# Patient Record
Sex: MALE | Race: WHITE | NOT HISPANIC OR LATINO | Employment: OTHER | URBAN - NONMETROPOLITAN AREA
[De-identification: names, ages, dates, MRNs, and addresses within clinical notes are randomized per-mention and may not be internally consistent; named-entity substitution may affect disease eponyms.]

---

## 2017-10-02 RX ORDER — AMLODIPINE BESYLATE 10 MG/1
10 TABLET ORAL DAILY
COMMUNITY
End: 2018-07-31 | Stop reason: HOSPADM

## 2017-10-02 RX ORDER — NITROGLYCERIN 0.4 MG/1
0.4 TABLET SUBLINGUAL
COMMUNITY
End: 2020-05-31 | Stop reason: SDUPTHER

## 2017-10-02 RX ORDER — ASPIRIN 81 MG/1
81 TABLET ORAL DAILY
COMMUNITY
End: 2020-05-30 | Stop reason: HOSPADM

## 2017-10-02 RX ORDER — BUMETANIDE 1 MG/1
1 TABLET ORAL 2 TIMES DAILY
COMMUNITY
End: 2017-10-03

## 2017-10-02 RX ORDER — NEBIVOLOL 5 MG/1
5 TABLET ORAL DAILY
COMMUNITY
End: 2017-10-03

## 2017-10-02 RX ORDER — LORATADINE 10 MG/1
10 CAPSULE, LIQUID FILLED ORAL DAILY
COMMUNITY
End: 2018-08-14

## 2017-10-02 RX ORDER — EPLERENONE 50 MG/1
50 TABLET, FILM COATED ORAL DAILY
COMMUNITY
End: 2017-10-03

## 2017-10-03 ENCOUNTER — OFFICE VISIT (OUTPATIENT)
Dept: CARDIOLOGY | Facility: CLINIC | Age: 77
End: 2017-10-03

## 2017-10-03 VITALS
SYSTOLIC BLOOD PRESSURE: 142 MMHG | HEIGHT: 73 IN | BODY MASS INDEX: 29.42 KG/M2 | DIASTOLIC BLOOD PRESSURE: 86 MMHG | WEIGHT: 222 LBS | HEART RATE: 80 BPM | OXYGEN SATURATION: 98 %

## 2017-10-03 DIAGNOSIS — I48.91 ATRIAL FIBRILLATION, UNSPECIFIED TYPE (HCC): ICD-10-CM

## 2017-10-03 DIAGNOSIS — R00.2 PALPITATIONS: Primary | ICD-10-CM

## 2017-10-03 DIAGNOSIS — I10 ESSENTIAL HYPERTENSION: ICD-10-CM

## 2017-10-03 DIAGNOSIS — R53.82 CHRONIC FATIGUE: ICD-10-CM

## 2017-10-03 PROCEDURE — 93000 ELECTROCARDIOGRAM COMPLETE: CPT | Performed by: INTERNAL MEDICINE

## 2017-10-03 PROCEDURE — 99203 OFFICE O/P NEW LOW 30 MIN: CPT | Performed by: INTERNAL MEDICINE

## 2017-10-03 RX ORDER — LISINOPRIL 20 MG/1
20 TABLET ORAL DAILY
COMMUNITY
End: 2018-07-31 | Stop reason: HOSPADM

## 2017-10-03 RX ORDER — MECLIZINE HYDROCHLORIDE 25 MG/1
25 TABLET ORAL 3 TIMES DAILY PRN
COMMUNITY
End: 2018-10-03 | Stop reason: ALTCHOICE

## 2017-10-03 RX ORDER — MULTIVIT-MIN/IRON/FOLIC ACID/K 18-600-40
2000 CAPSULE ORAL DAILY
COMMUNITY

## 2017-10-03 RX ORDER — EPLERENONE 50 MG/1
50 TABLET, FILM COATED ORAL 2 TIMES DAILY
COMMUNITY
End: 2018-07-31 | Stop reason: HOSPADM

## 2017-10-03 NOTE — PROGRESS NOTES
Subjective:     Encounter Date:10/03/2017      Patient ID: Faisal Joseph is a 77 y.o. male with chronic kidney disease, stage IV, hypertension, hyperlipidemia, reported atrial fibrillation who presents today for further evaluation of fatigue.    Referring Provider: Nikita Polanco MD    Reason for Referral: Fatigue    Chief Complaint: Fatigue     Palpitations    This is a recurrent problem. The problem occurs intermittently. The problem has been waxing and waning. Nothing aggravates the symptoms. Associated symptoms include dizziness, an irregular heartbeat, malaise/fatigue and weakness. Pertinent negatives include no anxiety, chest fullness, chest pain, coughing, diaphoresis, fever, nausea, numbness, shortness of breath, syncope or vomiting. He has tried nothing for the symptoms. Risk factors include being male and dyslipidemia. There is no history of drug use, heart disease or a valve disorder.   Fatigue   This is a chronic problem. The problem occurs daily. The problem has been waxing and waning. Associated symptoms include fatigue and weakness. Pertinent negatives include no abdominal pain, change in bowel habit, chest pain, chills, congestion, coughing, diaphoresis, fever, headaches, joint swelling, myalgias, nausea, neck pain, numbness, rash, urinary symptoms, vertigo, visual change or vomiting. Nothing aggravates the symptoms. He has tried nothing for the symptoms.   Hypertension   This is a chronic problem. The problem is unchanged. The problem is controlled. Associated symptoms include malaise/fatigue and palpitations. Pertinent negatives include no blurred vision, chest pain, headaches, neck pain, orthopnea, peripheral edema, PND or shortness of breath. Risk factors for coronary artery disease include dyslipidemia and male gender. Past treatments include calcium channel blockers and ACE inhibitors. The current treatment provides significant improvement. There are no compliance problems.  Hypertensive  end-organ damage includes kidney disease. There is no history of angina. Identifiable causes of hypertension include chronic renal disease.     This is a 77-year-old white male with a history of reported atrial fibrillation, chronicity unknown, hypertension, hyperlipidemia, chronic kidney disease who presents today for further evaluation of fatigue.  The patient says that he was admitted to the hospital in [place] by Dr. Polanco recently.  I am unclear exactly why the patient was admitted as he does not give a concise history today.  In any event, we received records from [place] that indicated that he had a Lexiscan nuclear stress test on 9/7/17 which was negative for ischemia and showed a normal ejection fraction.  The patient today he initially said that he was having some chest discomfort lately to my medical assistant however when I evaluated the patient, he denies any chest discomfort at any point in time.  He says that his main complaint is generalized fatigue that has been present for quite some time and is waxing and waning.  He he is difficult to obtain a history from but the best I can tell he is simply fatigue with no significant exacerbating factors.  He also says that he feels some irregularity to his pulse at times and during my examination, the patient started to lean more towards describing palpitations where he says that he feels like his heart may be racing or fluttering and he notes irregularities.  He says that at [place] he was found have a first-degree AV block.  I did ask him about his potential history of atrial fibrillation and he says that he is not aware that he has atrial fibrillation but only that he has a first-degree AV block.  He denies any history of cardiovascular disease and says that he previously saw Dr. Ybarra here and had a heart catheterization number of years ago with no obstructive disease identified.  He says that his blood pressures generally well-controlled.  He is  followed by the kidney disease clinic as he has a history of chronic renal insufficiency.  Otherwise, the patient is without complaint today.    The following portions of the patient's history were reviewed and updated as appropriate: allergies, current medications, past family history, past medical history, past social history, past surgical history and problem list.     Past Medical History:   Diagnosis Date   • Atrial fibrillation    • Chronic renal disease, stage IV    • Hyperlipidemia    • Hypertension    • Sleep apnea    • Stroke 2007     Past Surgical History:   Procedure Laterality Date   • CARDIAC CATHETERIZATION         Current Outpatient Prescriptions:   •  amLODIPine (NORVASC) 10 MG tablet, Take 10 mg by mouth Daily., Disp: , Rfl:   •  aspirin 81 MG EC tablet, Take 81 mg by mouth Daily., Disp: , Rfl:   •  Cholecalciferol (VITAMIN D) 2000 units capsule, Take  by mouth., Disp: , Rfl:   •  lisinopril (PRINIVIL,ZESTRIL) 20 MG tablet, Take 20 mg by mouth Daily., Disp: , Rfl:   •  Loratadine 10 MG capsule, Take 10 mg by mouth Daily., Disp: , Rfl:   •  magnesium oxide (MAGOX) 400 (241.3 Mg) MG tablet tablet, Take 400 mg by mouth 2 (Two) Times a Day., Disp: , Rfl:   •  meclizine (ANTIVERT) 25 MG tablet, Take 25 mg by mouth 3 (Three) Times a Day As Needed for dizziness., Disp: , Rfl:   •  nitroglycerin (NITROSTAT) 0.4 MG SL tablet, Place 0.4 mg under the tongue Every 5 (Five) Minutes As Needed for Chest Pain. Take no more than 3 doses in 15 minutes., Disp: , Rfl:   •  eplerenone (INSPRA) 50 MG tablet, Take 50 mg by mouth 2 (Two) Times a Day. Taking 1/2 tablet , Disp: , Rfl:     Allergies   Allergen Reactions   • Hydralazine Nausea Only   • Losartan Potassium Nausea Only   • Penicillins    • Spironolactone      Social History   Substance Use Topics   • Smoking status: Former Smoker     Quit date: 1984   • Smokeless tobacco: Never Used   • Alcohol use No     History reviewed. No pertinent family history.    Review  of Systems   Constitution: Positive for fatigue, weakness and malaise/fatigue. Negative for chills, diaphoresis, fever, night sweats and weight loss.   HENT: Negative for congestion and hearing loss.    Eyes: Negative for blurred vision and pain.   Cardiovascular: Positive for irregular heartbeat and palpitations. Negative for chest pain, claudication, dyspnea on exertion, leg swelling, orthopnea, paroxysmal nocturnal dyspnea and syncope.   Respiratory: Negative for cough, hemoptysis, shortness of breath and wheezing.    Endocrine: Negative for cold intolerance, heat intolerance, polydipsia and polyuria.   Hematologic/Lymphatic: Negative for adenopathy and bleeding problem. Does not bruise/bleed easily.   Skin: Negative for color change, poor wound healing and rash.   Musculoskeletal: Negative for arthritis, back pain, joint pain, joint swelling, myalgias and neck pain.   Gastrointestinal: Negative for abdominal pain, change in bowel habit, constipation, diarrhea, heartburn, hematochezia, melena, nausea and vomiting.   Genitourinary: Negative for bladder incontinence, dysuria, frequency, hematuria and nocturia.   Neurological: Positive for dizziness. Negative for focal weakness, headaches, light-headedness, loss of balance, numbness, seizures and vertigo.   Psychiatric/Behavioral: Negative for altered mental status, memory loss and substance abuse. The patient is not nervous/anxious.    Allergic/Immunologic: Negative for hives and persistent infections.       ECG 12 Lead  Date/Time: 10/3/2017 5:34 PM  Performed by: HILARY WALKER  Authorized by: HILARY WALKER   Previous ECG: no previous ECG available  Rhythm: sinus rhythm  Ectopy: atrial premature contractions  Rate: normal  BPM: 76  Conduction: 1st degree  QRS axis: normal  Other findings: PRWP  Clinical impression: abnormal ECG             Objective:     Physical Exam   Constitutional: He is oriented to person, place, and time. Vital signs are  normal. He appears well-developed and well-nourished. He is cooperative.  Non-toxic appearance. No distress.   HENT:   Head: Normocephalic and atraumatic.   Right Ear: External ear normal.   Left Ear: External ear normal.   Nose: Nose normal.   Mouth/Throat: Uvula is midline, oropharynx is clear and moist and mucous membranes are normal. Mucous membranes are not pale, not dry and not cyanotic. No oropharyngeal exudate.   Eyes: EOM and lids are normal. Pupils are equal, round, and reactive to light.   Neck: Normal range of motion. Neck supple. No hepatojugular reflux and no JVD present. Carotid bruit is not present. No tracheal deviation and no edema present. No thyroid mass and no thyromegaly present.   Cardiovascular: Normal rate, regular rhythm, S1 normal, S2 normal, normal heart sounds, intact distal pulses and normal pulses.   No extrasystoles are present. PMI is not displaced.  Exam reveals no gallop and no friction rub.    No murmur heard.  Pulses:       Radial pulses are 2+ on the right side, and 2+ on the left side.        Femoral pulses are 2+ on the right side, and 2+ on the left side.       Dorsalis pedis pulses are 2+ on the right side, and 2+ on the left side.        Posterior tibial pulses are 2+ on the right side, and 2+ on the left side.   Pulmonary/Chest: Effort normal and breath sounds normal. No accessory muscle usage. No respiratory distress. He has no wheezes. He has no rales. He exhibits no tenderness.   Abdominal: Soft. Normal appearance and bowel sounds are normal. He exhibits no distension, no abdominal bruit and no pulsatile midline mass. There is no hepatosplenomegaly. There is no tenderness.   Musculoskeletal: Normal range of motion. He exhibits no edema, tenderness or deformity.   Lymphadenopathy:     He has no cervical adenopathy.   Neurological: He is oriented to person, place, and time. He has normal strength. No cranial nerve deficit.   Skin: Skin is warm, dry and intact. No rash  "noted. He is not diaphoretic. No cyanosis or erythema. Nails show no clubbing.   Psychiatric: He has a normal mood and affect. His speech is normal and behavior is normal. Thought content normal.   Vitals reviewed.    /86 (BP Location: Left arm, Patient Position: Sitting, Cuff Size: Adult)  Pulse 80  Ht 73\" (185.4 cm)  Wt 222 lb (101 kg)  SpO2 98%  BMI 29.29 kg/m2    Data/Lab Review:     Lexiscan nuclear stress test at [facility] on 9/7/17 was negative for ischemia and ejection fraction of 57% was noted.      Assessment:          Diagnosis Plan   1. Palpitations  Holter Monitor - 24 Hour    ECG 12 Lead   2. Atrial fibrillation, unspecified type  Holter Monitor - 24 Hour   3. Chronic fatigue     4. Essential hypertension            Plan:       1.  Palpitations: The patient gives a somewhat circuitous history however it seems that most of his complaints are related to palpitations and fatigue.  His records that were sent with him indicate possibly atrial fibrillation in the past.  The patient himself denies any history of atrial fibrillation.  Therefore, I think to evaluate further, we need to place him in a 24-hour Holter monitor to see if we can identify any sort of rhythm abnormalities other than premature atrial contractions which were present on today's EKG.  We will have the patient follow-up in one month with our nurse practitioner clinic to review the results of the Holter monitor with him and make further plans.    2.  Atrial fibrillation, unspecified type: The papers that the patient brought with him today indicate a history of atrial fibrillation although he denies any personal history of atrial fibrillation.  He is not chronically anticoagulated, based on his home medications.  A Holter monitor has been ordered to evaluate further.    3.  Chronic fatigue: Is unclear exactly what to make of the patient's fatigue as he gives a somewhat vague history.  He had a recent normal " myocardial perfusion imaging study at [place], therefore I am not inclined to pursue any other invasive cardiac workup at this time.  It may be that he has some sort of arrhythmia or significant ectopy that may contribute to his fatigue.  Therefore, we will investigate further with a Holter monitor.    4.  Essential hypertension: The patient's pressure is reasonable at this time.  Continue current medications.    Follow-up: One month with our nurse practitioner clinic to review the results the patient's Holter monitor make further planning.    EMR Dragon/Transcription disclaimer: Much of this encounter note is an electronic transcription/translation of spoken language to printed text. The electronic translation of spoken language may permit erroneous, or at times, nonsensical words or phrases to be inadvertently transcribed; although I have reviewed the note for such errors, some may still exist.

## 2017-10-17 ENCOUNTER — OFFICE VISIT (OUTPATIENT)
Dept: CARDIOLOGY | Facility: CLINIC | Age: 77
End: 2017-10-17

## 2017-10-17 VITALS
WEIGHT: 220 LBS | HEIGHT: 73 IN | SYSTOLIC BLOOD PRESSURE: 140 MMHG | BODY MASS INDEX: 29.16 KG/M2 | DIASTOLIC BLOOD PRESSURE: 80 MMHG | HEART RATE: 70 BPM | RESPIRATION RATE: 18 BRPM

## 2017-10-17 DIAGNOSIS — I10 ESSENTIAL HYPERTENSION: ICD-10-CM

## 2017-10-17 DIAGNOSIS — I49.3 PVCS (PREMATURE VENTRICULAR CONTRACTIONS): ICD-10-CM

## 2017-10-17 DIAGNOSIS — R00.2 PALPITATIONS: Primary | ICD-10-CM

## 2017-10-17 DIAGNOSIS — I49.1 PREMATURE ATRIAL CONTRACTIONS: ICD-10-CM

## 2017-10-17 PROBLEM — E78.2 MIXED HYPERLIPIDEMIA: Status: ACTIVE | Noted: 2017-10-17

## 2017-10-17 PROCEDURE — 99214 OFFICE O/P EST MOD 30 MIN: CPT | Performed by: NURSE PRACTITIONER

## 2017-10-17 RX ORDER — METOPROLOL SUCCINATE 25 MG/1
25 TABLET, EXTENDED RELEASE ORAL DAILY
Qty: 90 TABLET | Refills: 3 | Status: SHIPPED | OUTPATIENT
Start: 2017-10-17 | End: 2018-07-31 | Stop reason: HOSPADM

## 2017-11-17 ENCOUNTER — OFFICE VISIT (OUTPATIENT)
Dept: CARDIOLOGY | Facility: CLINIC | Age: 77
End: 2017-11-17

## 2017-11-17 VITALS
HEART RATE: 60 BPM | BODY MASS INDEX: 29.82 KG/M2 | WEIGHT: 225 LBS | SYSTOLIC BLOOD PRESSURE: 118 MMHG | DIASTOLIC BLOOD PRESSURE: 78 MMHG | HEIGHT: 73 IN | RESPIRATION RATE: 18 BRPM

## 2017-11-17 DIAGNOSIS — I10 ESSENTIAL HYPERTENSION: ICD-10-CM

## 2017-11-17 DIAGNOSIS — I49.3 PVCS (PREMATURE VENTRICULAR CONTRACTIONS): ICD-10-CM

## 2017-11-17 DIAGNOSIS — R00.2 PALPITATIONS: Primary | ICD-10-CM

## 2017-11-17 PROCEDURE — 99213 OFFICE O/P EST LOW 20 MIN: CPT | Performed by: NURSE PRACTITIONER

## 2017-11-17 NOTE — PROGRESS NOTES
Subjective:     Encounter Date:11/17/2017      Patient ID: Faisal Joseph is a 77 y.o. male.    Chief Complaint:  Palpitations    This is a recurrent problem. The problem has been resolved. Pertinent negatives include no chest pain, coughing, dizziness, fever, malaise/fatigue, nausea, near-syncope, shortness of breath, syncope or vomiting. He has tried beta blockers for the symptoms. The treatment provided significant relief.     Patient is here for 1 month follow up after being started on metoprolol for PVCs and PACs on holter and palpitations. Patient states his palpitations have resolved. Patient is tolerating the medication without side effects, no issues with cost. Denies chest pain, shortness of breath, palpitations, orthopnea, PND, dizziness or syncope. Patient has a history of CKD followed by [facility]. Patient reports a longstanding history of hypertension, taking medications since 30. Patient has a history of a stroke. Patient also has atrial fibrillation noted in old records. Patient denies atrial fibrillation, and states he was never anticoagulated. Recent holter monitor identified palpitations related to PVCs and PACs.     The following portions of the patient's history were reviewed and updated as appropriate: allergies, current medications, past family history, past medical history, past social history, past surgical history and problem list.   Prior to Admission medications    Medication Sig Start Date End Date Taking? Authorizing Provider   amLODIPine (NORVASC) 10 MG tablet Take 10 mg by mouth Daily.   Yes Historical Provider, MD   aspirin 81 MG EC tablet Take 81 mg by mouth Daily.   Yes Historical Provider, MD   Cholecalciferol (VITAMIN D) 2000 units capsule Take  by mouth.   Yes Historical Provider, MD   eplerenone (INSPRA) 50 MG tablet Take 50 mg by mouth 2 (Two) Times a Day. Taking 1/2 tablet    Yes Historical Provider, MD   lisinopril (PRINIVIL,ZESTRIL) 20 MG tablet Take 20 mg by mouth  Daily.   Yes Historical Provider, MD   Loratadine 10 MG capsule Take 10 mg by mouth Daily.   Yes Historical Provider, MD   magnesium oxide (MAGOX) 400 (241.3 Mg) MG tablet tablet Take 400 mg by mouth 2 (Two) Times a Day.   Yes Historical Provider, MD   meclizine (ANTIVERT) 25 MG tablet Take 25 mg by mouth 3 (Three) Times a Day As Needed for dizziness.   Yes Historical Provider, MD   metoprolol succinate XL (TOPROL XL) 25 MG 24 hr tablet Take 1 tablet by mouth Daily. 10/17/17  Yes LIZ Infante   nitroglycerin (NITROSTAT) 0.4 MG SL tablet Place 0.4 mg under the tongue Every 5 (Five) Minutes As Needed for Chest Pain. Take no more than 3 doses in 15 minutes.   Yes Historical Provider, MD     Past Medical History:   Diagnosis Date   • Atrial fibrillation    • Chronic renal disease, stage IV    • Hyperlipidemia    • Hypertension    • Sleep apnea    • Stroke 2007       Review of Systems   Constitution: Negative for chills, decreased appetite, fever, malaise/fatigue, weight gain and weight loss.   HENT: Negative for nosebleeds.    Eyes: Negative for visual disturbance.   Cardiovascular: Negative for chest pain, dyspnea on exertion, leg swelling, near-syncope, orthopnea, palpitations, paroxysmal nocturnal dyspnea and syncope.   Respiratory: Negative for cough, hemoptysis, shortness of breath and snoring.    Endocrine: Negative for cold intolerance and heat intolerance.   Hematologic/Lymphatic: Negative for bleeding problem. Does not bruise/bleed easily.   Skin: Negative for rash.   Musculoskeletal: Negative for back pain and falls.   Gastrointestinal: Negative for abdominal pain, constipation, diarrhea, heartburn, melena, nausea and vomiting.   Genitourinary: Negative for hematuria.   Neurological: Negative for dizziness, headaches and light-headedness.   Psychiatric/Behavioral: Negative for altered mental status.   Allergic/Immunologic: Negative for persistent infections.       Procedures       Objective:      "Physical Exam   Constitutional: He is oriented to person, place, and time. He appears well-developed and well-nourished.   HENT:   Head: Normocephalic and atraumatic.   Eyes: Pupils are equal, round, and reactive to light.   Neck: Normal range of motion. Neck supple. No JVD present. Carotid bruit is not present.   Cardiovascular: Normal rate, regular rhythm, normal heart sounds and intact distal pulses.    Pulmonary/Chest: Effort normal and breath sounds normal.   Abdominal: Soft. Bowel sounds are normal.   Musculoskeletal: Normal range of motion.   Neurological: He is alert and oriented to person, place, and time. He has normal reflexes.   Skin: Skin is warm and dry.   Psychiatric: He has a normal mood and affect. His behavior is normal. Judgment and thought content normal.     Blood pressure 118/78, pulse 60, resp. rate 18, height 73\" (185.4 cm), weight 225 lb (102 kg).      Lab Review:       Assessment:          Diagnosis Plan   1. Palpitations     2. Essential hypertension     3. PVCs (premature ventricular contractions)            Plan:       1. Palpitations- have resolved on metoprolol. Follow up in 1 year. Patient to call if return.   2. Blood Pressure controlled  3. PVCs and PACs on holter monitor. No A-fib.          "

## 2018-07-30 ENCOUNTER — HOSPITAL ENCOUNTER (OUTPATIENT)
Facility: HOSPITAL | Age: 78
Setting detail: OBSERVATION
Discharge: HOME | End: 2018-07-31
Attending: INTERNAL MEDICINE | Admitting: NURSE PRACTITIONER

## 2018-07-30 DIAGNOSIS — R00.1 SYMPTOMATIC BRADYCARDIA: ICD-10-CM

## 2018-07-30 DIAGNOSIS — I49.1 PREMATURE ATRIAL CONTRACTIONS: ICD-10-CM

## 2018-07-30 DIAGNOSIS — I49.3 PVCS (PREMATURE VENTRICULAR CONTRACTIONS): Primary | ICD-10-CM

## 2018-07-30 DIAGNOSIS — R00.2 PALPITATIONS: ICD-10-CM

## 2018-07-30 DIAGNOSIS — R53.82 CHRONIC FATIGUE: ICD-10-CM

## 2018-07-30 PROBLEM — N18.4 STAGE 4 CHRONIC KIDNEY DISEASE: Status: ACTIVE | Noted: 2018-07-30

## 2018-07-30 LAB
ANION GAP SERPL CALCULATED.3IONS-SCNC: 10 MMOL/L (ref 4–13)
BUN BLD-MCNC: 25 MG/DL (ref 5–21)
BUN/CREAT SERPL: 13.2 (ref 7–25)
CALCIUM SPEC-SCNC: 9.3 MG/DL (ref 8.4–10.4)
CHLORIDE SERPL-SCNC: 105 MMOL/L (ref 98–110)
CO2 SERPL-SCNC: 24 MMOL/L (ref 24–31)
CREAT BLD-MCNC: 1.89 MG/DL (ref 0.5–1.4)
GFR SERPL CREATININE-BSD FRML MDRD: 35 ML/MIN/1.73
GLUCOSE BLD-MCNC: 99 MG/DL (ref 70–100)
POTASSIUM BLD-SCNC: 4.6 MMOL/L (ref 3.5–5.3)
SODIUM BLD-SCNC: 139 MMOL/L (ref 135–145)

## 2018-07-30 PROCEDURE — G0378 HOSPITAL OBSERVATION PER HR: HCPCS

## 2018-07-30 PROCEDURE — 93005 ELECTROCARDIOGRAM TRACING: CPT | Performed by: NURSE PRACTITIONER

## 2018-07-30 PROCEDURE — G0379 DIRECT REFER HOSPITAL OBSERV: HCPCS

## 2018-07-30 PROCEDURE — 99220 PR INITIAL OBSERVATION CARE/DAY 70 MINUTES: CPT | Performed by: INTERNAL MEDICINE

## 2018-07-30 PROCEDURE — 93010 ELECTROCARDIOGRAM REPORT: CPT | Performed by: INTERNAL MEDICINE

## 2018-07-30 PROCEDURE — 80048 BASIC METABOLIC PNL TOTAL CA: CPT | Performed by: NURSE PRACTITIONER

## 2018-07-30 RX ORDER — CETIRIZINE HYDROCHLORIDE 5 MG/1
5 TABLET ORAL DAILY
Status: DISCONTINUED | OUTPATIENT
Start: 2018-07-30 | End: 2018-07-31 | Stop reason: HOSPADM

## 2018-07-30 RX ORDER — ASPIRIN 81 MG/1
81 TABLET ORAL DAILY
Status: DISCONTINUED | OUTPATIENT
Start: 2018-07-30 | End: 2018-07-31 | Stop reason: HOSPADM

## 2018-07-30 RX ORDER — NITROGLYCERIN 0.4 MG/1
0.4 TABLET SUBLINGUAL
Status: DISCONTINUED | OUTPATIENT
Start: 2018-07-30 | End: 2018-07-31 | Stop reason: HOSPADM

## 2018-07-30 RX ADMIN — MAGNESIUM GLUCONATE 500 MG ORAL TABLET 400 MG: 500 TABLET ORAL at 14:50

## 2018-07-30 RX ADMIN — CETIRIZINE HYDROCHLORIDE 5 MG: 5 TABLET ORAL at 14:50

## 2018-07-31 ENCOUNTER — APPOINTMENT (OUTPATIENT)
Dept: CARDIOLOGY | Facility: HOSPITAL | Age: 78
End: 2018-07-31
Attending: INTERNAL MEDICINE

## 2018-07-31 VITALS
BODY MASS INDEX: 28.92 KG/M2 | DIASTOLIC BLOOD PRESSURE: 85 MMHG | HEART RATE: 64 BPM | WEIGHT: 218.2 LBS | TEMPERATURE: 97.5 F | RESPIRATION RATE: 18 BRPM | SYSTOLIC BLOOD PRESSURE: 141 MMHG | HEIGHT: 73 IN | OXYGEN SATURATION: 98 %

## 2018-07-31 LAB
BH CV ECHO MEAS - AO MAX PG (FULL): 0.5 MMHG
BH CV ECHO MEAS - AO MAX PG: 4.1 MMHG
BH CV ECHO MEAS - AO MEAN PG (FULL): 0 MMHG
BH CV ECHO MEAS - AO MEAN PG: 2 MMHG
BH CV ECHO MEAS - AO ROOT AREA (BSA CORRECTED): 1.4
BH CV ECHO MEAS - AO ROOT AREA: 7.5 CM^2
BH CV ECHO MEAS - AO ROOT DIAM: 3.1 CM
BH CV ECHO MEAS - AO V2 MAX: 101 CM/SEC
BH CV ECHO MEAS - AO V2 MEAN: 71.8 CM/SEC
BH CV ECHO MEAS - AO V2 VTI: 24 CM
BH CV ECHO MEAS - AVA(I,A): 3.1 CM^2
BH CV ECHO MEAS - AVA(I,D): 3.1 CM^2
BH CV ECHO MEAS - AVA(V,A): 3.2 CM^2
BH CV ECHO MEAS - AVA(V,D): 3.2 CM^2
BH CV ECHO MEAS - BSA(HAYCOCK): 2.3 M^2
BH CV ECHO MEAS - BSA: 2.2 M^2
BH CV ECHO MEAS - BZI_BMI: 28.8 KILOGRAMS/M^2
BH CV ECHO MEAS - BZI_METRIC_HEIGHT: 185.4 CM
BH CV ECHO MEAS - BZI_METRIC_WEIGHT: 98.9 KG
BH CV ECHO MEAS - EDV(CUBED): 103.8 ML
BH CV ECHO MEAS - EDV(TEICH): 102.4 ML
BH CV ECHO MEAS - EF(CUBED): 76.5 %
BH CV ECHO MEAS - EF(TEICH): 68.5 %
BH CV ECHO MEAS - ESV(CUBED): 24.4 ML
BH CV ECHO MEAS - ESV(TEICH): 32.2 ML
BH CV ECHO MEAS - FS: 38.3 %
BH CV ECHO MEAS - IVS/LVPW: 0.7
BH CV ECHO MEAS - IVSD: 0.7 CM
BH CV ECHO MEAS - LA DIMENSION: 4.3 CM
BH CV ECHO MEAS - LA/AO: 1.4
BH CV ECHO MEAS - LAT PEAK E' VEL: 11.9 CM/SEC
BH CV ECHO MEAS - LV MASS(C)D: 132.3 GRAMS
BH CV ECHO MEAS - LV MASS(C)DI: 59.3 GRAMS/M^2
BH CV ECHO MEAS - LV MAX PG: 3.6 MMHG
BH CV ECHO MEAS - LV MEAN PG: 2 MMHG
BH CV ECHO MEAS - LV V1 MAX: 94.6 CM/SEC
BH CV ECHO MEAS - LV V1 MEAN: 59.3 CM/SEC
BH CV ECHO MEAS - LV V1 VTI: 21.4 CM
BH CV ECHO MEAS - LVIDD: 4.7 CM
BH CV ECHO MEAS - LVIDS: 2.9 CM
BH CV ECHO MEAS - LVOT AREA (M): 3.5 CM^2
BH CV ECHO MEAS - LVOT AREA: 3.5 CM^2
BH CV ECHO MEAS - LVOT DIAM: 2.1 CM
BH CV ECHO MEAS - LVPWD: 1 CM
BH CV ECHO MEAS - MED PEAK E' VEL: 7.62 CM/SEC
BH CV ECHO MEAS - MV A MAX VEL: 82 CM/SEC
BH CV ECHO MEAS - MV DEC TIME: 0.22 SEC
BH CV ECHO MEAS - MV E MAX VEL: 89.7 CM/SEC
BH CV ECHO MEAS - MV E/A: 1.1
BH CV ECHO MEAS - RAP SYSTOLE: 5 MMHG
BH CV ECHO MEAS - RVSP: 20.4 MMHG
BH CV ECHO MEAS - SI(AO): 81.2 ML/M^2
BH CV ECHO MEAS - SI(CUBED): 35.6 ML/M^2
BH CV ECHO MEAS - SI(LVOT): 33.2 ML/M^2
BH CV ECHO MEAS - SI(TEICH): 31.4 ML/M^2
BH CV ECHO MEAS - SV(AO): 181.1 ML
BH CV ECHO MEAS - SV(CUBED): 79.4 ML
BH CV ECHO MEAS - SV(LVOT): 74.1 ML
BH CV ECHO MEAS - SV(TEICH): 70.1 ML
BH CV ECHO MEAS - TR MAX VEL: 196 CM/SEC
BH CV ECHO MEASUREMENTS AVERAGE E/E' RATIO: 9.19
LEFT ATRIUM VOLUME INDEX: 30.4 ML/M2
LEFT ATRIUM VOLUME: 67.8 CM3
MAXIMAL PREDICTED HEART RATE: 143 BPM
STRESS TARGET HR: 122 BPM

## 2018-07-31 PROCEDURE — 99217 PR OBSERVATION CARE DISCHARGE MANAGEMENT: CPT | Performed by: INTERNAL MEDICINE

## 2018-07-31 PROCEDURE — G0378 HOSPITAL OBSERVATION PER HR: HCPCS

## 2018-07-31 PROCEDURE — 93306 TTE W/DOPPLER COMPLETE: CPT

## 2018-07-31 PROCEDURE — 93306 TTE W/DOPPLER COMPLETE: CPT | Performed by: INTERNAL MEDICINE

## 2018-07-31 RX ADMIN — CETIRIZINE HYDROCHLORIDE 5 MG: 5 TABLET ORAL at 08:07

## 2018-07-31 RX ADMIN — ASPIRIN 81 MG: 81 TABLET ORAL at 08:07

## 2018-08-14 ENCOUNTER — OFFICE VISIT (OUTPATIENT)
Dept: CARDIOLOGY | Facility: CLINIC | Age: 78
End: 2018-08-14

## 2018-08-14 VITALS
SYSTOLIC BLOOD PRESSURE: 158 MMHG | HEIGHT: 73 IN | BODY MASS INDEX: 28.89 KG/M2 | DIASTOLIC BLOOD PRESSURE: 84 MMHG | WEIGHT: 218 LBS | HEART RATE: 72 BPM

## 2018-08-14 DIAGNOSIS — R00.1 SYMPTOMATIC BRADYCARDIA: ICD-10-CM

## 2018-08-14 DIAGNOSIS — I63.9 CEREBROVASCULAR ACCIDENT (CVA), UNSPECIFIED MECHANISM (HCC): ICD-10-CM

## 2018-08-14 DIAGNOSIS — R00.2 PALPITATIONS: Primary | ICD-10-CM

## 2018-08-14 DIAGNOSIS — I10 ESSENTIAL HYPERTENSION: ICD-10-CM

## 2018-08-14 PROCEDURE — 99213 OFFICE O/P EST LOW 20 MIN: CPT | Performed by: PHYSICIAN ASSISTANT

## 2018-08-14 PROCEDURE — 93000 ELECTROCARDIOGRAM COMPLETE: CPT | Performed by: PHYSICIAN ASSISTANT

## 2018-08-14 RX ORDER — LISINOPRIL 20 MG/1
20 TABLET ORAL 2 TIMES DAILY
Status: ON HOLD | COMMUNITY
End: 2020-06-16 | Stop reason: SDUPTHER

## 2018-08-14 RX ORDER — EPLERENONE 50 MG/1
50 TABLET, FILM COATED ORAL DAILY
Status: ON HOLD | COMMUNITY
End: 2020-06-09

## 2018-08-14 NOTE — PROGRESS NOTES
Subjective:     Encounter Date:08/14/2018      Patient ID: Faisal Joseph is a 77 y.o. male w hx of HTN, CVA who [facility]-up after hospitalization for symptomatic bradycardia. He notes that he has felt a little better since discharge. He continues with chronic dizziness, he reports due to stroke history. He denies any chest pain. He endorses he continues with palpitations every day, unchanged. He denies any syncope or similar complaint. He reports he has chronic, stable exertional dyspnea x  3 years. He notes his blood pressure has been averaging systolic in 150s.     Chief Complaint:  Palpitations    This is a chronic problem. The current episode started more than 1 year ago. The problem occurs 2 to 4 times per day. The problem has been unchanged. Nothing aggravates the symptoms. Associated symptoms include dizziness. Pertinent negatives include no chest pain, irregular heartbeat, malaise/fatigue, nausea, near-syncope, shortness of breath, syncope or vomiting. He has tried beta blockers for the symptoms. The treatment provided mild relief. Risk factors include being male and family history. There is no history of heart disease.   Hypertension   This is a chronic problem. The current episode started more than 1 year ago. The problem is unchanged. The problem is uncontrolled. Associated symptoms include palpitations. Pertinent negatives include no chest pain, malaise/fatigue, orthopnea, PND or shortness of breath. There are no associated agents to hypertension. Risk factors for coronary artery disease include male gender and family history. Past treatments include ACE inhibitors. Current antihypertension treatment includes ACE inhibitors. The current treatment provides moderate improvement. Compliance problems include exercise and diet.  There is no history of heart failure. Identifiable causes of hypertension include chronic renal disease.       The following portions of the  patient's history were reviewed and updated as appropriate: allergies, current medications, past family history, past medical history, past social history, past surgical history and problem list.    Review of Systems   Constitution: Negative for malaise/fatigue and weight gain.   Cardiovascular: Positive for dyspnea on exertion and palpitations. Negative for chest pain, claudication, irregular heartbeat, leg swelling, near-syncope, orthopnea, paroxysmal nocturnal dyspnea and syncope.   Respiratory: Negative for hemoptysis and shortness of breath.    Hematologic/Lymphatic: Negative for bleeding problem. Does not bruise/bleed easily.   Skin: Negative for poor wound healing.   Musculoskeletal: Negative for myalgias.   Gastrointestinal: Negative for melena, nausea and vomiting.   Genitourinary: Negative for hematuria.   Neurological: Positive for dizziness and light-headedness. Negative for focal weakness.   Psychiatric/Behavioral: Negative for memory loss.   All other systems reviewed and are negative.        ECG 12 Lead  Date/Time: 8/14/2018 9:42 AM  Performed by: TONI UMAÑA  Authorized by: TONI UMAÑA   Comparison: compared with previous ECG from 7/30/2018  Similar to previous ECG  Rhythm: sinus rhythm  Ectopy: atrial premature contractions  Rate: normal  QRS axis: right  Clinical impression: abnormal ECG               Objective:     Physical Exam   Constitutional: He is oriented to person, place, and time. He appears well-developed and well-nourished.   HENT:   Head: Normocephalic and atraumatic.   Eyes: Pupils are equal, round, and reactive to light. Conjunctivae and EOM are normal.   Neck: Normal range of motion. Neck supple. No JVD present.   Cardiovascular: Normal rate, S1 normal, S2 normal, normal heart sounds, intact distal pulses and normal pulses.  An irregular rhythm present.   No murmur heard.  Pulmonary/Chest: Effort normal and breath sounds normal. No respiratory distress.   Abdominal: Soft. Bowel  "sounds are normal. He exhibits no distension.   Musculoskeletal: He exhibits no edema or tenderness.   Neurological: He is alert and oriented to person, place, and time.   Skin: Skin is warm and dry.   Psychiatric: He has a normal mood and affect. Judgment normal.   Vitals reviewed.      /84   Pulse 72   Ht 185.4 cm (73\")   Wt 98.9 kg (218 lb)   BMI 28.76 kg/m²     Current Outpatient Prescriptions:   •  aspirin 81 MG EC tablet, Take 81 mg by mouth Daily., Disp: , Rfl:   •  Cholecalciferol (VITAMIN D) 2000 units capsule, Take  by mouth Daily., Disp: , Rfl:   •  eplerenone (INSPRA) 50 MG tablet, Take 50 mg by mouth Daily., Disp: , Rfl:   •  lisinopril (PRINIVIL,ZESTRIL) 20 MG tablet, Take 20 mg by mouth Daily., Disp: , Rfl:   •  magnesium oxide (MAGOX) 400 (241.3 Mg) MG tablet tablet, Take 400 mg by mouth 1 (One) Time., Disp: , Rfl:   •  meclizine (ANTIVERT) 25 MG tablet, Take 25 mg by mouth 3 (Three) Times a Day As Needed for dizziness., Disp: , Rfl:   •  nitroglycerin (NITROSTAT) 0.4 MG SL tablet, Place 0.4 mg under the tongue Every 5 (Five) Minutes As Needed for Chest Pain. Take no more than 3 doses in 15 minutes., Disp: , Rfl:   Past Medical History:   Diagnosis Date   • Atrial fibrillation (CMS/HCC)    • Chronic renal disease, stage IV (CMS/HCC)    • Hyperlipidemia    • Hypertension    • Sleep apnea    • Stroke (CMS/HCC) 2007     Past Surgical History:   Procedure Laterality Date   • CARDIAC CATHETERIZATION       Allergies   Allergen Reactions   • Hydralazine Nausea Only   • Losartan Potassium Nausea Only   • Penicillins Hives   • Spironolactone Unknown (See Comments)     Pt unsure of reaction     Social History     Social History   • Marital status:      Spouse name: N/A   • Number of children: N/A   • Years of education: N/A     Occupational History   • Not on file.     Social History Main Topics   • Smoking status: Former Smoker     Quit date: 1984   • Smokeless tobacco: Never Used   • " Alcohol use No   • Drug use: No   • Sexual activity: Defer     Other Topics Concern   • Not on file     Social History Narrative   • No narrative on file     Family History   Problem Relation Age of Onset   • Cancer Mother    • Cancer Father      Patient's Body mass index is 28.76 kg/m². BMI is above normal parameters. Recommendations include: educational material.  Current outpatient and discharge medications have been reconciled for the patient.  Reviewed by: Tamar Willoughby PA-C        Assessment:          Diagnosis Plan   1. Palpitations      with frequent PACs   2. Essential hypertension      Poorly controlled   3. Symptomatic bradycardia      bradycardia appears to have improved since holding BB          Plan:     1. I tried to increase patient's Lisinopril to 40 mg, but he tells me he wants his kidney doctor to adjust his anti-hypertensives and will not let me do so. I have instructed him to continue monitoring this and follow-up with nephrology regarding this issue next month as scheduled.   2. Continue MCOT- patient has 2 weeks left. Further recommendations pending this report.   3. Follow-up as scheduled with Dr. Brice in October, unless needed sooner  4. Verbalized understanding of instructions.

## 2018-08-14 NOTE — PATIENT INSTRUCTIONS
Heart-Healthy Eating Plan  Many factors influence your heart health, including eating and exercise habits. Heart (coronary) risk increases with abnormal blood fat (lipid) levels. Heart-healthy meal planning includes limiting unhealthy fats, increasing healthy fats, and making other small dietary changes. This includes maintaining a healthy body weight to help keep lipid levels within a normal range.  What is my plan?  Your health care provider recommends that you:  · Get no more than _________% of the total calories in your daily diet from fat.  · Limit your intake of saturated fat to less than _________% of your total calories each day.  · Limit the amount of cholesterol in your diet to less than _________ mg per day.    What types of fat should I choose?  · Choose healthy fats more often. Choose monounsaturated and polyunsaturated fats, such as olive oil and canola oil, flaxseeds, walnuts, almonds, and seeds.  · Eat more omega-3 fats. Good choices include salmon, mackerel, sardines, tuna, flaxseed oil, and ground flaxseeds. Aim to eat fish at least two times each week.  · Limit saturated fats. Saturated fats are primarily found in animal products, such as meats, butter, and cream. Plant sources of saturated fats include palm oil, palm kernel oil, and coconut oil.  · Avoid foods with partially hydrogenated oils in them. These contain trans fats. Examples of foods that contain trans fats are stick margarine, some tub margarines, cookies, crackers, and other baked goods.  What general guidelines do I need to follow?  · Check food labels carefully to identify foods with trans fats or high amounts of saturated fat.  · Fill one half of your plate with vegetables and green salads. Eat 4-5 servings of vegetables per day. A serving of vegetables equals 1 cup of raw leafy vegetables, ½ cup of raw or cooked cut-up vegetables, or ½ cup of vegetable juice.  · Fill one fourth of your plate with whole grains. Look for the word  "\"whole\" as the first word in the ingredient list.  · Fill one fourth of your plate with lean protein foods.  · Eat 4-5 servings of fruit per day. A serving of fruit equals one medium whole fruit, ¼ cup of dried fruit, ½ cup of fresh, frozen, or canned fruit, or ½ cup of 100% fruit juice.  · Eat more foods that contain soluble fiber. Examples of foods that contain this type of fiber are apples, broccoli, carrots, beans, peas, and barley. Aim to get 20-30 g of fiber per day.  · Eat more home-cooked food and less restaurant, buffet, and fast food.  · Limit or avoid alcohol.  · Limit foods that are high in starch and sugar.  · Avoid fried foods.  · Cook foods by using methods other than frying. Baking, boiling, grilling, and broiling are all great options. Other fat-reducing suggestions include:  ? Removing the skin from poultry.  ? Removing all visible fats from meats.  ? Skimming the fat off of stews, soups, and gravies before serving them.  ? Steaming vegetables in water or broth.  · Lose weight if you are overweight. Losing just 5-10% of your initial body weight can help your overall health and prevent diseases such as diabetes and heart disease.  · Increase your consumption of nuts, legumes, and seeds to 4-5 servings per week. One serving of dried beans or legumes equals ½ cup after being cooked, one serving of nuts equals 1½ ounces, and one serving of seeds equals ½ ounce or 1 tablespoon.  · You may need to monitor your salt (sodium) intake, especially if you have high blood pressure. Talk with your health care provider or dietitian to get more information about reducing sodium.  What foods can I eat?  Grains    Breads, including Maltese, white, jeevan, wheat, raisin, rye, oatmeal, and Italian. Tortillas that are neither fried nor made with lard or trans fat. Low-fat rolls, including hotdog and hamburger buns and English muffins. Biscuits. Muffins. Waffles. Pancakes. Light popcorn. Whole-grain cereals. Flatbread. " Odette toast. Pretzels. Breadsticks. Rusks. Low-fat snacks and crackers, including oyster, saltine, matzo, doris, animal, and rye. Rice and pasta, including brown rice and those that are made with whole wheat.  Vegetables  All vegetables.  Fruits  All fruits, but limit coconut.  Meats and Other Protein Sources  Lean, well-trimmed beef, veal, pork, and lamb. Chicken and turkey without skin. All fish and shellfish. Wild duck, rabbit, pheasant, and venison. Egg whites or low-cholesterol egg substitutes. Dried beans, peas, lentils, and tofu. Seeds and most nuts.  Dairy  Low-fat or nonfat cheeses, including ricotta, string, and mozzarella. Skim or 1% milk that is liquid, powdered, or evaporated. Buttermilk that is made with low-fat milk. Nonfat or low-fat yogurt.  Beverages  Mineral water. Diet carbonated beverages.  Sweets and Desserts  Sherbets and fruit ices. Honey, jam, marmalade, jelly, and syrups. Meringues and gelatins. Pure sugar candy, such as hard candy, jelly beans, gumdrops, mints, marshmallows, and small amounts of dark chocolate. Maynor food cake.  Eat all sweets and desserts in moderation.  Fats and Oils  Nonhydrogenated (trans-free) margarines. Vegetable oils, including soybean, sesame, sunflower, olive, peanut, safflower, corn, canola, and cottonseed. Salad dressings or mayonnaise that are made with a vegetable oil. Limit added fats and oils that you use for cooking, baking, salads, and as spreads.  Other  Cocoa powder. Coffee and tea. All seasonings and condiments.  The items listed above may not be a complete list of recommended foods or beverages. Contact your dietitian for more options.  What foods are not recommended?  Grains  Breads that are made with saturated or trans fats, oils, or whole milk. Croissants. Butter rolls. Cheese breads. Sweet rolls. Donuts. Buttered popcorn. Chow mein noodles. High-fat crackers, such as cheese or butter crackers.  Meats and Other Protein Sources  Fatty meats, such  as hotdogs, short ribs, sausage, spareribs, jackson, ribeye roast or steak, and mutton. High-fat deli meats, such as salami and bologna. Caviar. Domestic duck and goose. Organ meats, such as kidney, liver, sweetbreads, brains, gizzard, chitterlings, and heart.  Dairy  Cream, sour cream, cream cheese, and creamed cottage cheese. Whole milk cheeses, including blue (carmen), Cory Rolando, Brie, Gianluca, American, Havarti, Swiss, cheddar, Camembert, and [place]. Whole or 2% milk that is liquid, evaporated, or condensed. Whole buttermilk. Cream sauce or high-fat cheese sauce. Yogurt that is made from whole milk.  Beverages  Regular sodas and drinks with added sugar.  Sweets and Desserts  Frosting. Pudding. Cookies. Cakes other than pattie food cake. Candy that has milk chocolate or white chocolate, hydrogenated fat, butter, coconut, or unknown ingredients. Buttered syrups. Full-fat ice cream or ice cream drinks.  Fats and Oils  Gravy that has suet, meat fat, or shortening. Cocoa butter, hydrogenated oils, palm oil, coconut oil, palm kernel oil. These can often be found in baked products, candy, fried foods, nondairy creamers, and whipped toppings. Solid fats and shortenings, including jackson fat, salt pork, lard, and butter. Nondairy cream substitutes, such as coffee creamers and sour cream substitutes. Salad dressings that are made of unknown oils, cheese, or sour cream.  The items listed above may not be a complete list of foods and beverages to avoid. Contact your dietitian for more information.  This information is not intended to replace advice given to you by your health care provider. Make sure you discuss any questions you have with your health care provider.  Document Released: 09/26/2009 Document Revised: 07/07/2017 Document Reviewed: 06/11/2015  [employer] Interactive Patient Education © 2018 [employer]    Exercising to Lose Weight  Exercising can help you to lose weight. In order to lose weight through exercise,  you need to do vigorous-intensity exercise. You can tell that you are exercising with vigorous intensity if you are breathing very hard and fast and cannot hold a conversation while exercising.  Moderate-intensity exercise helps to maintain your current weight. You can tell that you are exercising at a moderate level if you have a higher heart rate and faster breathing, but you are still able to hold a conversation.  How often should I exercise?  Choose an activity that you enjoy and set realistic goals. Your health care provider can help you to make an activity plan that works for you. Exercise regularly as directed by your health care provider. This may include:  · Doing resistance training twice each week, such as:  ? Push-ups.  ? Sit-ups.  ? Lifting weights.  ? Using resistance bands.  · Doing a given intensity of exercise for a given amount of time. Choose from these options:  ? 150 minutes of moderate-intensity exercise every week.  ? 75 minutes of vigorous-intensity exercise every week.  ? A mix of moderate-intensity and vigorous-intensity exercise every week.    Children, pregnant women, people who are out of shape, people who are overweight, and older adults may need to consult a health care provider for individual recommendations. If you have any sort of medical condition, be sure to consult your health care provider before starting a new exercise program.  What are some activities that can help me to lose weight?  · Walking at a rate of at least 4.5 miles an hour.  · Jogging or running at a rate of 5 miles per hour.  · Biking at a rate of at least 10 miles per hour.  · Lap swimming.  · Roller-skating or in-line skating.  · Cross-country skiing.  · Vigorous competitive sports, such as football, basketball, and soccer.  · Jumping rope.  · Aerobic dancing.  How can I be more active in my day-to-day activities?  · Use the stairs instead of the elevator.  · Take a walk during your lunch break.  · If you drive,  park your car farther away from work or school.  · If you take public transportation, get off one stop early and walk the rest of the way.  · Make all of your phone calls while standing up and walking around.  · Get up, stretch, and walk around every 30 minutes throughout the day.  What guidelines should I follow while exercising?  · Do not exercise so much that you hurt yourself, feel dizzy, or get very short of breath.  · Consult your health care provider prior to starting a new exercise program.  · Wear comfortable clothes and shoes with good support.  · Drink plenty of water while you exercise to prevent dehydration or heat stroke. Body water is lost during exercise and must be replaced.  · Work out until you breathe faster and your heart beats faster.  This information is not intended to replace advice given to you by your health care provider. Make sure you discuss any questions you have with your health care provider.  Document Released: 01/20/2012 Document Revised: 05/25/2017 Document Reviewed: 05/21/2015  [employer] Interactive Patient Education © 2018 [employer]

## 2018-10-03 ENCOUNTER — OFFICE VISIT (OUTPATIENT)
Dept: CARDIOLOGY | Facility: CLINIC | Age: 78
End: 2018-10-03

## 2018-10-03 VITALS
HEART RATE: 70 BPM | DIASTOLIC BLOOD PRESSURE: 98 MMHG | OXYGEN SATURATION: 99 % | BODY MASS INDEX: 29.8 KG/M2 | HEIGHT: 72 IN | WEIGHT: 220 LBS | SYSTOLIC BLOOD PRESSURE: 140 MMHG

## 2018-10-03 DIAGNOSIS — I10 ESSENTIAL HYPERTENSION: Primary | ICD-10-CM

## 2018-10-03 DIAGNOSIS — R00.1 SYMPTOMATIC BRADYCARDIA: ICD-10-CM

## 2018-10-03 DIAGNOSIS — R00.2 PALPITATIONS: ICD-10-CM

## 2018-10-03 PROCEDURE — 93000 ELECTROCARDIOGRAM COMPLETE: CPT | Performed by: INTERNAL MEDICINE

## 2018-10-03 PROCEDURE — 99214 OFFICE O/P EST MOD 30 MIN: CPT | Performed by: INTERNAL MEDICINE

## 2018-10-03 RX ORDER — AMLODIPINE BESYLATE 5 MG/1
5 TABLET ORAL DAILY
Qty: 30 TABLET | Refills: 11 | Status: SHIPPED | OUTPATIENT
Start: 2018-10-03 | End: 2019-10-31 | Stop reason: SDUPTHER

## 2018-10-03 RX ORDER — LORATADINE 10 MG/1
1 CAPSULE, LIQUID FILLED ORAL DAILY
COMMUNITY
End: 2019-08-25 | Stop reason: HOSPADM

## 2018-10-03 NOTE — PROGRESS NOTES
Subjective:     Encounter Date:10/03/2018      Patient ID: Faisal Joseph is a 78 y.o. male with history of PACs, PVCs, hypertension with rather recent hospitalization with bradycardia and hypotension, here for follow-up.    Chief Complaint: Follow-up    Palpitations    This is a recurrent problem. The problem occurs intermittently. The problem has been waxing and waning. Nothing aggravates the symptoms. Associated symptoms include malaise/fatigue. Pertinent negatives include no chest pain, coughing, diaphoresis, dizziness, fever, nausea, numbness, shortness of breath, syncope, vomiting or weakness. He has tried nothing for the symptoms. Risk factors include being male. There is no history of hyperthyroidism.   Hypertension   This is a chronic problem. The problem has been waxing and waning since onset. The problem is uncontrolled. Associated symptoms include malaise/fatigue and palpitations. Pertinent negatives include no chest pain, headaches, orthopnea, peripheral edema, PND or shortness of breath. Past treatments include beta blockers. Current antihypertension treatment includes diuretics and ACE inhibitors. The current treatment provides mild improvement. There are no compliance problems.  There is no history of angina, CVA or heart failure.     This patient presents today for follow-up.  He says that overall he has been doing reasonably well but he says that he has had some good days and bad days.  When asked further about this, the patient says that he will have occasional palpitations.  He says that he becomes very frightened during these episodes and thinks that his heart may stop.  He checks his pulse and notices 3 or 4 beats out of every minute or so that he says are hard beats and seems somewhat off cycle.  These typically last for a few hours and then are gone and he has days in between where he has absolutely no symptoms.  He has worn a monitor and is noted to have PVCs and PACs.  The patient says  that during these episodes, he can feel some what fatigue but he also says that he may be simply worried about his symptoms and may be more fatigue secondary to this.  He denies any syncopal episodes.  He did have bradycardia which was present during the hospital stay and metoprolol was discontinued.  At that time, most of his blood pressure medications were discontinued to see was relatively hypotensive as well.  Since that time, he has started back on eplerenone and lisinopril.  He says that his blood pressure has never been under as good control as it was previously but he has certainly had no episodes of low blood pressure since his hospital stay      Current Outpatient Prescriptions:   •  aspirin 81 MG EC tablet, Take 81 mg by mouth Daily., Disp: , Rfl:   •  Cholecalciferol (VITAMIN D) 2000 units capsule, Take  by mouth Daily., Disp: , Rfl:   •  eplerenone (INSPRA) 50 MG tablet, Take 50 mg by mouth Daily., Disp: , Rfl:   •  lisinopril (PRINIVIL,ZESTRIL) 20 MG tablet, Take 20 mg by mouth 2 (Two) Times a Day., Disp: , Rfl:   •  Loratadine 10 MG capsule, Take 1 tablet by mouth Daily., Disp: , Rfl:   •  magnesium oxide (MAGOX) 400 (241.3 Mg) MG tablet tablet, Take 400 mg by mouth 1 (One) Time., Disp: , Rfl:   •  nitroglycerin (NITROSTAT) 0.4 MG SL tablet, Place 0.4 mg under the tongue Every 5 (Five) Minutes As Needed for Chest Pain. Take no more than 3 doses in 15 minutes., Disp: , Rfl:   •  amLODIPine (NORVASC) 5 MG tablet, Take 1 tablet by mouth Daily., Disp: 30 tablet, Rfl: 11    Allergies   Allergen Reactions   • Hydralazine Nausea Only   • Losartan Potassium Nausea Only   • Penicillins Hives   • Spironolactone Unknown (See Comments)     Pt unsure of reaction     Social History   Substance Use Topics   • Smoking status: Former Smoker     Quit date: 1984   • Smokeless tobacco: Never Used   • Alcohol use No     Review of Systems   Constitution: Positive for malaise/fatigue. Negative for chills, diaphoresis,  fever, weakness, night sweats and weight loss.   Cardiovascular: Positive for palpitations. Negative for chest pain, claudication, dyspnea on exertion, leg swelling, orthopnea, paroxysmal nocturnal dyspnea and syncope.   Respiratory: Negative for cough, hemoptysis, shortness of breath and wheezing.    Endocrine: Negative for cold intolerance and heat intolerance.   Hematologic/Lymphatic: Negative for bleeding problem. Does not bruise/bleed easily.   Gastrointestinal: Negative for abdominal pain, hematemesis, hematochezia, melena, nausea and vomiting.   Genitourinary: Negative for dysuria and hematuria.   Neurological: Positive for light-headedness. Negative for dizziness, headaches, loss of balance, numbness, paresthesias and seizures.         ECG 12 Lead  Date/Time: 10/3/2018 10:57 AM  Performed by: HILARY WALKER  Authorized by: HILARY WALKER   Comparison: compared with previous ECG from 8/14/2018  Similar to previous ECG  Comparison to previous ECG: PACs no longer present  Rhythm: sinus rhythm  Rate: normal  BPM: 65  Conduction: conduction normal  QRS axis: normal  Clinical impression: abnormal ECG  Comments: NSTT               Objective:     Physical Exam   Constitutional: He is oriented to person, place, and time. He appears well-developed and well-nourished. No distress.   HENT:   Head: Normocephalic and atraumatic.   Mouth/Throat: Oropharynx is clear and moist.   Eyes: Pupils are equal, round, and reactive to light. EOM are normal.   Neck: Normal range of motion. Neck supple. No JVD present. No thyromegaly present.   Cardiovascular: Normal rate, regular rhythm, S1 normal, S2 normal, normal heart sounds and intact distal pulses.  Exam reveals no gallop and no friction rub.    No murmur heard.  Pulmonary/Chest: Effort normal and breath sounds normal.   Abdominal: Soft. Bowel sounds are normal. He exhibits no distension. There is no tenderness.   Musculoskeletal: Normal range of motion. He  "exhibits no edema.   Neurological: He is alert and oriented to person, place, and time. No cranial nerve deficit.   Skin: Skin is warm and dry. No rash noted. No cyanosis or erythema. Nails show no clubbing.   Psychiatric: He has a normal mood and affect.   Vitals reviewed.    /98 (BP Location: Left arm, Patient Position: Sitting)   Pulse 70   Ht 182.9 cm (72\")   Wt 99.8 kg (220 lb)   SpO2 99%   BMI 29.84 kg/m²     Lab Review:     [facility] [place]  · A relatively benign monitor study.  · Average heart rate 71 bpm  · The predominant rhythm is normal sinus rhythm.  · PACs and PVCs are noted during the monitoring period.  · One episode of lightheadedness and dizziness was reported in this happened during a time of sinus rhythm with a PAC the tracing.  · All other manual entries correlated with sinus rhythm and either PACs or PVCs.  · There were no significant arrhythmias identified on this cardiac monitor.    Echo 7/31/18:  · Left ventricular systolic function is normal. Estimated EF appears to be in the range of 56 - 60%.  · Left atrial cavity size is dilated.  · Trace mitral valve regurgitation is present.  · Physiologic tricuspid valve regurgitation is present. Estimated right ventricular systolic pressure from tricuspid regurgitation is normal (<35 mmHg).           Assessment:          Diagnosis Plan   1. Essential hypertension  amLODIPine (NORVASC) 5 MG tablet   2. Palpitations  ECG 12 Lead   3. Symptomatic bradycardia            Plan:       1.  Essential hypertension: The patient says that his blood pressure has been elevated since being out of hospital, even more so than today's reading.  Therefore, I think that he needs further blood pressure control and I will add back amlodipine, starting at 5 mg daily.  I would recommend him to stay off of beta blockers as he has had bradycardia on beta blockers in the past at that time was symptomatic.    2. Palpitations: We did discuss at length that this patient " has never been identified as having any significant cardiac arrhythmia but does have episodes of PACs and PVCs.  We did discuss that these, in general, are considered to be benign.  It does not seem that he is having the frequency, at this particular time, that would be worrisome.  He says that he simply does not like the feeling of having palpitations but with the knowledge that in general, palpitations are not thought to be harmful (given the knowledge of isolated PVCs and PACs), he seems to be somewhat encouraged.    3.  Symptomatically bradycardia: The patient's cardiac monitor showed an average heart rate is 71 bpm.  At this time, I would recommend avoiding beta blocker therapy as he has had a history of symptomatic bradycardia.  However, there continues to be no indication for a pacemaker at this time.    Patient's Body mass index is 29.84 kg/m². BMI is above normal parameters. Recommendations include: exercise counseling and nutrition counseling.    Follow-up: 6 months unless needed sooner

## 2019-08-25 ENCOUNTER — HOSPITAL ENCOUNTER (EMERGENCY)
Facility: HOSPITAL | Age: 79
Discharge: HOME | End: 2019-08-25
Attending: EMERGENCY MEDICINE | Admitting: EMERGENCY MEDICINE

## 2019-08-25 ENCOUNTER — APPOINTMENT (OUTPATIENT)
Dept: CT IMAGING | Facility: HOSPITAL | Age: 79
End: 2019-08-25

## 2019-08-25 VITALS
OXYGEN SATURATION: 98 % | TEMPERATURE: 97.9 F | HEIGHT: 72 IN | DIASTOLIC BLOOD PRESSURE: 91 MMHG | RESPIRATION RATE: 15 BRPM | SYSTOLIC BLOOD PRESSURE: 160 MMHG | WEIGHT: 212 LBS | BODY MASS INDEX: 28.71 KG/M2 | HEART RATE: 62 BPM

## 2019-08-25 DIAGNOSIS — R20.2 PARESTHESIA: Primary | ICD-10-CM

## 2019-08-25 LAB
ANION GAP SERPL CALCULATED.3IONS-SCNC: 5 MMOL/L (ref 4–13)
APTT PPP: 30.6 SECONDS (ref 24.1–35)
BASOPHILS # BLD AUTO: 0.04 10*3/MM3 (ref 0–0.2)
BASOPHILS NFR BLD AUTO: 0.7 % (ref 0–1.5)
BUN BLD-MCNC: 39 MG/DL (ref 5–21)
BUN/CREAT SERPL: 16.2 (ref 7–25)
CALCIUM SPEC-SCNC: 8.9 MG/DL (ref 8.4–10.4)
CHLORIDE SERPL-SCNC: 108 MMOL/L (ref 98–110)
CK SERPL-CCNC: 109 U/L (ref 0–203)
CO2 SERPL-SCNC: 23 MMOL/L (ref 24–31)
CREAT BLD-MCNC: 2.41 MG/DL (ref 0.5–1.4)
DEPRECATED RDW RBC AUTO: 40.7 FL (ref 37–54)
EOSINOPHIL # BLD AUTO: 0.25 10*3/MM3 (ref 0–0.4)
EOSINOPHIL NFR BLD AUTO: 4.6 % (ref 0.3–6.2)
ERYTHROCYTE [DISTWIDTH] IN BLOOD BY AUTOMATED COUNT: 12.6 % (ref 12.3–15.4)
GFR SERPL CREATININE-BSD FRML MDRD: 26 ML/MIN/1.73
GLUCOSE BLD-MCNC: 91 MG/DL (ref 70–100)
GLUCOSE BLDC GLUCOMTR-MCNC: 94 MG/DL (ref 70–130)
HCT VFR BLD AUTO: 38.7 % (ref 37.5–51)
HGB BLD-MCNC: 13.5 G/DL (ref 13–17.7)
IMM GRANULOCYTES # BLD AUTO: 0.02 10*3/MM3 (ref 0–0.05)
IMM GRANULOCYTES NFR BLD AUTO: 0.4 % (ref 0–0.5)
INR PPP: 0.96 (ref 0.91–1.09)
LYMPHOCYTES # BLD AUTO: 0.91 10*3/MM3 (ref 0.7–3.1)
LYMPHOCYTES NFR BLD AUTO: 16.7 % (ref 19.6–45.3)
MAGNESIUM SERPL-MCNC: 2.1 MG/DL (ref 1.4–2.2)
MCH RBC QN AUTO: 30.6 PG (ref 26.6–33)
MCHC RBC AUTO-ENTMCNC: 34.9 G/DL (ref 31.5–35.7)
MCV RBC AUTO: 87.8 FL (ref 79–97)
MONOCYTES # BLD AUTO: 0.44 10*3/MM3 (ref 0.1–0.9)
MONOCYTES NFR BLD AUTO: 8.1 % (ref 5–12)
NEUTROPHILS # BLD AUTO: 3.79 10*3/MM3 (ref 1.7–7)
NEUTROPHILS NFR BLD AUTO: 69.5 % (ref 42.7–76)
NRBC BLD AUTO-RTO: 0 /100 WBC (ref 0–0.2)
PLATELET # BLD AUTO: 153 10*3/MM3 (ref 140–450)
PMV BLD AUTO: 10.6 FL (ref 6–12)
POTASSIUM BLD-SCNC: 4.6 MMOL/L (ref 3.5–5.3)
PROTHROMBIN TIME: 13.1 SECONDS (ref 11.9–14.6)
RBC # BLD AUTO: 4.41 10*6/MM3 (ref 4.14–5.8)
SODIUM BLD-SCNC: 136 MMOL/L (ref 135–145)
TROPONIN I SERPL-MCNC: <0.012 NG/ML (ref 0–0.03)
WBC NRBC COR # BLD: 5.45 10*3/MM3 (ref 3.4–10.8)

## 2019-08-25 PROCEDURE — 85610 PROTHROMBIN TIME: CPT | Performed by: EMERGENCY MEDICINE

## 2019-08-25 PROCEDURE — 82962 GLUCOSE BLOOD TEST: CPT

## 2019-08-25 PROCEDURE — 99284 EMERGENCY DEPT VISIT MOD MDM: CPT

## 2019-08-25 PROCEDURE — 83735 ASSAY OF MAGNESIUM: CPT | Performed by: EMERGENCY MEDICINE

## 2019-08-25 PROCEDURE — 85025 COMPLETE CBC W/AUTO DIFF WBC: CPT | Performed by: EMERGENCY MEDICINE

## 2019-08-25 PROCEDURE — 80048 BASIC METABOLIC PNL TOTAL CA: CPT | Performed by: EMERGENCY MEDICINE

## 2019-08-25 PROCEDURE — 84484 ASSAY OF TROPONIN QUANT: CPT | Performed by: EMERGENCY MEDICINE

## 2019-08-25 PROCEDURE — 82550 ASSAY OF CK (CPK): CPT | Performed by: EMERGENCY MEDICINE

## 2019-08-25 PROCEDURE — 93010 ELECTROCARDIOGRAM REPORT: CPT | Performed by: INTERNAL MEDICINE

## 2019-08-25 PROCEDURE — 70450 CT HEAD/BRAIN W/O DYE: CPT

## 2019-08-25 PROCEDURE — 93005 ELECTROCARDIOGRAM TRACING: CPT | Performed by: EMERGENCY MEDICINE

## 2019-08-25 PROCEDURE — 85730 THROMBOPLASTIN TIME PARTIAL: CPT | Performed by: EMERGENCY MEDICINE

## 2019-08-25 RX ADMIN — SODIUM CHLORIDE 500 ML: 9 INJECTION, SOLUTION INTRAVENOUS at 20:00

## 2019-10-31 DIAGNOSIS — I10 ESSENTIAL HYPERTENSION: ICD-10-CM

## 2019-10-31 RX ORDER — AMLODIPINE BESYLATE 5 MG/1
5 TABLET ORAL DAILY
Qty: 30 TABLET | Refills: 11 | Status: SHIPPED | OUTPATIENT
Start: 2019-10-31 | End: 2020-05-30 | Stop reason: HOSPADM

## 2020-05-28 ENCOUNTER — HOSPITAL ENCOUNTER (INPATIENT)
Facility: HOSPITAL | Age: 80
LOS: 2 days | Discharge: HOME | End: 2020-05-30
Attending: EMERGENCY MEDICINE | Admitting: FAMILY MEDICINE

## 2020-05-28 ENCOUNTER — APPOINTMENT (OUTPATIENT)
Dept: CARDIOLOGY | Facility: HOSPITAL | Age: 80
End: 2020-05-28

## 2020-05-28 ENCOUNTER — APPOINTMENT (OUTPATIENT)
Dept: GENERAL RADIOLOGY | Facility: HOSPITAL | Age: 80
End: 2020-05-28

## 2020-05-28 DIAGNOSIS — I21.4 NSTEMI (NON-ST ELEVATED MYOCARDIAL INFARCTION) (HCC): ICD-10-CM

## 2020-05-28 DIAGNOSIS — R07.9 CHEST PAIN, UNSPECIFIED TYPE: Primary | ICD-10-CM

## 2020-05-28 PROBLEM — R00.1 SYMPTOMATIC BRADYCARDIA: Status: RESOLVED | Noted: 2018-07-30 | Resolved: 2020-05-28

## 2020-05-28 PROBLEM — R77.8 ELEVATED TROPONIN: Status: ACTIVE | Noted: 2020-05-28

## 2020-05-28 PROBLEM — R79.89 ELEVATED TROPONIN: Status: ACTIVE | Noted: 2020-05-28

## 2020-05-28 LAB
ALBUMIN SERPL-MCNC: 4.2 G/DL (ref 3.5–5.2)
ALBUMIN/GLOB SERPL: 1.7 G/DL
ALP SERPL-CCNC: 50 U/L (ref 39–117)
ALT SERPL W P-5'-P-CCNC: 11 U/L (ref 1–41)
ANION GAP SERPL CALCULATED.3IONS-SCNC: 11 MMOL/L (ref 5–15)
APTT PPP: 30.5 SECONDS (ref 24.1–35)
AST SERPL-CCNC: 15 U/L (ref 1–40)
BASOPHILS # BLD AUTO: 0.06 10*3/MM3 (ref 0–0.2)
BASOPHILS NFR BLD AUTO: 1 % (ref 0–1.5)
BH CV ECHO MEAS - AO MAX PG (FULL): 2.7 MMHG
BH CV ECHO MEAS - AO MAX PG: 5.1 MMHG
BH CV ECHO MEAS - AO MEAN PG (FULL): 1 MMHG
BH CV ECHO MEAS - AO MEAN PG: 3 MMHG
BH CV ECHO MEAS - AO ROOT AREA (BSA CORRECTED): 1.5
BH CV ECHO MEAS - AO ROOT AREA: 8.6 CM^2
BH CV ECHO MEAS - AO ROOT DIAM: 3.3 CM
BH CV ECHO MEAS - AO V2 MAX: 113 CM/SEC
BH CV ECHO MEAS - AO V2 MEAN: 78.6 CM/SEC
BH CV ECHO MEAS - AO V2 VTI: 27.3 CM
BH CV ECHO MEAS - AVA(I,A): 3.1 CM^2
BH CV ECHO MEAS - AVA(I,D): 3.1 CM^2
BH CV ECHO MEAS - AVA(V,A): 2.9 CM^2
BH CV ECHO MEAS - AVA(V,D): 2.9 CM^2
BH CV ECHO MEAS - BSA(HAYCOCK): 2.2 M^2
BH CV ECHO MEAS - BSA: 2.2 M^2
BH CV ECHO MEAS - BZI_BMI: 28.9 KILOGRAMS/M^2
BH CV ECHO MEAS - BZI_METRIC_HEIGHT: 182.9 CM
BH CV ECHO MEAS - BZI_METRIC_WEIGHT: 96.6 KG
BH CV ECHO MEAS - EDV(CUBED): 100.5 ML
BH CV ECHO MEAS - EDV(MOD-SP4): 142 ML
BH CV ECHO MEAS - EDV(TEICH): 99.8 ML
BH CV ECHO MEAS - EF(CUBED): 70.9 %
BH CV ECHO MEAS - EF(MOD-SP4): 68.9 %
BH CV ECHO MEAS - EF(TEICH): 62.6 %
BH CV ECHO MEAS - ESV(CUBED): 29.2 ML
BH CV ECHO MEAS - ESV(MOD-SP4): 44.2 ML
BH CV ECHO MEAS - ESV(TEICH): 37.3 ML
BH CV ECHO MEAS - FS: 33.8 %
BH CV ECHO MEAS - IVS/LVPW: 1
BH CV ECHO MEAS - IVSD: 1.3 CM
BH CV ECHO MEAS - LA DIMENSION: 4.2 CM
BH CV ECHO MEAS - LA/AO: 1.3
BH CV ECHO MEAS - LAT PEAK E' VEL: 12 CM/SEC
BH CV ECHO MEAS - LV DIASTOLIC VOL/BSA (35-75): 64.9 ML/M^2
BH CV ECHO MEAS - LV MASS(C)D: 234 GRAMS
BH CV ECHO MEAS - LV MASS(C)DI: 106.9 GRAMS/M^2
BH CV ECHO MEAS - LV MAX PG: 2.5 MMHG
BH CV ECHO MEAS - LV MEAN PG: 2 MMHG
BH CV ECHO MEAS - LV SYSTOLIC VOL/BSA (12-30): 20.2 ML/M^2
BH CV ECHO MEAS - LV V1 MAX: 78.3 CM/SEC
BH CV ECHO MEAS - LV V1 MEAN: 58.4 CM/SEC
BH CV ECHO MEAS - LV V1 VTI: 20.3 CM
BH CV ECHO MEAS - LVIDD: 4.7 CM
BH CV ECHO MEAS - LVIDS: 3.1 CM
BH CV ECHO MEAS - LVLD AP4: 8.5 CM
BH CV ECHO MEAS - LVLS AP4: 6.9 CM
BH CV ECHO MEAS - LVOT AREA (M): 4.2 CM^2
BH CV ECHO MEAS - LVOT AREA: 4.2 CM^2
BH CV ECHO MEAS - LVOT DIAM: 2.3 CM
BH CV ECHO MEAS - LVPWD: 1.3 CM
BH CV ECHO MEAS - MED PEAK E' VEL: 6.85 CM/SEC
BH CV ECHO MEAS - MV A MAX VEL: 72.8 CM/SEC
BH CV ECHO MEAS - MV DEC SLOPE: 395 CM/SEC^2
BH CV ECHO MEAS - MV DEC TIME: 0.22 SEC
BH CV ECHO MEAS - MV E MAX VEL: 84.9 CM/SEC
BH CV ECHO MEAS - MV E/A: 1.2
BH CV ECHO MEAS - RAP SYSTOLE: 5 MMHG
BH CV ECHO MEAS - RVSP: 23 MMHG
BH CV ECHO MEAS - SI(AO): 106.7 ML/M^2
BH CV ECHO MEAS - SI(CUBED): 32.6 ML/M^2
BH CV ECHO MEAS - SI(LVOT): 38.5 ML/M^2
BH CV ECHO MEAS - SI(MOD-SP4): 44.7 ML/M^2
BH CV ECHO MEAS - SI(TEICH): 28.6 ML/M^2
BH CV ECHO MEAS - SV(AO): 233.5 ML
BH CV ECHO MEAS - SV(CUBED): 71.3 ML
BH CV ECHO MEAS - SV(LVOT): 84.3 ML
BH CV ECHO MEAS - SV(MOD-SP4): 97.8 ML
BH CV ECHO MEAS - SV(TEICH): 62.5 ML
BH CV ECHO MEAS - TR MAX VEL: 212 CM/SEC
BH CV ECHO MEASUREMENTS AVERAGE E/E' RATIO: 9.01
BILIRUB SERPL-MCNC: 0.3 MG/DL (ref 0.2–1.2)
BUN BLD-MCNC: 30 MG/DL (ref 8–23)
BUN/CREAT SERPL: 15.7 (ref 7–25)
CALCIUM SPEC-SCNC: 9.4 MG/DL (ref 8.6–10.5)
CHLORIDE SERPL-SCNC: 107 MMOL/L (ref 98–107)
CHOLEST SERPL-MCNC: 162 MG/DL (ref 0–200)
CO2 SERPL-SCNC: 22 MMOL/L (ref 22–29)
CREAT BLD-MCNC: 1.91 MG/DL (ref 0.76–1.27)
DEPRECATED RDW RBC AUTO: 41.7 FL (ref 37–54)
EOSINOPHIL # BLD AUTO: 0.29 10*3/MM3 (ref 0–0.4)
EOSINOPHIL NFR BLD AUTO: 4.7 % (ref 0.3–6.2)
ERYTHROCYTE [DISTWIDTH] IN BLOOD BY AUTOMATED COUNT: 12.7 % (ref 12.3–15.4)
GFR SERPL CREATININE-BSD FRML MDRD: 34 ML/MIN/1.73
GLOBULIN UR ELPH-MCNC: 2.5 GM/DL
GLUCOSE BLD-MCNC: 98 MG/DL (ref 65–99)
HCT VFR BLD AUTO: 40.5 % (ref 37.5–51)
HDLC SERPL-MCNC: 42 MG/DL (ref 40–60)
HGB BLD-MCNC: 13.9 G/DL (ref 13–17.7)
HOLD SPECIMEN: NORMAL
HOLD SPECIMEN: NORMAL
IMM GRANULOCYTES # BLD AUTO: 0.02 10*3/MM3 (ref 0–0.05)
IMM GRANULOCYTES NFR BLD AUTO: 0.3 % (ref 0–0.5)
INR PPP: 1.02 (ref 0.91–1.09)
LDLC SERPL CALC-MCNC: 106 MG/DL (ref 0–100)
LDLC/HDLC SERPL: 2.51 {RATIO}
LEFT ATRIUM VOLUME INDEX: 37.1 ML/M2
LEFT ATRIUM VOLUME: 81.2 CM3
LYMPHOCYTES # BLD AUTO: 1.15 10*3/MM3 (ref 0.7–3.1)
LYMPHOCYTES NFR BLD AUTO: 18.8 % (ref 19.6–45.3)
MAGNESIUM SERPL-MCNC: 1.9 MG/DL (ref 1.6–2.4)
MAXIMAL PREDICTED HEART RATE: 141 BPM
MCH RBC QN AUTO: 30.5 PG (ref 26.6–33)
MCHC RBC AUTO-ENTMCNC: 34.3 G/DL (ref 31.5–35.7)
MCV RBC AUTO: 88.8 FL (ref 79–97)
MONOCYTES # BLD AUTO: 0.59 10*3/MM3 (ref 0.1–0.9)
MONOCYTES NFR BLD AUTO: 9.7 % (ref 5–12)
NEUTROPHILS # BLD AUTO: 4 10*3/MM3 (ref 1.7–7)
NEUTROPHILS NFR BLD AUTO: 65.5 % (ref 42.7–76)
NRBC BLD AUTO-RTO: 0 /100 WBC (ref 0–0.2)
NT-PROBNP SERPL-MCNC: 992.2 PG/ML (ref 5–1800)
PLATELET # BLD AUTO: 144 10*3/MM3 (ref 140–450)
PMV BLD AUTO: 10.5 FL (ref 6–12)
POTASSIUM BLD-SCNC: 4.4 MMOL/L (ref 3.5–5.2)
PROT SERPL-MCNC: 6.7 G/DL (ref 6–8.5)
PROTHROMBIN TIME: 13 SECONDS (ref 11.9–14.6)
RBC # BLD AUTO: 4.56 10*6/MM3 (ref 4.14–5.8)
SARS-COV-2 RNA RESP QL NAA+PROBE: NOT DETECTED
SODIUM BLD-SCNC: 140 MMOL/L (ref 136–145)
STRESS TARGET HR: 120 BPM
TRIGL SERPL-MCNC: 72 MG/DL (ref 0–150)
TROPONIN T SERPL-MCNC: 0.04 NG/ML (ref 0–0.03)
TROPONIN T SERPL-MCNC: 0.04 NG/ML (ref 0–0.03)
VLDLC SERPL-MCNC: 14.4 MG/DL
WBC NRBC COR # BLD: 6.11 10*3/MM3 (ref 3.4–10.8)
WHOLE BLOOD HOLD SPECIMEN: NORMAL
WHOLE BLOOD HOLD SPECIMEN: NORMAL

## 2020-05-28 PROCEDURE — 85025 COMPLETE CBC W/AUTO DIFF WBC: CPT | Performed by: EMERGENCY MEDICINE

## 2020-05-28 PROCEDURE — 87635 SARS-COV-2 COVID-19 AMP PRB: CPT | Performed by: FAMILY MEDICINE

## 2020-05-28 PROCEDURE — 36415 COLL VENOUS BLD VENIPUNCTURE: CPT | Performed by: EMERGENCY MEDICINE

## 2020-05-28 PROCEDURE — 25010000002 FENTANYL CITRATE (PF) 100 MCG/2ML SOLUTION: Performed by: INTERNAL MEDICINE

## 2020-05-28 PROCEDURE — 85610 PROTHROMBIN TIME: CPT | Performed by: EMERGENCY MEDICINE

## 2020-05-28 PROCEDURE — 93010 ELECTROCARDIOGRAM REPORT: CPT | Performed by: INTERNAL MEDICINE

## 2020-05-28 PROCEDURE — 25010000002 HEPARIN (PORCINE): Performed by: INTERNAL MEDICINE

## 2020-05-28 PROCEDURE — 99152 MOD SED SAME PHYS/QHP 5/>YRS: CPT | Performed by: INTERNAL MEDICINE

## 2020-05-28 PROCEDURE — 93454 CORONARY ARTERY ANGIO S&I: CPT | Performed by: INTERNAL MEDICINE

## 2020-05-28 PROCEDURE — 93005 ELECTROCARDIOGRAM TRACING: CPT | Performed by: EMERGENCY MEDICINE

## 2020-05-28 PROCEDURE — 99284 EMERGENCY DEPT VISIT MOD MDM: CPT

## 2020-05-28 PROCEDURE — 71045 X-RAY EXAM CHEST 1 VIEW: CPT

## 2020-05-28 PROCEDURE — 25010000002 MIDAZOLAM PER 1 MG: Performed by: INTERNAL MEDICINE

## 2020-05-28 PROCEDURE — 93005 ELECTROCARDIOGRAM TRACING: CPT | Performed by: INTERNAL MEDICINE

## 2020-05-28 PROCEDURE — 83735 ASSAY OF MAGNESIUM: CPT | Performed by: INTERNAL MEDICINE

## 2020-05-28 PROCEDURE — 83880 ASSAY OF NATRIURETIC PEPTIDE: CPT | Performed by: EMERGENCY MEDICINE

## 2020-05-28 PROCEDURE — 25010000002 HEPARIN (PORCINE) PER 1000 UNITS: Performed by: INTERNAL MEDICINE

## 2020-05-28 PROCEDURE — B2111ZZ FLUOROSCOPY OF MULTIPLE CORONARY ARTERIES USING LOW OSMOLAR CONTRAST: ICD-10-PCS | Performed by: INTERNAL MEDICINE

## 2020-05-28 PROCEDURE — 84484 ASSAY OF TROPONIN QUANT: CPT | Performed by: EMERGENCY MEDICINE

## 2020-05-28 PROCEDURE — 80061 LIPID PANEL: CPT | Performed by: INTERNAL MEDICINE

## 2020-05-28 PROCEDURE — C1894 INTRO/SHEATH, NON-LASER: HCPCS | Performed by: INTERNAL MEDICINE

## 2020-05-28 PROCEDURE — 80053 COMPREHEN METABOLIC PANEL: CPT | Performed by: EMERGENCY MEDICINE

## 2020-05-28 PROCEDURE — 25010000002 DIPHENHYDRAMINE PER 50 MG: Performed by: INTERNAL MEDICINE

## 2020-05-28 PROCEDURE — 84484 ASSAY OF TROPONIN QUANT: CPT | Performed by: INTERNAL MEDICINE

## 2020-05-28 PROCEDURE — 85730 THROMBOPLASTIN TIME PARTIAL: CPT | Performed by: EMERGENCY MEDICINE

## 2020-05-28 PROCEDURE — 4A023N7 MEASUREMENT OF CARDIAC SAMPLING AND PRESSURE, LEFT HEART, PERCUTANEOUS APPROACH: ICD-10-PCS | Performed by: INTERNAL MEDICINE

## 2020-05-28 PROCEDURE — 99223 1ST HOSP IP/OBS HIGH 75: CPT | Performed by: INTERNAL MEDICINE

## 2020-05-28 PROCEDURE — 25010000002 PERFLUTREN 6.52 MG/ML SUSPENSION: Performed by: INTERNAL MEDICINE

## 2020-05-28 PROCEDURE — 93306 TTE W/DOPPLER COMPLETE: CPT

## 2020-05-28 PROCEDURE — 25010000002 IOPAMIDOL 61 % SOLUTION: Performed by: INTERNAL MEDICINE

## 2020-05-28 PROCEDURE — B2151ZZ FLUOROSCOPY OF LEFT HEART USING LOW OSMOLAR CONTRAST: ICD-10-PCS | Performed by: INTERNAL MEDICINE

## 2020-05-28 PROCEDURE — 93306 TTE W/DOPPLER COMPLETE: CPT | Performed by: INTERNAL MEDICINE

## 2020-05-28 RX ORDER — ASPIRIN 81 MG/1
324 TABLET, CHEWABLE ORAL ONCE
Status: COMPLETED | OUTPATIENT
Start: 2020-05-28 | End: 2020-05-28

## 2020-05-28 RX ORDER — NITROGLYCERIN 0.4 MG/1
0.4 TABLET SUBLINGUAL
Status: DISCONTINUED | OUTPATIENT
Start: 2020-05-28 | End: 2020-05-28 | Stop reason: SDUPTHER

## 2020-05-28 RX ORDER — DIPHENHYDRAMINE HYDROCHLORIDE 50 MG/ML
INJECTION INTRAMUSCULAR; INTRAVENOUS AS NEEDED
Status: DISCONTINUED | OUTPATIENT
Start: 2020-05-28 | End: 2020-05-28 | Stop reason: HOSPADM

## 2020-05-28 RX ORDER — ASPIRIN 81 MG/1
81 TABLET ORAL DAILY
Status: DISCONTINUED | OUTPATIENT
Start: 2020-05-29 | End: 2020-05-30 | Stop reason: HOSPADM

## 2020-05-28 RX ORDER — ACETAMINOPHEN 325 MG/1
650 TABLET ORAL EVERY 4 HOURS PRN
Status: DISCONTINUED | OUTPATIENT
Start: 2020-05-28 | End: 2020-05-30 | Stop reason: HOSPADM

## 2020-05-28 RX ORDER — LISINOPRIL 20 MG/1
20 TABLET ORAL 2 TIMES DAILY
Status: DISCONTINUED | OUTPATIENT
Start: 2020-05-28 | End: 2020-05-30 | Stop reason: HOSPADM

## 2020-05-28 RX ORDER — SODIUM CHLORIDE 9 MG/ML
100 INJECTION, SOLUTION INTRAVENOUS CONTINUOUS
Status: DISCONTINUED | OUTPATIENT
Start: 2020-05-28 | End: 2020-05-28 | Stop reason: HOSPADM

## 2020-05-28 RX ORDER — EPLERENONE 25 MG/1
50 TABLET, FILM COATED ORAL DAILY
Status: DISCONTINUED | OUTPATIENT
Start: 2020-05-28 | End: 2020-05-30 | Stop reason: HOSPADM

## 2020-05-28 RX ORDER — AMLODIPINE BESYLATE 5 MG/1
5 TABLET ORAL DAILY
Status: DISCONTINUED | OUTPATIENT
Start: 2020-05-28 | End: 2020-05-29

## 2020-05-28 RX ORDER — SODIUM CHLORIDE 0.9 % (FLUSH) 0.9 %
10 SYRINGE (ML) INJECTION AS NEEDED
Status: DISCONTINUED | OUTPATIENT
Start: 2020-05-28 | End: 2020-05-28 | Stop reason: HOSPADM

## 2020-05-28 RX ORDER — SODIUM CHLORIDE 0.9 % (FLUSH) 0.9 %
10 SYRINGE (ML) INJECTION EVERY 12 HOURS SCHEDULED
Status: DISCONTINUED | OUTPATIENT
Start: 2020-05-28 | End: 2020-05-30 | Stop reason: HOSPADM

## 2020-05-28 RX ORDER — ACETAMINOPHEN 325 MG/1
650 TABLET ORAL EVERY 4 HOURS PRN
Status: DISCONTINUED | OUTPATIENT
Start: 2020-05-28 | End: 2020-05-28 | Stop reason: SDUPTHER

## 2020-05-28 RX ORDER — ONDANSETRON 2 MG/ML
4 INJECTION INTRAMUSCULAR; INTRAVENOUS EVERY 6 HOURS PRN
Status: DISCONTINUED | OUTPATIENT
Start: 2020-05-28 | End: 2020-05-30 | Stop reason: HOSPADM

## 2020-05-28 RX ORDER — SODIUM CHLORIDE 0.9 % (FLUSH) 0.9 %
10 SYRINGE (ML) INJECTION AS NEEDED
Status: DISCONTINUED | OUTPATIENT
Start: 2020-05-28 | End: 2020-05-30 | Stop reason: HOSPADM

## 2020-05-28 RX ORDER — NITROGLYCERIN 0.4 MG/1
0.4 TABLET SUBLINGUAL
Status: DISCONTINUED | OUTPATIENT
Start: 2020-05-28 | End: 2020-05-30 | Stop reason: HOSPADM

## 2020-05-28 RX ORDER — ACETAMINOPHEN 650 MG/1
650 SUPPOSITORY RECTAL EVERY 4 HOURS PRN
Status: DISCONTINUED | OUTPATIENT
Start: 2020-05-28 | End: 2020-05-30 | Stop reason: HOSPADM

## 2020-05-28 RX ORDER — SODIUM CHLORIDE 9 MG/ML
100 INJECTION, SOLUTION INTRAVENOUS CONTINUOUS
Status: DISCONTINUED | OUTPATIENT
Start: 2020-05-28 | End: 2020-05-29

## 2020-05-28 RX ORDER — DIPHENHYDRAMINE HCL 25 MG
25 CAPSULE ORAL ONCE
Status: DISCONTINUED | OUTPATIENT
Start: 2020-05-28 | End: 2020-05-28 | Stop reason: HOSPADM

## 2020-05-28 RX ORDER — MELATONIN
1000 DAILY
Status: DISCONTINUED | OUTPATIENT
Start: 2020-05-28 | End: 2020-05-30 | Stop reason: HOSPADM

## 2020-05-28 RX ORDER — ATORVASTATIN CALCIUM 40 MG/1
40 TABLET, FILM COATED ORAL NIGHTLY
Status: DISCONTINUED | OUTPATIENT
Start: 2020-05-28 | End: 2020-05-30 | Stop reason: HOSPADM

## 2020-05-28 RX ORDER — SODIUM CHLORIDE 9 MG/ML
75 INJECTION, SOLUTION INTRAVENOUS CONTINUOUS
Status: DISCONTINUED | OUTPATIENT
Start: 2020-05-28 | End: 2020-05-28 | Stop reason: HOSPADM

## 2020-05-28 RX ORDER — MIDAZOLAM HYDROCHLORIDE 1 MG/ML
INJECTION INTRAMUSCULAR; INTRAVENOUS AS NEEDED
Status: DISCONTINUED | OUTPATIENT
Start: 2020-05-28 | End: 2020-05-28 | Stop reason: HOSPADM

## 2020-05-28 RX ORDER — ACETAMINOPHEN 160 MG/5ML
650 SOLUTION ORAL EVERY 4 HOURS PRN
Status: DISCONTINUED | OUTPATIENT
Start: 2020-05-28 | End: 2020-05-30 | Stop reason: HOSPADM

## 2020-05-28 RX ORDER — ONDANSETRON 4 MG/1
4 TABLET, FILM COATED ORAL EVERY 6 HOURS PRN
Status: DISCONTINUED | OUTPATIENT
Start: 2020-05-28 | End: 2020-05-30 | Stop reason: HOSPADM

## 2020-05-28 RX ORDER — FENTANYL CITRATE 50 UG/ML
INJECTION, SOLUTION INTRAMUSCULAR; INTRAVENOUS AS NEEDED
Status: DISCONTINUED | OUTPATIENT
Start: 2020-05-28 | End: 2020-05-28 | Stop reason: HOSPADM

## 2020-05-28 RX ADMIN — VITAMIN D 1000 UNITS: 25 TAB ORAL at 08:38

## 2020-05-28 RX ADMIN — SODIUM CHLORIDE 100 ML/HR: 9 INJECTION, SOLUTION INTRAVENOUS at 23:19

## 2020-05-28 RX ADMIN — NITROGLYCERIN 0.4 MG: 0.4 TABLET SUBLINGUAL at 05:09

## 2020-05-28 RX ADMIN — SODIUM CHLORIDE 75 ML/HR: 9 INJECTION, SOLUTION INTRAVENOUS at 08:40

## 2020-05-28 RX ADMIN — PERFLUTREN 8.48 MG: 6.52 INJECTION, SUSPENSION INTRAVENOUS at 15:19

## 2020-05-28 RX ADMIN — MAGNESIUM GLUCONATE 500 MG ORAL TABLET 400 MG: 500 TABLET ORAL at 08:38

## 2020-05-28 RX ADMIN — ATORVASTATIN CALCIUM 40 MG: 40 TABLET, FILM COATED ORAL at 20:37

## 2020-05-28 RX ADMIN — EPLERENONE 50 MG: 25 TABLET ORAL at 08:38

## 2020-05-28 RX ADMIN — ASPIRIN 324 MG: 81 TABLET, CHEWABLE ORAL at 05:08

## 2020-05-28 RX ADMIN — LISINOPRIL 20 MG: 20 TABLET ORAL at 20:37

## 2020-05-28 RX ADMIN — LISINOPRIL 20 MG: 20 TABLET ORAL at 08:38

## 2020-05-28 RX ADMIN — AMLODIPINE BESYLATE 5 MG: 5 TABLET ORAL at 08:38

## 2020-05-29 ENCOUNTER — APPOINTMENT (OUTPATIENT)
Dept: PULMONOLOGY | Facility: HOSPITAL | Age: 80
End: 2020-05-29

## 2020-05-29 ENCOUNTER — APPOINTMENT (OUTPATIENT)
Dept: CT IMAGING | Facility: HOSPITAL | Age: 80
End: 2020-05-29

## 2020-05-29 ENCOUNTER — APPOINTMENT (OUTPATIENT)
Dept: ULTRASOUND IMAGING | Facility: HOSPITAL | Age: 80
End: 2020-05-29

## 2020-05-29 LAB
ALBUMIN SERPL-MCNC: 3.8 G/DL (ref 3.5–5.2)
ALBUMIN/GLOB SERPL: 1.7 G/DL
ALP SERPL-CCNC: 44 U/L (ref 39–117)
ALT SERPL W P-5'-P-CCNC: 9 U/L (ref 1–41)
ANION GAP SERPL CALCULATED.3IONS-SCNC: 12 MMOL/L (ref 5–15)
ARTERIAL PATENCY WRIST A: POSITIVE
AST SERPL-CCNC: 13 U/L (ref 1–40)
ATMOSPHERIC PRESS: 751 MMHG
BASE EXCESS BLDA CALC-SCNC: -3.5 MMOL/L (ref 0–2)
BASOPHILS # BLD AUTO: 0.03 10*3/MM3 (ref 0–0.2)
BASOPHILS NFR BLD AUTO: 0.6 % (ref 0–1.5)
BDY SITE: ABNORMAL
BILIRUB SERPL-MCNC: 0.3 MG/DL (ref 0.2–1.2)
BODY TEMPERATURE: 37 C
BUN BLD-MCNC: 26 MG/DL (ref 8–23)
BUN/CREAT SERPL: 14.7 (ref 7–25)
CALCIUM SPEC-SCNC: 8.9 MG/DL (ref 8.6–10.5)
CHLORIDE SERPL-SCNC: 107 MMOL/L (ref 98–107)
CO2 SERPL-SCNC: 21 MMOL/L (ref 22–29)
CREAT BLD-MCNC: 1.77 MG/DL (ref 0.76–1.27)
DEPRECATED RDW RBC AUTO: 42.7 FL (ref 37–54)
EOSINOPHIL # BLD AUTO: 0.24 10*3/MM3 (ref 0–0.4)
EOSINOPHIL NFR BLD AUTO: 4.6 % (ref 0.3–6.2)
ERYTHROCYTE [DISTWIDTH] IN BLOOD BY AUTOMATED COUNT: 13.1 % (ref 12.3–15.4)
GFR SERPL CREATININE-BSD FRML MDRD: 37 ML/MIN/1.73
GLOBULIN UR ELPH-MCNC: 2.3 GM/DL
GLUCOSE BLD-MCNC: 80 MG/DL (ref 65–99)
HCO3 BLDA-SCNC: 20.4 MMOL/L (ref 20–26)
HCT VFR BLD AUTO: 38.7 % (ref 37.5–51)
HGB BLD-MCNC: 13 G/DL (ref 13–17.7)
IMM GRANULOCYTES # BLD AUTO: 0.01 10*3/MM3 (ref 0–0.05)
IMM GRANULOCYTES NFR BLD AUTO: 0.2 % (ref 0–0.5)
LYMPHOCYTES # BLD AUTO: 1.16 10*3/MM3 (ref 0.7–3.1)
LYMPHOCYTES NFR BLD AUTO: 22.2 % (ref 19.6–45.3)
Lab: ABNORMAL
MCH RBC QN AUTO: 30.4 PG (ref 26.6–33)
MCHC RBC AUTO-ENTMCNC: 33.6 G/DL (ref 31.5–35.7)
MCV RBC AUTO: 90.4 FL (ref 79–97)
MODALITY: ABNORMAL
MONOCYTES # BLD AUTO: 0.57 10*3/MM3 (ref 0.1–0.9)
MONOCYTES NFR BLD AUTO: 10.9 % (ref 5–12)
NEUTROPHILS # BLD AUTO: 3.22 10*3/MM3 (ref 1.7–7)
NEUTROPHILS NFR BLD AUTO: 61.5 % (ref 42.7–76)
NRBC BLD AUTO-RTO: 0 /100 WBC (ref 0–0.2)
PCO2 BLDA: 32.7 MM HG (ref 35–45)
PH BLDA: 7.4 PH UNITS (ref 7.35–7.45)
PLATELET # BLD AUTO: 146 10*3/MM3 (ref 140–450)
PMV BLD AUTO: 11.3 FL (ref 6–12)
PO2 BLDA: 94.2 MM HG (ref 83–108)
POTASSIUM BLD-SCNC: 4.3 MMOL/L (ref 3.5–5.2)
PROT SERPL-MCNC: 6.1 G/DL (ref 6–8.5)
RBC # BLD AUTO: 4.28 10*6/MM3 (ref 4.14–5.8)
SAO2 % BLDCOA: 98 % (ref 94–99)
SODIUM BLD-SCNC: 140 MMOL/L (ref 136–145)
TROPONIN T SERPL-MCNC: 0.04 NG/ML (ref 0–0.03)
TROPONIN T SERPL-MCNC: 0.05 NG/ML (ref 0–0.03)
VENTILATOR MODE: ABNORMAL
WBC NRBC COR # BLD: 5.23 10*3/MM3 (ref 3.4–10.8)

## 2020-05-29 PROCEDURE — 94726 PLETHYSMOGRAPHY LUNG VOLUMES: CPT | Performed by: INTERNAL MEDICINE

## 2020-05-29 PROCEDURE — 94729 DIFFUSING CAPACITY: CPT | Performed by: INTERNAL MEDICINE

## 2020-05-29 PROCEDURE — 84484 ASSAY OF TROPONIN QUANT: CPT | Performed by: INTERNAL MEDICINE

## 2020-05-29 PROCEDURE — 71275 CT ANGIOGRAPHY CHEST: CPT

## 2020-05-29 PROCEDURE — 99222 1ST HOSP IP/OBS MODERATE 55: CPT | Performed by: THORACIC SURGERY (CARDIOTHORACIC VASCULAR SURGERY)

## 2020-05-29 PROCEDURE — 94729 DIFFUSING CAPACITY: CPT

## 2020-05-29 PROCEDURE — 94060 EVALUATION OF WHEEZING: CPT | Performed by: INTERNAL MEDICINE

## 2020-05-29 PROCEDURE — 94726 PLETHYSMOGRAPHY LUNG VOLUMES: CPT

## 2020-05-29 PROCEDURE — 82803 BLOOD GASES ANY COMBINATION: CPT

## 2020-05-29 PROCEDURE — 93880 EXTRACRANIAL BILAT STUDY: CPT | Performed by: SURGERY

## 2020-05-29 PROCEDURE — 36415 COLL VENOUS BLD VENIPUNCTURE: CPT | Performed by: INTERNAL MEDICINE

## 2020-05-29 PROCEDURE — 93880 EXTRACRANIAL BILAT STUDY: CPT

## 2020-05-29 PROCEDURE — 99232 SBSQ HOSP IP/OBS MODERATE 35: CPT | Performed by: INTERNAL MEDICINE

## 2020-05-29 PROCEDURE — 0 IOPAMIDOL PER 1 ML: Performed by: FAMILY MEDICINE

## 2020-05-29 PROCEDURE — 80053 COMPREHEN METABOLIC PANEL: CPT | Performed by: INTERNAL MEDICINE

## 2020-05-29 PROCEDURE — 36600 WITHDRAWAL OF ARTERIAL BLOOD: CPT

## 2020-05-29 PROCEDURE — 94060 EVALUATION OF WHEEZING: CPT

## 2020-05-29 PROCEDURE — 85025 COMPLETE CBC W/AUTO DIFF WBC: CPT | Performed by: INTERNAL MEDICINE

## 2020-05-29 RX ORDER — AMLODIPINE BESYLATE 10 MG/1
10 TABLET ORAL DAILY
Status: DISCONTINUED | OUTPATIENT
Start: 2020-05-30 | End: 2020-05-30 | Stop reason: HOSPADM

## 2020-05-29 RX ORDER — ALBUTEROL SULFATE 2.5 MG/3ML
2.5 SOLUTION RESPIRATORY (INHALATION) ONCE
Status: COMPLETED | OUTPATIENT
Start: 2020-05-29 | End: 2020-05-29

## 2020-05-29 RX ADMIN — LISINOPRIL 20 MG: 20 TABLET ORAL at 08:19

## 2020-05-29 RX ADMIN — ASPIRIN 81 MG: 81 TABLET ORAL at 08:19

## 2020-05-29 RX ADMIN — MAGNESIUM GLUCONATE 500 MG ORAL TABLET 400 MG: 500 TABLET ORAL at 08:19

## 2020-05-29 RX ADMIN — SODIUM CHLORIDE, PRESERVATIVE FREE 10 ML: 5 INJECTION INTRAVENOUS at 21:34

## 2020-05-29 RX ADMIN — AMLODIPINE BESYLATE 5 MG: 5 TABLET ORAL at 08:19

## 2020-05-29 RX ADMIN — EPLERENONE 50 MG: 25 TABLET ORAL at 08:19

## 2020-05-29 RX ADMIN — ALBUTEROL SULFATE 2.5 MG: 2.5 SOLUTION RESPIRATORY (INHALATION) at 14:42

## 2020-05-29 RX ADMIN — LISINOPRIL 20 MG: 20 TABLET ORAL at 22:53

## 2020-05-29 RX ADMIN — ATORVASTATIN CALCIUM 40 MG: 40 TABLET, FILM COATED ORAL at 22:27

## 2020-05-29 RX ADMIN — VITAMIN D 1000 UNITS: 25 TAB ORAL at 08:19

## 2020-05-29 RX ADMIN — IOPAMIDOL 109 ML: 755 INJECTION, SOLUTION INTRAVENOUS at 15:07

## 2020-05-30 ENCOUNTER — HOSPITAL ENCOUNTER (EMERGENCY)
Facility: HOSPITAL | Age: 80
Discharge: HOME | End: 2020-05-31
Attending: EMERGENCY MEDICINE | Admitting: EMERGENCY MEDICINE

## 2020-05-30 ENCOUNTER — APPOINTMENT (OUTPATIENT)
Dept: GENERAL RADIOLOGY | Facility: HOSPITAL | Age: 80
End: 2020-05-30

## 2020-05-30 ENCOUNTER — READMISSION MANAGEMENT (OUTPATIENT)
Dept: CALL CENTER | Facility: HOSPITAL | Age: 80
End: 2020-05-30

## 2020-05-30 VITALS
SYSTOLIC BLOOD PRESSURE: 132 MMHG | WEIGHT: 212.8 LBS | TEMPERATURE: 97.5 F | DIASTOLIC BLOOD PRESSURE: 72 MMHG | OXYGEN SATURATION: 98 % | BODY MASS INDEX: 28.82 KG/M2 | RESPIRATION RATE: 16 BRPM | HEART RATE: 65 BPM | HEIGHT: 72 IN

## 2020-05-30 DIAGNOSIS — R07.9 CHEST PAIN, UNSPECIFIED TYPE: Primary | ICD-10-CM

## 2020-05-30 LAB
ANION GAP SERPL CALCULATED.3IONS-SCNC: 11 MMOL/L (ref 5–15)
ANION GAP SERPL CALCULATED.3IONS-SCNC: 12 MMOL/L (ref 5–15)
APTT PPP: 30.4 SECONDS (ref 24.1–35)
BASOPHILS # BLD AUTO: 0.06 10*3/MM3 (ref 0–0.2)
BASOPHILS NFR BLD AUTO: 0.8 % (ref 0–1.5)
BUN BLD-MCNC: 21 MG/DL (ref 8–23)
BUN BLD-MCNC: 23 MG/DL (ref 8–23)
BUN/CREAT SERPL: 10.9 (ref 7–25)
BUN/CREAT SERPL: 12.4 (ref 7–25)
CALCIUM SPEC-SCNC: 9.2 MG/DL (ref 8.6–10.5)
CALCIUM SPEC-SCNC: 9.6 MG/DL (ref 8.6–10.5)
CHLORIDE SERPL-SCNC: 104 MMOL/L (ref 98–107)
CHLORIDE SERPL-SCNC: 106 MMOL/L (ref 98–107)
CK SERPL-CCNC: 83 U/L (ref 20–200)
CO2 SERPL-SCNC: 21 MMOL/L (ref 22–29)
CO2 SERPL-SCNC: 22 MMOL/L (ref 22–29)
CREAT BLD-MCNC: 1.86 MG/DL (ref 0.76–1.27)
CREAT BLD-MCNC: 1.93 MG/DL (ref 0.76–1.27)
DEPRECATED RDW RBC AUTO: 41.6 FL (ref 37–54)
EOSINOPHIL # BLD AUTO: 0.24 10*3/MM3 (ref 0–0.4)
EOSINOPHIL NFR BLD AUTO: 3.1 % (ref 0.3–6.2)
ERYTHROCYTE [DISTWIDTH] IN BLOOD BY AUTOMATED COUNT: 12.9 % (ref 12.3–15.4)
GFR SERPL CREATININE-BSD FRML MDRD: 34 ML/MIN/1.73
GFR SERPL CREATININE-BSD FRML MDRD: 35 ML/MIN/1.73
GLUCOSE BLD-MCNC: 102 MG/DL (ref 65–99)
GLUCOSE BLD-MCNC: 91 MG/DL (ref 65–99)
HCT VFR BLD AUTO: 39.8 % (ref 37.5–51)
HGB BLD-MCNC: 13.7 G/DL (ref 13–17.7)
HOLD SPECIMEN: NORMAL
HOLD SPECIMEN: NORMAL
IMM GRANULOCYTES # BLD AUTO: 0.02 10*3/MM3 (ref 0–0.05)
IMM GRANULOCYTES NFR BLD AUTO: 0.3 % (ref 0–0.5)
INR PPP: 1.04 (ref 0.91–1.09)
LYMPHOCYTES # BLD AUTO: 1.02 10*3/MM3 (ref 0.7–3.1)
LYMPHOCYTES NFR BLD AUTO: 13.3 % (ref 19.6–45.3)
MCH RBC QN AUTO: 30.4 PG (ref 26.6–33)
MCHC RBC AUTO-ENTMCNC: 34.4 G/DL (ref 31.5–35.7)
MCV RBC AUTO: 88.4 FL (ref 79–97)
MONOCYTES # BLD AUTO: 0.5 10*3/MM3 (ref 0.1–0.9)
MONOCYTES NFR BLD AUTO: 6.5 % (ref 5–12)
NEUTROPHILS # BLD AUTO: 5.82 10*3/MM3 (ref 1.7–7)
NEUTROPHILS NFR BLD AUTO: 76 % (ref 42.7–76)
NRBC BLD AUTO-RTO: 0 /100 WBC (ref 0–0.2)
NT-PROBNP SERPL-MCNC: 679.1 PG/ML (ref 5–1800)
PLATELET # BLD AUTO: 157 10*3/MM3 (ref 140–450)
PMV BLD AUTO: 10.5 FL (ref 6–12)
POTASSIUM BLD-SCNC: 4.3 MMOL/L (ref 3.5–5.2)
POTASSIUM BLD-SCNC: 4.6 MMOL/L (ref 3.5–5.2)
PROTHROMBIN TIME: 13.2 SECONDS (ref 11.9–14.6)
RBC # BLD AUTO: 4.5 10*6/MM3 (ref 4.14–5.8)
SODIUM BLD-SCNC: 137 MMOL/L (ref 136–145)
SODIUM BLD-SCNC: 139 MMOL/L (ref 136–145)
TROPONIN T SERPL-MCNC: 0.03 NG/ML (ref 0–0.03)
WBC NRBC COR # BLD: 7.66 10*3/MM3 (ref 3.4–10.8)
WHOLE BLOOD HOLD SPECIMEN: NORMAL

## 2020-05-30 PROCEDURE — 82550 ASSAY OF CK (CPK): CPT | Performed by: EMERGENCY MEDICINE

## 2020-05-30 PROCEDURE — 93010 ELECTROCARDIOGRAM REPORT: CPT | Performed by: INTERNAL MEDICINE

## 2020-05-30 PROCEDURE — 85025 COMPLETE CBC W/AUTO DIFF WBC: CPT | Performed by: EMERGENCY MEDICINE

## 2020-05-30 PROCEDURE — 99232 SBSQ HOSP IP/OBS MODERATE 35: CPT | Performed by: INTERNAL MEDICINE

## 2020-05-30 PROCEDURE — 93005 ELECTROCARDIOGRAM TRACING: CPT | Performed by: EMERGENCY MEDICINE

## 2020-05-30 PROCEDURE — 84484 ASSAY OF TROPONIN QUANT: CPT | Performed by: EMERGENCY MEDICINE

## 2020-05-30 PROCEDURE — 85610 PROTHROMBIN TIME: CPT | Performed by: EMERGENCY MEDICINE

## 2020-05-30 PROCEDURE — 83880 ASSAY OF NATRIURETIC PEPTIDE: CPT | Performed by: EMERGENCY MEDICINE

## 2020-05-30 PROCEDURE — 85730 THROMBOPLASTIN TIME PARTIAL: CPT | Performed by: EMERGENCY MEDICINE

## 2020-05-30 PROCEDURE — 36415 COLL VENOUS BLD VENIPUNCTURE: CPT

## 2020-05-30 PROCEDURE — 71045 X-RAY EXAM CHEST 1 VIEW: CPT

## 2020-05-30 PROCEDURE — 80048 BASIC METABOLIC PNL TOTAL CA: CPT | Performed by: EMERGENCY MEDICINE

## 2020-05-30 PROCEDURE — 99283 EMERGENCY DEPT VISIT LOW MDM: CPT

## 2020-05-30 PROCEDURE — 80048 BASIC METABOLIC PNL TOTAL CA: CPT | Performed by: INTERNAL MEDICINE

## 2020-05-30 RX ORDER — AMLODIPINE BESYLATE 10 MG/1
10 TABLET ORAL DAILY
Qty: 30 TABLET | Refills: 2 | Status: ON HOLD | OUTPATIENT
Start: 2020-05-31 | End: 2020-06-11

## 2020-05-30 RX ORDER — ASPIRIN 81 MG/1
81 TABLET ORAL DAILY
Qty: 100 TABLET | Refills: 99 | Status: SHIPPED | OUTPATIENT
Start: 2020-05-31

## 2020-05-30 RX ORDER — ATORVASTATIN CALCIUM 40 MG/1
40 TABLET, FILM COATED ORAL NIGHTLY
Qty: 30 TABLET | Refills: 2 | Status: SHIPPED | OUTPATIENT
Start: 2020-05-30 | End: 2021-01-29 | Stop reason: SINTOL

## 2020-05-30 RX ADMIN — LISINOPRIL 20 MG: 20 TABLET ORAL at 09:26

## 2020-05-30 RX ADMIN — MAGNESIUM GLUCONATE 500 MG ORAL TABLET 400 MG: 500 TABLET ORAL at 09:26

## 2020-05-30 RX ADMIN — AMLODIPINE BESYLATE 10 MG: 10 TABLET ORAL at 09:26

## 2020-05-30 RX ADMIN — SODIUM CHLORIDE, PRESERVATIVE FREE 10 ML: 5 INJECTION INTRAVENOUS at 09:26

## 2020-05-30 RX ADMIN — ASPIRIN 81 MG: 81 TABLET ORAL at 09:26

## 2020-05-30 RX ADMIN — EPLERENONE 50 MG: 25 TABLET ORAL at 09:26

## 2020-05-30 RX ADMIN — VITAMIN D 1000 UNITS: 25 TAB ORAL at 09:26

## 2020-05-31 VITALS
RESPIRATION RATE: 20 BRPM | OXYGEN SATURATION: 96 % | BODY MASS INDEX: 28.99 KG/M2 | TEMPERATURE: 98 F | SYSTOLIC BLOOD PRESSURE: 154 MMHG | HEART RATE: 68 BPM | WEIGHT: 214 LBS | HEIGHT: 72 IN | DIASTOLIC BLOOD PRESSURE: 85 MMHG

## 2020-05-31 LAB — TROPONIN T SERPL-MCNC: 0.03 NG/ML (ref 0–0.03)

## 2020-05-31 PROCEDURE — 93005 ELECTROCARDIOGRAM TRACING: CPT | Performed by: EMERGENCY MEDICINE

## 2020-05-31 PROCEDURE — 93010 ELECTROCARDIOGRAM REPORT: CPT | Performed by: INTERNAL MEDICINE

## 2020-05-31 PROCEDURE — 84484 ASSAY OF TROPONIN QUANT: CPT | Performed by: EMERGENCY MEDICINE

## 2020-05-31 RX ORDER — NITROGLYCERIN 0.4 MG/1
0.4 TABLET SUBLINGUAL
Qty: 30 TABLET | Refills: 0 | Status: SHIPPED | OUTPATIENT
Start: 2020-05-31 | End: 2020-06-16 | Stop reason: HOSPADM

## 2020-05-31 NOTE — OUTREACH NOTE
Prep Survey      Responses   [facility] facility patient discharged from?  [facility]   Is LACE score < 7 ?  No   Eligibility  Readm Mgmt   Discharge diagnosis  Chest painElevated troponin, CABG next week   COVID-19 Test Status  Negative   Does the patient have one of the following disease processes/diagnoses(primary or secondary)?  Other   Does the patient have Home health ordered?  No   Is there a DME ordered?  No   Prep survey completed?  Yes          Libby Lawrence RN

## 2020-06-01 ENCOUNTER — PREP FOR SURGERY (OUTPATIENT)
Dept: OTHER | Facility: HOSPITAL | Age: 80
End: 2020-06-01

## 2020-06-01 DIAGNOSIS — I25.10 CORONARY ARTERY DISEASE: Primary | ICD-10-CM

## 2020-06-01 DIAGNOSIS — R07.9 CHEST PAIN: ICD-10-CM

## 2020-06-01 RX ORDER — SODIUM CHLORIDE 0.9 % (FLUSH) 0.9 %
3 SYRINGE (ML) INJECTION EVERY 12 HOURS SCHEDULED
Status: CANCELLED | OUTPATIENT
Start: 2020-06-01

## 2020-06-01 RX ORDER — SODIUM CHLORIDE 0.9 % (FLUSH) 0.9 %
10 SYRINGE (ML) INJECTION AS NEEDED
Status: CANCELLED | OUTPATIENT
Start: 2020-06-01

## 2020-06-02 ENCOUNTER — APPOINTMENT (OUTPATIENT)
Dept: PREADMISSION TESTING | Facility: HOSPITAL | Age: 80
End: 2020-06-02

## 2020-06-02 ENCOUNTER — TRANSCRIBE ORDERS (OUTPATIENT)
Dept: ADMINISTRATIVE | Facility: HOSPITAL | Age: 80
End: 2020-06-02

## 2020-06-02 ENCOUNTER — ANESTHESIA EVENT (OUTPATIENT)
Dept: PERIOP | Facility: HOSPITAL | Age: 80
End: 2020-06-02

## 2020-06-02 ENCOUNTER — LAB (OUTPATIENT)
Dept: LAB | Facility: HOSPITAL | Age: 80
End: 2020-06-02

## 2020-06-02 ENCOUNTER — READMISSION MANAGEMENT (OUTPATIENT)
Dept: CALL CENTER | Facility: HOSPITAL | Age: 80
End: 2020-06-02

## 2020-06-02 VITALS
BODY MASS INDEX: 28.61 KG/M2 | RESPIRATION RATE: 18 BRPM | HEIGHT: 72 IN | OXYGEN SATURATION: 98 % | WEIGHT: 211.2 LBS | DIASTOLIC BLOOD PRESSURE: 93 MMHG | SYSTOLIC BLOOD PRESSURE: 137 MMHG | HEART RATE: 66 BPM

## 2020-06-02 DIAGNOSIS — I25.10 CORONARY ARTERY DISEASE: ICD-10-CM

## 2020-06-02 DIAGNOSIS — R07.9 CHEST PAIN: ICD-10-CM

## 2020-06-02 DIAGNOSIS — Z01.818 PREOP TESTING: Primary | ICD-10-CM

## 2020-06-02 LAB
ABO GROUP BLD: NORMAL
ALBUMIN SERPL-MCNC: 4.5 G/DL (ref 3.5–5.2)
ALBUMIN/GLOB SERPL: 1.7 G/DL
ALP SERPL-CCNC: 53 U/L (ref 39–117)
ALT SERPL W P-5'-P-CCNC: 11 U/L (ref 1–41)
ANION GAP SERPL CALCULATED.3IONS-SCNC: 9 MMOL/L (ref 5–15)
APTT PPP: 30.7 SECONDS (ref 24.1–35)
AST SERPL-CCNC: 16 U/L (ref 1–40)
BASOPHILS # BLD AUTO: 0.04 10*3/MM3 (ref 0–0.2)
BASOPHILS NFR BLD AUTO: 0.7 % (ref 0–1.5)
BILIRUB SERPL-MCNC: 0.5 MG/DL (ref 0.2–1.2)
BILIRUB UR QL STRIP: NEGATIVE
BLD GP AB SCN SERPL QL: NEGATIVE
BUN BLD-MCNC: 28 MG/DL (ref 8–23)
BUN/CREAT SERPL: 13.5 (ref 7–25)
CALCIUM SPEC-SCNC: 9.5 MG/DL (ref 8.6–10.5)
CHLORIDE SERPL-SCNC: 106 MMOL/L (ref 98–107)
CLARITY UR: CLEAR
CO2 SERPL-SCNC: 24 MMOL/L (ref 22–29)
COLOR UR: YELLOW
CREAT BLD-MCNC: 2.07 MG/DL (ref 0.76–1.27)
DEPRECATED RDW RBC AUTO: 41.1 FL (ref 37–54)
EOSINOPHIL # BLD AUTO: 0.21 10*3/MM3 (ref 0–0.4)
EOSINOPHIL NFR BLD AUTO: 3.5 % (ref 0.3–6.2)
ERYTHROCYTE [DISTWIDTH] IN BLOOD BY AUTOMATED COUNT: 12.7 % (ref 12.3–15.4)
GFR SERPL CREATININE-BSD FRML MDRD: 31 ML/MIN/1.73
GLOBULIN UR ELPH-MCNC: 2.6 GM/DL
GLUCOSE BLD-MCNC: 86 MG/DL (ref 65–99)
GLUCOSE UR STRIP-MCNC: NEGATIVE MG/DL
HCT VFR BLD AUTO: 41.8 % (ref 37.5–51)
HGB BLD-MCNC: 14.3 G/DL (ref 13–17.7)
HGB UR QL STRIP.AUTO: NEGATIVE
IMM GRANULOCYTES # BLD AUTO: 0.02 10*3/MM3 (ref 0–0.05)
IMM GRANULOCYTES NFR BLD AUTO: 0.3 % (ref 0–0.5)
INR PPP: 1.05 (ref 0.91–1.09)
KETONES UR QL STRIP: NEGATIVE
LEUKOCYTE ESTERASE UR QL STRIP.AUTO: NEGATIVE
LYMPHOCYTES # BLD AUTO: 1 10*3/MM3 (ref 0.7–3.1)
LYMPHOCYTES NFR BLD AUTO: 16.5 % (ref 19.6–45.3)
MCH RBC QN AUTO: 30.5 PG (ref 26.6–33)
MCHC RBC AUTO-ENTMCNC: 34.2 G/DL (ref 31.5–35.7)
MCV RBC AUTO: 89.1 FL (ref 79–97)
MONOCYTES # BLD AUTO: 0.46 10*3/MM3 (ref 0.1–0.9)
MONOCYTES NFR BLD AUTO: 7.6 % (ref 5–12)
NEUTROPHILS # BLD AUTO: 4.32 10*3/MM3 (ref 1.7–7)
NEUTROPHILS NFR BLD AUTO: 71.4 % (ref 42.7–76)
NITRITE UR QL STRIP: NEGATIVE
NRBC BLD AUTO-RTO: 0 /100 WBC (ref 0–0.2)
PH UR STRIP.AUTO: 5.5 [PH] (ref 5–8)
PLATELET # BLD AUTO: 159 10*3/MM3 (ref 140–450)
PMV BLD AUTO: 10.4 FL (ref 6–12)
POTASSIUM BLD-SCNC: 4.6 MMOL/L (ref 3.5–5.2)
PROT SERPL-MCNC: 7.1 G/DL (ref 6–8.5)
PROT UR QL STRIP: ABNORMAL
PROTHROMBIN TIME: 13.3 SECONDS (ref 11.9–14.6)
RBC # BLD AUTO: 4.69 10*6/MM3 (ref 4.14–5.8)
RH BLD: POSITIVE
SARS-COV-2 RNA RESP QL NAA+PROBE: NOT DETECTED
SODIUM BLD-SCNC: 139 MMOL/L (ref 136–145)
SP GR UR STRIP: 1.02 (ref 1–1.03)
T&S EXPIRATION DATE: NORMAL
UROBILINOGEN UR QL STRIP: ABNORMAL
WBC NRBC COR # BLD: 6.05 10*3/MM3 (ref 3.4–10.8)

## 2020-06-02 PROCEDURE — 86850 RBC ANTIBODY SCREEN: CPT | Performed by: NURSE PRACTITIONER

## 2020-06-02 PROCEDURE — 86901 BLOOD TYPING SEROLOGIC RH(D): CPT | Performed by: NURSE PRACTITIONER

## 2020-06-02 PROCEDURE — 87635 SARS-COV-2 COVID-19 AMP PRB: CPT | Performed by: THORACIC SURGERY (CARDIOTHORACIC VASCULAR SURGERY)

## 2020-06-02 PROCEDURE — C9803 HOPD COVID-19 SPEC COLLECT: HCPCS

## 2020-06-02 PROCEDURE — 86900 BLOOD TYPING SEROLOGIC ABO: CPT | Performed by: NURSE PRACTITIONER

## 2020-06-02 PROCEDURE — 85025 COMPLETE CBC W/AUTO DIFF WBC: CPT | Performed by: NURSE PRACTITIONER

## 2020-06-02 PROCEDURE — 85730 THROMBOPLASTIN TIME PARTIAL: CPT | Performed by: NURSE PRACTITIONER

## 2020-06-02 PROCEDURE — 93010 ELECTROCARDIOGRAM REPORT: CPT | Performed by: INTERNAL MEDICINE

## 2020-06-02 PROCEDURE — 81003 URINALYSIS AUTO W/O SCOPE: CPT | Performed by: NURSE PRACTITIONER

## 2020-06-02 PROCEDURE — 80053 COMPREHEN METABOLIC PANEL: CPT | Performed by: NURSE PRACTITIONER

## 2020-06-02 PROCEDURE — 93005 ELECTROCARDIOGRAM TRACING: CPT

## 2020-06-02 PROCEDURE — 85610 PROTHROMBIN TIME: CPT | Performed by: NURSE PRACTITIONER

## 2020-06-02 PROCEDURE — 36415 COLL VENOUS BLD VENIPUNCTURE: CPT

## 2020-06-02 NOTE — ANESTHESIA PREPROCEDURE EVALUATION
Anesthesia Evaluation     Patient summary reviewed   no history of anesthetic complications:  NPO Solid Status: > 8 hours  NPO Liquid Status: > 8 hours           Airway   Mallampati: I  TM distance: >3 FB  Neck ROM: full  Dental    (+) partials        Pulmonary - normal exam    breath sounds clear to auscultation  (+) sleep apnea (not compliant with CPAP),   (-) asthma, recent URI, not a smoker  Cardiovascular - normal exam  Exercise tolerance: poor (<4 METS)    ECG reviewed  Rhythm: regular  Rate: normal    (+) hypertension, past MI (one week ago) , CAD, dysrhythmias (patient unsure, believes this is remote) Atrial Fib, angina with exertion, hyperlipidemia,   (-) pacemaker, cardiac stents, CABG    ROS comment: TTE 5/28/20 - ·Left ventricular systolic function is normal. Estimated EF appears to be in the range of 61 - 65%.  ·Left ventricular wall thickness is consistent with mild concentric hypertrophy.  ·No hemodynamically significant valvular abnormalities identified on the study.    Cath 5/28/20 - 1.  Severe native multivessel coronary artery disease, including severe stenosis at the distal left main coronary artery which also involves the ostial portion of the LAD, ramus intermedius and left circumflex and severe stenosis in the proximal right coronary artery.    Neuro/Psych  (+) CVA (2007, residual balance and right leg issues),     (-) seizures, TIA  GI/Hepatic/Renal/Endo    (+)   renal disease CRI,   (-) GERD, liver disease, diabetes, no thyroid disorder    Musculoskeletal     Abdominal    Substance History      OB/GYN          Other        ROS/Med Hx Other: Denies dysphagia, previous esophageal surgeries      Phys Exam Other: Radial pulses palpable bilaterally                Anesthesia Plan    ASA 4     general   (Preop arterial line  Post induction central line  Post induction SHELBY (no CI to SHELBY placement))  intravenous induction     Anesthetic plan, all risks, benefits, and alternatives have been provided,  discussed and informed consent has been obtained with: patient.  Use of blood products discussed with patient  Consented to blood products.

## 2020-06-02 NOTE — DISCHARGE INSTRUCTIONS
DAY OF SURGERY INSTRUCTIONS        YOUR SURGEON: ***    PROCEDURE: ***Coronary Artery Bypass Grafting     DATE OF SURGERY: ***6/4/2020    ARRIVAL TIME: AS DIRECTED BY OFFICE    YOU MAY TAKE THE FOLLOWING MEDICATION(S) THE MORNING OF SURGERY WITH A SIP OF WATER: ***NONE per       ALL OTHER HOME MEDICATION CHECK WITH YOUR PHYSICIAN                            MANAGING PAIN AFTER SURGERY    We know you are probably wondering what your pain will be like after surgery.  Following surgery it is unrealistic to expect you will not have pain.   Pain is how our bodies let us know that something is wrong or cautions us to be careful.  That said, our goal is to make your pain tolerable.    Methods we may use to treat your pain include (oral or IV medications, PCAs, epidurals, nerve blocks, etc.)   While some procedures require IV pain medications for a short time after surgery, transitioning to pain medications by mouth allows for better management of pain.   Your nurse will encourage you to take oral pain medications whenever possible.  IV medications work almost immediately, but only last a short while.  Taking medications by mouth allows for a more constant level of medication in your blood stream for a longer period of time.      Once your pain is out of control it is harder to get back under control.  It is important you are aware when your next dose of pain medication is due.  If you are admitted, your nurse may write the time of your next dose on the white board in your room to help you remember.      We are interested in your pain and encourage you to inform us about aggravating factors during your visit.   Many times a simple repositioning every few hours can make a big difference.    If your physician says it is okay, do not let your pain prevent you from getting out of bed. Be sure to call your nurse for assistance prior to getting up so you do not fall.      Before surgery, please decide your  tolerable pain goal.  These faces help describe the pain ratings we use on a 0-10 scale.   Be prepared to tell us your goal and whether or not you take pain or anxiety medications at home.          BEFORE YOU COME TO THE HOSPITAL  (Pre-op instructions)  • Do not eat, drink, smoke or chew gum after midnight the night before surgery.  This also includes no mints.  • Morning of surgery take only the medicines you have been instructed with a sip of water unless otherwise instructed  by your physician.  • Do not shave, wear makeup or dark nail polish.  • Remove all jewelry including rings.  • Leave anything you consider valuable at home.  • Leave your suitcase in the car until after your surgery.  • Bring the following with you if applicable:  o Picture ID and insurance, Medicare or Medicaid cards  o Co-pay/deductible required by insurance (cash, check, credit card)  o Copy of advance directive, living will or power-of- documents if not brought to PAT  o CPAP or BIPAP mask and tubing  o Relaxation aids ( book, magazine), etc.  o Hearing aids                        ON THE DAY OF SURGERY  · On the day of surgery check in at registration located at the main entrance of the hospital.   ? You will be registered and given a beeper with instructions where to wait in the main lobby.  ? When your beeper lights up and vibrates a member of the Outpatient Surgery staff will meet you at the double doors under the stair steps and escort you to your preoperative room.   · You may have cloth compression devices placed on your legs. These help to prevent blood clots and reduce swelling in your legs.  · An IV may be inserted into one of your veins.  · In the operating room, you may be given one or more of the following:  ? A medicine to help you relax (sedative).  ? A medicine to numb the area (local anesthetic).  ? A medicine to make you fall asleep (general anesthetic).  ? A medicine that is injected into an area of your body to  "numb everything below the injection site (regional anesthetic).  · Your surgical site will be marked or identified.  · You may be given an antibiotic through your IV to help prevent infection.  Contact a health care provider if you:  · Develop a fever of more than 100.4°F (38°C) or other feelings of illness during the 48 hours before your surgery.  · Have symptoms that get worse.  Have questions or concerns about your surgery    General Anesthesia/Surgery, Adult  General anesthesia is the use of medicines to make a person \"go to sleep\" (unconscious) for a medical procedure. General anesthesia must be used for certain procedures, and is often recommended for procedures that:  · Last a long time.  · Require you to be still or in an unusual position.  · Are major and can cause blood loss.  The medicines used for general anesthesia are called general anesthetics. As well as making you unconscious for a certain amount of time, these medicines:  · Prevent pain.  · Control your blood pressure.  · Relax your muscles.  Tell a health care provider about:  · Any allergies you have.  · All medicines you are taking, including vitamins, herbs, eye drops, creams, and over-the-counter medicines.  · Any problems you or family members have had with anesthetic medicines.  · Types of anesthetics you have had in the past.  · Any blood disorders you have.  · Any surgeries you have had.  · Any medical conditions you have.  · Any recent upper respiratory, chest, or ear infections.  · Any history of:  ? Heart or lung conditions, such as heart failure, sleep apnea, asthma, or chronic obstructive pulmonary disease (COPD).  ?  service.  ? Depression or anxiety.  · Any tobacco or drug use, including marijuana or alcohol use.  · Whether you are pregnant or may be pregnant.  What are the risks?  Generally, this is a safe procedure. However, problems may occur, including:  · Allergic reaction.  · Lung and heart problems.  · Inhaling food " or liquid from the stomach into the lungs (aspiration).  · Nerve injury.  · Air in the bloodstream, which can lead to stroke.  · Extreme agitation or confusion (delirium) when you wake up from the anesthetic.  · Waking up during your procedure and being unable to move. This is rare.  These problems are more likely to develop if you are having a major surgery or if you have an advanced or serious medical condition. You can prevent some of these complications by answering all of your health care provider's questions thoroughly and by following all instructions before your procedure.  General anesthesia can cause side effects, including:  · Nausea or vomiting.  · A sore throat from the breathing tube.  · Hoarseness.  · Wheezing or coughing.  · Shaking chills.  · Tiredness.  · Body aches.  · Anxiety.  · Sleepiness or drowsiness.  · Confusion or agitation.  RISKS AND COMPLICATIONS OF SURGERY  Your health care provider will discuss possible risks and complications with you before surgery. Common risks and complications include:    · Problems due to the use of anesthetics.  · Blood loss and replacement (does not apply to minor surgical procedures).  · Temporary increase in pain due to surgery.  · Uncorrected pain or problems that the surgery was meant to correct.  · Infection.  · New damage.    What happens before the procedure?    Medicines  Ask your health care provider about:  · Changing or stopping your regular medicines. This is especially important if you are taking diabetes medicines or blood thinners.  · Taking medicines such as aspirin and ibuprofen. These medicines can thin your blood. Do not take these medicines unless your health care provider tells you to take them.  · Taking over-the-counter medicines, vitamins, herbs, and supplements. Do not take these during the week before your procedure unless your health care provider approves them.  General instructions  · Starting 3-6 weeks before the procedure, do not  use any products that contain nicotine or tobacco, such as cigarettes and e-cigarettes. If you need help quitting, ask your health care provider.  · If you brush your teeth on the morning of the procedure, make sure to spit out all of the toothpaste.  · Tell your health care provider if you become ill or develop a cold, cough, or fever.  · If instructed by your health care provider, bring your sleep apnea device with you on the day of your surgery (if applicable).  · Ask your health care provider if you will be going home the same day, the following day, or after a longer hospital stay.  ? Plan to have someone take you home from the hospital or clinic.  ? Plan to have a responsible adult care for you for at least 24 hours after you leave the hospital or clinic. This is important.  What happens during the procedure?  · You will be given anesthetics through both of the following:  ? A mask placed over your nose and mouth.  ? An IV in one of your veins.  · You may receive a medicine to help you relax (sedative).  · After you are unconscious, a breathing tube may be inserted down your throat to help you breathe. This will be removed before you wake up.  · An anesthesia specialist will stay with you throughout your procedure. He or she will:  ? Keep you comfortable and safe by continuing to give you medicines and adjusting the amount of medicine that you get.  ? Monitor your blood pressure, pulse, and oxygen levels to make sure that the anesthetics do not cause any problems.  The procedure may vary among health care providers and hospitals.  What happens after the procedure?  · Your blood pressure, temperature, heart rate, breathing rate, and blood oxygen level will be monitored until the medicines you were given have worn off.  · You will wake up in a recovery area. You may wake up slowly.  · If you feel anxious or agitated, you may be given medicine to help you calm down.  · If you will be going home the same day, your  health care provider may check to make sure you can walk, drink, and urinate.  · Your health care provider will treat any pain or side effects you have before you go home.  · Do not drive for 24 hours if you were given a sedative.  Summary  · General anesthesia is used to keep you still and prevent pain during a procedure.  · It is important to tell your healthcare provider about your medical history and any surgeries you have had, and previous experience with anesthesia.  · Follow your healthcare provider’s instructions about when to stop eating, drinking, or taking certain medicines before your procedure.  · Plan to have someone take you home from the hospital or clinic.  This information is not intended to replace advice given to you by your health care provider. Make sure you discuss any questions you have with your health care provider.  Document Released: 03/26/2009 Document Revised: 08/03/2018 Document Reviewed: 08/03/2018  [employer] Interactive Patient Education © 2019 [employer]       Fall Prevention in Hospitals, Adult  As a hospital patient, your condition and the treatments you receive can increase your risk for falls. Some additional risk factors for falls in a hospital include:  · Being in an unfamiliar environment.  · Being on bed rest.  · Your surgery.  · Taking certain medicines.  · Your tubing requirements, such as intravenous (IV) therapy or catheters.  It is important that you learn how to decrease fall risks while at the hospital. Below are important tips that can help prevent falls.  SAFETY TIPS FOR PREVENTING FALLS  Talk about your risk of falling.  · Ask your health care provider why you are at risk for falling. Is it your medicine, illness, tubing placement, or something else?  · Make a plan with your health care provider to keep you safe from falls.  · Ask your health care provider or pharmacist about side effects of your medicines. Some medicines can make you dizzy or affect your  coordination.  Ask for help.  · Ask for help before getting out of bed. You may need to press your call button.  · Ask for assistance in getting safely to the toilet.  · Ask for a walker or cane to be put at your bedside. Ask that most of the side rails on your bed be placed up before your health care provider leaves the room.  · Ask family or friends to sit with you.  · Ask for things that are out of your reach, such as your glasses, hearing aids, telephone, bedside table, or call button.  Follow these tips to avoid falling:  · Stay lying or seated, rather than standing, while waiting for help.  · Wear rubber-soled slippers or shoes whenever you walk in the hospital.  · Avoid quick, sudden movements.  ¨ Change positions slowly.  ¨ Sit on the side of your bed before standing.  ¨ Stand up slowly and wait before you start to walk.  · Let your health care provider know if there is a spill on the floor.  · Pay careful attention to the medical equipment, electrical cords, and tubes around you.  · When you need help, use your call button by your bed or in the bathroom. Wait for one of your health care providers to help you.  · If you feel dizzy or unsure of your footing, return to bed and wait for assistance.  · Avoid being distracted by the TV, telephone, or another person in your room.  · Do not lean or support yourself on rolling objects, such as IV poles or bedside tables.     This information is not intended to replace advice given to you by your health care provider. Make sure you discuss any questions you have with your health care provider.     Document Released: 12/15/2001 Document Revised: 01/08/2016 Document Reviewed: 08/25/2013  [employer] Interactive Patient Education ©2016 [employer]       [facility]  CHG 4% Patient Instruction Sheet    Chlorhexidine Before Surgery  Chlorhexidine gluconate (CHG) is a germ-killing (antiseptic) solution that is used to clean the skin. It gets rid of the bacteria  that normally live on the skin. Cleaning your skin with CHG before surgery helps lower the risk for infection after surgery.    How to use CHG solution  · You will take 2 showers, one shower the night before surgery, the second shower the morning of surgery before coming to the hospital.  · Use CHG only as told by your health care provider, and follow the instructions on the label.  · Use CHG solution while taking a shower. Follow these steps when using CHG solution (unless your health care provider gives you different instructions):  1. Start the shower.  2. Use your normal soap and shampoo to wash your face and hair.  3. Turn off the shower or move out of the shower stream.  4. Pour the CHG onto a clean washcloth. Do not use any type of brush or rough-edged sponge.  5. Starting at your neck, lather your body down to your toes. Make sure you:  6. Pay special attention to the part of your body where you will be having surgery. Scrub this area for at least 1 minute.  7. Use the full amount of CHG as directed. Usually, this is one half bottle for each shower.  8. Do not use CHG on your head or face. If the solution gets into your ears or eyes, rinse them well with water.  9. Avoid your genital area.  10. Avoid any areas of skin that have broken skin, cuts, or scrapes.  11. Scrub your back and under your arms. Make sure to wash skin folds.  12. Let the lather sit on your skin for 1-2 minutes or as long as told by your health care  provider.  13. Thoroughly rinse your entire body in the shower. Make sure that all body creases and crevices are rinsed well.  14. Dry off with a clean towel. Do not put any substances on your body afterward, such as powder, lotion, or perfume.  15. Put on clean clothes or pajamas.  16. If it is the night before your surgery, sleep in clean sheets.    What are the risks?  Risks of using CHG include:  · A skin reaction.  · Hearing loss, if CHG gets in your ears.  · Eye injury, if CHG gets in  your eyes and is not rinsed out.  · The CHG product catching fire.  Make sure that you avoid smoking and flames after applying CHG to your skin.  Do not use CHG:  · If you have a chlorhexidine allergy or have previously reacted to chlorhexidine.  · On babies younger than 2 months of age.      On the day of surgery, when you are taken to your room in Outpatient Surgery you will be given a CHG prepackaged cloth to wipe the site for your surgery.  How to use CHG prepackaged cloths  · Follow the instructions on the label.  · Use the CHG cloth on clean, dry skin. Follow these steps when using a CHG cloth (unless your health care provider gives you different instructions):  1. Using the CHG cloth, vigorously scrub the part of your body where you will be having surgery. Scrub using a back-and-forth motion for 3 minutes. The area on your body should be completely wet with CHG when you are finished scrubbing.  2. Do not rinse. Discard the cloth and let the area air-dry for 1 minute. Do not put any substances on your body afterward, such as powder, lotion, or perfume.  Contact a health care provider if:  · Your skin gets irritated after scrubbing.  · You have questions about using your solution or cloth.  Get help right away if:  · Your eyes become very red or swollen.  · Your eyes itch badly.  · Your skin itches badly and is red or swollen.  · Your hearing changes.  · You have trouble seeing.  · You have swelling or tingling in your mouth or throat.  · You have trouble breathing.  · You swallow any chlorhexidine.  Summary  · Chlorhexidine gluconate (CHG) is a germ-killing (antiseptic) solution that is used to clean the skin. Cleaning your skin with CHG before surgery helps lower the risk for infection after surgery.  · You may be given CHG to use at home. It may be in a bottle or in a prepackaged cloth to use on your skin. Carefully follow your health care provider's instructions and the instructions on the product  label.  · Do not use CHG if you have a chlorhexidine allergy.  · Contact your health care provider if your skin gets irritated after scrubbing.  This information is not intended to replace advice given to you by your health care provider. Make sure you discuss any questions you have with your health care provider.  Document Released: 09/11/2013 Document Revised: 11/15/2018 Document Reviewed: 11/15/2018  [employer] Interactive Patient Education © 2019 [employer]          PATIENT/FAMILY/RESPONSIBLE PARTY VERBALIZES UNDERSTANDING OF ABOVE EDUCATION.  COPY OF PAIN SCALE GIVEN AND REVIEWED WITH VERBALIZED UNDERSTANDING.

## 2020-06-03 ENCOUNTER — DOCUMENTATION (OUTPATIENT)
Dept: CARDIAC SURGERY | Facility: CLINIC | Age: 80
End: 2020-06-03

## 2020-06-03 ENCOUNTER — TRANSCRIBE ORDERS (OUTPATIENT)
Dept: ADMINISTRATIVE | Facility: HOSPITAL | Age: 80
End: 2020-06-03

## 2020-06-03 ENCOUNTER — TELEPHONE (OUTPATIENT)
Dept: CARDIAC SURGERY | Facility: CLINIC | Age: 80
End: 2020-06-03

## 2020-06-03 DIAGNOSIS — Z01.818 PRE-OP TESTING: Primary | ICD-10-CM

## 2020-06-03 DIAGNOSIS — N18.30 CKD (CHRONIC KIDNEY DISEASE) STAGE 3, GFR 30-59 ML/MIN (HCC): Primary | ICD-10-CM

## 2020-06-03 RX ORDER — ISOSORBIDE MONONITRATE 30 MG/1
30 TABLET, EXTENDED RELEASE ORAL DAILY
Qty: 7 TABLET | Refills: 0 | Status: SHIPPED | OUTPATIENT
Start: 2020-06-03 | End: 2020-06-16 | Stop reason: HOSPADM

## 2020-06-03 RX ORDER — ISOSORBIDE MONONITRATE 30 MG/1
30 TABLET, EXTENDED RELEASE ORAL DAILY
Qty: 7 TABLET | Refills: 0 | Status: SHIPPED | OUTPATIENT
Start: 2020-06-03 | End: 2020-06-03 | Stop reason: SDUPTHER

## 2020-06-03 NOTE — TELEPHONE ENCOUNTER
Called patient to discuss. Ok to continue BB. He has not taken this yet. Rx sent in for imdur too. Patient will pick this up tomorrow. /80 while on phone with me. Advised him to monitor HR, BP and notify our office with any changes. I will call him back tomorrow to check in with him.

## 2020-06-03 NOTE — OUTREACH NOTE
Medical Week 1 Survey      Responses   [facility] patient discharged from?  [place]   COVID-19 Test Status  Negative   Does the patient have one of the following disease processes/diagnoses(primary or secondary)?  Other   Is there a successful TCM telephone encounter documented?  No   Week 1 attempt successful?  Yes   Call start time  2019   Call end time  2023   Discharge diagnosis  Chest painElevated troponin, CABG next week   Meds reviewed with patient/caregiver?  Yes   Is the patient having any side effects they believe may be caused by any medication additions or changes?  No   Does the patient have all medications ordered at discharge?  Yes   Is the patient taking all medications as directed (includes completed medication regime)?  Yes   Does the patient have a primary care provider?   Yes   Does the patient have an appointment with their PCP within 7 days of discharge?  Yes   Has the patient kept scheduled appointments due by today?  Yes   Comments  ER Saturday for chest pain, took nitro last night and he is feeling some better today.   Has home health visited the patient within 72 hours of discharge?  N/A   Pulse Ox monitoring  None   Psychosocial issues?  No   Did the patient receive a copy of their discharge instructions?  Yes   Nursing interventions  Reviewed instructions with patient   What is the patient's perception of their health status since discharge?  Improving   Is the patient/caregiver able to teach back signs and symptoms related to disease process for when to call PCP?  Yes   Is the patient/caregiver able to teach back signs and symptoms related to disease process for when to call 911?  Yes   Is the patient/caregiver able to teach back the hierarchy of who to call/visit for symptoms/problems? PCP, Specialist, Home health nurse, Urgent Care, ED, 911  Yes   Additional teach back comments  Open Heart Surgery this Thursday.   Week 1 call completed?  Yes          Sneha Gaona RN

## 2020-06-03 NOTE — TELEPHONE ENCOUNTER
Discussed with patient's daughter Ms. Ray and patient. Pre operative lab work revealed worsening renal function. Instructed patient to increase oral fluids, stop inspra, decrease lisinopril to 10 mg BID and that CABG would be cancelled tomorrow and likely be rescheduled for next Tuesday with repeat labs on Monday. Patient checked BP while I was on the phone with the daughter and his BP was 154/84 HR 80. He has not had morning medications yet today. Instructed patient to take medications with the exception of the above changes and recheck BP later on today and let our office know. In addition a prescription for lopressor has been sent to his pharmacy. He did report an episode of chest pain this morning around 0200 that resolved after a single dose of nitroglycerin. He expresses concern for postponing CABG. We discussed he is to notify our office of worsening chest pain or proceed to the ER. Both patient and daughter verbalize understanding.   He is not taking lipitor now as it is causing muscle cramps. He is taking pravastatin.   Will notify them later today of timing for repeat labs on Monday.   Dr. Carballo aware of the above.

## 2020-06-03 NOTE — TELEPHONE ENCOUNTER
Susan Ray left vm message stating a nurse practitioner just called them about a medication change and they have further questions re: this and request a call back.  Can reach them at #[phone]/dayana

## 2020-06-03 NOTE — TELEPHONE ENCOUNTER
Pt calling to report afternoon BP reading.  /75, P 84.  Can reach pt at #[phone] if any questions or instructions/kahm

## 2020-06-04 ENCOUNTER — ANESTHESIA (OUTPATIENT)
Dept: PERIOP | Facility: HOSPITAL | Age: 80
End: 2020-06-04

## 2020-06-04 NOTE — TELEPHONE ENCOUNTER
Patient checked blood pressure around mid day today and it was 139/71 and HR was 59. Notified patient he could go Monday morning to the outpatient lab to get his labs redrawn. Patient voiced understanding.

## 2020-06-05 ENCOUNTER — TELEPHONE (OUTPATIENT)
Dept: CARDIAC SURGERY | Facility: CLINIC | Age: 80
End: 2020-06-05

## 2020-06-05 NOTE — TELEPHONE ENCOUNTER
144/73 HR 63 however he has not taken any of his meds today.  I have advised him to discontinue the metoprolol and  Imdur from his pharmacy.  This was sent in on 6/3.  Reportedly he does not tolerate beta blockers and with HR 63 prior to meds, will hold off on lopressor for now and see how his numbers look with Imdur instead.  Patient verbalized understanding and states he will call his pharmacy and  new script.

## 2020-06-06 ENCOUNTER — LAB (OUTPATIENT)
Dept: LAB | Facility: HOSPITAL | Age: 80
End: 2020-06-06

## 2020-06-06 PROCEDURE — U0003 INFECTIOUS AGENT DETECTION BY NUCLEIC ACID (DNA OR RNA); SEVERE ACUTE RESPIRATORY SYNDROME CORONAVIRUS 2 (SARS-COV-2) (CORONAVIRUS DISEASE [COVID-19]), AMPLIFIED PROBE TECHNIQUE, MAKING USE OF HIGH THROUGHPUT TECHNOLOGIES AS DESCRIBED BY CMS-2020-01-R: HCPCS | Performed by: THORACIC SURGERY (CARDIOTHORACIC VASCULAR SURGERY)

## 2020-06-07 LAB
COVID LABCORP PRIORITY: NORMAL
SARS-COV-2 RNA RESP QL NAA+PROBE: NOT DETECTED

## 2020-06-08 ENCOUNTER — TELEPHONE (OUTPATIENT)
Dept: CARDIOLOGY | Facility: CLINIC | Age: 80
End: 2020-06-08

## 2020-06-08 ENCOUNTER — LAB (OUTPATIENT)
Dept: LAB | Facility: HOSPITAL | Age: 80
End: 2020-06-08

## 2020-06-08 ENCOUNTER — TELEPHONE (OUTPATIENT)
Dept: CARDIAC SURGERY | Facility: CLINIC | Age: 80
End: 2020-06-08

## 2020-06-08 ENCOUNTER — HOSPITAL ENCOUNTER (OUTPATIENT)
Dept: PULMONOLOGY | Facility: HOSPITAL | Age: 80
Discharge: HOME | End: 2020-06-08
Admitting: NURSE PRACTITIONER

## 2020-06-08 DIAGNOSIS — N18.30 CKD (CHRONIC KIDNEY DISEASE) STAGE 3, GFR 30-59 ML/MIN (HCC): ICD-10-CM

## 2020-06-08 DIAGNOSIS — I25.10 CORONARY ARTERY DISEASE: ICD-10-CM

## 2020-06-08 LAB
ANION GAP SERPL CALCULATED.3IONS-SCNC: 11 MMOL/L (ref 5–15)
ARTERIAL PATENCY WRIST A: ABNORMAL
ATMOSPHERIC PRESS: 747 MMHG
BASE EXCESS BLDA CALC-SCNC: -3.6 MMOL/L (ref 0–2)
BDY SITE: ABNORMAL
BODY TEMPERATURE: 37 C
BUN BLD-MCNC: 22 MG/DL (ref 8–23)
BUN/CREAT SERPL: 13 (ref 7–25)
CALCIUM SPEC-SCNC: 9.1 MG/DL (ref 8.6–10.5)
CHLORIDE SERPL-SCNC: 107 MMOL/L (ref 98–107)
CO2 SERPL-SCNC: 23 MMOL/L (ref 22–29)
CREAT BLD-MCNC: 1.69 MG/DL (ref 0.76–1.27)
GFR SERPL CREATININE-BSD FRML MDRD: 39 ML/MIN/1.73
GLUCOSE BLD-MCNC: 87 MG/DL (ref 65–99)
HCO3 BLDA-SCNC: 20.3 MMOL/L (ref 20–26)
HOROWITZ INDEX BLD+IHG-RTO: 21 %
Lab: ABNORMAL
MODALITY: ABNORMAL
PCO2 BLDA: 32.8 MM HG (ref 35–45)
PH BLDA: 7.4 PH UNITS (ref 7.35–7.45)
PO2 BLDA: 96.3 MM HG (ref 83–108)
POTASSIUM BLD-SCNC: 4.2 MMOL/L (ref 3.5–5.2)
SAO2 % BLDCOA: 98.2 % (ref 94–99)
SODIUM BLD-SCNC: 141 MMOL/L (ref 136–145)
VENTILATOR MODE: ABNORMAL

## 2020-06-08 PROCEDURE — 36600 WITHDRAWAL OF ARTERIAL BLOOD: CPT | Performed by: NURSE PRACTITIONER

## 2020-06-08 PROCEDURE — 80048 BASIC METABOLIC PNL TOTAL CA: CPT | Performed by: NURSE PRACTITIONER

## 2020-06-08 PROCEDURE — 36415 COLL VENOUS BLD VENIPUNCTURE: CPT

## 2020-06-08 PROCEDURE — 82803 BLOOD GASES ANY COMBINATION: CPT | Performed by: NURSE PRACTITIONER

## 2020-06-08 NOTE — TELEPHONE ENCOUNTER
Spoke with patient's daughter Susan.  Advised her that Creatinine today has improved from 2.0 to 1.6.  Likely will proceed with CABG tomorrow however Dr. Carballo is in the OR currently so this will be discussed with him this afternoon. She verbalized understanding.

## 2020-06-09 ENCOUNTER — APPOINTMENT (OUTPATIENT)
Dept: GENERAL RADIOLOGY | Facility: HOSPITAL | Age: 80
End: 2020-06-09

## 2020-06-09 ENCOUNTER — HOSPITAL ENCOUNTER (OUTPATIENT)
Dept: CARDIOLOGY | Facility: HOSPITAL | Age: 80
Setting detail: SURGERY ADMIT
Discharge: HOME | End: 2020-06-09

## 2020-06-09 ENCOUNTER — HOSPITAL ENCOUNTER (INPATIENT)
Facility: HOSPITAL | Age: 80
LOS: 7 days | Discharge: NURSING, REHAB OR LONG-TERM CARE | End: 2020-06-16
Attending: THORACIC SURGERY (CARDIOTHORACIC VASCULAR SURGERY) | Admitting: THORACIC SURGERY (CARDIOTHORACIC VASCULAR SURGERY)

## 2020-06-09 ENCOUNTER — APPOINTMENT (OUTPATIENT)
Dept: CARDIOLOGY | Facility: HOSPITAL | Age: 80
End: 2020-06-09

## 2020-06-09 ENCOUNTER — READMISSION MANAGEMENT (OUTPATIENT)
Dept: CALL CENTER | Facility: HOSPITAL | Age: 80
End: 2020-06-09

## 2020-06-09 DIAGNOSIS — I25.10 CORONARY ARTERY DISEASE: ICD-10-CM

## 2020-06-09 DIAGNOSIS — Z74.09 IMPAIRED MOBILITY: ICD-10-CM

## 2020-06-09 LAB
A-A DO2: 103.3 MMHG
A-A DO2: 123.1 MMHG
A-A DO2: 182 MMHG
A-A DO2: 204.5 MMHG
A-A DO2: 290 MMHG
A-A DO2: 366.8 MMHG
A-A DO2: 489.4 MMHG
A-A DO2: 95.7 MMHG
ABO GROUP BLD: NORMAL
ALBUMIN SERPL-MCNC: 2.6 G/DL (ref 3.5–5.2)
ALBUMIN SERPL-MCNC: 3.2 G/DL (ref 3.5–5.2)
ANION GAP SERPL CALCULATED.3IONS-SCNC: 11 MMOL/L (ref 5–15)
ANION GAP SERPL CALCULATED.3IONS-SCNC: 12 MMOL/L (ref 5–15)
APTT PPP: 45.5 SECONDS (ref 24.1–35)
ARTERIAL PATENCY WRIST A: ABNORMAL
ATMOSPHERIC PRESS: 742 MMHG
ATMOSPHERIC PRESS: 743 MMHG
BASE EXCESS BLDA CALC-SCNC: -1.8 MMOL/L (ref 0–2)
BASE EXCESS BLDA CALC-SCNC: -1.9 MMOL/L (ref 0–2)
BASE EXCESS BLDA CALC-SCNC: -2.7 MMOL/L (ref 0–2)
BASE EXCESS BLDA CALC-SCNC: -3.7 MMOL/L (ref 0–2)
BASE EXCESS BLDA CALC-SCNC: -3.8 MMOL/L (ref 0–2)
BASE EXCESS BLDA CALC-SCNC: -4.1 MMOL/L (ref 0–2)
BASE EXCESS BLDA CALC-SCNC: -4.5 MMOL/L (ref 0–2)
BASE EXCESS BLDA CALC-SCNC: -5 MMOL/L (ref 0–2)
BASE EXCESS BLDA CALC-SCNC: -5.3 MMOL/L (ref 0–2)
BASE EXCESS BLDA CALC-SCNC: 0.2 MMOL/L (ref 0–2)
BASE EXCESS BLDA CALC-SCNC: 1 MMOL/L (ref 0–2)
BASE EXCESS BLDA CALC-SCNC: 1.1 MMOL/L (ref 0–2)
BDY SITE: ABNORMAL
BLD GP AB SCN SERPL QL: NEGATIVE
BODY TEMPERATURE: 37 C
BUN BLD-MCNC: 21 MG/DL (ref 8–23)
BUN BLD-MCNC: 23 MG/DL (ref 8–23)
BUN/CREAT SERPL: 10.9 (ref 7–25)
BUN/CREAT SERPL: 11.3 (ref 7–25)
CA-I BLD-MCNC: 3.98 MG/DL (ref 4.6–5.4)
CA-I BLD-MCNC: 4.01 MG/DL (ref 4.6–5.4)
CA-I BLD-MCNC: 4.04 MG/DL (ref 4.6–5.4)
CA-I BLD-MCNC: 4.33 MG/DL (ref 4.6–5.4)
CA-I BLD-MCNC: 4.65 MG/DL (ref 4.6–5.4)
CA-I BLD-MCNC: 4.67 MG/DL (ref 4.6–5.4)
CA-I BLD-MCNC: 4.81 MG/DL (ref 4.6–5.4)
CA-I BLD-MCNC: 4.98 MG/DL (ref 4.6–5.4)
CALCIUM SPEC-SCNC: 7.7 MG/DL (ref 8.6–10.5)
CALCIUM SPEC-SCNC: 7.9 MG/DL (ref 8.6–10.5)
CHLORIDE SERPL-SCNC: 109 MMOL/L (ref 98–107)
CHLORIDE SERPL-SCNC: 111 MMOL/L (ref 98–107)
CO2 SERPL-SCNC: 21 MMOL/L (ref 22–29)
CO2 SERPL-SCNC: 22 MMOL/L (ref 22–29)
COHGB MFR BLD: 0.9 % (ref 0–5)
COHGB MFR BLD: 1 % (ref 0–5)
COHGB MFR BLD: 1.1 % (ref 0–5)
COHGB MFR BLD: 1.2 % (ref 0–5)
COHGB MFR BLD: 1.2 % (ref 0–5)
COHGB MFR BLD: 1.5 % (ref 0–5)
CREAT BLD-MCNC: 1.93 MG/DL (ref 0.76–1.27)
CREAT BLD-MCNC: 2.03 MG/DL (ref 0.76–1.27)
DEPRECATED RDW RBC AUTO: 42.4 FL (ref 37–54)
DEPRECATED RDW RBC AUTO: 43.3 FL (ref 37–54)
ERYTHROCYTE [DISTWIDTH] IN BLOOD BY AUTOMATED COUNT: 12.9 % (ref 12.3–15.4)
ERYTHROCYTE [DISTWIDTH] IN BLOOD BY AUTOMATED COUNT: 13.2 % (ref 12.3–15.4)
GFR SERPL CREATININE-BSD FRML MDRD: 32 ML/MIN/1.73
GFR SERPL CREATININE-BSD FRML MDRD: 34 ML/MIN/1.73
GLUCOSE BLD-MCNC: 100 MG/DL (ref 65–99)
GLUCOSE BLD-MCNC: 129 MG/DL (ref 65–99)
HCO3 BLDA-SCNC: 21.9 MMOL/L (ref 20–26)
HCO3 BLDA-SCNC: 21.9 MMOL/L (ref 20–26)
HCO3 BLDA-SCNC: 22 MMOL/L (ref 20–26)
HCO3 BLDA-SCNC: 22.1 MMOL/L (ref 20–26)
HCO3 BLDA-SCNC: 22.2 MMOL/L (ref 20–26)
HCO3 BLDA-SCNC: 23.4 MMOL/L (ref 20–26)
HCO3 BLDA-SCNC: 23.6 MMOL/L (ref 20–26)
HCO3 BLDA-SCNC: 24.3 MMOL/L (ref 20–26)
HCO3 BLDA-SCNC: 24.8 MMOL/L (ref 20–26)
HCO3 BLDA-SCNC: 24.9 MMOL/L (ref 20–26)
HCO3 BLDA-SCNC: 25.1 MMOL/L (ref 20–26)
HCO3 BLDA-SCNC: 25.7 MMOL/L (ref 20–26)
HCT VFR BLD AUTO: 28.2 % (ref 37.5–51)
HCT VFR BLD AUTO: 28.4 % (ref 37.5–51)
HCT VFR BLD CALC: 25.9 % (ref 38–51)
HCT VFR BLD CALC: 26.4 % (ref 38–51)
HCT VFR BLD CALC: 26.7 % (ref 38–51)
HCT VFR BLD CALC: 27.7 % (ref 38–51)
HCT VFR BLD CALC: 31 % (ref 38–51)
HCT VFR BLD CALC: 31.1 % (ref 38–51)
HCT VFR BLD CALC: 38.1 % (ref 38–51)
HCT VFR BLD CALC: 38.6 % (ref 38–51)
HGB BLD-MCNC: 9.6 G/DL (ref 13–17.7)
HGB BLD-MCNC: 9.6 G/DL (ref 13–17.7)
HGB BLDA-MCNC: 10.1 G/DL (ref 14–18)
HGB BLDA-MCNC: 10.2 G/DL (ref 14–18)
HGB BLDA-MCNC: 12.4 G/DL (ref 14–18)
HGB BLDA-MCNC: 12.6 G/DL (ref 14–18)
HGB BLDA-MCNC: 8.4 G/DL (ref 14–18)
HGB BLDA-MCNC: 8.6 G/DL (ref 14–18)
HGB BLDA-MCNC: 8.7 G/DL (ref 14–18)
HGB BLDA-MCNC: 9 G/DL (ref 14–18)
HOROWITZ INDEX BLD+IHG-RTO: 100 %
HOROWITZ INDEX BLD+IHG-RTO: 50 %
HOROWITZ INDEX BLD+IHG-RTO: 60 %
HOROWITZ INDEX BLD+IHG-RTO: 60 %
HOROWITZ INDEX BLD+IHG-RTO: 90 %
INR PPP: 1.4 (ref 0.91–1.09)
Lab: ABNORMAL
Lab: NORMAL
MCH RBC QN AUTO: 30.9 PG (ref 26.6–33)
MCH RBC QN AUTO: 31 PG (ref 26.6–33)
MCHC RBC AUTO-ENTMCNC: 33.8 G/DL (ref 31.5–35.7)
MCHC RBC AUTO-ENTMCNC: 34 G/DL (ref 31.5–35.7)
MCV RBC AUTO: 91 FL (ref 79–97)
MCV RBC AUTO: 91.3 FL (ref 79–97)
METHGB BLD QL: 0.8 % (ref 0–3)
METHGB BLD QL: 0.8 % (ref 0–3)
METHGB BLD QL: 1 % (ref 0–3)
METHGB BLD QL: 1 % (ref 0–3)
METHGB BLD QL: 1.1 % (ref 0–3)
METHGB BLD QL: 1.3 % (ref 0–3)
MODALITY: ABNORMAL
NOTE: ABNORMAL
NOTIFIED BY: ABNORMAL
NOTIFIED WHO: ABNORMAL
OXYHGB MFR BLDV: 90.2 % (ref 94–99)
OXYHGB MFR BLDV: 97.3 % (ref 94–99)
OXYHGB MFR BLDV: 98 % (ref 94–99)
OXYHGB MFR BLDV: 98.2 % (ref 94–99)
OXYHGB MFR BLDV: 98.2 % (ref 94–99)
OXYHGB MFR BLDV: 98.3 % (ref 94–99)
OXYHGB MFR BLDV: 98.5 % (ref 94–99)
OXYHGB MFR BLDV: 98.5 % (ref 94–99)
PCO2 BLDA: 36.1 MM HG (ref 35–45)
PCO2 BLDA: 36.5 MM HG (ref 35–45)
PCO2 BLDA: 36.6 MM HG (ref 35–45)
PCO2 BLDA: 40.3 MM HG (ref 35–45)
PCO2 BLDA: 40.8 MM HG (ref 35–45)
PCO2 BLDA: 42.1 MM HG (ref 35–45)
PCO2 BLDA: 42.9 MM HG (ref 35–45)
PCO2 BLDA: 47.4 MM HG (ref 35–45)
PCO2 BLDA: 48.4 MM HG (ref 35–45)
PCO2 BLDA: 50.8 MM HG (ref 35–45)
PCO2 BLDA: 52.9 MM HG (ref 35–45)
PCO2 BLDA: 71.7 MM HG (ref 35–45)
PCO2 TEMP ADJ BLD: ABNORMAL MM[HG]
PEEP RESPIRATORY: 5 CM[H2O]
PH BLDA: 7.15 PH UNITS (ref 7.35–7.45)
PH BLDA: 7.26 PH UNITS (ref 7.35–7.45)
PH BLDA: 7.27 PH UNITS (ref 7.35–7.45)
PH BLDA: 7.28 PH UNITS (ref 7.35–7.45)
PH BLDA: 7.3 PH UNITS (ref 7.35–7.45)
PH BLDA: 7.32 PH UNITS (ref 7.35–7.45)
PH BLDA: 7.33 PH UNITS (ref 7.35–7.45)
PH BLDA: 7.37 PH UNITS (ref 7.35–7.45)
PH BLDA: 7.39 PH UNITS (ref 7.35–7.45)
PH BLDA: 7.41 PH UNITS (ref 7.35–7.45)
PH BLDA: 7.44 PH UNITS (ref 7.35–7.45)
PH BLDA: 7.44 PH UNITS (ref 7.35–7.45)
PH, TEMP CORRECTED: ABNORMAL
PHOSPHATE SERPL-MCNC: 3.8 MG/DL (ref 2.5–4.5)
PHOSPHATE SERPL-MCNC: 4.3 MG/DL (ref 2.5–4.5)
PLATELET # BLD AUTO: 113 10*3/MM3 (ref 140–450)
PLATELET # BLD AUTO: 127 10*3/MM3 (ref 140–450)
PMV BLD AUTO: 10.6 FL (ref 6–12)
PMV BLD AUTO: 10.7 FL (ref 6–12)
PO2 BLDA: 104 MM HG (ref 83–108)
PO2 BLDA: 165 MM HG (ref 83–108)
PO2 BLDA: 292 MM HG (ref 83–108)
PO2 BLDA: 419 MM HG (ref 83–108)
PO2 BLDA: 463 MM HG (ref 83–108)
PO2 BLDA: 466 MM HG (ref 83–108)
PO2 BLDA: 482 MM HG (ref 83–108)
PO2 BLDA: 493 MM HG (ref 83–108)
PO2 BLDA: 64.5 MM HG (ref 83–108)
PO2 BLDA: 70.5 MM HG (ref 83–108)
PO2 BLDA: 79.1 MM HG (ref 83–108)
PO2 BLDA: 85 MM HG (ref 83–108)
PO2 TEMP ADJ BLD: ABNORMAL MM[HG]
POTASSIUM BLD-SCNC: 4.9 MMOL/L (ref 3.5–5.2)
POTASSIUM BLD-SCNC: 5.1 MMOL/L (ref 3.5–5.2)
POTASSIUM BLDA-SCNC: 4.1 MMOL/L (ref 3.5–5.2)
POTASSIUM BLDA-SCNC: 4.4 MMOL/L (ref 3.5–5.2)
POTASSIUM BLDA-SCNC: 4.5 MMOL/L (ref 3.5–5.2)
POTASSIUM BLDA-SCNC: 4.6 MMOL/L (ref 3.5–5.2)
POTASSIUM BLDA-SCNC: 4.6 MMOL/L (ref 3.5–5.2)
POTASSIUM BLDA-SCNC: 5 MMOL/L (ref 3.5–5.2)
POTASSIUM BLDA-SCNC: 5.1 MMOL/L (ref 3.5–5.2)
POTASSIUM BLDA-SCNC: 5.7 MMOL/L (ref 3.5–5.2)
PROTHROMBIN TIME: 16.8 SECONDS (ref 11.9–14.6)
PSV: 10 CMH2O
RBC # BLD AUTO: 3.1 10*6/MM3 (ref 4.14–5.8)
RBC # BLD AUTO: 3.11 10*6/MM3 (ref 4.14–5.8)
RH BLD: POSITIVE
SAO2 % BLDCOA: 100 % (ref 94–99)
SAO2 % BLDCOA: 92.2 % (ref 94–99)
SAO2 % BLDCOA: 92.3 % (ref 94–99)
SAO2 % BLDCOA: 95.1 % (ref 94–99)
SAO2 % BLDCOA: 96.9 % (ref 94–99)
SAO2 % BLDCOA: 97.8 % (ref 94–99)
SAO2 % BLDCOA: 99.7 % (ref 94–99)
SAO2 % BLDCOA: >100.1 % (ref 94–99)
SET MECH RESP RATE: 12
SET MECH RESP RATE: 16
SET MECH RESP RATE: 18
SET MECH RESP RATE: 22
SET MECH RESP RATE: 22
SODIUM BLD-SCNC: 142 MMOL/L (ref 136–145)
SODIUM BLD-SCNC: 144 MMOL/L (ref 136–145)
SODIUM BLDA-SCNC: 140 MMOL/L (ref 136–145)
SODIUM BLDA-SCNC: 141 MMOL/L (ref 136–145)
SODIUM BLDA-SCNC: 142 MMOL/L (ref 136–145)
T&S EXPIRATION DATE: NORMAL
VENTILATOR MODE: ABNORMAL
VENTILATOR MODE: AC
VT ON VENT VENT: 550 ML
WBC NRBC COR # BLD: 12.55 10*3/MM3 (ref 3.4–10.8)
WBC NRBC COR # BLD: 13.18 10*3/MM3 (ref 3.4–10.8)

## 2020-06-09 PROCEDURE — 25010000002 AMIODARONE IN DEXTROSE 5% 360-4.14 MG/200ML-% SOLUTION: Performed by: THORACIC SURGERY (CARDIOTHORACIC VASCULAR SURGERY)

## 2020-06-09 PROCEDURE — 25010000002 PROPRANOLOL PER 1 MG: Performed by: NURSE ANESTHETIST, CERTIFIED REGISTERED

## 2020-06-09 PROCEDURE — 25010000002 HEPARIN (PORCINE) PER 1000 UNITS: Performed by: NURSE ANESTHETIST, CERTIFIED REGISTERED

## 2020-06-09 PROCEDURE — 25010000002 VANCOMYCIN 1 G RECONSTITUTED SOLUTION 1 EACH VIAL: Performed by: THORACIC SURGERY (CARDIOTHORACIC VASCULAR SURGERY)

## 2020-06-09 PROCEDURE — B245ZZ4 ULTRASONOGRAPHY OF LEFT HEART, TRANSESOPHAGEAL: ICD-10-PCS | Performed by: THORACIC SURGERY (CARDIOTHORACIC VASCULAR SURGERY)

## 2020-06-09 PROCEDURE — C1713 ANCHOR/SCREW BN/BN,TIS/BN: HCPCS | Performed by: THORACIC SURGERY (CARDIOTHORACIC VASCULAR SURGERY)

## 2020-06-09 PROCEDURE — 94002 VENT MGMT INPAT INIT DAY: CPT

## 2020-06-09 PROCEDURE — 5A1221Z PERFORMANCE OF CARDIAC OUTPUT, CONTINUOUS: ICD-10-PCS | Performed by: THORACIC SURGERY (CARDIOTHORACIC VASCULAR SURGERY)

## 2020-06-09 PROCEDURE — 82330 ASSAY OF CALCIUM: CPT

## 2020-06-09 PROCEDURE — 85730 THROMBOPLASTIN TIME PARTIAL: CPT | Performed by: THORACIC SURGERY (CARDIOTHORACIC VASCULAR SURGERY)

## 2020-06-09 PROCEDURE — 71045 X-RAY EXAM CHEST 1 VIEW: CPT

## 2020-06-09 PROCEDURE — 86923 COMPATIBILITY TEST ELECTRIC: CPT

## 2020-06-09 PROCEDURE — 86901 BLOOD TYPING SEROLOGIC RH(D): CPT | Performed by: THORACIC SURGERY (CARDIOTHORACIC VASCULAR SURGERY)

## 2020-06-09 PROCEDURE — 86900 BLOOD TYPING SEROLOGIC ABO: CPT | Performed by: THORACIC SURGERY (CARDIOTHORACIC VASCULAR SURGERY)

## 2020-06-09 PROCEDURE — 3E080GC INTRODUCTION OF OTHER THERAPEUTIC SUBSTANCE INTO HEART, OPEN APPROACH: ICD-10-PCS | Performed by: THORACIC SURGERY (CARDIOTHORACIC VASCULAR SURGERY)

## 2020-06-09 PROCEDURE — 0211093 BYPASS CORONARY ARTERY, TWO ARTERIES FROM CORONARY ARTERY WITH AUTOLOGOUS VENOUS TISSUE, OPEN APPROACH: ICD-10-PCS | Performed by: THORACIC SURGERY (CARDIOTHORACIC VASCULAR SURGERY)

## 2020-06-09 PROCEDURE — 82805 BLOOD GASES W/O2 SATURATION: CPT

## 2020-06-09 PROCEDURE — 25010000002 VANCOMYCIN: Performed by: NURSE PRACTITIONER

## 2020-06-09 PROCEDURE — P9041 ALBUMIN (HUMAN),5%, 50ML: HCPCS | Performed by: THORACIC SURGERY (CARDIOTHORACIC VASCULAR SURGERY)

## 2020-06-09 PROCEDURE — 25810000003 DEXTROSE 5 % WITH KCL 20 MEQ 20-5 MEQ/L-% SOLUTION: Performed by: THORACIC SURGERY (CARDIOTHORACIC VASCULAR SURGERY)

## 2020-06-09 PROCEDURE — A4648 IMPLANTABLE TISSUE MARKER: HCPCS | Performed by: THORACIC SURGERY (CARDIOTHORACIC VASCULAR SURGERY)

## 2020-06-09 PROCEDURE — 80069 RENAL FUNCTION PANEL: CPT | Performed by: THORACIC SURGERY (CARDIOTHORACIC VASCULAR SURGERY)

## 2020-06-09 PROCEDURE — C1751 CATH, INF, PER/CENT/MIDLINE: HCPCS | Performed by: NURSE ANESTHETIST, CERTIFIED REGISTERED

## 2020-06-09 PROCEDURE — 94799 UNLISTED PULMONARY SVC/PX: CPT

## 2020-06-09 PROCEDURE — 25010000003 MEPERIDINE PER 100 MG: Performed by: THORACIC SURGERY (CARDIOTHORACIC VASCULAR SURGERY)

## 2020-06-09 PROCEDURE — 33508 ENDOSCOPIC VEIN HARVEST: CPT | Performed by: THORACIC SURGERY (CARDIOTHORACIC VASCULAR SURGERY)

## 2020-06-09 PROCEDURE — 25010000002 MORPHINE SULFATE (PF) 2 MG/ML SOLUTION: Performed by: THORACIC SURGERY (CARDIOTHORACIC VASCULAR SURGERY)

## 2020-06-09 PROCEDURE — 33533 CABG ARTERIAL SINGLE: CPT | Performed by: THORACIC SURGERY (CARDIOTHORACIC VASCULAR SURGERY)

## 2020-06-09 PROCEDURE — 25010000002 PHENYLEPHRINE 10 MG/ML SOLUTION: Performed by: NURSE ANESTHETIST, CERTIFIED REGISTERED

## 2020-06-09 PROCEDURE — 86850 RBC ANTIBODY SCREEN: CPT | Performed by: THORACIC SURGERY (CARDIOTHORACIC VASCULAR SURGERY)

## 2020-06-09 PROCEDURE — 33518 CABG ARTERY-VEIN TWO: CPT | Performed by: THORACIC SURGERY (CARDIOTHORACIC VASCULAR SURGERY)

## 2020-06-09 PROCEDURE — 25010000002 PROPOFOL 10 MG/ML EMULSION: Performed by: THORACIC SURGERY (CARDIOTHORACIC VASCULAR SURGERY)

## 2020-06-09 PROCEDURE — 82375 ASSAY CARBOXYHB QUANT: CPT

## 2020-06-09 PROCEDURE — 25010000002 CEFAZOLIN PER 500 MG: Performed by: THORACIC SURGERY (CARDIOTHORACIC VASCULAR SURGERY)

## 2020-06-09 PROCEDURE — 25010000002 MIDAZOLAM PER 1 MG: Performed by: NURSE ANESTHETIST, CERTIFIED REGISTERED

## 2020-06-09 PROCEDURE — 85610 PROTHROMBIN TIME: CPT | Performed by: THORACIC SURGERY (CARDIOTHORACIC VASCULAR SURGERY)

## 2020-06-09 PROCEDURE — 93010 ELECTROCARDIOGRAM REPORT: CPT | Performed by: INTERNAL MEDICINE

## 2020-06-09 PROCEDURE — 85027 COMPLETE CBC AUTOMATED: CPT | Performed by: THORACIC SURGERY (CARDIOTHORACIC VASCULAR SURGERY)

## 2020-06-09 PROCEDURE — 06BP3ZZ EXCISION OF RIGHT SAPHENOUS VEIN, PERCUTANEOUS APPROACH: ICD-10-PCS | Performed by: THORACIC SURGERY (CARDIOTHORACIC VASCULAR SURGERY)

## 2020-06-09 PROCEDURE — 25010000002 PROTAMINE SULFATE PER 10 MG: Performed by: NURSE ANESTHETIST, CERTIFIED REGISTERED

## 2020-06-09 PROCEDURE — 83050 HGB METHEMOGLOBIN QUAN: CPT

## 2020-06-09 PROCEDURE — 82803 BLOOD GASES ANY COMBINATION: CPT

## 2020-06-09 PROCEDURE — 93312 ECHO TRANSESOPHAGEAL: CPT | Performed by: INTERNAL MEDICINE

## 2020-06-09 PROCEDURE — 25010000002 ALBUMIN HUMAN 5% PER 50 ML: Performed by: THORACIC SURGERY (CARDIOTHORACIC VASCULAR SURGERY)

## 2020-06-09 PROCEDURE — 25010000003 CEFAZOLIN PER 500 MG: Performed by: NURSE ANESTHETIST, CERTIFIED REGISTERED

## 2020-06-09 PROCEDURE — 02100Z9 BYPASS CORONARY ARTERY, ONE ARTERY FROM LEFT INTERNAL MAMMARY, OPEN APPROACH: ICD-10-PCS | Performed by: THORACIC SURGERY (CARDIOTHORACIC VASCULAR SURGERY)

## 2020-06-09 PROCEDURE — 94640 AIRWAY INHALATION TREATMENT: CPT

## 2020-06-09 PROCEDURE — 25010000002 PROPOFOL 10 MG/ML EMULSION: Performed by: NURSE ANESTHETIST, CERTIFIED REGISTERED

## 2020-06-09 PROCEDURE — 93325 DOPPLER ECHO COLOR FLOW MAPG: CPT | Performed by: INTERNAL MEDICINE

## 2020-06-09 PROCEDURE — 25010000002 HEPARIN (PORCINE) PER 1000 UNITS: Performed by: THORACIC SURGERY (CARDIOTHORACIC VASCULAR SURGERY)

## 2020-06-09 PROCEDURE — 93318 ECHO TRANSESOPHAGEAL INTRAOP: CPT | Performed by: NURSE ANESTHETIST, CERTIFIED REGISTERED

## 2020-06-09 PROCEDURE — 93005 ELECTROCARDIOGRAM TRACING: CPT | Performed by: THORACIC SURGERY (CARDIOTHORACIC VASCULAR SURGERY)

## 2020-06-09 PROCEDURE — 25010000002 PAPAVERINE PER 60 MG: Performed by: THORACIC SURGERY (CARDIOTHORACIC VASCULAR SURGERY)

## 2020-06-09 DEVICE — WR SUT NONABS MF SS V40 1/2CIR TC 6/0 18IN M649G: Type: IMPLANTABLE DEVICE | Status: FUNCTIONAL

## 2020-06-09 DEVICE — WR SUT NONABS MF SS CCS 1/2CIR CUT/CONV 5/0 18IN M657G: Type: IMPLANTABLE DEVICE | Status: FUNCTIONAL

## 2020-06-09 DEVICE — DISK-SHAPED STYLE, SILICONE (1 PER STERILE PKG)
Type: IMPLANTABLE DEVICE | Status: FUNCTIONAL
Brand: SCANLAN® RADIOMARK® GRAFT MARKERS

## 2020-06-09 RX ORDER — ATORVASTATIN CALCIUM 10 MG/1
20 TABLET, FILM COATED ORAL NIGHTLY
Status: DISCONTINUED | OUTPATIENT
Start: 2020-06-10 | End: 2020-06-16 | Stop reason: HOSPADM

## 2020-06-09 RX ORDER — ASPIRIN 325 MG
162 TABLET ORAL ONCE
Status: COMPLETED | OUTPATIENT
Start: 2020-06-10 | End: 2020-06-10

## 2020-06-09 RX ORDER — OXYCODONE HYDROCHLORIDE AND ACETAMINOPHEN 5; 325 MG/1; MG/1
1 TABLET ORAL
Status: DISCONTINUED | OUTPATIENT
Start: 2020-06-09 | End: 2020-06-16 | Stop reason: HOSPADM

## 2020-06-09 RX ORDER — IPRATROPIUM BROMIDE AND ALBUTEROL SULFATE 2.5; .5 MG/3ML; MG/3ML
3 SOLUTION RESPIRATORY (INHALATION)
Status: DISCONTINUED | OUTPATIENT
Start: 2020-06-09 | End: 2020-06-14

## 2020-06-09 RX ORDER — MIDAZOLAM HYDROCHLORIDE 1 MG/ML
INJECTION INTRAMUSCULAR; INTRAVENOUS AS NEEDED
Status: DISCONTINUED | OUTPATIENT
Start: 2020-06-09 | End: 2020-06-09 | Stop reason: SURG

## 2020-06-09 RX ORDER — POTASSIUM CHLORIDE 29.8 MG/ML
20 INJECTION INTRAVENOUS
Status: DISCONTINUED | OUTPATIENT
Start: 2020-06-09 | End: 2020-06-12

## 2020-06-09 RX ORDER — SODIUM CHLORIDE, SODIUM LACTATE, POTASSIUM CHLORIDE, CALCIUM CHLORIDE 600; 310; 30; 20 MG/100ML; MG/100ML; MG/100ML; MG/100ML
INJECTION, SOLUTION INTRAVENOUS CONTINUOUS PRN
Status: DISCONTINUED | OUTPATIENT
Start: 2020-06-09 | End: 2020-06-09 | Stop reason: SURG

## 2020-06-09 RX ORDER — CEFAZOLIN SODIUM 1 G/3ML
INJECTION, POWDER, FOR SOLUTION INTRAMUSCULAR; INTRAVENOUS AS NEEDED
Status: DISCONTINUED | OUTPATIENT
Start: 2020-06-09 | End: 2020-06-09 | Stop reason: SURG

## 2020-06-09 RX ORDER — PROTAMINE SULFATE 10 MG/ML
INJECTION, SOLUTION INTRAVENOUS AS NEEDED
Status: DISCONTINUED | OUTPATIENT
Start: 2020-06-09 | End: 2020-06-09 | Stop reason: SURG

## 2020-06-09 RX ORDER — VECURONIUM BROMIDE 1 MG/ML
INJECTION, POWDER, LYOPHILIZED, FOR SOLUTION INTRAVENOUS AS NEEDED
Status: DISCONTINUED | OUTPATIENT
Start: 2020-06-09 | End: 2020-06-09 | Stop reason: SURG

## 2020-06-09 RX ORDER — NITROGLYCERIN 20 MG/100ML
5 INJECTION INTRAVENOUS CONTINUOUS
Status: DISCONTINUED | OUTPATIENT
Start: 2020-06-09 | End: 2020-06-12

## 2020-06-09 RX ORDER — ALBUMIN, HUMAN INJ 5% 5 %
500 SOLUTION INTRAVENOUS ONCE
Status: COMPLETED | OUTPATIENT
Start: 2020-06-09 | End: 2020-06-09

## 2020-06-09 RX ORDER — MORPHINE SULFATE 2 MG/ML
2 INJECTION, SOLUTION INTRAMUSCULAR; INTRAVENOUS
Status: DISCONTINUED | OUTPATIENT
Start: 2020-06-09 | End: 2020-06-10

## 2020-06-09 RX ORDER — MEPERIDINE HYDROCHLORIDE 50 MG/ML
25 INJECTION INTRAMUSCULAR; INTRAVENOUS; SUBCUTANEOUS
Status: DISCONTINUED | OUTPATIENT
Start: 2020-06-09 | End: 2020-06-10

## 2020-06-09 RX ORDER — POTASSIUM CHLORIDE, DEXTROSE MONOHYDRATE 150; 5 MG/100ML; G/100ML
30 INJECTION, SOLUTION INTRAVENOUS CONTINUOUS
Status: DISCONTINUED | OUTPATIENT
Start: 2020-06-09 | End: 2020-06-12

## 2020-06-09 RX ORDER — AMINOCAPROIC ACID 250 MG/ML
INJECTION, SOLUTION INTRAVENOUS AS NEEDED
Status: DISCONTINUED | OUTPATIENT
Start: 2020-06-09 | End: 2020-06-09 | Stop reason: SURG

## 2020-06-09 RX ORDER — MAGNESIUM HYDROXIDE 1200 MG/15ML
LIQUID ORAL AS NEEDED
Status: DISCONTINUED | OUTPATIENT
Start: 2020-06-09 | End: 2020-06-09 | Stop reason: HOSPADM

## 2020-06-09 RX ORDER — HEPARIN SODIUM 1000 [USP'U]/ML
INJECTION, SOLUTION INTRAVENOUS; SUBCUTANEOUS AS NEEDED
Status: DISCONTINUED | OUTPATIENT
Start: 2020-06-09 | End: 2020-06-09 | Stop reason: SURG

## 2020-06-09 RX ORDER — CLOPIDOGREL BISULFATE 75 MG/1
75 TABLET ORAL EVERY EVENING
Status: DISCONTINUED | OUTPATIENT
Start: 2020-06-10 | End: 2020-06-16 | Stop reason: HOSPADM

## 2020-06-09 RX ORDER — SUFENTANIL CITRATE 50 UG/ML
INJECTION EPIDURAL; INTRAVENOUS AS NEEDED
Status: DISCONTINUED | OUTPATIENT
Start: 2020-06-09 | End: 2020-06-09 | Stop reason: SURG

## 2020-06-09 RX ORDER — SODIUM CHLORIDE 9 MG/ML
INJECTION, SOLUTION INTRAVENOUS AS NEEDED
Status: DISCONTINUED | OUTPATIENT
Start: 2020-06-09 | End: 2020-06-09 | Stop reason: HOSPADM

## 2020-06-09 RX ORDER — ONDANSETRON 2 MG/ML
4 INJECTION INTRAMUSCULAR; INTRAVENOUS EVERY 6 HOURS PRN
Status: DISCONTINUED | OUTPATIENT
Start: 2020-06-09 | End: 2020-06-16 | Stop reason: HOSPADM

## 2020-06-09 RX ORDER — PROPRANOLOL HYDROCHLORIDE 1 MG/ML
INJECTION, SOLUTION INTRAVENOUS AS NEEDED
Status: DISCONTINUED | OUTPATIENT
Start: 2020-06-09 | End: 2020-06-09 | Stop reason: SURG

## 2020-06-09 RX ORDER — DEXTROSE MONOHYDRATE 25 G/50ML
25-50 INJECTION, SOLUTION INTRAVENOUS
Status: DISCONTINUED | OUTPATIENT
Start: 2020-06-09 | End: 2020-06-12

## 2020-06-09 RX ORDER — PROPOFOL 10 MG/ML
VIAL (ML) INTRAVENOUS AS NEEDED
Status: DISCONTINUED | OUTPATIENT
Start: 2020-06-09 | End: 2020-06-09 | Stop reason: SURG

## 2020-06-09 RX ORDER — AMIODARONE HYDROCHLORIDE 200 MG/1
400 TABLET ORAL EVERY 6 HOURS
Status: DISCONTINUED | OUTPATIENT
Start: 2020-06-10 | End: 2020-06-11

## 2020-06-09 RX ORDER — NITROGLYCERIN 20 MG/100ML
INJECTION INTRAVENOUS CONTINUOUS PRN
Status: DISCONTINUED | OUTPATIENT
Start: 2020-06-09 | End: 2020-06-09 | Stop reason: SURG

## 2020-06-09 RX ORDER — BUPIVACAINE HCL/0.9 % NACL/PF 0.1 %
2 PLASTIC BAG, INJECTION (ML) EPIDURAL EVERY 8 HOURS
Status: COMPLETED | OUTPATIENT
Start: 2020-06-09 | End: 2020-06-11

## 2020-06-09 RX ORDER — SODIUM CHLORIDE 0.9 % (FLUSH) 0.9 %
3 SYRINGE (ML) INJECTION EVERY 12 HOURS SCHEDULED
Status: DISCONTINUED | OUTPATIENT
Start: 2020-06-09 | End: 2020-06-09 | Stop reason: HOSPADM

## 2020-06-09 RX ORDER — VANCOMYCIN HYDROCHLORIDE 1 G/200ML
10 INJECTION, SOLUTION INTRAVENOUS EVERY 24 HOURS
Status: COMPLETED | OUTPATIENT
Start: 2020-06-10 | End: 2020-06-10

## 2020-06-09 RX ORDER — SODIUM CHLORIDE 0.9 % (FLUSH) 0.9 %
10 SYRINGE (ML) INJECTION AS NEEDED
Status: DISCONTINUED | OUTPATIENT
Start: 2020-06-09 | End: 2020-06-09 | Stop reason: HOSPADM

## 2020-06-09 RX ORDER — OXYCODONE AND ACETAMINOPHEN 10; 325 MG/1; MG/1
1 TABLET ORAL
Status: DISCONTINUED | OUTPATIENT
Start: 2020-06-09 | End: 2020-06-11

## 2020-06-09 RX ORDER — CHLORHEXIDINE GLUCONATE 0.12 MG/ML
15 RINSE ORAL EVERY 12 HOURS
Status: DISCONTINUED | OUTPATIENT
Start: 2020-06-09 | End: 2020-06-12

## 2020-06-09 RX ORDER — SODIUM CHLORIDE 9 MG/ML
INJECTION, SOLUTION INTRAVENOUS CONTINUOUS PRN
Status: DISCONTINUED | OUTPATIENT
Start: 2020-06-09 | End: 2020-06-09 | Stop reason: SURG

## 2020-06-09 RX ORDER — ASPIRIN 81 MG/1
81 TABLET ORAL DAILY
Status: DISCONTINUED | OUTPATIENT
Start: 2020-06-11 | End: 2020-06-16 | Stop reason: HOSPADM

## 2020-06-09 RX ADMIN — Medication 1 APPLICATION: at 20:17

## 2020-06-09 RX ADMIN — PROTAMINE SULFATE 180 MG: 10 INJECTION, SOLUTION INTRAVENOUS at 16:07

## 2020-06-09 RX ADMIN — SODIUM CHLORIDE, POTASSIUM CHLORIDE, SODIUM LACTATE AND CALCIUM CHLORIDE: 600; 310; 30; 20 INJECTION, SOLUTION INTRAVENOUS at 10:30

## 2020-06-09 RX ADMIN — VECURONIUM BROMIDE 10 MG: 1 INJECTION, POWDER, LYOPHILIZED, FOR SOLUTION INTRAVENOUS at 10:35

## 2020-06-09 RX ADMIN — SODIUM CHLORIDE, POTASSIUM CHLORIDE, SODIUM LACTATE AND CALCIUM CHLORIDE 1000 ML: 600; 310; 30; 20 INJECTION, SOLUTION INTRAVENOUS at 18:10

## 2020-06-09 RX ADMIN — CEFAZOLIN SODIUM 2 G: 10 INJECTION, POWDER, FOR SOLUTION INTRAVENOUS at 18:54

## 2020-06-09 RX ADMIN — LIDOCAINE HYDROCHLORIDE 100 MG: 20 INJECTION, SOLUTION INTRAVENOUS at 10:35

## 2020-06-09 RX ADMIN — SODIUM CHLORIDE: 9 INJECTION, SOLUTION INTRAVENOUS at 10:30

## 2020-06-09 RX ADMIN — ALBUMIN HUMAN 500 ML: 0.05 INJECTION, SOLUTION INTRAVENOUS at 20:45

## 2020-06-09 RX ADMIN — AMIODARONE HYDROCHLORIDE 1 MG/MIN: 1.8 INJECTION, SOLUTION INTRAVENOUS at 18:51

## 2020-06-09 RX ADMIN — AMINOCAPROIC ACID 1 G/HR: 250 INJECTION, SOLUTION INTRAVENOUS at 11:02

## 2020-06-09 RX ADMIN — VECURONIUM BROMIDE 5 MG: 1 INJECTION, POWDER, LYOPHILIZED, FOR SOLUTION INTRAVENOUS at 15:28

## 2020-06-09 RX ADMIN — NITROGLYCERIN 5 MCG/MIN: 20 INJECTION INTRAVENOUS at 18:08

## 2020-06-09 RX ADMIN — PROPOFOL 50 MCG/KG/MIN: 10 INJECTION, EMULSION INTRAVENOUS at 18:08

## 2020-06-09 RX ADMIN — MIDAZOLAM HYDROCHLORIDE 3 MG: 1 INJECTION, SOLUTION INTRAMUSCULAR; INTRAVENOUS at 10:21

## 2020-06-09 RX ADMIN — MIDAZOLAM HYDROCHLORIDE 2 MG: 1 INJECTION, SOLUTION INTRAMUSCULAR; INTRAVENOUS at 10:35

## 2020-06-09 RX ADMIN — MORPHINE SULFATE 2 MG: 2 INJECTION, SOLUTION INTRAMUSCULAR; INTRAVENOUS at 23:33

## 2020-06-09 RX ADMIN — HEPARIN SODIUM 37000 UNITS: 1000 INJECTION, SOLUTION INTRAVENOUS; SUBCUTANEOUS at 12:23

## 2020-06-09 RX ADMIN — IPRATROPIUM BROMIDE AND ALBUTEROL SULFATE 3 ML: 2.5; .5 SOLUTION RESPIRATORY (INHALATION) at 18:53

## 2020-06-09 RX ADMIN — POTASSIUM CHLORIDE AND DEXTROSE MONOHYDRATE 30 ML/HR: 150; 5 INJECTION, SOLUTION INTRAVENOUS at 18:48

## 2020-06-09 RX ADMIN — NITROGLYCERIN 30 MCG/MIN: 20 INJECTION INTRAVENOUS at 17:20

## 2020-06-09 RX ADMIN — SODIUM CHLORIDE 1000 ML: 9 INJECTION, SOLUTION INTRAVENOUS at 19:15

## 2020-06-09 RX ADMIN — MEPERIDINE HYDROCHLORIDE 25 MG: 50 INJECTION INTRAMUSCULAR; INTRAVENOUS; SUBCUTANEOUS at 19:00

## 2020-06-09 RX ADMIN — SUFENTANIL CITRATE 1 MCG/KG/HR: 50 INJECTION EPIDURAL; INTRAVENOUS at 11:14

## 2020-06-09 RX ADMIN — VANCOMYCIN HYDROCHLORIDE 1500 MG: 10 INJECTION, POWDER, LYOPHILIZED, FOR SOLUTION INTRAVENOUS at 10:46

## 2020-06-09 RX ADMIN — MEPERIDINE HYDROCHLORIDE 25 MG: 50 INJECTION INTRAMUSCULAR; INTRAVENOUS; SUBCUTANEOUS at 21:49

## 2020-06-09 RX ADMIN — PROPRANOLOL HYDROCHLORIDE 1 MG: 1 INJECTION INTRAVENOUS at 10:18

## 2020-06-09 RX ADMIN — CHLORHEXIDINE GLUCONATE 15 ML: 1.2 RINSE ORAL at 20:17

## 2020-06-09 RX ADMIN — AMINOCAPROIC ACID 5 G: 250 INJECTION, SOLUTION INTRAVENOUS at 11:02

## 2020-06-09 RX ADMIN — PROPOFOL 50 MG: 10 INJECTION, EMULSION INTRAVENOUS at 10:35

## 2020-06-09 RX ADMIN — CEFAZOLIN 2 G: 330 INJECTION, POWDER, FOR SOLUTION INTRAMUSCULAR; INTRAVENOUS at 10:46

## 2020-06-09 RX ADMIN — VECURONIUM BROMIDE 5 MG: 1 INJECTION, POWDER, LYOPHILIZED, FOR SOLUTION INTRAVENOUS at 13:37

## 2020-06-09 NOTE — ANESTHESIA POSTPROCEDURE EVALUATION
Patient: Faisal Joseph    Procedure Summary     Date:  06/09/20 Room / Location:   [facility] OR 15 /  [facility] OR    Anesthesia Start:  1017 Anesthesia Stop:  1818    Procedure:  CABG X 3, LIMA, SHELBY, EVH WITH OPEN EXTENSION LOWER RIGHT LEG (N/A Chest) Diagnosis:       Coronary artery disease      (Coronary artery disease [I25.10])    Surgeon:  Nikunj Carballo MD Provider:  Chele Serrano CRNA    Anesthesia Type:  general ASA Status:  4          Anesthesia Type: general    Vitals  Vitals Value Taken Time   BP     Temp     Pulse 65 6/9/2020  6:30 PM   Resp     SpO2 96 % 6/9/2020  6:29 PM   Vitals shown include unvalidated device data.        Post Anesthesia Care and Evaluation    Patient location during evaluation: ICU  Patient participation: complete - patient participated  Level of consciousness: lethargic  Pain management: adequate  Airway patency: patent  Anesthetic complications: No anesthetic complications    Cardiovascular status: acceptable  Respiratory status: acceptable, ETT, intubated and ventilator  Hydration status: acceptable    Comments: Blood pressure 135/86, pulse 71, temperature 97.5 °F (36.4 °C), temperature source Temporal, resp. rate 16, SpO2 97 %.    Pt discharged from PACU based on eddie score >8

## 2020-06-09 NOTE — PERIOPERATIVE NURSING NOTE
Patient identified before entering or using armbands, surgical consent verified, and armbands removed.  Cerebral oximeter pads placed on patients forehead just above the eyebrows.  Patient transferred to OR table without difficulty, 5 lead EKG and balloon pump leads covered with tape, and fast patches with tegaderm covers applied.  Cerebral oximeter pads connected to fore site and fast patches connected to defibrillator prior to induction.       ICU notification  Surgery start - BRIGGETT RN ICU 1133  On bypass - LOTTIE OROZCO ICU 1341  Off bypass - BETINA OROZCO ICU 1644        Vein harvest  Start - 1133  End - 1355      Vein harvest site - RIGHT LEG    Performed by -  HAYDEE REICH CST/CFA

## 2020-06-09 NOTE — ANESTHESIA PROCEDURE NOTES
Arterial Line      Patient location during procedure: OR   Performed By   CRNA: Chele Serrano CRNA  Preanesthetic Checklist  Completed: patient identified, site marked, surgical consent, pre-op evaluation, timeout performed, IV checked, risks and benefits discussed and monitors and equipment checked  Arterial Line Prep   Sterile Tech: gloves and sterile barriers  Prep: alcohol swabs  Arterial Line Procedure   Laterality:left  Location:  radial artery  Catheter size: 20 G   Guidance: ultrasound guided  Number of attempts: 1  Successful placement: yes  Post Assessment   Dressing Type: occlusive dressing applied, secured with tape and wrist guard applied.   Complications no  Circ/Move/Sens Assessment: normal.   Patient Tolerance: patient tolerated the procedure well with no apparent complications

## 2020-06-09 NOTE — OUTREACH NOTE
Medical Week 2 Survey      Responses   [facility] patient discharged from?  [place]   COVID-19 Test Status  Negative   Does the patient have one of the following disease processes/diagnoses(primary or secondary)?  Other   Week 2 attempt successful?  No   Revoke  Readmitted          Irma Whitfield RN

## 2020-06-09 NOTE — ANESTHESIA PROCEDURE NOTES
Procedure Performed: Emergent/Open-Heart Anesthesia SHELBY       Start Time:  6/9/2020 10:49 AM       End Time:   6/9/2020 10:55 AM    Preanesthesia Checklist:  Patient identified, IV assessed, risks and benefits discussed, monitors and equipment assessed, procedure being performed at surgeon's request and anesthesia consent obtained.    General Procedure Information  SHELBY Placed for monitoring purposes only -- This is not a diagnostic SHELBY  Diagnostic Indications for Echo:  hemodynamic monitoring  Physician Requesting Echo: Nikunj Craballo MD  Location performed:  OR  Intubated  Bite block not placed  Heart visualized  Probe Insertion:  Easy  Probe Type:  Multiplane  Modalities:  2D only, color flow mapping, continuous wave Doppler and pulse wave Doppler        Anesthesia Information  Performed Personally  Anesthesiologist:  Shannan Cobian MD      Echocardiogram Comments:       SHELBY placed at surgeon request.     Please see cardiology diagnostic evaluation for complete report.

## 2020-06-09 NOTE — ANESTHESIA PROCEDURE NOTES
Peripheral IV    Patient location during procedure: OR  Line placed for Fluids/Medication Admin.  Performed By   CRNA: Chele Serrano CRNA  Preanesthetic Checklist  Completed: patient identified, site marked, surgical consent, pre-op evaluation, timeout performed, IV checked, risks and benefits discussed and monitors and equipment checked  Peripheral IV Prep   Patient position: sitting   Prep: ChloraPrep and alcohol swabs  Patient monitoring: heart rate, cardiac monitor and continuous pulse ox  Peripheral IV Procedure   Laterality:left  Location:  Antecubital  Catheter size: 18 G         Post Assessment   Dressing Type: transparent.    IV Dressing/Site: clean, dry and intact

## 2020-06-09 NOTE — INTERVAL H&P NOTE
H&P reviewed. The patient was examined and there are no changes to the H&P.      Workup completed,  He is an increased but acceptable risk candidate for cardiac surgery.  All questions answered and agreeable to proceed forward.  He is ok with HD if indicated.  Daughter present for today's assessment.  No signs/symptoms of COVID.  Full code status discussed and his wishes are consistent with that.

## 2020-06-09 NOTE — ANESTHESIA PROCEDURE NOTES
Peripheral IV    Patient location during procedure: OR  Line placed for Fluids/Medication Admin.  Preanesthetic Checklist  Completed: patient identified, site marked, surgical consent, pre-op evaluation, timeout performed, IV checked, risks and benefits discussed and monitors and equipment checked  Peripheral IV Prep   Patient position: supine   Prep: alcohol swabs  Patient monitoring: heart rate, cardiac monitor and continuous pulse ox  Peripheral IV Procedure   Laterality:right  Location:  Forearm  Catheter size: 18 G         Post Assessment   Dressing Type: transparent.    IV Dressing/Site: clean, dry and intact

## 2020-06-09 NOTE — ANESTHESIA PROCEDURE NOTES
Central Line    Pre-sedation assessment completed: 10/30/2020 12:29 PM    Patient reassessed immediately prior to procedure    Patient location during procedure: OR  Start time: 6/9/2020 10:45 AM  Stop Time:6/9/2020 10:48 AM  Indications: vascular access, MD/Surgeon request and central pressure monitoring  Staff  Anesthesiologist: Shannan Cobian MD  Preanesthetic Checklist  Completed: patient identified, site marked, surgical consent, pre-op evaluation, timeout performed, IV checked, risks and benefits discussed and monitors and equipment checked  Central Line Prep  Sterile Tech:cap, gloves, gown, mask and sterile barriers  Prep: chloraprep  Patient monitoring: blood pressure monitoring, continuous pulse oximetry and EKG  Central Line Procedure  Laterality:right  Location:internal jugular  Catheter Type:Quincy-Shannan (MAC catheter)  Catheter Size:9 Fr  Guidance:ultrasound guided and wire visualized in collapsible vessel prior to dilation  PROCEDURE NOTE/ULTRASOUND INTERPRETATION.  Using ultrasound guidance the potential vascular sites for insertion of the catheter were visualized to determine the patency of the vessel to be used for vascular access.  After selecting the appropriate site for insertion, the needle was visualized under ultrasound being inserted into the internal jugular vein, followed by ultrasound confirmation of wire and catheter placement. There were no abnormalities seen on ultrasound; an image was taken; and the patient tolerated the procedure with no complications.   Assessment  Post procedure:biopatch applied, line sutured and occlusive dressing applied  Assessement:blood return through all ports, chest x-ray ordered and free fluid flow  Complications:no  Patient Tolerance:patient tolerated the procedure well with no apparent complications

## 2020-06-09 NOTE — ANESTHESIA PROCEDURE NOTES
Airway  Date/Time: 6/9/2020 10:37 AM  Airway not difficult    General Information and Staff    Patient location during procedure: OR  CRNA: Chele Serrano CRNA    Indications and Patient Condition  Indications for airway management: airway protection    Preoxygenated: yes  Mask difficulty assessment: 0 - not attempted    Final Airway Details  Final airway type: endotracheal airway      Successful airway: ETT  Cuffed: yes   Successful intubation technique: direct laryngoscopy  Endotracheal tube insertion site: oral  Blade: Sarah  Blade size: 3  ETT size (mm): 7.5  Cormack-Lehane Classification: grade I - full view of glottis  Placement verified by: chest auscultation and capnometry   Cuff volume (mL): 6  Measured from: lips  ETT/EBT  to lips (cm): 21  Number of attempts at approach: 1  Assessment: lips, teeth, and gum same as pre-op and atraumatic intubation    Additional Comments  ATRAUMATIC INTUBATION

## 2020-06-10 ENCOUNTER — APPOINTMENT (OUTPATIENT)
Dept: GENERAL RADIOLOGY | Facility: HOSPITAL | Age: 80
End: 2020-06-10

## 2020-06-10 PROBLEM — I25.2 HISTORY OF NON-ST ELEVATION MYOCARDIAL INFARCTION (NSTEMI): Status: ACTIVE | Noted: 2020-06-10

## 2020-06-10 PROBLEM — I31.9 CHRONIC PERICARDITIS: Status: ACTIVE | Noted: 2020-06-10

## 2020-06-10 PROBLEM — E78.5 HYPERLIPIDEMIA: Status: ACTIVE | Noted: 2020-06-10

## 2020-06-10 PROBLEM — Z86.73 HISTORY OF CVA (CEREBROVASCULAR ACCIDENT): Status: ACTIVE | Noted: 2020-06-10

## 2020-06-10 LAB
ANION GAP SERPL CALCULATED.3IONS-SCNC: 14 MMOL/L (ref 5–15)
ARTERIAL PATENCY WRIST A: ABNORMAL
ATMOSPHERIC PRESS: 742 MMHG
BASE EXCESS BLDA CALC-SCNC: -4.3 MMOL/L (ref 0–2)
BASOPHILS # BLD AUTO: 0.03 10*3/MM3 (ref 0–0.2)
BASOPHILS NFR BLD AUTO: 0.2 % (ref 0–1.5)
BDY SITE: ABNORMAL
BODY TEMPERATURE: 37 C
BUN BLD-MCNC: 25 MG/DL (ref 8–23)
BUN/CREAT SERPL: 11.4 (ref 7–25)
CALCIUM SPEC-SCNC: 7.7 MG/DL (ref 8.6–10.5)
CHLORIDE SERPL-SCNC: 109 MMOL/L (ref 98–107)
CO2 SERPL-SCNC: 19 MMOL/L (ref 22–29)
CREAT BLD-MCNC: 2.2 MG/DL (ref 0.76–1.27)
DEPRECATED RDW RBC AUTO: 43 FL (ref 37–54)
EOSINOPHIL # BLD AUTO: 0.01 10*3/MM3 (ref 0–0.4)
EOSINOPHIL NFR BLD AUTO: 0.1 % (ref 0.3–6.2)
ERYTHROCYTE [DISTWIDTH] IN BLOOD BY AUTOMATED COUNT: 13.2 % (ref 12.3–15.4)
GFR SERPL CREATININE-BSD FRML MDRD: 29 ML/MIN/1.73
GLUCOSE BLD-MCNC: 188 MG/DL (ref 65–99)
GLUCOSE BLDC GLUCOMTR-MCNC: 107 MG/DL (ref 70–130)
GLUCOSE BLDC GLUCOMTR-MCNC: 112 MG/DL (ref 70–130)
GLUCOSE BLDC GLUCOMTR-MCNC: 128 MG/DL (ref 70–130)
GLUCOSE BLDC GLUCOMTR-MCNC: 133 MG/DL (ref 70–130)
GLUCOSE BLDC GLUCOMTR-MCNC: 136 MG/DL (ref 70–130)
GLUCOSE BLDC GLUCOMTR-MCNC: 140 MG/DL (ref 70–130)
GLUCOSE BLDC GLUCOMTR-MCNC: 152 MG/DL (ref 70–130)
GLUCOSE BLDC GLUCOMTR-MCNC: 154 MG/DL (ref 70–130)
GLUCOSE BLDC GLUCOMTR-MCNC: 156 MG/DL (ref 70–130)
GLUCOSE BLDC GLUCOMTR-MCNC: 162 MG/DL (ref 70–130)
GLUCOSE BLDC GLUCOMTR-MCNC: 173 MG/DL (ref 70–130)
GLUCOSE BLDC GLUCOMTR-MCNC: 174 MG/DL (ref 70–130)
GLUCOSE BLDC GLUCOMTR-MCNC: 183 MG/DL (ref 70–130)
GLUCOSE BLDC GLUCOMTR-MCNC: 94 MG/DL (ref 70–130)
HCO3 BLDA-SCNC: 21.3 MMOL/L (ref 20–26)
HCT VFR BLD AUTO: 27.9 % (ref 37.5–51)
HGB BLD-MCNC: 9.6 G/DL (ref 13–17.7)
HOROWITZ INDEX BLD+IHG-RTO: 50 %
IMM GRANULOCYTES # BLD AUTO: 0.05 10*3/MM3 (ref 0–0.05)
IMM GRANULOCYTES NFR BLD AUTO: 0.4 % (ref 0–0.5)
INR PPP: 1.29 (ref 0.91–1.09)
LYMPHOCYTES # BLD AUTO: 0.44 10*3/MM3 (ref 0.7–3.1)
LYMPHOCYTES NFR BLD AUTO: 3.6 % (ref 19.6–45.3)
Lab: ABNORMAL
MAGNESIUM SERPL-MCNC: 2 MG/DL (ref 1.6–2.4)
MCH RBC QN AUTO: 31.2 PG (ref 26.6–33)
MCHC RBC AUTO-ENTMCNC: 34.4 G/DL (ref 31.5–35.7)
MCV RBC AUTO: 90.6 FL (ref 79–97)
MODALITY: ABNORMAL
MONOCYTES # BLD AUTO: 0.57 10*3/MM3 (ref 0.1–0.9)
MONOCYTES NFR BLD AUTO: 4.7 % (ref 5–12)
NEUTROPHILS # BLD AUTO: 11.02 10*3/MM3 (ref 1.7–7)
NEUTROPHILS NFR BLD AUTO: 91 % (ref 42.7–76)
NRBC BLD AUTO-RTO: 0 /100 WBC (ref 0–0.2)
PCO2 BLDA: 40.3 MM HG (ref 35–45)
PEEP RESPIRATORY: 5 CM[H2O]
PH BLDA: 7.33 PH UNITS (ref 7.35–7.45)
PLATELET # BLD AUTO: 116 10*3/MM3 (ref 140–450)
PMV BLD AUTO: 10.9 FL (ref 6–12)
PO2 BLDA: 70.8 MM HG (ref 83–108)
POTASSIUM BLD-SCNC: 4.8 MMOL/L (ref 3.5–5.2)
PROTHROMBIN TIME: 15.7 SECONDS (ref 11.9–14.6)
PSV: 10 CMH2O
RBC # BLD AUTO: 3.08 10*6/MM3 (ref 4.14–5.8)
SAO2 % BLDCOA: 94.4 % (ref 94–99)
SET MECH RESP RATE: 22
SODIUM BLD-SCNC: 142 MMOL/L (ref 136–145)
VENTILATOR MODE: ABNORMAL
VT ON VENT VENT: 550 ML
WBC NRBC COR # BLD: 12.12 10*3/MM3 (ref 3.4–10.8)

## 2020-06-10 PROCEDURE — 97162 PT EVAL MOD COMPLEX 30 MIN: CPT

## 2020-06-10 PROCEDURE — 25010000002 ONDANSETRON PER 1 MG: Performed by: THORACIC SURGERY (CARDIOTHORACIC VASCULAR SURGERY)

## 2020-06-10 PROCEDURE — 71045 X-RAY EXAM CHEST 1 VIEW: CPT

## 2020-06-10 PROCEDURE — 25010000002 AMIODARONE IN DEXTROSE 5% 360-4.14 MG/200ML-% SOLUTION: Performed by: THORACIC SURGERY (CARDIOTHORACIC VASCULAR SURGERY)

## 2020-06-10 PROCEDURE — 94799 UNLISTED PULMONARY SVC/PX: CPT

## 2020-06-10 PROCEDURE — 85610 PROTHROMBIN TIME: CPT | Performed by: THORACIC SURGERY (CARDIOTHORACIC VASCULAR SURGERY)

## 2020-06-10 PROCEDURE — 83735 ASSAY OF MAGNESIUM: CPT | Performed by: THORACIC SURGERY (CARDIOTHORACIC VASCULAR SURGERY)

## 2020-06-10 PROCEDURE — 93010 ELECTROCARDIOGRAM REPORT: CPT | Performed by: INTERNAL MEDICINE

## 2020-06-10 PROCEDURE — 25010000002 ENOXAPARIN PER 10 MG: Performed by: THORACIC SURGERY (CARDIOTHORACIC VASCULAR SURGERY)

## 2020-06-10 PROCEDURE — 85025 COMPLETE CBC W/AUTO DIFF WBC: CPT | Performed by: THORACIC SURGERY (CARDIOTHORACIC VASCULAR SURGERY)

## 2020-06-10 PROCEDURE — 93005 ELECTROCARDIOGRAM TRACING: CPT | Performed by: THORACIC SURGERY (CARDIOTHORACIC VASCULAR SURGERY)

## 2020-06-10 PROCEDURE — 25010000002 CEFAZOLIN PER 500 MG: Performed by: THORACIC SURGERY (CARDIOTHORACIC VASCULAR SURGERY)

## 2020-06-10 PROCEDURE — 82962 GLUCOSE BLOOD TEST: CPT

## 2020-06-10 PROCEDURE — 99024 POSTOP FOLLOW-UP VISIT: CPT | Performed by: NURSE PRACTITIONER

## 2020-06-10 PROCEDURE — 82803 BLOOD GASES ANY COMBINATION: CPT

## 2020-06-10 PROCEDURE — 25010000002 VANCOMYCIN PER 500 MG: Performed by: THORACIC SURGERY (CARDIOTHORACIC VASCULAR SURGERY)

## 2020-06-10 PROCEDURE — 25010000003 INSULIN REGULAR HUMAN PER 5 UNITS: Performed by: THORACIC SURGERY (CARDIOTHORACIC VASCULAR SURGERY)

## 2020-06-10 PROCEDURE — 97530 THERAPEUTIC ACTIVITIES: CPT

## 2020-06-10 PROCEDURE — 80048 BASIC METABOLIC PNL TOTAL CA: CPT | Performed by: THORACIC SURGERY (CARDIOTHORACIC VASCULAR SURGERY)

## 2020-06-10 RX ORDER — PREGABALIN 25 MG/1
25 CAPSULE ORAL DAILY
Status: DISCONTINUED | OUTPATIENT
Start: 2020-06-10 | End: 2020-06-16 | Stop reason: HOSPADM

## 2020-06-10 RX ORDER — LIDOCAINE 50 MG/G
2 PATCH TOPICAL
Status: DISCONTINUED | OUTPATIENT
Start: 2020-06-10 | End: 2020-06-16 | Stop reason: HOSPADM

## 2020-06-10 RX ADMIN — VANCOMYCIN HYDROCHLORIDE 1000 MG: 1 INJECTION, SOLUTION INTRAVENOUS at 11:18

## 2020-06-10 RX ADMIN — IPRATROPIUM BROMIDE AND ALBUTEROL SULFATE 3 ML: 2.5; .5 SOLUTION RESPIRATORY (INHALATION) at 10:35

## 2020-06-10 RX ADMIN — AMIODARONE HYDROCHLORIDE 400 MG: 200 TABLET ORAL at 08:27

## 2020-06-10 RX ADMIN — PREGABALIN 25 MG: 25 CAPSULE ORAL at 10:06

## 2020-06-10 RX ADMIN — CLOPIDOGREL 75 MG: 75 TABLET, FILM COATED ORAL at 17:44

## 2020-06-10 RX ADMIN — ONDANSETRON HYDROCHLORIDE 4 MG: 2 SOLUTION INTRAMUSCULAR; INTRAVENOUS at 11:49

## 2020-06-10 RX ADMIN — AMIODARONE HYDROCHLORIDE 0.5 MG/MIN: 1.8 INJECTION, SOLUTION INTRAVENOUS at 04:52

## 2020-06-10 RX ADMIN — IPRATROPIUM BROMIDE AND ALBUTEROL SULFATE 3 ML: 2.5; .5 SOLUTION RESPIRATORY (INHALATION) at 14:36

## 2020-06-10 RX ADMIN — OXYCODONE HYDROCHLORIDE AND ACETAMINOPHEN 1 TABLET: 5; 325 TABLET ORAL at 11:18

## 2020-06-10 RX ADMIN — CHLORHEXIDINE GLUCONATE 15 ML: 1.2 RINSE ORAL at 20:04

## 2020-06-10 RX ADMIN — Medication 1 APPLICATION: at 09:06

## 2020-06-10 RX ADMIN — OXYCODONE HYDROCHLORIDE AND ACETAMINOPHEN 1 TABLET: 5; 325 TABLET ORAL at 20:45

## 2020-06-10 RX ADMIN — AMIODARONE HYDROCHLORIDE 400 MG: 200 TABLET ORAL at 13:21

## 2020-06-10 RX ADMIN — OXYCODONE HYDROCHLORIDE AND ACETAMINOPHEN 1 TABLET: 10; 325 TABLET ORAL at 23:44

## 2020-06-10 RX ADMIN — IPRATROPIUM BROMIDE AND ALBUTEROL SULFATE 3 ML: 2.5; .5 SOLUTION RESPIRATORY (INHALATION) at 07:06

## 2020-06-10 RX ADMIN — ENOXAPARIN SODIUM 30 MG: 30 INJECTION SUBCUTANEOUS at 20:04

## 2020-06-10 RX ADMIN — OXYCODONE HYDROCHLORIDE AND ACETAMINOPHEN 1 TABLET: 5; 325 TABLET ORAL at 17:59

## 2020-06-10 RX ADMIN — OXYCODONE HYDROCHLORIDE AND ACETAMINOPHEN 1 TABLET: 5; 325 TABLET ORAL at 14:22

## 2020-06-10 RX ADMIN — ATORVASTATIN CALCIUM 20 MG: 10 TABLET, FILM COATED ORAL at 20:03

## 2020-06-10 RX ADMIN — IPRATROPIUM BROMIDE AND ALBUTEROL SULFATE 3 ML: 2.5; .5 SOLUTION RESPIRATORY (INHALATION) at 21:14

## 2020-06-10 RX ADMIN — SODIUM CHLORIDE 1 UNITS/HR: 9 INJECTION, SOLUTION INTRAVENOUS at 00:46

## 2020-06-10 RX ADMIN — AMIODARONE HYDROCHLORIDE 400 MG: 200 TABLET ORAL at 17:44

## 2020-06-10 RX ADMIN — Medication 1 APPLICATION: at 20:04

## 2020-06-10 RX ADMIN — CEFAZOLIN SODIUM 2 G: 10 INJECTION, POWDER, FOR SOLUTION INTRAVENOUS at 10:06

## 2020-06-10 RX ADMIN — CEFAZOLIN SODIUM 2 G: 10 INJECTION, POWDER, FOR SOLUTION INTRAVENOUS at 18:00

## 2020-06-10 RX ADMIN — CHLORHEXIDINE GLUCONATE 15 ML: 1.2 RINSE ORAL at 09:06

## 2020-06-10 RX ADMIN — METOPROLOL TARTRATE 12.5 MG: 25 TABLET, FILM COATED ORAL at 08:28

## 2020-06-10 RX ADMIN — AMIODARONE HYDROCHLORIDE 400 MG: 200 TABLET ORAL at 23:44

## 2020-06-10 RX ADMIN — METOPROLOL TARTRATE 12.5 MG: 25 TABLET, FILM COATED ORAL at 20:03

## 2020-06-10 RX ADMIN — CEFAZOLIN SODIUM 2 G: 10 INJECTION, POWDER, FOR SOLUTION INTRAVENOUS at 02:16

## 2020-06-10 RX ADMIN — ONDANSETRON HYDROCHLORIDE 4 MG: 2 SOLUTION INTRAMUSCULAR; INTRAVENOUS at 04:16

## 2020-06-10 RX ADMIN — LIDOCAINE 2 PATCH: 50 PATCH CUTANEOUS at 10:06

## 2020-06-10 RX ADMIN — ENOXAPARIN SODIUM 30 MG: 30 INJECTION SUBCUTANEOUS at 08:28

## 2020-06-10 RX ADMIN — OXYCODONE HYDROCHLORIDE AND ACETAMINOPHEN 1 TABLET: 5; 325 TABLET ORAL at 07:28

## 2020-06-10 RX ADMIN — ASPIRIN 162 MG: 325 TABLET ORAL at 08:27

## 2020-06-10 NOTE — PLAN OF CARE
Problem: Patient Care Overview  Goal: Plan of Care Review  Flowsheets (Taken 6/10/2020 4335)  Outcome Summary: PT eval completed. He presents lethargic needing cues to stay awake and on task during all activity. He needed mod assist x 2 to get to the EOB, stand and t/f to the chair. He stood twice and both times he demos a flexed posture and B knee's shaking due to weakness. He was unable to ambulate due to weakness and was at risk for falls at this time. PT will continue to progress his functional mobillity, balance, and strength. Pending progress he may need placement for therapy vs d.c home with assist and HH.

## 2020-06-10 NOTE — PROGRESS NOTES
"CABG x 3, EVH with open extension of the lower right leg    POD 1    Extubated and resting in bed. Getting ready to work with PT. Drowsy. On 4 liters nasal cannula. IS ~750 ml with prompting.     IV gtts: Amiodarone, Insulin, Nitro, IVF, Jose David-off  Telemetry: sinus 70s  Hemodynamics reviewed: CO 7.1, CI 3.24, CVP 9 PAP 37/15 mean 24    Visit Vitals  /65   Pulse 72   Temp 97.8 °F (36.6 °C) (Core)   Resp 13   Ht 184 cm (72.44\")   Wt 99.2 kg (218 lb 11.1 oz)   SpO2 94%   BMI 29.30 kg/m²       Intake/Output Summary (Last 24 hours) at 6/10/2020 0944  Last data filed at 6/10/2020 0737  Gross per 24 hour   Intake 5177.01 ml   Output 1972 ml   Net 3205.01 ml     Mediastinal tube output: 229 ml/24 hours  Left Mejia drain: 195 ml/24 hours    Labs:      Chest x-ray: appropriate support lines in place, bibasilar atelectasis, no pneumothorax    Physical Exam:  General: No apparent distress. In good spirits.   Cardiovascular: Regular rate and rhythm without murmur, rubs, or gallops.    Pulmonary: Clear to auscultation bilaterally without wheezing, rubs, or rales.  Chest: Sternotomy incision clean, dry, and intact. Sternum stable. No clicks. Mediastinal tubes x 2 and Mejia drain x 1 with dressing clean, dry, and intact.    Abdomen: Soft, non distended and non tender.  Extremities: Warm, moves all extremities. Saphenectomy site with ace wrap in place.   Neurologic: Grossly intact with no focal deficits.  Drowsy.     Impression:  Coronary artery disease s/p CABG  Chronic pericarditis  CKD stage IV  History of NSTEMI  Previous CVA    Plan:  Multimodality pain control strategies  Encourage pulmonary toilet and ambulation  Discontinue amiodarone gtt, continue oral amiodarone  Routine post cardiac surgery regimen  Keep in ICU for now  DW patient, family, and nursing  "

## 2020-06-10 NOTE — PROGRESS NOTES
Discharge Planning Assessment   [place]     Patient Name: Faisal Joseph  MRN: [id number]  Today's Date: 6/10/2020    Admit Date: 6/9/2020    Discharge Needs Assessment     Row Name 06/10/20 0935       Living Environment    Lives With  child(bon), adult    Name(s) of Who Lives With Patient  Daughter - Susan Ray    Current Living Arrangements  home/apartment/condo    Primary Care Provided by  self    Provides Primary Care For  no one    Family Caregiver if Needed  child(bon), adult    Quality of Family Relationships  supportive;helpful;involved    Able to Return to Prior Arrangements  yes       Resource/Environmental Concerns    Resource/Environmental Concerns  none    Transportation Concerns  car, none       Transition Planning    Patient/Family Anticipates Transition to  home with family    Patient/Family Anticipated Services at Transition  none    Transportation Anticipated  family or friend will provide       Discharge Needs Assessment    Readmission Within the Last 30 Days  planned readmission    Concerns to be Addressed  discharge planning    Equipment Currently Used at Home  none    Anticipated Changes Related to Illness  none    Equipment Needed After Discharge  none    Current Discharge Risk  chronically ill    Discharge Coordination/Progress  Patient currently admitted to ICU post op.  Patient resides at home with his daughter and her family.  Patient functions independently, has a PCP and RX coverage.  Anticipate patient will return home at discharge.  SW will follow patient's progress.        Discharge Plan    No documentation.       Destination      Coordination has not been started for this encounter.      Durable Medical Equipment      Coordination has not been started for this encounter.      Dialysis/Infusion      Coordination has not been started for this encounter.      Home Medical Care      Coordination has not been started for this encounter.      Therapy      Coordination has not been  started for this encounter.      Community Resources      Coordination has not been started for this encounter.          Demographic Summary    No documentation.       Functional Status    No documentation.       Psychosocial    No documentation.       Abuse/Neglect    No documentation.       Legal    No documentation.       Substance Abuse    No documentation.       Patient Forms    No documentation.           PAZ Alexander

## 2020-06-10 NOTE — PLAN OF CARE
Problem: Patient Care Overview  Goal: Plan of Care Review  Outcome: Ongoing (interventions implemented as appropriate)  Flowsheets (Taken 6/10/2020 1412)  Outcome Summary: Pt. stood with mod assist x2. Pt. was barely able to take 3-4 steps forward and back x2. Flexed posture, flexed knees. Worked on wgt shifting, posture. Reviewed sternal precautions.

## 2020-06-10 NOTE — THERAPY EVALUATION
Patient Name: Faisal Joseph  : 1940    MRN: [id number]                              Today's Date: 6/10/2020       Admit Date: 2020    Visit Dx:     ICD-10-CM ICD-9-CM   1. Coronary artery disease I25.10 414.00   2. Impaired mobility Z74.09 799.89     Patient Active Problem List   Diagnosis   • Essential hypertension   • Stage 4 chronic kidney disease (CMS/[facility])   • Chest pain   • Elevated troponin   • NSTEMI (non-ST elevated myocardial infarction) (CMS/[facility])   • Coronary artery disease     Past Medical History:   Diagnosis Date   • Arthritis    • Atrial fibrillation (CMS/[facility])    • Cataract    • Chronic fatigue 10/3/2017   • Chronic renal disease, stage IV (CMS/[facility])    • CVA (cerebral vascular accident) (CMS/[facility]) 2018   • Hyperlipidemia    • Hypertension    • Palpitations 10/3/2017   • Premature atrial contractions 10/17/2017   • PVCs (premature ventricular contractions) 10/17/2017   • Sleep apnea    • Stage 4 chronic kidney disease (CMS/[facility]) 2018   • Stroke (CMS/HCC)    • Weakness     right leg weaker     Past Surgical History:   Procedure Laterality Date   • CARDIAC CATHETERIZATION     • CARDIAC CATHETERIZATION N/A 2020    Procedure: Left Heart Cath;  Surgeon: Rufino Brice MD;  Location: [facility] CATH INVASIVE LOCATION;  Service: Cardiology;  Laterality: N/A;     General Information     Row Name 06/10/20 0755          PT Evaluation Time/Intention    Document Type  evaluation Planned CABG s/p  CABG x 3.   -COURT     Mode of Treatment  physical therapy  -COURT     Row Name 06/10/20 0751          General Information    Patient Profile Reviewed?  yes  -COURT     Prior Level of Function  independent:;all household mobility;ADL's  -COURT     Existing Precautions/Restrictions  fall;sternal;oxygen therapy device and L/min chest tube  -COURT     Barriers to Rehab  medically complex  -COURT     Row Name 06/10/20 0752          Relationship/Environment    Lives With  child(bon), adult  -COURT     Row Name  06/10/20 [facility]5          Resource/Environmental Concerns    Current Living Arrangements  home/apartment/condo  -COURT     Row Name 06/10/20 [facility]5          Home Main Entrance    Number of Stairs, Main Entrance  one  -COURT     Row Name 06/10/20 [facility]5          Stairs Within Home, Primary    Stairs, Within Home, Primary  flight of steps upstairs to his bedroom.   -COURT     Row Name 06/10/20 0755          Cognitive Assessment/Intervention- PT/OT    Orientation Status (Cognition)  oriented to;person;place  -COURT     Row Name 06/10/20 0755          Safety Issues, Functional Mobility    Safety Issues Affecting Function (Mobility)  ability to follow commands;awareness of need for assistance;insight into deficits/self awareness;safety precaution awareness;safety precautions follow-through/compliance;sequencing abilities  -COURT     Impairments Affecting Function (Mobility)  balance;cognition;endurance/activity tolerance;strength;shortness of breath;postural/trunk control;pain  -COURT       User Key  (r) = Recorded By, (t) = Taken By, (c) = Cosigned By    Initials Name Provider Type    Noble Giles, PT DPT Physical Therapist        Mobility     Row Name 06/10/20 [facility]5          Bed Mobility Assessment/Treatment    Bed Mobility Assessment/Treatment  supine-sit  -COURT     Supine-Sit [place] (Bed Mobility)  moderate assist (50% patient effort);2 person assist  -COURT     Assistive Device (Bed Mobility)  head of bed elevated  -COURT     [facility] Name 06/10/20 0755          Transfer Assessment/Treatment    Comment (Transfers)  (S) Stood x 2 reps, flexed posture and B knee's shaking due to weakness  -COURT     Row Name 06/10/20 0755          Bed-Chair Transfer    Bed-Chair [place] (Transfers)  moderate assist (50% patient effort);2 person assist  -COURT     Row Name 06/10/20 0755          Sit-Stand Transfer    Sit-Stand [place] (Transfers)  moderate assist (50% patient effort);2 person assist  -COURT       User Key  (r) = Recorded By, (t) = Taken By,  (c) = Cosigned By    Initials Name Provider Type    Noble Giles, PT DPT Physical Therapist        Obj/Interventions     Row Name 06/10/20 [facility]5          General ROM    GENERAL ROM COMMENTS  B LE AROM impaired ~ 50% (weakness)  -COURT     Row Name 06/10/20 [facility]5          MMT (Manual Muscle Testing)    General MMT Comments  B LE functionally 3/5 with activity  -COURT     Row Name 06/10/20 [facility]5          Static Sitting Balance    Level of [place] (Unsupported Sitting, Static Balance)  minimal assist, 75% patient effort  -COURT     Sitting Position (Unsupported Sitting, Static Balance)  sitting on edge of bed  -COURT     Row Name 06/10/20 [facility]5          Dynamic Sitting Balance    Level of [place], Reaches Outside Midline (Sitting, Dynamic Balance)  minimal assist, 75% patient effort;moderate assist, 50 to 74% patient effort  -COURT     Sitting Position, Reaches Outside Midline (Sitting, Dynamic Balance)  sitting on edge of bed  -COURT     Row Name 06/10/20 [facility]5          Static Standing Balance    Level of [place] (Supported Standing, Static Balance)  moderate assist, 50 to 74% patient effort;2 person assist  -COURT     Row Name 06/10/20 [facility]5          Dynamic Standing Balance    Level of [place], Reaches Outside Midline (Standing, Dynamic Balance)  moderate assist, 50 to 74% patient effort;2 person assist  -COURT       User Key  (r) = Recorded By, (t) = Taken By, (c) = Cosigned By    Initials Name Provider Type    Noble Giles, PT DPT Physical Therapist        Goals/Plan     [facility] Name 06/10/20 0755          Bed Mobility Goal 1 (PT)    Activity/Assistive Device (Bed Mobility Goal 1, PT)  sit to supine/supine to sit  -COURT     [place] Level/Cues Needed (Bed Mobility Goal 1, PT)  supervision required  -COURT     Time Frame (Bed Mobility Goal 1, PT)  long term goal (LTG);10 days  -COURT     Progress/Outcomes (Bed Mobility Goal 1, PT)  goal ongoing  -COURT     Row Name 06/10/20 0755          Transfer Goal 1 (PT)     Activity/Assistive Device (Transfer Goal 1, PT)  sit-to-stand/stand-to-sit;bed-to-chair/chair-to-bed  -COURT     [place] Level/Cues Needed (Transfer Goal 1, PT)  supervision required  -COURT     Time Frame (Transfer Goal 1, PT)  long term goal (LTG);10 days  -COURT     Progress/Outcome (Transfer Goal 1, PT)  goal ongoing  -COURT     Row Name 06/10/20 8685          Gait Training Goal 1 (PT)    Activity/Assistive Device (Gait Training Goal 1, PT)  gait (walking locomotion);decrease fall risk;improve balance and speed;increase endurance/gait distance  -COURT     [place] Level (Gait Training Goal 1, PT)  supervision required  -COURT     Distance (Gait Goal 1, PT)  200  -COURT     Time Frame (Gait Training Goal 1, PT)  long term goal (LTG);10 days  -COURT     Progress/Outcome (Gait Training Goal 1, PT)  goal ongoing  -COURT     Row Name 06/10/20 0755          Stairs Goal 1 (PT)    Activity/Assistive Device (Stairs Goal 1, PT)  ascending stairs;descending stairs;using handrail, right;using handrail, left  -COURT     [place] Level/Cues Needed (Stairs Goal 1, PT)  contact guard assist  -COURT     Number of Stairs (Stairs Goal 1, PT)  9 steps  -COURT     Time Frame (Stairs Goal 1, PT)  long term goal (LTG);10 days  -COURT     Progress/Outcome (Stairs Goal 1, PT)  goal ongoing  -COURT       User Key  (r) = Recorded By, (t) = Taken By, (c) = Cosigned By    Initials Name Provider Type    Noble Giles, PT DPT Physical Therapist        Clinical Impression     Row Name 06/10/20 7724          Pain Assessment    Additional Documentation  Pain Scale: FACES Pre/Post-Treatment (Group)  -COURT     [facility] Name 06/10/20 2208          Pain Scale: Numbers Pre/Post-Treatment    Pain Location - Orientation  incisional  -COURT     Pain Intervention(s)  Repositioned;Ambulation/increased activity  -COURT     Row Name 06/10/20 4601          Pain Scale: FACES Pre/Post-Treatment    Pain: FACES Scale, Pretreatment  4-->hurts little more  -COURT     Pain: FACES Scale, Post-Treatment   4-->hurts little more  -COURT     Pre/Post Treatment Pain Comment  c/o pain, weakness, nausea  -COURT     Row Name 06/10/20 0755          Plan of Care Review    Plan of Care Reviewed With  patient  -COURT     [facility] Name 06/10/20 [facility]          Physical Therapy Clinical Impression    Patient/Family Goals Statement (PT Clinical Impression)  decrease pain  -COURT     Criteria for Skilled Interventions Met (PT Clinical Impression)  yes;treatment indicated  -COURT     Rehab Potential (PT Clinical Summary)  good, to achieve stated therapy goals  -COURT     Predicted Duration of Therapy (PT)  until d/c  -COURT     Row Name 06/10/20 [facility]5          Vital Signs    Pre Systolic BP Rehab  120  -COURT     Pre Treatment Diastolic BP  60  -COURT     Post Systolic BP Rehab  121  -COURT     Post Treatment Diastolic BP  78  -COURT     Pretreatment Heart Rate (beats/min)  74  -COURT     Intratreatment Heart Rate (beats/min)  60  -COURT     Posttreatment Heart Rate (beats/min)  62  -COURT     Pre SpO2 (%)  91  -COURT     O2 Delivery Pre Treatment  supplemental O2  -COURT     Post SpO2 (%)  90  -COURT     O2 Delivery Post Treatment  supplemental O2  -COURT     Pre Patient Position  Supine  -COURT     Intra Patient Position  Standing  -COURT     Post Patient Position  Sitting  -COURT     [facility] Name 06/10/20 [facility]          Positioning and Restraints    Pre-Treatment Position  in bed  -COURT     Post Treatment Position  chair  -COURT     In Chair  reclined;call light within reach;encouraged to call for assist;with nsg;RUE elevated;LUE elevated;legs elevated  -COURT       User Key  (r) = Recorded By, (t) = Taken By, (c) = Cosigned By    Initials Name Provider Type    Noble Giles, PT DPT Physical Therapist        Outcome Measures     Row Name 06/10/20 [facility]5          How much help from another person do you currently need...    Turning from your back to your side while in flat bed without using bedrails?  2  -COURT     Moving from lying on back to sitting on the side of a flat bed without bedrails?  2  -COURT      Moving to and from a bed to a chair (including a wheelchair)?  2  -COURT     Standing up from a chair using your arms (e.g., wheelchair, bedside chair)?  2  -COURT     Climbing 3-5 steps with a railing?  1  -COURT     To walk in hospital room?  1  -COURT     AM-PAC 6 Clicks Score (PT)  10  -COURT     Row Name 06/10/20 0755          Functional Assessment    Outcome Measure Options  AM-PAC 6 Clicks Basic Mobility (PT)  -COURT       User Key  (r) = Recorded By, (t) = Taken By, (c) = Cosigned By    Initials Name Provider Type    Noble Giles, PT DPT Physical Therapist        Physical Therapy Education                 Title: PT OT SLP Therapies (In Progress)     Topic: Physical Therapy (In Progress)     Point: Mobility training (In Progress)     Description:   Instruct learner(s) on safety and technique for assisting patient out of bed, chair or wheelchair.  Instruct in the proper use of assistive devices, such as walker, crutches, cane or brace.              Patient Friendly Description:   It's important to get you on your feet again, but we need to do so in a way that is safe for you. Falling has serious consequences, and your personal safety is the most important thing of all.        When it's time to get out of bed, one of us or a family member will sit next to you on the bed to give you support.     If your doctor or nurse tells you to use a walker, crutches, a cane, or a brace, be sure you use it every time you get out of bed, even if you think you don't need it.    Learning Progress Summary           Patient Acceptance, E, NR by COURT at 6/10/2020 0755    Comment:  Educated pt on progression of PT POC, benefits of activity, sternal prec                   Point: Home exercise program (Not Started)     Description:   Instruct learner(s) on appropriate technique for monitoring, assisting and/or progressing patient with therapeutic exercises and activities.              Learner Progress:   Not documented in this visit.           Point: Body mechanics (Not Started)     Description:   Instruct learner(s) on proper positioning and spine alignment for patient and/or caregiver during mobility tasks and/or exercises.              Learner Progress:   Not documented in this visit.          Point: Precautions (In Progress)     Description:   Instruct learner(s) on prescribed precautions during mobility and gait tasks              Learning Progress Summary           Patient Acceptance, E, NR by COURT at 6/10/2020 0755    Comment:  Educated pt on progression of PT POC, benefits of activity, sternal prec                               User Key     Initials Effective Dates Name Provider Type Discipline    COURT 08/02/16 -  Noble Day, PT DPT Physical Therapist PT              PT Recommendation and Plan  Planned Therapy Interventions (PT Eval): bed mobility training, transfer training, gait training, balance training, home exercise program, patient/family education, postural re-education, stair training, strengthening  Outcome Summary/Treatment Plan (PT)  Anticipated Discharge Disposition (PT): home with assist, home with 24/7 care, home with home health, skilled nursing facility, transitional care  Plan of Care Reviewed With: patient  Outcome Summary: PT eval completed. He presents lethargic needing cues to stay awake and on task during all activity. He needed mod assist x 2 to get to the EOB, stand and t/f to the chair. He stood twice and both times he demos a flexed posture and B knee's shaking due to weakness. He was unable to ambulate due to weakness and was at risk for falls at this time. PT will continue to progress his functional mobillity, balance, and strength. Pending progress he may need placement for therapy vs d.c home with assist and .     Time Calculation:   PT Charges     Row Name 06/10/20 0933             Time Calculation    Start Time  0755  -COURT      Stop Time  0910  -COURT      Time Calculation (min)  75 min  -CORUT      PT Received On   06/10/20  -COURT      PT Goal Re-Cert Due Date  06/20/20  -COURT         Time Calculation- PT    Total Timed Code Minutes- PT  15 minute(s)  -COURT         Timed Charges    33542 - PT Therapeutic Activity Minutes  15  -COURT        User Key  (r) = Recorded By, (t) = Taken By, (c) = Cosigned By    Initials Name Provider Type    Noble Giles, PT DPT Physical Therapist        Therapy Charges for Today     Code Description Service Date Service Provider Modifiers Qty    78681835398 HC PT THERAPEUTIC ACT EA 15 MIN 6/10/2020 Noble Day, PT DPT GP 1    87796154964 HC PT EVAL MOD COMPLEXITY 4 6/10/2020 Noble Day, PT DPT GP 1          PT G-Codes  Outcome Measure Options: AM-PAC 6 Clicks Basic Mobility (PT)  AM-PAC 6 Clicks Score (PT): 10    Noble Day, PT DPT  6/10/2020

## 2020-06-11 ENCOUNTER — APPOINTMENT (OUTPATIENT)
Dept: GENERAL RADIOLOGY | Facility: HOSPITAL | Age: 80
End: 2020-06-11

## 2020-06-11 LAB
ANION GAP SERPL CALCULATED.3IONS-SCNC: 10 MMOL/L (ref 5–15)
BUN BLD-MCNC: 33 MG/DL (ref 8–23)
BUN/CREAT SERPL: 13.3 (ref 7–25)
CALCIUM SPEC-SCNC: 8.2 MG/DL (ref 8.6–10.5)
CHLORIDE SERPL-SCNC: 107 MMOL/L (ref 98–107)
CO2 SERPL-SCNC: 24 MMOL/L (ref 22–29)
CREAT BLD-MCNC: 2.48 MG/DL (ref 0.76–1.27)
DEPRECATED RDW RBC AUTO: 45.3 FL (ref 37–54)
ERYTHROCYTE [DISTWIDTH] IN BLOOD BY AUTOMATED COUNT: 13.4 % (ref 12.3–15.4)
GFR SERPL CREATININE-BSD FRML MDRD: 25 ML/MIN/1.73
GLUCOSE BLD-MCNC: 134 MG/DL (ref 65–99)
HCT VFR BLD AUTO: 27.9 % (ref 37.5–51)
HGB BLD-MCNC: 9.1 G/DL (ref 13–17.7)
MCH RBC QN AUTO: 30.1 PG (ref 26.6–33)
MCHC RBC AUTO-ENTMCNC: 32.6 G/DL (ref 31.5–35.7)
MCV RBC AUTO: 92.4 FL (ref 79–97)
PLATELET # BLD AUTO: 94 10*3/MM3 (ref 140–450)
PMV BLD AUTO: 11.4 FL (ref 6–12)
POTASSIUM BLD-SCNC: 4.5 MMOL/L (ref 3.5–5.2)
RBC # BLD AUTO: 3.02 10*6/MM3 (ref 4.14–5.8)
SODIUM BLD-SCNC: 141 MMOL/L (ref 136–145)
WBC NRBC COR # BLD: 12.2 10*3/MM3 (ref 3.4–10.8)

## 2020-06-11 PROCEDURE — 97116 GAIT TRAINING THERAPY: CPT

## 2020-06-11 PROCEDURE — 25810000003 DEXTROSE 5 % WITH KCL 20 MEQ 20-5 MEQ/L-% SOLUTION: Performed by: THORACIC SURGERY (CARDIOTHORACIC VASCULAR SURGERY)

## 2020-06-11 PROCEDURE — 25010000002 FUROSEMIDE PER 20 MG: Performed by: THORACIC SURGERY (CARDIOTHORACIC VASCULAR SURGERY)

## 2020-06-11 PROCEDURE — 94799 UNLISTED PULMONARY SVC/PX: CPT

## 2020-06-11 PROCEDURE — 99024 POSTOP FOLLOW-UP VISIT: CPT | Performed by: NURSE PRACTITIONER

## 2020-06-11 PROCEDURE — 85027 COMPLETE CBC AUTOMATED: CPT | Performed by: THORACIC SURGERY (CARDIOTHORACIC VASCULAR SURGERY)

## 2020-06-11 PROCEDURE — 93010 ELECTROCARDIOGRAM REPORT: CPT | Performed by: INTERNAL MEDICINE

## 2020-06-11 PROCEDURE — 93005 ELECTROCARDIOGRAM TRACING: CPT | Performed by: THORACIC SURGERY (CARDIOTHORACIC VASCULAR SURGERY)

## 2020-06-11 PROCEDURE — 80048 BASIC METABOLIC PNL TOTAL CA: CPT | Performed by: THORACIC SURGERY (CARDIOTHORACIC VASCULAR SURGERY)

## 2020-06-11 PROCEDURE — 71045 X-RAY EXAM CHEST 1 VIEW: CPT

## 2020-06-11 PROCEDURE — 25010000002 ENOXAPARIN PER 10 MG: Performed by: THORACIC SURGERY (CARDIOTHORACIC VASCULAR SURGERY)

## 2020-06-11 PROCEDURE — 25010000002 CEFAZOLIN PER 500 MG: Performed by: THORACIC SURGERY (CARDIOTHORACIC VASCULAR SURGERY)

## 2020-06-11 PROCEDURE — 25010000002 ONDANSETRON PER 1 MG: Performed by: THORACIC SURGERY (CARDIOTHORACIC VASCULAR SURGERY)

## 2020-06-11 RX ORDER — EPLERENONE 50 MG/1
25 TABLET, FILM COATED ORAL 2 TIMES DAILY
COMMUNITY
End: 2020-06-16 | Stop reason: HOSPADM

## 2020-06-11 RX ORDER — BISACODYL 5 MG/1
10 TABLET, DELAYED RELEASE ORAL 2 TIMES DAILY
Status: DISCONTINUED | OUTPATIENT
Start: 2020-06-11 | End: 2020-06-16 | Stop reason: HOSPADM

## 2020-06-11 RX ORDER — NEBIVOLOL 5 MG/1
5 TABLET ORAL DAILY
COMMUNITY
End: 2020-06-16 | Stop reason: HOSPADM

## 2020-06-11 RX ORDER — POLYETHYLENE GLYCOL 3350 17 G/17G
17 POWDER, FOR SOLUTION ORAL DAILY
Status: DISCONTINUED | OUTPATIENT
Start: 2020-06-11 | End: 2020-06-16 | Stop reason: HOSPADM

## 2020-06-11 RX ORDER — AMLODIPINE BESYLATE 5 MG/1
5 TABLET ORAL
Status: DISCONTINUED | OUTPATIENT
Start: 2020-06-11 | End: 2020-06-16 | Stop reason: HOSPADM

## 2020-06-11 RX ORDER — AMLODIPINE BESYLATE 10 MG/1
10 TABLET ORAL DAILY
COMMUNITY

## 2020-06-11 RX ORDER — FUROSEMIDE 10 MG/ML
20 INJECTION INTRAMUSCULAR; INTRAVENOUS ONCE
Status: COMPLETED | OUTPATIENT
Start: 2020-06-11 | End: 2020-06-11

## 2020-06-11 RX ORDER — AMIODARONE HYDROCHLORIDE 200 MG/1
400 TABLET ORAL EVERY 12 HOURS SCHEDULED
Status: DISCONTINUED | OUTPATIENT
Start: 2020-06-11 | End: 2020-06-14

## 2020-06-11 RX ADMIN — BISACODYL 10 MG: 5 TABLET ORAL at 11:08

## 2020-06-11 RX ADMIN — ENOXAPARIN SODIUM 30 MG: 30 INJECTION SUBCUTANEOUS at 08:41

## 2020-06-11 RX ADMIN — ENOXAPARIN SODIUM 30 MG: 30 INJECTION SUBCUTANEOUS at 20:34

## 2020-06-11 RX ADMIN — LIDOCAINE 2 PATCH: 50 PATCH CUTANEOUS at 08:42

## 2020-06-11 RX ADMIN — IPRATROPIUM BROMIDE AND ALBUTEROL SULFATE 3 ML: 2.5; .5 SOLUTION RESPIRATORY (INHALATION) at 10:36

## 2020-06-11 RX ADMIN — ASPIRIN 81 MG: 81 TABLET ORAL at 08:39

## 2020-06-11 RX ADMIN — IPRATROPIUM BROMIDE AND ALBUTEROL SULFATE 3 ML: 2.5; .5 SOLUTION RESPIRATORY (INHALATION) at 18:45

## 2020-06-11 RX ADMIN — OXYCODONE HYDROCHLORIDE AND ACETAMINOPHEN 1 TABLET: 10; 325 TABLET ORAL at 03:38

## 2020-06-11 RX ADMIN — IPRATROPIUM BROMIDE AND ALBUTEROL SULFATE 3 ML: 2.5; .5 SOLUTION RESPIRATORY (INHALATION) at 07:30

## 2020-06-11 RX ADMIN — ATORVASTATIN CALCIUM 20 MG: 10 TABLET, FILM COATED ORAL at 20:33

## 2020-06-11 RX ADMIN — METOPROLOL TARTRATE 12.5 MG: 25 TABLET, FILM COATED ORAL at 08:40

## 2020-06-11 RX ADMIN — POLYETHYLENE GLYCOL 3350 17 G: 17 POWDER, FOR SOLUTION ORAL at 11:08

## 2020-06-11 RX ADMIN — AMLODIPINE BESYLATE 5 MG: 5 TABLET ORAL at 15:28

## 2020-06-11 RX ADMIN — AMIODARONE HYDROCHLORIDE 400 MG: 200 TABLET ORAL at 06:22

## 2020-06-11 RX ADMIN — METOPROLOL TARTRATE 12.5 MG: 25 TABLET, FILM COATED ORAL at 20:33

## 2020-06-11 RX ADMIN — BISACODYL 10 MG: 5 TABLET ORAL at 20:34

## 2020-06-11 RX ADMIN — ONDANSETRON HYDROCHLORIDE 4 MG: 2 SOLUTION INTRAMUSCULAR; INTRAVENOUS at 08:42

## 2020-06-11 RX ADMIN — CHLORHEXIDINE GLUCONATE 15 ML: 1.2 RINSE ORAL at 08:42

## 2020-06-11 RX ADMIN — CLOPIDOGREL 75 MG: 75 TABLET, FILM COATED ORAL at 18:14

## 2020-06-11 RX ADMIN — PREGABALIN 25 MG: 25 CAPSULE ORAL at 08:39

## 2020-06-11 RX ADMIN — OXYCODONE HYDROCHLORIDE AND ACETAMINOPHEN 1 TABLET: 5; 325 TABLET ORAL at 19:23

## 2020-06-11 RX ADMIN — AMIODARONE HYDROCHLORIDE 400 MG: 200 TABLET ORAL at 20:32

## 2020-06-11 RX ADMIN — IPRATROPIUM BROMIDE AND ALBUTEROL SULFATE 3 ML: 2.5; .5 SOLUTION RESPIRATORY (INHALATION) at 14:36

## 2020-06-11 RX ADMIN — POTASSIUM CHLORIDE AND DEXTROSE MONOHYDRATE 30 ML/HR: 150; 5 INJECTION, SOLUTION INTRAVENOUS at 03:28

## 2020-06-11 RX ADMIN — FUROSEMIDE 20 MG: 10 INJECTION, SOLUTION INTRAMUSCULAR; INTRAVENOUS at 15:28

## 2020-06-11 RX ADMIN — Medication 1 APPLICATION: at 08:41

## 2020-06-11 RX ADMIN — CEFAZOLIN SODIUM 2 G: 10 INJECTION, POWDER, FOR SOLUTION INTRAVENOUS at 03:27

## 2020-06-11 RX ADMIN — OXYCODONE HYDROCHLORIDE AND ACETAMINOPHEN 1 TABLET: 10; 325 TABLET ORAL at 06:22

## 2020-06-11 RX ADMIN — OXYCODONE HYDROCHLORIDE AND ACETAMINOPHEN 1 TABLET: 5; 325 TABLET ORAL at 14:49

## 2020-06-11 NOTE — PROGRESS NOTES
"CABG x 3, EVH with open extension of the lower right leg    POD 2    Resting in chair.  Nauseated.  Drowsy.  Walked 45 feet this morning with PT.  On 4 liters nasal cannula. IS ~750 ml with prompting. Per nursing he is requiring pain medication around the clock.  This morning he states his pain is in his back.    Telemetry: sinus 70s    Visit Vitals  /78   Pulse 78   Temp 97.7 °F (36.5 °C) (Oral)   Resp 18   Ht 184 cm (72.44\")   Wt 103 kg (226 lb 6.6 oz)   SpO2 93%   BMI 30.33 kg/m²       Intake/Output Summary (Last 24 hours) at 6/11/2020 0915  Last data filed at 6/11/2020 0800  Gross per 24 hour   Intake 953 ml   Output 1185 ml   Net -232 ml     Mediastinal tube output: 200 ml/24 hours  Left Mejia drain: 50ml/24 hours    Labs:       Chest x-ray: bibasilar atelectasis, no pneumothorax    Physical Exam:  General: No apparent distress. In good spirits.   Cardiovascular: Regular rate and rhythm without murmur, rubs, or gallops.    Pulmonary: Clear to auscultation bilaterally without wheezing, rubs, or rales.  Chest: Sternotomy incision clean, dry, and intact. Sternum stable. No clicks. Mediastinal tubes x 2 and Mejia drain x 1 with dressing clean, dry, and intact.    Abdomen: Soft, non distended and non tender.  Extremities: Warm, moves all extremities. Saphenectomy site C/D/I   Neurologic: Grossly intact with no focal deficits.  Drowsy.     Impression:  Coronary artery disease s/p CABG  Chronic pericarditis  CKD stage IV  History of NSTEMI  Previous CVA    Plan:  Multimodality pain control strategies  Decrease percocet to 5mg   Encourage pulmonary toilet and ambulation  Routine post cardiac surgery regimen  DW patient, family, and nursing  "

## 2020-06-11 NOTE — PLAN OF CARE
Problem: Patient Care Overview  Goal: Plan of Care Review  Outcome: Ongoing (interventions implemented as appropriate)  Flowsheets (Taken 6/11/2020 0810)  Outcome Summary: Pt. stood w min assist x1. Min assist of 2 to walk 45'. Forward flexed. Decreased step length, mostly slides feet, few actual steps. Fixated on pain. Rates pain at 7/10. Will continue to benefit from safety education, gait training.

## 2020-06-11 NOTE — PLAN OF CARE
Problem: Patient Care Overview  Goal: Plan of Care Review  6/11/2020 1343 by Halle Pritchard, PATRICIO  Outcome: Ongoing (interventions implemented as appropriate)  Flowsheets (Taken 6/11/2020 1343)  Outcome Summary: Pt. stood w minimal assist. Required assist to correct balance when 1st stood. Ambulated 85' very slowly. Forward flexed posture, very small scooting steps. Will continue to work on gait training and safety instruction.

## 2020-06-11 NOTE — PLAN OF CARE
VSS. 3-4L 02. 500-750 on incentive spirometer, needs a lot of encouragement/reminded to use I.S. C/O pain, medicated with PRN. Appears to be resting between care. X3 assist to get patient from chair to bed, very unsteady and weak.  Urine output 425. Chest tube output 70, LUNA output 10. H/H stable. BUN/CR trending up. Will continue to monitor.

## 2020-06-12 ENCOUNTER — APPOINTMENT (OUTPATIENT)
Dept: GENERAL RADIOLOGY | Facility: HOSPITAL | Age: 80
End: 2020-06-12

## 2020-06-12 LAB
ANION GAP SERPL CALCULATED.3IONS-SCNC: 10 MMOL/L (ref 5–15)
BUN BLD-MCNC: 37 MG/DL (ref 8–23)
BUN/CREAT SERPL: 17.4 (ref 7–25)
CALCIUM SPEC-SCNC: 8.8 MG/DL (ref 8.6–10.5)
CHLORIDE SERPL-SCNC: 104 MMOL/L (ref 98–107)
CO2 SERPL-SCNC: 25 MMOL/L (ref 22–29)
CREAT BLD-MCNC: 2.13 MG/DL (ref 0.76–1.27)
DEPRECATED RDW RBC AUTO: 44.4 FL (ref 37–54)
ERYTHROCYTE [DISTWIDTH] IN BLOOD BY AUTOMATED COUNT: 13.2 % (ref 12.3–15.4)
GFR SERPL CREATININE-BSD FRML MDRD: 30 ML/MIN/1.73
GLUCOSE BLD-MCNC: 110 MG/DL (ref 65–99)
HCT VFR BLD AUTO: 28.9 % (ref 37.5–51)
HGB BLD-MCNC: 9.8 G/DL (ref 13–17.7)
MCH RBC QN AUTO: 31.1 PG (ref 26.6–33)
MCHC RBC AUTO-ENTMCNC: 33.9 G/DL (ref 31.5–35.7)
MCV RBC AUTO: 91.7 FL (ref 79–97)
PLATELET # BLD AUTO: 120 10*3/MM3 (ref 140–450)
PMV BLD AUTO: 11.3 FL (ref 6–12)
POTASSIUM BLD-SCNC: 4.4 MMOL/L (ref 3.5–5.2)
RBC # BLD AUTO: 3.15 10*6/MM3 (ref 4.14–5.8)
SODIUM BLD-SCNC: 139 MMOL/L (ref 136–145)
WBC NRBC COR # BLD: 11.63 10*3/MM3 (ref 3.4–10.8)

## 2020-06-12 PROCEDURE — 25010000002 ONDANSETRON PER 1 MG: Performed by: THORACIC SURGERY (CARDIOTHORACIC VASCULAR SURGERY)

## 2020-06-12 PROCEDURE — 25010000002 ENOXAPARIN PER 10 MG: Performed by: THORACIC SURGERY (CARDIOTHORACIC VASCULAR SURGERY)

## 2020-06-12 PROCEDURE — 71046 X-RAY EXAM CHEST 2 VIEWS: CPT

## 2020-06-12 PROCEDURE — 94799 UNLISTED PULMONARY SVC/PX: CPT

## 2020-06-12 PROCEDURE — 25010000002 FUROSEMIDE PER 20 MG: Performed by: NURSE PRACTITIONER

## 2020-06-12 PROCEDURE — 97116 GAIT TRAINING THERAPY: CPT

## 2020-06-12 PROCEDURE — 25010000002 ENOXAPARIN PER 10 MG: Performed by: NURSE PRACTITIONER

## 2020-06-12 PROCEDURE — 85027 COMPLETE CBC AUTOMATED: CPT | Performed by: THORACIC SURGERY (CARDIOTHORACIC VASCULAR SURGERY)

## 2020-06-12 PROCEDURE — 80048 BASIC METABOLIC PNL TOTAL CA: CPT | Performed by: THORACIC SURGERY (CARDIOTHORACIC VASCULAR SURGERY)

## 2020-06-12 RX ORDER — FUROSEMIDE 10 MG/ML
20 INJECTION INTRAMUSCULAR; INTRAVENOUS EVERY 12 HOURS
Status: DISCONTINUED | OUTPATIENT
Start: 2020-06-12 | End: 2020-06-16 | Stop reason: HOSPADM

## 2020-06-12 RX ORDER — POTASSIUM CHLORIDE 750 MG/1
20 CAPSULE, EXTENDED RELEASE ORAL 2 TIMES DAILY WITH MEALS
Status: DISCONTINUED | OUTPATIENT
Start: 2020-06-12 | End: 2020-06-16 | Stop reason: HOSPADM

## 2020-06-12 RX ADMIN — LIDOCAINE 2 PATCH: 50 PATCH CUTANEOUS at 09:09

## 2020-06-12 RX ADMIN — POTASSIUM CHLORIDE 20 MEQ: 750 CAPSULE, EXTENDED RELEASE ORAL at 11:18

## 2020-06-12 RX ADMIN — ENOXAPARIN SODIUM 30 MG: 30 INJECTION SUBCUTANEOUS at 21:51

## 2020-06-12 RX ADMIN — AMIODARONE HYDROCHLORIDE 400 MG: 200 TABLET ORAL at 09:09

## 2020-06-12 RX ADMIN — POTASSIUM CHLORIDE 20 MEQ: 750 CAPSULE, EXTENDED RELEASE ORAL at 17:44

## 2020-06-12 RX ADMIN — METOPROLOL TARTRATE 12.5 MG: 25 TABLET, FILM COATED ORAL at 09:08

## 2020-06-12 RX ADMIN — FUROSEMIDE 20 MG: 10 INJECTION, SOLUTION INTRAVENOUS at 21:51

## 2020-06-12 RX ADMIN — ASPIRIN 81 MG: 81 TABLET ORAL at 09:08

## 2020-06-12 RX ADMIN — BISACODYL 10 MG: 5 TABLET ORAL at 21:44

## 2020-06-12 RX ADMIN — IPRATROPIUM BROMIDE AND ALBUTEROL SULFATE 3 ML: 2.5; .5 SOLUTION RESPIRATORY (INHALATION) at 14:16

## 2020-06-12 RX ADMIN — ENOXAPARIN SODIUM 30 MG: 30 INJECTION SUBCUTANEOUS at 09:08

## 2020-06-12 RX ADMIN — AMIODARONE HYDROCHLORIDE 400 MG: 200 TABLET ORAL at 21:44

## 2020-06-12 RX ADMIN — OXYCODONE HYDROCHLORIDE AND ACETAMINOPHEN 1 TABLET: 5; 325 TABLET ORAL at 03:56

## 2020-06-12 RX ADMIN — METOPROLOL TARTRATE 25 MG: 25 TABLET, FILM COATED ORAL at 21:44

## 2020-06-12 RX ADMIN — OXYCODONE HYDROCHLORIDE AND ACETAMINOPHEN 1 TABLET: 5; 325 TABLET ORAL at 15:31

## 2020-06-12 RX ADMIN — IPRATROPIUM BROMIDE AND ALBUTEROL SULFATE 3 ML: 2.5; .5 SOLUTION RESPIRATORY (INHALATION) at 10:35

## 2020-06-12 RX ADMIN — PREGABALIN 25 MG: 25 CAPSULE ORAL at 09:08

## 2020-06-12 RX ADMIN — CHLORHEXIDINE GLUCONATE 15 ML: 1.2 RINSE ORAL at 09:08

## 2020-06-12 RX ADMIN — AMLODIPINE BESYLATE 5 MG: 5 TABLET ORAL at 09:09

## 2020-06-12 RX ADMIN — BISACODYL 10 MG: 5 TABLET ORAL at 09:09

## 2020-06-12 RX ADMIN — FUROSEMIDE 20 MG: 10 INJECTION, SOLUTION INTRAVENOUS at 11:18

## 2020-06-12 RX ADMIN — ONDANSETRON HYDROCHLORIDE 4 MG: 2 SOLUTION INTRAMUSCULAR; INTRAVENOUS at 04:32

## 2020-06-12 RX ADMIN — IPRATROPIUM BROMIDE AND ALBUTEROL SULFATE 3 ML: 2.5; .5 SOLUTION RESPIRATORY (INHALATION) at 06:35

## 2020-06-12 RX ADMIN — IPRATROPIUM BROMIDE AND ALBUTEROL SULFATE 3 ML: 2.5; .5 SOLUTION RESPIRATORY (INHALATION) at 20:44

## 2020-06-12 RX ADMIN — ATORVASTATIN CALCIUM 20 MG: 10 TABLET, FILM COATED ORAL at 21:44

## 2020-06-12 RX ADMIN — OXYCODONE HYDROCHLORIDE AND ACETAMINOPHEN 1 TABLET: 5; 325 TABLET ORAL at 21:51

## 2020-06-12 RX ADMIN — CLOPIDOGREL 75 MG: 75 TABLET, FILM COATED ORAL at 17:44

## 2020-06-12 RX ADMIN — POLYETHYLENE GLYCOL 3350 17 G: 17 POWDER, FOR SOLUTION ORAL at 09:06

## 2020-06-12 NOTE — PLAN OF CARE
Problem: Patient Care Overview  Goal: Plan of Care Review  Outcome: Ongoing (interventions implemented as appropriate)  Flowsheets (Taken 6/12/2020 8935)  Outcome Summary: Pt. stands with minimal assist. Continues to require mod assist to get balance when 1st stands. Ambulated 200' with 3 standing rests. Decreased step length, decreased weight shifting, decreased floor clearance, forward flexion. SAT on 3LPM 94-96%. Will benefit from safety education and gait training.

## 2020-06-12 NOTE — PROGRESS NOTES
Continued Stay Note   Tamia     Patient Name: Faisal Joseph  MRN: [id number]  Today's Date: 6/12/2020    Admit Date: 6/9/2020    Discharge Plan     Row Name 06/12/20 0937       Plan    Plan  Home    Patient/Family in Agreement with Plan  yes    Plan Comments  Patient progressing well with physical therapy.  Patient continues to plan to return home at discharge with no anticipated needs.        Discharge Codes    No documentation.             PAZ Alexander

## 2020-06-12 NOTE — THERAPY TREATMENT NOTE
Acute Care - Physical Therapy Progress Note  [facility]     Patient Name: Faisal Joseph  : 1940  MRN: [id number]  Today's Date: 2020             Admit Date: 2020    Visit Dx:    ICD-10-CM ICD-9-CM   1. Coronary artery disease I25.10 414.00   2. Impaired mobility Z74.09 799.89     Patient Active Problem List   Diagnosis   • Essential hypertension   • Stage 4 chronic kidney disease (CMS/[facility])   • Chest pain   • Elevated troponin   • NSTEMI (non-ST elevated myocardial infarction) (CMS/[facility])   • Coronary artery disease   • History of non-ST elevation myocardial infarction (NSTEMI)   • Chronic pericarditis   • History of CVA (cerebrovascular accident)   • Hyperlipidemia       Therapy Treatment    Rehabilitation Treatment Summary     Row Name 20             Treatment Time/Intention    Discipline  physical therapy assistant  -CRISTINE      Document Type  therapy note (daily note)  -CRISTINE      Subjective Information  complains of;weakness;pain  -CRISTINE      Existing Precautions/Restrictions  fall;oxygen therapy device and L/min  -CRISTINE      Treatment Considerations/Comments  chest tube,cristine drain  -CRISTINE      Recorded by [CRISTINE] Halle Pritchard, PTA 20 07      Row Name 20 0718             Vital Signs    Pre Systolic BP Rehab  146  -CRISTINE      Pre Treatment Diastolic BP  76  -CRISTINE      Post Systolic BP Rehab  154  -JP2      Post Treatment Diastolic BP  72  -JP2      Pretreatment Heart Rate (beats/min)  77  -CRISTINE      Posttreatment Heart Rate (beats/min)  83  -JP2      Pre SpO2 (%)  95  -CRISTINE      O2 Delivery Pre Treatment  supplemental O2  -CRISTINE      Post SpO2 (%)  92  -JP2      O2 Delivery Post Treatment  supplemental O2  -JP2      Recorded by [CRISTINE] Halle Pritchard, PTA 20 07  [JP2] Halle Pritchard, PTA 20 0750      Row Name 20 0718             Sit-Stand Transfer    Sit-Stand [place] (Transfers)  minimum assist (75% patient effort) Poor balance when 1st stands. min-mod for balance  -CRISTINE       Recorded by [LUNA] Halle Pritchard, [facility] 06/12/20 0750      Row Name 06/12/20 0718             Stand-Sit Transfer    Stand-Sit [place] (Transfers)  verbal cues;minimum assist (75% patient effort)  -LUNA      Recorded by [LUNA] Halle Pritchard, [facility] 06/12/20 0750      Row Name 06/12/20 0718             Gait/Stairs Assessment/Training    [place] Level (Gait)  verbal cues;minimum assist (75% patient effort)  -LUNA      Distance in Feet (Gait)  200 3 standing rests  -LUNA      Deviations/Abnormal Patterns (Gait)  zofia decreased;gait speed decreased;stride length decreased  -LUNA      Bilateral Gait Deviations  forward flexed posture  -LUNA      Comment (Gait/Stairs)  flex worsens w fatigue  -LUNA      Recorded by [LUNA] Halle Pritchard, [facility] 06/12/20 0750      Row Name 06/12/20 0718             Therapeutic Exercise    Comment (Therapeutic Exercise)  cardiac protocol 15-20  -LUNA      Recorded by [LUNA] Halle Pritchard, [facility] 06/12/20 0750      Row Name 06/12/20 0718             Positioning and Restraints    Pre-Treatment Position  sitting in chair/recliner  -LUNA      Post Treatment Position  chair  -LUNA      In Chair  notified nsg;call light within reach;encouraged to call for assist  -LUNA      Recorded by [LUNA] Halle Pritchard, [facility] 06/12/20 0750      Row Name 06/12/20 0718             Pain Scale: Numbers Pre/Post-Treatment    Pain Scale: Numbers, Post-Treatment  5/10  -LUNA      Pain Location - Orientation  incisional  -LUNA      Pain Location  chest  -LUNA      Recorded by [LUNA] Halle Pritchard, [facility] 06/12/20 0750      Row Name                Wound 06/09/20 1138 Right leg Incision    Wound - Properties Group Date first assessed: 06/09/20 [LK] Time first assessed: 1138 [LK] Present on Hospital Admission: N [LK] Side: Right [LK] Location: leg [LK] Primary Wound Type: Incision [LK] Recorded by:  [LK] Kristen Caldwell RN 06/09/20 1138    Row Name                Wound 06/09/20 1308 chest Incision    Wound - Properties Group Date  first assessed: 06/09/20 [LK] Time first assessed: 1308 [LK] Present on Hospital Admission: N [LK] Location: chest [LK] Primary Wound Type: Incision [LK] Recorded by:  [PURVI] Kristen Caldwell RN 06/09/20 1308      User Key  (r) = Recorded By, (t) = Taken By, (c) = Cosigned By    Initials Name Effective Dates Discipline    Halle Turner PTA 08/02/16 -  PT    Kristen Tellez RN 11/27/17 -  Nurse          Wound 06/09/20 1138 Right leg Incision (Active)   Dressing Appearance dry;intact 6/12/2020  4:01 AM   Closure PETRA 6/12/2020  4:01 AM   Base dressing in place, unable to visualize 6/12/2020  4:01 AM   Drainage Amount none 6/12/2020  4:01 AM       Wound 06/09/20 1308 chest Incision (Active)   Dressing Appearance dry;intact 6/12/2020  4:01 AM   Closure PETRA 6/12/2020  4:01 AM   Base dressing in place, unable to visualize 6/12/2020  4:01 AM   Drainage Amount none 6/12/2020  4:01 AM           Physical Therapy Education                 Title: PT OT SLP Therapies (In Progress)     Topic: Physical Therapy (In Progress)     Point: Mobility training (In Progress)     Description:   Instruct learner(s) on safety and technique for assisting patient out of bed, chair or wheelchair.  Instruct in the proper use of assistive devices, such as walker, crutches, cane or brace.              Patient Friendly Description:   It's important to get you on your feet again, but we need to do so in a way that is safe for you. Falling has serious consequences, and your personal safety is the most important thing of all.        When it's time to get out of bed, one of us or a family member will sit next to you on the bed to give you support.     If your doctor or nurse tells you to use a walker, crutches, a cane, or a brace, be sure you use it every time you get out of bed, even if you think you don't need it.    Learning Progress Summary           Patient Acceptance, E, NR by LUNA at 6/11/2020 0809    Comment:  Gait training, step length     Acceptance, E, NR by COURT at 6/10/2020 0755    Comment:  Educated pt on progression of PT POC, benefits of activity, sternal prec                   Point: Home exercise program (Not Started)     Description:   Instruct learner(s) on appropriate technique for monitoring, assisting and/or progressing patient with therapeutic exercises and activities.              Learner Progress:   Not documented in this visit.          Point: Body mechanics (Not Started)     Description:   Instruct learner(s) on proper positioning and spine alignment for patient and/or caregiver during mobility tasks and/or exercises.              Learner Progress:   Not documented in this visit.          Point: Precautions (In Progress)     Description:   Instruct learner(s) on prescribed precautions during mobility and gait tasks              Learning Progress Summary           Patient Acceptance, E, NR by LUNA at 6/12/2020 0752    Comment:  Reviewed sternal precautions    Acceptance, E,D, NR by LUNA at 6/11/2020 0809    Comment:  Reviewed sternal precautions    Acceptance, E, NR by COURT at 6/10/2020 0755    Comment:  Educated pt on progression of PT POC, benefits of activity, sternal prec                               User Key     Initials Effective Dates Name Provider Type Discipline    COURT 08/02/16 -  Noble Day, PT DPT Physical Therapist PT    LUNA 08/02/16 -  Halle Pritchard, PTA Physical Therapy Assistant PT                PT Recommendation and Plan     Outcome Summary: Pt. stands with minimal assist. Continues to require mod assist to get balance when 1st stands. Ambulated 200' with 3 standing rests. Decreased step length, decreased weight shifting, decreased floor clearance, forward flexion. SAT on 3LPM 94-96%. Will benefit from safety education and gait training.     Time Calculation:   PT Charges     Row Name 06/12/20 0755             Time Calculation    Start Time  0718  -LUNA      Stop Time  0745  -      Time Calculation (min)  27 min   -LUNA      PT Received On  06/12/20  -LUNA         Timed Charges    85859 - Gait Training Minutes   27  -LUNA        User Key  (r) = Recorded By, (t) = Taken By, (c) = Cosigned By    Initials Name Provider Type    Halle Turner PTA Physical Therapy Assistant        Therapy Charges for Today     Code Description Service Date Service Provider Modifiers Qty    67033542390 HC GAIT TRAINING EA 15 MIN 6/11/2020 Halle Pritchard, PATRICIO GP 2    82861462629 HC GAIT TRAINING EA 15 MIN 6/11/2020 Halle Pritchard, PATRICIO GP 2    15178210219 HC GAIT TRAINING EA 15 MIN 6/12/2020 Halle Pritchard, PTA GP 2          PT G-Codes  Outcome Measure Options: AM-PAC 6 Clicks Basic Mobility (PT)  AM-PAC 6 Clicks Score (PT): 10    Halle Pritchard PTA  6/12/2020

## 2020-06-12 NOTE — NURSING NOTE
Patient walked one entire lap around the unit tonight, had a rough start but once he got moving he did well and really pushed himself.

## 2020-06-12 NOTE — PROGRESS NOTES
"CABG x 3, EVH with open extension of the lower right leg    POD 3    Resting in chair.  Still somewhat drowsy today.  Walked 200 feet this morning with PT.  On 3 liters nasal cannula. IS ~0741-1576 ml with prompting.  Creatinine improving, 2.1 today.    Telemetry: sinus 70s    Visit Vitals  /64   Pulse 70   Temp 98.3 °F (36.8 °C) (Axillary)   Resp 16   Ht 184 cm (72.44\")   Wt 101 kg (222 lb 10.6 oz)   SpO2 92%   BMI 29.83 kg/m²       Intake/Output Summary (Last 24 hours) at 6/12/2020 1037  Last data filed at 6/12/2020 0820  Gross per 24 hour   Intake 540 ml   Output 1668 ml   Net -1128 ml     Mediastinal tube output: 155ml/24 hours  Left Mejia drain: 93ml/24 hours    Labs:       Chest x-ray: bibasilar atelectasis, no pneumothorax    Physical Exam:  General: No apparent distress. In good spirits.   Cardiovascular: Regular rate and rhythm without murmur, rubs, or gallops.    Pulmonary: Clear to auscultation bilaterally without wheezing, rubs, or rales.  Chest: Sternotomy incision clean, dry, and intact. Sternum stable. No clicks. Mediastinal tubes x 2 and Mejia drain x 1 with dressing clean, dry, and intact.    Abdomen: Soft, non distended and non tender.  Extremities: Warm, moves all extremities. Saphenectomy site C/D/I   Neurologic: Grossly intact with no focal deficits.  Drowsy.     Impression:  Coronary artery disease s/p CABG  Chronic pericarditis  CKD stage IV  History of NSTEMI  Previous CVA    Plan:  Multimodality pain control strategies  Diurese  Increase beta blocker  Encourage pulmonary toilet and ambulation  Routine post cardiac surgery regimen  DW patient and nursing  Transfer to   "

## 2020-06-12 NOTE — PLAN OF CARE
Problem: Patient Care Overview  Goal: Plan of Care Review  Outcome: Ongoing (interventions implemented as appropriate)  Flowsheets  Taken 6/12/2020 7612  Progress: improving  Outcome Summary: PATIENT TRANSFERRED TO  FROM ICU THIS SHIFT. NSR ON TELE. VSS. PRN PERCOCET GIVEN X1 FOR BACK PAIN. AMBULATED IN [place]. 1500 IS. VOIDING PER URINAL AFTER MARSHALL D/C. MST/LUNA INTACT. CONTINUE TO MONITOR.

## 2020-06-13 LAB
ALBUMIN SERPL-MCNC: 2.8 G/DL (ref 3.5–5.2)
ALBUMIN/GLOB SERPL: 1.2 G/DL
ALP SERPL-CCNC: 35 U/L (ref 39–117)
ALT SERPL W P-5'-P-CCNC: <5 U/L (ref 1–41)
ANION GAP SERPL CALCULATED.3IONS-SCNC: 9 MMOL/L (ref 5–15)
AST SERPL-CCNC: 17 U/L (ref 1–40)
BH BB BLOOD EXPIRATION DATE: NORMAL
BH BB BLOOD EXPIRATION DATE: NORMAL
BH BB BLOOD TYPE BARCODE: 6200
BH BB BLOOD TYPE BARCODE: 6200
BH BB DISPENSE STATUS: NORMAL
BH BB DISPENSE STATUS: NORMAL
BH BB PRODUCT CODE: NORMAL
BH BB PRODUCT CODE: NORMAL
BH BB UNIT NUMBER: NORMAL
BH BB UNIT NUMBER: NORMAL
BILIRUB SERPL-MCNC: 0.4 MG/DL (ref 0.2–1.2)
BUN BLD-MCNC: 40 MG/DL (ref 8–23)
BUN/CREAT SERPL: 20.3 (ref 7–25)
CALCIUM SPEC-SCNC: 8.4 MG/DL (ref 8.6–10.5)
CHLORIDE SERPL-SCNC: 105 MMOL/L (ref 98–107)
CO2 SERPL-SCNC: 23 MMOL/L (ref 22–29)
CREAT BLD-MCNC: 1.97 MG/DL (ref 0.76–1.27)
CROSSMATCH INTERPRETATION: NORMAL
CROSSMATCH INTERPRETATION: NORMAL
DEPRECATED RDW RBC AUTO: 42.1 FL (ref 37–54)
ERYTHROCYTE [DISTWIDTH] IN BLOOD BY AUTOMATED COUNT: 12.9 % (ref 12.3–15.4)
GFR SERPL CREATININE-BSD FRML MDRD: 33 ML/MIN/1.73
GLOBULIN UR ELPH-MCNC: 2.3 GM/DL
GLUCOSE BLD-MCNC: 102 MG/DL (ref 65–99)
HCT VFR BLD AUTO: 23.6 % (ref 37.5–51)
HGB BLD-MCNC: 8 G/DL (ref 13–17.7)
MCH RBC QN AUTO: 30.9 PG (ref 26.6–33)
MCHC RBC AUTO-ENTMCNC: 33.9 G/DL (ref 31.5–35.7)
MCV RBC AUTO: 91.1 FL (ref 79–97)
PLATELET # BLD AUTO: 98 10*3/MM3 (ref 140–450)
PMV BLD AUTO: 11.4 FL (ref 6–12)
POTASSIUM BLD-SCNC: 4.4 MMOL/L (ref 3.5–5.2)
PROT SERPL-MCNC: 5.1 G/DL (ref 6–8.5)
RBC # BLD AUTO: 2.59 10*6/MM3 (ref 4.14–5.8)
SODIUM BLD-SCNC: 137 MMOL/L (ref 136–145)
UNIT  ABO: NORMAL
UNIT  ABO: NORMAL
UNIT  RH: NORMAL
UNIT  RH: NORMAL
WBC NRBC COR # BLD: 7.16 10*3/MM3 (ref 3.4–10.8)

## 2020-06-13 PROCEDURE — 85027 COMPLETE CBC AUTOMATED: CPT | Performed by: NURSE PRACTITIONER

## 2020-06-13 PROCEDURE — 94799 UNLISTED PULMONARY SVC/PX: CPT

## 2020-06-13 PROCEDURE — 99024 POSTOP FOLLOW-UP VISIT: CPT | Performed by: THORACIC SURGERY (CARDIOTHORACIC VASCULAR SURGERY)

## 2020-06-13 PROCEDURE — 25010000002 FUROSEMIDE PER 20 MG: Performed by: NURSE PRACTITIONER

## 2020-06-13 PROCEDURE — 25010000002 ENOXAPARIN PER 10 MG: Performed by: NURSE PRACTITIONER

## 2020-06-13 PROCEDURE — 97116 GAIT TRAINING THERAPY: CPT

## 2020-06-13 PROCEDURE — 80053 COMPREHEN METABOLIC PANEL: CPT | Performed by: NURSE PRACTITIONER

## 2020-06-13 RX ORDER — BISACODYL 10 MG
10 SUPPOSITORY, RECTAL RECTAL DAILY PRN
Status: DISCONTINUED | OUTPATIENT
Start: 2020-06-13 | End: 2020-06-16 | Stop reason: HOSPADM

## 2020-06-13 RX ADMIN — OXYCODONE HYDROCHLORIDE AND ACETAMINOPHEN 1 TABLET: 5; 325 TABLET ORAL at 00:35

## 2020-06-13 RX ADMIN — ATORVASTATIN CALCIUM 20 MG: 10 TABLET, FILM COATED ORAL at 21:04

## 2020-06-13 RX ADMIN — ENOXAPARIN SODIUM 30 MG: 30 INJECTION SUBCUTANEOUS at 21:03

## 2020-06-13 RX ADMIN — CLOPIDOGREL 75 MG: 75 TABLET, FILM COATED ORAL at 17:09

## 2020-06-13 RX ADMIN — AMLODIPINE BESYLATE 5 MG: 5 TABLET ORAL at 08:31

## 2020-06-13 RX ADMIN — FUROSEMIDE 20 MG: 10 INJECTION, SOLUTION INTRAVENOUS at 11:04

## 2020-06-13 RX ADMIN — AMIODARONE HYDROCHLORIDE 400 MG: 200 TABLET ORAL at 08:31

## 2020-06-13 RX ADMIN — LIDOCAINE 2 PATCH: 50 PATCH CUTANEOUS at 08:30

## 2020-06-13 RX ADMIN — FUROSEMIDE 20 MG: 10 INJECTION, SOLUTION INTRAVENOUS at 23:22

## 2020-06-13 RX ADMIN — BISACODYL 10 MG: 5 TABLET ORAL at 08:31

## 2020-06-13 RX ADMIN — ASPIRIN 81 MG: 81 TABLET ORAL at 08:31

## 2020-06-13 RX ADMIN — IPRATROPIUM BROMIDE AND ALBUTEROL SULFATE 3 ML: 2.5; .5 SOLUTION RESPIRATORY (INHALATION) at 06:53

## 2020-06-13 RX ADMIN — OXYCODONE HYDROCHLORIDE AND ACETAMINOPHEN 1 TABLET: 5; 325 TABLET ORAL at 21:08

## 2020-06-13 RX ADMIN — ENOXAPARIN SODIUM 30 MG: 30 INJECTION SUBCUTANEOUS at 08:30

## 2020-06-13 RX ADMIN — IPRATROPIUM BROMIDE AND ALBUTEROL SULFATE 3 ML: 2.5; .5 SOLUTION RESPIRATORY (INHALATION) at 22:06

## 2020-06-13 RX ADMIN — AMIODARONE HYDROCHLORIDE 400 MG: 200 TABLET ORAL at 21:04

## 2020-06-13 RX ADMIN — POTASSIUM CHLORIDE 20 MEQ: 750 CAPSULE, EXTENDED RELEASE ORAL at 17:09

## 2020-06-13 RX ADMIN — METOPROLOL TARTRATE 25 MG: 25 TABLET, FILM COATED ORAL at 21:03

## 2020-06-13 RX ADMIN — POLYETHYLENE GLYCOL 3350 17 G: 17 POWDER, FOR SOLUTION ORAL at 08:31

## 2020-06-13 RX ADMIN — OXYCODONE HYDROCHLORIDE AND ACETAMINOPHEN 1 TABLET: 5; 325 TABLET ORAL at 11:04

## 2020-06-13 RX ADMIN — POTASSIUM CHLORIDE 20 MEQ: 750 CAPSULE, EXTENDED RELEASE ORAL at 08:31

## 2020-06-13 RX ADMIN — IPRATROPIUM BROMIDE AND ALBUTEROL SULFATE 3 ML: 2.5; .5 SOLUTION RESPIRATORY (INHALATION) at 10:15

## 2020-06-13 RX ADMIN — IPRATROPIUM BROMIDE AND ALBUTEROL SULFATE 3 ML: 2.5; .5 SOLUTION RESPIRATORY (INHALATION) at 13:42

## 2020-06-13 RX ADMIN — METOPROLOL TARTRATE 25 MG: 25 TABLET, FILM COATED ORAL at 08:31

## 2020-06-13 RX ADMIN — PREGABALIN 25 MG: 25 CAPSULE ORAL at 08:30

## 2020-06-13 NOTE — SIGNIFICANT NOTE
TV remote and call bell started malfunctioning.  Engineering notified and stopped by room.  Says unable to fix tonight.  Got touch call bell and placed on belly.  Patient demonstrated understanding of using call device.  Declined need to watch TV.  Bed alarm on.

## 2020-06-13 NOTE — PLAN OF CARE
Pt A&O. C/o incisional chest and back pain PRN pain meds given. BM today. Boost ordered w/ every meal to promote nutritional intake. Pt right side weaker than left baseline from old stroke.Drifts the the right when walking. Can get upx1 when ambulating. Up in chair all day today. LUNA drain and chest tube removed today. Wires taped and isolated. VSS. Safety maintained.  Problem: Patient Care Overview  Goal: Plan of Care Review  Outcome: Ongoing (interventions implemented as appropriate)  Flowsheets (Taken 6/13/2020 1602)  Progress: improving  Plan of Care Reviewed With: patient; daughter  Goal: Individualization and Mutuality  Outcome: Ongoing (interventions implemented as appropriate)  Goal: Discharge Needs Assessment  Outcome: Ongoing (interventions implemented as appropriate)  Goal: Interprofessional Rounds/Family Conf  Outcome: Ongoing (interventions implemented as appropriate)     Problem: Fall Risk (Adult)  Goal: Identify Related Risk Factors and Signs and Symptoms  Outcome: Ongoing (interventions implemented as appropriate)  Goal: Absence of Fall  Outcome: Ongoing (interventions implemented as appropriate)     Problem: Cardiac Surgery (Adult)  Goal: Signs and Symptoms of Listed Potential Problems Will be Absent, Minimized or Managed (Cardiac Surgery)  Outcome: Ongoing (interventions implemented as appropriate)  Goal: Anesthesia/Sedation Recovery  Outcome: Ongoing (interventions implemented as appropriate)

## 2020-06-13 NOTE — PLAN OF CARE
Problem: Patient Care Overview  Goal: Plan of Care Review  Flowsheets (Taken 6/13/2020 0910)  Progress: improving  Plan of Care Reviewed With: patient  Outcome Summary: PT tx completed. Pt requires min A x1 for sit to stands with cues for sternal precautions. Pt amb 80' x2 with Min A due to unsteadiness, patient amb with incresed forward flexed posture that worsens when he fatigues. He presents with decreased stride length and required mod cues in order to correct, pt also requires for cues for obstacle awareness. Recommend SNF upon d/c due to decreased safety awareness, strength, and endurance.

## 2020-06-13 NOTE — PLAN OF CARE
Problem: Patient Care Overview  Goal: Plan of Care Review  6/13/2020 0312 by Lisa Orozco RN  Flowsheets  Taken 6/12/2020 1850 by America Holly RN  Progress: improving  Taken 6/12/2020 1411 by America Holly RN  Plan of Care Reviewed With: patient  Note:   Patient walked to elevated on 30 [place] with asst x 2, c/o fatigue.  Prn pain medication for c/o pain.  VSS.  Voiding per urinal.  No falls noted.  Bed alarm on.  LUNA and CT draining.  Continue to monitor.   6/13/2020 0311 by Lisa Orozco RN  Outcome: Ongoing (interventions implemented as appropriate)  6/13/2020 0310 by Lisa Orozco RN  Reactivated  6/13/2020 0221 by Lisa Orozco RN  Outcome: Unable to achieve outcome(s) by discharge  Goal: Individualization and Mutuality  6/13/2020 0311 by Lisa Orozco RN  Outcome: Ongoing (interventions implemented as appropriate)  Flowsheets (Taken 6/13/2020 0311)  Patient Specific Goals (Include Timeframe): keep pain free, decrease SOA  Patient Specific Interventions: walked with asst x 2,  Pain medication PRN  Patient Specific Preferences: door open, likes to sit in chair  6/13/2020 0310 by Lisa Orozco RN  Reactivated  6/13/2020 0221 by Lisa Orozco RN  Outcome: Unable to achieve outcome(s) by discharge  Goal: Discharge Needs Assessment  6/13/2020 0311 by Lisa Orozco RN  Outcome: Ongoing (interventions implemented as appropriate)  6/13/2020 0310 by Lisa Orozco RN  Reactivated  6/13/2020 0221 by Lisa Orozco RN  Outcome: Unable to achieve outcome(s) by discharge  Goal: Interprofessional Rounds/Family Conf  6/13/2020 0311 by Lisa Orozco RN  Outcome: Ongoing (interventions implemented as appropriate)  6/13/2020 0310 by Lisa Orozco RN  Reactivated  6/13/2020 0221 by Lisa Orozco RN  Outcome: Unable to achieve outcome(s) by discharge     Problem: Skin Injury Risk (Adult)  Goal: Identify Related Risk Factors and Signs and Symptoms  Outcome: Unable  to achieve outcome(s) by discharge  Goal: Skin Health and Integrity  Outcome: Unable to achieve outcome(s) by discharge     Problem: Fall Risk (Adult)  Goal: Identify Related Risk Factors and Signs and Symptoms  6/13/2020 0311 by Lisa Orozco RN  Outcome: Ongoing (interventions implemented as appropriate)  6/13/2020 0310 by Lisa Orozco RN  Reactivated  6/13/2020 0220 by Lisa Orozco RN  Outcome: Unable to achieve outcome(s) by discharge  Goal: Absence of Fall  6/13/2020 0311 by Lisa Orozco RN  Outcome: Ongoing (interventions implemented as appropriate)  6/13/2020 0310 by Lisa Orozco RN  Reactivated  6/13/2020 0220 by Lisa Orozco RN  Outcome: Unable to achieve outcome(s) by discharge     Problem: Cardiac Surgery (Adult)  Goal: Signs and Symptoms of Listed Potential Problems Will be Absent, Minimized or Managed (Cardiac Surgery)  6/13/2020 0311 by Lisa Orozco RN  Outcome: Ongoing (interventions implemented as appropriate)  6/13/2020 0311 by Lisa Orozco RN  Reactivated  6/13/2020 0220 by Lisa Orozco RN  Outcome: Unable to achieve outcome(s) by discharge  Goal: Anesthesia/Sedation Recovery  6/13/2020 0311 by Lisa Orozco RN  Outcome: Ongoing (interventions implemented as appropriate)  6/13/2020 0311 by Lisa Orozco RN  Reactivated  6/13/2020 0220 by Lisa Orozco RN  Outcome: Unable to achieve outcome(s) by discharge

## 2020-06-13 NOTE — DISCHARGE PLACEMENT REQUEST
"Alice Prince (79 y.o. Male)     Date of Birth Social Security Number Address Home Phone MRN    [id number]  [id number] [facility] [address] [phone] [id number]    Congregation Marital Status          Religion        Admission Date Admission Type Admitting Provider Attending Provider Department, Room/Bed    6/9/20 Elective Nikunj Carballo MD Lopez, Nicholas M, MD [facility] 4B, 432/1    Discharge Date Discharge Disposition Discharge Destination                       Attending Provider:  Nikunj Carballo MD    Allergies:  Hydralazine, Losartan Potassium, Penicillins, Spironolactone    Isolation:  None   Infection:  None   Code Status:  CPR    Ht:  182.9 cm (72\")   Wt:  100 kg (220 lb 9 oz)    Admission Cmt:  None   Principal Problem:  Coronary artery disease [I25.10] More...                 Active Insurance as of 6/9/2020     Primary Coverage     Payor Plan Insurance Group Employer/Plan Group    MEDICARE MEDICARE A & B      Payor Plan Address Payor Plan Phone Number Payor Plan Fax Number Effective Dates    [address] [phone]  9/1/2005 - None Entered    [facility] [id number]       Subscriber Name Subscriber Birth Date Member ID       ALICE PRINCE 1940 [id number]           Secondary Coverage     Payor Plan Insurance Group Employer/Plan Group    BANKERS FIDELITY BANKERS FIDELITY      Payor Plan Address Payor Plan Phone Number Payor Plan Fax Number Effective Dates    [address] [phone]  10/2/2017 - None Entered    [facility] [address]       Subscriber Name Subscriber Birth Date Member ID       ALICE PRINCE 1940 [id number]                 Emergency Contacts      (Rel.) Home Phone Work Phone Mobile Phone    Susan Ray (Daughter) [phone] -- --               History & Physical      Nikunj Carballo MD at 06/09/20 0657          H&P reviewed. The patient was examined and there are no changes to the H&P.      Workup " completed,  He is an increased but acceptable risk candidate for cardiac surgery.  All questions answered and agreeable to proceed forward.  He is ok with HD if indicated.  Daughter present for today's assessment.  No signs/symptoms of COVID.  Full code status discussed and his wishes are consistent with that.      Electronically signed by Nikunj Carballo MD at 06/09/20 6771   Source Note            Chief Complaint   Patient presents with   • Chest Pain         Subjective     History of Present Illness    78 yo male with salient past medical history includes atrial fibrillation, CKD stage IV, previous CVA with residual, HTN, HL, family history of CAD, and remote history of tobacco use presents with increasing shortness of breath over many years but chest pain with exertion increasingly so. This is relieved with rest.  He developed on the day of admission 10/10 chest pain which did improve with NTG SL.    Troponin was elevated ruling him in for a NSTEMI.  Dr. Brice did evaluate and ultimately performed LHC demonstrating L main multivessel CAD.  He has been chest pain free while in the hospital.  With the aforementioned, I have been asked to evaluate for the consideration of surgical revascularization.    His CKD is managed by Veda Brito and has been stable.  He did have a CVA with etiology unknown to him but thinks maybe hypertension was a cause.  He does have minimal right sided weakness which remains from originally having complete right hemiplegia.  Colonoscopy was negative performed by Dr. Brown with three performed.  He lives independently.  He is not  currently.    His daughter Susan was present via telephone for today's assessment.  She plans to assist in his care post operatively.      Review of Systems   Constitutional: Positive for activity change and fatigue. Negative for appetite change, chills, diaphoresis, fever and unexpected weight change.   HENT: Negative for dental problem, hearing  loss, nosebleeds, sore throat, trouble swallowing and voice change.    Eyes: Negative for photophobia, redness and visual disturbance.   Respiratory: Positive for chest tightness and shortness of breath. Negative for apnea, cough, wheezing and stridor.    Cardiovascular: Positive for chest pain, palpitations and leg swelling (left).   Gastrointestinal: Negative for abdominal distention, abdominal pain, blood in stool, constipation, diarrhea, nausea and vomiting.   Endocrine: Negative for cold intolerance, heat intolerance, polyphagia and polyuria.   Genitourinary: Negative for decreased urine volume, difficulty urinating, dysuria, flank pain, frequency, hematuria and urgency.   Musculoskeletal: Positive for arthralgias. Negative for back pain, gait problem, joint swelling, myalgias and neck pain.   Skin: Negative for pallor, rash and wound.   Allergic/Immunologic: Negative for immunocompromised state.   Neurological: Positive for weakness. Negative for dizziness, tremors, seizures, syncope, speech difficulty, light-headedness, numbness and headaches.   Hematological: Does not bruise/bleed easily.   Psychiatric/Behavioral: Negative for confusion, sleep disturbance and suicidal ideas. The patient is not nervous/anxious.           Past Medical History:   Diagnosis Date   • Atrial fibrillation (CMS/HCC)    • Chronic fatigue 10/3/2017   • Chronic renal disease, stage IV (CMS/HCC)    • CVA (cerebral vascular accident) (CMS/HCC) 8/14/2018   • Hyperlipidemia    • Hypertension    • Palpitations 10/3/2017   • Premature atrial contractions 10/17/2017   • PVCs (premature ventricular contractions) 10/17/2017   • Sleep apnea    • Stage 4 chronic kidney disease (CMS/HCC) 7/30/2018   • Stroke (CMS/HCC) 2007     Past Surgical History:   Procedure Laterality Date   • CARDIAC CATHETERIZATION     • CARDIAC CATHETERIZATION N/A 5/28/2020    Procedure: Left Heart Cath;  Surgeon: Rufino Brice MD;  Location: [facility] CATH INVASIVE  LOCATION;  Service: Cardiology;  Laterality: N/A;     Family History   Problem Relation Age of Onset   • Cancer Mother    • Cancer Father      Social History     Tobacco Use   • Smoking status: Former Smoker     Last attempt to quit: 1984     Years since quittin.4   • Smokeless tobacco: Never Used   Substance Use Topics   • Alcohol use: No   • Drug use: No     Medications Prior to Admission   Medication Sig Dispense Refill Last Dose   • amLODIPine (NORVASC) 5 MG tablet TAKE 1 TABLET BY MOUTH DAILY. 30 tablet 11    • aspirin 81 MG EC tablet Take 81 mg by mouth Daily.   Taking   • Cholecalciferol (VITAMIN D) 2000 units capsule Take  by mouth Daily.   Taking   • eplerenone (INSPRA) 50 MG tablet Take 50 mg by mouth Daily.   Taking   • lisinopril (PRINIVIL,ZESTRIL) 20 MG tablet Take 20 mg by mouth 2 (Two) Times a Day.   Taking   • magnesium oxide (MAGOX) 400 (241.3 Mg) MG tablet tablet Take 400 mg by mouth 1 (One) Time.   Taking   • nitroglycerin (NITROSTAT) 0.4 MG SL tablet Place 0.4 mg under the tongue Every 5 (Five) Minutes As Needed for Chest Pain. Take no more than 3 doses in 15 minutes.   Taking     Allergies:  Hydralazine; Losartan potassium; Penicillins; and Spironolactone    Objective      Vital Signs  Temp:  [96.9 °F (36.1 °C)-98.3 °F (36.8 °C)] 97.8 °F (36.6 °C)  Heart Rate:  [58-69] 62  Resp:  [16-18] 16  BP: (132-159)/(70-86) 153/86    Physical Exam   Constitutional: He is oriented to person, place, and time. He appears well-developed.   HENT:   Head: Normocephalic and atraumatic.   Mouth/Throat: Oropharynx is clear and moist.   Eyes: Pupils are equal, round, and reactive to light. EOM are normal.   Neck: Normal range of motion. Neck supple. No JVD present. No tracheal deviation present. No thyromegaly present.   Cardiovascular: Normal rate, regular rhythm, normal heart sounds and intact distal pulses. Exam reveals no gallop and no friction rub.   No murmur heard.  Pulmonary/Chest: Effort normal and  breath sounds normal. No respiratory distress. He has no wheezes. He has no rales. He exhibits no tenderness.   Abdominal: Soft. He exhibits no distension. There is no tenderness.   Musculoskeletal: Normal range of motion. He exhibits no edema.   Lymphadenopathy:     He has no cervical adenopathy.   Neurological: He is alert and oriented to person, place, and time. No cranial nerve deficit.   Right side minimally weaker   Skin: Skin is warm and dry.   Psychiatric: He has a normal mood and affect.       Results Review:   Xr Chest 1 View    Result Date: 5/28/2020  Narrative: XR CHEST 1 VW- 5/28/2020 4:55 AM CDT  HISTORY: Chest Pain Triage Protocol   COMPARISON: None.  FINDINGS: The lungs are clear. The cardiomediastinal silhouette and pulmonary vascularity are within normal limits.  The osseous structures and surrounding soft tissues demonstrate no acute abnormality.      Impression: 1. No radiographic evidence of acute cardiopulmonary process. This report was finalized on 05/28/2020 07:02 by Dr Xavi Nguyen, .    Ct Angiogram Chest    Result Date: 5/29/2020  Narrative: EXAMINATION: CT ANGIOGRAM CHEST-  5/29/2020 3:24 PM CDT  HISTORY:Coronary artery disease. Hypertension. Cardiac surgery workup  COMPARISON: 5/28/2020 chest radiography  TECHNIQUE:  CT evaluation of the chest was performed with intravenous contrast. 2 mm transaxial images were obtained.. Coronal reconstruction maximum intensity projection images of the pulmonary arteries and aorta were also generated.  Radiation dose equals  mGy-cm.  Automated exposure control dose reduction technique was implemented.   FINDINGS:  [There is calcified and noncalcified atheromatous plaque involving the ectatic aortic arch and descending thoracic aorta. There is no aneurysm or dissection.  Minimal aortic valve calcifications observed with minimal asymmetry in thoracic aorta calcifications.  There are coronary artery calcifications.  Pulmonary arteries opacify  appropriately without intraluminal filling defects or CT angiographic evidence of pulmonary embolus  There is no mediastinal or hilar lymphadenopathy. There are calcified subcarinal and right hilar lymph nodes.  There is a small sliding-type hiatal hernia.  There is centrilobular pulmonary emphysema with an upper lobe predominance.  There are no parenchymal infiltrates. There are no pleural effusions or pneumothoraces.  There are no suspicious pulmonary nodules or masses.  Probable bilateral renal cysts identified. Ultrasound may confirm.  Abdominal aortic calcifications appreciated with origin calcifications involving the celiac axis and SMA and renal arteries without significant stenosis.  No CT evidence of acute abdominal process observed.  Diffuse generative changes observed with bridging osteophyte formation thoracic spine and upper lumbar spine. No acute osseous abnormalities observed.]      Impression: 1. No CT angiographic evidence of pulmonary embolus or acute aortic pathology. 2. Aortic ectasia without aneurysmal dilatation. Atherosclerotic calcifications. 3. Pulmonary emphysema. 4. Small hiatal hernia. 5. Remote granulomatous disease. 6. No acute cardiopulmonary process. 7. Question nephrogenic cysts. This report was finalized on 05/29/2020 15:34 by Dr. Law Boyd MD.     I reviewed the patient's new clinical results.  Discussed with patient and daughter      Assessment/Plan       Chest pain    Essential hypertension    Stage 4 chronic kidney disease (CMS/HCC)    Elevated troponin    NSTEMI (non-ST elevated myocardial infarction) (CMS/[facility])          I discussed the patients findings and my recommendations with patient and daughter, We discussed the options for treatment of coronary artery disease to include medical therapy, coronary stenting, and surgical revascularization.  We discussed the pros and cons of each option and how it pertains to the current case.  The STS Risk score was roughly calculated  using the STS Risk Calculator and discussed with the patient.  Coronary artery bypass grafting is best option given patient's findings known and risk factors.  We discussed the operative conduct and expected hospital and outpatient recovery.  Risks were discussed to include but not limited to bleeding, infection, stroke, heart attack, need for additional procedures, anesthesia risk, organ dysfunction and/or failure, prolonged mechanical ventilation, prolonged ICU stay, chronic pain syndromes, sternal nonunion, and/or death.  We discussed from his history he is at increased risk for CVA and renal failure.  We discussed the need to utilize all medical treatments additionally prescribed post surgery.  He is functionally strong and independent.  Nevertheless given his age and comorbidities he was advised that rehabilitation may still be required post operatively.         The patient and daughter understands the risks, benefits, and alternatives and he wishes to proceed forward with surgery.  Timing will be important.  Due to CKD and recent administration of contrast and plans for additional contrast with CT Chest, he should benefit from delaying his surgery for approximately 5 days to preserve his renal function as best as possible.  Decision for return to hospital verses keeping in the hospital will be determined by his cardiac status.  CHEMA Brice.  If clinically stable, ok for dc and return for surgery next week.          Electronically signed by Nikunj Carballo MD at 05/30/20 0657             Nikunj Carballo MD at 05/29/20 1042            Chief Complaint   Patient presents with   • Chest Pain         Subjective     History of Present Illness    78 yo male with salient past medical history includes atrial fibrillation, CKD stage IV, previous CVA with residual, HTN, HL, family history of CAD, and remote history of tobacco use presents with increasing shortness of breath over many years but chest pain with  exertion increasingly so. This is relieved with rest.  He developed on the day of admission 10/10 chest pain which did improve with NTG SL.    Troponin was elevated ruling him in for a NSTEMI.  Dr. Brice did evaluate and ultimately performed LHC demonstrating L main multivessel CAD.  He has been chest pain free while in the hospital.  With the aforementioned, I have been asked to evaluate for the consideration of surgical revascularization.    His CKD is managed by Veda Brito and has been stable.  He did have a CVA with etiology unknown to him but thinks maybe hypertension was a cause.  He does have minimal right sided weakness which remains from originally having complete right hemiplegia.  Colonoscopy was negative performed by Dr. Brown with three performed.  He lives independently.  He is not  currently.    His daughter Susan was present via telephone for today's assessment.  She plans to assist in his care post operatively.      Review of Systems   Constitutional: Positive for activity change and fatigue. Negative for appetite change, chills, diaphoresis, fever and unexpected weight change.   HENT: Negative for dental problem, hearing loss, nosebleeds, sore throat, trouble swallowing and voice change.    Eyes: Negative for photophobia, redness and visual disturbance.   Respiratory: Positive for chest tightness and shortness of breath. Negative for apnea, cough, wheezing and stridor.    Cardiovascular: Positive for chest pain, palpitations and leg swelling (left).   Gastrointestinal: Negative for abdominal distention, abdominal pain, blood in stool, constipation, diarrhea, nausea and vomiting.   Endocrine: Negative for cold intolerance, heat intolerance, polyphagia and polyuria.   Genitourinary: Negative for decreased urine volume, difficulty urinating, dysuria, flank pain, frequency, hematuria and urgency.   Musculoskeletal: Positive for arthralgias. Negative for back pain, gait problem, joint  swelling, myalgias and neck pain.   Skin: Negative for pallor, rash and wound.   Allergic/Immunologic: Negative for immunocompromised state.   Neurological: Positive for weakness. Negative for dizziness, tremors, seizures, syncope, speech difficulty, light-headedness, numbness and headaches.   Hematological: Does not bruise/bleed easily.   Psychiatric/Behavioral: Negative for confusion, sleep disturbance and suicidal ideas. The patient is not nervous/anxious.           Past Medical History:   Diagnosis Date   • Atrial fibrillation (CMS/[facility])    • Chronic fatigue 10/3/2017   • Chronic renal disease, stage IV (CMS/[facility])    • CVA (cerebral vascular accident) (CMS/[facility]) 2018   • Hyperlipidemia    • Hypertension    • Palpitations 10/3/2017   • Premature atrial contractions 10/17/2017   • PVCs (premature ventricular contractions) 10/17/2017   • Sleep apnea    • Stage 4 chronic kidney disease (CMS/[facility]) 2018   • Stroke (CMS/[facility])      Past Surgical History:   Procedure Laterality Date   • CARDIAC CATHETERIZATION     • CARDIAC CATHETERIZATION N/A 2020    Procedure: Left Heart Cath;  Surgeon: Rufino Brice MD;  Location: [facility] CATH INVASIVE LOCATION;  Service: Cardiology;  Laterality: N/A;     Family History   Problem Relation Age of Onset   • Cancer Mother    • Cancer Father      Social History     Tobacco Use   • Smoking status: Former Smoker     Last attempt to quit: 1984     Years since quittin.4   • Smokeless tobacco: Never Used   Substance Use Topics   • Alcohol use: No   • Drug use: No     Medications Prior to Admission   Medication Sig Dispense Refill Last Dose   • amLODIPine (NORVASC) 5 MG tablet TAKE 1 TABLET BY MOUTH DAILY. 30 tablet 11    • aspirin 81 MG EC tablet Take 81 mg by mouth Daily.   Taking   • Cholecalciferol (VITAMIN D) 2000 units capsule Take  by mouth Daily.   Taking   • eplerenone (INSPRA) 50 MG tablet Take 50 mg by mouth Daily.   Taking   • lisinopril  (PRINIVIL,ZESTRIL) 20 MG tablet Take 20 mg by mouth 2 (Two) Times a Day.   Taking   • magnesium oxide (MAGOX) 400 (241.3 Mg) MG tablet tablet Take 400 mg by mouth 1 (One) Time.   Taking   • nitroglycerin (NITROSTAT) 0.4 MG SL tablet Place 0.4 mg under the tongue Every 5 (Five) Minutes As Needed for Chest Pain. Take no more than 3 doses in 15 minutes.   Taking     Allergies:  Hydralazine; Losartan potassium; Penicillins; and Spironolactone    Objective      Vital Signs  Temp:  [96.9 °F (36.1 °C)-98.3 °F (36.8 °C)] 97.8 °F (36.6 °C)  Heart Rate:  [58-69] 62  Resp:  [16-18] 16  BP: (132-159)/(70-86) 153/86    Physical Exam   Constitutional: He is oriented to person, place, and time. He appears well-developed.   HENT:   Head: Normocephalic and atraumatic.   Mouth/Throat: Oropharynx is clear and moist.   Eyes: Pupils are equal, round, and reactive to light. EOM are normal.   Neck: Normal range of motion. Neck supple. No JVD present. No tracheal deviation present. No thyromegaly present.   Cardiovascular: Normal rate, regular rhythm, normal heart sounds and intact distal pulses. Exam reveals no gallop and no friction rub.   No murmur heard.  Pulmonary/Chest: Effort normal and breath sounds normal. No respiratory distress. He has no wheezes. He has no rales. He exhibits no tenderness.   Abdominal: Soft. He exhibits no distension. There is no tenderness.   Musculoskeletal: Normal range of motion. He exhibits no edema.   Lymphadenopathy:     He has no cervical adenopathy.   Neurological: He is alert and oriented to person, place, and time. No cranial nerve deficit.   Right side minimally weaker   Skin: Skin is warm and dry.   Psychiatric: He has a normal mood and affect.       Results Review:   Xr Chest 1 View    Result Date: 5/28/2020  Narrative: XR CHEST 1 VW- 5/28/2020 4:55 AM CDT  HISTORY: Chest Pain Triage Protocol   COMPARISON: None.  FINDINGS: The lungs are clear. The cardiomediastinal silhouette and pulmonary  vascularity are within normal limits.  The osseous structures and surrounding soft tissues demonstrate no acute abnormality.      Impression: 1. No radiographic evidence of acute cardiopulmonary process. This report was finalized on 05/28/2020 07:02 by Dr Xavi Nguyen, .    Ct Angiogram Chest    Result Date: 5/29/2020  Narrative: EXAMINATION: CT ANGIOGRAM CHEST-  5/29/2020 3:24 PM CDT  HISTORY:Coronary artery disease. Hypertension. Cardiac surgery workup  COMPARISON: 5/28/2020 chest radiography  TECHNIQUE:  CT evaluation of the chest was performed with intravenous contrast. 2 mm transaxial images were obtained.. Coronal reconstruction maximum intensity projection images of the pulmonary arteries and aorta were also generated.  Radiation dose equals  mGy-cm.  Automated exposure control dose reduction technique was implemented.   FINDINGS:  [There is calcified and noncalcified atheromatous plaque involving the ectatic aortic arch and descending thoracic aorta. There is no aneurysm or dissection.  Minimal aortic valve calcifications observed with minimal asymmetry in thoracic aorta calcifications.  There are coronary artery calcifications.  Pulmonary arteries opacify appropriately without intraluminal filling defects or CT angiographic evidence of pulmonary embolus  There is no mediastinal or hilar lymphadenopathy. There are calcified subcarinal and right hilar lymph nodes.  There is a small sliding-type hiatal hernia.  There is centrilobular pulmonary emphysema with an upper lobe predominance.  There are no parenchymal infiltrates. There are no pleural effusions or pneumothoraces.  There are no suspicious pulmonary nodules or masses.  Probable bilateral renal cysts identified. Ultrasound may confirm.  Abdominal aortic calcifications appreciated with origin calcifications involving the celiac axis and SMA and renal arteries without significant stenosis.  No CT evidence of acute abdominal process observed.   Diffuse generative changes observed with bridging osteophyte formation thoracic spine and upper lumbar spine. No acute osseous abnormalities observed.]      Impression: 1. No CT angiographic evidence of pulmonary embolus or acute aortic pathology. 2. Aortic ectasia without aneurysmal dilatation. Atherosclerotic calcifications. 3. Pulmonary emphysema. 4. Small hiatal hernia. 5. Remote granulomatous disease. 6. No acute cardiopulmonary process. 7. Question nephrogenic cysts. This report was finalized on 05/29/2020 15:34 by Dr. Law Boyd MD.     I reviewed the patient's new clinical results.  Discussed with patient and daughter      Assessment/Plan       Chest pain    Essential hypertension    Stage 4 chronic kidney disease (CMS/[facility])    Elevated troponin    NSTEMI (non-ST elevated myocardial infarction) (CMS/[facility])          I discussed the patients findings and my recommendations with patient and daughter, We discussed the options for treatment of coronary artery disease to include medical therapy, coronary stenting, and surgical revascularization.  We discussed the pros and cons of each option and how it pertains to the current case.  The STS Risk score was roughly calculated using the STS Risk Calculator and discussed with the patient.  Coronary artery bypass grafting is best option given patient's findings known and risk factors.  We discussed the operative conduct and expected hospital and outpatient recovery.  Risks were discussed to include but not limited to bleeding, infection, stroke, heart attack, need for additional procedures, anesthesia risk, organ dysfunction and/or failure, prolonged mechanical ventilation, prolonged ICU stay, chronic pain syndromes, sternal nonunion, and/or death.  We discussed from his history he is at increased risk for CVA and renal failure.  We discussed the need to utilize all medical treatments additionally prescribed post surgery.  He is functionally strong and independent.   "Nevertheless given his age and comorbidities he was advised that rehabilitation may still be required post operatively.         The patient and daughter understands the risks, benefits, and alternatives and he wishes to proceed forward with surgery.  Timing will be important.  Due to CKD and recent administration of contrast and plans for additional contrast with CT Chest, he should benefit from delaying his surgery for approximately 5 days to preserve his renal function as best as possible.  Decision for return to hospital verses keeping in the hospital will be determined by his cardiac status.  CHEMA Brice.  If clinically stable, ok for dc and return for surgery next week.          Electronically signed by Nikunj Carballo MD at 05/30/20 0657          Physician Progress Notes (most recent note)      Ramona Uribe APRN at 06/12/20 1037          CABG x 3, EVH with open extension of the lower right leg    POD 3    Resting in chair.  Still somewhat drowsy today.  Walked 200 feet this morning with PT.  On 3 liters nasal cannula. IS ~2551-4232 ml with prompting.  Creatinine improving, 2.1 today.    Telemetry: sinus 70s    Visit Vitals  /64   Pulse 70   Temp 98.3 °F (36.8 °C) (Axillary)   Resp 16   Ht 184 cm (72.44\")   Wt 101 kg (222 lb 10.6 oz)   SpO2 92%   BMI 29.83 kg/m²       Intake/Output Summary (Last 24 hours) at 6/12/2020 1037  Last data filed at 6/12/2020 0820  Gross per 24 hour   Intake 540 ml   Output 1668 ml   Net -1128 ml     Mediastinal tube output: 155ml/24 hours  Left Mejia drain: 93ml/24 hours    Labs:       Chest x-ray: bibasilar atelectasis, no pneumothorax    Physical Exam:  General: No apparent distress. In good spirits.   Cardiovascular: Regular rate and rhythm without murmur, rubs, or gallops.    Pulmonary: Clear to auscultation bilaterally without wheezing, rubs, or rales.  Chest: Sternotomy incision clean, dry, and intact. Sternum stable. No clicks. Mediastinal tubes x 2 and Mejia " drain x 1 with dressing clean, dry, and intact.    Abdomen: Soft, non distended and non tender.  Extremities: Warm, moves all extremities. Saphenectomy site C/D/I   Neurologic: Grossly intact with no focal deficits.  Drowsy.     Impression:  Coronary artery disease s/p CABG  Chronic pericarditis  CKD stage IV  History of NSTEMI  Previous CVA    Plan:  Multimodality pain control strategies  Diurese  Increase beta blocker  Encourage pulmonary toilet and ambulation  Routine post cardiac surgery regimen  DW patient and nursing  Transfer to     Electronically signed by Ramona Uribe APRN at 20 1040       Consult Notes (most recent note)    No notes of this type exist for this encounter.            Physical Therapy Notes (most recent note)      Chel Ramos PTA at 20 0945  Version 1 of 1         Acute Care - Physical Therapy Treatment Note   [place]     Patient Name: Faisal Joseph  : 1940  MRN: [id number]  Today's Date: 2020             Admit Date: 2020    Visit Dx:    ICD-10-CM ICD-9-CM   1. Coronary artery disease I25.10 414.00   2. Impaired mobility Z74.09 799.89     Patient Active Problem List   Diagnosis   • Essential hypertension   • Stage 4 chronic kidney disease (CMS/HCC)   • Chest pain   • Elevated troponin   • NSTEMI (non-ST elevated myocardial infarction) (CMS/[facility])   • Coronary artery disease   • History of non-ST elevation myocardial infarction (NSTEMI)   • Chronic pericarditis   • History of CVA (cerebrovascular accident)   • Hyperlipidemia       Therapy Treatment    Rehabilitation Treatment Summary     Row Name 20 0910             Treatment Time/Intention    Discipline  physical therapy assistant  -      Document Type  therapy note (daily note)  -LC      Subjective Information  complains of;weakness;fatigue  -LC2      Mode of Treatment  physical therapy  -      Comment  R side weakness, stroke   -LC2      Existing Precautions/Restrictions  fall;oxygen  therapy device and L/min  -LC3      Treatment Considerations/Comments  chest tube, cristine drain  -LC2      Recorded by [LC] Chel Ramos, [facility] 06/13/20 0911  [LC2] Chel Ramos, [facility] 06/13/20 0944  [LC3] Rachel Chel RICKIE, [facility] 06/13/20 0912      Row Name 06/13/20 0910             Vital Signs    Pre Systolic BP Rehab  134  -LC      Pre Treatment Diastolic BP  74  -LC      Post Systolic BP Rehab  151  -LC      Post Treatment Diastolic BP  70  -LC      Pretreatment Heart Rate (beats/min)  70  -LC      Intratreatment Heart Rate (beats/min)  82  -LC      Posttreatment Heart Rate (beats/min)  70  -LC      Recorded by [LC] Chel Ramos, [facility] 06/13/20 0944      Row Name 06/13/20 0910             Sit-Stand Transfer    Sit-Stand [place] (Transfers)  verbal cues;minimum assist (75% patient effort)  -LC      Recorded by [LC] Chel Ramos, [facility] 06/13/20 0944      Row Name 06/13/20 0910             Stand-Sit Transfer    Stand-Sit [place] (Transfers)  verbal cues;minimum assist (75% patient effort)  -LC      Recorded by [LC] Chel Ramos, [facility] 06/13/20 0944      Row Name 06/13/20 0910             Gait/Stairs Assessment/Training    [place] Level (Gait)  verbal cues;minimum assist (75% patient effort)  -      Distance in Feet (Gait)  80' x2  -LC      Pattern (Gait)  swing-through  -LC      Deviations/Abnormal Patterns (Gait)  zofia decreased;gait speed decreased;stride length decreased  -LC      Bilateral Gait Deviations  forward flexed posture  -LC      Right Sided Gait Deviations  weight shift ability decreased  -LC      Recorded by [LC] Chel Ramos, [facility] 06/13/20 0944      Row Name 06/13/20 0910             Therapeutic Exercise    Comment (Therapeutic Exercise)  cardiac protocol x20  -LC      Recorded by [LC] Chel Ramos, [facility] 06/13/20 0944      Row Name 06/13/20 0910             Positioning and Restraints    Pre-Treatment Position  sitting in chair/recliner  -LC      Post Treatment Position  chair  -LC      In Chair   notified nsg;reclined;call light within reach;encouraged to call for assist  -LC      Recorded by [LC] Chel Ramos PTA 06/13/20 0944      Row Name 06/13/20 0910             Pain Scale: Numbers Pre/Post-Treatment    Pain Scale: Numbers, Pretreatment  2/10  -LC      Pain Scale: Numbers, Post-Treatment  4/10  -LC      Pain Location - Orientation  incisional  -LC      Pain Location  breast  -LC      Pain Intervention(s)  Medication (See MAR);Ambulation/increased activity  -LC      Recorded by [LC] Chel Ramos PTA 06/13/20 0944      Row Name                Wound 06/09/20 1138 Right leg Incision    Wound - Properties Group Date first assessed: 06/09/20 [LK] Time first assessed: 1138 [LK] Present on Hospital Admission: N [LK] Side: Right [LK] Location: leg [LK] Primary Wound Type: Incision [LK] Recorded by:  [PURVI] Kristen Caldwell RN 06/09/20 1138    Row Name                Wound 06/09/20 1308 chest Incision    Wound - Properties Group Date first assessed: 06/09/20 [LK] Time first assessed: 1308 [LK] Present on Hospital Admission: N [LK] Location: chest [LK] Primary Wound Type: Incision [LK] Recorded by:  [PURVI] Kristen Caldwell RN 06/09/20 1308    Row Name 06/13/20 0910             Plan of Care Review    Plan of Care Reviewed With  patient  -LC      Progress  improving  -LC      Outcome Summary  PT tx completed. Pt requires min A x1 for sit to stands with cues for sternal precautions. Pt amb 80' x2 with Min A due to unsteadiness, patient amb with incresed forward flxed posture that worsens when he fatigues. He presents with decreased stride length and required mod cues in order to correct, pt also requires for cues for obstacle awareness. Recommend SNF upon d/c due to decreased safety awareness, strength, and endurance.   -LC      Recorded by [HARSHIL] Chel Ramos PTA 06/13/20 0944        User Key  (r) = Recorded By, (t) = Taken By, (c) = Cosigned By    Initials Name Effective Dates Discipline     Chel Ramos PTA  10/18/19 -  PT    Kristen Tellez, RN 11/27/17 -  Nurse          Wound 06/09/20 1138 Right leg Incision (Active)   Dressing Appearance open to air 6/12/2020  8:00 PM   Closure Liquid skin adhesive;Open to air;Approximated 6/12/2020  2:11 PM   Base dry;clean 6/12/2020  2:11 PM   Drainage Amount none 6/12/2020  2:11 PM       Wound 06/09/20 1308 chest Incision (Active)   Dressing Appearance open to air 6/12/2020  8:00 PM   Closure Liquid skin adhesive;Approximated;Open to air 6/12/2020  2:11 PM   Base dry;clean 6/12/2020  2:11 PM   Drainage Amount none 6/12/2020  2:11 PM           Physical Therapy Education                 Title: PT OT SLP Therapies (Resolved)     Topic: Physical Therapy (Resolved)     Point: Mobility training (Resolved)     Description:   Instruct learner(s) on safety and technique for assisting patient out of bed, chair or wheelchair.  Instruct in the proper use of assistive devices, such as walker, crutches, cane or brace.              Patient Friendly Description:   It's important to get you on your feet again, but we need to do so in a way that is safe for you. Falling has serious consequences, and your personal safety is the most important thing of all.        When it's time to get out of bed, one of us or a family member will sit next to you on the bed to give you support.     If your doctor or nurse tells you to use a walker, crutches, a cane, or a brace, be sure you use it every time you get out of bed, even if you think you don't need it.    Learning Progress Summary           Patient Acceptance, E, NR by LUNA at 6/11/2020 0809    Comment:  Gait training, step length    Acceptance, E, NR by COURT at 6/10/2020 0755    Comment:  Educated pt on progression of PT POC, benefits of activity, sternal prec                   Point: Home exercise program (Resolved)     Description:   Instruct learner(s) on appropriate technique for monitoring, assisting and/or progressing patient with therapeutic  exercises and activities.              Learner Progress:   Not documented in this visit.          Point: Body mechanics (Resolved)     Description:   Instruct learner(s) on proper positioning and spine alignment for patient and/or caregiver during mobility tasks and/or exercises.              Learner Progress:   Not documented in this visit.          Point: Precautions (Resolved)     Description:   Instruct learner(s) on prescribed precautions during mobility and gait tasks              Learning Progress Summary           Patient Acceptance, E, NR by LUNA at 6/12/2020 0752    Comment:  Reviewed sternal precautions    Acceptance, E,D, NR by LUNA at 6/11/2020 0809    Comment:  Reviewed sternal precautions    Acceptance, E, NR by COURT at 6/10/2020 0755    Comment:  Educated pt on progression of PT POC, benefits of activity, sternal prec                               User Key     Initials Effective Dates Name Provider Type Discipline    COURT 08/02/16 -  Noble Day, PT DPT Physical Therapist PT    LUNA 08/02/16 -  Halle Pritchard, PTA Physical Therapy Assistant PT                PT Recommendation and Plan     Plan of Care Reviewed With: patient  Progress: improving  Outcome Summary: PT tx completed. Pt requires min A x1 for sit to stands with cues for sternal precautions. Pt amb 80' x2 with Min A due to unsteadiness, patient amb with incresed forward flxed posture that worsens when he fatigues. He presents with decreased stride length and required mod cues in order to correct, pt also requires for cues for obstacle awareness. Recommend SNF upon d/c due to decreased safety awareness, strength, and endurance.      Time Calculation:   PT Charges     Row Name 06/13/20 0944             Time Calculation    Start Time  0910  -      Stop Time  0938  -      Time Calculation (min)  28 min  -      PT Received On  06/13/20  -         Time Calculation- PT    Total Timed Code Minutes- PT  28 minute(s)  -        User Key  (r)  = Recorded By, (t) = Taken By, (c) = Cosigned By    Initials Name Provider Type    LC Chel Ramos PTA Physical Therapy Assistant        Therapy Charges for Today     Code Description Service Date Service Provider Modifiers Qty    94241004286 HC GAIT TRAINING EA 15 MIN 6/13/2020 Chel Ramos PTA GP 2          PT G-Codes  Outcome Measure Options: AM-PAC 6 Clicks Basic Mobility (PT)  AM-PAC 6 Clicks Score (PT): 10    Chel Ramos PTA  6/13/2020         Electronically signed by Chel Ramos PTA at 06/13/20 0945       Occupational Therapy Notes (most recent note)    No notes exist for this encounter.         Discharge Summary    No notes of this type exist for this encounter.

## 2020-06-13 NOTE — PROGRESS NOTES
Continued Stay Note   [place]     Patient Name: Faisal Joseph  MRN: [id number]  Today's Date: 6/13/2020    Admit Date: 6/9/2020    Discharge Plan     Row Name 06/13/20 3133       Plan    Plan Comments  JENNIFER spoke with Susan, daughter, in regards to consult about placement. Susan is requesting referral to [facility]. JENNIFER faxed referral and will await for possible bed offer on Monday.         Discharge Codes    No documentation.             Chel Casanova

## 2020-06-13 NOTE — THERAPY TREATMENT NOTE
Acute Care - Physical Therapy Treatment Note  [facility]     Patient Name: Faisal Joseph  : 1940  MRN: [id number]  Today's Date: 2020             Admit Date: 2020    Visit Dx:    ICD-10-CM ICD-9-CM   1. Coronary artery disease I25.10 414.00   2. Impaired mobility Z74.09 799.89     Patient Active Problem List   Diagnosis   • Essential hypertension   • Stage 4 chronic kidney disease (CMS/[facility])   • Chest pain   • Elevated troponin   • NSTEMI (non-ST elevated myocardial infarction) (CMS/[facility])   • Coronary artery disease   • History of non-ST elevation myocardial infarction (NSTEMI)   • Chronic pericarditis   • History of CVA (cerebrovascular accident)   • Hyperlipidemia       Therapy Treatment    Rehabilitation Treatment Summary     Row Name 20             Treatment Time/Intention    Discipline  physical therapy assistant  -      Document Type  therapy note (daily note)  -      Subjective Information  complains of;weakness;fatigue  -LC2      Mode of Treatment  physical therapy  -      Comment  R side weakness, stroke   -LC2      Existing Precautions/Restrictions  fall;oxygen therapy device and L/min  -[facility]      Treatment Considerations/Comments  chest tube, cristine drain  -LC2      Recorded by [LC] Chel Ramos, PTA 20  [LC2] Chel Ramos, PTA 20  [LC3] Chel Ramos, [facility] 20      Row Name 20             Vital Signs    Pre Systolic BP Rehab  134  -LC      Pre Treatment Diastolic BP  74  -LC      Post Systolic BP Rehab  151  -LC      Post Treatment Diastolic BP  70  -LC      Pretreatment Heart Rate (beats/min)  70  -      Intratreatment Heart Rate (beats/min)  82  -LC      Posttreatment Heart Rate (beats/min)  70  -LC      Recorded by [LC] Chel Ramos, PTA 20      Row Name 20             Sit-Stand Transfer    Sit-Stand [place] (Transfers)  verbal cues;minimum assist (75% patient effort)  -      Recorded by [LC]  Chel Ramos, [facility] 06/13/20 0944      Row Name 06/13/20 0910             Stand-Sit Transfer    Stand-Sit [place] (Transfers)  verbal cues;minimum assist (75% patient effort)  -LC      Recorded by [LC] Chel Ramos, PTA 06/13/20 0944      Row Name 06/13/20 0910             Gait/Stairs Assessment/Training    [place] Level (Gait)  verbal cues;minimum assist (75% patient effort)  -LC      Distance in Feet (Gait)  80' x2  -LC      Pattern (Gait)  swing-through  -LC      Deviations/Abnormal Patterns (Gait)  zofia decreased;gait speed decreased;stride length decreased  -LC      Bilateral Gait Deviations  forward flexed posture  -LC      Right Sided Gait Deviations  weight shift ability decreased  -LC      Recorded by [LC] Chel Raoms, [facility] 06/13/20 0944      Row Name 06/13/20 0910             Therapeutic Exercise    Comment (Therapeutic Exercise)  cardiac protocol x20  -LC      Recorded by [LC] Chel Ramos [facility] 06/13/20 0944      Row Name 06/13/20 0910             Positioning and Restraints    Pre-Treatment Position  sitting in chair/recliner  -LC      Post Treatment Position  chair  -LC      In Chair  notified nsg;reclined;call light within reach;encouraged to call for assist  -LC      Recorded by [LC] Chel Ramos, [facility] 06/13/20 0944      Row Name 06/13/20 0910             Pain Scale: Numbers Pre/Post-Treatment    Pain Scale: Numbers, Pretreatment  2/10  -LC      Pain Scale: Numbers, Post-Treatment  4/10  -LC      Pain Location - Orientation  incisional  -LC      Pain Location  breast  -LC      Pain Intervention(s)  Medication (See MAR);Ambulation/increased activity  -LC      Recorded by [LC] Chel Ramos, PTA 06/13/20 0944      Row Name                Wound 06/09/20 1138 Right leg Incision    Wound - Properties Group Date first assessed: 06/09/20 [LK] Time first assessed: 1138 [LK] Present on Hospital Admission: N [LK] Side: Right [LK] Location: leg [LK] Primary Wound Type: Incision [LK] Recorded by:   [PURVI] Kristen Caldwell RN 06/09/20 1138    Row Name                Wound 06/09/20 1308 chest Incision    Wound - Properties Group Date first assessed: 06/09/20 [LK] Time first assessed: 1308 [LK] Present on Hospital Admission: N [LK] Location: chest [LK] Primary Wound Type: Incision [LK] Recorded by:  [PURVI] Kristen Caldwell RN 06/09/20 1308    Row Name 06/13/20 0910             Plan of Care Review    Plan of Care Reviewed With  patient  -LC      Progress  improving  -LC      Outcome Summary  PT tx completed. Pt requires min A x1 for sit to stands with cues for sternal precautions. Pt amb 80' x2 with Min A due to unsteadiness, patient amb with incresed forward flxed posture that worsens when he fatigues. He presents with decreased stride length and required mod cues in order to correct, pt also requires for cues for obstacle awareness. Recommend SNF upon d/c due to decreased safety awareness, strength, and endurance.   -LC      Recorded by [HARSHIL] Chel Ramos PTA 06/13/20 0944        User Key  (r) = Recorded By, (t) = Taken By, (c) = Cosigned By    Initials Name Effective Dates Discipline    LC Chel Ramos, PATRICIO 10/18/19 -  PT    Kristen Tellez RN 11/27/17 -  Nurse          Wound 06/09/20 1138 Right leg Incision (Active)   Dressing Appearance open to air 6/12/2020  8:00 PM   Closure Liquid skin adhesive;Open to air;Approximated 6/12/2020  2:11 PM   Base dry;clean 6/12/2020  2:11 PM   Drainage Amount none 6/12/2020  2:11 PM       Wound 06/09/20 1308 chest Incision (Active)   Dressing Appearance open to air 6/12/2020  8:00 PM   Closure Liquid skin adhesive;Approximated;Open to air 6/12/2020  2:11 PM   Base dry;clean 6/12/2020  2:11 PM   Drainage Amount none 6/12/2020  2:11 PM           Physical Therapy Education                 Title: PT OT SLP Therapies (Resolved)     Topic: Physical Therapy (Resolved)     Point: Mobility training (Resolved)     Description:   Instruct learner(s) on safety and technique for  assisting patient out of bed, chair or wheelchair.  Instruct in the proper use of assistive devices, such as walker, crutches, cane or brace.              Patient Friendly Description:   It's important to get you on your feet again, but we need to do so in a way that is safe for you. Falling has serious consequences, and your personal safety is the most important thing of all.        When it's time to get out of bed, one of us or a family member will sit next to you on the bed to give you support.     If your doctor or nurse tells you to use a walker, crutches, a cane, or a brace, be sure you use it every time you get out of bed, even if you think you don't need it.    Learning Progress Summary           Patient Acceptance, E, NR by LUNA at 6/11/2020 0809    Comment:  Gait training, step length    Acceptance, E, NR by COURT at 6/10/2020 0755    Comment:  Educated pt on progression of PT POC, benefits of activity, sternal prec                   Point: Home exercise program (Resolved)     Description:   Instruct learner(s) on appropriate technique for monitoring, assisting and/or progressing patient with therapeutic exercises and activities.              Learner Progress:   Not documented in this visit.          Point: Body mechanics (Resolved)     Description:   Instruct learner(s) on proper positioning and spine alignment for patient and/or caregiver during mobility tasks and/or exercises.              Learner Progress:   Not documented in this visit.          Point: Precautions (Resolved)     Description:   Instruct learner(s) on prescribed precautions during mobility and gait tasks              Learning Progress Summary           Patient Acceptance, E, NR by LUNA at 6/12/2020 0752    Comment:  Reviewed sternal precautions    Acceptance, E,D, NR by LUNA at 6/11/2020 0809    Comment:  Reviewed sternal precautions    Acceptance, E, NR by COURT at 6/10/2020 0755    Comment:  Educated pt on progression of PT POC, benefits of  activity, sternal prec                               User Key     Initials Effective Dates Name Provider Type Discipline    COURT 08/02/16 -  Noble Day, PT DPT Physical Therapist PT    LUNA 08/02/16 -  Halle Pritchard PTA Physical Therapy Assistant PT                PT Recommendation and Plan     Plan of Care Reviewed With: patient  Progress: improving  Outcome Summary: PT tx completed. Pt requires min A x1 for sit to stands with cues for sternal precautions. Pt amb 80' x2 with Min A due to unsteadiness, patient amb with incresed forward flxed posture that worsens when he fatigues. He presents with decreased stride length and required mod cues in order to correct, pt also requires for cues for obstacle awareness. Recommend SNF upon d/c due to decreased safety awareness, strength, and endurance.      Time Calculation:   PT Charges     Row Name 06/13/20 0944             Time Calculation    Start Time  0910  -      Stop Time  0938  -      Time Calculation (min)  28 min  -      PT Received On  06/13/20  -         Time Calculation- PT    Total Timed Code Minutes- PT  28 minute(s)  -        User Key  (r) = Recorded By, (t) = Taken By, (c) = Cosigned By    Initials Name Provider Type     Chel Ramos PTA Physical Therapy Assistant        Therapy Charges for Today     Code Description Service Date Service Provider Modifiers Qty    71015474232 HC GAIT TRAINING EA 15 MIN 6/13/2020 Chel Ramos PTA GP 2          PT G-Codes  Outcome Measure Options: AM-PAC 6 Clicks Basic Mobility (PT)  AM-PAC 6 Clicks Score (PT): 10    Chel Ramos PTA  6/13/2020

## 2020-06-13 NOTE — THERAPY TREATMENT NOTE
Acute Care - Physical Therapy Treatment Note  [facility]     Patient Name: Faisal Joseph  : 1940  MRN: [id number]  Today's Date: 2020             Admit Date: 2020    Visit Dx:    ICD-10-CM ICD-9-CM   1. Coronary artery disease I25.10 414.00   2. Impaired mobility Z74.09 799.89     Patient Active Problem List   Diagnosis   • Essential hypertension   • Stage 4 chronic kidney disease (CMS/[facility])   • Chest pain   • Elevated troponin   • NSTEMI (non-ST elevated myocardial infarction) (CMS/[facility])   • Coronary artery disease   • History of non-ST elevation myocardial infarction (NSTEMI)   • Chronic pericarditis   • History of CVA (cerebrovascular accident)   • Hyperlipidemia       Therapy Treatment    Rehabilitation Treatment Summary     Row Name 20 1449 20 0910          Treatment Time/Intention    Discipline  physical therapy assistant  -LC  physical therapy assistant  -LC     Document Type  therapy note (daily note)  -LC  therapy note (daily note)  -LC     Subjective Information  complains of;pain  -LC2  complains of;weakness;fatigue  -LC2     Mode of Treatment  physical therapy  -LC  physical therapy  -LC     Comment  --  R side weakness, stroke   -LC2     Existing Precautions/Restrictions  fall;oxygen therapy device and L/min;sternal  -LC2  fall;oxygen therapy device and L/min  -LC3     Treatment Considerations/Comments  --  chest tube, cristine drain  -LC2     Recorded by [LC] Chel Ramos, PTA 20 1449  [LC2] Chel Ramos, PTA 20 1531 [LC] Chel Ramos, PTA 20 0911  [LC2] Chel Ramos, PTA 20 0944  [LC3] Chel Ramos, PTA 20 0912     Row Name 20 1449 20 0910          Vital Signs    Pre Systolic BP Rehab  132  -LC  134  -LC     Pre Treatment Diastolic BP  66  -LC  74  -LC     Post Systolic BP Rehab  140  -LC  151  -LC     Post Treatment Diastolic BP  65  -LC  70  -LC     Pretreatment Heart Rate (beats/min)  79  -LC  70  -LC     Intratreatment Heart  Rate (beats/min)  --  82  -LC     Posttreatment Heart Rate (beats/min)  83  -LC  70  -LC     Recorded by [LC] Chel Ramos, PTA 06/13/20 1531 [LC] Chel Ramos, [facility] 06/13/20 0944     Row Name 06/13/20 1449 06/13/20 0910          Sit-Stand Transfer    Sit-Stand J[place] (Transfers)  verbal cues;minimum assist (75% patient effort)  -LC  verbal cues;minimum assist (75% patient effort)  -LC     Recorded by [LC] RachelCirilon RICKIE, PTA 06/13/20 1531 [LC] Chel Ramos, PTA 06/13/20 0944     Row Name 06/13/20 1449 06/13/20 0910          Stand-Sit Transfer    Stand-Sit [place] (Transfers)  verbal cues;minimum assist (75% patient effort)  -LC  verbal cues;minimum assist (75% patient effort)  -LC     Recorded by [LC] RachelCirilon RICKIE, [facility] [place] [facility] 06/13/20 1531 [LC] RachelChel, [facility] [place] 06/13/20 0944     Row Name 06/13/20 1449 06/13/20 0910          Gait/Stairs Assessment/Training    Jerauld Level (Gait)  verbal cues;contact guard  -LC  verbal cues;minimum assist (75% patient effort)  -LC     Distance in Feet (Gait)  80' x2  -LC  80' x2  -LC     Pattern (Gait)  step-through  -LC  swing-through  -LC     Deviations/Abnormal Patterns (Gait)  zofia decreased;gait speed decreased;stride length decreased  -LC  zofia decreased;gait speed decreased;stride length decreased  -LC     Bilateral Gait Deviations  forward flexed posture  -LC  forward flexed posture  -LC     Right Sided Gait Deviations  weight shift ability decreased  -LC  weight shift ability decreased  -LC     Comment (Gait/Stairs)  pt able to stay more upright in PM  -LC  --     Recorded by [] RachelCirilon RICKIE, PTA 06/13/20 1531 [LC] RachelCirilon RICKIE, [facility] [place] [facility] 06/13/20 0944     Row Name 06/13/20 1449 06/13/20 0910          Therapeutic Exercise    Comment (Therapeutic Exercise)  [facility]  -LC  cardiac protocol x20  -LC     Recorded by [LC] Chel Ramos RICKIE, PTA 06/13/20 1531 [LC] Chel Ramos RICKIE, PTA 06/13/20 0944     Row Name 06/13/20 1449 06/13/20 0910           Positioning and Restraints    Pre-Treatment Position  sitting in chair/recliner  -LC  sitting in chair/recliner  -LC     Post Treatment Position  chair  -LC  chair  -LC     In Chair  reclined;call light within reach;encouraged to call for assist  -LC  notified nsg;reclined;call light within reach;encouraged to call for assist  -LC     Recorded by [LC] Chel Ramos, PTA 06/13/20 1531 [LC] Chel Ramos, PTA 06/13/20 0944     Row Name 06/13/20 1449 06/13/20 0910          Pain Scale: Numbers Pre/Post-Treatment    Pain Scale: Numbers, Pretreatment  5/10  -LC  2/10  -LC     Pain Scale: Numbers, Post-Treatment  5/10  -LC  4/10  -LC     Pain Location - Orientation  incisional  -LC  incisional  -LC     Pain Location  chest  -LC  breast  -LC     Pre/Post Treatment Pain Comment  c/o back pain 9/10 with ambulation  -LC  --     Pain Intervention(s)  Medication (See MAR);Ambulation/increased activity  -LC  Medication (See MAR);Ambulation/increased activity  -LC     Recorded by [LC] Chel Ramos, PTA 06/13/20 1531 [LC] Chel Ramos, PTA 06/13/20 0944     Row Name                Wound 06/09/20 1138 Right leg Incision    Wound - Properties Group Date first assessed: 06/09/20 [LK] Time first assessed: 1138 [LK] Present on Hospital Admission: N [LK] Side: Right [LK] Location: leg [LK] Primary Wound Type: Incision [LK] Recorded by:  [LK] Kristen Caldwell RN 06/09/20 1138    Row Name                Wound 06/09/20 1308 chest Incision    Wound - Properties Group Date first assessed: 06/09/20 [LK] Time first assessed: 1308 [LK] Present on Hospital Admission: N [LK] Location: chest [LK] Primary Wound Type: Incision [LK] Recorded by:  [PURVI] Kristen Caldwell RN 06/09/20 1308    Row Name 06/13/20 0910             Plan of Care Review    Plan of Care Reviewed With  patient  -LC      Progress  improving  -LC      Outcome Summary  PT tx completed. Pt requires min A x1 for sit to stands with cues for sternal precautions. Pt amb 80' x2 with Min A  due to unsteadiness, patient amb with incresed forward flxed posture that worsens when he fatigues. He presents with decreased stride length and required mod cues in order to correct, pt also requires for cues for obstacle awareness. Recommend SNF upon d/c due to decreased safety awareness, strength, and endurance.   -LC      Recorded by [HARSHIL] Chel Ramos, PATRICIO 06/13/20 0944        User Key  (r) = Recorded By, (t) = Taken By, (c) = Cosigned By    Initials Name Effective Dates Discipline     Chel Ramos, PTA 10/18/19 -  PT    Kristen Tellez RN 11/27/17 -  Nurse          Wound 06/09/20 1138 Right leg Incision (Active)   Dressing Appearance open to air 6/13/2020  8:12 AM   Closure Liquid skin adhesive;Open to air;Approximated 6/13/2020  8:12 AM   Drainage Amount none 6/13/2020  8:12 AM       Wound 06/09/20 1308 chest Incision (Active)   Dressing Appearance open to air 6/13/2020  8:12 AM   Closure Liquid skin adhesive;Approximated;Open to air 6/13/2020  8:12 AM   Drainage Amount none 6/13/2020  8:12 AM           Physical Therapy Education                 Title: PT OT SLP Therapies (Resolved)     Topic: Physical Therapy (Resolved)     Point: Mobility training (Resolved)     Description:   Instruct learner(s) on safety and technique for assisting patient out of bed, chair or wheelchair.  Instruct in the proper use of assistive devices, such as walker, crutches, cane or brace.              Patient Friendly Description:   It's important to get you on your feet again, but we need to do so in a way that is safe for you. Falling has serious consequences, and your personal safety is the most important thing of all.        When it's time to get out of bed, one of us or a family member will sit next to you on the bed to give you support.     If your doctor or nurse tells you to use a walker, crutches, a cane, or a brace, be sure you use it every time you get out of bed, even if you think you don't need it.    Learning  Progress Summary           Patient Acceptance, E, NR by LUNA at 6/11/2020 0809    Comment:  Gait training, step length    Acceptance, E, NR by COURT at 6/10/2020 0755    Comment:  Educated pt on progression of PT POC, benefits of activity, sternal prec                   Point: Home exercise program (Resolved)     Description:   Instruct learner(s) on appropriate technique for monitoring, assisting and/or progressing patient with therapeutic exercises and activities.              Learner Progress:   Not documented in this visit.          Point: Body mechanics (Resolved)     Description:   Instruct learner(s) on proper positioning and spine alignment for patient and/or caregiver during mobility tasks and/or exercises.              Learner Progress:   Not documented in this visit.          Point: Precautions (Resolved)     Description:   Instruct learner(s) on prescribed precautions during mobility and gait tasks              Learning Progress Summary           Patient Acceptance, E, NR by LUNA at 6/12/2020 0752    Comment:  Reviewed sternal precautions    Acceptance, E,D, NR by LUNA at 6/11/2020 0809    Comment:  Reviewed sternal precautions    Acceptance, E, NR by COURT at 6/10/2020 0755    Comment:  Educated pt on progression of PT POC, benefits of activity, sternal prec                               User Key     Initials Effective Dates Name Provider Type Discipline    COURT 08/02/16 -  Noble Day, PT DPT Physical Therapist PT    LUNA 08/02/16 -  Halle Pritchard, PTA Physical Therapy Assistant PT                PT Recommendation and Plan     Plan of Care Reviewed With: patient  Progress: improving  Outcome Summary: PT tx completed. Pt requires min A x1 for sit to stands with cues for sternal precautions. Pt amb 80' x2 with Min A due to unsteadiness, patient amb with incresed forward flxed posture that worsens when he fatigues. He presents with decreased stride length and required mod cues in order to correct, pt also  requires for cues for obstacle awareness. Recommend SNF upon d/c due to decreased safety awareness, strength, and endurance.      Time Calculation:   PT Charges     Row Name 06/13/20 1532 06/13/20 0944          Time Calculation    Start Time  1449  -  0910  -     Stop Time  1512  -  0938  -     Time Calculation (min)  23 min  -LC  28 min  -     PT Received On  06/13/20  -  06/13/20  -        Time Calculation- PT    Total Timed Code Minutes- PT  23 minute(s)  -LC  28 minute(s)  -LC       User Key  (r) = Recorded By, (t) = Taken By, (c) = Cosigned By    Initials Name Provider Type    Chel Starr PTA Physical Therapy Assistant        Therapy Charges for Today     Code Description Service Date Service Provider Modifiers Qty    30443833752 HC GAIT TRAINING EA 15 MIN 6/13/2020 Chel Ramos PTA GP 2    30367317389 HC GAIT TRAINING EA 15 MIN 6/13/2020 Chel Ramos PTA GP 2          PT G-Codes  Outcome Measure Options: AM-PAC 6 Clicks Basic Mobility (PT)  AM-PAC 6 Clicks Score (PT): 10    Chel Ramos PTA  6/13/2020

## 2020-06-14 PROCEDURE — 25010000002 FUROSEMIDE PER 20 MG: Performed by: NURSE PRACTITIONER

## 2020-06-14 PROCEDURE — 25010000002 ENOXAPARIN PER 10 MG: Performed by: NURSE PRACTITIONER

## 2020-06-14 PROCEDURE — 94799 UNLISTED PULMONARY SVC/PX: CPT

## 2020-06-14 PROCEDURE — 99024 POSTOP FOLLOW-UP VISIT: CPT | Performed by: THORACIC SURGERY (CARDIOTHORACIC VASCULAR SURGERY)

## 2020-06-14 PROCEDURE — 97116 GAIT TRAINING THERAPY: CPT

## 2020-06-14 RX ORDER — AMIODARONE HYDROCHLORIDE 200 MG/1
400 TABLET ORAL
Status: DISCONTINUED | OUTPATIENT
Start: 2020-06-15 | End: 2020-06-16 | Stop reason: HOSPADM

## 2020-06-14 RX ORDER — LISINOPRIL 2.5 MG/1
2.5 TABLET ORAL
Status: DISCONTINUED | OUTPATIENT
Start: 2020-06-15 | End: 2020-06-16 | Stop reason: HOSPADM

## 2020-06-14 RX ADMIN — POTASSIUM CHLORIDE 20 MEQ: 750 CAPSULE, EXTENDED RELEASE ORAL at 08:48

## 2020-06-14 RX ADMIN — IPRATROPIUM BROMIDE AND ALBUTEROL SULFATE 3 ML: 2.5; .5 SOLUTION RESPIRATORY (INHALATION) at 06:24

## 2020-06-14 RX ADMIN — IPRATROPIUM BROMIDE AND ALBUTEROL SULFATE 3 ML: 2.5; .5 SOLUTION RESPIRATORY (INHALATION) at 20:34

## 2020-06-14 RX ADMIN — AMIODARONE HYDROCHLORIDE 400 MG: 200 TABLET ORAL at 08:47

## 2020-06-14 RX ADMIN — LIDOCAINE 2 PATCH: 50 PATCH CUTANEOUS at 08:48

## 2020-06-14 RX ADMIN — ENOXAPARIN SODIUM 30 MG: 30 INJECTION SUBCUTANEOUS at 08:49

## 2020-06-14 RX ADMIN — AMLODIPINE BESYLATE 5 MG: 5 TABLET ORAL at 08:47

## 2020-06-14 RX ADMIN — CLOPIDOGREL 75 MG: 75 TABLET, FILM COATED ORAL at 17:42

## 2020-06-14 RX ADMIN — OXYCODONE HYDROCHLORIDE AND ACETAMINOPHEN 1 TABLET: 5; 325 TABLET ORAL at 02:40

## 2020-06-14 RX ADMIN — METOPROLOL TARTRATE 25 MG: 25 TABLET, FILM COATED ORAL at 08:47

## 2020-06-14 RX ADMIN — FUROSEMIDE 20 MG: 10 INJECTION, SOLUTION INTRAVENOUS at 10:32

## 2020-06-14 RX ADMIN — BISACODYL 10 MG: 5 TABLET ORAL at 20:00

## 2020-06-14 RX ADMIN — FUROSEMIDE 20 MG: 10 INJECTION, SOLUTION INTRAVENOUS at 23:28

## 2020-06-14 RX ADMIN — METOPROLOL TARTRATE 25 MG: 25 TABLET, FILM COATED ORAL at 20:00

## 2020-06-14 RX ADMIN — PREGABALIN 25 MG: 25 CAPSULE ORAL at 08:47

## 2020-06-14 RX ADMIN — OXYCODONE HYDROCHLORIDE AND ACETAMINOPHEN 1 TABLET: 5; 325 TABLET ORAL at 10:28

## 2020-06-14 RX ADMIN — OXYCODONE HYDROCHLORIDE AND ACETAMINOPHEN 1 TABLET: 5; 325 TABLET ORAL at 20:00

## 2020-06-14 RX ADMIN — OXYCODONE HYDROCHLORIDE AND ACETAMINOPHEN 1 TABLET: 5; 325 TABLET ORAL at 23:28

## 2020-06-14 RX ADMIN — ENOXAPARIN SODIUM 30 MG: 30 INJECTION SUBCUTANEOUS at 20:00

## 2020-06-14 RX ADMIN — ASPIRIN 81 MG: 81 TABLET ORAL at 08:48

## 2020-06-14 RX ADMIN — AMIODARONE HYDROCHLORIDE 400 MG: 200 TABLET ORAL at 20:00

## 2020-06-14 RX ADMIN — IPRATROPIUM BROMIDE AND ALBUTEROL SULFATE 3 ML: 2.5; .5 SOLUTION RESPIRATORY (INHALATION) at 14:23

## 2020-06-14 RX ADMIN — ATORVASTATIN CALCIUM 20 MG: 10 TABLET, FILM COATED ORAL at 20:00

## 2020-06-14 RX ADMIN — IPRATROPIUM BROMIDE AND ALBUTEROL SULFATE 3 ML: 2.5; .5 SOLUTION RESPIRATORY (INHALATION) at 10:33

## 2020-06-14 RX ADMIN — POTASSIUM CHLORIDE 20 MEQ: 750 CAPSULE, EXTENDED RELEASE ORAL at 17:42

## 2020-06-14 NOTE — PROGRESS NOTES
"CABG x 3, EVH with open extension of the lower right leg    POD 4  Daughter has concerns he needs rehab first.    Reviewed PT notes.  80 ft with min assist.  SNF recommended.      Telemetry: sinus 70s    Visit Vitals  /80 (BP Location: Left arm, Patient Position: Sitting)   Pulse 86   Temp 97.9 °F (36.6 °C) (Oral)   Resp 16   Ht 182.9 cm (72\")   Wt 98.1 kg (216 lb 3.2 oz)   SpO2 95%   BMI 29.32 kg/m²   0.5 l/min      Intake/Output Summary (Last 24 hours) at 6/14/2020 1324  Last data filed at 6/14/2020 1217  Gross per 24 hour   Intake 720 ml   Output 2550 ml   Net -1830 ml     Mediastinal tube output: 155ml/24 hours  Left Mejia drain: 93ml/24 hours    Labs:       Chest x-ray: none today.      Physical Exam:  General: No apparent distress. In good spirits, in chair  Cardiovascular: Regular rate and rhythm without murmur, rubs, or gallops.    Pulmonary: Clear to auscultation bilaterally without wheezing, rubs, or rales.  Chest: Sternotomy incision clean, dry, and intact. Sternum stable. No clicks. Mediastinal tubes x 2 and Mejia drain x 1 with dressing clean, dry, and intact.    Abdomen: Soft, non distended and non tender.  Extremities: Warm, moves all extremities. Saphenectomy site C/D/I   Neurologic: Grossly intact with no focal deficits.  Drowsy.     Impression:  Coronary artery disease s/p CABG  Chronic pericarditis  CKD stage IV  History of NSTEMI  Previous CVA  Hyperlipidemia  Advanced age    Plan:  Multimodality pain control strategies  Diurese  DC drains  Encourage pulmonary toilet and ambulation  Routine post cardiac surgery regimen  DW patient and nursing  SW consult.  Encourage PT continued activity.    "

## 2020-06-14 NOTE — THERAPY TREATMENT NOTE
Acute Care - Physical Therapy Treatment Note  [facility]     Patient Name: Faisal Joseph  : 1940  MRN: [id number]  Today's Date: 2020             Admit Date: 2020    Visit Dx:    ICD-10-CM ICD-9-CM   1. Coronary artery disease I25.10 414.00   2. Impaired mobility Z74.09 799.89     Patient Active Problem List   Diagnosis   • Essential hypertension   • Stage 4 chronic kidney disease (CMS/[facility])   • Chest pain   • Elevated troponin   • NSTEMI (non-ST elevated myocardial infarction) (CMS/[facility])   • Coronary artery disease   • History of non-ST elevation myocardial infarction (NSTEMI)   • Chronic pericarditis   • History of CVA (cerebrovascular accident)   • Hyperlipidemia       Therapy Treatment    Rehabilitation Treatment Summary     Row Name 20          Treatment Time/Intention    Discipline  physical therapy assistant  -LC  physical therapy assistant  -LC     Document Type  therapy note (daily note)  -  therapy note (daily note)  -     Subjective Information  complains of;pain  -LC2  --     Mode of Treatment  physical therapy  -LC  physical therapy  -LC     Comment  --  eating  breakfast  -2     Existing Precautions/Restrictions  fall;oxygen therapy device and L/min;sternal  -LC  --     Recorded by [LC] Chel Ramos, PTA 20  [LC2] Chel Ramos, [facility] 20 1025 [LC] Chel Ramos, PTA 20 0827  [LC2] Chel Ramos, PTA 20 0828     Row Name 20             Vital Signs    Pre Systolic BP Rehab  136  -LC      Pre Treatment Diastolic BP  76  -LC      Post Systolic BP Rehab  137  -LC      Post Treatment Diastolic BP  68  -LC      Pretreatment Heart Rate (beats/min)  83  -LC      Posttreatment Heart Rate (beats/min)  87  -LC      Pre Patient Position  Sitting  -LC      Intra Patient Position  Standing  -LC      Post Patient Position  Sitting  -LC      Recorded by [LC] Chel Ramos, PTA 20 1025      Row Name 20              Sit-Stand Transfer    Sit-Stand Couch (Transfers)  verbal cues;contact guard;minimum assist (75% patient effort)  -LC      Recorded by [LC] Cehl Ramos, [facility] 06/14/20 1025      Row Name 06/14/20 0922             Stand-Sit Transfer    Stand-Sit Couch (Transfers)  verbal cues;contact guard  -LC      Recorded by [LC] Chel Ramos, PTA 06/14/20 1025      Row Name 06/14/20 0922             Gait/Stairs Assessment/Training    Couch Level (Gait)  verbal cues;contact guard  -LC      Distance in Feet (Gait)  90' x2  -LC      Pattern (Gait)  step-through  -LC      Deviations/Abnormal Patterns (Gait)  zofia decreased;gait speed decreased;stride length decreased  -LC      Bilateral Gait Deviations  forward flexed posture  -LC      Right Sided Gait Deviations  leans right  -LC      Recorded by [LC] Chel Ramos, PTA 06/14/20 1025      Row Name 06/14/20 0922             Therapeutic Exercise    Comment (Therapeutic Exercise)  cardiac protocol x20  -LC      Recorded by [LC] Chel Ramos, [facility] 06/14/20 1025      Row Name 06/14/20 0922             Positioning and Restraints    Pre-Treatment Position  sitting in chair/recliner  -LC      Post Treatment Position  chair  -LC      In Chair  reclined;call light within reach;encouraged to call for assist;with nsg  -LC      Recorded by [LC] Chel Ramos, [facility] 06/14/20 1025      Row Name 06/14/20 0922             Pain Scale: Numbers Pre/Post-Treatment    Pain Scale: Numbers, Pretreatment  2/10  -LC      Pain Scale: Numbers, Post-Treatment  2/10  -LC      Pain Location - Orientation  incisional  -LC      Pain Location  chest  -LC      Pain Intervention(s)  Medication (See MAR)  -LC      Recorded by [LC] Chel Ramos, PTA 06/14/20 1025      Row Name                Wound 06/09/20 1138 Right leg Incision    Wound - Properties Group Date first assessed: 06/09/20 [LK] Time first assessed: 1138 [LK] Present on Hospital Admission: N [LK] Side: Right [LK] Location: leg [LK]  Primary Wound Type: Incision [LK] Recorded by:  [PURVI] Kristen Caldwell RN 06/09/20 1138    Row Name                Wound 06/09/20 1308 chest Incision    Wound - Properties Group Date first assessed: 06/09/20 [LK] Time first assessed: 1308 [LK] Present on Hospital Admission: N [LK] Location: chest [LK] Primary Wound Type: Incision [LK] Recorded by:  [PURVI] Kristen Caldwell RN 06/09/20 1308    Row Name 06/14/20 0922             Plan of Care Review    Plan of Care Reviewed With  patient  -LC      Progress  improving  -LC      Outcome Summary  PT tx completed. Pt progressing well. CGA/Katie for sit to stands, occasionally pt requires multiple attempts to stand. Amb 90' x2 with CGA with improved mechanics, cont to lean slighltly R with forward flexed posture and decreased stride length. Will cont to progress for improved safety and strengthening.   -LC      Recorded by [HARSHIL] Chel Ramos, PATRICIO 06/14/20 1025        User Key  (r) = Recorded By, (t) = Taken By, (c) = Cosigned By    Initials Name Effective Dates Discipline    LC Chel Ramos, PTA 10/18/19 -  PT    Kristen Tellez RN 11/27/17 -  Nurse          Wound 06/09/20 1138 Right leg Incision (Active)   Dressing Appearance open to air 6/13/2020  9:08 PM   Base scab 6/13/2020  9:08 PM       Wound 06/09/20 1308 chest Incision (Active)   Dressing Appearance open to air 6/13/2020  9:08 PM   Closure Liquid skin adhesive 6/13/2020  9:08 PM   Base dry;clean 6/13/2020  9:08 PM           Physical Therapy Education                 Title: PT OT SLP Therapies (Resolved)     Topic: Physical Therapy (Resolved)     Point: Mobility training (Resolved)     Description:   Instruct learner(s) on safety and technique for assisting patient out of bed, chair or wheelchair.  Instruct in the proper use of assistive devices, such as walker, crutches, cane or brace.              Patient Friendly Description:   It's important to get you on your feet again, but we need to do so in a way that is  safe for you. Falling has serious consequences, and your personal safety is the most important thing of all.        When it's time to get out of bed, one of us or a family member will sit next to you on the bed to give you support.     If your doctor or nurse tells you to use a walker, crutches, a cane, or a brace, be sure you use it every time you get out of bed, even if you think you don't need it.    Learning Progress Summary           Patient Acceptance, E, NR by LUNA at 6/11/2020 0809    Comment:  Gait training, step length    Acceptance, E, NR by COURT at 6/10/2020 0755    Comment:  Educated pt on progression of PT POC, benefits of activity, sternal prec                   Point: Home exercise program (Resolved)     Description:   Instruct learner(s) on appropriate technique for monitoring, assisting and/or progressing patient with therapeutic exercises and activities.              Learner Progress:   Not documented in this visit.          Point: Body mechanics (Resolved)     Description:   Instruct learner(s) on proper positioning and spine alignment for patient and/or caregiver during mobility tasks and/or exercises.              Learner Progress:   Not documented in this visit.          Point: Precautions (Resolved)     Description:   Instruct learner(s) on prescribed precautions during mobility and gait tasks              Learning Progress Summary           Patient Acceptance, E, NR by LUNA at 6/12/2020 0752    Comment:  Reviewed sternal precautions    Acceptance, E,D, NR by LUNA at 6/11/2020 0809    Comment:  Reviewed sternal precautions    Acceptance, E, NR by COURT at 6/10/2020 0755    Comment:  Educated pt on progression of PT POC, benefits of activity, sternal prec                               User Key     Initials Effective Dates Name Provider Type Discipline    COURT 08/02/16 -  Noble Day, PT DPT Physical Therapist PT    LUNA 08/02/16 -  Halle Pritchard, PTA Physical Therapy Assistant PT                PT  Recommendation and Plan     Plan of Care Reviewed With: patient  Progress: improving  Outcome Summary: PT tx completed. Pt progressing well. CGA/Katie for sit to stands, occasionally pt requires multiple attempts to stand. Amb 90' x2 with CGA with improved mechanics, cont to lean slighltly R with forward flexed posture and decreased stride length. Will cont to progress for improved safety and strengthening.      Time Calculation:   PT Charges     Row Name 06/14/20 1025             Time Calculation    Start Time  0922  -      Stop Time  0948  -      Time Calculation (min)  26 min  -      PT Received On  06/14/20  -         Time Calculation- PT    Total Timed Code Minutes- PT  26 minute(s)  -        User Key  (r) = Recorded By, (t) = Taken By, (c) = Cosigned By    Initials Name Provider Type    Chel Starr PTA Physical Therapy Assistant        Therapy Charges for Today     Code Description Service Date Service Provider Modifiers Qty    89902460910 HC GAIT TRAINING EA 15 MIN 6/13/2020 Chel Ramos PTA GP 2    50775247038 HC GAIT TRAINING EA 15 MIN 6/13/2020 Chel Ramos PTA GP 2    53918708674 HC GAIT TRAINING EA 15 MIN 6/14/2020 Chel Ramos, PATRICIO GP 2          PT G-Codes  Outcome Measure Options: AM-PAC 6 Clicks Basic Mobility (PT)  AM-PAC 6 Clicks Score (PT): 10    Chel Ramos PTA  6/14/2020

## 2020-06-14 NOTE — PLAN OF CARE
Pt A&O. Pt on 1L O2 and O2 sat in upper 90's. Pt doing so much better than yesterday. Walking in the [place] with minimal assist. Pt does not c/o of chest pain or SOA. Incision to midsternal chest and right leg CDI. Bilateral LE edema has gone down since yesterday,. Pt eating all meals. BM 13th. Wires tapped and isolated. VSS. Safety maintained.  Problem: Patient Care Overview  Goal: Plan of Care Review  Outcome: Ongoing (interventions implemented as appropriate)  Flowsheets (Taken 6/14/2020 7431)  Progress: improving  Plan of Care Reviewed With: patient  Goal: Individualization and Mutuality  Outcome: Ongoing (interventions implemented as appropriate)  Goal: Discharge Needs Assessment  Outcome: Ongoing (interventions implemented as appropriate)  Goal: Interprofessional Rounds/Family Conf  Outcome: Ongoing (interventions implemented as appropriate)     Problem: Fall Risk (Adult)  Goal: Identify Related Risk Factors and Signs and Symptoms  Outcome: Ongoing (interventions implemented as appropriate)  Goal: Absence of Fall  Outcome: Ongoing (interventions implemented as appropriate)     Problem: Cardiac Surgery (Adult)  Goal: Signs and Symptoms of Listed Potential Problems Will be Absent, Minimized or Managed (Cardiac Surgery)  Outcome: Ongoing (interventions implemented as appropriate)  Goal: Anesthesia/Sedation Recovery  Outcome: Ongoing (interventions implemented as appropriate)

## 2020-06-14 NOTE — PROGRESS NOTES
"CABG x 3, EVH with open extension of the lower right leg    POD 5    Did much better with PT walked 200 ft with one rest.  Much progress over night.  Good cough.  IS 2000.      Telemetry: sinus     Visit Vitals  /80 (BP Location: Left arm, Patient Position: Sitting)   Pulse 86   Temp 97.9 °F (36.6 °C) (Oral)   Resp 16   Ht 182.9 cm (72\")   Wt 98.1 kg (216 lb 3.2 oz)   SpO2 95%   BMI 29.32 kg/m²   0.5 l/min      Intake/Output Summary (Last 24 hours) at 6/14/2020 1342  Last data filed at 6/14/2020 1217  Gross per 24 hour   Intake 720 ml   Output 2550 ml   Net -1830 ml       Labs:    Chest x-ray: none today.      Physical Exam:  General: No apparent distress. In good spirits, in chair  Cardiovascular: Regular rate and rhythm without murmur, rubs, or gallops.    Pulmonary: Clear to auscultation bilaterally without wheezing, rubs, or rales.  Chest: Sternotomy incision clean, dry, and intact. Sternum stable. No clicks.   Abdomen: Soft, non distended and non tender.  Extremities: Warm, moves all extremities. Saphenectomy site C/D/I   Neurologic: Grossly intact with no focal deficits.   Impression:  Coronary artery disease s/p CABG  Chronic pericarditis  CKD stage IV  History of NSTEMI  Previous CVA  Hyperlipidemia  Advanced age    Plan:  Multimodality pain control strategies  ctd diurese  Encourage pulmonary toilet and ambulation  Routine post cardiac surgery regimen  DW patient and nursing  SW consult.  Encourage PT continued activity.    Making good progress.  May not need SNF.    Will reassess and discuss with Pt/familly  "

## 2020-06-14 NOTE — PLAN OF CARE
Problem: Patient Care Overview  Goal: Plan of Care Review  Flowsheets (Taken 6/14/2020 0922)  Progress: improving  Plan of Care Reviewed With: patient  Outcome Summary: PT tx completed. Pt progressing well. CGA/Katie for sit to stands, occasionally pt requires multiple attempts to stand. Amb 90' x2 with CGA with improved mechanics, cont to lean slightly R with forward flexed posture and decreased stride length. Will cont to progress for improved safety and strengthening.

## 2020-06-14 NOTE — PLAN OF CARE
Problem: Patient Care Overview  Goal: Plan of Care Review  Outcome: Ongoing (interventions implemented as appropriate)  Flowsheets (Taken 6/14/2020 0428)  Progress: improving  Plan of Care Reviewed With: patient  Note:   Patient had 2 large BM overnight.  Patient walking better without as much lean to the right.  Seems more alert.  C/o incisional pain x 1, relief with prn pain med.  No falls noted, bed alarm on.  Possible wires pulled today.  Continue to monitor.   Goal: Individualization and Mutuality  Outcome: Ongoing (interventions implemented as appropriate)  Flowsheets (Taken 6/13/2020 0311)  Patient Specific Goals (Include Timeframe): keep pain free, decrease SOA  Patient Specific Interventions: walked with asst x 2,  Pain medication PRN  Patient Specific Preferences: door open, likes to sit in chair  Goal: Discharge Needs Assessment  Outcome: Ongoing (interventions implemented as appropriate)  Goal: Interprofessional Rounds/Family Conf  Outcome: Ongoing (interventions implemented as appropriate)     Problem: Fall Risk (Adult)  Goal: Identify Related Risk Factors and Signs and Symptoms  Outcome: Ongoing (interventions implemented as appropriate)  Goal: Absence of Fall  Outcome: Ongoing (interventions implemented as appropriate)     Problem: Cardiac Surgery (Adult)  Goal: Signs and Symptoms of Listed Potential Problems Will be Absent, Minimized or Managed (Cardiac Surgery)  Outcome: Ongoing (interventions implemented as appropriate)  Goal: Anesthesia/Sedation Recovery  Outcome: Ongoing (interventions implemented as appropriate)

## 2020-06-14 NOTE — THERAPY TREATMENT NOTE
Acute Care - Physical Therapy Treatment Note  [facility]     Patient Name: Faisal Joseph  : 1940  MRN: [id number]  Today's Date: 2020             Admit Date: 2020    Visit Dx:    ICD-10-CM ICD-9-CM   1. Coronary artery disease I25.10 414.00   2. Impaired mobility Z74.09 799.89     Patient Active Problem List   Diagnosis   • Essential hypertension   • Stage 4 chronic kidney disease (CMS/[facility])   • Chest pain   • Elevated troponin   • NSTEMI (non-ST elevated myocardial infarction) (CMS/[facility])   • Coronary artery disease   • History of non-ST elevation myocardial infarction (NSTEMI)   • Chronic pericarditis   • History of CVA (cerebrovascular accident)   • Hyperlipidemia       Therapy Treatment    Rehabilitation Treatment Summary     Row Name 20 1353 20 1321 20 0922       Treatment Time/Intention    Discipline  physical therapy assistant  -LC  physical therapy assistant  -LC  physical therapy assistant  -LC    Document Type  therapy note (daily note)  -LC  therapy note (daily note)  -LC  therapy note (daily note)  -LC    Subjective Information  complains of;pain  -LC  --  complains of;pain  -LC2    Mode of Treatment  physical therapy  -LC  physical therapy  -LC  physical therapy  -LC    Comment  --  pt just walked with nsg  -LC2  --    Existing Precautions/Restrictions  fall;oxygen therapy device and L/min;sternal  -LC  fall;oxygen therapy device and L/min;sternal  -LC  fall;oxygen therapy device and L/min;sternal  -LC    Recorded by [LC] Chel Ramos, PTA 20 1441 [LC] Chel Ramos, PTA 20 1321  [LC2] Chel Ramos, PTA 20 1322 [LC] Chel Ramos, PTA 20 0922  [LC2] Chel Ramos, PTA 20 1025    Row Name 20 0826             Treatment Time/Intention    Discipline  physical therapy assistant  -      Document Type  therapy note (daily note)  -LC      Mode of Treatment  physical therapy  -LC      Comment  eating  breakfast  -LC2      Recorded by [LC]  Chel Ramos, PTA 06/14/20 0827  [LC2] Chel Ramso, PTA 06/14/20 0828      Row Name 06/14/20 1353 06/14/20 0922          Vital Signs    Pre Systolic BP Rehab  136  -LC  136  -LC     Pre Treatment Diastolic BP  57  -LC  76  -LC     Post Systolic BP Rehab  149  -LC  137  -LC     Post Treatment Diastolic BP  69  -LC  68  -LC     Pretreatment Heart Rate (beats/min)  82  -LC  83  -LC     Posttreatment Heart Rate (beats/min)  85  -LC  87  -LC     Pre Patient Position  --  Sitting  -LC     Intra Patient Position  --  Standing  -LC     Post Patient Position  --  Sitting  -LC     Recorded by [LC] Chel Ramos RICKIE, PTA 06/14/20 1441 [LC] Chel Ramos RICKIE, PTA 06/14/20 1025     Row Name 06/14/20 1353 06/14/20 0922          Sit-Stand Transfer    Sit-Stand [place] (Transfers)  verbal cues;contact guard  -LC  verbal cues;contact guard;minimum assist (75% patient effort)  -LC     Recorded by [LC] Chel Ramos, PTA 06/14/20 1441 [LC] Chel Ramos RICKIE, PTA 06/14/20 1025     Row Name 06/14/20 1353 06/14/20 0922          Stand-Sit Transfer    Stand-Sit [place] (Transfers)  verbal cues;contact guard  -LC  verbal cues;contact guard  -LC     Recorded by [LC] Chel Ramos, PTA 06/14/20 1441 [LC] Chel Ramos RICKIE, PTA 06/14/20 1025     Row Name 06/14/20 1353 06/14/20 0922          Gait/Stairs Assessment/Training    [place] Level (Gait)  verbal cues;contact guard  -LC  verbal cues;contact guard  -LC     Distance in Feet (Gait)  90' x2 then 65'  -LC  90' x2  -LC     Pattern (Gait)  step-through  -LC  step-through  -LC     Deviations/Abnormal Patterns (Gait)  zofia decreased;gait speed decreased;stride length decreased  -LC  zofia decreased;gait speed decreased;stride length decreased  -LC     Bilateral Gait Deviations  forward flexed posture  -LC  forward flexed posture  -LC     Right Sided Gait Deviations  leans right  -LC  leans right  -LC     Recorded by [LC] Chel Ramos, PTA 06/14/20 1441 [LC] Chel Ramos, PTA 06/14/20  1025     Row Name 06/14/20 1353 06/14/20 0922          Therapeutic Exercise    Comment (Therapeutic Exercise)  cardiac protocol x20  -LC  cardiac protocol x20  -LC     Recorded by [LC] Chel Ramos, PTA 06/14/20 1441 [LC] Chel Ramos, PTA 06/14/20 1025     Row Name 06/14/20 1353 06/14/20 0922          Positioning and Restraints    Pre-Treatment Position  sitting in chair/recliner  -LC  sitting in chair/recliner  -LC     Post Treatment Position  chair  -LC  chair  -LC     In Chair  reclined;call light within reach;encouraged to call for assist  -LC  reclined;call light within reach;encouraged to call for assist;with nsg  -LC     Recorded by [LC] Chel Ramos, PTA 06/14/20 1441 [LC] Chel Ramos, PTA 06/14/20 1025     Row Name 06/14/20 1353 06/14/20 0922          Pain Scale: Numbers Pre/Post-Treatment    Pain Scale: Numbers, Pretreatment  2/10  -LC  2/10  -LC     Pain Scale: Numbers, Post-Treatment  2/10  -LC  2/10  -LC     Pain Location - Orientation  incisional  -LC  incisional  -LC     Pain Location  chest  -LC  chest  -LC     Pre/Post Treatment Pain Comment  c/o weakness in back while ambulating  -LC  --     Pain Intervention(s)  Medication (See MAR)  -LC  Medication (See MAR)  -LC     Recorded by [LC] Chel Ramos, PTA 06/14/20 1441 [LC] Chel Ramos, PTA 06/14/20 1025     Row Name 06/14/20 1353             Pain Scale: FACES Pre/Post-Treatment    Pain: FACES Scale, Pretreatment  0-->no hurt  -LC      Pain: FACES Scale, Post-Treatment  2-->hurts little bit  -LC      Recorded by [LC] Chel Ramos, PTA 06/14/20 1441      Row Name                Wound 06/09/20 1138 Right leg Incision    Wound - Properties Group Date first assessed: 06/09/20 [LK] Time first assessed: 1138 [LK] Present on Hospital Admission: N [LK] Side: Right [LK] Location: leg [LK] Primary Wound Type: Incision [LK] Recorded by:  [LK] Kristen Caldwell RN 06/09/20 1138    Row Name                Wound 06/09/20 1308 chest Incision    Wound -  Properties Group Date first assessed: 06/09/20 [LK] Time first assessed: 1308 [LK] Present on Hospital Admission: N [LK] Location: chest [LK] Primary Wound Type: Incision [LK] Recorded by:  [PURVI] Kristen Caldwell RN 06/09/20 1308    Row Name 06/14/20 0922             Plan of Care Review    Plan of Care Reviewed With  patient  -LC      Progress  improving  -LC      Outcome Summary  PT tx completed. Pt progressing well. CGA/Katie for sit to stands, occasionally pt requires multiple attempts to stand. Amb 90' x2 with CGA with improved mechanics, cont to lean slighltly R with forward flexed posture and decreased stride length. Will cont to progress for improved safety and strengthening.   -LC      Recorded by [HARSHIL] Chel Ramos PTA 06/14/20 1025        User Key  (r) = Recorded By, (t) = Taken By, (c) = Cosigned By    Initials Name Effective Dates Discipline    LC Chel Ramos, PATRICIO 10/18/19 -  PT    Kristen Tellez RN 11/27/17 -  Nurse          Wound 06/09/20 1138 Right leg Incision (Active)   Dressing Appearance open to air 6/14/2020  8:17 AM   Closure Liquid skin adhesive;Open to air;Approximated 6/14/2020  8:17 AM   Base scab 6/13/2020  9:08 PM   Drainage Amount none 6/14/2020  8:17 AM       Wound 06/09/20 1308 chest Incision (Active)   Dressing Appearance open to air 6/14/2020  8:17 AM   Closure Liquid skin adhesive 6/14/2020  8:17 AM   Base dry;clean 6/13/2020  9:08 PM   Drainage Amount none 6/14/2020  8:17 AM           Physical Therapy Education                 Title: PT OT SLP Therapies (Resolved)     Topic: Physical Therapy (Resolved)     Point: Mobility training (Resolved)     Description:   Instruct learner(s) on safety and technique for assisting patient out of bed, chair or wheelchair.  Instruct in the proper use of assistive devices, such as walker, crutches, cane or brace.              Patient Friendly Description:   It's important to get you on your feet again, but we need to do so in a way that is  safe for you. Falling has serious consequences, and your personal safety is the most important thing of all.        When it's time to get out of bed, one of us or a family member will sit next to you on the bed to give you support.     If your doctor or nurse tells you to use a walker, crutches, a cane, or a brace, be sure you use it every time you get out of bed, even if you think you don't need it.    Learning Progress Summary           Patient Acceptance, E, NR by LUNA at 6/11/2020 0809    Comment:  Gait training, step length    Acceptance, E, NR by COURT at 6/10/2020 0755    Comment:  Educated pt on progression of PT POC, benefits of activity, sternal prec                   Point: Home exercise program (Resolved)     Description:   Instruct learner(s) on appropriate technique for monitoring, assisting and/or progressing patient with therapeutic exercises and activities.              Learner Progress:   Not documented in this visit.          Point: Body mechanics (Resolved)     Description:   Instruct learner(s) on proper positioning and spine alignment for patient and/or caregiver during mobility tasks and/or exercises.              Learner Progress:   Not documented in this visit.          Point: Precautions (Resolved)     Description:   Instruct learner(s) on prescribed precautions during mobility and gait tasks              Learning Progress Summary           Patient Acceptance, E, NR by LUNA at 6/12/2020 0752    Comment:  Reviewed sternal precautions    Acceptance, E,D, NR by LUNA at 6/11/2020 0809    Comment:  Reviewed sternal precautions    Acceptance, E, NR by COURT at 6/10/2020 0755    Comment:  Educated pt on progression of PT POC, benefits of activity, sternal prec                               User Key     Initials Effective Dates Name Provider Type Discipline    COURT 08/02/16 -  Noble Day, PT DPT Physical Therapist PT    LUNA 08/02/16 -  Halle Pritchard, PTA Physical Therapy Assistant PT                PT  Recommendation and Plan     Plan of Care Reviewed With: patient  Progress: improving  Outcome Summary: PT tx completed. Pt progressing well. CGA/Katie for sit to stands, occasionally pt requires multiple attempts to stand. Amb 90' x2 with CGA with improved mechanics, cont to lean slighltly R with forward flexed posture and decreased stride length. Will cont to progress for improved safety and strengthening.      Time Calculation:   PT Charges     Row Name 06/14/20 1441 06/14/20 1025          Time Calculation    Start Time  1353  -  0922  -     Stop Time  1422  -  0948  -     Time Calculation (min)  29 min  -LC  26 min  -LC     PT Received On  06/14/20  -  06/14/20  -        Time Calculation- PT    Total Timed Code Minutes- PT  29 minute(s)  -LC  26 minute(s)  -LC       User Key  (r) = Recorded By, (t) = Taken By, (c) = Cosigned By    Initials Name Provider Type    Chel Starr PTA Physical Therapy Assistant        Therapy Charges for Today     Code Description Service Date Service Provider Modifiers Qty    45836451377 HC GAIT TRAINING EA 15 MIN 6/13/2020 Chel Ramos, PATRICIO GP 2    89864702753 HC GAIT TRAINING EA 15 MIN 6/13/2020 Chel Ramos, PTA GP 2    73067460512 HC GAIT TRAINING EA 15 MIN 6/14/2020 Chel Ramos, PTA GP 2    67840761836 HC GAIT TRAINING EA 15 MIN 6/14/2020 Chel Ramos, PATRICIO GP 2          PT G-Codes  Outcome Measure Options: AM-PAC 6 Clicks Basic Mobility (PT)  AM-PAC 6 Clicks Score (PT): 10    Chel Ramos PTA  6/14/2020

## 2020-06-15 LAB
ANION GAP SERPL CALCULATED.3IONS-SCNC: 14 MMOL/L (ref 5–15)
BUN BLD-MCNC: 31 MG/DL (ref 8–23)
BUN/CREAT SERPL: 15.7 (ref 7–25)
CALCIUM SPEC-SCNC: 9 MG/DL (ref 8.6–10.5)
CHLORIDE SERPL-SCNC: 97 MMOL/L (ref 98–107)
CO2 SERPL-SCNC: 23 MMOL/L (ref 22–29)
CREAT BLD-MCNC: 1.97 MG/DL (ref 0.76–1.27)
DEPRECATED RDW RBC AUTO: 40.7 FL (ref 37–54)
ERYTHROCYTE [DISTWIDTH] IN BLOOD BY AUTOMATED COUNT: 12.5 % (ref 12.3–15.4)
GFR SERPL CREATININE-BSD FRML MDRD: 33 ML/MIN/1.73
GLUCOSE BLD-MCNC: 160 MG/DL (ref 65–99)
HCT VFR BLD AUTO: 29.1 % (ref 37.5–51)
HGB BLD-MCNC: 9.9 G/DL (ref 13–17.7)
MCH RBC QN AUTO: 30.6 PG (ref 26.6–33)
MCHC RBC AUTO-ENTMCNC: 34 G/DL (ref 31.5–35.7)
MCV RBC AUTO: 89.8 FL (ref 79–97)
PLATELET # BLD AUTO: 208 10*3/MM3 (ref 140–450)
PMV BLD AUTO: 10.4 FL (ref 6–12)
POTASSIUM BLD-SCNC: 4 MMOL/L (ref 3.5–5.2)
RBC # BLD AUTO: 3.24 10*6/MM3 (ref 4.14–5.8)
SARS-COV-2 RNA RESP QL NAA+PROBE: NOT DETECTED
SODIUM BLD-SCNC: 134 MMOL/L (ref 136–145)
WBC NRBC COR # BLD: 7.1 10*3/MM3 (ref 3.4–10.8)

## 2020-06-15 PROCEDURE — 97116 GAIT TRAINING THERAPY: CPT

## 2020-06-15 PROCEDURE — 94799 UNLISTED PULMONARY SVC/PX: CPT

## 2020-06-15 PROCEDURE — 25010000002 ENOXAPARIN PER 10 MG: Performed by: NURSE PRACTITIONER

## 2020-06-15 PROCEDURE — 85027 COMPLETE CBC AUTOMATED: CPT | Performed by: NURSE PRACTITIONER

## 2020-06-15 PROCEDURE — 99024 POSTOP FOLLOW-UP VISIT: CPT | Performed by: NURSE PRACTITIONER

## 2020-06-15 PROCEDURE — 25010000002 FUROSEMIDE PER 20 MG: Performed by: NURSE PRACTITIONER

## 2020-06-15 PROCEDURE — 80048 BASIC METABOLIC PNL TOTAL CA: CPT | Performed by: NURSE PRACTITIONER

## 2020-06-15 PROCEDURE — 87635 SARS-COV-2 COVID-19 AMP PRB: CPT | Performed by: THORACIC SURGERY (CARDIOTHORACIC VASCULAR SURGERY)

## 2020-06-15 RX ADMIN — OXYCODONE HYDROCHLORIDE AND ACETAMINOPHEN 1 TABLET: 5; 325 TABLET ORAL at 16:58

## 2020-06-15 RX ADMIN — ASPIRIN 81 MG: 81 TABLET ORAL at 09:17

## 2020-06-15 RX ADMIN — POTASSIUM CHLORIDE 20 MEQ: 750 CAPSULE, EXTENDED RELEASE ORAL at 09:17

## 2020-06-15 RX ADMIN — FUROSEMIDE 20 MG: 10 INJECTION, SOLUTION INTRAVENOUS at 22:17

## 2020-06-15 RX ADMIN — LISINOPRIL 2.5 MG: 2.5 TABLET ORAL at 09:17

## 2020-06-15 RX ADMIN — OXYCODONE HYDROCHLORIDE AND ACETAMINOPHEN 1 TABLET: 5; 325 TABLET ORAL at 11:57

## 2020-06-15 RX ADMIN — METOPROLOL TARTRATE 25 MG: 25 TABLET, FILM COATED ORAL at 20:18

## 2020-06-15 RX ADMIN — AMLODIPINE BESYLATE 5 MG: 5 TABLET ORAL at 09:17

## 2020-06-15 RX ADMIN — CLOPIDOGREL 75 MG: 75 TABLET, FILM COATED ORAL at 16:58

## 2020-06-15 RX ADMIN — BISACODYL 10 MG: 5 TABLET ORAL at 09:17

## 2020-06-15 RX ADMIN — BISACODYL 10 MG: 5 TABLET ORAL at 20:18

## 2020-06-15 RX ADMIN — POLYETHYLENE GLYCOL 3350 17 G: 17 POWDER, FOR SOLUTION ORAL at 09:17

## 2020-06-15 RX ADMIN — OXYCODONE HYDROCHLORIDE AND ACETAMINOPHEN 1 TABLET: 5; 325 TABLET ORAL at 20:57

## 2020-06-15 RX ADMIN — FUROSEMIDE 20 MG: 10 INJECTION, SOLUTION INTRAVENOUS at 11:32

## 2020-06-15 RX ADMIN — METOPROLOL TARTRATE 25 MG: 25 TABLET, FILM COATED ORAL at 09:17

## 2020-06-15 RX ADMIN — ENOXAPARIN SODIUM 30 MG: 30 INJECTION SUBCUTANEOUS at 20:18

## 2020-06-15 RX ADMIN — AMIODARONE HYDROCHLORIDE 400 MG: 200 TABLET ORAL at 09:17

## 2020-06-15 RX ADMIN — LIDOCAINE 2 PATCH: 50 PATCH CUTANEOUS at 09:20

## 2020-06-15 RX ADMIN — ATORVASTATIN CALCIUM 20 MG: 10 TABLET, FILM COATED ORAL at 20:18

## 2020-06-15 RX ADMIN — PREGABALIN 25 MG: 25 CAPSULE ORAL at 09:17

## 2020-06-15 RX ADMIN — POTASSIUM CHLORIDE 20 MEQ: 750 CAPSULE, EXTENDED RELEASE ORAL at 17:00

## 2020-06-15 RX ADMIN — ENOXAPARIN SODIUM 30 MG: 30 INJECTION SUBCUTANEOUS at 09:17

## 2020-06-15 RX ADMIN — OXYCODONE HYDROCHLORIDE AND ACETAMINOPHEN 1 TABLET: 5; 325 TABLET ORAL at 03:11

## 2020-06-15 NOTE — PLAN OF CARE
Problem: Patient Care Overview  Goal: Plan of Care Review  Outcome: Ongoing (interventions implemented as appropriate)  Flowsheets  Taken 6/15/2020 0251 by Lisa Orozco RN  Progress: improving  Taken 6/14/2020 1654 by Bella Reis RNA  Plan of Care Reviewed With: patient  Note:   Patient c/o pain in mid right chest, relief with prn pain med and repositioning in bed.  Patient c/o some difficulty staying asleep.  No falls noted.  Patient up with asst x 1.  Good UOP with urinal bedside.  Incisions CDI, well approximated.  Possible d/c home today, will discuss rehab vs home today.  Patient walking has much improved since Friday night.  Patient has much steadier gait and is more alert and interactive with staff.  Continue to monitor.   Goal: Individualization and Mutuality  Outcome: Ongoing (interventions implemented as appropriate)  Flowsheets (Taken 6/13/2020 0311)  Patient Specific Goals (Include Timeframe): keep pain free, decrease SOA  Patient Specific Interventions: walked with asst x 2,  Pain medication PRN  Patient Specific Preferences: door open, likes to sit in chair  Goal: Discharge Needs Assessment  Outcome: Ongoing (interventions implemented as appropriate)  Goal: Interprofessional Rounds/Family Conf  Outcome: Ongoing (interventions implemented as appropriate)     Problem: Fall Risk (Adult)  Goal: Identify Related Risk Factors and Signs and Symptoms  Outcome: Ongoing (interventions implemented as appropriate)  Goal: Absence of Fall  Outcome: Ongoing (interventions implemented as appropriate)     Problem: Cardiac Surgery (Adult)  Goal: Signs and Symptoms of Listed Potential Problems Will be Absent, Minimized or Managed (Cardiac Surgery)  Outcome: Ongoing (interventions implemented as appropriate)  Goal: Anesthesia/Sedation Recovery  Outcome: Ongoing (interventions implemented as appropriate)

## 2020-06-15 NOTE — PROGRESS NOTES
"CABG x 3, EVH with open extension of the lower right leg    POD 6    Did much better with PT walked 250 ft.  He has had BM.  He is on room air.  Weight 5 pounds from baseline.  Social work has been consulted to see if he qualifies for rehab placement and answer is pending.  The patient states he feels he would benefit from additional rehab and would like to keep referral despite his improvement in ambulation.     Telemetry: sinus     Visit Vitals  /73 (BP Location: Right arm, Patient Position: Sitting)   Pulse 77   Temp 98 °F (36.7 °C) (Oral)   Resp 16   Ht 182.9 cm (72\")   Wt 98 kg (216 lb)   SpO2 94%   BMI 29.29 kg/m²         Intake/Output Summary (Last 24 hours) at 6/15/2020 1314  Last data filed at 6/15/2020 1132  Gross per 24 hour   Intake 1810 ml   Output 1200 ml   Net 610 ml       Chest x-ray: none today.      Physical Exam:  General: No apparent distress. In good spirits, in chair  Cardiovascular: Regular rate and rhythm without murmur, rubs, or gallops.    Pulmonary: Clear to auscultation bilaterally without wheezing, rubs, or rales.  Chest: Sternotomy incision clean, dry, and intact. Sternum stable. No clicks.   Abdomen: Soft, non distended and non tender.  Extremities: Warm, moves all extremities. Saphenectomy site C/D/I   Neurologic: Grossly intact with no focal deficits.       Impression:  Coronary artery disease s/p CABG  Chronic pericarditis  CKD stage IV  History of NSTEMI  Previous CVA  Hyperlipidemia  Advanced age    Plan:  Multimodality pain control strategies  ctd diurese  Encourage pulmonary toilet and ambulation  Routine post cardiac surgery regimen  DW patient and nursing  Spoke with JENNIFER and rehab consult is pending  Stat CBC and BMP today  Anticipate DC tomorrow home versus rehab  "

## 2020-06-15 NOTE — PLAN OF CARE
Problem: Patient Care Overview  Goal: Plan of Care Review  Outcome: Ongoing (interventions implemented as appropriate)  Flowsheets (Taken 6/15/2020 3419)  Outcome Summary: Pt. continues with balance issues when 1st stands. Pt. states he has had this issue and is used to using arms to steady self. Continue to instruct how to manage balance when 1st stands. Pt. Ambulated 250' with 2 rests. Pt. has a narrow base of support, forward flexion and decreased step length. Will benefit from safety instruction, continued sit to stand training and increased ambulation.

## 2020-06-15 NOTE — PLAN OF CARE
Problem: Patient Care Overview  Goal: Plan of Care Review  Outcome: Ongoing (interventions implemented as appropriate)  Flowsheets (Taken 6/15/2020 1817)  Progress: improving  Plan of Care Reviewed With: patient  Outcome Summary: Patient c/o incisional pain x 2, PRN percocet given with relief. Ambulated x 3 this shift. Up in chair for most of the day. 1 BM this shift. S 72-85 on tele. Incisions C/D/I. Plans to d/c to SNF tomorrow. Continue to monitor.

## 2020-06-15 NOTE — DISCHARGE SUMMARY
[facility] Cardiothoracic Surgery  DISCHARGE SUMMARY        Date of Admission: 6/9/2020  Date of Discharge:  6/16/2020  Primary Care Physician: Spike Polanco,     Discharge Diagnoses:  Active Hospital Problems    Diagnosis   • **Coronary artery disease     Added automatically from request for surgery [id number]     • History of non-ST elevation myocardial infarction (NSTEMI)   • Chronic pericarditis   • History of CVA (cerebrovascular accident)   • Hyperlipidemia   • Stage 4 chronic kidney disease (CMS/HCC)   • Essential hypertension       Procedures Performed:   Procedure(s) (LRB):  CABG X 3, LIMA, SHELBY, EVH WITH OPEN EXTENSION LOWER RIGHT LEG (N/A)    HPI:  Mr. Faisal Joseph is a 79 y.o. male with pertinent past medical history includes atrial fibrillation, CKD stage IV, previous CVA with residual, HTN, HL, family history of CAD, and remote history of tobacco use presented with increasing shortness of breath over many years but chest pain with exertion is worsening over the past several weeks.  This was relieved with rest.  He developed on the day of admission 10/10 chest pain which did improve with NTG SL.    Troponin was elevated ruling him in for a NSTEMI.  Dr. Brice did evaluate and ultimately performed LHC demonstrating left main multivessel CAD.  He remained chest pain free while in the hospital.  Cardiothoracic Surgery was consulted for the consideration of CABG.   His CKD is managed by Veda Brito and has been stable.  He did have a CVA with etiology unknown to him but he feels that hypertension was the cause.  He does have minimal right sided weakness which remains from originally having complete right hemiplegia.  Colonoscopy was negative performed by Dr. Brown.  He lives independently.  He is not  currently.  His daughter Susan was present via telephone and will be helping him at home postoperatively.  He remained chest pain free and was discharged home with  the understanding to return to the ER if any chest pain develops.  He verbalized understanding and was agreeable to proceed with surgery on an outpatient basis.      Hospital Course: On 6/9/2020, Mr. Joseph was taken to the operative suite for coronary artery bypass grafting.  See separate op note by Dr. Carballo detailing the operation.  Following surgery, he was transferred to the ICU in stable but guarded condition.  Here he remained hemodynamically stable and demonstrated an intact neurological exam.  He was successfully extubated during the early morning of post op day 1.  He was tolerating 4 liters O2.  He remained drowsy from anesthesia throughout the day and was able to only take steps with physical therapy.  When all IV drips were off and he was able to ambulate throughout the unit, he was ultimately transferred to  on post op day 3.  The remainder to his hospital stay was significant for encouraging pulmonary toilet and ambulation, pain control, diuresis, and close monitoring of renal function.  Mediastinal chest tubes and left jay drain were removed on post op day 4. He was ultimately able to ambulate 250 feet with physical therapy.  He demonstrated adequate bowel function and remained in sinus rhythm.  Renal function remained stable and returned to baseline prior to discharge.  Although he was able to ambulate over 200 feet with physical therapy, the patient and his daughter requested social work consult for consideration of rehab placement due to weakness.  This was initiated and he was ultimately accepted to [facility]. He meets criteria for discharge on post op day 7 and he is agreeable.    Condition on Discharge:  Neurologically intact and has good pain control.  He is eating well and has demonstrable good bowel function.  The sternum is stable without clicks and the saphenectomy incisions are healing nicely.  The heart is in normal sinus rhythm.  He has met all physical therapy criteria  and verbalizes understanding of sternotomy precautions.   He verbalizes understanding of a separately handed out cardiac surgery handout.       Discharge Disposition: [facility]      Discharge Medications:     Discharge Medications      New Medications      Instructions Start Date   clopidogrel 75 MG tablet  Commonly known as:  PLAVIX   75 mg, Oral, Every Evening      oxyCODONE-acetaminophen 5-325 MG per tablet  Commonly known as:  PERCOCET   1 tablet, Oral, Every 6 Hours PRN         Changes to Medications      Instructions Start Date   lisinopril 2.5 MG tablet  Commonly known as:  PRINRENAZESTRIL  What changed:    · medication strength  · how much to take  · when to take this   2.5 mg, Oral, Daily         Continue These Medications      Instructions Start Date   amLODIPine 5 MG tablet  Commonly known as:  NORVASC   5 mg, Oral, Daily      aspirin 81 MG EC tablet   81 mg, Oral, Daily      atorvastatin 40 MG tablet  Commonly known as:  LIPITOR   40 mg, Oral, Nightly      magnesium oxide 400 (241.3 Mg) MG tablet tablet  Commonly known as:  MAGOX   400 mg, Oral, Once      metoprolol tartrate 25 MG tablet  Commonly known as:  LOPRESSOR   25 mg, Oral, 2 Times Daily      Vitamin D 50 MCG (2000 UT) capsule   Oral, Daily         Stop These Medications    Inspra 50 MG tablet  Generic drug:  eplerenone     isosorbide mononitrate 30 MG 24 hr tablet  Commonly known as:  IMDUR     nebivolol 5 MG tablet  Commonly known as:  BYSTOLIC     nitroglycerin 0.4 MG SL tablet  Commonly known as:  NITROSTAT            Discharge Diet: Regular diet     Discharge Care Plan / Instructions: Please see the separately handed out cardiac surgery handout.    Activity at Discharge:   No heavy lifting greater than 5 pounds or a gallon of milk while maintaining sternal precautions.  aFisal Joseph has been instructed on an exercise  regiment as detailed in a handed out cardiac surgery handout.    Tobacco:  Patient has been counseled as to  ongoing smoking cessation. We discussed its benefits in regards to immediate recovery and the long-term benefits of avoidance of tobacco products. We discussed the benefit of long-term health that tobacco cessation would convey.      BMI: Patient's Body mass index is 28.87 kg/m². BMI is above normal parameters. Recommendations include: educational material, nutrition counseling and referral to primary care.      Follow-up Appointments: Faisal Joseph  is requested to see Spike Polanco DO within 1-2 weeks from time of discharge, to see Ramona HILL in 1 week, to follow-up with Dr. Carballo in 4 weeks,  and to follow-up with Dr. Brice of the cardiology service in 6 weeks.

## 2020-06-15 NOTE — PROGRESS NOTES
Continued Stay Note   Tamia     Patient Name: Faisal Joseph  MRN: [id number]  Today's Date: 6/15/2020    Admit Date: 6/9/2020    Discharge Plan     Row Name 06/15/20 1206       Plan    Plan  Referral to [facility]    Patient/Family in Agreement with Plan  yes    Plan Comments  [facility] did not get the referral so called to [facility] [phone]. Awaiting bed offer.         Discharge Codes    No documentation.             LULY Carter

## 2020-06-16 VITALS
RESPIRATION RATE: 18 BRPM | TEMPERATURE: 98.4 F | SYSTOLIC BLOOD PRESSURE: 134 MMHG | HEART RATE: 75 BPM | WEIGHT: 212.9 LBS | HEIGHT: 72 IN | DIASTOLIC BLOOD PRESSURE: 66 MMHG | BODY MASS INDEX: 28.84 KG/M2 | OXYGEN SATURATION: 94 %

## 2020-06-16 PROBLEM — R53.81 PHYSICAL DEBILITY: Status: ACTIVE | Noted: 2020-06-16

## 2020-06-16 PROCEDURE — 99024 POSTOP FOLLOW-UP VISIT: CPT | Performed by: THORACIC SURGERY (CARDIOTHORACIC VASCULAR SURGERY)

## 2020-06-16 PROCEDURE — 25010000002 ENOXAPARIN PER 10 MG: Performed by: NURSE PRACTITIONER

## 2020-06-16 RX ORDER — LISINOPRIL 2.5 MG/1
2.5 TABLET ORAL DAILY
Qty: 30 TABLET | Refills: 0 | Status: SHIPPED | OUTPATIENT
Start: 2020-06-16 | End: 2020-07-29 | Stop reason: SDUPTHER

## 2020-06-16 RX ORDER — CLOPIDOGREL BISULFATE 75 MG/1
75 TABLET ORAL EVERY EVENING
Qty: 30 TABLET | Refills: 2 | Status: SHIPPED | OUTPATIENT
Start: 2020-06-16 | End: 2021-08-30

## 2020-06-16 RX ORDER — OXYCODONE HYDROCHLORIDE AND ACETAMINOPHEN 5; 325 MG/1; MG/1
1 TABLET ORAL EVERY 6 HOURS PRN
Qty: 30 TABLET | Refills: 0 | Status: SHIPPED | OUTPATIENT
Start: 2020-06-16 | End: 2021-01-29

## 2020-06-16 RX ADMIN — OXYCODONE HYDROCHLORIDE AND ACETAMINOPHEN 1 TABLET: 5; 325 TABLET ORAL at 06:13

## 2020-06-16 RX ADMIN — POTASSIUM CHLORIDE 20 MEQ: 750 CAPSULE, EXTENDED RELEASE ORAL at 09:06

## 2020-06-16 RX ADMIN — PREGABALIN 25 MG: 25 CAPSULE ORAL at 09:05

## 2020-06-16 RX ADMIN — LISINOPRIL 2.5 MG: 2.5 TABLET ORAL at 09:05

## 2020-06-16 RX ADMIN — METOPROLOL TARTRATE 25 MG: 25 TABLET, FILM COATED ORAL at 09:05

## 2020-06-16 RX ADMIN — AMIODARONE HYDROCHLORIDE 400 MG: 200 TABLET ORAL at 09:05

## 2020-06-16 RX ADMIN — BISACODYL 10 MG: 5 TABLET ORAL at 09:05

## 2020-06-16 RX ADMIN — ENOXAPARIN SODIUM 30 MG: 30 INJECTION SUBCUTANEOUS at 09:06

## 2020-06-16 RX ADMIN — OXYCODONE HYDROCHLORIDE AND ACETAMINOPHEN 1 TABLET: 5; 325 TABLET ORAL at 13:23

## 2020-06-16 RX ADMIN — ASPIRIN 81 MG: 81 TABLET ORAL at 09:05

## 2020-06-16 RX ADMIN — AMLODIPINE BESYLATE 5 MG: 5 TABLET ORAL at 09:05

## 2020-06-16 NOTE — PLAN OF CARE
Problem: Patient Care Overview  Goal: Plan of Care Review  Outcome: Ongoing (interventions implemented as appropriate)  Flowsheets (Taken 6/16/2020 1636)  Progress: improving  Plan of Care Reviewed With: patient; family  Outcome Summary: VSS, NSR, All incisions D/C/I, Ambulate x4, BM x2, Pain well contolled with prn Percocet, hoping to dc to rehab in the am

## 2020-06-16 NOTE — PROGRESS NOTES
"CABG x 3, EVH with open extension of the lower right leg    POD 7    Walked 250 ft.  He has had BM.  He is on room air.  Weight 1 pound from baseline.  Patient has been accepted to [facility] in [place] and they will take him today.  Labs yesterday are stable.  COVID test is negative.  He states he feels much better and is anticipating discharge.     Telemetry: sinus     Visit Vitals  /59 (BP Location: Left arm, Patient Position: Lying)   Pulse 81   Temp 98.4 °F (36.9 °C) (Oral)   Resp 16   Ht 182.9 cm (72\")   Wt 96.6 kg (212 lb 14.4 oz)   SpO2 94%   BMI 28.87 kg/m²         Intake/Output Summary (Last 24 hours) at 6/16/2020 0838  Last data filed at 6/16/2020 0800  Gross per 24 hour   Intake 600 ml   Output 1800 ml   Net -1200 ml       Chest x-ray: none today.      Physical Exam:  General: No apparent distress. In good spirits, in chair  Cardiovascular: Regular rate and rhythm without murmur, rubs, or gallops.    Pulmonary: Clear to auscultation bilaterally without wheezing, rubs, or rales.  Chest: Sternotomy incision clean, dry, and intact. Sternum stable. No clicks.   Abdomen: Soft, non distended and non tender.  Extremities: Warm, moves all extremities. Saphenectomy site C/D/I.  Mild RLE edema  Neurologic: Grossly intact with no focal deficits.       Impression:  Coronary artery disease s/p CABG  Chronic pericarditis  CKD stage IV  History of NSTEMI  Previous CVA  Hyperlipidemia  Advanced age    Plan:  Pacing wires removed without remark.   Encourage pulmonary toilet and ambulation  Routine post cardiac surgery regimen  DW patient and nursing  DC today to [facility]  "

## 2020-06-16 NOTE — THERAPY DISCHARGE NOTE
Acute Care - Physical Therapy Discharge Summary  [facility]       Patient Name: Faisal Joseph  : 1940  MRN: [id number]    Today's Date: 2020                 Admit Date: 2020      PT Recommendation and Plan    Visit Dx:    ICD-10-CM ICD-9-CM   1. Coronary artery disease I25.10 414.00   2. Impaired mobility Z74.09 799.89               Rehab Goal Summary     Row Name 20 1448             Bed Mobility Goal 1 (PT)    Activity/Assistive Device (Bed Mobility Goal 1, PT)  sit to supine/supine to sit  -AB      [place] Level/Cues Needed (Bed Mobility Goal 1, PT)  supervision required  -AB      Time Frame (Bed Mobility Goal 1, PT)  long term goal (LTG);10 days  -AB      Progress/Outcomes (Bed Mobility Goal 1, PT)  goal not met  -AB         Transfer Goal 1 (PT)    Activity/Assistive Device (Transfer Goal 1, PT)  sit-to-stand/stand-to-sit;bed-to-chair/chair-to-bed  -AB      [place] Level/Cues Needed (Transfer Goal 1, PT)  supervision required  -AB      Time Frame (Transfer Goal 1, PT)  long term goal (LTG);10 days  -AB      Progress/Outcome (Transfer Goal 1, PT)  goal not met  -AB         Gait Training Goal 1 (PT)    Activity/Assistive Device (Gait Training Goal 1, PT)  gait (walking locomotion);decrease fall risk;improve balance and speed;increase endurance/gait distance  -AB      [place] Level (Gait Training Goal 1, PT)  supervision required  -AB      Distance (Gait Goal 1, PT)  200  -AB      Time Frame (Gait Training Goal 1, PT)  long term goal (LTG);10 days  -AB      Progress/Outcome (Gait Training Goal 1, PT)  goal not met  -AB         Stairs Goal 1 (PT)    Activity/Assistive Device (Stairs Goal 1, PT)  ascending stairs;descending stairs;using handrail, right;using handrail, left  -AB      [place] Level/Cues Needed (Stairs Goal 1, PT)  contact guard assist  -AB      Number of Stairs (Stairs Goal 1, PT)  9 steps  -AB      Time Frame (Stairs Goal 1, PT)  long term goal (LTG);10 days   -AB      Progress/Outcome (Stairs Goal 1, PT)  goal not met  -AB        User Key  (r) = Recorded By, (t) = Taken By, (c) = Cosigned By    Initials Name Provider Type Discipline    Jaz Julio, PTA Physical Therapy Assistant PT              PT Discharge Summary  Anticipated Discharge Disposition (PT): home with assist, home with 24/7 care, home with home health, skilled nursing facility, transitional care  Reason for Discharge: Discharge from facility  Outcomes Achieved: Refer to plan of care for updates on goals achieved  Discharge Destination: Inpatient rehabilitation facility      Jaz Montelongo PTA   6/16/2020

## 2020-06-22 NOTE — OP NOTE
Date of Procedure:  6/9/2020    Preoperative Diagnosis:   1.  Coronary artery disease [I25.10]  2.  Stage IV chronic kidney disease  3.  NSTEMI  4.  History of CVA    Postoperative Diagnosis:  Same plus Chronic pericarditis    Procedures Performed:  1. Coronary artery bypass grafting-3 vessel (left internal mammary artery/left anterior descending, reverse saphenous vein graft/dominant and most proximally identified diagonal artery, and reverse saphenous vein graft/distal right coronary artery)  2. Right endoscopic vein harvest with open extension    Surgeon:  Nikunj Carballo MD  Assistants:  Akiko Ruiz   Anesthesia Staff:  Anesthesiologist: Shannan Cobian MD  CRNA: Jarad Rodriguez CRNA; Chele Serrano CRNA  Anesthesia Type:  General    Estimated Blood Loss:  Minimal (Cell Saver)  Drains:  1. 19 [place]an Mejia drain-left pleural space  2. 24 [place] Mejia drains-anterior and posterior mediastinum    Specimens:  None     Operative Findings:  Excellent arterial conduit. Good venous conduit. The LAD at site of grafting measured 2.3 mm in size and had mild atherosclerotic disease burden. Post bypass grafting had excellent arterial runoff.  The diagonal artery artery measured 1.7 mm in size and had mild atherosclerotic disease burden.  Post bypass grafting had excellent arterial runoff. The distal RCA 1.8 mm in size and had excellent arterial runoff with moderate atherosclerotic disease burden.  Transesophageal echocardiography salient findings include preserved left ventricular function with no significant valvular dysfunction.  Severe pericarditis    Operative description in detail:  The patient was taken to the operative suite where he  was placed in a supine position.  Induction of general anesthesia and placement of a single-lumen endotracheal tube was performed without remark.  Appropriate arterial and venous access was established without remark.  Through the previously placed right internal  jugular central venous line, a pulmonary artery catheter was floated into position.  The patient was then prepped and draped in the usual and sterile surgical fashion.  A timeout was performed.  Perioperative antibiotics were administered. Beta blocker was given.    A two team approach was utilized to harvest both the left internal mammary artery and the right greater saphenous vein.   Briefly the right greater saphenous vein was taken at the level of the knee  medially and taken in a prograde fashion for an anticipated length of vein to the fossa ovalis.  Blunt dissection was performed and each branch was taken using the BridgeXsview device.  A counter stab incision was made with both proximal and distal control obtained.  The vein was extracted from a hemostatic tunnel.  Additional vein was taken distally open using standard technique.  The leg was closed in a layered fashion with Vicryl suture.  The vein was prepared without remark.      While the vein was being harvested, median sternotomy was performed by me. Pericardial fat in midline from the level of the innominate vein to the level of the diaphragm was divided in midline.  A Rultract device was used to expose the posterior sternal table.  The left internal mammary artery was taken down using a standard pedicle technique with a combination of electrocautery and/or clips to control all side branches.  At that time, the patient was systemically anticoagulated with IV heparin.  A 19 [place] Mejia drain was placed in the left pleural space.  After suitable time of circulating heparin, clips were placed doubly onto the mammary artery distally and it was divided proximal to the previously placed clips.  It had excellent arterial inflow.  The mammary artery was controlled distally.  The mammary artery harvest bed was hemostatic.  The Rultract device was removed from the sterile field and a Genesse retractor was used for exposure.  The mammary artery was prepared for  bypass grafting and deemed excellent.  A pericardial well was created as adhesiolysis was performed with the finding of extensive severe chronic pericarditis.  Tenacious scarring was noted along the RA and left PV and left LV wall.  I elected to cannulate the heart centrally accessing distally the ascending aorta and the right atrial appendage.  Each cannula was placed in continuity with the appropriate pump line. Retrograde autologous prime was completed as indicated.  A combined cardioplegia/aortic root vent set was secured with a horizontal mattress 4-0 Prolene suture.  With an appropriate ACT and all in readiness, cardiopulmonary bypass was commenced.  Additional adhesiolysis was performed dissecting the LV where possible.  I dissected out the most proximal visualized and largest diagonal artery, dRCA, and the LAD for suitable sites for bypass grafting.  With that, I proceeded to apply the aortic cross-clamp and administered cardioplegia.  We did achieve electrical-mechanical arrest.  We did implement systemic hypothermia mild and apply topical hypothermia to the ventricle.    I directed my attention towards the dRCA.  A coronary arteriotomy was made and augmented to size with Wilcox scissors.  It is as per operative findings.  The anastomosis was constructed in an end-to-side orientation with running 7-0 Prolene suture.  With that its proximal anastomosis was  constructed following aortotomy with 11 blade and augmented size with 4 mm arterial punch  subsequently.  This was constructed in a side aorta end vein graft fashion with running 6-0 Prolene suture. An AC  was placed.  The graft was assessed for lay which was excellent. It is without tension or torsion.     To that end I directed my attention towards the diagonal artery.  A coronary arteriotomy was made and augmented to size with Wilcox scissors.  It is as per operative findings.  The anastomosis was constructed in an end-to-side orientation with  running 7-0 Prolene suture.  With that its proximal anastomosis was constructed following aortotomy with 11 blade and augmented size with 4 mm arterial punch subsequently.  This was constructed in a side aorta end vein graft fashion with running 6-0 Prolene suture. An AC  was placed.  The graft was assessed for lay which was excellent.  It is without tension or torsion.     During a dose of cardioplegia, a pericardial slit left lateral was made while dividing associate pericardiophrenic fat and vasculature while being mindful of the lay of the phrenic nerve.  As such I proceeded to obtain control proximally onto the mammary artery and spatulated it distally.  I grafted this onto the LAD following coronary arteriotomy using previous described techniques.  The anastomosis was constructed in an end-to-side orientation with running 8-0 Prolene suture.  It is as per operative findings and the anastomosis was hemostatic.  I did tack the mammary artery pedicle to the anterior aspect heart with 6-0 Prolene sutures.    With that being accomplished, a modified terminal hotshot was administered.  The patient was placed in trendelenburg position.  Upon completion of terminal hotshot and placement of temporary epicardial pacing wires, with the aortic vent on high and pump flows diminished, the aortic cross-clamp was released.  With that, full support was implemented.  A perfusable rhythm was established. A nonworking beating phase was implemented.  Ventilation restored.  I surveyed each graft and each anastomosis was hemostatic and had excellent lay.  With all in readiness, the heart was allowed to fill.  De-airing maneuvers were performed as guided by transesophageal echocardiography.  With that the heart was decompressed and we removed the aortic root vent/cardioplegia cannula set.  Its associated pursestring suture was tied securely and this was reinforced as per matter of routine. The table was now placed in neutral  position.  With all in readiness, we proceeded to wean from and separate from cardiopulmonary bypass.  I did decannulate the venous line and snared down its associated pursestring suture.  Systemic intravenous protamine was administered.  All associated blood volume was returned to the patient. With continued good hemodynamics, I decannulate the arterial line and tied down it associated pursestring sutures.  At this time I tied down the previously snared pursestring suture.  The mediastinum was drained with 24 Cypriot Mejia drains placed anteriorly and posteriorly.  I surveyed the chest and hemostasis was pristine.  With that I impregnated the sternal edges with vancomycin, thrombin, and Gelfoam paste.  The sternum was reapproximated with stainless sterile wires placed in an interrupted fashion.  In layers anatomically the soft tissue planes were reapproximated. Instruments, sharps, and sponge counts were reported as correct.      Complications: None     Disposition: Transferred to ICU in stable and guarded condition.

## 2020-06-24 ENCOUNTER — OFFICE VISIT (OUTPATIENT)
Dept: CARDIAC SURGERY | Facility: CLINIC | Age: 80
End: 2020-06-24

## 2020-06-24 VITALS
WEIGHT: 215 LBS | BODY MASS INDEX: 29.12 KG/M2 | HEART RATE: 69 BPM | OXYGEN SATURATION: 96 % | SYSTOLIC BLOOD PRESSURE: 130 MMHG | DIASTOLIC BLOOD PRESSURE: 70 MMHG | HEIGHT: 72 IN

## 2020-06-24 DIAGNOSIS — I10 ESSENTIAL HYPERTENSION: ICD-10-CM

## 2020-06-24 DIAGNOSIS — I25.10 CORONARY ARTERY DISEASE INVOLVING NATIVE CORONARY ARTERY OF NATIVE HEART WITHOUT ANGINA PECTORIS: Primary | ICD-10-CM

## 2020-06-24 PROCEDURE — 99024 POSTOP FOLLOW-UP VISIT: CPT | Performed by: NURSE PRACTITIONER

## 2020-06-24 NOTE — PROGRESS NOTES
"Subjective   Chief Complaint   Patient presents with   • Post-op Follow-up     CABG x3 on 6/9       Patient ID: Faisal Joseph is a 79 y.o. male who is here for follow-up having had CABG X 3, LIMA, SHELBY, EVH WITH OPEN EXTENSION LOWER RIGHT LEG performed on 6/9/2020.      History of Present Illness  Post operative recovery was uneventful without any major complications. Sleep habits are good. Pain control has been good. No fevers/sweats/chills. No drainage from incisions.  No sternal clicks. No chest pain or shortness of breath. Appetite is fair. He is not diabetic. Denies tobacco use.  Ambulating 15 minutes twice daily.  He has been doing well with PT at [facility] in [place] and is scheduled to be discharged in the morning.  Blood pressure has been well controlled.     The following portions of the patient's history were reviewed and updated as appropriate: allergies, current medications, past family history, past medical history, past social history, past surgical history and problem list.    Review of Systems   Constitutional: Negative for activity change, chills, diaphoresis, fatigue and fever.   HENT: Negative for trouble swallowing and voice change.    Eyes: Negative for visual disturbance.   Respiratory: Negative for cough, chest tightness, shortness of breath and wheezing.    Cardiovascular: Negative for chest pain, palpitations and leg swelling.   Gastrointestinal: Negative for abdominal distention, abdominal pain, constipation, diarrhea, nausea and vomiting.   Musculoskeletal: Negative for arthralgias and myalgias.   Skin: Negative for color change, pallor, rash and wound.   Neurological: Negative for dizziness, syncope and headaches.   Psychiatric/Behavioral: Negative for agitation, confusion and sleep disturbance.       Objective   Visit Vitals  /70 (BP Location: Right arm, Patient Position: Sitting, Cuff Size: Large Adult)   Pulse 69   Ht 182.9 cm (72\")   Wt 97.5 kg (215 lb)   SpO2 96%   BMI 29.16 " kg/m²       Physical Exam   Constitutional: He is oriented to person, place, and time. He appears well-developed and well-nourished.   HENT:   Head: Normocephalic.   Eyes: Pupils are equal, round, and reactive to light.   Neck: No JVD present.   Cardiovascular: Normal rate, regular rhythm, normal heart sounds and intact distal pulses.   No murmur heard.  The sternum is stable and without clicks.  Sternotomy site is C/D/I and healing nicely   Pulmonary/Chest: Effort normal and breath sounds normal. He has no wheezes. He has no rales.   Abdominal: Soft. He exhibits no distension. There is no tenderness.   Musculoskeletal: He exhibits no edema or tenderness.   Lymphadenopathy:     He has no cervical adenopathy.   Neurological: He is alert and oriented to person, place, and time.   Skin: Skin is warm and dry.   Saphenectomy site is C/D/I and healing nicely   Psychiatric: He has a normal mood and affect. His behavior is normal. Judgment and thought content normal.   Vitals reviewed.          Assessment/Plan       Faisal was seen today for post-op follow-up.    Diagnoses and all orders for this visit:    Coronary artery disease involving native coronary artery of native heart without angina pectoris    Essential hypertension       Overall, Faisal Joseph is doing well.  We discussed current sternotomy precautions and how these will not be advanced yet. Following post op cardiac surgery home instructions. Provided support and encouragement. All questions have been answered to the best of my ability. Will discuss starting cardiac rehabilitation at official 1 month post op visit. Patient has follow up with Dr. Carballo in a few weeks. Patient has follow up with Cardiology in a few weeks. Patient has been instructed to contact our office with any questions or concerns should they arise prior to the next office visit.      Patient's Body mass index is 29.16 kg/m². BMI is above normal parameters. Recommendations include:  educational material, nutrition counseling and referral to primary care.    We discussed the need for ongoing smoking cessation as tobacco use reduces the overall graft patency.  I have congratulated Faisal Joseph on successful smoking cessation.     Faisal Joseph  reports that he quit smoking about 36 years ago. He has never used smokeless tobacco.. I have educated him on the risk of diseases from using tobacco products such as cancer, COPD and heart diease.     I spent 5 minutes counseling the patient.    Advance Care Planning   ACP discussion was held with the patient during this visit. Patient has an advance directive in EMR which is still valid.

## 2020-07-22 ENCOUNTER — OFFICE VISIT (OUTPATIENT)
Dept: CARDIAC SURGERY | Facility: CLINIC | Age: 80
End: 2020-07-22

## 2020-07-22 VITALS
DIASTOLIC BLOOD PRESSURE: 94 MMHG | BODY MASS INDEX: 28.61 KG/M2 | SYSTOLIC BLOOD PRESSURE: 168 MMHG | HEIGHT: 72 IN | WEIGHT: 211.2 LBS | OXYGEN SATURATION: 95 % | HEART RATE: 77 BPM

## 2020-07-22 DIAGNOSIS — R53.81 PHYSICAL DEBILITY: ICD-10-CM

## 2020-07-22 DIAGNOSIS — I21.4 NSTEMI (NON-ST ELEVATED MYOCARDIAL INFARCTION) (HCC): ICD-10-CM

## 2020-07-22 DIAGNOSIS — I25.2 HISTORY OF NON-ST ELEVATION MYOCARDIAL INFARCTION (NSTEMI): ICD-10-CM

## 2020-07-22 DIAGNOSIS — I25.10 CORONARY ARTERY DISEASE INVOLVING NATIVE CORONARY ARTERY OF NATIVE HEART WITHOUT ANGINA PECTORIS: Primary | ICD-10-CM

## 2020-07-22 DIAGNOSIS — N18.4 STAGE 4 CHRONIC KIDNEY DISEASE (HCC): ICD-10-CM

## 2020-07-22 DIAGNOSIS — Z86.73 HISTORY OF CVA (CEREBROVASCULAR ACCIDENT): ICD-10-CM

## 2020-07-22 PROCEDURE — 99024 POSTOP FOLLOW-UP VISIT: CPT | Performed by: THORACIC SURGERY (CARDIOTHORACIC VASCULAR SURGERY)

## 2020-07-29 ENCOUNTER — OFFICE VISIT (OUTPATIENT)
Dept: CARDIOLOGY | Facility: CLINIC | Age: 80
End: 2020-07-29

## 2020-07-29 VITALS
HEIGHT: 72 IN | HEART RATE: 67 BPM | OXYGEN SATURATION: 97 % | WEIGHT: 207 LBS | SYSTOLIC BLOOD PRESSURE: 168 MMHG | DIASTOLIC BLOOD PRESSURE: 92 MMHG | BODY MASS INDEX: 28.04 KG/M2

## 2020-07-29 DIAGNOSIS — E78.5 HYPERLIPIDEMIA LDL GOAL <70: ICD-10-CM

## 2020-07-29 DIAGNOSIS — N18.4 STAGE 4 CHRONIC KIDNEY DISEASE (HCC): ICD-10-CM

## 2020-07-29 DIAGNOSIS — E78.00 PURE HYPERCHOLESTEROLEMIA: ICD-10-CM

## 2020-07-29 DIAGNOSIS — I25.10 CORONARY ARTERY DISEASE INVOLVING NATIVE CORONARY ARTERY OF NATIVE HEART WITHOUT ANGINA PECTORIS: Primary | ICD-10-CM

## 2020-07-29 DIAGNOSIS — I21.4 NSTEMI (NON-ST ELEVATED MYOCARDIAL INFARCTION) (HCC): ICD-10-CM

## 2020-07-29 DIAGNOSIS — I10 ESSENTIAL HYPERTENSION: ICD-10-CM

## 2020-07-29 PROCEDURE — 99214 OFFICE O/P EST MOD 30 MIN: CPT | Performed by: NURSE PRACTITIONER

## 2020-07-29 PROCEDURE — 93000 ELECTROCARDIOGRAM COMPLETE: CPT | Performed by: NURSE PRACTITIONER

## 2020-07-29 RX ORDER — LISINOPRIL 10 MG/1
10 TABLET ORAL DAILY
Qty: 30 TABLET | Refills: 11 | Status: SHIPPED | OUTPATIENT
Start: 2020-07-29 | End: 2021-01-29 | Stop reason: DRUGHIGH

## 2020-07-29 RX ORDER — POLYETHYLENE GLYCOL 3350 17 G/17G
17 POWDER, FOR SOLUTION ORAL DAILY
COMMUNITY
End: 2021-01-29

## 2020-07-29 NOTE — PROGRESS NOTES
Subjective:     Encounter Date:07/29/2020      Patient ID: Faisal Joseph is a 79 y.o. male     Chief Complaint:  Coronary Artery Disease   Presents for follow-up visit. Symptoms include shortness of breath. Pertinent negatives include no chest pain, chest pressure, chest tightness, dizziness, leg swelling, muscle weakness, palpitations or weight gain. Risk factors include hyperlipidemia. The symptoms have been stable. Compliance with diet is good. Compliance with exercise is good. Compliance with medications is good.   Hypertension   This is a chronic problem. The current episode started more than 1 year ago. The problem is uncontrolled. Associated symptoms include shortness of breath. Pertinent negatives include no chest pain, headaches, malaise/fatigue, orthopnea, palpitations or PND. Past treatments include beta blockers, ACE inhibitors and calcium channel blockers. The current treatment provides significant improvement.   Hyperlipidemia   This is a new problem. This is a new diagnosis. Recent lipid tests were reviewed and are high. Associated symptoms include shortness of breath. Pertinent negatives include no chest pain. Current antihyperlipidemic treatment includes statins. There are no compliance problems.      Patient presents today for routine follow up after recent hospital admission for NSTEMI and CABG. Patient was seen in May for NSTEMI and chest pain. He then underwent heart cath with Dr. Brice. Heart cath revealed Severe native multivessel coronary artery disease, including severe stenosis at the distal left main coronary artery which also involves the ostial portion of the LAD, ramus intermedius and left circumflex and severe stenosis in the proximal right coronary artery. He was then referred to CTS for work up. Echo showed LVEF 61-65%, mild LVH, and no significant valvular abnormalities. Patient was ultimately felt stable and discharged home and returned 6/9/20 for CABG. He then underwent 3  vessel coronary artery bypass grafting (LIMA to LAD, SVG to dominant/diagnoal and SVG to distal RCA. 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 His LDL was 106 at time of NSTEMI-he was started on Lipitor 40 at that time- he has tolerated this thus far. Overall he is doing well. No further chest pain. Still notes some shortness of breath.    The following portions of the patient's history were reviewed and updated as appropriate: allergies, current medications, past family history, past medical history, past social history, past surgical history and problem list.    Allergies   Allergen Reactions   • Hydralazine Nausea Only   • Losartan Potassium Nausea Only   • Penicillins Hives   • Spironolactone Swelling     Made  Breast sore and swell       Current Outpatient Medications:   •  amLODIPine (NORVASC) 5 MG tablet, Take 5 mg by mouth Daily., Disp: , Rfl:   •  aspirin 81 MG EC tablet, Take 1 tablet by mouth Daily., Disp: 100 tablet, Rfl: 99  •  atorvastatin (LIPITOR) 40 MG tablet, Take 1 tablet by mouth Every Night., Disp: 30 tablet, Rfl: 2  •  Cholecalciferol (VITAMIN D) 2000 units capsule, Take  by mouth Daily., Disp: , Rfl:   •  clopidogrel (PLAVIX) 75 MG tablet, Take 1 tablet by mouth Every Evening., Disp: 30 tablet, Rfl: 2  •  lisinopril (PRINIVIL,ZESTRIL) 10 MG tablet, Take 1 tablet by mouth Daily., Disp: 30 tablet, Rfl: 11  •  magnesium oxide (MAGOX) 400 (241.3 Mg) MG tablet tablet, Take 400 mg by mouth 1 (One) Time., Disp: , Rfl:   •  metoprolol tartrate (LOPRESSOR) 25 MG tablet, Take 1 tablet by mouth 2 (Two) Times a Day., Disp: 60 tablet, Rfl: 2  •  oxyCODONE-acetaminophen (PERCOCET) 5-325 MG per tablet, Take 1 tablet by mouth Every 6 (Six) Hours As Needed for Moderate Pain  for up to 30 doses., Disp: 30 tablet, Rfl: 0  •  polyethylene glycol (MIRALAX) packet, Take 17 g by mouth Daily., Disp: , Rfl:     Social History      Socioeconomic History   • Marital status:      Spouse name: Not on file   • Number of children: Not on file   • Years of education: Not on file   • Highest education level: Not on file   Tobacco Use   • Smoking status: Former Smoker     Last attempt to quit:      Years since quittin.6   • Smokeless tobacco: Never Used   Substance and Sexual Activity   • Alcohol use: No   • Drug use: No   • Sexual activity: Defer       Review of Systems   Constitution: Negative for chills, decreased appetite, fever, malaise/fatigue, weight gain and weight loss.   HENT: Negative for nosebleeds.    Eyes: Negative for visual disturbance.   Cardiovascular: Negative for chest pain, dyspnea on exertion, leg swelling, near-syncope, orthopnea, palpitations, paroxysmal nocturnal dyspnea and syncope.   Respiratory: Positive for shortness of breath. Negative for chest tightness, cough, hemoptysis and snoring.    Endocrine: Negative for cold intolerance and heat intolerance.   Hematologic/Lymphatic: Negative for bleeding problem. Does not bruise/bleed easily.   Skin: Negative for rash.   Musculoskeletal: Positive for back pain. Negative for falls and muscle weakness.   Gastrointestinal: Negative for abdominal pain, constipation, diarrhea, heartburn, melena, nausea and vomiting.   Genitourinary: Negative for hematuria.   Neurological: Negative for dizziness, headaches and light-headedness.   Psychiatric/Behavioral: Negative for altered mental status.   Allergic/Immunologic: Negative for persistent infections.              Objective:     Physical Exam   Constitutional: He is oriented to person, place, and time. He appears well-developed and well-nourished.   HENT:   Head: Normocephalic and atraumatic.   Eyes: Pupils are equal, round, and reactive to light.   Neck: Normal range of motion. Neck supple. No JVD present. Carotid bruit is not present.   Cardiovascular: Normal rate, regular rhythm, normal heart sounds and intact  "distal pulses.   Pulmonary/Chest: Effort normal and breath sounds normal.   Abdominal: Soft. Bowel sounds are normal.   Musculoskeletal: Normal range of motion.   Neurological: He is alert and oriented to person, place, and time. He has normal reflexes.   Skin: Skin is warm and dry.   Psychiatric: He has a normal mood and affect. His behavior is normal. Judgment and thought content normal.           ECG 12 Lead  Date/Time: 7/29/2020 1:39 PM  Performed by: Michael Fierro APRN  Authorized by: Michael Fierro APRN   Comparison: compared with previous ECG from 6/11/2020  Similar to previous ECG  Rhythm: sinus rhythm  Conduction: 1st degree AV block          /92   Pulse 67   Ht 182.9 cm (72\")   Wt 93.9 kg (207 lb)   SpO2 97%   BMI 28.07 kg/m²   Lab Review:   I have reviewed   Lab Results   Component Value Date    CHOL 162 05/28/2020    TRIG 72 05/28/2020    HDL 42 05/28/2020     (H) 05/28/2020         Assessment:          Diagnosis Plan   1. Coronary artery disease involving native coronary artery of native heart without angina pectoris     2. Hyperlipidemia LDL goal <70  Lipid Panel   3. Essential hypertension     4. Pure hypercholesterolemia     5. Stage 4 chronic kidney disease (CMS/[facility])     6. NSTEMI (non-ST elevated myocardial infarction) (CMS/[facility])            Plan:       1. Coronary Artery Disease now s/p CABG in June. Overall doing well. Still short of breath. Due to start rehab soon but having back problems. On Lopressor, Lipitor and ACE. On Plavix and Aspirin.  2. Hyperlipidemia- LDL goal less than 70. Started on Lipitor 40.  3. Blood Pressure elevated- states Dr. Carballo told him to increase Lisinopril to 10 but he did not have script for this.   4. Hyperlipidemia- . Started on Lipitor. Repeat lipid panel in 6 months before appointment. If not at goal would increase Lipitor  5. CKD- follows with [facility] kidney  6. NSTEMI- cath in May with multivessel disease. Now s/p " CABG    Patient's Body mass index is 28.07 kg/m². BMI is above normal parameters. Recommendations include: exercise counseling and nutrition counseling.

## 2020-07-31 ENCOUNTER — TELEPHONE (OUTPATIENT)
Dept: CARDIOLOGY | Facility: CLINIC | Age: 80
End: 2020-07-31

## 2020-07-31 NOTE — TELEPHONE ENCOUNTER
Spoke with patient. No drainage or fever. Patient has been going to cardiac rehab. Spoke to Ramona Isaac. Offered appointment for today but patient declined. Recommend to keep leg elevated, obtain compression stockings from local pharmacy, and call office if gets worse. Will follow up on Monday and if it is not any better will schedule for patient to see Ramona Uribe in office next week. Patient voiced understanding.

## 2020-07-31 NOTE — TELEPHONE ENCOUNTER
"Pt left  message stating he had CABG recently and the leg where the vein was removed is swelling \"really really bad\".  Calling to speak with someone re: what to do.  Can reach him at #[phone]/dayana  "

## 2020-08-17 NOTE — PROGRESS NOTES
"Subjective   Chief Complaint   Patient presents with   • Post-op Follow-up     CABG x3 on 6/9       Patient ID: Faisal Joseph is a 79 y.o. male who is here for follow-up having had CABG X 3, LIMA, SHELBY, EVH WITH OPEN EXTENSION LOWER RIGHT LEG performed on 6/9/2020.      History of Present Illness  Post operative recovery was uneventful without any major complications. Sleep habits are back to baseline.  Pain control is been excellent.  He denies any fevers, sweats, or chills.  No drainage from incisions.  He has had no sternal clicks.  He has had no cardiac chest pain or shortness of breath.  He is not a diabetic.  He is a non-smoker.  He is not walking nearly 30 minutes twice a day most days.  Since being discharged from a [facility] nursing facility he has made efforts as an outpatient to improve his exercise tolerance.  He is concerned as to his blood pressure which has been increasingly more elevated.     The following portions of the patient's history were reviewed and updated as appropriate: allergies, current medications, past family history, past medical history, past social history, past surgical history and problem list.        Objective   Visit Vitals  /94 (BP Location: Left arm, Patient Position: Sitting, Cuff Size: Adult)   Pulse 77   Ht 182.9 cm (72\")   Wt 95.8 kg (211 lb 3.2 oz)   SpO2 95%   BMI 28.64 kg/m²       Physical Exam   Constitutional: He is oriented to person, place, and time. He appears well-developed and well-nourished.   HENT:   Head: Normocephalic.   Eyes: Pupils are equal, round, and reactive to light.   Neck: No JVD present.   Cardiovascular: Normal rate, regular rhythm, normal heart sounds and intact distal pulses.   No murmur heard.  The sternum is stable and without clicks.  Sternotomy site is C/D/I and healing nicely   Pulmonary/Chest: Effort normal and breath sounds normal. He has no wheezes. He has no rales.   Abdominal: Soft. He exhibits no distension. There is no " tenderness.   Musculoskeletal: He exhibits no edema or tenderness.   Lymphadenopathy:     He has no cervical adenopathy.   Neurological: He is alert and oriented to person, place, and time.   Skin: Skin is warm and dry.   Saphenectomy site well-healed.  No associated edema   Psychiatric: He has a normal mood and affect. His behavior is normal. Judgment and thought content normal.   Vitals reviewed.          Assessment/Plan       Faisal was seen today for post-op follow-up.    Diagnoses and all orders for this visit:    Coronary artery disease involving native coronary artery of native heart without angina pectoris  -     Ambulatory Referral to Cardiac Rehab    Stage 4 chronic kidney disease (CMS/[facility])    History of non-ST elevation myocardial infarction (NSTEMI)    NSTEMI (non-ST elevated myocardial infarction) (CMS/[facility])    History of CVA (cerebrovascular accident)    Physical debility       Overall, Faisal Joseph is doing great.  We discussed the sternal precautions and how those are evolving over the next couple months.  He has been cleared to begin cardiac rehabilitation is very excited to do so.  We discussed the importance of risk factor modification including dietary changes, regular exercise regiment, strict adherence to medical regiment, and remaining a non-smoker.  He does have follow-up with cardiology.        Patient's Body mass index is 28.64 kg/m². BMI is within normal parameters. No follow-up required..    He is a non-smoker.    Advance Care Planning   ACP discussion was held with the patient during this visit. Patient has an advance directive in EMR which is still valid.        Although I have not given him a specific return to clinic appointment, should I be of any further assist in the future, please do not hesitate to contact me.

## 2020-09-30 ENCOUNTER — TELEPHONE (OUTPATIENT)
Dept: CARDIOLOGY | Facility: CLINIC | Age: 80
End: 2020-09-30

## 2020-10-01 NOTE — TELEPHONE ENCOUNTER
The patient had coronary artery bypass grafting earlier this year.  As long as he is not having any unstable symptoms, he would not need any further cardiac work-up prior to his pain management treatments.  I would recommend that he stay on aspirin 81 mg daily but Plavix may be discontinued at this time permanently.

## 2021-01-29 ENCOUNTER — OFFICE VISIT (OUTPATIENT)
Dept: CARDIOLOGY | Facility: CLINIC | Age: 81
End: 2021-01-29

## 2021-01-29 VITALS
HEIGHT: 72 IN | OXYGEN SATURATION: 99 % | WEIGHT: 226 LBS | DIASTOLIC BLOOD PRESSURE: 74 MMHG | SYSTOLIC BLOOD PRESSURE: 128 MMHG | BODY MASS INDEX: 30.61 KG/M2 | HEART RATE: 63 BPM

## 2021-01-29 DIAGNOSIS — E78.00 PURE HYPERCHOLESTEROLEMIA: Chronic | ICD-10-CM

## 2021-01-29 DIAGNOSIS — I10 ESSENTIAL HYPERTENSION: Chronic | ICD-10-CM

## 2021-01-29 DIAGNOSIS — I25.10 CORONARY ARTERY DISEASE INVOLVING NATIVE CORONARY ARTERY OF NATIVE HEART WITHOUT ANGINA PECTORIS: Primary | ICD-10-CM

## 2021-01-29 DIAGNOSIS — N18.4 STAGE 4 CHRONIC KIDNEY DISEASE (HCC): Chronic | ICD-10-CM

## 2021-01-29 DIAGNOSIS — Z86.73 HISTORY OF CVA (CEREBROVASCULAR ACCIDENT): Chronic | ICD-10-CM

## 2021-01-29 DIAGNOSIS — I31.9 CHRONIC PERICARDITIS, UNSPECIFIED COMPLICATION STATUS, UNSPECIFIED TYPE: Chronic | ICD-10-CM

## 2021-01-29 PROBLEM — R07.9 CHEST PAIN: Status: RESOLVED | Noted: 2020-05-28 | Resolved: 2021-01-29

## 2021-01-29 PROBLEM — R79.89 ELEVATED TROPONIN: Status: RESOLVED | Noted: 2020-05-28 | Resolved: 2021-01-29

## 2021-01-29 PROBLEM — I21.4 NSTEMI (NON-ST ELEVATED MYOCARDIAL INFARCTION): Status: RESOLVED | Noted: 2020-05-28 | Resolved: 2021-01-29

## 2021-01-29 PROBLEM — I25.2 HISTORY OF NON-ST ELEVATION MYOCARDIAL INFARCTION (NSTEMI): Chronic | Status: ACTIVE | Noted: 2020-06-10

## 2021-01-29 PROBLEM — R77.8 ELEVATED TROPONIN: Status: RESOLVED | Noted: 2020-05-28 | Resolved: 2021-01-29

## 2021-01-29 PROBLEM — E78.5 HYPERLIPIDEMIA: Chronic | Status: ACTIVE | Noted: 2020-06-10

## 2021-01-29 PROCEDURE — 93000 ELECTROCARDIOGRAM COMPLETE: CPT | Performed by: NURSE PRACTITIONER

## 2021-01-29 PROCEDURE — 99214 OFFICE O/P EST MOD 30 MIN: CPT | Performed by: NURSE PRACTITIONER

## 2021-01-29 RX ORDER — TERAZOSIN 5 MG/1
5 CAPSULE ORAL NIGHTLY
COMMUNITY
End: 2021-08-30 | Stop reason: SINTOL

## 2021-01-29 RX ORDER — HYDRALAZINE HYDROCHLORIDE 50 MG/1
50 TABLET, FILM COATED ORAL 3 TIMES DAILY
COMMUNITY
End: 2022-05-03

## 2021-01-29 RX ORDER — CLONIDINE HYDROCHLORIDE 0.1 MG/1
0.1 TABLET ORAL AS NEEDED
COMMUNITY

## 2021-01-29 RX ORDER — LISINOPRIL 20 MG/1
20 TABLET ORAL DAILY
COMMUNITY

## 2021-01-29 RX ORDER — NITROGLYCERIN 0.4 MG/1
TABLET SUBLINGUAL
Qty: 25 TABLET | Refills: 2 | Status: SHIPPED | OUTPATIENT
Start: 2021-01-29

## 2021-01-29 RX ORDER — LEVOCETIRIZINE DIHYDROCHLORIDE 5 MG/1
5 TABLET, FILM COATED ORAL EVERY EVENING
COMMUNITY
End: 2022-01-13

## 2021-01-29 NOTE — PROGRESS NOTES
Subjective:     Encounter Date:01/29/2021      Patient ID: Faisal Joseph is a 80 y.o. male with a previous medical history of coronary artery disease status post coronary artery bypass grafting, hypertension, hyperlipidemia, chronic kidney disease who presents today for follow up.     Chief Complaint: Follow up   History of Present Illness    Mr. Joseph presents today for routine follow-up.  Overall he is feeling stable although he does admit to labile blood pressure recordings.  He has discussed this with his primary care physician.  Prior to his hospitalization for coronary artery bypass grafting he had adjustments made to his medications due to his kidney function and blood pressure.  During his hospitalization other medication changes were made and since discharge he is on different pills that he was for a long period of time.  He is concerned about his blood pressure.  His blood pressure in the office today is 128/74.  He tells me he generally checks his blood pressure every day in the evening times and his blood pressure is generally 170 systolic.  He is taking clonidine at night almost every evening.  He is taking hydralazine only twice a day.  He has recently started a new medication prescribed by his primary care physician he estimates for about 3 months.  He tells me he was started on Terazosin which was prescribed and replaced his Flomax.  He has had recent lab work through his PCP office which is not available to me.  He tells me he has not seen the kidney office where he follows with LIZ Gilbert, in some time.  He is concerned that he should be back on his indapamide that he was on prior to surgery.     He reports chronic shortness of breath that is not worsening but has not completely disappeared since surgery this appears to be consistent with his previous office visit.  He reports left-sided chest discomfort worse on palpation today.  There is a note during his hospitalization that he has  "had chronic pericarditis.  Patient tells me he has not familiar with that diagnosis but does tell me that this discomfort is not like his chest pain he presented with at the time of his heart attack.  He denies any pressure, sharp pain, heaviness but describes it as \" a muscle pain\".  He cannot tell me any worsening or alleviating factors.  He denies any associated symptoms with the episodes.    He denies any lightheadedness, dizziness.  He has not taken nitroglycerin but is requesting refills today.    The following portions of the patient's history were reviewed and updated as appropriate: allergies, current medications, past family history, past medical history, past social history and past surgical history.     Allergies   Allergen Reactions   • Hydralazine Nausea Only   • Losartan Potassium Nausea Only   • Penicillins Hives   • Spironolactone Swelling     Made  Breast sore and swell       Current Outpatient Medications:   •  amLODIPine (NORVASC) 10 MG tablet, Take 10 mg by mouth Daily., Disp: , Rfl:   •  aspirin 81 MG EC tablet, Take 1 tablet by mouth Daily., Disp: 100 tablet, Rfl: 99  •  Cholecalciferol (VITAMIN D) 2000 units capsule, Take  by mouth Daily., Disp: , Rfl:   •  cloNIDine (CATAPRES) 0.1 MG tablet, Take 0.1 mg by mouth As Needed for High Blood Pressure., Disp: , Rfl:   •  clopidogrel (PLAVIX) 75 MG tablet, Take 1 tablet by mouth Every Evening., Disp: 30 tablet, Rfl: 2  •  hydrALAZINE (APRESOLINE) 25 MG tablet, Take 25 mg by mouth 3 (Three) Times a Day., Disp: , Rfl:   •  levocetirizine (XYZAL) 5 MG tablet, Take 5 mg by mouth Every Evening., Disp: , Rfl:   •  lisinopril (PRINIVIL,ZESTRIL) 20 MG tablet, Take 20 mg by mouth Daily., Disp: , Rfl:   •  magnesium oxide (MAGOX) 400 (241.3 Mg) MG tablet tablet, Take 400 mg by mouth 1 (One) Time., Disp: , Rfl:   •  metoprolol tartrate (LOPRESSOR) 25 MG tablet, Take 1 tablet by mouth 2 (Two) Times a Day., Disp: 60 tablet, Rfl: 2  •  terazosin (HYTRIN) 5 MG " "capsule, Take 5 mg by mouth Every Night., Disp: , Rfl:   •  Evolocumab (REPATHA) solution prefilled syringe injection, Inject 1 mL under the skin into the appropriate area as directed Every 14 (Fourteen) Days., Disp: 6 each, Rfl: 3  •  nitroglycerin (NITROSTAT) 0.4 MG SL tablet, 1 under the tongue as needed for angina, may repeat q5mins for up three doses, Disp: 25 tablet, Rfl: 2    Review of Systems   Constitution: Positive for malaise/fatigue. Negative for diaphoresis, fever, weight gain and weight loss.   Cardiovascular: Positive for chest pain and dyspnea on exertion. Negative for leg swelling, near-syncope, orthopnea, palpitations, paroxysmal nocturnal dyspnea and syncope.   Hematologic/Lymphatic: Negative for bleeding problem. Bruises/bleeds easily.   Gastrointestinal: Negative for bloating, abdominal pain, nausea and vomiting.   Neurological: Negative for dizziness, headaches and light-headedness.   Psychiatric/Behavioral: Negative for altered mental status.       ECG 12 Lead    Date/Time: 1/29/2021 3:16 PM  Performed by: Jaz Musa APRN  Authorized by: Jaz Musa APRN   Comparison: compared with previous ECG from 7/29/2020  Similar to previous ECG  Rhythm: sinus rhythm  Ectopy: infrequent PVCs  Rate: normal  BPM: 67  Conduction: 1st degree AV block  T inversion: II, III and aVF  QRS axis: right  Other findings: T wave abnormality    Clinical impression: abnormal EKG          /74   Pulse 63   Ht 182.9 cm (72\")   Wt 103 kg (226 lb)   SpO2 99%   BMI 30.65 kg/m²        Objective:     Vitals signs reviewed.   Constitutional:       Appearance: Well-developed and well-groomed. Chronically ill-appearing.   Neck:      Musculoskeletal: Normal range of motion and neck supple.   Pulmonary:      Effort: Pulmonary effort is normal.      Breath sounds: Normal breath sounds.   Chest:      Chest wall: Tender to palpatation. Reproducing clinical pain.   Cardiovascular:      Normal rate. Regular rhythm. "      Murmurs: There is no murmur.      No gallop. No rub.   Edema:     Peripheral edema absent.   Abdominal:      Palpations: Abdomen is soft.   Skin:     General: Skin is warm and dry.   Neurological:      Mental Status: Alert and oriented to person, place and time.   Psychiatric:         Behavior: Behavior is cooperative.         Lab Review:       Assessment:          Diagnosis Plan   1. Coronary artery disease involving native coronary artery of native heart without angina pectoris  ECG 12 Lead   2. Essential hypertension     3. Pure hypercholesterolemia     4. Chronic pericarditis, unspecified complication status, unspecified type     5. Stage 4 chronic kidney disease (CMS/HCC)     6. History of CVA (cerebrovascular accident)            Plan:       Coronary artery disease: The patient denies any angina similar to his previous findings during his hospitalization.  He does report chest pain although this is worsened with palpation and appears to be musculoskeletal.  The patient is on aspirin 81 mg daily as well as Plavix 75 mg daily.  He has stopped his statin therapy due to muscle aches.  He is on a beta-blocker, metoprolol tartrate 25 mg twice a day.    Hypertension: The patient tells me that he has uncontrolled blood pressure at home.  He does not bring a log with him.  He reports that his primary care physician has recently requested a log which she has brought to his office on multiple occasions.  His primary care physician is also made some adjustments to his medicines.  Based on his blood pressure today I do not think adjustments in his blood pressure are necessary however he may need to continue as needed use of clonidine at bedtime if his blood pressure elevated then as he reports.  He is very concerned that he is not on the same medications as he was prior to surgery.  I have spent a long time in the room with him today discussing that medication changes made at that time we will monitor during his  inpatient stay and have seems to have worked after his discharge.  If he is having elevated blood pressures it is helpful to make adjustments based off of a log that would help to demonstrate when his blood pressures are controlled versus not controlled.  He reports that he would like to also discuss this with the nephrology office.  I have offered to make the patient an appointment at their office as he tells me he does not think that he has one.  I have explained to him that if I make adjustments today and he seeks the opinion of yet another provider there may be too many changes happening at once.  I will defer further blood pressure management to nephrology.    Hyperlipidemia: The patient did not tolerate atorvastatin 40 mg daily.  He tells me that he had associated myalgias with medications that improved with stopping them.  Discussed PCSk9i therapy with the patient who seemed agreeable but then told me, at the end of the visit, that he would rather retry the previous statin therapy that he was taking prior to his hospitalization.  He is unsure what that medication was that he will reach back out to our office to let us know.  It appears after research that the patient was on pravastatin.  Until he has called us I will hold off on sending in refills in case I am mistaken.    Chronic pericarditis: There is mention of chronic pericarditis during his hospitalization.  The patient does have what appears to be musculoskeletal discomfort today on exam.  He denies any worsening of the pain with deep inspiration or expiration.  He reports that he has not tried anything for pain relief.  I have suggested a short-term dose of anti-inflammatory medications to see if he has improvement of the discomfort.    CKD: The patient reports stage IV chronic kidney disease.  He has had recent lab work through his primary care physician's office is not available to me.  He is concerned about restarting his indapamide.  I am unsure  what his most recent creatinine or potassium was.  I will defer this to nephrology.    CVA: The patient has a history of a stroke.  He has right-sided residual from this.  The stroke was suffered in approximately 2007.    I will have our office to call nephrology and have them to place a call to the patient as he has requested for us to help him getting set up with a follow-up visit there.  In terms of his follow-up with us I do not think at this time he needs further follow-up here for 6 months unless he has worsening symptoms.  I will defer management of his blood pressure to his primary care physician's office or nephrology.  However, if I can be of help in adjusting his medications we will certainly do so.  He expresses desire to discuss this with nephrology at this time.

## 2021-03-05 ENCOUNTER — DOCUMENTATION (OUTPATIENT)
Dept: CARDIOLOGY | Facility: CLINIC | Age: 81
End: 2021-03-05

## 2021-03-05 NOTE — PROGRESS NOTES
I have mailed Mr Joseph an order to have lipid panel drawn.   His PCP (Dr Polanco) put him on Pravastatin 40mg on 3/2/2021 but no lipid was drawn.  Patient has been notified.

## 2021-07-28 ENCOUNTER — OFFICE VISIT (OUTPATIENT)
Dept: CARDIOLOGY | Facility: CLINIC | Age: 81
End: 2021-07-28

## 2021-07-28 VITALS
OXYGEN SATURATION: 97 % | HEIGHT: 72 IN | WEIGHT: 217 LBS | BODY MASS INDEX: 29.39 KG/M2 | SYSTOLIC BLOOD PRESSURE: 160 MMHG | DIASTOLIC BLOOD PRESSURE: 100 MMHG | HEART RATE: 72 BPM

## 2021-07-28 DIAGNOSIS — I10 ESSENTIAL HYPERTENSION: Chronic | ICD-10-CM

## 2021-07-28 DIAGNOSIS — R06.02 SOB (SHORTNESS OF BREATH): ICD-10-CM

## 2021-07-28 DIAGNOSIS — I73.9 CLAUDICATION (HCC): ICD-10-CM

## 2021-07-28 DIAGNOSIS — E78.00 PURE HYPERCHOLESTEROLEMIA: Chronic | ICD-10-CM

## 2021-07-28 DIAGNOSIS — I25.10 CORONARY ARTERY DISEASE INVOLVING NATIVE CORONARY ARTERY OF NATIVE HEART WITHOUT ANGINA PECTORIS: Primary | Chronic | ICD-10-CM

## 2021-07-28 PROCEDURE — 99214 OFFICE O/P EST MOD 30 MIN: CPT | Performed by: INTERNAL MEDICINE

## 2021-07-28 PROCEDURE — 93000 ELECTROCARDIOGRAM COMPLETE: CPT | Performed by: INTERNAL MEDICINE

## 2021-07-28 RX ORDER — METOPROLOL TARTRATE 50 MG/1
50 TABLET, FILM COATED ORAL 2 TIMES DAILY
Qty: 60 TABLET | Refills: 11 | Status: CANCELLED | OUTPATIENT
Start: 2021-07-28

## 2021-07-28 RX ORDER — CARVEDILOL 12.5 MG/1
12.5 TABLET ORAL 2 TIMES DAILY
Qty: 60 TABLET | Refills: 11 | Status: SHIPPED | OUTPATIENT
Start: 2021-07-28 | End: 2021-08-30

## 2021-07-28 RX ORDER — UBIDECARENONE 75 MG
50 CAPSULE ORAL DAILY
COMMUNITY

## 2021-07-28 NOTE — PROGRESS NOTES
Subjective:     Encounter Date:07/28/2021      Patient ID: Faisal Joseph is a 80 y.o. male with a history of coronary artery disease, status post previous coronary artery bypass grafting 6/9/20 (LIMA to the LAD, saphenous vein graft to the diagonal, saphenous vein graft to the distal right coronary artery), hypertension, hyperlipidemia, stage IV chronic kidney disease, previous stroke, presenting today for routine follow-up.    Chief Complaint: Shortness of breath, leg weakness, elevated blood pressure    This patient presents today for routine follow-up.  He has a history of coronary artery disease with history of coronary artery bypass grafting.  He has not had any chest pain but has noticed fatigue, shortness of breath.  The shortness of breath seems to have gotten worse over the past 6 months or so.  No associated symptoms such as cough or wheezing but he does state that sometimes he has to get up at night due to shortness of breath.  He denies having to raise the head of the bed.  He does not have to sleep upright but simply wakes up some nights short of breath.  His weight has been stable.  He does have some chronic lower extremity edema to some degree with his right greater than his left after bypass surgery and this has not increased lately.  No cough or wheezing.  He is known to have stage IV chronic kidney disease and he says that his renal function has worsened.  He also notes that his blood pressure remains uncontrolled.  Medication adjustments have been made by his nephrologist however his blood pressure does remain elevated.  He denies having any headaches, focal neurologic symptoms at this time.  No side effects or problems from any of his current medications.  He does note that when he walks, his legs simply seem to give out on him.  He denies having any hip pain or leg pain necessarily but a sense of generalized weakness in his legs after a fixed distance, usually going up 1 flight of steps or  walking across his house.  He says that sometimes his legs will become so weak that he has to sit down.  He denies lightheadedness, dizziness or syncope.  No palpitations.    Coronary Artery Disease  Presents for follow-up visit. Symptoms include leg swelling, muscle weakness and shortness of breath. Pertinent negatives include no chest pain, dizziness, palpitations or weight gain. The symptoms have been stable. Compliance with diet is variable. Compliance with exercise is variable. Compliance with medications is good.   Shortness of Breath  This is a recurrent problem. The problem occurs intermittently. The problem has been waxing and waning. Associated symptoms include claudication, leg swelling and PND. Pertinent negatives include no abdominal pain, chest pain, fever, headaches, orthopnea, syncope, vomiting or wheezing. The symptoms are aggravated by exercise. He has tried nothing for the symptoms. His past medical history is significant for CAD. There is no history of a heart failure.       Current Outpatient Medications:   •  amLODIPine (NORVASC) 10 MG tablet, Take 10 mg by mouth Daily., Disp: , Rfl:   •  aspirin 81 MG EC tablet, Take 1 tablet by mouth Daily., Disp: 100 tablet, Rfl: 99  •  Cholecalciferol (VITAMIN D) 2000 units capsule, Take 2,000 Units by mouth Daily., Disp: , Rfl:   •  cloNIDine (CATAPRES) 0.1 MG tablet, Take 0.1 mg by mouth As Needed for High Blood Pressure., Disp: , Rfl:   •  clopidogrel (PLAVIX) 75 MG tablet, Take 1 tablet by mouth Every Evening., Disp: 30 tablet, Rfl: 2  •  hydrALAZINE (APRESOLINE) 50 MG tablet, Take 50 mg by mouth 3 (Three) Times a Day., Disp: , Rfl:   •  levocetirizine (XYZAL) 5 MG tablet, Take 5 mg by mouth Every Evening., Disp: , Rfl:   •  lisinopril (PRINIVIL,ZESTRIL) 20 MG tablet, Take 20 mg by mouth Daily., Disp: , Rfl:   •  magnesium oxide (MAGOX) 400 (241.3 Mg) MG tablet tablet, Take 400 mg by mouth 1 (One) Time., Disp: , Rfl:   •  nitroglycerin (NITROSTAT) 0.4  MG SL tablet, 1 under the tongue as needed for angina, may repeat q5mins for up three doses, Disp: 25 tablet, Rfl: 2  •  pravastatin (PRAVACHOL) 40 MG tablet, Take 40 mg by mouth Daily., Disp: , Rfl:   •  vitamin B-12 (CYANOCOBALAMIN) 100 MCG tablet, Take 50 mcg by mouth Daily., Disp: , Rfl:   •  carvedilol (COREG) 12.5 MG tablet, Take 1 tablet by mouth 2 (Two) Times a Day., Disp: 60 tablet, Rfl: 11  •  terazosin (HYTRIN) 5 MG capsule, Take 5 mg by mouth Every Night., Disp: , Rfl:     Allergies   Allergen Reactions   • Hydralazine Nausea Only   • Losartan Potassium Nausea Only   • Penicillins Hives   • Spironolactone Swelling     Made  Breast sore and swell     Social History     Tobacco Use   • Smoking status: Former Smoker     Quit date:      Years since quittin.5   • Smokeless tobacco: Never Used   Substance Use Topics   • Alcohol use: No     Review of Systems   Constitutional: Positive for malaise/fatigue. Negative for fever, weight gain and weight loss.   Cardiovascular: Positive for claudication, dyspnea on exertion, leg swelling and paroxysmal nocturnal dyspnea. Negative for chest pain, orthopnea, palpitations and syncope.   Respiratory: Positive for shortness of breath. Negative for cough and wheezing.    Hematologic/Lymphatic: Negative for adenopathy and bleeding problem.   Musculoskeletal: Positive for arthritis, back pain and muscle weakness.   Gastrointestinal: Negative for abdominal pain, nausea and vomiting.   Neurological: Positive for loss of balance and weakness. Negative for dizziness and headaches.       ECG 12 Lead    Date/Time: 2021 3:11 PM  Performed by: Rufino Brice MD  Authorized by: Rufino Brice MD   Comparison: compared with previous ECG from 2021  Similar to previous ECG  Rhythm: sinus rhythm  Ectopy: unifocal PVCs  Rate: normal  BPM: 62  Conduction: 1st degree AV block  QRS axis: normal  Other findings: non-specific ST-T wave changes and poor R  "wave progression    Clinical impression: abnormal EKG             Objective:     Vitals reviewed.   Constitutional:       General: Not in acute distress.     Appearance: Healthy appearance. Well-developed.   Eyes:      Extraocular Movements: Extraocular movements intact.      Pupils: Pupils are equal, round, and reactive to light.   HENT:      Head: Normocephalic and atraumatic.   Neck:      Thyroid: No thyroid mass.      Vascular: No JVD.   Pulmonary:      Effort: Pulmonary effort is normal.      Breath sounds: Normal breath sounds. No wheezing. No rhonchi. No rales.   Cardiovascular:      Normal rate. Occasional ectopic beats. Regular rhythm.      Murmurs: There is no murmur.      No gallop.   Pulses:     Radial: 2+ bilaterally.     Dorsalis pedis: 1+ bilaterally.     Posterior tibial: 1+ bilaterally.  Edema:     Peripheral edema present.     Pretibial: trace edema of the left pretibial area and 1+ edema of the right pretibial area.     Ankle: trace edema of the left ankle and 1+ edema of the right ankle.  Abdominal:      General: Bowel sounds are normal. There is no distension.      Palpations: Abdomen is soft.      Tenderness: There is no abdominal tenderness.   Musculoskeletal: Normal range of motion.      Extremities: No clubbing present.     Cervical back: Normal range of motion and neck supple. Skin:     General: Skin is warm and dry. There is no cyanosis.      Findings: No erythema or rash.   Neurological:      Mental Status: Alert and oriented to person, place, and time.      Cranial Nerves: No cranial nerve deficit.       /100   Pulse 72   Ht 182.9 cm (72\")   Wt 98.4 kg (217 lb)   SpO2 97%   BMI 29.43 kg/m²     Data/Lab Review:     Lipid panel from 3/23/2021: LDL 70, HDL 34, triglycerides 207    ==========================================    Echo 5/28/20:  · Left ventricular systolic function is normal. Estimated EF appears to be in the range of 61 - 65%.  · Left ventricular wall thickness is " consistent with mild concentric hypertrophy.  · No hemodynamically significant valvular abnormalities identified on the study.        Assessment:          Diagnosis Plan   1. Coronary artery disease involving native coronary artery of native heart without angina pectoris  ECG 12 Lead   2. Essential hypertension  carvedilol (COREG) 12.5 MG tablet   3. Pure hypercholesterolemia     4. SOB (shortness of breath)  Adult Transthoracic Echo Complete W/ Cont if Necessary Per Protocol   5. Claudication (CMS/HCC)  US Ankle / Brachial Indices Extremity Complete        Plan:       1.  Coronary artery disease: The patient does not describe any chest discomfort at this time.  He does have some fatigue and shortness of breath.  However, at this time, I would not consider these necessarily anginal symptoms until proven otherwise.  I would not feel strongly about cardiac stress testing at this time, however we could certainly consider this at some point in the future.  The patient continues aspirin, beta-blocker and statin therapies.  We will be changing his beta-blocker as noted below.  Also, he can likely discontinue Plavix at this time.    2.  Essential hypertension: Blood pressure is still not optimally controlled.  He is on maximum dose of amlodipine and lisinopril.  I am changing his metoprolol from 25 mg twice daily to carvedilol 12.5 mg twice daily with instructions to increase this to 25 mg twice daily if his blood pressure remains elevated and her heart rate will tolerate.  Continue hydralazine at current dose at this time.  He also continues to follow with nephrology who has adjusted his antihypertensives previously.    3.  Hyperlipidemia: The patient is currently tolerating pravastatin.    4.  Shortness of breath: Potentially multifactorial.  Given his complaints of what sounds like PND, also with lower extremity edema and shortness of breath, will order an echocardiogram to further evaluate.  Some of his shortness of  breath may be due to deconditioning as well.  Certainly, his kidney disease may be factor in this also.  However, we will investigate further at this time with an echocardiogram.    5.  Claudication: The patient describes predictable leg weakness with ambulation.  For this reason, we will order an ELIJAH exam to further evaluate.    Follow-up will be pending the results of the studies.

## 2021-08-19 ENCOUNTER — HOSPITAL ENCOUNTER (OUTPATIENT)
Dept: ULTRASOUND IMAGING | Facility: HOSPITAL | Age: 81
Discharge: HOME | End: 2021-08-19
Admitting: INTERNAL MEDICINE

## 2021-08-19 DIAGNOSIS — I73.9 CLAUDICATION (HCC): ICD-10-CM

## 2021-08-19 PROCEDURE — 93923 UPR/LXTR ART STDY 3+ LVLS: CPT | Performed by: SURGERY

## 2021-08-19 PROCEDURE — 93923 UPR/LXTR ART STDY 3+ LVLS: CPT

## 2021-08-30 ENCOUNTER — OFFICE VISIT (OUTPATIENT)
Dept: CARDIOLOGY | Facility: CLINIC | Age: 81
End: 2021-08-30

## 2021-08-30 VITALS
OXYGEN SATURATION: 99 % | SYSTOLIC BLOOD PRESSURE: 160 MMHG | HEART RATE: 70 BPM | BODY MASS INDEX: 28.85 KG/M2 | DIASTOLIC BLOOD PRESSURE: 90 MMHG | HEIGHT: 72 IN | WEIGHT: 213 LBS

## 2021-08-30 DIAGNOSIS — R00.2 PALPITATIONS: ICD-10-CM

## 2021-08-30 DIAGNOSIS — E78.00 PURE HYPERCHOLESTEROLEMIA: Chronic | ICD-10-CM

## 2021-08-30 DIAGNOSIS — N18.4 STAGE 4 CHRONIC KIDNEY DISEASE (HCC): Chronic | ICD-10-CM

## 2021-08-30 DIAGNOSIS — Z86.73 HISTORY OF CVA (CEREBROVASCULAR ACCIDENT): Chronic | ICD-10-CM

## 2021-08-30 DIAGNOSIS — I25.10 CORONARY ARTERY DISEASE INVOLVING NATIVE CORONARY ARTERY OF NATIVE HEART WITHOUT ANGINA PECTORIS: Chronic | ICD-10-CM

## 2021-08-30 DIAGNOSIS — I10 ESSENTIAL HYPERTENSION: Primary | Chronic | ICD-10-CM

## 2021-08-30 DIAGNOSIS — Z95.1 S/P CABG (CORONARY ARTERY BYPASS GRAFT): ICD-10-CM

## 2021-08-30 PROCEDURE — 93000 ELECTROCARDIOGRAM COMPLETE: CPT | Performed by: NURSE PRACTITIONER

## 2021-08-30 PROCEDURE — 99214 OFFICE O/P EST MOD 30 MIN: CPT | Performed by: NURSE PRACTITIONER

## 2021-08-30 RX ORDER — METOPROLOL TARTRATE 50 MG/1
50 TABLET, FILM COATED ORAL 2 TIMES DAILY
Qty: 60 TABLET | Refills: 3 | Status: SHIPPED | OUTPATIENT
Start: 2021-08-30 | End: 2022-01-17

## 2021-08-30 NOTE — PROGRESS NOTES
Subjective:     Encounter Date:08/30/2021      Patient ID: Faisal Joseph is a 80 y.o. male with a previous medical history of coronary artery disease status post coronary artery bypass grafting, hypertension, hyperlipidemia, chronic kidney disease who presents today for follow up after recent medication adjustments 1 month ago.     Chief Complaint: Hypertension follow-up  Hypertension  This is a chronic problem. The current episode started more than 1 year ago. The problem has been waxing and waning since onset. The problem is uncontrolled. Associated symptoms include malaise/fatigue, peripheral edema and shortness of breath. Pertinent negatives include no chest pain, headaches, orthopnea, palpitations or PND. Risk factors for coronary artery disease include male gender and dyslipidemia. Past treatments include diuretics and alpha 1 blockers. Current antihypertension treatment includes angiotensin blockers, beta blockers, calcium channel blockers, central alpha agonists and direct vasodilators. Hypertensive end-organ damage includes kidney disease and CAD/MI.     The patient was evaluated in our office on 7/28/2021 by Dr. Brice.  At that time he described having uncontrolled blood pressure despite previous manipulation of his medications through nephrology.  His beta-blocker therapy was transitioned from metoprolol to carvedilol.  He was instructed to start this medication at 12.5 mg twice daily and increase to 25 mg twice daily if his blood pressure remained elevated with a stable heart rate.  The other antihypertensive medication was to remain the same.      In addition to medication adjustments the patient had ABIs ordered for complaints of claudication as well as an echocardiogram ordered due to complaints of shortness of breath.  His echo has been scheduled to be completed in 2 weeks.      The patient reports that he transition from metoprolol to carvedilol after his office visit.  He notes after  "starting this medication and irregular heartbeat.  He felt that this was due to the change in medications and subsequently stopped taking the carvedilol on his own.  He recalls taking this for approximately 2-3 weeks.  After stopping the carvedilol he restarted his metoprolol tartrate.  He took this for a few days but over the course of the last 3 days has not taken a beta-blocker at all.  I have tried to obtain from the patient what his blood pressures were during the period of time where he was on carvedilol and it sounds as if his blood pressure had improved, 130-140 and heart rate was around 60, but then reports after being on the carvedilol for several days he felt like his heart rate started to increase.    When reviewing his other medications the patient tells me that he has been off of Terazosin for at least 6 months.  He stopped this medication and discussed it with his primary care physician who felt that it was okay for him to remain off of this.  The patient reported weakness and feeling like he was \"wilting\".  He noted improvement of his symptoms after stopping this.  He still has weakness and ABIs were checked, but nondiagnostic.    He continues with mild chronic stable shortness of breath.  This is no worse than 1 month ago.  He denies any chest pain.  He continues with bilateral lower extremity swelling which may be due to his amlodipine.  He continues with weakness and loss of balance at times.    The following portions of the patient's history were reviewed and updated as appropriate: allergies, current medications, past family history, past medical history, past social history and past surgical history.     Allergies   Allergen Reactions   • Hydralazine Nausea Only   • Losartan Potassium Nausea Only   • Penicillins Hives   • Spironolactone Swelling     Made  Breast sore and swell       Current Outpatient Medications:   •  amLODIPine (NORVASC) 10 MG tablet, Take 10 mg by mouth Daily., Disp: , Rfl: "   •  aspirin 81 MG EC tablet, Take 1 tablet by mouth Daily., Disp: 100 tablet, Rfl: 99  •  Cholecalciferol (VITAMIN D) 2000 units capsule, Take 2,000 Units by mouth Daily., Disp: , Rfl:   •  cloNIDine (CATAPRES) 0.1 MG tablet, Take 0.1 mg by mouth As Needed for High Blood Pressure., Disp: , Rfl:   •  hydrALAZINE (APRESOLINE) 50 MG tablet, Take 50 mg by mouth 3 (Three) Times a Day., Disp: , Rfl:   •  levocetirizine (XYZAL) 5 MG tablet, Take 5 mg by mouth Every Evening., Disp: , Rfl:   •  lisinopril (PRINIVIL,ZESTRIL) 20 MG tablet, Take 20 mg by mouth Daily., Disp: , Rfl:   •  magnesium oxide (MAGOX) 400 (241.3 Mg) MG tablet tablet, Take 400 mg by mouth 1 (One) Time., Disp: , Rfl:   •  pravastatin (PRAVACHOL) 40 MG tablet, Take 40 mg by mouth Daily., Disp: , Rfl:   •  vitamin B-12 (CYANOCOBALAMIN) 100 MCG tablet, Take 50 mcg by mouth Daily., Disp: , Rfl:   •  metoprolol tartrate (LOPRESSOR) 50 MG tablet, Take 1 tablet by mouth 2 (Two) Times a Day., Disp: 60 tablet, Rfl: 3  •  nitroglycerin (NITROSTAT) 0.4 MG SL tablet, 1 under the tongue as needed for angina, may repeat q5mins for up three doses, Disp: 25 tablet, Rfl: 2    Review of Systems   Constitutional: Positive for malaise/fatigue. Negative for diaphoresis, fever, weight gain and weight loss.   Cardiovascular: Positive for dyspnea on exertion. Negative for chest pain, leg swelling, near-syncope, orthopnea, palpitations, paroxysmal nocturnal dyspnea and syncope.   Respiratory: Positive for shortness of breath. Negative for wheezing.    Hematologic/Lymphatic: Negative for bleeding problem. Bruises/bleeds easily.   Musculoskeletal: Positive for arthritis and muscle weakness.   Gastrointestinal: Negative for bloating, abdominal pain, nausea and vomiting.   Neurological: Positive for loss of balance and weakness. Negative for dizziness, headaches and light-headedness.   Psychiatric/Behavioral: Negative for altered mental status.       ECG 12 Lead    Date/Time:  "8/30/2021 11:06 AM  Performed by: Jaz Musa APRN  Authorized by: Jaz Musa APRN   Comparison: compared with previous ECG from 7/28/2021  Comparison to previous ECG: Now with frequent PVCs  Rhythm: sinus rhythm  Ectopy: unifocal PVCs  Rate: normal  BPM: 70  Conduction: incomplete left bundle branch block and 1st degree AV block  QRS axis: normal  Other findings: poor R wave progression    Clinical impression: abnormal EKG          /90 (BP Location: Left arm, Patient Position: Sitting, Cuff Size: Adult)   Pulse 70   Ht 182.9 cm (72.01\")   Wt 96.6 kg (213 lb)   SpO2 99%   BMI 28.88 kg/m²        Objective:     Vitals reviewed.   Constitutional:       Appearance: Well-developed and well-groomed. Chronically ill-appearing.   Pulmonary:      Effort: Pulmonary effort is normal.      Breath sounds: Normal breath sounds.   Chest:      Chest wall: Tender to palpatation. Reproducing clinical pain.   Cardiovascular:      Normal rate. Regular rhythm.      Murmurs: There is no murmur.      No gallop. No rub.   Edema:     Peripheral edema absent.   Abdominal:      Palpations: Abdomen is soft.   Musculoskeletal:      Cervical back: Normal range of motion and neck supple. Skin:     General: Skin is warm and dry.   Neurological:      Mental Status: Alert and oriented to person, place and time.   Psychiatric:         Behavior: Behavior is cooperative.         Lab Review:     August 2021 -recent labs done through nephrology  Creatinine 2.44  Potassium 4.2  GFR 24      Assessment:          Diagnosis Plan   1. Essential hypertension     2. Palpitations     3. Coronary artery disease involving native coronary artery of native heart without angina pectoris     4. S/P CABG (coronary artery bypass graft)     5. Pure hypercholesterolemia     6. Stage 4 chronic kidney disease (CMS/HCC)     7. History of CVA (cerebrovascular accident)            Plan:       -Essential hypertension: The patient was to follow-up today to " reevaluate his blood pressure after recent medication changes.  See HPI.  The patient feels like he felt worse on the carvedilol.  We will restart his metoprolol at a higher dose and titrate up with frequent office visits for blood pressure control.  His lower extremity swelling is stable and may be secondary to his amlodipine.  Ultimately he may need increased dose of his hydralazine for overall improvement of his blood pressure.  He is not taking Terazosin which was previously listed on his medication list.  He is on lisinopril 20 mg daily.  He uses clonidine at home as needed for significantly elevated blood pressures.  He tells me he has not used this since his previous office visit here.    -Palpitations: The patient feels like his palpitations started when he was placed on carvedilol (in place of his metoprolol tartrate).  He feels frequent palpitations which is a change in symptoms from previous.  He has been off carvedilol for 1 to 2 weeks.  He intermittently took metoprolol after stopping carvedilol but has not taken any beta-blocker therapy in a few days.  We will reevaluate his symptoms in short-term.  His EKG today does demonstrate frequent PVCs whereas they had been minimal on previous.    -Coronary artery disease: The patient is a history of coronary artery disease with previous coronary artery bypass grafting in June 2020.  He has chronic shortness of breath.  He denies any significant chest pain.  His shortness of breath is stable and we will further evaluate with echo which has been scheduled.  He has managed on statin and aspirin.  He is also taking Plavix which can be discontinued.  This was previously mentioned in his visit 1 month ago.  We will restart beta-blocker therapy.    -CABG: LIMA to the LAD, saphenous vein graft to the diagonal branch, saphenous vein graft to the distal right coronary artery in June 2020.    -Hyperlipidemia: Stage IV.  the patient is on pravastatin for lipid management.   His most recent lipid panel, obtained through his PCP, reveals well-controlled LDL.  He did not tolerate atorvastatin due to increased lower extremity muscle pain and weakness.    -CKD: The patient follows with nephrology routinely.  His most recent labs are listed above.  GFR 24, creatinine 2.44.  The patient reports this is an improvement from previous labs.    -CVA: The patient has a history of a stroke, ~2007.  He has right-sided residual from this.      We will attempt to improve his blood pressure as it is elevated in the office today.  Restart metoprolol at increased dose.  I have sent in metoprolol tartrate 50 mg twice daily.  The patient knows to discontinue carvedilol.  Okay to stop clopidogrel.  Follow-up in 3 weeks after echocardiogram has been completed.  We will discuss test results at follow-up as well as further evaluate palpitations and blood pressure.  Consider increased dose of Lopressor versus hydralazine at follow-up.

## 2021-09-14 ENCOUNTER — HOSPITAL ENCOUNTER (OUTPATIENT)
Dept: CARDIOLOGY | Facility: HOSPITAL | Age: 81
Discharge: HOME | End: 2021-09-14
Admitting: INTERNAL MEDICINE

## 2021-09-14 VITALS
SYSTOLIC BLOOD PRESSURE: 181 MMHG | HEIGHT: 72 IN | WEIGHT: 212.96 LBS | DIASTOLIC BLOOD PRESSURE: 86 MMHG | BODY MASS INDEX: 28.85 KG/M2

## 2021-09-14 DIAGNOSIS — R06.02 SOB (SHORTNESS OF BREATH): ICD-10-CM

## 2021-09-14 LAB
BH CV ECHO MEAS - AO MAX PG (FULL): 2.2 MMHG
BH CV ECHO MEAS - AO MAX PG: 5.9 MMHG
BH CV ECHO MEAS - AO MEAN PG (FULL): 1 MMHG
BH CV ECHO MEAS - AO MEAN PG: 3 MMHG
BH CV ECHO MEAS - AO ROOT AREA (BSA CORRECTED): 1.5
BH CV ECHO MEAS - AO ROOT AREA: 8 CM^2
BH CV ECHO MEAS - AO ROOT DIAM: 3.2 CM
BH CV ECHO MEAS - AO V2 MAX: 121 CM/SEC
BH CV ECHO MEAS - AO V2 MEAN: 84.4 CM/SEC
BH CV ECHO MEAS - AO V2 VTI: 31.9 CM
BH CV ECHO MEAS - AVA(I,A): 2.4 CM^2
BH CV ECHO MEAS - AVA(I,D): 2.4 CM^2
BH CV ECHO MEAS - AVA(V,A): 2.5 CM^2
BH CV ECHO MEAS - AVA(V,D): 2.5 CM^2
BH CV ECHO MEAS - BSA(HAYCOCK): 2.2 M^2
BH CV ECHO MEAS - BSA: 2.2 M^2
BH CV ECHO MEAS - BZI_BMI: 28.9 KILOGRAMS/M^2
BH CV ECHO MEAS - BZI_METRIC_HEIGHT: 182.9 CM
BH CV ECHO MEAS - BZI_METRIC_WEIGHT: 96.6 KG
BH CV ECHO MEAS - EDV(CUBED): 142.2 ML
BH CV ECHO MEAS - EDV(MOD-SP4): 99.4 ML
BH CV ECHO MEAS - EDV(TEICH): 130.7 ML
BH CV ECHO MEAS - EF(CUBED): 78 %
BH CV ECHO MEAS - EF(MOD-SP4): 58.7 %
BH CV ECHO MEAS - EF(TEICH): 69.8 %
BH CV ECHO MEAS - ESV(CUBED): 31.3 ML
BH CV ECHO MEAS - ESV(MOD-SP4): 41.1 ML
BH CV ECHO MEAS - ESV(TEICH): 39.4 ML
BH CV ECHO MEAS - FS: 39.7 %
BH CV ECHO MEAS - IVS/LVPW: 1.1
BH CV ECHO MEAS - IVSD: 1.3 CM
BH CV ECHO MEAS - LA DIMENSION: 5.1 CM
BH CV ECHO MEAS - LA/AO: 1.6
BH CV ECHO MEAS - LAT PEAK E' VEL: 11 CM/SEC
BH CV ECHO MEAS - LV DIASTOLIC VOL/BSA (35-75): 45.4 ML/M^2
BH CV ECHO MEAS - LV MASS(C)D: 251.8 GRAMS
BH CV ECHO MEAS - LV MASS(C)DI: 115.1 GRAMS/M^2
BH CV ECHO MEAS - LV MAX PG: 3.7 MMHG
BH CV ECHO MEAS - LV MEAN PG: 2 MMHG
BH CV ECHO MEAS - LV SYSTOLIC VOL/BSA (12-30): 18.8 ML/M^2
BH CV ECHO MEAS - LV V1 MAX: 96.1 CM/SEC
BH CV ECHO MEAS - LV V1 MEAN: 61.4 CM/SEC
BH CV ECHO MEAS - LV V1 VTI: 23.9 CM
BH CV ECHO MEAS - LVIDD: 5.2 CM
BH CV ECHO MEAS - LVIDS: 3.2 CM
BH CV ECHO MEAS - LVLD AP4: 7.7 CM
BH CV ECHO MEAS - LVLS AP4: 6.2 CM
BH CV ECHO MEAS - LVOT AREA (M): 3.1 CM^2
BH CV ECHO MEAS - LVOT AREA: 3.1 CM^2
BH CV ECHO MEAS - LVOT DIAM: 2 CM
BH CV ECHO MEAS - LVPWD: 1.1 CM
BH CV ECHO MEAS - MED PEAK E' VEL: 5.87 CM/SEC
BH CV ECHO MEAS - MV A MAX VEL: 61.1 CM/SEC
BH CV ECHO MEAS - MV DEC TIME: 0.17 SEC
BH CV ECHO MEAS - MV E MAX VEL: 104 CM/SEC
BH CV ECHO MEAS - MV E/A: 1.7
BH CV ECHO MEAS - RAP SYSTOLE: 5 MMHG
BH CV ECHO MEAS - RVSP: 31 MMHG
BH CV ECHO MEAS - SI(AO): 117.2 ML/M^2
BH CV ECHO MEAS - SI(CUBED): 50.7 ML/M^2
BH CV ECHO MEAS - SI(LVOT): 34.3 ML/M^2
BH CV ECHO MEAS - SI(MOD-SP4): 26.6 ML/M^2
BH CV ECHO MEAS - SI(TEICH): 41.7 ML/M^2
BH CV ECHO MEAS - SV(AO): 256.6 ML
BH CV ECHO MEAS - SV(CUBED): 111 ML
BH CV ECHO MEAS - SV(LVOT): 75.1 ML
BH CV ECHO MEAS - SV(MOD-SP4): 58.3 ML
BH CV ECHO MEAS - SV(TEICH): 91.2 ML
BH CV ECHO MEAS - TR MAX VEL: 255 CM/SEC
BH CV ECHO MEASUREMENTS AVERAGE E/E' RATIO: 12.33
LEFT ATRIUM VOLUME INDEX: 42.4 ML/M2
LEFT ATRIUM VOLUME: 92.9 CM3
MAXIMAL PREDICTED HEART RATE: 140 BPM
STRESS TARGET HR: 119 BPM

## 2021-09-14 PROCEDURE — 93306 TTE W/DOPPLER COMPLETE: CPT

## 2021-09-14 PROCEDURE — 93306 TTE W/DOPPLER COMPLETE: CPT | Performed by: INTERNAL MEDICINE

## 2021-10-04 ENCOUNTER — OFFICE VISIT (OUTPATIENT)
Dept: CARDIOLOGY | Facility: CLINIC | Age: 81
End: 2021-10-04

## 2021-10-04 VITALS
OXYGEN SATURATION: 99 % | SYSTOLIC BLOOD PRESSURE: 122 MMHG | HEIGHT: 72 IN | WEIGHT: 215 LBS | DIASTOLIC BLOOD PRESSURE: 80 MMHG | BODY MASS INDEX: 29.12 KG/M2 | HEART RATE: 61 BPM

## 2021-10-04 DIAGNOSIS — Z95.1 S/P CABG (CORONARY ARTERY BYPASS GRAFT): ICD-10-CM

## 2021-10-04 DIAGNOSIS — E78.2 MIXED HYPERLIPIDEMIA: Chronic | ICD-10-CM

## 2021-10-04 DIAGNOSIS — I25.10 CORONARY ARTERY DISEASE INVOLVING NATIVE CORONARY ARTERY OF NATIVE HEART WITHOUT ANGINA PECTORIS: Chronic | ICD-10-CM

## 2021-10-04 DIAGNOSIS — I10 ESSENTIAL HYPERTENSION: Primary | Chronic | ICD-10-CM

## 2021-10-04 DIAGNOSIS — N18.4 STAGE 4 CHRONIC KIDNEY DISEASE (HCC): Chronic | ICD-10-CM

## 2021-10-04 DIAGNOSIS — Z86.73 HISTORY OF CVA (CEREBROVASCULAR ACCIDENT): Chronic | ICD-10-CM

## 2021-10-04 DIAGNOSIS — R26.9 GAIT DIFFICULTY: ICD-10-CM

## 2021-10-04 PROCEDURE — 99214 OFFICE O/P EST MOD 30 MIN: CPT | Performed by: NURSE PRACTITIONER

## 2021-10-04 PROCEDURE — 93000 ELECTROCARDIOGRAM COMPLETE: CPT | Performed by: NURSE PRACTITIONER

## 2021-10-04 RX ORDER — CLOPIDOGREL BISULFATE 75 MG/1
75 TABLET ORAL DAILY
COMMUNITY
End: 2021-10-04

## 2021-10-04 NOTE — PROGRESS NOTES
Subjective:     Encounter Date:10/04/2021      Patient ID: Faisal Joseph is a 81 y.o. male with known coronary artery disease status post coronary artery bypass grafting, hypertension, hyperlipidemia, chronic kidney disease who presents today for follow up after recent medication adjustments 1 month ago.     Chief Complaint: Hypertension follow-up  Hypertension  This is a chronic problem. The current episode started more than 1 year ago. The problem has been waxing and waning since onset. The problem is uncontrolled. Associated symptoms include malaise/fatigue, peripheral edema and shortness of breath. Pertinent negatives include no chest pain, headaches, orthopnea, palpitations or PND. Risk factors for coronary artery disease include male gender and dyslipidemia. Past treatments include diuretics and alpha 1 blockers. Current antihypertension treatment includes angiotensin blockers, beta blockers, calcium channel blockers, central alpha agonists and direct vasodilators. Hypertensive end-organ damage includes kidney disease and CAD/MI.   Coronary Artery Disease  Symptoms include muscle weakness and shortness of breath. Pertinent negatives include no chest pain, dizziness, leg swelling, palpitations or weight gain.   Palpitations   Associated symptoms include malaise/fatigue and shortness of breath. Pertinent negatives include no chest pain, diaphoresis, dizziness, fever, irregular heartbeat, nausea, near-syncope, syncope, vomiting or weakness.     The patient was evaluated in our office on 7/28/2021 by Dr. Brice.  At that time, he described having uncontrolled blood pressure despite previous manipulation of his medications through nephrology.  His beta-blocker therapy was transitioned from metoprolol to carvedilol.  He was instructed to start this medication at 12.5 mg twice daily and increase to 25 mg twice daily if his blood pressure remained elevated with a stable heart rate.  The other antihypertensive  medication was to remain the same.      At follow-up, the patient reported that he transitioned from metoprolol to carvedilol after his office visit.  He felt worse after doing so and ultimately went back on metoprolol at the following visit.      He has chronic stable shortness of breath which is unchanged.  He has mentioned lower extremity muscle weakness and balance issues.  He references this again today.  He reports that sometimes he feels as if he is shuffling his feet which reminds him of what he saw his father do who had Parkinson disease.  He does not feel like he has had an acute stroke but references how he felt at the time of his stroke to how he feels now.       The following portions of the patient's history were reviewed and updated as appropriate: allergies, current medications, past family history, past medical history, past social history and past surgical history.     Allergies   Allergen Reactions   • Hydralazine Nausea Only   • Losartan Potassium Nausea Only   • Penicillins Hives   • Spironolactone Swelling     Made  Breast sore and swell       Current Outpatient Medications:   •  amLODIPine (NORVASC) 10 MG tablet, Take 10 mg by mouth Daily., Disp: , Rfl:   •  aspirin 81 MG EC tablet, Take 1 tablet by mouth Daily., Disp: 100 tablet, Rfl: 99  •  Cholecalciferol (VITAMIN D) 2000 units capsule, Take 2,000 Units by mouth Daily., Disp: , Rfl:   •  cloNIDine (CATAPRES) 0.1 MG tablet, Take 0.1 mg by mouth As Needed for High Blood Pressure., Disp: , Rfl:   •  hydrALAZINE (APRESOLINE) 50 MG tablet, Take 50 mg by mouth 3 (Three) Times a Day., Disp: , Rfl:   •  levocetirizine (XYZAL) 5 MG tablet, Take 5 mg by mouth Every Evening., Disp: , Rfl:   •  lisinopril (PRINIVIL,ZESTRIL) 20 MG tablet, Take 20 mg by mouth Daily., Disp: , Rfl:   •  magnesium oxide (MAGOX) 400 (241.3 Mg) MG tablet tablet, Take 400 mg by mouth 1 (One) Time., Disp: , Rfl:   •  metoprolol tartrate (LOPRESSOR) 50 MG tablet, Take 1 tablet  "by mouth 2 (Two) Times a Day., Disp: 60 tablet, Rfl: 3  •  nitroglycerin (NITROSTAT) 0.4 MG SL tablet, 1 under the tongue as needed for angina, may repeat q5mins for up three doses, Disp: 25 tablet, Rfl: 2  •  pravastatin (PRAVACHOL) 40 MG tablet, Take 40 mg by mouth Daily., Disp: , Rfl:   •  vitamin B-12 (CYANOCOBALAMIN) 100 MCG tablet, Take 50 mcg by mouth Daily., Disp: , Rfl:     Review of Systems   Constitutional: Positive for malaise/fatigue. Negative for diaphoresis, fever, weight gain and weight loss.   Cardiovascular: Positive for dyspnea on exertion. Negative for chest pain, irregular heartbeat, leg swelling, near-syncope, orthopnea, palpitations, paroxysmal nocturnal dyspnea and syncope.   Respiratory: Positive for shortness of breath. Negative for wheezing.    Hematologic/Lymphatic: Negative for bleeding problem. Bruises/bleeds easily.   Musculoskeletal: Positive for arthritis and muscle weakness.   Gastrointestinal: Negative for bloating, abdominal pain, nausea and vomiting.   Neurological: Positive for loss of balance. Negative for dizziness, headaches, light-headedness and weakness.   Psychiatric/Behavioral: Negative for altered mental status.       ECG 12 Lead    Date/Time: 10/7/2021 9:24 AM  Performed by: Jaz Musa APRN  Authorized by: Jaz Musa APRN   Comparison: compared with previous ECG from 8/30/2021  Similar to previous ECG  Rhythm: sinus rhythm  Ectopy: unifocal PVCs  Rate: normal  BPM: 60  Conduction: 1st degree AV block  QRS axis: normal    Clinical impression: abnormal EKG          /80   Pulse 61   Ht 182.9 cm (72.01\")   Wt 97.5 kg (215 lb)   SpO2 99%   BMI 29.15 kg/m²        Objective:     Vitals reviewed.   Constitutional:       Appearance: Well-developed and well-groomed. Chronically ill-appearing.   Pulmonary:      Effort: Pulmonary effort is normal.      Breath sounds: Normal breath sounds.   Chest:      Chest wall: Tender to palpatation. Reproducing clinical " pain.   Cardiovascular:      Normal rate. Regular rhythm.      Murmurs: There is no murmur.      No gallop. No rub.   Edema:     Peripheral edema absent.   Abdominal:      Palpations: Abdomen is soft.   Musculoskeletal:      Cervical back: Normal range of motion and neck supple. Skin:     General: Skin is warm and dry.   Neurological:      Mental Status: Alert and oriented to person, place and time.   Psychiatric:         Behavior: Behavior is cooperative.         Lab Review:   Results for orders placed during the hospital encounter of 09/14/21    Adult Transthoracic Echo Complete W/ Cont if Necessary Per Protocol    Interpretation Summary  · Left ventricular systolic function is normal. Left ventricular ejection fraction appears to be 56 - 60%.  · Left ventricular wall thickness is consistent with mild concentric hypertrophy.  · The right ventricular cavity is borderline dilated. Normal right ventricular systolic function noted.  · Biatrial enlargement is noted.  · Mild mitral valve regurgitation is present.  · Mild tricuspid valve regurgitation is present. Estimated right ventricular systolic pressure from tricuspid regurgitation is normal (<35 mmHg).            Assessment:          Diagnosis Plan   1. Essential hypertension     2. Coronary artery disease involving native coronary artery of native heart without angina pectoris     3. S/P CABG (coronary artery bypass graft)     4. Mixed hyperlipidemia     5. Stage 4 chronic kidney disease (HCC)     6. History of CVA (cerebrovascular accident)     7. Gait difficulty            Plan:       -Essential hypertension: Due to the patient feeling worse on carvedilol he was transitioned back to metoprolol at a higher dose.  His blood pressure is well controlled.  He continues to have some complaints which are likely multifactorial.  But overall his blood pressure appears to be stable.    -Coronary artery disease: The patient has coronary artery disease with previous  coronary artery bypass grafting in June 2020.  He has chronic shortness of breath which is unchanged.  He denies any chest pain.  Results of his echo were discussed.  He has mild left ventricular hypertrophy, mild valvular disease, and a normal left ventricular ejection fraction.  He is managed with aspirin, statin, beta-blocker therapies.    -CABG: LIMA to the LAD, saphenous vein graft to the diagonal branch, saphenous vein graft to the distal right coronary artery in June 2020.    -Hyperlipidemia:  the patient is on pravastatin for lipid management.  His most recent lipid panel, obtained through his PCP, reveals well-controlled LDL.  He did not tolerate atorvastatin due to increased lower extremity muscle pain and weakness.    -CKD: The patient follows with nephrology routinely.  His most recent labs available to me revealed GFR 24, creatinine 2.44.     -CVA: The patient has a history of a stroke, ~2007.  He has some right-sided residual from this.      -Gait difficulty: The patient has previously described weakness to his lower extremities.  He actually had ABIs ordered at one time.  He now reports some balance issues as well as shuffling when he walks.  He reports his father had Parkinson's disease.  I have urged the patient to follow-up with his primary care doctor, Dr. Polanco.  He likely needs further evaluation with neurology.  He does have a history of stroke in 2007 and he mentions to me today that sometimes he feels like he did after his stroke.         He has a follow-up in January scheduled with Dr. Brice.

## 2021-10-04 NOTE — PATIENT INSTRUCTIONS
Coronary Artery Disease, Male  Coronary artery disease (CAD) is a condition in which the arteries that lead to the heart (coronary arteries) become narrow or blocked. The narrowing or blockage can lead to decreased blood flow to the heart. Prolonged reduced blood flow can cause a heart attack (myocardial infarction or MI). This condition may also be called coronary heart disease.  Because CAD is the leading cause of death in men, it is important to understand what causes this condition and how it is treated.  What are the causes?  CAD is most often caused by atherosclerosis. This is the buildup of fat and cholesterol (plaque) on the inside of the arteries. Over time, the plaque may narrow or block the artery, reducing blood flow to the heart. Plaque can also become weak and break off within a coronary artery and cause a sudden blockage. Other less common causes of CAD include:  · A blood clot or a piece of a blood clot or other substance that blocks the flow of blood in a coronary artery (embolism).  · A tearing of the artery (spontaneous coronary artery dissection).  · An enlargement of an artery (aneurysm).  · Inflammation (vasculitis) in the artery wall.  What increases the risk?  The following factors may make you more likely to develop this condition:  · Age. Men over age 45 are at a greater risk of CAD.  · Family history of CAD.  · Gender. Men often develop CAD earlier in life than women.  · High blood pressure (hypertension).  · Diabetes.  · High cholesterol levels.  · Tobacco use.  · Excessive alcohol use.  · Lack of exercise.  · A diet high in saturated and trans fats, such as fried food and processed meat.  Other possible risk factors include:  · High stress levels.  · Depression.  · Obesity.  · Sleep apnea.  What are the signs or symptoms?  Many people do not have any symptoms during the early stages of CAD. As the condition progresses, symptoms may include:  · Chest pain (angina). The pain can:  ? Feel  like crushing or squeezing, or like a tightness, pressure, fullness, or heaviness in the chest.  ? Last more than a few minutes or can stop and recur. The pain tends to get worse with exercise or stress and to fade with rest.  · Pain in the arms, neck, jaw, ear, or back.  · Unexplained heartburn or indigestion.  · Shortness of breath.  · Nausea or vomiting.  · Sudden light-headedness.  · Sudden cold sweats.  · Fluttering or fast heartbeat (palpitations).  How is this diagnosed?  This condition is diagnosed based on:  · Your family and medical history.  · A physical exam.  · Tests, including:  ? A test to check the electrical signals in your heart (electrocardiogram).  ? Exercise stress test. This looks for signs of blockage when the heart is stressed with exercise, such as running on a treadmill.  ? Pharmacologic stress test. This test looks for signs of blockage when the heart is being stressed with a medicine.  ? Blood tests.  ? Coronary angiogram. This is a procedure to look at the coronary arteries to see if there is any blockage. During this test, a dye is injected into your arteries so they appear on an X-ray.  ? Coronary artery CT scan. This CT scan helps detect calcium deposits in your coronary arteries. Calcium deposits are an indicator of CAD.  ? A test that uses sound waves to take a picture of your heart (echocardiogram).  ? Chest X-ray.  How is this treated?  This condition may be treated by:  · Healthy lifestyle changes to reduce risk factors.  · Medicines such as:  ? Antiplatelet medicines and blood-thinning medicines, such as aspirin. These help to prevent blood clots.  ? Nitroglycerin.  ? Blood pressure medicines.  ? Cholesterol-lowering medicine.  · Coronary angioplasty and stenting. During this procedure, a thin, flexible tube is inserted through a blood vessel and into a blocked artery. A balloon or similar device on the end of the tube is inflated to open up the artery. In some cases, a small,  mesh tube (stent) is inserted into the artery to keep it open.  · Coronary artery bypass surgery. During this surgery, veins or arteries from other parts of the body are used to create a bypass around the blockage and allow blood to reach your heart.  Follow these instructions at home:  Medicines  · Take over-the-counter and prescription medicines only as told by your health care provider.  · Do not take the following medicines unless your health care provider approves:  ? NSAIDs, such as ibuprofen, naproxen, or celecoxib.  ? Vitamin supplements that contain vitamin A, vitamin E, or both.  Lifestyle  · Follow an exercise program approved by your health care provider. Aim for 150 minutes of moderate exercise or 75 minutes of vigorous exercise each week.  · Maintain a healthy weight or lose weight as approved by your health care provider.  · Learn to manage stress or try to limit your stress. Ask your health care provider for suggestions if you need help.  · Get screened for depression and seek treatment, if needed.  · Do not use any products that contain nicotine or tobacco, such as cigarettes, e-cigarettes, and chewing tobacco. If you need help quitting, ask your health care provider.  · Do not use illegal drugs.  Eating and drinking    · Follow a heart-healthy diet. A dietitian can help educate you about healthy food options and changes. In general, eat plenty of fruits and vegetables, lean meats, and whole grains.  · Avoid foods high in:  ? Sugar.  ? Salt (sodium).  ? Saturated fat, such as processed or fatty meat.  ? Trans fat, such as fried foods.  · Use healthy cooking methods such as roasting, grilling, broiling, baking, poaching, steaming, or stir-frying.  · Do not drink alcohol if your health care provider tells you not to drink.  · If you drink alcohol:  ? Limit how much you have to 0-2 drinks per day.  ? Be aware of how much alcohol is in your drink. In the [place] one drink equals one 12 oz bottle of beer  (355 mL), one 5 oz glass of wine (148 mL), or one 1½ oz glass of hard liquor (44 mL).    General instructions  · Manage any other health conditions, such as hypertension and diabetes. These conditions affect your heart.  · Your health care provider may ask you to monitor your blood pressure. Ideally, your blood pressure should be below 130/80.  · Keep all follow-up visits as told by your health care provider. This is important.  Get help right away if:  · You have pain in your chest, neck, ear, arm, jaw, stomach, or back that:  ? Lasts more than a few minutes.  ? Is recurring.  ? Is not relieved by taking medicine under your tongue (sublingual nitroglycerin).  · You have profuse sweating without cause.  · You have unexplained:  ? Heartburn or indigestion.  ? Shortness of breath or difficulty breathing.  ? Fluttering or fast heartbeat (palpitations).  ? Nausea or vomiting.  ? Fatigue.  ? Feelings of nervousness or anxiety.  ? Weakness.  ? Diarrhea.  · You have sudden light-headedness or dizziness.  · You faint.  · You feel like hurting yourself or think about taking your own life.  These symptoms may represent a serious problem that is an emergency. Do not wait to see if the symptoms will go away. Get medical help right away. Call your local emergency services (911 in the [place] Do not drive yourself to the hospital.  Summary  · Coronary artery disease (CAD) is a condition in which the arteries that lead to the heart (coronary arteries) become narrow or blocked. The narrowing or blockage can lead to a heart attack.  · Many people do not have any symptoms during the early stages of CAD.  · CAD can be treated with lifestyle changes, medicines, surgery, or a combination of these treatments.  This information is not intended to replace advice given to you by your health care provider. Make sure you discuss any questions you have with your health care provider.  Document Revised: 09/06/2019 Document Reviewed:  08/27/2019  [employer]vier Patient Education © 2021 [employer]

## 2021-10-07 PROBLEM — R26.9 GAIT DIFFICULTY: Status: ACTIVE | Noted: 2021-10-07

## 2022-01-13 ENCOUNTER — OFFICE VISIT (OUTPATIENT)
Dept: CARDIOLOGY | Facility: CLINIC | Age: 82
End: 2022-01-13

## 2022-01-13 VITALS
OXYGEN SATURATION: 98 % | HEART RATE: 66 BPM | DIASTOLIC BLOOD PRESSURE: 86 MMHG | WEIGHT: 217 LBS | SYSTOLIC BLOOD PRESSURE: 160 MMHG | HEIGHT: 72 IN | BODY MASS INDEX: 29.39 KG/M2

## 2022-01-13 DIAGNOSIS — E78.2 MIXED HYPERLIPIDEMIA: Chronic | ICD-10-CM

## 2022-01-13 DIAGNOSIS — I10 ESSENTIAL HYPERTENSION: Chronic | ICD-10-CM

## 2022-01-13 DIAGNOSIS — I25.10 CORONARY ARTERY DISEASE INVOLVING NATIVE CORONARY ARTERY OF NATIVE HEART WITHOUT ANGINA PECTORIS: Primary | Chronic | ICD-10-CM

## 2022-01-13 PROCEDURE — 93000 ELECTROCARDIOGRAM COMPLETE: CPT | Performed by: INTERNAL MEDICINE

## 2022-01-13 PROCEDURE — 99214 OFFICE O/P EST MOD 30 MIN: CPT | Performed by: INTERNAL MEDICINE

## 2022-01-13 RX ORDER — PRAVASTATIN SODIUM 40 MG
40 TABLET ORAL DAILY
Qty: 90 TABLET | Refills: 3 | Status: SHIPPED | OUTPATIENT
Start: 2022-01-13 | End: 2023-02-26

## 2022-01-13 RX ORDER — MONTELUKAST SODIUM 10 MG/1
10 TABLET ORAL NIGHTLY
COMMUNITY
End: 2022-04-20 | Stop reason: ALTCHOICE

## 2022-01-13 NOTE — PROGRESS NOTES
Subjective:     Encounter Date:01/13/2022      Patient ID: Faisal Joseph is a 81 y.o. male with coronary artery disease, status post previous coronary artery bypass grafting 6/9/20 (LIMA to the LAD, saphenous vein graft to the diagonal, saphenous vein graft to the distal right coronary artery), hypertension, hyperlipidemia, stage IV chronic kidney disease, previous stroke, presenting today for routine follow-up.    Chief Complaint: Here today for follow-up of coronary artery disease, hypertension    This patient presents today for routine follow-up of coronary artery disease and hypertension.  He says that he thinks that his heart has been doing well.  He was recently hospitalized in [place] with what was thought to be viral gastroenteritis.  He had abdominal pain, nausea, vomiting, fever.  Ultimately, the symptoms have resolved.  He continues to have some mild shortness of breath and dyspnea on exertion which have been chronic and unchanged symptoms according to the patient.  He says that ever since bypass surgery he has had some soreness in his chest.  No chest pain at this time necessarily, however.  No orthopnea, PND, edema.  No lightheadedness, dizziness or syncope.  He says that his blood pressure continues to wax and wane.  Medications were changed late last year but he did not tolerate the changes.  More recently, blood pressure has been somewhere in the 140 mmHg range.  It was higher than this today but he says that generally it has been better controlled lately.  He has not experienced any recent side effects from his medications.  He did have COVID-19 late last year.  He seems to have recovered well from this according to his report.    Coronary Artery Disease  Presents for follow-up visit. Symptoms include shortness of breath. Pertinent negatives include no chest pain (Nothing significant at this time), dizziness, leg swelling or palpitations. The symptoms have been stable. Compliance with diet  is variable. Compliance with exercise is variable. Compliance with medications is good.        Current Outpatient Medications:   •  amLODIPine (NORVASC) 10 MG tablet, Take 10 mg by mouth Daily., Disp: , Rfl:   •  aspirin 81 MG EC tablet, Take 1 tablet by mouth Daily., Disp: 100 tablet, Rfl: 99  •  Cholecalciferol (VITAMIN D) 2000 units capsule, Take 2,000 Units by mouth Daily., Disp: , Rfl:   •  cloNIDine (CATAPRES) 0.1 MG tablet, Take 0.1 mg by mouth As Needed for High Blood Pressure., Disp: , Rfl:   •  hydrALAZINE (APRESOLINE) 50 MG tablet, Take 50 mg by mouth 3 (Three) Times a Day., Disp: , Rfl:   •  lisinopril (PRINIVIL,ZESTRIL) 20 MG tablet, Take 20 mg by mouth Daily., Disp: , Rfl:   •  magnesium oxide (MAGOX) 400 (241.3 Mg) MG tablet tablet, Take 400 mg by mouth 1 (One) Time., Disp: , Rfl:   •  metoprolol tartrate (LOPRESSOR) 50 MG tablet, Take 1 tablet by mouth 2 (Two) Times a Day., Disp: 60 tablet, Rfl: 3  •  montelukast (SINGULAIR) 10 MG tablet, Take 10 mg by mouth Every Night., Disp: , Rfl:   •  nitroglycerin (NITROSTAT) 0.4 MG SL tablet, 1 under the tongue as needed for angina, may repeat q5mins for up three doses, Disp: 25 tablet, Rfl: 2  •  pravastatin (PRAVACHOL) 40 MG tablet, Take 1 tablet by mouth Daily., Disp: 90 tablet, Rfl: 3  •  vitamin B-12 (CYANOCOBALAMIN) 100 MCG tablet, Take 50 mcg by mouth Daily., Disp: , Rfl:     Allergies   Allergen Reactions   • Hydralazine Nausea Only   • Losartan Potassium Nausea Only   • Penicillins Hives   • Spironolactone Swelling     Made  Breast sore and swell     Social History     Tobacco Use   • Smoking status: Former Smoker     Years: 25.00     Quit date:      Years since quittin.0   • Smokeless tobacco: Never Used   Substance Use Topics   • Alcohol use: No     Review of Systems   Constitutional: Negative for fever and weight loss.   Cardiovascular: Positive for dyspnea on exertion. Negative for chest pain (Nothing significant at this time), leg  swelling, orthopnea, palpitations, paroxysmal nocturnal dyspnea and syncope.   Respiratory: Positive for shortness of breath. Negative for cough and wheezing.    Hematologic/Lymphatic: Negative for adenopathy and bleeding problem.   Gastrointestinal: Negative for abdominal pain, hematemesis, hematochezia, melena, nausea and vomiting.   Neurological: Positive for loss of balance and weakness. Negative for dizziness and headaches.       ECG 12 Lead    Date/Time: 1/13/2022 3:13 PM  Performed by: Rufino Brice MD  Authorized by: Rufino Brice MD   Comparison: compared with previous ECG from 10/4/2021  Rhythm: sinus bradycardia  Rate: bradycardic  BPM: 58  Conduction: 1st degree AV block  QRS axis: normal  Other findings: non-specific ST-T wave changes and poor R wave progression    Clinical impression: abnormal EKG             Objective:     Vitals reviewed.   Constitutional:       General: Not in acute distress.     Appearance: Well-developed and not in distress.   Eyes:      Extraocular Movements: Extraocular movements intact.      Pupils: Pupils are equal, round, and reactive to light.   HENT:      Head: Normocephalic and atraumatic.   Neck:      Thyroid: No thyroid mass.      Vascular: No JVD.   Pulmonary:      Effort: Pulmonary effort is normal.      Breath sounds: Normal breath sounds. No wheezing. No rhonchi. No rales.   Cardiovascular:      Normal rate. Regular rhythm.      Murmurs: There is no murmur.      No gallop.   Pulses:     Intact distal pulses.   Edema:     Peripheral edema absent.   Abdominal:      General: Bowel sounds are normal. There is no distension.      Palpations: Abdomen is soft.      Tenderness: There is no abdominal tenderness.   Musculoskeletal: Normal range of motion.      Extremities: No clubbing present.Skin:     General: Skin is warm and dry. There is no cyanosis.      Findings: No erythema or rash.   Neurological:      Mental Status: Alert and oriented to person,  "place, and time.      Cranial Nerves: No cranial nerve deficit.       /86   Pulse 66   Ht 182.9 cm (72.01\")   Wt 98.4 kg (217 lb)   SpO2 98%   BMI 29.42 kg/m²     Data/Lab Review:     CMP on 1/6/2022: Sodium 141, potassium 4.3, chloride 109, carbon dioxide 22, BUN 28, creatinine 2.72, AST and ALT both 18, alkaline phosphatase 45, total bilirubin 0.3    CBC on 1/6/2022: White blood cell count 8.8, hemoglobin 9.9, hematocrit 30 platelets 87,000    Procalcitonin on 1/6/2022: 4.39 with upper limits of normal being 0.1    Blood cultures from outside hospital, no growth    Results for orders placed during the hospital encounter of 09/14/21    Adult Transthoracic Echo Complete W/ Cont if Necessary Per Protocol    Interpretation Summary  · Left ventricular systolic function is normal. Left ventricular ejection fraction appears to be 56 - 60%.  · Left ventricular wall thickness is consistent with mild concentric hypertrophy.  · The right ventricular cavity is borderline dilated. Normal right ventricular systolic function noted.  · Biatrial enlargement is noted.  · Mild mitral valve regurgitation is present.  · Mild tricuspid valve regurgitation is present. Estimated right ventricular systolic pressure from tricuspid regurgitation is normal (<35 mmHg).          Assessment:          Diagnosis Plan   1. Coronary artery disease involving native coronary artery of native heart without angina pectoris     2. Essential hypertension     3. Mixed hyperlipidemia  pravastatin (PRAVACHOL) 40 MG tablet          Plan:       1.  Coronary artery disease: Stable at this time after bypass grafting in June 2020.  No change in baseline shortness of breath, dyspnea on exertion.  Continue current therapies.    2.  Essential hypertension: Blood pressure continues to wax and wane.  The patient's report of blood pressure being around 140 mmHg may be reasonable given his advanced age.  Continue current medications.    3.  Mixed " hyperlipidemia: Lipids are followed by the patient's primary care provider.  Refill pravastatin at this time.    We will see the patient back in 6 months unless otherwise needed sooner.

## 2022-01-17 RX ORDER — METOPROLOL TARTRATE 50 MG/1
50 TABLET, FILM COATED ORAL 2 TIMES DAILY
Qty: 60 TABLET | Refills: 11 | Status: SHIPPED | OUTPATIENT
Start: 2022-01-17 | End: 2022-04-20 | Stop reason: ALTCHOICE

## 2022-04-19 NOTE — PROGRESS NOTES
Chief Complaint  Restrictive Lung Disease    Subjective    History of Present Illness {CC  Problem List  Visit Diagnosis   Encounters  Notes  Medications  Labs  Result Review Imaging  Media     Faisal Joseph presents to [facility] for:    Mr. Joseph is a new patient here to establish care.  He tells me he has had some ongoing exertional dyspnea for almost 2 years MED-EL.  He had a CABG in June 2020 and reports he has not felt well since then.  He also had COVID in November 2021 and dyspnea has worsened since then.  He was hospitalized at [facility] in [place] right after he had COVID with pulmonary symptoms and was treated with antibiotics.  He is a former smoker and quit in 1985 but smoked for approximately 30 years about 1.5 packs/day.  He reports productive cough with thick, yellow sputum.  He tells me that he has a history of obstructive sleep apnea for which she was prescribed a CPAP but he does not tolerate it well so therefore he does not utilize it.  He is not on any inhalers.  He does have a history of stage IV chronic kidney disease which he follows with nephrology.  He is up-to-date on vaccinations.    Shortness of breath, yes. It is worse with exertion. It has been occurring for 2 year(s).    Cough, yes: symptoms include: dry.  It has been occuring for  2  year(s)..   Do you have a history of lung problems, no.   Family history of lung problems, no.   Heartburn, no.    Trouble swallowing, no.  Do you have a history of connective tissue disease, no.    Rash, no.Dry mouth, no.  Dry eyes, no.  Joint pain, no.    Allergies, no.    Do you have pets, yes, dogs.    The patient  reports that he quit smoking about 38 years ago. He has a 37.50 pack-year smoking history. He has never used smokeless tobacco..   Do you drink alcohol? no  Do you use any illegal drugs or prescription drugs that are not prescribed to you? no     Work history:  [employer].    Have you ever taken Methotrexate?  No  Have you ever taken Amiodarone?  No  Have you ever taken Macrodantin for an extended period of time?  No  Obstructive sleep apnea: Yes has CPAP prescribed but does not utilize         Prior to Admission medications    Medication Sig Start Date End Date Taking? Authorizing Provider   amLODIPine (NORVASC) 10 MG tablet Take 10 mg by mouth Daily.    Mitesh Contreras MD   aspirin 81 MG EC tablet Take 1 tablet by mouth Daily. 20   Wesley Lew DO   Cholecalciferol (VITAMIN D) 2000 units capsule Take 2,000 Units by mouth Daily.    Mitesh Contreras MD   cloNIDine (CATAPRES) 0.1 MG tablet Take 0.1 mg by mouth As Needed for High Blood Pressure.    Mitesh Contreras MD   hydrALAZINE (APRESOLINE) 50 MG tablet Take 50 mg by mouth 3 (Three) Times a Day.    Mitesh Contreras MD   lisinopril (PRINIVIL,ZESTRIL) 20 MG tablet Take 20 mg by mouth Daily.    Mitesh Contreras MD   magnesium oxide (MAGOX) 400 (241.3 Mg) MG tablet tablet Take 400 mg by mouth 1 (One) Time.    Mitesh Contreras MD   metoprolol tartrate (LOPRESSOR) 50 MG tablet TAKE 1 TABLET BY MOUTH 2 (TWO) TIMES A DAY 22   Rufino Brice MD   montelukast (SINGULAIR) 10 MG tablet Take 10 mg by mouth Every Night.    Mitesh Contreras MD   nitroglycerin (NITROSTAT) 0.4 MG SL tablet 1 under the tongue as needed for angina, may repeat q5mins for up three doses 21   Jaz Musa APRN   pravastatin (PRAVACHOL) 40 MG tablet Take 1 tablet by mouth Daily. 22   Rufino Brice MD   vitamin B-12 (CYANOCOBALAMIN) 100 MCG tablet Take 50 mcg by mouth Daily.    Mitesh Contreras MD       Social History     Socioeconomic History   • Marital status:    Tobacco Use   • Smoking status: Former Smoker     Packs/day: 1.50     Years: 25.00     Pack years: 37.50     Quit date:      Years since quittin.3   • Smokeless tobacco: Never Used  "  Vaping Use   • Vaping Use: Never used   Substance and Sexual Activity   • Alcohol use: No   • Drug use: No   • Sexual activity: Defer       Objective   Vital Signs:   /86   Pulse 60   Ht 182.9 cm (72.01\")   Wt 99.8 kg (220 lb)   SpO2 95% Comment: SHUN  BMI 29.83 kg/m²     Physical Exam  Constitutional:       General: He is not in acute distress.     Interventions: Face mask in place.   HENT:      Head: Normocephalic.      Nose: Nose normal.      Mouth/Throat:      Mouth: Mucous membranes are moist.   Eyes:      General: No scleral icterus.  Cardiovascular:      Rate and Rhythm: Normal rate.   Pulmonary:      Effort: No respiratory distress.   Abdominal:      General: There is no distension.   Neurological:      Mental Status: He is alert and oriented to person, place, and time.   Psychiatric:         Mood and Affect: Mood normal.         Behavior: Behavior is cooperative.        Result Review :{ Labs  Result Review  Imaging  Med Tab  Media :          Results for orders placed during the hospital encounter of 05/28/20    Pulmonary Function Test Spirometry Pre & Post Bronchodilator, ABG, Lung Volumes; DLCO; albuterol (PROVENTIL) nebulizer solution 0.083% 2.5 mg/3 mL    [facility] - Pulmonary Function Test    [address] [place] [address]  [id number]  [phone]    Patient : Faisal Joseph  MRN : [id number]  CSN : [id number]  Pulmonologist : Asaf García MD  Date : 6/1/2020    ______________________________________________________________________    Interpretation :  1.  Spirometry reveals a mild obstructive ventilatory defect manifested by small airways disease.  2.  There is some improvement in spirometry postbronchodilator but a very mild obstructive ventilatory defect is still present.  3.  Lung volumes reveal hyperinflation.  4.  Arterial blood gases reveal a mild mixed metabolic acidosis and respiratory alkalosis with a resultant normal pH.  5.  The patient's current " studies show improvement in his FEV1 and FVC on both pre-and postbronchodilator studies compared to previous baseline sp[facility]rom[facility], [facility].  Hyperinflation is now present which was not present previously.      Asaf García MD    Rest/Exercise Pulse Ox Values        Some values may be hidden. Unless noted otherwise, only the newest values recorded on each date are displayed.         Rest/Exercise Pulse Ox Results 4/20/22   Rest room air SAT % 96   Exercise room air SAT % 93                   CT Chest/Abd/Pel w/o Contrast (01/05/2022 01:34 EST)        Assessment and Plan {CC Problem List  Visit Diagnosis  ROS  Review (Popup)  Health Maintenance  Quality  BestPractice  Medications  SmartSets  SnapShot Encounters  Media      Diagnoses and all orders for this visit:    1. Stage 1 mild COPD by GOLD classification (HCC) (Primary)  -     tiotropium bromide-olodaterol (Stiolto Respimat) 2.5-2.5 MCG/ACT aerosol solution inhaler; Inhale 2 puffs Daily.  Dispense: 1 each; Refill: 0    2. Centrilobular emphysema (HCC)  -     predniSONE (DELTASONE) 20 MG tablet; Take 1 tablet by mouth Daily for 5 days.  Dispense: 5 tablet; Refill: 0  -     Alpha - 1 - Antitrypsin; Future  -     tiotropium bromide-olodaterol (Stiolto Respimat) 2.5-2.5 MCG/ACT aerosol solution inhaler; Inhale 2 puffs Daily.  Dispense: 1 each; Refill: 0    3. Shortness of breath  -     Walking Oximetry  -     tiotropium bromide-olodaterol (Stiolto Respimat) 2.5-2.5 MCG/ACT aerosol solution inhaler; Inhale 2 puffs Daily.  Dispense: 1 each; Refill: 0    4. Cough        Patient's Body mass index is 29.83 kg/m². indicating that he is overweight (BMI 25-29.9).       PFT from 2020 showed mild obstruction with evidence of hyperinflation.  We will plan to try to treat underlying COPD with short prednisone course and a trial of Stiolto.  Albuterol HFA as needed and preemptively to activities that induce dyspnea  and wheezing.  Add Mucinex twice a day for secretion management.  Worsening dyspnea could also be related to untreated SONU due to PAP intolerance.  Check alpha-1 antitrypsin.  Walking oximetry was adequate in office today. Follow-up in 3 months with spirometry and DLCO.  Call in the interim with issues.    Peggy Mendez, LIZ  4/20/2022  14:23 CDT    Follow Up {Instructions Charge Capture  Follow-up Communications   Return in about 3 months (around 7/20/2022) for spirometry and dlco.    Patient was given instructions and counseling regarding his condition or for health maintenance advice. Please see specific information pulled into the AVS if appropriate.

## 2022-04-20 ENCOUNTER — OFFICE VISIT (OUTPATIENT)
Dept: PULMONOLOGY | Facility: CLINIC | Age: 82
End: 2022-04-20

## 2022-04-20 VITALS
WEIGHT: 220 LBS | HEART RATE: 60 BPM | DIASTOLIC BLOOD PRESSURE: 86 MMHG | OXYGEN SATURATION: 95 % | BODY MASS INDEX: 29.8 KG/M2 | HEIGHT: 72 IN | SYSTOLIC BLOOD PRESSURE: 162 MMHG

## 2022-04-20 DIAGNOSIS — R05.9 COUGH: ICD-10-CM

## 2022-04-20 DIAGNOSIS — R06.02 SHORTNESS OF BREATH: ICD-10-CM

## 2022-04-20 DIAGNOSIS — J43.2 CENTRILOBULAR EMPHYSEMA: Chronic | ICD-10-CM

## 2022-04-20 DIAGNOSIS — J44.9 STAGE 1 MILD COPD BY GOLD CLASSIFICATION: Primary | Chronic | ICD-10-CM

## 2022-04-20 PROCEDURE — 99214 OFFICE O/P EST MOD 30 MIN: CPT | Performed by: NURSE PRACTITIONER

## 2022-04-20 RX ORDER — LEVOCETIRIZINE DIHYDROCHLORIDE 5 MG/1
TABLET, FILM COATED ORAL
COMMUNITY
Start: 2022-03-16

## 2022-04-20 RX ORDER — ALBUTEROL SULFATE 90 UG/1
AEROSOL, METERED RESPIRATORY (INHALATION)
COMMUNITY
Start: 2022-03-14

## 2022-04-20 RX ORDER — CARVEDILOL 12.5 MG/1
12.5 TABLET ORAL 2 TIMES DAILY WITH MEALS
COMMUNITY
End: 2023-01-23

## 2022-04-20 RX ORDER — SODIUM BICARBONATE 325 MG/1
325 TABLET ORAL 4 TIMES DAILY
COMMUNITY

## 2022-04-20 RX ORDER — PREDNISONE 20 MG/1
20 TABLET ORAL DAILY
Qty: 5 TABLET | Refills: 0 | Status: SHIPPED | OUTPATIENT
Start: 2022-04-20 | End: 2022-04-25

## 2022-04-20 RX ORDER — TIOTROPIUM BROMIDE AND OLODATEROL 3.124; 2.736 UG/1; UG/1
2 SPRAY, METERED RESPIRATORY (INHALATION)
Qty: 1 EACH | Refills: 0 | COMMUNITY
Start: 2022-04-20 | End: 2022-07-20

## 2022-04-20 NOTE — PROCEDURES
Walking Oximetry  Performed by: Peggy Mendez APRN  Authorized by: Peggy Mendez APRN     Rest room air SAT %:  96  Exercise room air SAT %:  93

## 2022-04-28 ENCOUNTER — PATIENT ROUNDING (BHMG ONLY) (OUTPATIENT)
Dept: PULMONOLOGY | Facility: CLINIC | Age: 82
End: 2022-04-28

## 2022-04-28 NOTE — PROGRESS NOTES
April 28, 2022    Hello, may I speak with Faisal Joseph?    My name is Yasemin Garcia     I am  with [facility] MEDIC[facility]  [address]  [facility] [address] [phone].    Before we get started may I verify your date of birth? 1940    I am calling to officially welcome you to our practice and ask about your recent visit. Is this a good time to talk? yes    Tell me about your visit with us. What things went well?  Visit went real good.  Was satisfied with provider and staff.       We're always looking for ways to make our patients' experiences even better. Do you have recommendations on ways we may improve?  no    Overall were you satisfied with your first visit to our practice? yes       I appreciate you taking the time to speak with me today. Is there anything else I can do for you? Yes, patient wondered why a PFT was not done on visit.  I explained to him that a covid test has to be performed before a PFT can be done.  Patient understood.      Thank you, and have a great day.

## 2022-05-03 ENCOUNTER — LAB (OUTPATIENT)
Dept: LAB | Facility: HOSPITAL | Age: 82
End: 2022-05-03

## 2022-05-03 ENCOUNTER — CONSULT (OUTPATIENT)
Dept: ONCOLOGY | Facility: CLINIC | Age: 82
End: 2022-05-03

## 2022-05-03 VITALS
TEMPERATURE: 97.7 F | HEART RATE: 60 BPM | RESPIRATION RATE: 16 BRPM | HEIGHT: 72 IN | SYSTOLIC BLOOD PRESSURE: 168 MMHG | OXYGEN SATURATION: 95 % | BODY MASS INDEX: 29.72 KG/M2 | DIASTOLIC BLOOD PRESSURE: 88 MMHG | WEIGHT: 219.4 LBS

## 2022-05-03 DIAGNOSIS — J44.9 CHRONIC OBSTRUCTIVE PULMONARY DISEASE, UNSPECIFIED COPD TYPE: ICD-10-CM

## 2022-05-03 DIAGNOSIS — I25.810 CORONARY ARTERY DISEASE INVOLVING AUTOLOGOUS ARTERY CORONARY BYPASS GRAFT, UNSPECIFIED WHETHER ANGINA PRESENT: ICD-10-CM

## 2022-05-03 DIAGNOSIS — N18.30 STAGE 3 CHRONIC KIDNEY DISEASE, UNSPECIFIED WHETHER STAGE 3A OR 3B CKD: ICD-10-CM

## 2022-05-03 DIAGNOSIS — D69.6 THROMBOCYTOPENIA: ICD-10-CM

## 2022-05-03 DIAGNOSIS — D64.9 ANEMIA, UNSPECIFIED TYPE: Primary | ICD-10-CM

## 2022-05-03 DIAGNOSIS — D64.9 ANEMIA, UNSPECIFIED TYPE: ICD-10-CM

## 2022-05-03 LAB
ALBUMIN SERPL-MCNC: 4.2 G/DL (ref 3.5–5.2)
ALBUMIN/GLOB SERPL: 1.8 G/DL
ALP SERPL-CCNC: 64 U/L (ref 39–117)
ALT SERPL W P-5'-P-CCNC: 9 U/L (ref 1–41)
ANION GAP SERPL CALCULATED.3IONS-SCNC: 10 MMOL/L (ref 5–15)
AST SERPL-CCNC: 9 U/L (ref 1–40)
BASOPHILS # BLD AUTO: 0.05 10*3/MM3 (ref 0–0.2)
BASOPHILS NFR BLD AUTO: 0.5 % (ref 0–1.5)
BILIRUB SERPL-MCNC: 0.5 MG/DL (ref 0–1.2)
BUN SERPL-MCNC: 36 MG/DL (ref 8–23)
BUN/CREAT SERPL: 12.8 (ref 7–25)
CALCIUM SPEC-SCNC: 9.2 MG/DL (ref 8.6–10.5)
CHLORIDE SERPL-SCNC: 106 MMOL/L (ref 98–107)
CO2 SERPL-SCNC: 21 MMOL/L (ref 22–29)
CREAT SERPL-MCNC: 2.81 MG/DL (ref 0.76–1.27)
CRP SERPL-MCNC: 5.3 MG/DL (ref 0–0.5)
CYTOLOGIST CVX/VAG CYTO: NORMAL
DAT POLY-SP REAG RBC QL: NEGATIVE
DEPRECATED RDW RBC AUTO: 43.5 FL (ref 37–54)
EGFRCR SERPLBLD CKD-EPI 2021: 21.9 ML/MIN/1.73
EOSINOPHIL # BLD AUTO: 0.56 10*3/MM3 (ref 0–0.4)
EOSINOPHIL NFR BLD AUTO: 6.1 % (ref 0.3–6.2)
ERYTHROCYTE [DISTWIDTH] IN BLOOD BY AUTOMATED COUNT: 13.3 % (ref 12.3–15.4)
ERYTHROCYTE [SEDIMENTATION RATE] IN BLOOD: 22 MM/HR (ref 0–20)
FERRITIN SERPL-MCNC: 56.25 NG/ML (ref 30–400)
FOLATE SERPL-MCNC: 8.21 NG/ML (ref 4.78–24.2)
GLOBULIN UR ELPH-MCNC: 2.4 GM/DL
GLUCOSE SERPL-MCNC: 90 MG/DL (ref 65–99)
HAPTOGLOB SERPL-MCNC: 222 MG/DL (ref 30–200)
HCT VFR BLD AUTO: 35.5 % (ref 37.5–51)
HGB BLD-MCNC: 11.9 G/DL (ref 13–17.7)
IMM GRANULOCYTES # BLD AUTO: 0.04 10*3/MM3 (ref 0–0.05)
IMM GRANULOCYTES NFR BLD AUTO: 0.4 % (ref 0–0.5)
IRON 24H UR-MRATE: 34 MCG/DL (ref 59–158)
IRON SATN MFR SERPL: 9 % (ref 20–50)
LYMPHOCYTES # BLD AUTO: 1.05 10*3/MM3 (ref 0.7–3.1)
LYMPHOCYTES NFR BLD AUTO: 11.4 % (ref 19.6–45.3)
MCH RBC QN AUTO: 29.9 PG (ref 26.6–33)
MCHC RBC AUTO-ENTMCNC: 33.5 G/DL (ref 31.5–35.7)
MCV RBC AUTO: 89.2 FL (ref 79–97)
MONOCYTES # BLD AUTO: 0.99 10*3/MM3 (ref 0.1–0.9)
MONOCYTES NFR BLD AUTO: 10.7 % (ref 5–12)
NEUTROPHILS NFR BLD AUTO: 6.55 10*3/MM3 (ref 1.7–7)
NEUTROPHILS NFR BLD AUTO: 70.9 % (ref 42.7–76)
NRBC BLD AUTO-RTO: 0 /100 WBC (ref 0–0.2)
PATH INTERP BLD-IMP: NORMAL
PLATELET # BLD AUTO: 115 10*3/MM3 (ref 140–450)
PMV BLD AUTO: 10.5 FL (ref 6–12)
POTASSIUM SERPL-SCNC: 4.3 MMOL/L (ref 3.5–5.2)
PROT SERPL-MCNC: 6.6 G/DL (ref 6–8.5)
RBC # BLD AUTO: 3.98 10*6/MM3 (ref 4.14–5.8)
RETICS # AUTO: 0.07 10*6/MM3 (ref 0.02–0.13)
RETICS/RBC NFR AUTO: 1.68 % (ref 0.7–1.9)
SODIUM SERPL-SCNC: 137 MMOL/L (ref 136–145)
TIBC SERPL-MCNC: 373 MCG/DL (ref 298–536)
TRANSFERRIN SERPL-MCNC: 250 MG/DL (ref 200–360)
VIT B12 BLD-MCNC: 1372 PG/ML (ref 211–946)
WBC NRBC COR # BLD: 9.24 10*3/MM3 (ref 3.4–10.8)

## 2022-05-03 PROCEDURE — 86880 COOMBS TEST DIRECT: CPT

## 2022-05-03 PROCEDURE — 85652 RBC SED RATE AUTOMATED: CPT

## 2022-05-03 PROCEDURE — 82607 VITAMIN B-12: CPT

## 2022-05-03 PROCEDURE — 99215 OFFICE O/P EST HI 40 MIN: CPT | Performed by: NURSE PRACTITIONER

## 2022-05-03 PROCEDURE — 36415 COLL VENOUS BLD VENIPUNCTURE: CPT

## 2022-05-03 PROCEDURE — 83010 ASSAY OF HAPTOGLOBIN QUANT: CPT

## 2022-05-03 PROCEDURE — 82525 ASSAY OF COPPER: CPT

## 2022-05-03 PROCEDURE — 84466 ASSAY OF TRANSFERRIN: CPT

## 2022-05-03 PROCEDURE — 85045 AUTOMATED RETICULOCYTE COUNT: CPT

## 2022-05-03 PROCEDURE — 85060 BLOOD SMEAR INTERPRETATION: CPT

## 2022-05-03 PROCEDURE — 83540 ASSAY OF IRON: CPT

## 2022-05-03 PROCEDURE — 84630 ASSAY OF ZINC: CPT

## 2022-05-03 PROCEDURE — 82728 ASSAY OF FERRITIN: CPT

## 2022-05-03 PROCEDURE — 80053 COMPREHEN METABOLIC PANEL: CPT

## 2022-05-03 PROCEDURE — 86140 C-REACTIVE PROTEIN: CPT

## 2022-05-03 PROCEDURE — 84165 PROTEIN E-PHORESIS SERUM: CPT

## 2022-05-03 PROCEDURE — 82746 ASSAY OF FOLIC ACID SERUM: CPT

## 2022-05-03 PROCEDURE — 82668 ASSAY OF ERYTHROPOIETIN: CPT

## 2022-05-03 PROCEDURE — 85025 COMPLETE CBC W/AUTO DIFF WBC: CPT

## 2022-05-03 NOTE — PROGRESS NOTES
"[facility] HEMATOLOGY & ONCOLOGY  [address]  [facility] [address] [phone]    Patient Name: Faisal Joseph  Encounter Date: 05/03/2022  YOB: 1940  Patient Number: [id number]    Initial Note    REASON FOR CONSULTATION: Patient states \" my blood  \".    HISTORY OF PRESENT ILLNESS: Faisal Joseph is a 81 y.o. male referred by Veda Brito, * for diagnostic and management recommendations for anemia and thrombocytopenia. History is obtained from patient. History is considered to be accurate.    He has health history significant for CKD, COPD, Centrilobular emphysema, Hypertension, Coronary Artery Disease s/p CAGB in June 2020, Hyperlipidemia    He was referred to our office by [facility] related to anemia and thrombocytopenia.    April 19, 2022 labs were resulted with WBC 6.8, Hgb 12.2, Hct 36.6, Platelets 98.  BUN 35, Creatinine 3.04, GFR 20 December 22, 2021: WBC 6.3, Hgb 12.2, Hct 36.9, Plt 121.  BUN 24, Creatinine 2.21, GFR 27.     He sees Dr. Brice for Cardiology, Peggy HILL for Pulmonology.  PCP is Dr. Polanco in [place]     He reports fatigue and that he has felt that way since his CABG.  He also reports ribs being sore on left side     He had CT of Abdomen / Pelvis with out contrast on 01/05/22 which showed that the liver and spleen demonstrate fairly normal attenuation with small calcified granulomata in the spleen.     He had colonoscopy approx 5 years ago  Drinks beer occastionally. Denies smoking or illicit drug use.    LABS    Lab Results - Last 18 Months   Lab Units 05/03/22  1153   HEMOGLOBIN g/dL 11.9*   HEMATOCRIT % 35.5*   MCV fL 89.2   WBC 10*3/mm3 9.24   RDW % 13.3   MPV fL 10.5   PLATELETS 10*3/mm3 115*   IMM GRAN % % 0.4   NEUTROS ABS 10*3/mm3 6.55   LYMPHS ABS 10*3/mm3 1.05   MONOS ABS 10*3/mm3 0.99*   EOS ABS 10*3/mm3 0.56*   BASOS ABS 10*3/mm3 0.05   IMMATURE GRANS " (ABS) 10*3/mm3 0.04   NRBC /100 WBC 0.0       Lab Results - Last 18 Months   Lab Units 05/03/22  1153 01/06/22  0434 01/05/22  0657 01/04/22  2124 11/22/21  1235   GLUCOSE mg/dL 90  --   --   --   --    SODIUM mmol/L 137 141 135 137 140   POTASSIUM mmol/L 4.3 4.3 3.3* 3.5 4.0   TOTAL CO2 mmol/L  --  22 19* 20* 21   CO2 mmol/L 21.0*  --   --   --   --    CHLORIDE mmol/L 106 109* 106 106 108*   ANION GAP mmol/L 10.0 10 10 11 11   CREATININE mg/dL 2.81* 2.72* 2.65* 2.83* 2.62*   BUN mg/dL 36* 28* 30* 31* 30*   BUN / CREAT RATIO  12.8 10.3 11.3 11.0 11.5   CALCIUM mg/dL 9.2 8.2* 7.8* 8.2* 8.5   EGFR IF NONAFRICN AM mL/min/1.73m2  --  22.6* 23.2* 21.5* 23.5*   ALK PHOS U/L 64 45* 39* 51 52   TOTAL PROTEIN g/dL 6.6 6.0* 6.1* 6.8 6.8   ALT (SGPT) U/L 9 18 17 21 10*   AST (SGOT) U/L 9 18 18 18 13   BILIRUBIN mg/dL 0.5 0.3 0.3 0.4 0.4   ALBUMIN g/dL 4.20  3.5 2.8* 3.0* 3.5 3.1*   GLOBULIN gm/dL 2.4  --   --   --   --        Lab Results - Last 18 Months   Lab Units 05/03/22  1153   M-SPIKE g/dL Not Observed       Lab Results - Last 18 Months   Lab Units 05/03/22  1153   IRON mcg/dL 34*   TIBC mcg/dL 373   IRON SATURATION % 9*   FERRITIN ng/mL 56.25   FOLATE ng/mL 8.21         PAST MEDICAL HISTORY:  ALLERGIES:  Allergies   Allergen Reactions   • Hydralazine Nausea Only   • Losartan Potassium Nausea Only   • Penicillins Hives   • Spironolactone Swelling     Made  Breast sore and swell     CURRENT MEDICATIONS:  Outpatient Encounter Medications as of 5/3/2022   Medication Sig Dispense Refill   • albuterol sulfate  (90 Base) MCG/ACT inhaler      • amLODIPine (NORVASC) 10 MG tablet Take 10 mg by mouth Daily.     • aspirin 81 MG EC tablet Take 1 tablet by mouth Daily. 100 tablet 99   • carvedilol (COREG) 12.5 MG tablet Take 12.5 mg by mouth 2 (Two) Times a Day With Meals.     • Cholecalciferol (VITAMIN D) 2000 units capsule Take 2,000 Units by mouth Daily.     • cloNIDine (CATAPRES) 0.1 MG tablet Take 0.1 mg by mouth As  Needed for High Blood Pressure.     • levocetirizine (XYZAL) 5 MG tablet      • lisinopril (PRINIVIL,ZESTRIL) 20 MG tablet Take 20 mg by mouth Daily.     • magnesium oxide (MAGOX) 400 (241.3 Mg) MG tablet tablet Take 400 mg by mouth 1 (One) Time.     • nitroglycerin (NITROSTAT) 0.4 MG SL tablet 1 under the tongue as needed for angina, may repeat q5mins for up three doses 25 tablet 2   • pravastatin (PRAVACHOL) 40 MG tablet Take 1 tablet by mouth Daily. 90 tablet 3   • sodium bicarbonate 325 MG tablet Take 325 mg by mouth 4 (Four) Times a Day.     • tiotropium bromide-olodaterol (Stiolto Respimat) 2.5-2.5 MCG/ACT aerosol solution inhaler Inhale 2 puffs Daily. 1 each 0   • vitamin B-12 (CYANOCOBALAMIN) 100 MCG tablet Take 50 mcg by mouth Daily.     • [DISCONTINUED] hydrALAZINE (APRESOLINE) 50 MG tablet Take 50 mg by mouth 3 (Three) Times a Day.       No facility-administered encounter medications on file as of 5/3/2022.     ADULT ILLNESSES:  Patient Active Problem List   Diagnosis Code   • Essential hypertension I10   • Stage 4 chronic kidney disease (HCC) N18.4   • Coronary artery disease I25.10   • History of non-ST elevation myocardial infarction (NSTEMI) I25.2   • Chronic pericarditis I31.9   • History of CVA (cerebrovascular accident) Z86.73   • Hyperlipidemia E78.5   • Physical debility R53.81   • S/P CABG (coronary artery bypass graft) Z95.1   • Palpitations R00.2   • Gait difficulty R26.9     SURGERIES:  Past Surgical History:   Procedure Laterality Date   • CARDIAC CATHETERIZATION     • CARDIAC CATHETERIZATION N/A 5/28/2020    Procedure: Left Heart Cath;  Surgeon: Rufino Brice MD;  Location:  [facility] CATH INVASIVE LOCATION;  Service: Cardiology;  Laterality: N/A;   • CORONARY ARTERY BYPASS GRAFT N/A 6/9/2020    Procedure: CABG X 3, LIMA, SHELBY, EVH WITH OPEN EXTENSION LOWER RIGHT LEG;  Surgeon: Nikunj Carballo MD;  Location:  [facility] OR;  Service: Cardiothoracic;  Laterality: N/A;     HEALTH  MAINTENANCE ITEMS:  Health Maintenance Due   Topic Date Due   • ZOSTER VACCINE (1 of 2) Never done   • ANNUAL WELLNESS VISIT  Never done   • Pneumococcal Vaccine 65+ (2 - PPSV23 or PCV20) 2020   • TDAP/TD VACCINES (2 - Tdap) 2021   • LIPID PANEL  2021   • COVID-19 Vaccine (3 - Booster for Moderna series) 2021       <no information>  Last Completed Colonoscopy     This patient has no relevant Health Maintenance data.        Immunization History   Administered Date(s) Administered   • COVID-19 (MODERNA) 1st, 2nd, 3rd Dose Only 2021, 2021   • FLUAD TRI 65YR+ 2019, 2020   • Flu Vaccine Intradermal Quad 18-64YR 10/18/2021   • Fluzone High Dose =>65 Years ([employer] ONLY) 2015, 10/14/2016, 2017   • Pneumococcal Conjugate 13-Valent (PCV13) 2019   • TD Preservative Free 2011     Last Completed Mammogram     This patient has no relevant Health Maintenance data.            FAMILY HISTORY:  Family History   Problem Relation Age of Onset   • Cancer Mother    • Cancer Father      SOCIAL HISTORY:  Social History     Socioeconomic History   • Marital status:    Tobacco Use   • Smoking status: Former Smoker     Packs/day: 1.50     Years: 25.00     Pack years: 37.50     Types: Cigarettes     Quit date:      Years since quittin.3   • Smokeless tobacco: Never Used   Vaping Use   • Vaping Use: Never used   Substance and Sexual Activity   • Alcohol use: No   • Drug use: No   • Sexual activity: Defer       REVIEW OF SYSTEMS:  Review of Systems   Constitutional: Positive for activity change and fatigue. Negative for diaphoresis.        Since CABG   HENT: Positive for hearing loss and postnasal drip. Negative for congestion, dental problem, drooling, ear discharge, ear pain, facial swelling, nosebleeds, sinus pressure, swollen glands, tinnitus, trouble swallowing and voice change.    Eyes: Positive for blurred vision.        Had cataract surgery in  "November.  Pt states vision is still blurry   Respiratory: Positive for cough, chest tightness and shortness of breath.         Had Covid in December.  This has been much worse since then   Cardiovascular: Positive for palpitations.        S/P CABG 2020   Gastrointestinal: Negative for abdominal distention, abdominal pain, anal bleeding, blood in stool, constipation, diarrhea, nausea, rectal pain, vomiting, GERD and indigestion.   Genitourinary: Negative for breast discharge, decreased libido, decreased urine volume, frequency, hematuria, penile pain, penile swelling, testicular pain, urgency and urinary incontinence.   Musculoskeletal: Positive for arthralgias, back pain and myalgias.   Skin: Positive for bruise.        On blood thinner      Neurological: Positive for dizziness. Negative for tremors, syncope, light-headedness, headache, memory problem and confusion.        Sometimes dizzy after taking BP meds.   Psychiatric/Behavioral: Positive for sleep disturbance (Wakes up 4-5 times at night). Negative for dysphoric mood, hallucinations, suicidal ideas, depressed mood and stress.        Does feel some depression r/t decreased activity.       /88   Pulse 60   Temp 97.7 °F (36.5 °C) (Temporal)   Resp 16   Ht 182.9 cm (72\")   Wt 99.5 kg (219 lb 6.4 oz)   SpO2 95%   BMI 29.76 kg/m²  Body surface area is 2.22 meters squared.    Pain Score    05/03/22 1105   PainSc: 0-No pain       Physical Exam:  Physical Exam  Constitutional:       Appearance: Normal appearance.   HENT:      Head: Normocephalic and atraumatic.   Eyes:      Extraocular Movements: Extraocular movements intact.   Cardiovascular:      Rate and Rhythm: Normal rate and regular rhythm.   Pulmonary:      Effort: Pulmonary effort is normal. No respiratory distress.      Breath sounds: Normal breath sounds.   Abdominal:      General: Bowel sounds are normal.      Palpations: Abdomen is soft.   Skin:     General: Skin is warm and dry. "   Neurological:      General: No focal deficit present.      Mental Status: He is alert and oriented to person, place, and time.         Faisal Joseph reports a pain score of 0.  No intervention indicated.          ASSESSMENT / PLAN:    1. Anemia, unspecified type    2. Stage 3 chronic kidney disease, unspecified whether stage 3a or 3b CKD (HCC)    3. Thrombocytopenia (HCC)    4. Chronic obstructive pulmonary disease, unspecified COPD type (HCC)    5. Coronary artery disease involving autologous artery coronary bypass graft, unspecified whether angina present       1.  Anemia in Chronic Kidney Disease    -Most recent labs WBC 6.8, Hgb 12.2, Hct 36.6, Platelets 98.  BUN 35, Creatinine 3.04, GFR 20    -Will recheck CBC, CMP and anemia profile    -Patient must continue all follow up and treatment plans per PCP, Nephrology .   -If patient's Hgb <10 g/dL, Ferritin > 100 and Iron Sat > 20%, we will consider TANJA    2.  Thrombocytopenia    -Platelets 98.   -Pt has no s/s of bleeding.    -Pt is not taking any medications known to cause thrombocytopenia     3.  COPD   -June 2020 PFT with FEV1 82% and 88% post bronchodilator    -Continue follow up and treatment plan with LIZ Arellano, Pulmonology     4.  CAD s/p CABG   -CABG June 2020   -BP stable.  Continue medications as prescribed   -Hyperlipidemia.  Continue pravastatin as prescribed   -Continue follow up and treatment plan per Dr. Brice, Cardiology       -  regarding the reason for the referral.  -  regarding anemia/ thrombocytopenia and differential diagnoses considerations   -Will draw CBC, CMP, HIV, hepatitis panel, vitamin B12, folate, copper, zinc, anemia profile, CRP, para globin, B12, SPEP, erythropoietin level.  -All questions were answered.  Pt voiced understanding and agreed to this treatment plan.    -RTC in 2 week for lab review.   -Further recommendations pending.         Thank you for the referral.    I spent 45 minutes caring for  Faisal on this date of service. This time includes time spent by me in the following activities: preparing for the visit, reviewing tests, performing a medically appropriate examination and/or evaluation, counseling and educating the patient/family/caregiver, ordering medications, tests, or procedures and documenting information in the medical record.

## 2022-05-04 ENCOUNTER — PATIENT ROUNDING (BHMG ONLY) (OUTPATIENT)
Dept: ONCOLOGY | Facility: CLINIC | Age: 82
End: 2022-05-04

## 2022-05-04 LAB
ALBUMIN SERPL ELPH-MCNC: 3.5 G/DL (ref 2.9–4.4)
ALBUMIN/GLOB SERPL: 1.3 {RATIO} (ref 0.7–1.7)
ALPHA1 GLOB SERPL ELPH-MCNC: 0.2 G/DL (ref 0–0.4)
ALPHA2 GLOB SERPL ELPH-MCNC: 0.8 G/DL (ref 0.4–1)
B-GLOBULIN SERPL ELPH-MCNC: 0.8 G/DL (ref 0.7–1.3)
EPO SERPL-ACNC: 14.2 MIU/ML (ref 2.6–18.5)
GAMMA GLOB SERPL ELPH-MCNC: 0.9 G/DL (ref 0.4–1.8)
GLOBULIN SER CALC-MCNC: 2.7 G/DL (ref 2.2–3.9)
LABORATORY COMMENT REPORT: NORMAL
M PROTEIN SERPL ELPH-MCNC: NORMAL G/DL
PROT PATTERN SERPL ELPH-IMP: NORMAL
PROT SERPL-MCNC: 6.2 G/DL (ref 6–8.5)

## 2022-05-04 NOTE — PROGRESS NOTES
May 4, 2022    Hello, may I speak with Faisal Joseph?    My name is SHAKEEL    I am  with [facility] HEMATOLOGY & ONCOLOGY  [address]  [facility] [address] [phone].    Before we get started may I verify your date of birth? 1940    I am calling to officially welcome you to our practice and ask about your recent visit. Is this a good time to talk? LEFT VOICEMAIL    Tell me about your visit with us. What things went well?  LEFT VOICEMAIL       We're always looking for ways to make our patients' experiences even better. Do you have recommendations on ways we may improve?  LEFT VOICEMAIL    Overall were you satisfied with your first visit to our practice? LEFT VOICEMAIL       I appreciate you taking the time to speak with me today. Is there anything else I can do for you? LEFT VOICEMAIL      Thank you, and have a great day.

## 2022-05-06 LAB
COPPER SERPL-MCNC: 101 UG/DL (ref 69–132)
ZINC SERPL-MCNC: 99 UG/DL (ref 44–115)

## 2022-05-10 ENCOUNTER — OFFICE VISIT (OUTPATIENT)
Dept: ONCOLOGY | Facility: CLINIC | Age: 82
End: 2022-05-10

## 2022-05-10 ENCOUNTER — LAB (OUTPATIENT)
Dept: LAB | Facility: HOSPITAL | Age: 82
End: 2022-05-10

## 2022-05-10 VITALS
BODY MASS INDEX: 29.64 KG/M2 | RESPIRATION RATE: 16 BRPM | WEIGHT: 218.8 LBS | TEMPERATURE: 97.4 F | SYSTOLIC BLOOD PRESSURE: 138 MMHG | DIASTOLIC BLOOD PRESSURE: 76 MMHG | HEIGHT: 72 IN | HEART RATE: 58 BPM | OXYGEN SATURATION: 97 %

## 2022-05-10 DIAGNOSIS — J44.9 CHRONIC OBSTRUCTIVE PULMONARY DISEASE, UNSPECIFIED COPD TYPE: ICD-10-CM

## 2022-05-10 DIAGNOSIS — D50.9 IRON DEFICIENCY ANEMIA, UNSPECIFIED IRON DEFICIENCY ANEMIA TYPE: Primary | ICD-10-CM

## 2022-05-10 DIAGNOSIS — N18.4 ANEMIA IN STAGE 4 CHRONIC KIDNEY DISEASE: ICD-10-CM

## 2022-05-10 DIAGNOSIS — D63.1 ANEMIA IN STAGE 4 CHRONIC KIDNEY DISEASE: ICD-10-CM

## 2022-05-10 DIAGNOSIS — Z86.73 HISTORY OF CVA (CEREBROVASCULAR ACCIDENT): ICD-10-CM

## 2022-05-10 DIAGNOSIS — D69.6 THROMBOCYTOPENIA: ICD-10-CM

## 2022-05-10 DIAGNOSIS — I25.2 HISTORY OF NON-ST ELEVATION MYOCARDIAL INFARCTION (NSTEMI): ICD-10-CM

## 2022-05-10 DIAGNOSIS — I25.810 CORONARY ARTERY DISEASE INVOLVING AUTOLOGOUS ARTERY CORONARY BYPASS GRAFT, UNSPECIFIED WHETHER ANGINA PRESENT: ICD-10-CM

## 2022-05-10 DIAGNOSIS — N18.4 STAGE 4 CHRONIC KIDNEY DISEASE: ICD-10-CM

## 2022-05-10 DIAGNOSIS — N18.30 STAGE 3 CHRONIC KIDNEY DISEASE, UNSPECIFIED WHETHER STAGE 3A OR 3B CKD: ICD-10-CM

## 2022-05-10 DIAGNOSIS — D64.9 ANEMIA, UNSPECIFIED TYPE: ICD-10-CM

## 2022-05-10 PROCEDURE — 83521 IG LIGHT CHAINS FREE EACH: CPT

## 2022-05-10 PROCEDURE — 99213 OFFICE O/P EST LOW 20 MIN: CPT | Performed by: NURSE PRACTITIONER

## 2022-05-10 PROCEDURE — 81050 URINALYSIS VOLUME MEASURE: CPT

## 2022-05-10 RX ORDER — FERROUS SULFATE 325(65) MG
325 TABLET ORAL
Qty: 90 TABLET | Refills: 1 | Status: SHIPPED | OUTPATIENT
Start: 2022-05-10 | End: 2022-11-09 | Stop reason: SDUPTHER

## 2022-05-10 NOTE — PROGRESS NOTES
[facility] HEMATOLOGY & ONCOLOGY  [address]  [facility] [address] [phone]    Patient Name: Faisal Joseph  Encounter Date: 05/10/2022  YOB: 1940  Patient Number: [id number]    Hematology / Oncology Progress Note    CHIEF COMPLAINT:  Pt seen in consultation on May 3, 2022 for anemia.  He presents to clinic today to review labs.       HISTORY OF PRESENT ILLNESS: Faisal Joseph is a 81 y.o. male referred by Veda Brito,  for diagnostic and management recommendations for anemia and thrombocytopenia. History is obtained from patient. History is considered to be accurate.     He has health history significant for CKD, COPD, Centrilobular Emphysema, Hypertension, Coronary Artery Disease s/p CAGB in June 2020, Hyperlipidemia     He was referred to our office by [facility] related to anemia and thrombocytopenia.    April 19, 2022 labs were resulted with WBC 6.8, Hgb 12.2, Hct 36.6, Platelets 98.  BUN 35, Creatinine 3.04, GFR 20 December 22, 2021: WBC 6.3, Hgb 12.2, Hct 36.9, Plt 121.  BUN 24, Creatinine 2.21, GFR 27.     He sees Dr. Brice for Cardiology, Peggy HILL for Pulmonology.  PCP is Dr. Polanco in [place]      He reports fatigue and that he has felt that way since his CABG.  He also reports ribs being sore on left side      He had CT of Abdomen / Pelvis with out contrast on 01/05/22 which showed that the liver and spleen demonstrate fairly normal attenuation with small calcified granulomata in the spleen.      He had colonoscopy approx 5 years ago.  Drinks beer occastionally. Denies smoking or illicit drug use.    FOLLOW UP 05/10/22    Pt presents to clinic today to review labs.    CBC:  WBC 9.24, Hgb 11.9, Hct 35.5, Plt 115  Erythropoietin  14.2  Chemistry:  Creatinine 2.81, BUN 36, GFR 21.9.  Anemia Panel:  Serum iron 34, Ferritin 56.25, Iron Saturation 9%, TIBC 373  Peripheral Blood  Smear:  Mild normocytic anemia, mild thrombocytopenia, no evidence of pseudothrombocytopenia  C-Reactive 5.3  SPEP:  Normal with out any M Edilson  VENKAT negative, Hapatoglobin 222   Sed Rate: 22    Pt complains today that when he exerts himself, he feels like he could collapse.  He also complains of edema to his right leg since he had surgery.   States he feels like he could collapse with exertion.   Complains of edema to right leg after surery    Productive cough with sputum consistently.  Saw pulmonology 04/20/22  Per that office note:  PFT from 2020 showed mild obstruction with evidence of hyperinflation.  We will plan to try to treat underlying COPD with short prednisone course and a trial of Stiolto.  Albuterol HFA as needed and preemptively to activities that induce dyspnea and wheezing.  Add Mucinex twice a day for secretion management.  Worsening dyspnea could also be related to untreated SONU due to PAP intolerance.  Check alpha-1 antitrypsin.  Walking oximetry was adequate in office today. Follow-up in 3 months with spirometry and DLCO.          LABS    Lab Results - Last 18 Months   Lab Units 05/03/22  1153   HEMOGLOBIN g/dL 11.9*   HEMATOCRIT % 35.5*   MCV fL 89.2   WBC 10*3/mm3 9.24   RDW % 13.3   MPV fL 10.5   PLATELETS 10*3/mm3 115*   IMM GRAN % % 0.4   NEUTROS ABS 10*3/mm3 6.55   LYMPHS ABS 10*3/mm3 1.05   MONOS ABS 10*3/mm3 0.99*   EOS ABS 10*3/mm3 0.56*   BASOS ABS 10*3/mm3 0.05   IMMATURE GRANS (ABS) 10*3/mm3 0.04   NRBC /100 WBC 0.0       Lab Results - Last 18 Months   Lab Units 05/03/22  1153 01/06/22  0434 01/05/22  0657 01/04/22  2124 11/22/21  1235   GLUCOSE mg/dL 90  --   --   --   --    SODIUM mmol/L 137 141 135 137 140   POTASSIUM mmol/L 4.3 4.3 3.3* 3.5 4.0   TOTAL CO2 mmol/L  --  22 19* 20* 21   CO2 mmol/L 21.0*  --   --   --   --    CHLORIDE mmol/L 106 109* 106 106 108*   ANION GAP mmol/L 10.0 10 10 11 11   CREATININE mg/dL 2.81* 2.72* 2.65* 2.83* 2.62*   BUN mg/dL 36* 28* 30* 31* 30*   BUN  / CREAT RATIO  12.8 10.3 11.3 11.0 11.5   CALCIUM mg/dL 9.2 8.2* 7.8* 8.2* 8.5   EGFR IF NONAFRICN AM mL/min/1.73m2  --  22.6* 23.2* 21.5* 23.5*   ALK PHOS U/L 64 45* 39* 51 52   TOTAL PROTEIN g/dL 6.6 6.0* 6.1* 6.8 6.8   ALT (SGPT) U/L 9 18 17 21 10*   AST (SGOT) U/L 9 18 18 18 13   BILIRUBIN mg/dL 0.5 0.3 0.3 0.4 0.4   ALBUMIN g/dL 4.20  3.5 2.8* 3.0* 3.5 3.1*   GLOBULIN gm/dL 2.4  --   --   --   --        Lab Results - Last 18 Months   Lab Units 05/10/22  0950 05/03/22  1153   M-SPIKE g/dL  --  Not Observed   FREE KAPPA LT CHAINS, 24HR mg/24 hr 205.77  --        Lab Results - Last 18 Months   Lab Units 05/03/22  1153   IRON mcg/dL 34*   TIBC mcg/dL 373   IRON SATURATION % 9*   FERRITIN ng/mL 56.25   FOLATE ng/mL 8.21         PAST MEDICAL HISTORY:  ALLERGIES:  Allergies   Allergen Reactions   • Hydralazine Nausea Only   • Losartan Potassium Nausea Only   • Penicillins Hives   • Spironolactone Swelling     Made  Breast sore and swell     CURRENT MEDICATIONS:  Outpatient Encounter Medications as of 5/10/2022   Medication Sig Dispense Refill   • albuterol sulfate  (90 Base) MCG/ACT inhaler      • amLODIPine (NORVASC) 10 MG tablet Take 10 mg by mouth Daily.     • aspirin 81 MG EC tablet Take 1 tablet by mouth Daily. 100 tablet 99   • carvedilol (COREG) 12.5 MG tablet Take 12.5 mg by mouth 2 (Two) Times a Day With Meals.     • Cholecalciferol (VITAMIN D) 2000 units capsule Take 2,000 Units by mouth Daily.     • cloNIDine (CATAPRES) 0.1 MG tablet Take 0.1 mg by mouth As Needed for High Blood Pressure.     • ferrous sulfate 325 (65 FE) MG tablet Take 1 tablet by mouth Daily With Breakfast. 90 tablet 1   • levocetirizine (XYZAL) 5 MG tablet      • lisinopril (PRINIVIL,ZESTRIL) 20 MG tablet Take 20 mg by mouth Daily.     • magnesium oxide (MAGOX) 400 (241.3 Mg) MG tablet tablet Take 400 mg by mouth 1 (One) Time.     • nitroglycerin (NITROSTAT) 0.4 MG SL tablet 1 under the tongue as needed for angina, may repeat  q5mins for up three doses 25 tablet 2   • pravastatin (PRAVACHOL) 40 MG tablet Take 1 tablet by mouth Daily. 90 tablet 3   • sodium bicarbonate 325 MG tablet Take 325 mg by mouth 4 (Four) Times a Day.     • tiotropium bromide-olodaterol (Stiolto Respimat) 2.5-2.5 MCG/ACT aerosol solution inhaler Inhale 2 puffs Daily. 1 each 0   • vitamin B-12 (CYANOCOBALAMIN) 100 MCG tablet Take 50 mcg by mouth Daily.       No facility-administered encounter medications on file as of 5/10/2022.     ADULT ILLNESSES:  Patient Active Problem List   Diagnosis Code   • Essential hypertension I10   • Stage 4 chronic kidney disease (HCC) N18.4   • Coronary artery disease I25.10   • History of non-ST elevation myocardial infarction (NSTEMI) I25.2   • Chronic pericarditis I31.9   • History of CVA (cerebrovascular accident) Z86.73   • Hyperlipidemia E78.5   • Physical debility R53.81   • S/P CABG (coronary artery bypass graft) Z95.1   • Palpitations R00.2   • Gait difficulty R26.9     SURGERIES:  Past Surgical History:   Procedure Laterality Date   • CARDIAC CATHETERIZATION     • CARDIAC CATHETERIZATION N/A 5/28/2020    Procedure: Left Heart Cath;  Surgeon: Rufino Brice MD;  Location:  [facility];  Service: Cardiology;  Laterality: N/A;   • CORONARY ARTERY BYPASS GRAFT N/A 6/9/2020    Procedure: CABG X 3, LIMA, SHELBY, EVH WITH OPEN EXTENSION LOWER RIGHT LEG;  Surgeon: Nikunj Carballo MD;  Location[facility];  Service: Cardiothoracic;  Laterality: N/A;     HEALTH MAINTENANCE ITEMS:  Health Maintenance Due   Topic Date Due   • ZOSTER VACCINE (1 of 2) Never done   • ANNUAL WELLNESS VISIT  Never done   • Pneumococcal Vaccine 65+ (2 - PPSV23 or PCV20) 11/05/2020   • TDAP/TD VACCINES (2 - Tdap) 04/20/2021   • LIPID PANEL  05/28/2021   • COVID-19 Vaccine (3 - Booster for Moderna series) 06/30/2021       <no information>  Last Completed Colonoscopy     This patient has no relevant Health Maintenance data.     "    Immunization History   Administered Date(s) Administered   • COVID-19 (MODERNA) 1st, 2nd, 3rd Dose Only 2021, 2021   • FLUAD TRI 65YR+ 2019, 2020   • Flu Vaccine Intradermal Quad 18-64YR 10/18/2021   • Fluzone High Dose =>65 Years ([employer] ONLY) 2015, 10/14/2016, 2017   • Pneumococcal Conjugate 13-Valent (PCV13) 2019   • TD Preservative Free 2011     Last Completed Mammogram     This patient has no relevant Health Maintenance data.            FAMILY HISTORY:  Family History   Problem Relation Age of Onset   • Cancer Mother    • Cancer Father      SOCIAL HISTORY:  Social History     Socioeconomic History   • Marital status:    Tobacco Use   • Smoking status: Former Smoker     Packs/day: 1.50     Years: 25.00     Pack years: 37.50     Types: Cigarettes     Quit date:      Years since quittin.3   • Smokeless tobacco: Never Used   Vaping Use   • Vaping Use: Never used   Substance and Sexual Activity   • Alcohol use: No   • Drug use: No   • Sexual activity: Defer       REVIEW OF SYSTEMS:  Review of Systems   Constitutional: Positive for activity change and fatigue.   HENT: Negative for swollen glands and trouble swallowing.    Gastrointestinal: Negative for nausea and vomiting.   Neurological: Positive for weakness.   Hematological: Does not bruise/bleed easily.   Psychiatric/Behavioral: Negative for suicidal ideas and stress.       /76   Pulse 58   Temp 97.4 °F (36.3 °C) (Temporal)   Resp 16   Ht 182.9 cm (72\")   Wt 99.2 kg (218 lb 12.8 oz)   SpO2 97%   BMI 29.67 kg/m²  Body surface area is 2.21 meters squared.    Pain Score    05/10/22 1001   PainSc: 0-No pain       Physical Exam:  Physical Exam  Constitutional:       Appearance: Normal appearance.   HENT:      Head: Normocephalic and atraumatic.   Eyes:      Extraocular Movements: Extraocular movements intact.   Cardiovascular:      Rate and Rhythm: Normal rate and regular rhythm. "   Pulmonary:      Effort: Pulmonary effort is normal.      Breath sounds: Normal breath sounds.   Abdominal:      General: Bowel sounds are normal.      Palpations: Abdomen is soft.   Musculoskeletal:         General: Normal range of motion.   Skin:     General: Skin is warm and dry.   Neurological:      General: No focal deficit present.      Mental Status: He is alert and oriented to person, place, and time.   Psychiatric:         Mood and Affect: Mood normal.         Faisal Joseph reports a pain score of 0.  No intervention indicated.          ASSESSMENT / PLAN    1. Iron deficiency anemia, unspecified iron deficiency anemia type    2. History of CVA (cerebrovascular accident)    3. History of non-ST elevation myocardial infarction (NSTEMI)    4. Stage 4 chronic kidney disease (HCC)    5. Anemia in stage 4 chronic kidney disease (HCC)    6. Chronic obstructive pulmonary disease, unspecified COPD type ([facility])         ASSESSMENT:      1.  Iron Deficiency Anemia    -SPEP:  Normal with out any M Edilson   -VENKAT negative, Hapatoglobin 222    - Serum iron 34, Ferritin 56.25, Iron Saturation 9%, TIBC 373     2.  Chronic Kidney Disease    -Stage IV   -Creatinine 2.81, BUN 36, GFR 21.9   -Patient is seen by [facility]    -Has an appt Friday, May 13   -SPEP negative     3.  History of CVA / History or NSTEMI   -Patient is seen by Dr. Brice for Cardiology   -History of hypertension    4. COPD   -Continue bronchodilator and LABA per Pulmonology    -Consider adding ICS/KOWALSKI      PLAN:    1.  Hgb / Hct is stable at this time.   2.  Iron is deficient.  Pt will start oral iron daily.   3.  -Patient will start oral iron daily  4.  Discussed with patient the potential need for TANJA.    5.  Will start Procrit 40,000 units subcutaneously weekly if Hemoglobin below 10, Hematocrit below 30%, Ferritin >100 and Iron Saturation >20% to target a Hemoglobin at 11 and Hematocrit 33%  6.  If indicated, will transfuse 2 units  PRBCs if hemoglobin below 7.  Premed Tylenol 500 mg p.o. and Benadryl 25 mg IV.  Lasix 20 mg IVP after each unit of PRBC.  7.  RTC in 4 weeks for continued follow up.  2.  Blood for CBC, CMP, Ferritin, TIBC, % Saturation, and Iron every 4 weeks.  3.  Continue ongoing management per primary care physician and other specialists including cardiology and pulmonlogy.  4. Plan of care discussed with patient.  Understanding expressed.  Patient agreeable to proceed.   5.  Blood pressure is stable today at 138/76        I spent 30 minutes caring for Faisal on this date of service. This time includes time spent by me in the following activities: preparing for the visit, reviewing tests, performing a medically appropriate examination and/or evaluation, counseling and educating the patient/family/caregiver, ordering medications, tests, or procedures and documenting information in the medical record.

## 2022-05-13 DIAGNOSIS — J43.2 CENTRILOBULAR EMPHYSEMA: Chronic | ICD-10-CM

## 2022-05-14 LAB
KAPPA LC FREE 24H UR-MRATE: 205.77 MG/24 HR
KAPPA LC FREE UR-MCNC: 108.3 MG/L (ref 1.17–86.46)
KAPPA LC FREE/LAMBDA FREE UR: 4.03 (ref 1.83–14.26)
LAMBDA LC FREE 24H UR-MRATE: 51.09 MG/24 HR
LAMBDA LC FREE UR-MCNC: 26.89 MG/L (ref 0.27–15.21)

## 2022-06-07 ENCOUNTER — LAB (OUTPATIENT)
Dept: LAB | Facility: HOSPITAL | Age: 82
End: 2022-06-07

## 2022-06-07 ENCOUNTER — OFFICE VISIT (OUTPATIENT)
Dept: ONCOLOGY | Facility: CLINIC | Age: 82
End: 2022-06-07

## 2022-06-07 VITALS
HEART RATE: 67 BPM | DIASTOLIC BLOOD PRESSURE: 86 MMHG | OXYGEN SATURATION: 97 % | SYSTOLIC BLOOD PRESSURE: 160 MMHG | TEMPERATURE: 97.9 F | HEIGHT: 72 IN | RESPIRATION RATE: 16 BRPM | BODY MASS INDEX: 29.26 KG/M2 | WEIGHT: 216 LBS

## 2022-06-07 DIAGNOSIS — I25.119 ATHEROSCLEROSIS OF NATIVE CORONARY ARTERY OF NATIVE HEART WITH ANGINA PECTORIS: ICD-10-CM

## 2022-06-07 DIAGNOSIS — D50.9 IRON DEFICIENCY ANEMIA, UNSPECIFIED IRON DEFICIENCY ANEMIA TYPE: Primary | ICD-10-CM

## 2022-06-07 DIAGNOSIS — N18.4 STAGE 4 CHRONIC KIDNEY DISEASE: ICD-10-CM

## 2022-06-07 DIAGNOSIS — D50.9 IRON DEFICIENCY ANEMIA, UNSPECIFIED IRON DEFICIENCY ANEMIA TYPE: ICD-10-CM

## 2022-06-07 DIAGNOSIS — D69.6 THROMBOCYTOPENIA: ICD-10-CM

## 2022-06-07 DIAGNOSIS — J44.9 CHRONIC OBSTRUCTIVE PULMONARY DISEASE, UNSPECIFIED COPD TYPE: ICD-10-CM

## 2022-06-07 DIAGNOSIS — N18.4 STAGE 4 CHRONIC KIDNEY DISEASE: Primary | ICD-10-CM

## 2022-06-07 LAB
ALBUMIN SERPL-MCNC: 4 G/DL (ref 3.5–5.2)
ALBUMIN/GLOB SERPL: 1.7 G/DL
ALP SERPL-CCNC: 65 U/L (ref 39–117)
ALT SERPL W P-5'-P-CCNC: 11 U/L (ref 1–41)
ANION GAP SERPL CALCULATED.3IONS-SCNC: 10 MMOL/L (ref 5–15)
AST SERPL-CCNC: 10 U/L (ref 1–40)
BASOPHILS # BLD AUTO: 0.05 10*3/MM3 (ref 0–0.2)
BASOPHILS NFR BLD AUTO: 0.8 % (ref 0–1.5)
BILIRUB SERPL-MCNC: 0.3 MG/DL (ref 0–1.2)
BUN SERPL-MCNC: 25 MG/DL (ref 8–23)
BUN/CREAT SERPL: 9 (ref 7–25)
CALCIUM SPEC-SCNC: 9.1 MG/DL (ref 8.6–10.5)
CHLORIDE SERPL-SCNC: 106 MMOL/L (ref 98–107)
CO2 SERPL-SCNC: 22 MMOL/L (ref 22–29)
CREAT SERPL-MCNC: 2.78 MG/DL (ref 0.76–1.27)
DEPRECATED RDW RBC AUTO: 43.4 FL (ref 37–54)
EGFRCR SERPLBLD CKD-EPI 2021: 22.2 ML/MIN/1.73
EOSINOPHIL # BLD AUTO: 0.58 10*3/MM3 (ref 0–0.4)
EOSINOPHIL NFR BLD AUTO: 9.4 % (ref 0.3–6.2)
ERYTHROCYTE [DISTWIDTH] IN BLOOD BY AUTOMATED COUNT: 13.3 % (ref 12.3–15.4)
FERRITIN SERPL-MCNC: 56.51 NG/ML (ref 30–400)
GLOBULIN UR ELPH-MCNC: 2.4 GM/DL
GLUCOSE SERPL-MCNC: 103 MG/DL (ref 65–99)
HCT VFR BLD AUTO: 35.5 % (ref 37.5–51)
HGB BLD-MCNC: 11.8 G/DL (ref 13–17.7)
HOLD SPECIMEN: NORMAL
IMM GRANULOCYTES # BLD AUTO: 0.01 10*3/MM3 (ref 0–0.05)
IMM GRANULOCYTES NFR BLD AUTO: 0.2 % (ref 0–0.5)
IRON 24H UR-MRATE: 85 MCG/DL (ref 59–158)
IRON SATN MFR SERPL: 25 % (ref 20–50)
LYMPHOCYTES # BLD AUTO: 0.97 10*3/MM3 (ref 0.7–3.1)
LYMPHOCYTES NFR BLD AUTO: 15.7 % (ref 19.6–45.3)
MCH RBC QN AUTO: 29.6 PG (ref 26.6–33)
MCHC RBC AUTO-ENTMCNC: 33.2 G/DL (ref 31.5–35.7)
MCV RBC AUTO: 89 FL (ref 79–97)
MONOCYTES # BLD AUTO: 0.65 10*3/MM3 (ref 0.1–0.9)
MONOCYTES NFR BLD AUTO: 10.6 % (ref 5–12)
NEUTROPHILS NFR BLD AUTO: 3.9 10*3/MM3 (ref 1.7–7)
NEUTROPHILS NFR BLD AUTO: 63.3 % (ref 42.7–76)
NRBC BLD AUTO-RTO: 0 /100 WBC (ref 0–0.2)
PLATELET # BLD AUTO: 111 10*3/MM3 (ref 140–450)
PMV BLD AUTO: 10.7 FL (ref 6–12)
POTASSIUM SERPL-SCNC: 3.7 MMOL/L (ref 3.5–5.2)
PROT SERPL-MCNC: 6.4 G/DL (ref 6–8.5)
RBC # BLD AUTO: 3.99 10*6/MM3 (ref 4.14–5.8)
SODIUM SERPL-SCNC: 138 MMOL/L (ref 136–145)
TIBC SERPL-MCNC: 334 MCG/DL (ref 298–536)
TRANSFERRIN SERPL-MCNC: 224 MG/DL (ref 200–360)
WBC NRBC COR # BLD: 6.16 10*3/MM3 (ref 3.4–10.8)

## 2022-06-07 PROCEDURE — 80053 COMPREHEN METABOLIC PANEL: CPT

## 2022-06-07 PROCEDURE — 83540 ASSAY OF IRON: CPT

## 2022-06-07 PROCEDURE — 99213 OFFICE O/P EST LOW 20 MIN: CPT | Performed by: NURSE PRACTITIONER

## 2022-06-07 PROCEDURE — 84466 ASSAY OF TRANSFERRIN: CPT

## 2022-06-07 PROCEDURE — 85025 COMPLETE CBC W/AUTO DIFF WBC: CPT

## 2022-06-07 PROCEDURE — 36415 COLL VENOUS BLD VENIPUNCTURE: CPT

## 2022-06-07 PROCEDURE — 82728 ASSAY OF FERRITIN: CPT

## 2022-06-07 NOTE — PROGRESS NOTES
[facility] ON[facility] GROUP HEMATOLOGY & ONCOLOGY  [address]  [facility] [address] [phone]    Patient Name: Faisal Joseph  Encounter Date: 06/07/2022  YOB: 1940  Patient Number: [id number]    Hematology / Oncology Progress Note    CHIEF COMPLAINT:  Pt seen in consultation on May 3, 2022 for anemia.  He presents to clinic today to review labs.     HISTORY OF PRESENT ILLNESS: Faisal Joseph is a 81 y.o. male referred by Veda Brito,  for diagnostic and management recommendations for anemia and thrombocytopenia. History is obtained from patient. History is considered to be accurate.     He has health history significant for CKD, COPD, Centrilobular Emphysema, Hypertension, Coronary Artery Disease s/p CAGB in June 2020, Hyperlipidemia     He was referred to our office by [facility] related to anemia and thrombocytopenia.    April 19, 2022 labs were resulted with WBC 6.8, Hgb 12.2, Hct 36.6, Platelets 98.  BUN 35, Creatinine 3.04, GFR 20   December 22, 2021: WBC 6.3, Hgb 12.2, Hct 36.9, Plt 121.  BUN 24, Creatinine 2.21, GFR 27.     He sees Dr. Brice for Cardiology, Peggy HILL for Pulmonology.  PCP is Dr. Polanco in [place]      He reports fatigue and that he has felt that way since his CABG.  He also reports ribs being sore on left side      He had CT of Abdomen / Pelvis with out contrast on 01/05/22 which showed that the liver and spleen demonstrate fairly normal attenuation with small calcified granulomata in the spleen.      He had colonoscopy approx 5 years ago.  Drinks beer occastionally. Denies smoking or illicit drug use.    Work up was negative included Erythropoietin  14.2  Chemistry:  Creatinine 2.81, BUN 36, GFR 21.9.  Anemia Panel:  Serum iron 34, Ferritin 56.25, Iron Saturation 9%, TIBC 373  Peripheral Blood Smear:  Mild normocytic anemia, mild thrombocytopenia, no evidence of  pseudothrombocytopenia  C-Reactive 5.3  SPEP:  Normal with out any M Edilson  VENKAT negative, Hapatoglobin 222   Sed Rate: 22  He was started on oral iron once daily.      Follow Up 06/07/2022  Patient presents to clinic today for follow-up since starting oral iron.  He was taking 325 mg p.o. daily for iron deficiency as evidenced by  Serum iron 34, Ferritin 56.25, Iron Saturation 9%, TIBC 373.    He states he has been tolerating the iron well.  Labs reviewed.  WBC 6.16, Hgb 11.8, Hct 35.5, Platelets 111  CMP with BUN 25, Creatinine 2.78, GFR 22.2  Anemia profile with serum iron 85, Iron sat 25, TIBC 334.  Iron is stable.    Pt states he continues to see Nephrology.        LABS    Lab Results - Last 18 Months   Lab Units 06/07/22  1000 05/03/22  1153   HEMOGLOBIN g/dL 11.8* 11.9*   HEMATOCRIT % 35.5* 35.5*   MCV fL 89.0 89.2   WBC 10*3/mm3 6.16 9.24   RDW % 13.3 13.3   MPV fL 10.7 10.5   PLATELETS 10*3/mm3 111* 115*   IMM GRAN % % 0.2 0.4   NEUTROS ABS 10*3/mm3 3.90 6.55   LYMPHS ABS 10*3/mm3 0.97 1.05   MONOS ABS 10*3/mm3 0.65 0.99*   EOS ABS 10*3/mm3 0.58* 0.56*   BASOS ABS 10*3/mm3 0.05 0.05   IMMATURE GRANS (ABS) 10*3/mm3 0.01 0.04   NRBC /100 WBC 0.0 0.0       Lab Results - Last 18 Months   Lab Units 06/07/22  1000 05/03/22  1153 01/06/22  0434 01/05/22  0657 01/04/22  2124 11/22/21  1235   GLUCOSE mg/dL 103* 90  --   --   --   --    SODIUM mmol/L 138 137 141 135 137 140   POTASSIUM mmol/L 3.7 4.3 4.3 3.3* 3.5 4.0   TOTAL CO2 mmol/L  --   --  22 19* 20* 21   CO2 mmol/L 22.0 21.0*  --   --   --   --    CHLORIDE mmol/L 106 106 109* 106 106 108*   ANION GAP mmol/L 10.0 10.0 10 10 11 11   CREATININE mg/dL 2.78* 2.81* 2.72* 2.65* 2.83* 2.62*   BUN mg/dL 25* 36* 28* 30* 31* 30*   BUN / CREAT RATIO  9.0 12.8 10.3 11.3 11.0 11.5   CALCIUM mg/dL 9.1 9.2 8.2* 7.8* 8.2* 8.5   EGFR IF NONAFRICN AM mL/min/1.73m2  --   --  22.6* 23.2* 21.5* 23.5*   ALK PHOS U/L 65 64 45* 39* 51 52   TOTAL PROTEIN g/dL 6.4 6.6 6.0* 6.1* 6.8 6.8    ALT (SGPT) U/L 11 9 18 17 21 10*   AST (SGOT) U/L 10 9 18 18 18 13   BILIRUBIN mg/dL 0.3 0.5 0.3 0.3 0.4 0.4   ALBUMIN g/dL 4.00 4.20  3.5 2.8* 3.0* 3.5 3.1*   GLOBULIN gm/dL 2.4 2.4  --   --   --   --        Lab Results - Last 18 Months   Lab Units 05/10/22  0950 05/03/22  1153   M-SPIKE g/dL  --  Not Observed   FREE KAPPA LT CHAINS, 24HR mg/24 hr 205.77  --        Lab Results - Last 18 Months   Lab Units 06/07/22  1000 05/03/22  1153   IRON mcg/dL 85 34*   TIBC mcg/dL 334 373   IRON SATURATION % 25 9*   FERRITIN ng/mL 56.51 56.25   FOLATE ng/mL  --  8.21         PAST MEDICAL HISTORY:  ALLERGIES:  Allergies   Allergen Reactions   • Hydralazine Nausea Only   • Losartan Potassium Nausea Only   • Penicillins Hives   • Spironolactone Swelling     Made  Breast sore and swell     CURRENT MEDICATIONS:  Outpatient Encounter Medications as of 6/7/2022   Medication Sig Dispense Refill   • albuterol sulfate  (90 Base) MCG/ACT inhaler      • amLODIPine (NORVASC) 10 MG tablet Take 10 mg by mouth Daily.     • aspirin 81 MG EC tablet Take 1 tablet by mouth Daily. 100 tablet 99   • carvedilol (COREG) 12.5 MG tablet Take 12.5 mg by mouth 2 (Two) Times a Day With Meals.     • Cholecalciferol (VITAMIN D) 2000 units capsule Take 2,000 Units by mouth Daily.     • cloNIDine (CATAPRES) 0.1 MG tablet Take 0.1 mg by mouth As Needed for High Blood Pressure.     • ferrous sulfate 325 (65 FE) MG tablet Take 1 tablet by mouth Daily With Breakfast. 90 tablet 1   • levocetirizine (XYZAL) 5 MG tablet      • lisinopril (PRINIVIL,ZESTRIL) 20 MG tablet Take 20 mg by mouth Daily.     • magnesium oxide (MAGOX) 400 (241.3 Mg) MG tablet tablet Take 400 mg by mouth 1 (One) Time.     • nitroglycerin (NITROSTAT) 0.4 MG SL tablet 1 under the tongue as needed for angina, may repeat q5mins for up three doses 25 tablet 2   • pravastatin (PRAVACHOL) 40 MG tablet Take 1 tablet by mouth Daily. 90 tablet 3   • sodium bicarbonate 325 MG tablet Take 325 mg  by mouth 4 (Four) Times a Day.     • tiotropium bromide-olodaterol (Stiolto Respimat) 2.5-2.5 MCG/ACT aerosol solution inhaler Inhale 2 puffs Daily. 1 each 0   • vitamin B-12 (CYANOCOBALAMIN) 100 MCG tablet Take 50 mcg by mouth Daily.       No facility-administered encounter medications on file as of 6/7/2022.     ADULT ILLNESSES:  Patient Active Problem List   Diagnosis Code   • Essential hypertension I10   • Stage 4 chronic kidney disease (HCC) N18.4   • Coronary artery disease I25.10   • History of non-ST elevation myocardial infarction (NSTEMI) I25.2   • Chronic pericarditis I31.9   • History of CVA (cerebrovascular accident) Z86.73   • Hyperlipidemia E78.5   • Physical debility R53.81   • S/P CABG (coronary artery bypass graft) Z95.1   • Palpitations R00.2   • Gait difficulty R26.9     SURGERIES:  Past Surgical History:   Procedure Laterality Date   • CARDIAC CATHETERIZATION     • CARDIAC CATHETERIZATION N/A 5/28/2020    Procedure: Left Heart Cath;  Surgeon: Rufino Brice MD;  Location:  [facility];  Service: Cardiology;  Laterality: N/A;   • CORONARY ARTERY BYPASS GRAFT N/A 6/9/2020    Procedure: CABG X 3, LIMA, SHELBY, EVH WITH OPEN EXTENSION LOWER RIGHT LEG;  Surgeon: Nikunj Carballo MD;  Location: [facility];  Service: Cardiothoracic;  Laterality: N/A;     HEALTH MAINTENANCE ITEMS:  Health Maintenance Due   Topic Date Due   • ZOSTER VACCINE (1 of 2) Never done   • ANNUAL WELLNESS VISIT  Never done   • Pneumococcal Vaccine 65+ (2 - PPSV23 or PCV20) 11/05/2020   • TDAP/TD VACCINES (2 - Tdap) 04/20/2021   • LIPID PANEL  05/28/2021   • COVID-19 Vaccine (3 - Booster for Moderna series) 06/30/2021       <no information>  Last Completed Colonoscopy     This patient has no relevant Health Maintenance data.        Immunization History   Administered Date(s) Administered   • COVID-19 (MODERNA) 1st, 2nd, 3rd Dose Only 01/02/2021, 01/30/2021   • FLUAD TRI 65YR+ 11/05/2019, 08/28/2020   • Flu  "Vaccine Intradermal Quad 18-64YR 10/18/2021   • Fluzone High Dose =>65 Years ([employer] ONLY) 2015, 10/14/2016, 2017   • Pneumococcal Conjugate 13-Valent (PCV13) 2019   • TD Preservative Free 2011     Last Completed Mammogram     This patient has no relevant Health Maintenance data.            FAMILY HISTORY:  Family History   Problem Relation Age of Onset   • Cancer Mother    • Cancer Father      SOCIAL HISTORY:  Social History     Socioeconomic History   • Marital status:    Tobacco Use   • Smoking status: Former Smoker     Packs/day: 1.50     Years: 25.00     Pack years: 37.50     Types: Cigarettes     Quit date:      Years since quittin.4   • Smokeless tobacco: Never Used   Vaping Use   • Vaping Use: Never used   Substance and Sexual Activity   • Alcohol use: No   • Drug use: No   • Sexual activity: Defer       REVIEW OF SYSTEMS:  Review of Systems   Constitutional: Positive for activity change and fatigue.   HENT: Negative for swollen glands and trouble swallowing.    Gastrointestinal: Negative for nausea and vomiting.   Neurological: Positive for weakness.   Hematological: Does not bruise/bleed easily.   Psychiatric/Behavioral: Negative for suicidal ideas and stress.       /86   Pulse 67   Temp 97.9 °F (36.6 °C) (Temporal)   Resp 16   Ht 182.9 cm (72\")   Wt 98 kg (216 lb)   SpO2 97%   BMI 29.29 kg/m²  Body surface area is 2.2 meters squared.    Pain Score    22 1007   PainSc: 0-No pain       Physical Exam:  Physical Exam  Constitutional:       Appearance: Normal appearance.   HENT:      Head: Normocephalic and atraumatic.   Eyes:      Extraocular Movements: Extraocular movements intact.   Cardiovascular:      Rate and Rhythm: Normal rate and regular rhythm.   Pulmonary:      Effort: Pulmonary effort is normal.      Breath sounds: Normal breath sounds.   Abdominal:      General: Bowel sounds are normal.      Palpations: Abdomen is soft. "   Musculoskeletal:         General: Normal range of motion.   Skin:     General: Skin is warm and dry.   Neurological:      General: No focal deficit present.      Mental Status: He is alert and oriented to person, place, and time.   Psychiatric:         Mood and Affect: Mood normal.         Faisal Joseph reports a pain score of 0.  No intervention indicated.          ASSESSMENT / PLAN    1. Stage 4 chronic kidney disease (HCC)    2. Iron deficiency anemia, unspecified iron deficiency anemia type    3. Thrombocytopenia (HCC)    4. Chronic obstructive pulmonary disease, unspecified COPD type (HCC)    5. Atherosclerosis of native coronary artery of native heart with angina pectoris (HCC)         ASSESSMENT:      1.  Iron Deficiency Anemia    -SPEP:  Normal with out any M Edilson   -VENKAT negative, Hapatoglobin 222    - Serum iron 34, Ferritin 56.25, Iron Saturation 9%, TIBC 373 on May 3, 2022   -Pt was started on oral iron 325 mg once daily   -CBC today with  Hgb 11.8, Hct 35.5,     2.  Chronic Kidney Disease    -Stage IV   -Creatinine 2.81, BUN 36, GFR 21.9   -Patient is seen by [facility]    -SPEP negative     3.  History of CVA / History or NSTEMI   -Patient is seen by Dr. Brice for Cardiology   -History of hypertension    4. COPD   -Continue bronchodilator and LABA per Pulmonology    -Consider adding ICS/KOWALSKI      PLAN:    1.  Hgb / Hct is stable at this time.   2. Iron has improved and is WNL  3.  -Patient will continue oral iron daily  4.  Discussed with patient the potential need for TANJA.    5.  Will start Procrit 40,000 units subcutaneously weekly if Hemoglobin below 10, Hematocrit below 30%, Ferritin >100 and Iron Saturation >20% to target a Hemoglobin at 11 and Hematocrit 33%  6.  If indicated, will transfuse 2 units PRBCs if hemoglobin below 7.  Premed Tylenol 500 mg p.o. and Benadryl 25 mg IV.  Lasix 20 mg IVP after each unit of PRBC.  7.  RTC in 8 weeks for continued follow up.  2.   Blood for CBC, CMP, Ferritin, TIBC, % Saturation, and Iron in 8 weeks.  3.  Continue ongoing management per primary care physician and other specialists including cardiology and pulmonlogy.  4. Plan of care discussed with patient.  Understanding expressed.  Patient agreeable to proceed.   5.  Blood pressure is stable today at 160/86.  He states he took his medicine only about hour ago.          I spent 25 minutes caring for Faisal on this date of service. This time includes time spent by me in the following activities: preparing for the visit, reviewing tests, performing a medically appropriate examination and/or evaluation, counseling and educating the patient/family/caregiver, ordering medications, tests, or procedures and documenting information in the medical record.

## 2022-07-14 ENCOUNTER — OFFICE VISIT (OUTPATIENT)
Dept: CARDIOLOGY | Facility: CLINIC | Age: 82
End: 2022-07-14

## 2022-07-14 VITALS
HEIGHT: 72 IN | WEIGHT: 216 LBS | SYSTOLIC BLOOD PRESSURE: 164 MMHG | DIASTOLIC BLOOD PRESSURE: 88 MMHG | HEART RATE: 53 BPM | BODY MASS INDEX: 29.26 KG/M2 | OXYGEN SATURATION: 98 %

## 2022-07-14 DIAGNOSIS — I25.10 CORONARY ARTERY DISEASE INVOLVING NATIVE CORONARY ARTERY OF NATIVE HEART WITHOUT ANGINA PECTORIS: Primary | Chronic | ICD-10-CM

## 2022-07-14 DIAGNOSIS — E78.2 MIXED HYPERLIPIDEMIA: Chronic | ICD-10-CM

## 2022-07-14 DIAGNOSIS — I10 ESSENTIAL HYPERTENSION: Chronic | ICD-10-CM

## 2022-07-14 PROCEDURE — 99214 OFFICE O/P EST MOD 30 MIN: CPT | Performed by: INTERNAL MEDICINE

## 2022-07-14 PROCEDURE — 93000 ELECTROCARDIOGRAM COMPLETE: CPT | Performed by: INTERNAL MEDICINE

## 2022-07-14 RX ORDER — METOPROLOL TARTRATE 50 MG/1
50 TABLET, FILM COATED ORAL 2 TIMES DAILY
COMMUNITY
End: 2023-02-06

## 2022-07-14 RX ORDER — HYDRALAZINE HYDROCHLORIDE 100 MG/1
100 TABLET, FILM COATED ORAL 3 TIMES DAILY
COMMUNITY

## 2022-07-14 NOTE — PROGRESS NOTES
Subjective:     Encounter Date:07/14/2022      Patient ID: Faisal Joseph is a 81 y.o. male with coronary artery disease, status post coronary artery bypass grafting in 2020 (LIMA to LAD, SVG to diagonal, SVG to distal right coronary artery), hypertension, hyperlipidemia, previous stroke, stage IV chronic kidney disease, presenting today for routine follow-up.    Chief Complaint: Here for routine follow-up of coronary artery disease    HPI: This patient presents today for routine follow-up.  He has coronary artery disease with previous coronary artery bypass grafting.  He is chest pain-free at this time.  He describes chronic shortness of breath and dyspnea on exertion as well as chronic cough and congestion following COVID.  His shortness of breath is described as being stable but still present.  No orthopnea, PND or significant edema.  The patient denies having palpitations, lightheadedness, dizziness or syncope.  Blood pressure waxes and wanes but has been reasonably well controlled according to his report.  No side effects or problems from current medications.  He does remain on statin therapy.  He reports that his lipids have been reasonably well controlled.  He was found to have thrombocytopenia and has been evaluated by hematology/oncology.  He continues to follow there.  He reports blood counts of been relatively stable and he notes no significant bleeding issues otherwise.  He denies having any side effects from his current medications.    Coronary Artery Disease  Presents for follow-up visit. Symptoms include shortness of breath. Pertinent negatives include no chest pain, dizziness, leg swelling or palpitations. The symptoms have been stable.       Current Outpatient Medications:   •  albuterol sulfate  (90 Base) MCG/ACT inhaler, , Disp: , Rfl:   •  amLODIPine (NORVASC) 10 MG tablet, Take 10 mg by mouth Daily., Disp: , Rfl:   •  aspirin 81 MG EC tablet, Take 1 tablet by mouth Daily., Disp: 100  tablet, Rfl: 99  •  carvedilol (COREG) 12.5 MG tablet, Take 12.5 mg by mouth 2 (Two) Times a Day With Meals., Disp: , Rfl:   •  Cholecalciferol (VITAMIN D) 2000 units capsule, Take 2,000 Units by mouth Daily., Disp: , Rfl:   •  cloNIDine (CATAPRES) 0.1 MG tablet, Take 0.1 mg by mouth As Needed for High Blood Pressure., Disp: , Rfl:   •  ferrous sulfate 325 (65 FE) MG tablet, Take 1 tablet by mouth Daily With Breakfast., Disp: 90 tablet, Rfl: 1  •  hydrALAZINE (APRESOLINE) 100 MG tablet, Take 100 mg by mouth 3 (Three) Times a Day., Disp: , Rfl:   •  levocetirizine (XYZAL) 5 MG tablet, , Disp: , Rfl:   •  lisinopril (PRINIVIL,ZESTRIL) 20 MG tablet, Take 20 mg by mouth Daily., Disp: , Rfl:   •  magnesium oxide (MAGOX) 400 (241.3 Mg) MG tablet tablet, Take 400 mg by mouth 1 (One) Time., Disp: , Rfl:   •  metoprolol tartrate (LOPRESSOR) 50 MG tablet, Take 50 mg by mouth 2 (Two) Times a Day., Disp: , Rfl:   •  nitroglycerin (NITROSTAT) 0.4 MG SL tablet, 1 under the tongue as needed for angina, may repeat q5mins for up three doses, Disp: 25 tablet, Rfl: 2  •  pravastatin (PRAVACHOL) 40 MG tablet, Take 1 tablet by mouth Daily., Disp: 90 tablet, Rfl: 3  •  sodium bicarbonate 325 MG tablet, Take 325 mg by mouth 4 (Four) Times a Day., Disp: , Rfl:   •  tiotropium bromide-olodaterol (Stiolto Respimat) 2.5-2.5 MCG/ACT aerosol solution inhaler, Inhale 2 puffs Daily., Disp: 1 each, Rfl: 0  •  vitamin B-12 (CYANOCOBALAMIN) 100 MCG tablet, Take 50 mcg by mouth Daily., Disp: , Rfl:     Allergies   Allergen Reactions   • Hydralazine Nausea Only   • Losartan Potassium Nausea Only   • Penicillins Hives   • Spironolactone Swelling     Made  Breast sore and swell     Social History     Tobacco Use   • Smoking status: Former Smoker     Packs/day: 1.50     Years: 25.00     Pack years: 37.50     Types: Cigarettes     Quit date:      Years since quittin.5   • Smokeless tobacco: Never Used   Substance Use Topics   • Alcohol use: No      Review of Systems   Constitutional: Positive for malaise/fatigue. Negative for fever and weight loss.   HENT: Positive for congestion.    Cardiovascular: Positive for dyspnea on exertion. Negative for chest pain, leg swelling, orthopnea, palpitations, paroxysmal nocturnal dyspnea and syncope.   Respiratory: Positive for cough and shortness of breath. Negative for wheezing.    Hematologic/Lymphatic: Negative for adenopathy and bleeding problem.   Gastrointestinal: Negative for abdominal pain, nausea and vomiting.   Neurological: Positive for weakness. Negative for dizziness, headaches and loss of balance.       ECG 12 Lead    Date/Time: 7/14/2022 12:04 PM  Performed by: Rufino Brice MD  Authorized by: Rufino Brice MD   Comparison: compared with previous ECG from 1/13/2022  Similar to previous ECG  Rhythm: sinus bradycardia  Ectopy: atrial premature contractions  Rate: bradycardic  BPM: 54  Conduction: 1st degree AV block  Other findings: non-specific ST-T wave changes and poor R wave progression    Clinical impression: abnormal EKG             Objective:     Vitals reviewed.   Constitutional:       General: Not in acute distress.     Appearance: Well-developed and not in distress.   Eyes:      Extraocular Movements: Extraocular movements intact.   HENT:      Head: Normocephalic and atraumatic.   Neck:      Thyroid: No thyroid mass.      Vascular: No JVD.   Pulmonary:      Effort: Pulmonary effort is normal.      Breath sounds: Normal breath sounds. No wheezing. No rhonchi. No rales.   Cardiovascular:      Normal rate. Regular rhythm.      Murmurs: There is no murmur.      No gallop.   Pulses:     Intact distal pulses.   Edema:     Peripheral edema absent.   Abdominal:      General: Bowel sounds are normal. There is no distension.      Palpations: Abdomen is soft.      Tenderness: There is no abdominal tenderness.   Musculoskeletal:      Extremities: No clubbing present.Skin:     General:  "Skin is warm and dry. There is no cyanosis.      Findings: No erythema or rash.   Neurological:      Mental Status: Alert and oriented to person, place, and time.      Cranial Nerves: No cranial nerve deficit.       /88   Pulse 53   Ht 182.9 cm (72\")   Wt 98 kg (216 lb)   SpO2 98%   BMI 29.29 kg/m²     Data/Lab Review:     Lab Results   Component Value Date    WBC 6.16 06/07/2022    HGB 11.8 (L) 06/07/2022    HCT 35.5 (L) 06/07/2022    MCV 89.0 06/07/2022     (L) 06/07/2022     Lab Results   Component Value Date    GLUCOSE 103 (H) 06/07/2022    CALCIUM 9.1 06/07/2022     06/07/2022    K 3.7 06/07/2022    CO2 22.0 06/07/2022     06/07/2022    BUN 25 (H) 06/07/2022    CREATININE 2.78 (H) 06/07/2022    EGFRIFAFRI 27.3 (L) 01/06/2022    EGFRIFNONA 22.6 (L) 01/06/2022    BCR 9.0 06/07/2022    ANIONGAP 10.0 06/07/2022     Results for orders placed during the hospital encounter of 09/14/21    Adult Transthoracic Echo Complete W/ Cont if Necessary Per Protocol    Interpretation Summary  · Left ventricular systolic function is normal. Left ventricular ejection fraction appears to be 56 - 60%.  · Left ventricular wall thickness is consistent with mild concentric hypertrophy.  · The right ventricular cavity is borderline dilated. Normal right ventricular systolic function noted.  · Biatrial enlargement is noted.  · Mild mitral valve regurgitation is present.  · Mild tricuspid valve regurgitation is present. Estimated right ventricular systolic pressure from tricuspid regurgitation is normal (<35 mmHg).        Assessment:          Diagnosis Plan   1. Coronary artery disease involving native coronary artery of native heart without angina pectoris  ECG 12 Lead   2. Essential hypertension     3. Mixed hyperlipidemia            Plan:       1.  Coronary artery disease: Stable at this time.  No ischemic symptoms described by the patient.  Continue aspirin, beta-blocker, statin therapies.    2.  Essential " hypertension: Blood pressure is elevated today.  The patient says that his pressure tends to wax and wane.  Continue to monitor.  Continue current medications at this time.    3.  Mixed hyperlipidemia: The patient reports good control of his lipids, followed by his primary care provider.  He remains on pravastatin and is tolerating this well.    We will plan to see the patient back in 6 months unless otherwise needed sooner.

## 2022-07-19 NOTE — PROGRESS NOTES
Chief Complaint  Stage 1 mild COPD by GOLD classification ( (No covid test; doing the same; cannot tell a difference with stiolto )    Subjective    History of Present Illness {CC  Problem List  Visit Diagnosis   Encounters  Notes  Medications  Labs  Result Review Imaging  Media     Faisal Joseph presents to [facility] for:    Mr. Joseph is here for follow up and management of mild copd. He continues to notice exertional dyspnea and ongoing congestion. He reports adding Mucinex at last OV has helped congestion. He is reporting sinus drainage with associated cough. He didn't feel like he had much benefit with trial of Stiolto. Unable to get repeat PFT today due to not being set up for covid testing. He completed short course of prednisone at last visit that was not helpful.        Prior to Admission medications    Medication Sig Start Date End Date Taking? Authorizing Provider   albuterol sulfate  (90 Base) MCG/ACT inhaler  3/14/22   Mitesh Contreras MD   amLODIPine (NORVASC) 10 MG tablet Take 10 mg by mouth Daily.    Mitesh Contreras MD   aspirin 81 MG EC tablet Take 1 tablet by mouth Daily. 5/31/20   Wesley Lew DO   carvedilol (COREG) 12.5 MG tablet Take 12.5 mg by mouth 2 (Two) Times a Day With Meals.    Mitesh Contreras MD   Cholecalciferol (VITAMIN D) 2000 units capsule Take 2,000 Units by mouth Daily.    Mitesh Contreras MD   cloNIDine (CATAPRES) 0.1 MG tablet Take 0.1 mg by mouth As Needed for High Blood Pressure.    Mitesh Contreras MD   ferrous sulfate 325 (65 FE) MG tablet Take 1 tablet by mouth Daily With Breakfast. 5/10/22   Samantha Winkler APRN   hydrALAZINE (APRESOLINE) 100 MG tablet Take 100 mg by mouth 3 (Three) Times a Day.    Mitesh Contreras MD   levocetirizine (XYZAL) 5 MG tablet  3/16/22   Mitesh Contreras MD   lisinopril (PRINIVIL,ZESTRIL) 20 MG tablet Take 20 mg by mouth Daily.    " Mitesh Contreras MD   magnesium oxide (MAGOX) 400 (241.3 Mg) MG tablet tablet Take 400 mg by mouth 1 (One) Time.    Mitesh Contreras MD   metoprolol tartrate (LOPRESSOR) 50 MG tablet Take 50 mg by mouth 2 (Two) Times a Day.    Mitesh Contreras MD   nitroglycerin (NITROSTAT) 0.4 MG SL tablet 1 under the tongue as needed for angina, may repeat q5mins for up three doses 21   Jaz Musa APRN   pravastatin (PRAVACHOL) 40 MG tablet Take 1 tablet by mouth Daily. 22   Rufino Brice MD   sodium bicarbonate 325 MG tablet Take 325 mg by mouth 4 (Four) Times a Day.    Mitesh Contreras MD   tiotropium bromide-olodaterol (Stiolto Respimat) 2.5-2.5 MCG/ACT aerosol solution inhaler Inhale 2 puffs Daily. 22   Peggy Mendez APRN   vitamin B-12 (CYANOCOBALAMIN) 100 MCG tablet Take 50 mcg by mouth Daily.    Mitesh Contreras MD       Social History     Socioeconomic History   • Marital status:    Tobacco Use   • Smoking status: Former Smoker     Packs/day: 1.50     Years: 25.00     Pack years: 37.50     Types: Cigarettes     Start date:      Quit date:      Years since quittin.5   • Smokeless tobacco: Never Used   Vaping Use   • Vaping Use: Never used   Substance and Sexual Activity   • Alcohol use: No   • Drug use: No   • Sexual activity: Defer       Objective   Vital Signs:   /80   Pulse 77   Ht 182.9 cm (72\")   Wt 98.2 kg (216 lb 6.4 oz)   SpO2 95%   BMI 29.35 kg/m²     Physical Exam  Constitutional:       General: He is not in acute distress.     Interventions: Face mask in place.   HENT:      Head: Normocephalic.      Nose: Nose normal.      Mouth/Throat:      Mouth: Mucous membranes are moist.   Eyes:      General: No scleral icterus.  Cardiovascular:      Rate and Rhythm: Normal rate.   Pulmonary:      Effort: No respiratory distress.   Abdominal:      General: There is no distension.   Neurological:      Mental Status: He is alert and " oriented to person, place, and time.   Psychiatric:         Mood and Affect: Mood normal.         Behavior: Behavior is cooperative.        Result Review :{ Labs  Result Review  Imaging  Med Tab  Media :          Results for orders placed during the hospital encounter of 05/28/20    Pulmonary Function Test Spirometry Pre & Post Bronchodilator, ABG, Lung Volumes; DLCO; albuterol (PROVENTIL) nebulizer solution 0.083% 2.5 mg/3 mL    [facility] - Pulmonary Function Test    [address]  [id number]  [phone]    Patient : Faisal Joseph  MRN : [id number]  CSN : [id number]  Pulmonologist : Asaf García MD  Date : 6/1/2020    ______________________________________________________________________    Interpretation :  1.  Spirometry reveals a mild obstructive ventilatory defect manifested by small airways disease.  2.  There is some improvement in spirometry postbronchodilator but a very mild obstructive ventilatory defect is still present.  3.  Lung volumes reveal hyperinflation.  4.  Arterial blood gases reveal a mild mixed metabolic acidosis and respiratory alkalosis with a resultant normal pH.  5.  The patient's current studies show improvement in his FEV1 and FVC on both pre-and postbronchodilator studies compared to previous baseline sp[facility]rom[facility] from August 24, [facility].  Hyperinflation is now present which was not present previously.      Asaf García MD    Rest/Exercise Pulse Ox Values        Some values may be hidden. Unless noted otherwise, only the newest values recorded on each date are displayed.         Rest/Exercise Pulse Ox Results 4/20/22   Rest room air SAT % 96   Exercise room air SAT % 93                          Assessment and Plan {CC Problem List  Visit Diagnosis  ROS  Review (Popup)  Health Maintenance  Quality  BestPractice  Medications  SmartSets  SnapShot Encounters  Media      Diagnoses and all  orders for this visit:    1. Encounter for preoperative screening laboratory testing for COVID-19 virus (Primary)  -     COVID PRE-OP / PRE-PROCEDURE SCREENING ORDER (NO ISOLATION) - Swab, Nasal Cavity; Future    2. Centrilobular emphysema (HCC)  -     Pulmonary Function Test    3. Shortness of breath  -     IgE + Allergens (22); Future  -     Full Pulmonary Function Test With Bronchodilator; Future    4. Stage 1 mild COPD by GOLD classification (HCC)  -     XR Chest 2 View; Future  -     Full Pulmonary Function Test With Bronchodilator; Future  -     Budeson-Glycopyrrol-Formoterol (Breztri Aerosphere) 160-9-4.8 MCG/ACT aerosol inhaler; Inhale 2 puffs 2 (Two) Times a Day.  Dispense: 1 each; Refill: 0    5. Cough  -     XR Chest 2 View; Future  -     IgE + Allergens (22); Future  -     Full Pulmonary Function Test With Bronchodilator; Future  -     Budeson-Glycopyrrol-Formoterol (Breztri Aerosphere) 160-9-4.8 MCG/ACT aerosol inhaler; Inhale 2 puffs 2 (Two) Times a Day.  Dispense: 1 each; Refill: 0    6. Non-seasonal allergic rhinitis, unspecified trigger   -     IgE + Allergens (22); Future        BMI is >= 25 and <30. (Overweight) The following options were offered after discussion;: weight loss educational material (shared in after visit summary)      Continue Mucinex. Trial of Breztri. We demonstrated proper use and mouth care with ICS. Chest xray today. Consider IGE and allergen panel. Reschedule Full PFT at hospital. Add Flonase and continue levocetirizine. Will follow up labs and imaging by phone. Follow up in 3 months, call in the interim with issues.    Peggy Mendez, APRN  7/20/2022  12:08 CDT    Follow Up {Instructions Charge Capture  Follow-up Communications   Return in about 3 months (around 10/20/2022).    Patient was given instructions and counseling regarding his condition or for health maintenance advice. Please see specific information pulled into the AVS if appropriate.

## 2022-07-20 ENCOUNTER — HOSPITAL ENCOUNTER (OUTPATIENT)
Dept: GENERAL RADIOLOGY | Facility: HOSPITAL | Age: 82
Discharge: HOME | End: 2022-07-20
Admitting: NURSE PRACTITIONER

## 2022-07-20 ENCOUNTER — OFFICE VISIT (OUTPATIENT)
Dept: PULMONOLOGY | Facility: CLINIC | Age: 82
End: 2022-07-20

## 2022-07-20 VITALS
HEART RATE: 77 BPM | HEIGHT: 72 IN | OXYGEN SATURATION: 95 % | WEIGHT: 216.4 LBS | BODY MASS INDEX: 29.31 KG/M2 | DIASTOLIC BLOOD PRESSURE: 80 MMHG | SYSTOLIC BLOOD PRESSURE: 142 MMHG

## 2022-07-20 DIAGNOSIS — R05.9 COUGH: ICD-10-CM

## 2022-07-20 DIAGNOSIS — R06.02 SHORTNESS OF BREATH: ICD-10-CM

## 2022-07-20 DIAGNOSIS — J44.9 STAGE 1 MILD COPD BY GOLD CLASSIFICATION: ICD-10-CM

## 2022-07-20 DIAGNOSIS — J43.2 CENTRILOBULAR EMPHYSEMA: ICD-10-CM

## 2022-07-20 DIAGNOSIS — J30.89 NON-SEASONAL ALLERGIC RHINITIS, UNSPECIFIED TRIGGER: ICD-10-CM

## 2022-07-20 DIAGNOSIS — Z01.812 ENCOUNTER FOR PREOPERATIVE SCREENING LABORATORY TESTING FOR COVID-19 VIRUS: Primary | ICD-10-CM

## 2022-07-20 DIAGNOSIS — Z20.822 ENCOUNTER FOR PREOPERATIVE SCREENING LABORATORY TESTING FOR COVID-19 VIRUS: Primary | ICD-10-CM

## 2022-07-20 PROCEDURE — 99213 OFFICE O/P EST LOW 20 MIN: CPT | Performed by: NURSE PRACTITIONER

## 2022-07-20 PROCEDURE — 94664 DEMO&/EVAL PT USE INHALER: CPT | Performed by: NURSE PRACTITIONER

## 2022-07-20 PROCEDURE — 71046 X-RAY EXAM CHEST 2 VIEWS: CPT

## 2022-07-20 RX ORDER — BUDESONIDE, GLYCOPYRROLATE, AND FORMOTEROL FUMARATE 160; 9; 4.8 UG/1; UG/1; UG/1
2 AEROSOL, METERED RESPIRATORY (INHALATION) 2 TIMES DAILY
Qty: 1 EACH | Refills: 0 | COMMUNITY
Start: 2022-07-20 | End: 2022-10-26

## 2022-07-20 NOTE — PATIENT INSTRUCTIONS
Flonase over the counter  Breztri trial- 2puffs twice a day- rinse mouth after use  Chest xray today  Reschedule full PFT

## 2022-07-25 ENCOUNTER — TELEPHONE (OUTPATIENT)
Dept: PULMONOLOGY | Facility: CLINIC | Age: 82
End: 2022-07-25

## 2022-07-25 NOTE — TELEPHONE ENCOUNTER
Patient is requesting to do PFT in our office.    Peggy, I know you ordered pre/post, just wanted to ask if could do just pre here, since patient is requesting it.

## 2022-08-09 ENCOUNTER — LAB (OUTPATIENT)
Dept: LAB | Facility: HOSPITAL | Age: 82
End: 2022-08-09

## 2022-08-09 ENCOUNTER — OFFICE VISIT (OUTPATIENT)
Dept: ONCOLOGY | Facility: CLINIC | Age: 82
End: 2022-08-09

## 2022-08-09 VITALS
HEART RATE: 57 BPM | DIASTOLIC BLOOD PRESSURE: 86 MMHG | WEIGHT: 217 LBS | SYSTOLIC BLOOD PRESSURE: 168 MMHG | TEMPERATURE: 97.5 F | RESPIRATION RATE: 16 BRPM | BODY MASS INDEX: 29.39 KG/M2 | HEIGHT: 72 IN | OXYGEN SATURATION: 96 %

## 2022-08-09 DIAGNOSIS — D69.6 THROMBOCYTOPENIA: ICD-10-CM

## 2022-08-09 DIAGNOSIS — N18.4 STAGE 4 CHRONIC KIDNEY DISEASE: ICD-10-CM

## 2022-08-09 DIAGNOSIS — D50.9 IRON DEFICIENCY ANEMIA, UNSPECIFIED IRON DEFICIENCY ANEMIA TYPE: ICD-10-CM

## 2022-08-09 DIAGNOSIS — J44.9 CHRONIC OBSTRUCTIVE PULMONARY DISEASE, UNSPECIFIED COPD TYPE: ICD-10-CM

## 2022-08-09 DIAGNOSIS — D69.6 THROMBOCYTOPENIA: Primary | ICD-10-CM

## 2022-08-09 LAB
ALBUMIN SERPL-MCNC: 4.1 G/DL (ref 3.5–5.2)
ALBUMIN/GLOB SERPL: 1.6 G/DL
ALP SERPL-CCNC: 54 U/L (ref 39–117)
ALT SERPL W P-5'-P-CCNC: 10 U/L (ref 1–41)
ANION GAP SERPL CALCULATED.3IONS-SCNC: 9 MMOL/L (ref 5–15)
AST SERPL-CCNC: 12 U/L (ref 1–40)
BASOPHILS # BLD AUTO: 0.05 10*3/MM3 (ref 0–0.2)
BASOPHILS NFR BLD AUTO: 0.9 % (ref 0–1.5)
BILIRUB SERPL-MCNC: 0.3 MG/DL (ref 0–1.2)
BUN SERPL-MCNC: 36 MG/DL (ref 8–23)
BUN/CREAT SERPL: 12.8 (ref 7–25)
CALCIUM SPEC-SCNC: 9.1 MG/DL (ref 8.6–10.5)
CHLORIDE SERPL-SCNC: 107 MMOL/L (ref 98–107)
CO2 SERPL-SCNC: 22 MMOL/L (ref 22–29)
CREAT SERPL-MCNC: 2.82 MG/DL (ref 0.76–1.27)
DEPRECATED RDW RBC AUTO: 43.8 FL (ref 37–54)
EGFRCR SERPLBLD CKD-EPI 2021: 21.8 ML/MIN/1.73
EOSINOPHIL # BLD AUTO: 0.35 10*3/MM3 (ref 0–0.4)
EOSINOPHIL NFR BLD AUTO: 6.4 % (ref 0.3–6.2)
ERYTHROCYTE [DISTWIDTH] IN BLOOD BY AUTOMATED COUNT: 13.4 % (ref 12.3–15.4)
FERRITIN SERPL-MCNC: 50.36 NG/ML (ref 30–400)
GLOBULIN UR ELPH-MCNC: 2.5 GM/DL
GLUCOSE SERPL-MCNC: 96 MG/DL (ref 65–99)
HCT VFR BLD AUTO: 35.8 % (ref 37.5–51)
HGB BLD-MCNC: 11.9 G/DL (ref 13–17.7)
HOLD SPECIMEN: NORMAL
IMM GRANULOCYTES # BLD AUTO: 0.01 10*3/MM3 (ref 0–0.05)
IMM GRANULOCYTES NFR BLD AUTO: 0.2 % (ref 0–0.5)
IRON 24H UR-MRATE: 95 MCG/DL (ref 59–158)
IRON SATN MFR SERPL: 26 % (ref 20–50)
LYMPHOCYTES # BLD AUTO: 0.99 10*3/MM3 (ref 0.7–3.1)
LYMPHOCYTES NFR BLD AUTO: 18 % (ref 19.6–45.3)
MCH RBC QN AUTO: 29.8 PG (ref 26.6–33)
MCHC RBC AUTO-ENTMCNC: 33.2 G/DL (ref 31.5–35.7)
MCV RBC AUTO: 89.5 FL (ref 79–97)
MONOCYTES # BLD AUTO: 0.48 10*3/MM3 (ref 0.1–0.9)
MONOCYTES NFR BLD AUTO: 8.7 % (ref 5–12)
NEUTROPHILS NFR BLD AUTO: 3.62 10*3/MM3 (ref 1.7–7)
NEUTROPHILS NFR BLD AUTO: 65.8 % (ref 42.7–76)
NRBC BLD AUTO-RTO: 0 /100 WBC (ref 0–0.2)
PLATELET # BLD AUTO: 85 10*3/MM3 (ref 140–450)
PMV BLD AUTO: 10.8 FL (ref 6–12)
POTASSIUM SERPL-SCNC: 4.4 MMOL/L (ref 3.5–5.2)
PROT SERPL-MCNC: 6.6 G/DL (ref 6–8.5)
RBC # BLD AUTO: 4 10*6/MM3 (ref 4.14–5.8)
SODIUM SERPL-SCNC: 138 MMOL/L (ref 136–145)
TIBC SERPL-MCNC: 361 MCG/DL (ref 298–536)
TRANSFERRIN SERPL-MCNC: 242 MG/DL (ref 200–360)
WBC NRBC COR # BLD: 5.5 10*3/MM3 (ref 3.4–10.8)

## 2022-08-09 PROCEDURE — 36415 COLL VENOUS BLD VENIPUNCTURE: CPT

## 2022-08-09 PROCEDURE — 99214 OFFICE O/P EST MOD 30 MIN: CPT | Performed by: NURSE PRACTITIONER

## 2022-08-09 PROCEDURE — 85025 COMPLETE CBC W/AUTO DIFF WBC: CPT

## 2022-08-09 PROCEDURE — 84466 ASSAY OF TRANSFERRIN: CPT

## 2022-08-09 PROCEDURE — 80053 COMPREHEN METABOLIC PANEL: CPT

## 2022-08-09 PROCEDURE — 82728 ASSAY OF FERRITIN: CPT

## 2022-08-09 PROCEDURE — 83540 ASSAY OF IRON: CPT

## 2022-08-09 NOTE — PROGRESS NOTES
[facility]ATOLOGY & [facility]COLOGY  [address]  [place] [address] [phone]    Patient Name: Faisal Joseph  Encounter Date: 08/09/2022  YOB: 1940  Patient Number: [id number]    Hematology / Oncology Progress Note    CHIEF COMPLAINT:  Pt seen in consultation on May 3, 2022 for anemia.  He presents to clinic today to review labs.     HISTORY OF PRESENT ILLNESS: Faisal Joseph is a 81 y.o. male referred by Veda Brito,  for diagnostic and management recommendations for anemia and thrombocytopenia. History is obtained from patient. History is considered to be accurate.     He has health history significant for CKD, COPD, Centrilobular Emphysema, Hypertension, Coronary Artery Disease s/p CAGB in June 2020, Hyperlipidemia  He sees Dr. Brice for Cardiology, Peggy HILL for Pulmonology.  PCP is Dr. Polanco in [place]   He reports fatigue and that he has felt that way since his CABG.  He also reports ribs being sore on left side.  He had CT of Abdomen / Pelvis with out contrast on 01/05/22 which showed that the liver and spleen demonstrate fairly normal attenuation with small calcified granulomata in the spleen.      DIAGNOSTIC WORKUP  Work up was negative included Erythropoietin  14.2  Chemistry:  Creatinine 2.81, BUN 36, GFR 21.9.  Anemia Panel:  Serum iron 34, Ferritin 56.25, Iron Saturation 9%, TIBC 373  Peripheral Blood Smear:  Mild normocytic anemia, mild thrombocytopenia, no evidence of pseudothrombocytopenia  C-Reactive 5.3  SPEP:  Normal with out any M Edilson  VENKAT negative, Hapatoglobin 222   Sed Rate: 22  He was started on oral iron once daily.    Interval History   Patient presents to clinic today for continued follow-up.  He has no acute complaints today.  He had labs drawn before appointment and they were reviewed with patient.    CBC with WBC 5.5, hemoglobin 11.9, hematocrit 35.8, platelet count 85.   Chemistry with BUN 36, creatinine 2.82, GFR 21.8.  Anemia profile with serum iron 95, ferritin 50.36, saturation 26%, TIBC 361.    LABS    Lab Results - Last 18 Months   Lab Units 08/09/22  0931 06/07/22  1000 05/03/22  1153   HEMOGLOBIN g/dL 11.9* 11.8* 11.9*   HEMATOCRIT % 35.8* 35.5* 35.5*   MCV fL 89.5 89.0 89.2   WBC 10*3/mm3 5.50 6.16 9.24   RDW % 13.4 13.3 13.3   MPV fL 10.8 10.7 10.5   PLATELETS 10*3/mm3 85* 111* 115*   IMM GRAN % % 0.2 0.2 0.4   NEUTROS ABS 10*3/mm3 3.62 3.90 6.55   LYMPHS ABS 10*3/mm3 0.99 0.97 1.05   MONOS ABS 10*3/mm3 0.48 0.65 0.99*   EOS ABS 10*3/mm3 0.35 0.58* 0.56*   BASOS ABS 10*3/mm3 0.05 0.05 0.05   IMMATURE GRANS (ABS) 10*3/mm3 0.01 0.01 0.04   NRBC /100 WBC 0.0 0.0 0.0       Lab Results - Last 18 Months   Lab Units 08/09/22  0931 06/07/22  1000 05/03/22  1153 01/06/22  0434 01/05/22  0657 01/04/22  2124 11/22/21  1235   GLUCOSE mg/dL 96 103* 90  --   --   --   --    SODIUM mmol/L 138 138 137 141 135 137 140   POTASSIUM mmol/L 4.4 3.7 4.3 4.3 3.3* 3.5 4.0   TOTAL CO2 mmol/L  --   --   --  22 19* 20* 21   CO2 mmol/L 22.0 22.0 21.0*  --   --   --   --    CHLORIDE mmol/L 107 106 106 109* 106 106 108*   ANION GAP mmol/L 9.0 10.0 10.0 10 10 11 11   CREATININE mg/dL 2.82* 2.78* 2.81* 2.72* 2.65* 2.83* 2.62*   BUN mg/dL 36* 25* 36* 28* 30* 31* 30*   BUN / CREAT RATIO  12.8 9.0 12.8 10.3 11.3 11.0 11.5   CALCIUM mg/dL 9.1 9.1 9.2 8.2* 7.8* 8.2* 8.5   EGFR IF NONAFRICN AM mL/min/1.73m2  --   --   --  22.6* 23.2* 21.5* 23.5*   ALK PHOS U/L 54 65 64 45* 39* 51 52   TOTAL PROTEIN g/dL 6.6 6.4 6.6 6.0* 6.1* 6.8 6.8   ALT (SGPT) U/L 10 11 9 18 17 21 10*   AST (SGOT) U/L 12 10 9 18 18 18 13   BILIRUBIN mg/dL 0.3 0.3 0.5 0.3 0.3 0.4 0.4   ALBUMIN g/dL 4.10 4.00 4.20  3.5 2.8* 3.0* 3.5 3.1*   GLOBULIN gm/dL 2.5 2.4 2.4  --   --   --   --        Lab Results - Last 18 Months   Lab Units 05/10/22  0950 05/03/22  1153   M-SPIKE g/dL  --  Not Observed   FREE KAPPA LT CHAINS, 24HR mg/24 hr 205.77  --         Lab Results - Last 18 Months   Lab Units 08/09/22  0931 06/07/22  1000 05/03/22  1153   IRON mcg/dL 95 85 34*   TIBC mcg/dL 361 334 373   IRON SATURATION % 26 25 9*   FERRITIN ng/mL 50.36 56.51 56.25   FOLATE ng/mL  --   --  8.21         PAST MEDICAL HISTORY:  ALLERGIES:  Allergies   Allergen Reactions   • Hydralazine Nausea Only   • Losartan Potassium Nausea Only   • Penicillins Hives   • Spironolactone Swelling     Made  Breast sore and swell     CURRENT MEDICATIONS:  Outpatient Encounter Medications as of 8/9/2022   Medication Sig Dispense Refill   • albuterol sulfate  (90 Base) MCG/ACT inhaler      • amLODIPine (NORVASC) 10 MG tablet Take 10 mg by mouth Daily.     • aspirin 81 MG EC tablet Take 1 tablet by mouth Daily. 100 tablet 99   • Budeson-Glycopyrrol-Formoterol (Breztri Aerosphere) 160-9-4.8 MCG/ACT aerosol inhaler Inhale 2 puffs 2 (Two) Times a Day. 1 each 0   • carvedilol (COREG) 12.5 MG tablet Take 12.5 mg by mouth 2 (Two) Times a Day With Meals.     • Cholecalciferol (VITAMIN D) 2000 units capsule Take 2,000 Units by mouth Daily.     • cloNIDine (CATAPRES) 0.1 MG tablet Take 0.1 mg by mouth As Needed for High Blood Pressure.     • ferrous sulfate 325 (65 FE) MG tablet Take 1 tablet by mouth Daily With Breakfast. 90 tablet 1   • hydrALAZINE (APRESOLINE) 100 MG tablet Take 100 mg by mouth 3 (Three) Times a Day.     • levocetirizine (XYZAL) 5 MG tablet      • lisinopril (PRINIVIL,ZESTRIL) 20 MG tablet Take 20 mg by mouth Daily.     • magnesium oxide (MAGOX) 400 (241.3 Mg) MG tablet tablet Take 400 mg by mouth 1 (One) Time.     • metoprolol tartrate (LOPRESSOR) 50 MG tablet Take 50 mg by mouth 2 (Two) Times a Day.     • nitroglycerin (NITROSTAT) 0.4 MG SL tablet 1 under the tongue as needed for angina, may repeat q5mins for up three doses 25 tablet 2   • pravastatin (PRAVACHOL) 40 MG tablet Take 1 tablet by mouth Daily. 90 tablet 3   • sodium bicarbonate 325 MG tablet Take 325 mg by mouth 4  (Four) Times a Day.     • vitamin B-12 (CYANOCOBALAMIN) 100 MCG tablet Take 50 mcg by mouth Daily.       No facility-administered encounter medications on file as of 8/9/2022.     ADULT ILLNESSES:  Patient Active Problem List   Diagnosis Code   • Essential hypertension I10   • Stage 4 chronic kidney disease (HCC) N18.4   • Coronary artery disease I25.10   • History of non-ST elevation myocardial infarction (NSTEMI) I25.2   • Chronic pericarditis I31.9   • History of CVA (cerebrovascular accident) Z86.73   • Hyperlipidemia E78.5   • Physical debility R53.81   • S/P CABG (coronary artery bypass graft) Z95.1   • Palpitations R00.2   • Gait difficulty R26.9     SURGERIES:  Past Surgical History:   Procedure Laterality Date   • CARDIAC CATHETERIZATION     • CARDIAC CATHETERIZATION N/A 5/28/2020    Procedure: Left Heart Cath;  Surgeon: Rufino Brice MD;  Location:  [facility] CATH [facility]VASIVE [facility];  Service: Cardiology;  Laterality: N/A;   • CORONARY ARTERY BYPASS GRAFT N/A 6/9/2020    Procedure: CABG X 3, LIMA, SHELBY, EVH WITH OPEN EXTENSION LOWER RIGHT LEG;  Surgeon: Nikunj Carballo MD;  Location:  [facility] OR;  Service: Cardiothoracic;  Laterality: N/A;     HEALTH MAINTENANCE ITEMS:  Health Maintenance Due   Topic Date Due   • ZOSTER VACCINE (1 of 2) Never done   • ANNUAL WELLNESS VISIT  Never done   • TDAP/TD VACCINES (2 - Tdap) 04/20/2021   • LIPID PANEL  05/28/2021   • COVID-19 Vaccine (3 - Booster for Moderna series) 06/30/2021       <no information>  Last Completed Colonoscopy     This patient has no relevant Health Maintenance data.        Immunization History   Administered Date(s) Administered   • COVID-19 (MODERNA) 1st, 2nd, 3rd Dose Only 01/02/2021, 01/30/2021   • FLUAD TRI 65YR+ 11/05/2019, 08/28/2020   • Flu Vaccine Intradermal Quad 18-64YR 10/18/2021   • Fluzone High Dose =>65 Years ([employer] ONLY) 09/30/2015, 10/14/2016, 09/20/2017   • Pneumococcal Conjugate 13-Valent (PCV13) 11/05/2019   • TD  "Preservative Free 2011     Last Completed Mammogram     This patient has no relevant Health Maintenance data.            FAMILY HISTORY:  Family History   Problem Relation Age of Onset   • Cancer Mother    • Cancer Father      SOCIAL HISTORY:  Social History     Socioeconomic History   • Marital status:    Tobacco Use   • Smoking status: Former Smoker     Packs/day: 1.50     Years: 25.00     Pack years: 37.50     Types: Cigarettes     Start date:      Quit date:      Years since quittin.6   • Smokeless tobacco: Never Used   Vaping Use   • Vaping Use: Never used   Substance and Sexual Activity   • Alcohol use: No   • Drug use: No   • Sexual activity: Defer       REVIEW OF SYSTEMS:  Review of Systems   Constitutional: Positive for activity change and fatigue.   HENT: Negative for swollen glands and trouble swallowing.    Gastrointestinal: Negative for nausea and vomiting.   Neurological: Positive for weakness.   Hematological: Does not bruise/bleed easily.   Psychiatric/Behavioral: Negative for suicidal ideas and stress.       /86   Pulse 57   Temp 97.5 °F (36.4 °C) (Temporal)   Resp 16   Ht 182.9 cm (72\")   Wt 98.4 kg (217 lb)   SpO2 96%   BMI 29.43 kg/m²  Body surface area is 2.21 meters squared.    Pain Score    22 0940   PainSc: 10-Worst pain ever   PainLoc: Back       Physical Exam:  Physical Exam  Constitutional:       Appearance: Normal appearance.   HENT:      Head: Normocephalic and atraumatic.   Eyes:      Extraocular Movements: Extraocular movements intact.   Cardiovascular:      Rate and Rhythm: Normal rate and regular rhythm.   Pulmonary:      Effort: Pulmonary effort is normal.      Breath sounds: Normal breath sounds.   Abdominal:      General: Bowel sounds are normal.      Palpations: Abdomen is soft.   Musculoskeletal:         General: Normal range of motion.      Right lower leg: Edema present.      Left lower leg: Edema present.   Skin:     General: Skin " is warm and dry.   Neurological:      General: No focal deficit present.      Mental Status: He is alert and oriented to person, place, and time.   Psychiatric:         Mood and Affect: Mood normal.         Behavior: Behavior normal.         Faisal Joseph reports a pain score of 0.  No intervention indicated.          ASSESSMENT / PLAN    1. Thrombocytopenia (HCC)    2. Stage 4 chronic kidney disease (HCC)    3. Iron deficiency anemia, unspecified iron deficiency anemia type    4. Chronic obstructive pulmonary disease, unspecified COPD type (HCC)         ASSESSMENT:      1.  Iron Deficiency Anemia     - Anemia profile with serum iron 95, ferritin 50.36, saturation 26%, TIBC 361.   -Continue oral iron once daily.    -CBC today with  Hgb 11.9, Hct 35.8,     2.  Chronic Kidney Disease    -Stage IV   - Chemistry with BUN 36, creatinine 2.82, GFR 21.8.   -Patient is seen by [facility]     3.  History of CVA / History or NSTEMI   -Patient is seen by Dr. Brice for Cardiology   -History of hypertension taking hydralazine, Coreg, Metoprolol, Clonidine, Amlodipine   -Blood pressure today at 168/86.    4. COPD   -Continue bronchodilator and LABA per Pulmonology    -SCheuled for PFt on 08/17    5.   Thrombycytopenia   -Plt count 85   -No s/s of bleeding    PLAN:  1.  Hgb / Hct is stable at this time.   2. Iron has improved and is WNL  3.  -Patient will continue oral iron daily  4.  RTC in 12 weeks for continued follow up.  5.  Blood for CBC, CMP, Ferritin, TIBC, % Saturation, and Iron   6.  Continue ongoing management per primary care physician and other specialists including cardiology and pulmonlogy.  7. Plan of care discussed with patient.  Understanding expressed.  Patient agreeable to proceed.    MDM  Number of Diagnoses or Management Options  Chronic obstructive pulmonary disease, unspecified COPD type (HCC)  Iron deficiency anemia, unspecified iron deficiency anemia type  Stage 4 chronic kidney  disease (HCC)  Thrombocytopenia (HCC)  Diagnosis management comments: 2+ Chronic Conditions       Amount and/or Complexity of Data Reviewed  Clinical lab tests: reviewed and ordered      LIZ Coles  08/09/2022

## 2022-08-16 ENCOUNTER — LAB (OUTPATIENT)
Dept: LAB | Facility: HOSPITAL | Age: 82
End: 2022-08-16

## 2022-08-16 DIAGNOSIS — Z01.818 PREOP TESTING: ICD-10-CM

## 2022-08-16 LAB — SARS-COV-2 ORF1AB RESP QL NAA+PROBE: NOT DETECTED

## 2022-08-16 PROCEDURE — C9803 HOPD COVID-19 SPEC COLLECT: HCPCS

## 2022-08-16 PROCEDURE — U0004 COV-19 TEST NON-CDC HGH THRU: HCPCS

## 2022-08-16 PROCEDURE — U0005 INFEC AGEN DETEC AMPLI PROBE: HCPCS

## 2022-08-17 ENCOUNTER — OFFICE VISIT (OUTPATIENT)
Dept: PULMONOLOGY | Facility: CLINIC | Age: 82
End: 2022-08-17

## 2022-08-17 DIAGNOSIS — R05.9 COUGH: ICD-10-CM

## 2022-08-17 DIAGNOSIS — J44.9 STAGE 1 MILD COPD BY GOLD CLASSIFICATION: ICD-10-CM

## 2022-08-17 DIAGNOSIS — R06.02 SHORTNESS OF BREATH: ICD-10-CM

## 2022-08-17 DIAGNOSIS — Z01.818 PREOP TESTING: Primary | ICD-10-CM

## 2022-08-17 PROCEDURE — 94727 GAS DIL/WSHOT DETER LNG VOL: CPT | Performed by: NURSE PRACTITIONER

## 2022-08-17 PROCEDURE — 94729 DIFFUSING CAPACITY: CPT | Performed by: NURSE PRACTITIONER

## 2022-08-17 PROCEDURE — 94010 BREATHING CAPACITY TEST: CPT | Performed by: NURSE PRACTITIONER

## 2022-08-17 NOTE — PROCEDURES
Full Pulmonary Function Test With Bronchodilator  Performed by: Antonette Douglas, RRT  Authorized by: Peggy Mendez, APRN      Pre Drug % Predicted    FVC: 78%   FEV1: 65%   FEF 25-75%: 38%   FEV1/FVC: 61%   T%   RV: 101%   DLCO: 42%   D/VAsb: 49%    Interpretation   Spirometry   Spirometry shows moderate obstruction. There is reduced midflow suggesting small airway/airflow obstruction.   Review of FVL curve   Patient's effort is normal.   Diffusion Capacity  The patient's diffusion capacity is severely reduced.  Diffusion capacity is severely reduced when corrected for alveolar volume.

## 2022-10-24 NOTE — PROGRESS NOTES
Chief Complaint  COPD    Subjective    History of Present Illness {CC  Problem List  Visit Diagnosis   Encounters  Notes  Medications  Labs  Result Review Imaging  Media     Faisal Joseph presents to [facility] for:    History of Present Illness  Mr. Joseph is here for follow up and management of page 2 COPD.  He is not on any inhalers currently.  He has tried Stiolto and Breztri.  He feels like Stiolto has worked best for him.  He tells me his breathing is about the same.  He gets short of breath with exertional activities.  He feels like he has the most trouble when his allergies are flared up which have been more frequently lately.  He is experiencing watery and itching eyes, sinus pressure, congestion and occasional rhinorrhea.  He denies fevers.  Mucinex that was added at last office visit has been helpful.  He has not been using Flonase or Xyzal.  He has not been using albuterol HFA.  He is up-to-date on vaccinations.       Prior to Admission medications    Medication Sig Start Date End Date Taking? Authorizing Provider   albuterol sulfate  (90 Base) MCG/ACT inhaler  3/14/22   Mitesh Contreras MD   amLODIPine (NORVASC) 10 MG tablet Take 10 mg by mouth Daily.    Mitesh Contreras MD   aspirin 81 MG EC tablet Take 1 tablet by mouth Daily. 5/31/20   Wesley Lew,    Budeson-Glycopyrrol-Formoterol (Breztri Aerosphere) 160-9-4.8 MCG/ACT aerosol inhaler Inhale 2 puffs 2 (Two) Times a Day. 7/20/22   Peggy Mendez APRN   carvedilol (COREG) 12.5 MG tablet Take 12.5 mg by mouth 2 (Two) Times a Day With Meals.    Mitesh Contreras MD   Cholecalciferol (VITAMIN D) 2000 units capsule Take 2,000 Units by mouth Daily.    Mitesh Contreras MD   cloNIDine (CATAPRES) 0.1 MG tablet Take 0.1 mg by mouth As Needed for High Blood Pressure.    Mitesh Contreras MD   ferrous sulfate 325 (65 FE) MG tablet Take 1 tablet by mouth Daily  "With Breakfast. 5/10/22   Samantha Winkler APRN   hydrALAZINE (APRESOLINE) 100 MG tablet Take 100 mg by mouth 3 (Three) Times a Day.    Mitesh Contreras MD   levocetirizine (XYZAL) 5 MG tablet  3/16/22   Mitesh Contreras MD   lisinopril (PRINIVIL,ZESTRIL) 20 MG tablet Take 20 mg by mouth Daily.    Mitesh Contreras MD   magnesium oxide (MAGOX) 400 (241.3 Mg) MG tablet tablet Take 400 mg by mouth 1 (One) Time.    Mitesh Contreras MD   metoprolol tartrate (LOPRESSOR) 50 MG tablet Take 50 mg by mouth 2 (Two) Times a Day.    Mitesh Contreras MD   nitroglycerin (NITROSTAT) 0.4 MG SL tablet 1 under the tongue as needed for angina, may repeat q5mins for up three doses 21   Jaz Musa APRN   pravastatin (PRAVACHOL) 40 MG tablet Take 1 tablet by mouth Daily. 22   Rufino Brice MD   sodium bicarbonate 325 MG tablet Take 325 mg by mouth 4 (Four) Times a Day.    Mitesh Contreras MD   vitamin B-12 (CYANOCOBALAMIN) 100 MCG tablet Take 50 mcg by mouth Daily.    Mitesh Contreras MD       Social History     Socioeconomic History   • Marital status:    Tobacco Use   • Smoking status: Former     Packs/day: 1.50     Years: 25.00     Pack years: 37.50     Types: Cigarettes     Start date:      Quit date:      Years since quittin.8   • Smokeless tobacco: Never   Vaping Use   • Vaping Use: Never used   Substance and Sexual Activity   • Alcohol use: No   • Drug use: No   • Sexual activity: Defer       Objective   Vital Signs:   /80   Pulse 72   Ht 182.9 cm (72\")   Wt 100 kg (221 lb)   SpO2 98% Comment: RA  BMI 29.97 kg/m²     Physical Exam  Constitutional:       General: He is not in acute distress.     Interventions: Face mask in place.   HENT:      Head: Normocephalic.      Nose: Congestion present.      Comments: Sinus pressure      Mouth/Throat:      Mouth: Mucous membranes are moist.   Eyes:      General: No scleral icterus.  Cardiovascular:    "   Rate and Rhythm: Normal rate.   Pulmonary:      Effort: No respiratory distress.   Abdominal:      General: There is no distension.   Neurological:      Mental Status: He is alert and oriented to person, place, and time.   Psychiatric:         Mood and Affect: Mood normal.         Behavior: Behavior is cooperative.        Result Review :{ Labs  Result Review  Imaging  Med Tab  Media :    PFT Values        Some values may be hidden. Unless noted otherwise, only the newest values recorded on each date are displayed.         Old Values PFT Results 22   No data to display.      Pre Drug PFT Results 22   FVC 78   FEV1 65   FEF 25-75% 38   FEV1/FVC 61      Post Drug PFT Results 22   No data to display.      Other Tests PFT Results 22   TLC 89      DLCO 42   D/VAsb 49               Results for orders placed in visit on 22    Full Pulmonary Function Test With Bronchodilator    Narrative  Full Pulmonary Function Test With Bronchodilator  Performed by: Antonette Douglas, RRT  Authorized by: Peggy Mendez, APRN    Pre Drug % Predicted  FVC: 78%  FEV1: 65%  FEF 25-75%: 38%  FEV1/FVC: 61%  T%  RV: 101%  DLCO: 42%  D/VAsb: 49%    Interpretation  Spirometry  Spirometry shows moderate obstruction. There is reduced midflow suggesting small airway/airflow obstruction.  Review of FVL curve  Patient's effort is normal.  Diffusion Capacity  The patient's diffusion capacity is severely reduced.  Diffusion capacity is severely reduced when corrected for alveolar volume.      Results for orders placed during the hospital encounter of 20    Pulmonary Function Test Spirometry Pre & Post Bronchodilator, ABG, Lung Volumes; DLCO; albuterol (PROVENTIL) nebulizer solution 0.083% 2.5 mg/3 mL    [facility] - Pulmonary Function Test    [address] [place] [address]  [place]  [address]  [phone]    Patient : Faisal Joseph  MRN : [id number]  CSN : [id number]  Pulmonologist :  Asaf García MD  Date : 6/1/2020    ______________________________________________________________________    Interpretation :  1.  Spirometry reveals a mild obstructive ventilatory defect manifested by small airways disease.  2.  There is some improvement in spirometry postbronchodilator but a very mild obstructive ventilatory defect is still present.  3.  Lung volumes reveal hyperinflation.  4.  Arterial blood gases reveal a mild mixed metabolic acidosis and respiratory alkalosis with a resultant normal pH.  5.  The patient's current studies show improvement in his FEV1 and FVC on both pre-and postbronchodilator studies compared to previous baseline sp[facility]rom[facility], [facility].  Hyperinflation is now present which was not present previously.      Asaf García MD    Rest/Exercise Pulse Ox Values        Some values may be hidden. Unless noted otherwise, only the newest values recorded on each date are displayed.         Rest/Exercise Pulse Ox Results 4/20/22   Rest room air SAT % 96   Exercise room air SAT % 93                          Assessment and Plan {CC Problem List  Visit Diagnosis  ROS  Review (Popup)  Health Maintenance  Quality  BestPractice  Medications  SmartSets  SnapShot Encounters  Media      Diagnoses and all orders for this visit:    1. Stage 2 moderate COPD by GOLD classification (HCC) (Primary)  -     tiotropium bromide-olodaterol (Stiolto Respimat) 2.5-2.5 MCG/ACT aerosol solution inhaler; Inhale 2 puffs Daily.  Dispense: 1 each; Refill: 0    2. Non-seasonal allergic rhinitis, unspecified trigger  -     Cancel: IgE + Allergens (22); Future  -     IgE + Allergens (22); Future  -     CT Sinus Without Contrast; Future    3. Chronic sinusitis, unspecified location  -     CT Sinus Without Contrast; Future        BMI is >= 25 and <30. (Overweight) The following options were offered after discussion;: weight loss educational material (shared  in after visit summary)      Resume Stiolto.  Albuterol HFA as needed and preemptively to activities that induce dyspnea.  Check IgE with allergens.  Resume Xyzal.  Resume Flonase.  Continue Mucinex 1200 mg twice a day. CT sinuses. Consider pre and post PFT down the road. Follow up in 6 months. Call in the interim with issues.     LIZ Becker  10/26/2022  14:35 CDT    Follow Up {Instructions Charge Capture  Follow-up Communications   Return in about 6 months (around 4/26/2023).    Patient was given instructions and counseling regarding his condition or for health maintenance advice. Please see specific information pulled into the AVS if appropriate.

## 2022-10-26 ENCOUNTER — OFFICE VISIT (OUTPATIENT)
Dept: PULMONOLOGY | Facility: CLINIC | Age: 82
End: 2022-10-26

## 2022-10-26 VITALS
SYSTOLIC BLOOD PRESSURE: 142 MMHG | DIASTOLIC BLOOD PRESSURE: 80 MMHG | WEIGHT: 221 LBS | HEIGHT: 72 IN | OXYGEN SATURATION: 98 % | HEART RATE: 72 BPM | BODY MASS INDEX: 29.93 KG/M2

## 2022-10-26 DIAGNOSIS — J32.9 CHRONIC SINUSITIS, UNSPECIFIED LOCATION: ICD-10-CM

## 2022-10-26 DIAGNOSIS — J44.9 STAGE 2 MODERATE COPD BY GOLD CLASSIFICATION: Primary | Chronic | ICD-10-CM

## 2022-10-26 DIAGNOSIS — J30.89 NON-SEASONAL ALLERGIC RHINITIS, UNSPECIFIED TRIGGER: ICD-10-CM

## 2022-10-26 PROCEDURE — 99214 OFFICE O/P EST MOD 30 MIN: CPT | Performed by: NURSE PRACTITIONER

## 2022-10-26 RX ORDER — TIOTROPIUM BROMIDE AND OLODATEROL 3.124; 2.736 UG/1; UG/1
2 SPRAY, METERED RESPIRATORY (INHALATION)
Qty: 1 EACH | Refills: 0 | Status: SHIPPED | OUTPATIENT
Start: 2022-10-26

## 2022-10-26 NOTE — PATIENT INSTRUCTIONS
Xyzal   Albuterol hfa as needed ( rescue inhaler)   Stiolto 2 puffs every day once a day  Flonase  Mucinex 1200mg two times a day

## 2022-10-27 ENCOUNTER — TELEPHONE (OUTPATIENT)
Dept: PULMONOLOGY | Facility: CLINIC | Age: 82
End: 2022-10-27

## 2022-10-27 NOTE — TELEPHONE ENCOUNTER
Patient's insurance denied the stiolto respimat.  The alternatives that were listed were combivent respimat, anoro ellipta or bevespi.  Please advise.

## 2022-10-27 NOTE — TELEPHONE ENCOUNTER
----- Message from LIZ Becker sent at 10/26/2022  2:34 PM CDT -----  When he calls back about his labs will you let him know I also decided on the CT of his sinuses. We discussed this during the visit but I wasn't sure if I was going to order it or not. I have ordered it and they will call with his appt.

## 2022-10-28 DIAGNOSIS — J44.9 STAGE 2 MODERATE COPD BY GOLD CLASSIFICATION: Primary | ICD-10-CM

## 2022-10-28 RX ORDER — GLYCOPYRROLATE AND FORMOTEROL FUMARATE 9; 4.8 UG/1; UG/1
2 AEROSOL, METERED RESPIRATORY (INHALATION) 2 TIMES DAILY
Qty: 1 EACH | Refills: 3 | Status: SHIPPED | OUTPATIENT
Start: 2022-10-28 | End: 2023-01-23

## 2022-11-09 ENCOUNTER — LAB (OUTPATIENT)
Dept: LAB | Facility: HOSPITAL | Age: 82
End: 2022-11-09

## 2022-11-09 ENCOUNTER — OFFICE VISIT (OUTPATIENT)
Dept: ONCOLOGY | Facility: CLINIC | Age: 82
End: 2022-11-09

## 2022-11-09 VITALS
SYSTOLIC BLOOD PRESSURE: 148 MMHG | RESPIRATION RATE: 16 BRPM | HEIGHT: 72 IN | BODY MASS INDEX: 29.17 KG/M2 | WEIGHT: 215.4 LBS | OXYGEN SATURATION: 97 % | TEMPERATURE: 97.8 F | DIASTOLIC BLOOD PRESSURE: 78 MMHG | HEART RATE: 56 BPM

## 2022-11-09 DIAGNOSIS — J44.9 CHRONIC OBSTRUCTIVE PULMONARY DISEASE, UNSPECIFIED COPD TYPE: ICD-10-CM

## 2022-11-09 DIAGNOSIS — D69.6 THROMBOCYTOPENIA: ICD-10-CM

## 2022-11-09 DIAGNOSIS — N18.4 STAGE 4 CHRONIC KIDNEY DISEASE: ICD-10-CM

## 2022-11-09 DIAGNOSIS — J44.9 CHRONIC OBSTRUCTIVE PULMONARY DISEASE, UNSPECIFIED COPD TYPE: Primary | ICD-10-CM

## 2022-11-09 DIAGNOSIS — Z86.73 HISTORY OF CVA (CEREBROVASCULAR ACCIDENT): ICD-10-CM

## 2022-11-09 DIAGNOSIS — D50.9 IRON DEFICIENCY ANEMIA, UNSPECIFIED IRON DEFICIENCY ANEMIA TYPE: ICD-10-CM

## 2022-11-09 DIAGNOSIS — I25.2 HISTORY OF NON-ST ELEVATION MYOCARDIAL INFARCTION (NSTEMI): ICD-10-CM

## 2022-11-09 LAB
ALBUMIN SERPL-MCNC: 4 G/DL (ref 3.5–5.2)
ALBUMIN/GLOB SERPL: 1.6 G/DL
ALP SERPL-CCNC: 62 U/L (ref 39–117)
ALT SERPL W P-5'-P-CCNC: 8 U/L (ref 1–41)
ANION GAP SERPL CALCULATED.3IONS-SCNC: 9 MMOL/L (ref 5–15)
AST SERPL-CCNC: 9 U/L (ref 1–40)
BASOPHILS # BLD AUTO: 0.03 10*3/MM3 (ref 0–0.2)
BASOPHILS NFR BLD AUTO: 0.5 % (ref 0–1.5)
BILIRUB SERPL-MCNC: 0.4 MG/DL (ref 0–1.2)
BUN SERPL-MCNC: 30 MG/DL (ref 8–23)
BUN/CREAT SERPL: 10.1 (ref 7–25)
CALCIUM SPEC-SCNC: 9.1 MG/DL (ref 8.6–10.5)
CHLORIDE SERPL-SCNC: 105 MMOL/L (ref 98–107)
CO2 SERPL-SCNC: 21 MMOL/L (ref 22–29)
CREAT SERPL-MCNC: 2.97 MG/DL (ref 0.76–1.27)
DEPRECATED RDW RBC AUTO: 42.2 FL (ref 37–54)
EGFRCR SERPLBLD CKD-EPI 2021: 20.4 ML/MIN/1.73
EOSINOPHIL # BLD AUTO: 0.39 10*3/MM3 (ref 0–0.4)
EOSINOPHIL NFR BLD AUTO: 6.3 % (ref 0.3–6.2)
ERYTHROCYTE [DISTWIDTH] IN BLOOD BY AUTOMATED COUNT: 12.7 % (ref 12.3–15.4)
FERRITIN SERPL-MCNC: 59.81 NG/ML (ref 30–400)
GLOBULIN UR ELPH-MCNC: 2.5 GM/DL
GLUCOSE SERPL-MCNC: 93 MG/DL (ref 65–99)
HCT VFR BLD AUTO: 35.2 % (ref 37.5–51)
HGB BLD-MCNC: 11.8 G/DL (ref 13–17.7)
IRON 24H UR-MRATE: 53 MCG/DL (ref 59–158)
IRON SATN MFR SERPL: 16 % (ref 20–50)
LYMPHOCYTES # BLD AUTO: 0.82 10*3/MM3 (ref 0.7–3.1)
LYMPHOCYTES NFR BLD AUTO: 13.2 % (ref 19.6–45.3)
MCH RBC QN AUTO: 30.3 PG (ref 26.6–33)
MCHC RBC AUTO-ENTMCNC: 33.5 G/DL (ref 31.5–35.7)
MCV RBC AUTO: 90.5 FL (ref 79–97)
MONOCYTES # BLD AUTO: 0.56 10*3/MM3 (ref 0.1–0.9)
MONOCYTES NFR BLD AUTO: 9 % (ref 5–12)
NEUTROPHILS NFR BLD AUTO: 4.41 10*3/MM3 (ref 1.7–7)
NEUTROPHILS NFR BLD AUTO: 70.8 % (ref 42.7–76)
PLATELET # BLD AUTO: 89 10*3/MM3 (ref 140–450)
PMV BLD AUTO: 11.1 FL (ref 6–12)
POTASSIUM SERPL-SCNC: 4.1 MMOL/L (ref 3.5–5.2)
PROT SERPL-MCNC: 6.5 G/DL (ref 6–8.5)
RBC # BLD AUTO: 3.89 10*6/MM3 (ref 4.14–5.8)
SODIUM SERPL-SCNC: 135 MMOL/L (ref 136–145)
TIBC SERPL-MCNC: 332 MCG/DL (ref 298–536)
TRANSFERRIN SERPL-MCNC: 223 MG/DL (ref 200–360)
WBC NRBC COR # BLD: 6.22 10*3/MM3 (ref 3.4–10.8)

## 2022-11-09 PROCEDURE — 86003 ALLG SPEC IGE CRUDE XTRC EA: CPT | Performed by: NURSE PRACTITIONER

## 2022-11-09 PROCEDURE — 83540 ASSAY OF IRON: CPT

## 2022-11-09 PROCEDURE — 99214 OFFICE O/P EST MOD 30 MIN: CPT | Performed by: NURSE PRACTITIONER

## 2022-11-09 PROCEDURE — 85025 COMPLETE CBC W/AUTO DIFF WBC: CPT

## 2022-11-09 PROCEDURE — 80053 COMPREHEN METABOLIC PANEL: CPT

## 2022-11-09 PROCEDURE — 82728 ASSAY OF FERRITIN: CPT

## 2022-11-09 PROCEDURE — 82785 ASSAY OF IGE: CPT | Performed by: NURSE PRACTITIONER

## 2022-11-09 PROCEDURE — 84466 ASSAY OF TRANSFERRIN: CPT

## 2022-11-09 RX ORDER — FERROUS SULFATE 325(65) MG
325 TABLET ORAL 2 TIMES DAILY
Qty: 180 TABLET | Refills: 1 | Status: SHIPPED | OUTPATIENT
Start: 2022-11-09

## 2022-11-09 NOTE — PROGRESS NOTES
[facility]  [address]  [facility] [address] [phone]    Patient Name: Faisal Joseph  Encounter Date: 11/09/2022  YOB: 1940  Patient Number: [id number]    Hematology / Oncology Progress Note      HISTORY OF PRESENT ILLNESS: Faisal Joseph is a 82 y.o. male referred by Veda Brito,  for diagnostic and management recommendations for anemia and thrombocytopenia. History is obtained from patient. History is considered to be accurate.     He has health history significant for CKD, COPD, Centrilobular Emphysema, Hypertension, Coronary Artery Disease s/p CAGB in June 2020, Hyperlipidemia  He sees Dr. Brice for Cardiology, Peggy HILL for Pulmonology.  PCP is Dr. Polanco in [place]   He reports fatigue and that he has felt that way since his CABG.  He also reports ribs being sore on left side.  He had CT of Abdomen / Pelvis with out contrast on 01/05/22 which showed that the liver and spleen demonstrate fairly normal attenuation with small calcified granulomata in the spleen.      DIAGNOSTIC WORKUP  Work up was negative included Erythropoietin  14.2  Chemistry:  Creatinine 2.81, BUN 36, GFR 21.9.  Anemia Panel:  Serum iron 34, Ferritin 56.25, Iron Saturation 9%, TIBC 373  Peripheral Blood Smear:  Mild normocytic anemia, mild thrombocytopenia, no evidence of pseudothrombocytopenia  C-Reactive 5.3  SPEP:  Normal with out any M Edilson  VENKAT negative, Hapatoglobin 222   Sed Rate: 22  He was started on oral iron once daily.    Interval History   Patient presents to clinic today for continued follow-up.  He has no acute complaints today.  He continues to take oral iron once daily.  Follows with [facility].    He had labs drawn before appointment and they were reviewed with patient.  Chemistry with sodium 135, CO2 21, BUN 30, creatinine 2.97, GFR 20.4.  Anemia profile with serum iron  53, ferritin 59.81, saturation 16%, TIBC 332.  CBC with WBC 5.5, hemoglobin 11.9, hematocrit 35.8, platelet count 85.            LABS    Lab Results - Last 18 Months   Lab Units 08/09/22  0931 06/07/22  1000 05/03/22  1153   HEMOGLOBIN g/dL 11.9* 11.8* 11.9*   HEMATOCRIT % 35.8* 35.5* 35.5*   MCV fL 89.5 89.0 89.2   WBC 10*3/mm3 5.50 6.16 9.24   RDW % 13.4 13.3 13.3   MPV fL 10.8 10.7 10.5   PLATELETS 10*3/mm3 85* 111* 115*   IMM GRAN % % 0.2 0.2 0.4   NEUTROS ABS 10*3/mm3 3.62 3.90 6.55   LYMPHS ABS 10*3/mm3 0.99 0.97 1.05   MONOS ABS 10*3/mm3 0.48 0.65 0.99*   EOS ABS 10*3/mm3 0.35 0.58* 0.56*   BASOS ABS 10*3/mm3 0.05 0.05 0.05   IMMATURE GRANS (ABS) 10*3/mm3 0.01 0.01 0.04   NRBC /100 WBC 0.0 0.0 0.0       Lab Results - Last 18 Months   Lab Units 08/09/22  0931 06/07/22  1000 05/03/22  1153 01/06/22  0434 01/05/22  0657 01/04/22  2124 11/22/21  1235   GLUCOSE mg/dL 96 103* 90  --   --   --   --    SODIUM mmol/L 138 138 137 141 135 137 140   POTASSIUM mmol/L 4.4 3.7 4.3 4.3 3.3* 3.5 4.0   TOTAL CO2 mmol/L  --   --   --  22 19* 20* 21   CO2 mmol/L 22.0 22.0 21.0*  --   --   --   --    CHLORIDE mmol/L 107 106 106 109* 106 106 108*   ANION GAP mmol/L 9.0 10.0 10.0 10 10 11 11   CREATININE mg/dL 2.82* 2.78* 2.81* 2.72* 2.65* 2.83* 2.62*   BUN mg/dL 36* 25* 36* 28* 30* 31* 30*   BUN / CREAT RATIO  12.8 9.0 12.8 10.3 11.3 11.0 11.5   CALCIUM mg/dL 9.1 9.1 9.2 8.2* 7.8* 8.2* 8.5   EGFR IF NONAFRICN AM mL/min/1.73m2  --   --   --  22.6* 23.2* 21.5* 23.5*   ALK PHOS U/L 54 65 64 45* 39* 51 52   TOTAL PROTEIN g/dL 6.6 6.4 6.6 6.0* 6.1* 6.8 6.8   ALT (SGPT) U/L 10 11 9 18 17 21 10*   AST (SGOT) U/L 12 10 9 18 18 18 13   BILIRUBIN mg/dL 0.3 0.3 0.5 0.3 0.3 0.4 0.4   ALBUMIN g/dL 4.10 4.00 4.20  3.5 2.8* 3.0* 3.5 3.1*   GLOBULIN gm/dL 2.5 2.4 2.4  --   --   --   --        Lab Results - Last 18 Months   Lab Units 05/10/22  0950 05/03/22  1153   M-SPIKE g/dL  --  Not Observed   FREE KAPPA LT CHAINS, 24HR mg/24 hr 205.77  --         Lab Results - Last 18 Months   Lab Units 08/09/22  0931 06/07/22  1000 05/03/22  1153   IRON mcg/dL 95 85 34*   TIBC mcg/dL 361 334 373   IRON SATURATION % 26 25 9*   FERRITIN ng/mL 50.36 56.51 56.25   FOLATE ng/mL  --   --  8.21         PAST MEDICAL HISTORY:  ALLERGIES:  Allergies   Allergen Reactions   • Hydralazine Nausea Only   • Losartan Potassium Nausea Only   • Penicillins Hives   • Spironolactone Swelling     Made  Breast sore and swell     CURRENT MEDICATIONS:  Outpatient Encounter Medications as of 11/9/2022   Medication Sig Dispense Refill   • albuterol sulfate  (90 Base) MCG/ACT inhaler      • amLODIPine (NORVASC) 10 MG tablet Take 10 mg by mouth Daily.     • aspirin 81 MG EC tablet Take 1 tablet by mouth Daily. 100 tablet 99   • carvedilol (COREG) 12.5 MG tablet Take 12.5 mg by mouth 2 (Two) Times a Day With Meals.     • Cholecalciferol (VITAMIN D) 2000 units capsule Take 2,000 Units by mouth Daily.     • cloNIDine (CATAPRES) 0.1 MG tablet Take 0.1 mg by mouth As Needed for High Blood Pressure.     • ferrous sulfate 325 (65 FE) MG tablet Take 1 tablet by mouth Daily With Breakfast. 90 tablet 1   • Glycopyrrolate-Formoterol (Bevespi Aerosphere) 9-4.8 MCG/ACT aerosol Inhale 2 sprays 2 (Two) Times a Day. 1 each 3   • hydrALAZINE (APRESOLINE) 100 MG tablet Take 100 mg by mouth 3 (Three) Times a Day.     • levocetirizine (XYZAL) 5 MG tablet      • lisinopril (PRINIVIL,ZESTRIL) 20 MG tablet Take 20 mg by mouth Daily.     • magnesium oxide (MAGOX) 400 (241.3 Mg) MG tablet tablet Take 400 mg by mouth 1 (One) Time.     • metoprolol tartrate (LOPRESSOR) 50 MG tablet Take 50 mg by mouth 2 (Two) Times a Day.     • nitroglycerin (NITROSTAT) 0.4 MG SL tablet 1 under the tongue as needed for angina, may repeat q5mins for up three doses 25 tablet 2   • pravastatin (PRAVACHOL) 40 MG tablet Take 1 tablet by mouth Daily. 90 tablet 3   • sodium bicarbonate 325 MG tablet Take 325 mg by mouth 4 (Four) Times a  Day.     • tiotropium bromide-olodaterol (Stiolto Respimat) 2.5-2.5 MCG/ACT aerosol solution inhaler Inhale 2 puffs Daily. 1 each 0   • vitamin B-12 (CYANOCOBALAMIN) 100 MCG tablet Take 50 mcg by mouth Daily.       No facility-administered encounter medications on file as of 11/9/2022.     ADULT ILLNESSES:  Patient Active Problem List   Diagnosis Code   • Essential hypertension I10   • Stage 4 chronic kidney disease (HCC) N18.4   • Coronary artery disease I25.10   • History of non-ST elevation myocardial infarction (NSTEMI) I25.2   • Chronic pericarditis I31.9   • History of CVA (cerebrovascular accident) Z86.73   • Hyperlipidemia E78.5   • Physical debility R53.81   • S/P CABG (coronary artery bypass graft) Z95.1   • Palpitations R00.2   • Gait difficulty R26.9     SURGERIES:  Past Surgical History:   Procedure Laterality Date   • CARDIAC CATHETERIZATION     • CARDIAC CATHETERIZATION N/A 5/28/2020    Procedure: Left Heart Cath;  Surgeon: Rufino Brice MD;  Location: [facility] CATH INVASIVE LOCATION;  Service: Cardiology;  Laterality: N/A;   • CORONARY ARTERY BYPASS GRAFT N/A 6/9/2020    Procedure: CABG X 3, LIMA, SHELBY, EVH WITH OPEN EXTENSION LOWER RIGHT LEG;  Surgeon: Nikunj Carballo MD;  Location: [facility] OR;  Service: Cardiothoracic;  Laterality: N/A;     HEALTH MAINTENANCE ITEMS:  Health Maintenance Due   Topic Date Due   • ZOSTER VACCINE (1 of 2) Never done   • ANNUAL WELLNESS VISIT  Never done   • COVID-19 Vaccine (3 - Booster for Moderna series) 03/27/2021   • TDAP/TD VACCINES (2 - Tdap) 04/20/2021   • LIPID PANEL  05/28/2021       <no information>  Last Completed Colonoscopy     This patient has no relevant Health Maintenance data.        Immunization History   Administered Date(s) Administered   • COVID-19 (MODERNA) 1st, 2nd, 3rd Dose Only 01/02/2021, 01/30/2021   • FLUAD TRI 65YR+ 11/05/2019, 08/28/2020   • Flu Vaccine Intradermal Quad 18-64YR 10/18/2021   • Fluzone High Dose =>65 Years ([employer]  "ONLY) 2015, 10/14/2016, 2017   • Pneumococcal Conjugate 13-Valent (PCV13) 2019   • TD Preservative Free 2011     Last Completed Mammogram     This patient has no relevant Health Maintenance data.            FAMILY HISTORY:  Family History   Problem Relation Age of Onset   • Cancer Mother    • Cancer Father      SOCIAL HISTORY:  Social History     Socioeconomic History   • Marital status:    Tobacco Use   • Smoking status: Former     Packs/day: 1.50     Years: 25.00     Pack years: 37.50     Types: Cigarettes     Start date:      Quit date:      Years since quittin.8   • Smokeless tobacco: Never   Vaping Use   • Vaping Use: Never used   Substance and Sexual Activity   • Alcohol use: No   • Drug use: No   • Sexual activity: Defer       REVIEW OF SYSTEMS:  Review of Systems   Constitutional: Positive for activity change and fatigue.   HENT: Negative for swollen glands and trouble swallowing.    Gastrointestinal: Negative for nausea and vomiting.   Neurological: Positive for weakness.   Hematological: Does not bruise/bleed easily.   Psychiatric/Behavioral: Negative for suicidal ideas and stress.       /78   Pulse 56   Temp 97.8 °F (36.6 °C) (Temporal)   Resp 16   Ht 182.9 cm (72\")   Wt 97.7 kg (215 lb 6.4 oz)   SpO2 97%   BMI 29.21 kg/m²  Body surface area is 2.2 meters squared.    Pain Score    22 0956   PainSc: 0-No pain       Physical Exam:  Physical Exam  Constitutional:       Appearance: Normal appearance.   HENT:      Head: Normocephalic and atraumatic.   Eyes:      Extraocular Movements: Extraocular movements intact.   Cardiovascular:      Rate and Rhythm: Normal rate and regular rhythm.   Pulmonary:      Effort: Pulmonary effort is normal.      Breath sounds: Normal breath sounds.   Abdominal:      General: Bowel sounds are normal.      Palpations: Abdomen is soft.   Musculoskeletal:         General: Normal range of motion.      Right lower leg: " Edema present.      Left lower leg: Edema present.   Skin:     General: Skin is warm and dry.   Neurological:      General: No focal deficit present.      Mental Status: He is alert and oriented to person, place, and time.   Psychiatric:         Mood and Affect: Mood normal.         Behavior: Behavior normal.         Faisal Joseph reports a pain score of 0.  No intervention indicated.          ASSESSMENT / PLAN    1. Chronic obstructive pulmonary disease, unspecified COPD type (HCC)    2. Stage 4 chronic kidney disease (HCC)    3. Iron deficiency anemia, unspecified iron deficiency anemia type    4. Thrombocytopenia (HCC)    5. History of CVA (cerebrovascular accident)    6. History of non-ST elevation myocardial infarction (NSTEMI)         ASSESSMENT:      1.  Iron Deficiency Anemia    Anemia profile with serum iron 53, ferritin 59.81, saturation 16%, TIBC 332.   -Hemoglobin 11.9, hematocrit 35.8   -Increase oral iron to BID    2.  Chronic Kidney Disease    -Stage IV   -BUN 30, creatinine 2.97, GFR 20.4.   -Patient is seen by [facility]     3.  History of CVA / History or NSTEMI   -Patient is seen by Dr. Brice for Cardiology   -History of hypertension taking hydralazine, Coreg, Metoprolol, Clonidine, Amlodipine   -Blood pressure today at 148/78    4. COPD   -Continue bronchodilator and LABA per Pulmonology        5.   Thrombycytopenia   -Plt count  89   -Stable, No s/s of bleeding    PLAN:  -Increase oral iron to BID  Pt will have labs in 6 weeks for CBC, CMP, Iron profile and Ferritin  RTC in 12 weeks for continued follow up.  - Blood for CBC, CMP, Ferritin, TIBC, % Saturation, and Iron   Continue ongoing management per primary care physician and other specialists including cardiology and pulmonlogy.  Plan of care discussed with patient.  Understanding expressed.  Patient agreeable to proceed.    Samantha Winkler, APRN  11/09/2022

## 2022-11-09 NOTE — PATIENT INSTRUCTIONS
Increase Iron to twice daily.  Recheck labs in 6 weeks.  If it is still low, we can consider IV Iron infusions.   Return to clinic in 3 months.

## 2022-11-16 ENCOUNTER — HOSPITAL ENCOUNTER (OUTPATIENT)
Dept: CT IMAGING | Facility: HOSPITAL | Age: 82
Discharge: HOME | End: 2022-11-16
Admitting: NURSE PRACTITIONER

## 2022-11-16 ENCOUNTER — TELEPHONE (OUTPATIENT)
Dept: PULMONOLOGY | Facility: CLINIC | Age: 82
End: 2022-11-16

## 2022-11-16 DIAGNOSIS — J30.89 NON-SEASONAL ALLERGIC RHINITIS, UNSPECIFIED TRIGGER: ICD-10-CM

## 2022-11-16 DIAGNOSIS — J32.9 CHRONIC SINUSITIS, UNSPECIFIED LOCATION: ICD-10-CM

## 2022-11-16 PROCEDURE — 70486 CT MAXILLOFACIAL W/O DYE: CPT

## 2022-11-16 NOTE — TELEPHONE ENCOUNTER
Patient said that he will not be able to afford the Bevespi. He is going to check with his insurance company to see if the Anoro would be cheaper. He is going to call back with that information.

## 2022-12-19 ENCOUNTER — LAB (OUTPATIENT)
Dept: LAB | Facility: HOSPITAL | Age: 82
End: 2022-12-19

## 2022-12-19 DIAGNOSIS — N18.4 STAGE 4 CHRONIC KIDNEY DISEASE: ICD-10-CM

## 2022-12-19 DIAGNOSIS — D50.9 IRON DEFICIENCY ANEMIA, UNSPECIFIED IRON DEFICIENCY ANEMIA TYPE: ICD-10-CM

## 2022-12-19 LAB
ALBUMIN SERPL-MCNC: 4.1 G/DL (ref 3.5–5.2)
ALBUMIN/GLOB SERPL: 1.5 G/DL
ALP SERPL-CCNC: 70 U/L (ref 39–117)
ALT SERPL W P-5'-P-CCNC: 9 U/L (ref 1–41)
ANION GAP SERPL CALCULATED.3IONS-SCNC: 8 MMOL/L (ref 5–15)
AST SERPL-CCNC: 12 U/L (ref 1–40)
BASOPHILS # BLD AUTO: 0.05 10*3/MM3 (ref 0–0.2)
BASOPHILS NFR BLD AUTO: 0.7 % (ref 0–1.5)
BILIRUB SERPL-MCNC: 0.3 MG/DL (ref 0–1.2)
BUN SERPL-MCNC: 30 MG/DL (ref 8–23)
BUN/CREAT SERPL: 10.6 (ref 7–25)
CALCIUM SPEC-SCNC: 9.2 MG/DL (ref 8.6–10.5)
CHLORIDE SERPL-SCNC: 105 MMOL/L (ref 98–107)
CO2 SERPL-SCNC: 27 MMOL/L (ref 22–29)
CREAT SERPL-MCNC: 2.82 MG/DL (ref 0.76–1.27)
DEPRECATED RDW RBC AUTO: 42.2 FL (ref 37–54)
EGFRCR SERPLBLD CKD-EPI 2021: 21.7 ML/MIN/1.73
EOSINOPHIL # BLD AUTO: 0.71 10*3/MM3 (ref 0–0.4)
EOSINOPHIL NFR BLD AUTO: 10.1 % (ref 0.3–6.2)
ERYTHROCYTE [DISTWIDTH] IN BLOOD BY AUTOMATED COUNT: 12.5 % (ref 12.3–15.4)
FERRITIN SERPL-MCNC: 98.15 NG/ML (ref 30–400)
GLOBULIN UR ELPH-MCNC: 2.8 GM/DL
GLUCOSE SERPL-MCNC: 91 MG/DL (ref 65–99)
HCT VFR BLD AUTO: 36 % (ref 37.5–51)
HGB BLD-MCNC: 11.8 G/DL (ref 13–17.7)
IMM GRANULOCYTES # BLD AUTO: 0.03 10*3/MM3 (ref 0–0.05)
IMM GRANULOCYTES NFR BLD AUTO: 0.4 % (ref 0–0.5)
IRON 24H UR-MRATE: 42 MCG/DL (ref 59–158)
IRON SATN MFR SERPL: 15 % (ref 20–50)
LYMPHOCYTES # BLD AUTO: 1.04 10*3/MM3 (ref 0.7–3.1)
LYMPHOCYTES NFR BLD AUTO: 14.8 % (ref 19.6–45.3)
MCH RBC QN AUTO: 30.1 PG (ref 26.6–33)
MCHC RBC AUTO-ENTMCNC: 32.8 G/DL (ref 31.5–35.7)
MCV RBC AUTO: 91.8 FL (ref 79–97)
MONOCYTES # BLD AUTO: 0.78 10*3/MM3 (ref 0.1–0.9)
MONOCYTES NFR BLD AUTO: 11.1 % (ref 5–12)
NEUTROPHILS NFR BLD AUTO: 4.44 10*3/MM3 (ref 1.7–7)
NEUTROPHILS NFR BLD AUTO: 62.9 % (ref 42.7–76)
NRBC BLD AUTO-RTO: 0 /100 WBC (ref 0–0.2)
PLATELET # BLD AUTO: 130 10*3/MM3 (ref 140–450)
PMV BLD AUTO: 10.3 FL (ref 6–12)
POTASSIUM SERPL-SCNC: 4.3 MMOL/L (ref 3.5–5.2)
PROT SERPL-MCNC: 6.9 G/DL (ref 6–8.5)
RBC # BLD AUTO: 3.92 10*6/MM3 (ref 4.14–5.8)
SODIUM SERPL-SCNC: 140 MMOL/L (ref 136–145)
TIBC SERPL-MCNC: 282 MCG/DL (ref 298–536)
TRANSFERRIN SERPL-MCNC: 189 MG/DL (ref 200–360)
WBC NRBC COR # BLD: 7.05 10*3/MM3 (ref 3.4–10.8)

## 2022-12-19 PROCEDURE — 80053 COMPREHEN METABOLIC PANEL: CPT

## 2022-12-19 PROCEDURE — 36415 COLL VENOUS BLD VENIPUNCTURE: CPT

## 2022-12-19 PROCEDURE — 82728 ASSAY OF FERRITIN: CPT

## 2022-12-19 PROCEDURE — 85025 COMPLETE CBC W/AUTO DIFF WBC: CPT

## 2022-12-19 PROCEDURE — 83540 ASSAY OF IRON: CPT

## 2022-12-19 PROCEDURE — 84466 ASSAY OF TRANSFERRIN: CPT

## 2023-01-23 ENCOUNTER — OFFICE VISIT (OUTPATIENT)
Dept: CARDIOLOGY | Facility: CLINIC | Age: 83
End: 2023-01-23
Payer: MEDICARE

## 2023-01-23 VITALS
HEART RATE: 48 BPM | OXYGEN SATURATION: 100 % | BODY MASS INDEX: 28.99 KG/M2 | DIASTOLIC BLOOD PRESSURE: 88 MMHG | WEIGHT: 214 LBS | SYSTOLIC BLOOD PRESSURE: 142 MMHG | HEIGHT: 72 IN

## 2023-01-23 DIAGNOSIS — E78.2 MIXED HYPERLIPIDEMIA: Chronic | ICD-10-CM

## 2023-01-23 DIAGNOSIS — I25.10 CORONARY ARTERY DISEASE INVOLVING NATIVE CORONARY ARTERY OF NATIVE HEART WITHOUT ANGINA PECTORIS: Primary | Chronic | ICD-10-CM

## 2023-01-23 DIAGNOSIS — I10 ESSENTIAL HYPERTENSION: Chronic | ICD-10-CM

## 2023-01-23 PROCEDURE — 99214 OFFICE O/P EST MOD 30 MIN: CPT | Performed by: INTERNAL MEDICINE

## 2023-01-23 PROCEDURE — 93000 ELECTROCARDIOGRAM COMPLETE: CPT | Performed by: INTERNAL MEDICINE

## 2023-01-23 RX ORDER — FERROUS SULFATE 325(65) MG
2 TABLET ORAL DAILY
COMMUNITY

## 2023-01-23 NOTE — PROGRESS NOTES
Subjective:     Encounter Date:01/23/2023      Patient ID: Faisal Joseph is a 82 y.o. male with coronary artery disease, previous coronary bypass grafting in 2020 (LIMA to LAD, SVG to diagonal, SVG to distal right coronary artery), hypertension, hyperlipidemia, previous stroke, stage IV chronic kidney disease who presents today for routine follow-up.    Chief Complaint: Here for follow-up of coronary artery disease    History of Present Illness    This patient presents today for follow-up of coronary artery disease.  He had bypass grafting in 2020.  He continues to do well.  No significant chest pain.  He says that every once a while he will feel a soreness in his chest when laying down at night but otherwise no significant chest pain.  He denies having significant shortness of breath or dyspnea with exertion.  He denies having palpitations, lightheadedness, dizziness or syncope.  Heart rate is variable and sometimes he notes low heart rate.  Previously, he was on a beta-blocker and developed symptomatic bradycardia but so far is tolerating beta-blocker therapy once again at this time well.  Blood pressure waxes and wanes but generally is reasonably well controlled.  The patient denies having side effects on current medications.  Primary care follows his lipids.  He notes good control to his knowledge.  He denies having any orthopnea, PND or significant edema.  He denies having any other significant cardiac symptoms at this time and says that he has been feeling reasonably well.      Current Outpatient Medications:   •  albuterol sulfate  (90 Base) MCG/ACT inhaler, , Disp: , Rfl:   •  amLODIPine (NORVASC) 10 MG tablet, Take 10 mg by mouth Daily., Disp: , Rfl:   •  aspirin 81 MG EC tablet, Take 1 tablet by mouth Daily., Disp: 100 tablet, Rfl: 99  •  Cholecalciferol (VITAMIN D) 2000 units capsule, Take 2,000 Units by mouth Daily., Disp: , Rfl:   •  cloNIDine (CATAPRES) 0.1 MG tablet, Take 0.1 mg by mouth  As Needed for High Blood Pressure., Disp: , Rfl:   •  ferrous sulfate 325 (65 FE) MG tablet, Take 1 tablet by mouth 2 (Two) Times a Day., Disp: 180 tablet, Rfl: 1  •  ferrous sulfate 325 (65 FE) MG tablet, Take 2 tablets by mouth Daily., Disp: , Rfl:   •  hydrALAZINE (APRESOLINE) 100 MG tablet, Take 100 mg by mouth 3 (Three) Times a Day., Disp: , Rfl:   •  levocetirizine (XYZAL) 5 MG tablet, , Disp: , Rfl:   •  lisinopril (PRINIVIL,ZESTRIL) 20 MG tablet, Take 20 mg by mouth Daily., Disp: , Rfl:   •  magnesium oxide (MAGOX) 400 (241.3 Mg) MG tablet tablet, Take 400 mg by mouth 1 (One) Time., Disp: , Rfl:   •  metoprolol tartrate (LOPRESSOR) 50 MG tablet, Take 50 mg by mouth 2 (Two) Times a Day., Disp: , Rfl:   •  nitroglycerin (NITROSTAT) 0.4 MG SL tablet, 1 under the tongue as needed for angina, may repeat q5mins for up three doses, Disp: 25 tablet, Rfl: 2  •  pravastatin (PRAVACHOL) 40 MG tablet, Take 1 tablet by mouth Daily., Disp: 90 tablet, Rfl: 3  •  sodium bicarbonate 325 MG tablet, Take 325 mg by mouth 4 (Four) Times a Day., Disp: , Rfl:   •  tiotropium bromide-olodaterol (Stiolto Respimat) 2.5-2.5 MCG/ACT aerosol solution inhaler, Inhale 2 puffs Daily., Disp: 1 each, Rfl: 0  •  vitamin B-12 (CYANOCOBALAMIN) 100 MCG tablet, Take 50 mcg by mouth Daily., Disp: , Rfl:     Allergies   Allergen Reactions   • Hydralazine Nausea Only   • Losartan Potassium Nausea Only   • Penicillins Hives   • Spironolactone Swelling     Made  Breast sore and swell     Social History     Tobacco Use   • Smoking status: Former     Packs/day: 1.50     Years: 25.00     Pack years: 37.50     Types: Cigarettes     Start date:      Quit date:      Years since quittin.0   • Smokeless tobacco: Never   Substance Use Topics   • Alcohol use: No     Review of Systems   Constitutional: Negative for fever and weight loss.   Cardiovascular: Negative for chest pain, dyspnea on exertion, leg swelling, orthopnea, palpitations, paroxysmal  "nocturnal dyspnea and syncope.   Respiratory: Negative for cough, shortness of breath and wheezing.    Gastrointestinal: Negative for abdominal pain, nausea and vomiting.   Neurological: Negative for dizziness, headaches and loss of balance.       ECG 12 Lead    Date/Time: 1/23/2023 2:11 PM  Performed by: Rufino Brice MD  Authorized by: Rufino Brice MD   Comparison: compared with previous ECG from 7/14/2022  Similar to previous ECG  Rhythm: sinus bradycardia  Rate: bradycardic  BPM: 57  Conduction: 1st degree AV block and non-specific intraventricular conduction delay  QRS axis: normal  Other findings: non-specific ST-T wave changes and poor R wave progression    Clinical impression: abnormal EKG             Objective:     Vitals reviewed.   Constitutional:       General: Not in acute distress.     Appearance: Well-developed and not in distress.   Eyes:      Extraocular Movements: Extraocular movements intact.   HENT:      Head: Normocephalic and atraumatic.   Neck:      Thyroid: No thyroid mass.      Vascular: No JVD.   Pulmonary:      Effort: Pulmonary effort is normal.      Breath sounds: Normal breath sounds. No wheezing. No rhonchi. No rales.   Cardiovascular:      Bradycardia present. Regular rhythm.      Murmurs: There is no murmur.      No gallop.   Pulses:     Intact distal pulses.   Edema:     Peripheral edema absent.   Abdominal:      General: Bowel sounds are normal. There is no distension.      Palpations: Abdomen is soft.      Tenderness: There is no abdominal tenderness.   Skin:     General: Skin is warm and dry. There is no cyanosis.      Findings: No erythema or rash.   Neurological:      Mental Status: Alert and oriented to person, place, and time.      Cranial Nerves: No cranial nerve deficit.       /88   Pulse (!) 48   Ht 182.9 cm (72\")   Wt 97.1 kg (214 lb)   SpO2 100%   BMI 29.02 kg/m²     Data/Lab Review:     Lab Results   Component Value Date    WBC 7.05 " 12/19/2022    HGB 11.8 (L) 12/19/2022    HCT 36.0 (L) 12/19/2022    MCV 91.8 12/19/2022     (L) 12/19/2022     Lab Results   Component Value Date    GLUCOSE 91 12/19/2022    CALCIUM 9.2 12/19/2022     12/19/2022    K 4.3 12/19/2022    CO2 27.0 12/19/2022     12/19/2022    BUN 30 (H) 12/19/2022    CREATININE 2.82 (H) 12/19/2022    EGFRIFAFRI 27.3 (L) 01/06/2022    EGFRIFNONA 22.6 (L) 01/06/2022    BCR 10.6 12/19/2022    ANIONGAP 8.0 12/19/2022     Results for orders placed during the hospital encounter of 09/14/21    Adult Transthoracic Echo Complete W/ Cont if Necessary Per Protocol    Interpretation Summary  · Left ventricular systolic function is normal. Left ventricular ejection fraction appears to be 56 - 60%.  · Left ventricular wall thickness is consistent with mild concentric hypertrophy.  · The right ventricular cavity is borderline dilated. Normal right ventricular systolic function noted.  · Biatrial enlargement is noted.  · Mild mitral valve regurgitation is present.  · Mild tricuspid valve regurgitation is present. Estimated right ventricular systolic pressure from tricuspid regurgitation is normal (<35 mmHg).        Assessment:          Diagnosis Plan   1. Coronary artery disease involving native coronary artery of native heart without angina pectoris  ECG 12 Lead      2. Essential hypertension        3. Mixed hyperlipidemia             Plan:       1.  Coronary artery disease: The patient is stable at this time and does not describe any ischemic symptoms.  Continue current therapies with aspirin, metoprolol, pravastatin.    2.  Essential hypertension: Blood pressure mildly elevated today.  Generally, blood pressure has been reasonably well controlled according the patient's report.  Medications have been adjusted by primary care.  Continue to monitor.  Continue current medications.    3.  Mixed hyperlipidemia: The patient is on statin therapy.  His lipids are followed by his primary  care provider.  LDL cholesterol goal is less than 70.    Of note, the patient's medical record indicates a history of paroxysmal atrial fibrillation.  This was addressed with the patient number of years ago.  While records do indicate atrial fibrillation, clinically, the patient has never been known to have atrial fibrillation as long as we have followed the patient.  He denies having any history of atrial fibrillation, therefore this will be removed from his medical record at this time.    The patient is thought to be stable from a cardiovascular standpoint at this time and in need of no further cardiac testing at this time.  We will plan to see the patient again in 6 months unless otherwise needed sooner.

## 2023-02-06 RX ORDER — METOPROLOL TARTRATE 50 MG/1
TABLET, FILM COATED ORAL
Qty: 60 TABLET | Refills: 11 | Status: SHIPPED | OUTPATIENT
Start: 2023-02-06

## 2023-02-07 ENCOUNTER — OFFICE VISIT (OUTPATIENT)
Dept: ONCOLOGY | Facility: CLINIC | Age: 83
End: 2023-02-07
Payer: MEDICARE

## 2023-02-07 ENCOUNTER — LAB (OUTPATIENT)
Dept: LAB | Facility: HOSPITAL | Age: 83
End: 2023-02-07
Payer: MEDICARE

## 2023-02-07 ENCOUNTER — TRANSCRIBE ORDERS (OUTPATIENT)
Dept: ADMINISTRATIVE | Facility: HOSPITAL | Age: 83
End: 2023-02-07
Payer: MEDICARE

## 2023-02-07 VITALS
OXYGEN SATURATION: 97 % | TEMPERATURE: 97.9 F | BODY MASS INDEX: 28.93 KG/M2 | HEIGHT: 72 IN | HEART RATE: 67 BPM | RESPIRATION RATE: 16 BRPM | DIASTOLIC BLOOD PRESSURE: 78 MMHG | SYSTOLIC BLOOD PRESSURE: 142 MMHG | WEIGHT: 213.6 LBS

## 2023-02-07 DIAGNOSIS — D50.9 IRON DEFICIENCY ANEMIA, UNSPECIFIED IRON DEFICIENCY ANEMIA TYPE: Primary | ICD-10-CM

## 2023-02-07 DIAGNOSIS — D69.6 THROMBOCYTOPENIA: ICD-10-CM

## 2023-02-07 DIAGNOSIS — N18.4 STAGE 4 CHRONIC KIDNEY DISEASE: ICD-10-CM

## 2023-02-07 DIAGNOSIS — N18.4 CHRONIC KIDNEY DISEASE, STAGE IV (SEVERE): ICD-10-CM

## 2023-02-07 DIAGNOSIS — N18.4 CHRONIC KIDNEY DISEASE, STAGE IV (SEVERE): Primary | ICD-10-CM

## 2023-02-07 DIAGNOSIS — D50.9 IRON DEFICIENCY ANEMIA, UNSPECIFIED IRON DEFICIENCY ANEMIA TYPE: ICD-10-CM

## 2023-02-07 LAB
25(OH)D3 SERPL-MCNC: 45.1 NG/ML (ref 30–100)
ALBUMIN SERPL-MCNC: 4 G/DL (ref 3.5–5.2)
ALBUMIN/GLOB SERPL: 1.5 G/DL
ALP SERPL-CCNC: 56 U/L (ref 39–117)
ALT SERPL W P-5'-P-CCNC: 11 U/L (ref 1–41)
ANION GAP SERPL CALCULATED.3IONS-SCNC: 9 MMOL/L (ref 5–15)
AST SERPL-CCNC: 13 U/L (ref 1–40)
BACTERIA UR QL AUTO: ABNORMAL /HPF
BASOPHILS # BLD AUTO: 0.05 10*3/MM3 (ref 0–0.2)
BASOPHILS NFR BLD AUTO: 0.8 % (ref 0–1.5)
BILIRUB SERPL-MCNC: 0.3 MG/DL (ref 0–1.2)
BILIRUB UR QL STRIP: NEGATIVE
BUN SERPL-MCNC: 35 MG/DL (ref 8–23)
BUN/CREAT SERPL: 12.5 (ref 7–25)
CALCIUM SPEC-SCNC: 9 MG/DL (ref 8.6–10.5)
CHLORIDE SERPL-SCNC: 108 MMOL/L (ref 98–107)
CLARITY UR: CLEAR
CO2 SERPL-SCNC: 23 MMOL/L (ref 22–29)
COLOR UR: YELLOW
CREAT SERPL-MCNC: 2.79 MG/DL (ref 0.76–1.27)
CREAT UR-MCNC: 120.7 MG/DL
DEPRECATED RDW RBC AUTO: 44.1 FL (ref 37–54)
EGFRCR SERPLBLD CKD-EPI 2021: 21.9 ML/MIN/1.73
EOSINOPHIL # BLD AUTO: 0.37 10*3/MM3 (ref 0–0.4)
EOSINOPHIL NFR BLD AUTO: 5.7 % (ref 0.3–6.2)
ERYTHROCYTE [DISTWIDTH] IN BLOOD BY AUTOMATED COUNT: 13.2 % (ref 12.3–15.4)
FERRITIN SERPL-MCNC: 96.15 NG/ML (ref 30–400)
GLOBULIN UR ELPH-MCNC: 2.6 GM/DL
GLUCOSE SERPL-MCNC: 83 MG/DL (ref 65–99)
GLUCOSE UR STRIP-MCNC: NEGATIVE MG/DL
HCT VFR BLD AUTO: 37.5 % (ref 37.5–51)
HGB BLD-MCNC: 12.3 G/DL (ref 13–17.7)
HGB UR QL STRIP.AUTO: NEGATIVE
HOLD SPECIMEN: NORMAL
HYALINE CASTS UR QL AUTO: ABNORMAL /LPF
IRON 24H UR-MRATE: 75 MCG/DL (ref 59–158)
IRON SATN MFR SERPL: 26 % (ref 20–50)
KETONES UR QL STRIP: NEGATIVE
LEUKOCYTE ESTERASE UR QL STRIP.AUTO: NEGATIVE
LYMPHOCYTES # BLD AUTO: 1.1 10*3/MM3 (ref 0.7–3.1)
LYMPHOCYTES NFR BLD AUTO: 16.8 % (ref 19.6–45.3)
MAGNESIUM SERPL-MCNC: 2.3 MG/DL (ref 1.6–2.4)
MCH RBC QN AUTO: 29.9 PG (ref 26.6–33)
MCHC RBC AUTO-ENTMCNC: 32.8 G/DL (ref 31.5–35.7)
MCV RBC AUTO: 91.2 FL (ref 79–97)
MONOCYTES # BLD AUTO: 0.52 10*3/MM3 (ref 0.1–0.9)
MONOCYTES NFR BLD AUTO: 8 % (ref 5–12)
NEUTROPHILS NFR BLD AUTO: 4.47 10*3/MM3 (ref 1.7–7)
NEUTROPHILS NFR BLD AUTO: 68.4 % (ref 42.7–76)
NITRITE UR QL STRIP: NEGATIVE
PH UR STRIP.AUTO: 6.5 [PH] (ref 5–8)
PHOSPHATE SERPL-MCNC: 3.8 MG/DL (ref 2.5–4.5)
PLATELET # BLD AUTO: 111 10*3/MM3 (ref 140–450)
PMV BLD AUTO: 10.7 FL (ref 6–12)
POTASSIUM SERPL-SCNC: 4.2 MMOL/L (ref 3.5–5.2)
PROT ?TM UR-MCNC: 141.5 MG/DL
PROT SERPL-MCNC: 6.6 G/DL (ref 6–8.5)
PROT UR QL STRIP: ABNORMAL
PROT/CREAT UR: 1172.3 MG/G CREA (ref 0–200)
PTH-INTACT SERPL-MCNC: 80.5 PG/ML (ref 15–65)
RBC # BLD AUTO: 4.11 10*6/MM3 (ref 4.14–5.8)
RBC # UR STRIP: ABNORMAL /HPF
REF LAB TEST METHOD: ABNORMAL
SODIUM SERPL-SCNC: 140 MMOL/L (ref 136–145)
SP GR UR STRIP: 1.02 (ref 1–1.03)
SQUAMOUS #/AREA URNS HPF: ABNORMAL /HPF
TIBC SERPL-MCNC: 291 MCG/DL (ref 298–536)
TRANSFERRIN SERPL-MCNC: 195 MG/DL (ref 200–360)
URATE SERPL-MCNC: 4.2 MG/DL (ref 3.4–7)
UROBILINOGEN UR QL STRIP: ABNORMAL
WBC # UR STRIP: ABNORMAL /HPF
WBC NRBC COR # BLD: 6.53 10*3/MM3 (ref 3.4–10.8)

## 2023-02-07 PROCEDURE — 84550 ASSAY OF BLOOD/URIC ACID: CPT

## 2023-02-07 PROCEDURE — 81001 URINALYSIS AUTO W/SCOPE: CPT

## 2023-02-07 PROCEDURE — 85025 COMPLETE CBC W/AUTO DIFF WBC: CPT

## 2023-02-07 PROCEDURE — 83540 ASSAY OF IRON: CPT

## 2023-02-07 PROCEDURE — 36415 COLL VENOUS BLD VENIPUNCTURE: CPT

## 2023-02-07 PROCEDURE — 84100 ASSAY OF PHOSPHORUS: CPT

## 2023-02-07 PROCEDURE — 83735 ASSAY OF MAGNESIUM: CPT

## 2023-02-07 PROCEDURE — 82728 ASSAY OF FERRITIN: CPT

## 2023-02-07 PROCEDURE — 83970 ASSAY OF PARATHORMONE: CPT

## 2023-02-07 PROCEDURE — 82306 VITAMIN D 25 HYDROXY: CPT

## 2023-02-07 PROCEDURE — 84156 ASSAY OF PROTEIN URINE: CPT

## 2023-02-07 PROCEDURE — 82570 ASSAY OF URINE CREATININE: CPT

## 2023-02-07 PROCEDURE — 84466 ASSAY OF TRANSFERRIN: CPT

## 2023-02-07 PROCEDURE — 99214 OFFICE O/P EST MOD 30 MIN: CPT | Performed by: NURSE PRACTITIONER

## 2023-02-07 PROCEDURE — 80053 COMPREHEN METABOLIC PANEL: CPT

## 2023-02-07 NOTE — PROGRESS NOTES
[facility]  [address]  [place] [place] [address] [phone]    Patient Name: Faisal Joseph  Encounter Date: 02/07/2023  YOB: 1940  Patient Number: [id number]    Hematology / Oncology Progress Note      HISTORY OF PRESENT ILLNESS: Faisal Joseph is a 82 y.o. male referred by Veda Brito,  for diagnostic and management recommendations for anemia and thrombocytopenia. History is obtained from patient. History is considered to be accurate.     He has health history significant for CKD, COPD, Centrilobular Emphysema, Hypertension, Coronary Artery Disease s/p CAGB in June 2020, Hyperlipidemia  He sees Dr. Brice for Cardiology, Peggy HILL for Pulmonology.  PCP is Dr. Polanco in [place]   He reports fatigue and that he has felt that way since his CABG.  He also reports ribs being sore on left side.  He had CT of Abdomen / Pelvis with out contrast on 01/05/22 which showed that the liver and spleen demonstrate fairly normal attenuation with small calcified granulomata in the spleen.      DIAGNOSTIC WORKUP  Work up was negative included Erythropoietin  14.2  Chemistry:  Creatinine 2.81, BUN 36, GFR 21.9.  Anemia Panel:  Serum iron 34, Ferritin 56.25, Iron Saturation 9%, TIBC 373  Peripheral Blood Smear:  Mild normocytic anemia, mild thrombocytopenia, no evidence of pseudothrombocytopenia  C-Reactive 5.3  SPEP:  Normal with out any M Edilson  VENKAT negative, Hapatoglobin 222   Sed Rate: 22  He was started on oral iron once daily.    Interval History   Patient presents to clinic today for continued follow-up.  He continues to take oral iron twice  daily.  Follows with [facility].    He complains of fatigue.  He states he was recently on antibiotic for his breathing and he is feeling a bit better.      He had labs drawn and result were reviewed with him.         LABS    Lab Results - Last  18 Months   Lab Units 02/07/23  0921 12/19/22  1215 11/09/22  0948 08/09/22  0931 06/07/22  1000 05/03/22  1153   HEMOGLOBIN g/dL 12.3* 11.8* 11.8* 11.9* 11.8* 11.9*   HEMATOCRIT % 37.5 36.0* 35.2* 35.8* 35.5* 35.5*   MCV fL 91.2 91.8 90.5 89.5 89.0 89.2   WBC 10*3/mm3 6.53 7.05 6.22 5.50 6.16 9.24   RDW % 13.2 12.5 12.7 13.4 13.3 13.3   MPV fL 10.7 10.3 11.1 10.8 10.7 10.5   PLATELETS 10*3/mm3 111* 130* 89* 85* 111* 115*   IMM GRAN % %  --  0.4  --  0.2 0.2 0.4   NEUTROS ABS 10*3/mm3 4.47 4.44 4.41 3.62 3.90 6.55   LYMPHS ABS 10*3/mm3 1.10 1.04 0.82 0.99 0.97 1.05   MONOS ABS 10*3/mm3 0.52 0.78 0.56 0.48 0.65 0.99*   EOS ABS 10*3/mm3 0.37 0.71* 0.39 0.35 0.58* 0.56*   BASOS ABS 10*3/mm3 0.05 0.05 0.03 0.05 0.05 0.05   IMMATURE GRANS (ABS) 10*3/mm3  --  0.03  --  0.01 0.01 0.04   NRBC /100 WBC  --  0.0  --  0.0 0.0 0.0       Lab Results - Last 18 Months   Lab Units 12/19/22  1215 11/09/22  0948 08/09/22  0931 06/07/22  1000 05/03/22  1153 01/06/22  0434 01/05/22  0657 01/04/22  2124 11/22/21  1235   GLUCOSE mg/dL 91 93 96 103* 90  --   --   --   --    SODIUM mmol/L 140 135* 138 138 137 141 135 137 140   POTASSIUM mmol/L 4.3 4.1 4.4 3.7 4.3 4.3 3.3* 3.5 4.0   TOTAL CO2 mmol/L  --   --   --   --   --  22 19* 20* 21   CO2 mmol/L 27.0 21.0* 22.0 22.0 21.0*  --   --   --   --    CHLORIDE mmol/L 105 105 107 106 106 109* 106 106 108*   ANION GAP mmol/L 8.0 9.0 9.0 10.0 10.0 10 10 11 11   CREATININE mg/dL 2.82* 2.97* 2.82* 2.78* 2.81* 2.72* 2.65* 2.83* 2.62*   BUN mg/dL 30* 30* 36* 25* 36* 28* 30* 31* 30*   BUN / CREAT RATIO  10.6 10.1 12.8 9.0 12.8 10.3 11.3 11.0 11.5   CALCIUM mg/dL 9.2 9.1 9.1 9.1 9.2 8.2* 7.8* 8.2* 8.5   EGFR IF NONAFRICN AM mL/min/1.73m2  --   --   --   --   --  22.6* 23.2* 21.5* 23.5*   ALK PHOS U/L 70 62 54 65 64 45* 39* 51 52   TOTAL PROTEIN g/dL 6.9 6.5 6.6 6.4 6.6 6.0* 6.1* 6.8 6.8   ALT (SGPT) U/L 9 8 10 11 9 18 17 21 10*   AST (SGOT) U/L 12 9 12 10 9 18 18 18 13   BILIRUBIN mg/dL 0.3 0.4 0.3 0.3  0.5 0.3 0.3 0.4 0.4   ALBUMIN g/dL 4.10 4.00 4.10 4.00 4.20  3.5 2.8* 3.0* 3.5 3.1*   GLOBULIN gm/dL 2.8 2.5 2.5 2.4 2.4  --   --   --   --        Lab Results - Last 18 Months   Lab Units 05/10/22  0950 05/03/22  1153   M-SPIKE g/dL  --  Not Observed   FREE KAPPA LT CHAINS, 24HR mg/24 hr 205.77  --        Lab Results - Last 18 Months   Lab Units 12/19/22  1215 11/09/22  0948 08/09/22  0931 06/07/22  1000 05/03/22  1153   IRON mcg/dL 42* 53* 95 85 34*   TIBC mcg/dL 282* 332 361 334 373   IRON SATURATION % 15* 16* 26 25 9*   FERRITIN ng/mL 98.15 59.81 50.36 56.51 56.25   FOLATE ng/mL  --   --   --   --  8.21         PAST MEDICAL HISTORY:  ALLERGIES:  Allergies   Allergen Reactions   • Hydralazine Nausea Only   • Losartan Potassium Nausea Only   • Penicillins Hives   • Spironolactone Swelling     Made  Breast sore and swell     CURRENT MEDICATIONS:  Outpatient Encounter Medications as of 2/7/2023   Medication Sig Dispense Refill   • albuterol sulfate  (90 Base) MCG/ACT inhaler      • amLODIPine (NORVASC) 10 MG tablet Take 10 mg by mouth Daily.     • aspirin 81 MG EC tablet Take 1 tablet by mouth Daily. 100 tablet 99   • Cholecalciferol (VITAMIN D) 2000 units capsule Take 2,000 Units by mouth Daily.     • cloNIDine (CATAPRES) 0.1 MG tablet Take 0.1 mg by mouth As Needed for High Blood Pressure.     • ferrous sulfate 325 (65 FE) MG tablet Take 1 tablet by mouth 2 (Two) Times a Day. 180 tablet 1   • ferrous sulfate 325 (65 FE) MG tablet Take 2 tablets by mouth Daily.     • hydrALAZINE (APRESOLINE) 100 MG tablet Take 100 mg by mouth 3 (Three) Times a Day.     • levocetirizine (XYZAL) 5 MG tablet      • lisinopril (PRINIVIL,ZESTRIL) 20 MG tablet Take 20 mg by mouth Daily.     • magnesium oxide (MAGOX) 400 (241.3 Mg) MG tablet tablet Take 400 mg by mouth 1 (One) Time.     • metoprolol tartrate (LOPRESSOR) 50 MG tablet TAKE 1 TABLET BY MOUTH 2 (TWO) TIMES A DAY 60 tablet 11   • nitroglycerin (NITROSTAT) 0.4 MG SL  tablet 1 under the tongue as needed for angina, may repeat q5mins for up three doses 25 tablet 2   • pravastatin (PRAVACHOL) 40 MG tablet Take 1 tablet by mouth Daily. 90 tablet 3   • sodium bicarbonate 325 MG tablet Take 325 mg by mouth 4 (Four) Times a Day.     • tiotropium bromide-olodaterol (Stiolto Respimat) 2.5-2.5 MCG/ACT aerosol solution inhaler Inhale 2 puffs Daily. 1 each 0   • vitamin B-12 (CYANOCOBALAMIN) 100 MCG tablet Take 50 mcg by mouth Daily.       No facility-administered encounter medications on file as of 2/7/2023.     ADULT ILLNESSES:  Patient Active Problem List   Diagnosis Code   • Essential hypertension I10   • Stage 4 chronic kidney disease (HCC) N18.4   • Coronary artery disease I25.10   • History of non-ST elevation myocardial infarction (NSTEMI) I25.2   • Chronic pericarditis I31.9   • History of CVA (cerebrovascular accident) Z86.73   • Hyperlipidemia E78.5   • Physical debility R53.81   • S/P CABG (coronary artery bypass graft) Z95.1   • Palpitations R00.2   • Gait difficulty R26.9     SURGERIES:  Past Surgical History:   Procedure Laterality Date   • CARDIAC CATHETERIZATION     • CARDIAC CATHETERIZATION N/A 5/28/2020    Procedure: Left Heart Cath;  Surgeon: Rufino Brice MD;  Location: [facility] CATH INVASIVE LOCATION;  Service: Cardiology;  Laterality: N/A;   • CORONARY ARTERY BYPASS GRAFT N/A 6/9/2020    Procedure: CABG X 3, LIMA, SHELBY, EVH WITH OPEN EXTENSION LOWER RIGHT LEG;  Surgeon: Nikunj Carballo MD;  Location: [facility] OR;  Service: Cardiothoracic;  Laterality: N/A;     HEALTH MAINTENANCE ITEMS:  Health Maintenance Due   Topic Date Due   • ZOSTER VACCINE (1 of 2) Never done   • ANNUAL WELLNESS VISIT  Never done   • Pneumococcal Vaccine 65+ (2 - PPSV23 if available, else PCV20) 11/05/2020   • COVID-19 Vaccine (3 - Booster for Moderna series) 03/27/2021   • TDAP/TD VACCINES (2 - Tdap) 04/20/2021   • LIPID PANEL  05/28/2021       <no information>  Last Completed  "Colonoscopy     This patient has no relevant Health Maintenance data.        Immunization History   Administered Date(s) Administered   • COVID-19 (MODERNA) 1st, 2nd, 3rd Dose Only 2021, 2021   • FLUAD TRI 65YR+ 2019, 2020   • Flu Vaccine Intradermal Quad 18-64YR 10/18/2021   • Fluzone High Dose =>65 Years ([employer] ONLY) 2015, 10/14/2016, 2017   • Pneumococcal Conjugate 13-Valent (PCV13) 2019   • TD Preservative Free 2011     Last Completed Mammogram     This patient has no relevant Health Maintenance data.            FAMILY HISTORY:  Family History   Problem Relation Age of Onset   • Cancer Mother    • Cancer Father      SOCIAL HISTORY:  Social History     Socioeconomic History   • Marital status:    Tobacco Use   • Smoking status: Former     Packs/day: 1.50     Years: 25.00     Pack years: 37.50     Types: Cigarettes     Start date:      Quit date:      Years since quittin.1   • Smokeless tobacco: Never   Vaping Use   • Vaping Use: Never used   Substance and Sexual Activity   • Alcohol use: No   • Drug use: No   • Sexual activity: Defer       REVIEW OF SYSTEMS:  Review of Systems   Constitutional: Positive for activity change and fatigue.   HENT: Negative for swollen glands and trouble swallowing.    Respiratory: Positive for shortness of breath (with exertion).    Gastrointestinal: Negative for nausea and vomiting.   Neurological: Positive for weakness.   Hematological: Does not bruise/bleed easily.   Psychiatric/Behavioral: Negative for suicidal ideas and stress.       /78   Pulse 67   Temp 97.9 °F (36.6 °C)   Resp 16   Ht 182.9 cm (72\")   Wt 96.9 kg (213 lb 9.6 oz)   SpO2 97%   BMI 28.97 kg/m²  Body surface area is 2.19 meters squared.    Pain Score    23 0932   PainSc: 0-No pain       Physical Exam  Constitutional:       Appearance: Normal appearance.   HENT:      Head: Normocephalic and atraumatic.   Eyes:      Extraocular " Movements: Extraocular movements intact.   Cardiovascular:      Rate and Rhythm: Normal rate and regular rhythm.   Pulmonary:      Effort: Pulmonary effort is normal.      Comments: Diminished.  Abdominal:      General: Bowel sounds are normal.      Palpations: Abdomen is soft.   Musculoskeletal:         General: Normal range of motion.      Right lower leg: Edema present.      Left lower leg: Edema present.   Skin:     General: Skin is warm and dry.   Neurological:      General: No focal deficit present.      Mental Status: He is alert and oriented to person, place, and time.   Psychiatric:         Mood and Affect: Mood normal.         Behavior: Behavior normal.         Faisal Joseph reports a pain score of 0.  No intervention indicated.          ASSESSMENT / PLAN    No diagnosis found.     ASSESSMENT:      1.  Iron Deficiency Anemia    Anemia profile with iron 75, ferritin 96.15, sat 26%, TIBC 291.   -Hemoglobin 12.3, Hematocrit 37.5   -Continue oral iron BID    2.  Chronic Kidney Disease    -Stage IV   -BUN 35, Creatinine 2.79, GFR 21.9   -Patient is seen by [facility]     3.  History of CVA / History or NSTEMI   -Patient is seen by Dr. Brice for Cardiology   -History of hypertension taking hydralazine, Coreg, Metoprolol, Clonidine, Amlodipine   -Blood pressure today at 142/78    4. COPD   -Continue bronchodilator and LABA per Pulmonology    -Increased SOB  -ADVISED TO CONTACT THEM    5.   Thrombycytopenia   -Plt count  111   -Stable, No s/s of bleeding    PLAN:  -Contiue oral iron to BID  Pt will have labs in 6 weeks for CBC, CMP, Iron profile and Ferritin  RTC in 12 weeks for continued follow up.  - Blood for CBC, CMP, Ferritin, TIBC, % Saturation, and Iron   Continue ongoing management per primary care physician and other specialists   Plan of care discussed with patient.  Understanding expressed.    Patient agreeable to proceed.    Samantha Winkler, APRN  02/07/2023

## 2023-02-24 DIAGNOSIS — E78.2 MIXED HYPERLIPIDEMIA: Chronic | ICD-10-CM

## 2023-02-26 RX ORDER — PRAVASTATIN SODIUM 40 MG
40 TABLET ORAL DAILY
Qty: 90 TABLET | Refills: 3 | Status: SHIPPED | OUTPATIENT
Start: 2023-02-26

## 2023-03-16 ENCOUNTER — TELEPHONE (OUTPATIENT)
Dept: ONCOLOGY | Facility: CLINIC | Age: 83
End: 2023-03-16
Payer: MEDICARE

## 2023-03-16 ENCOUNTER — LAB (OUTPATIENT)
Dept: LAB | Facility: HOSPITAL | Age: 83
End: 2023-03-16
Payer: MEDICARE

## 2023-03-16 DIAGNOSIS — D50.9 IRON DEFICIENCY ANEMIA, UNSPECIFIED IRON DEFICIENCY ANEMIA TYPE: Primary | ICD-10-CM

## 2023-03-16 DIAGNOSIS — D50.9 IRON DEFICIENCY ANEMIA, UNSPECIFIED IRON DEFICIENCY ANEMIA TYPE: ICD-10-CM

## 2023-03-16 LAB
ALBUMIN SERPL-MCNC: 3.8 G/DL (ref 3.5–5.2)
ALBUMIN/GLOB SERPL: 1.6 G/DL
ALP SERPL-CCNC: 63 U/L (ref 39–117)
ALT SERPL W P-5'-P-CCNC: 10 U/L (ref 1–41)
ANION GAP SERPL CALCULATED.3IONS-SCNC: 9 MMOL/L (ref 5–15)
AST SERPL-CCNC: 13 U/L (ref 1–40)
BASOPHILS # BLD AUTO: 0.05 10*3/MM3 (ref 0–0.2)
BASOPHILS NFR BLD AUTO: 0.7 % (ref 0–1.5)
BILIRUB SERPL-MCNC: 0.3 MG/DL (ref 0–1.2)
BUN SERPL-MCNC: 20 MG/DL (ref 8–23)
BUN/CREAT SERPL: 7.3 (ref 7–25)
CALCIUM SPEC-SCNC: 8.7 MG/DL (ref 8.6–10.5)
CHLORIDE SERPL-SCNC: 106 MMOL/L (ref 98–107)
CO2 SERPL-SCNC: 24 MMOL/L (ref 22–29)
CREAT SERPL-MCNC: 2.73 MG/DL (ref 0.76–1.27)
DEPRECATED RDW RBC AUTO: 45.1 FL (ref 37–54)
EGFRCR SERPLBLD CKD-EPI 2021: 22.5 ML/MIN/1.73
EOSINOPHIL # BLD AUTO: 0.35 10*3/MM3 (ref 0–0.4)
EOSINOPHIL NFR BLD AUTO: 4.8 % (ref 0.3–6.2)
ERYTHROCYTE [DISTWIDTH] IN BLOOD BY AUTOMATED COUNT: 13.3 % (ref 12.3–15.4)
FERRITIN SERPL-MCNC: 84.77 NG/ML (ref 30–400)
GLOBULIN UR ELPH-MCNC: 2.4 GM/DL
GLUCOSE SERPL-MCNC: 89 MG/DL (ref 65–99)
HCT VFR BLD AUTO: 36.6 % (ref 37.5–51)
HGB BLD-MCNC: 11.9 G/DL (ref 13–17.7)
IMM GRANULOCYTES # BLD AUTO: 0.03 10*3/MM3 (ref 0–0.05)
IMM GRANULOCYTES NFR BLD AUTO: 0.4 % (ref 0–0.5)
IRON 24H UR-MRATE: 53 MCG/DL (ref 59–158)
IRON SATN MFR SERPL: 20 % (ref 20–50)
LYMPHOCYTES # BLD AUTO: 1.19 10*3/MM3 (ref 0.7–3.1)
LYMPHOCYTES NFR BLD AUTO: 16.2 % (ref 19.6–45.3)
MCH RBC QN AUTO: 30.2 PG (ref 26.6–33)
MCHC RBC AUTO-ENTMCNC: 32.5 G/DL (ref 31.5–35.7)
MCV RBC AUTO: 92.9 FL (ref 79–97)
MONOCYTES # BLD AUTO: 0.68 10*3/MM3 (ref 0.1–0.9)
MONOCYTES NFR BLD AUTO: 9.3 % (ref 5–12)
NEUTROPHILS NFR BLD AUTO: 5.03 10*3/MM3 (ref 1.7–7)
NEUTROPHILS NFR BLD AUTO: 68.6 % (ref 42.7–76)
NRBC BLD AUTO-RTO: 0 /100 WBC (ref 0–0.2)
PLATELET # BLD AUTO: 136 10*3/MM3 (ref 140–450)
PMV BLD AUTO: 10.5 FL (ref 6–12)
POTASSIUM SERPL-SCNC: 4 MMOL/L (ref 3.5–5.2)
PROT SERPL-MCNC: 6.2 G/DL (ref 6–8.5)
RBC # BLD AUTO: 3.94 10*6/MM3 (ref 4.14–5.8)
SODIUM SERPL-SCNC: 139 MMOL/L (ref 136–145)
TIBC SERPL-MCNC: 265 MCG/DL (ref 298–536)
TRANSFERRIN SERPL-MCNC: 178 MG/DL (ref 200–360)
WBC NRBC COR # BLD: 7.33 10*3/MM3 (ref 3.4–10.8)

## 2023-03-16 PROCEDURE — 36415 COLL VENOUS BLD VENIPUNCTURE: CPT

## 2023-03-16 PROCEDURE — 83540 ASSAY OF IRON: CPT

## 2023-03-16 PROCEDURE — 80053 COMPREHEN METABOLIC PANEL: CPT

## 2023-03-16 PROCEDURE — 82728 ASSAY OF FERRITIN: CPT

## 2023-03-16 PROCEDURE — 84466 ASSAY OF TRANSFERRIN: CPT

## 2023-03-16 PROCEDURE — 85025 COMPLETE CBC W/AUTO DIFF WBC: CPT

## 2023-03-16 NOTE — TELEPHONE ENCOUNTER
Patient presents to the office today, he reports he is nancy for lab draw next week and he was actually in town today he lives in [place], [place] and questions if he could go ahead and have his labs drawn today to save him from coming back to [place] next week.     Patient was informed

## 2023-03-31 ENCOUNTER — APPOINTMENT (OUTPATIENT)
Dept: GENERAL RADIOLOGY | Facility: HOSPITAL | Age: 83
End: 2023-03-31
Payer: MEDICARE

## 2023-03-31 ENCOUNTER — HOSPITAL ENCOUNTER (EMERGENCY)
Facility: HOSPITAL | Age: 83
Discharge: HOME | End: 2023-03-31
Attending: EMERGENCY MEDICINE | Admitting: EMERGENCY MEDICINE
Payer: MEDICARE

## 2023-03-31 VITALS
TEMPERATURE: 98.8 F | SYSTOLIC BLOOD PRESSURE: 187 MMHG | HEART RATE: 57 BPM | RESPIRATION RATE: 20 BRPM | HEIGHT: 72 IN | BODY MASS INDEX: 28.99 KG/M2 | WEIGHT: 214 LBS | OXYGEN SATURATION: 98 % | DIASTOLIC BLOOD PRESSURE: 89 MMHG

## 2023-03-31 DIAGNOSIS — I49.3 UNIFOCAL PVCS: ICD-10-CM

## 2023-03-31 DIAGNOSIS — R00.1 SINUS BRADYCARDIA: ICD-10-CM

## 2023-03-31 DIAGNOSIS — R00.2 PALPITATIONS: Primary | ICD-10-CM

## 2023-03-31 DIAGNOSIS — R07.89 CHEST TIGHTNESS: ICD-10-CM

## 2023-03-31 DIAGNOSIS — N18.9 CHRONIC KIDNEY DISEASE, UNSPECIFIED CKD STAGE: ICD-10-CM

## 2023-03-31 LAB
ANION GAP SERPL CALCULATED.3IONS-SCNC: 11 MMOL/L (ref 5–15)
BASOPHILS # BLD AUTO: 0.06 10*3/MM3 (ref 0–0.2)
BASOPHILS NFR BLD AUTO: 0.9 % (ref 0–1.5)
BUN SERPL-MCNC: 23 MG/DL (ref 8–23)
BUN/CREAT SERPL: 8.5 (ref 7–25)
CALCIUM SPEC-SCNC: 9.1 MG/DL (ref 8.6–10.5)
CHLORIDE SERPL-SCNC: 109 MMOL/L (ref 98–107)
CO2 SERPL-SCNC: 22 MMOL/L (ref 22–29)
CREAT SERPL-MCNC: 2.7 MG/DL (ref 0.76–1.27)
DEPRECATED RDW RBC AUTO: 45.2 FL (ref 37–54)
EGFRCR SERPLBLD CKD-EPI 2021: 22.8 ML/MIN/1.73
EOSINOPHIL # BLD AUTO: 0.39 10*3/MM3 (ref 0–0.4)
EOSINOPHIL NFR BLD AUTO: 5.7 % (ref 0.3–6.2)
ERYTHROCYTE [DISTWIDTH] IN BLOOD BY AUTOMATED COUNT: 13.3 % (ref 12.3–15.4)
GLUCOSE SERPL-MCNC: 87 MG/DL (ref 65–99)
HCT VFR BLD AUTO: 38.7 % (ref 37.5–51)
HGB BLD-MCNC: 12.6 G/DL (ref 13–17.7)
LYMPHOCYTES # BLD AUTO: 1.19 10*3/MM3 (ref 0.7–3.1)
LYMPHOCYTES NFR BLD AUTO: 17.3 % (ref 19.6–45.3)
MAGNESIUM SERPL-MCNC: 2.3 MG/DL (ref 1.6–2.4)
MCH RBC QN AUTO: 30.3 PG (ref 26.6–33)
MCHC RBC AUTO-ENTMCNC: 32.6 G/DL (ref 31.5–35.7)
MCV RBC AUTO: 93 FL (ref 79–97)
MONOCYTES # BLD AUTO: 0.65 10*3/MM3 (ref 0.1–0.9)
MONOCYTES NFR BLD AUTO: 9.4 % (ref 5–12)
NEUTROPHILS NFR BLD AUTO: 4.56 10*3/MM3 (ref 1.7–7)
NEUTROPHILS NFR BLD AUTO: 66.3 % (ref 42.7–76)
PLATELET # BLD AUTO: 135 10*3/MM3 (ref 140–450)
PMV BLD AUTO: 10.3 FL (ref 6–12)
POTASSIUM SERPL-SCNC: 3.9 MMOL/L (ref 3.5–5.2)
RBC # BLD AUTO: 4.16 10*6/MM3 (ref 4.14–5.8)
SODIUM SERPL-SCNC: 142 MMOL/L (ref 136–145)
TROPONIN T SERPL HS-MCNC: 27 NG/L
TROPONIN T SERPL HS-MCNC: 30 NG/L
TSH SERPL DL<=0.05 MIU/L-ACNC: 3.31 UIU/ML (ref 0.27–4.2)
WBC NRBC COR # BLD: 6.88 10*3/MM3 (ref 3.4–10.8)

## 2023-03-31 PROCEDURE — 71045 X-RAY EXAM CHEST 1 VIEW: CPT

## 2023-03-31 PROCEDURE — 36415 COLL VENOUS BLD VENIPUNCTURE: CPT

## 2023-03-31 PROCEDURE — 93010 ELECTROCARDIOGRAM REPORT: CPT | Performed by: STUDENT IN AN ORGANIZED HEALTH CARE EDUCATION/TRAINING PROGRAM

## 2023-03-31 PROCEDURE — 84484 ASSAY OF TROPONIN QUANT: CPT | Performed by: EMERGENCY MEDICINE

## 2023-03-31 PROCEDURE — 84443 ASSAY THYROID STIM HORMONE: CPT | Performed by: EMERGENCY MEDICINE

## 2023-03-31 PROCEDURE — 93005 ELECTROCARDIOGRAM TRACING: CPT | Performed by: EMERGENCY MEDICINE

## 2023-03-31 PROCEDURE — 85025 COMPLETE CBC W/AUTO DIFF WBC: CPT | Performed by: EMERGENCY MEDICINE

## 2023-03-31 PROCEDURE — 83735 ASSAY OF MAGNESIUM: CPT | Performed by: EMERGENCY MEDICINE

## 2023-03-31 PROCEDURE — 80048 BASIC METABOLIC PNL TOTAL CA: CPT | Performed by: EMERGENCY MEDICINE

## 2023-03-31 PROCEDURE — 99284 EMERGENCY DEPT VISIT MOD MDM: CPT

## 2023-03-31 NOTE — ED PROVIDER NOTES
Subjective   History of Present Illness  Patient with generalized weakness and palpitation and some episodes of chest tightness would get better when she places his hand on his chest.  But complaining of weakness generalized and palpitations.  Going on for the past 1 week    Palpitations  Palpitations quality:  Irregular  Onset quality:  Gradual  Timing:  Intermittent  Progression:  Waxing and waning  Chronicity:  New  Context: not anxiety, not appetite suppressants, not blood loss, not caffeine, not dehydration, not exercise, not illicit drugs, not nicotine and not stimulant use    Relieved by:  Nothing  Worsened by:  Nothing  Ineffective treatments:  None tried  Associated symptoms: chest pain and weakness    Associated symptoms: no back pain, no chest pressure, no cough, no diaphoresis, no dizziness, no leg pain, no lower extremity edema, no nausea, no near-syncope, no numbness, no orthopnea, no PND, no shortness of breath, no syncope and no vomiting    Risk factors: heart disease    Risk factors: no diabetes mellitus, no hx of PE, no hx of thyroid disease and no hyperthyroidism        Review of Systems   Constitutional: Negative for chills, diaphoresis, fatigue and fever.   HENT: Negative.  Negative for congestion.    Respiratory: Negative.  Negative for cough, chest tightness, shortness of breath and stridor.    Cardiovascular: Positive for chest pain and palpitations. Negative for orthopnea, syncope, PND and near-syncope.   Gastrointestinal: Negative.  Negative for abdominal distention, abdominal pain, nausea and vomiting.   Endocrine: Negative.    Genitourinary: Negative.  Negative for difficulty urinating and flank pain.   Musculoskeletal: Negative.  Negative for back pain.   Skin: Negative.  Negative for color change.   Neurological: Positive for weakness. Negative for dizziness, numbness and headaches.   All other systems reviewed and are negative.      Past Medical History:   Diagnosis Date   • Arthritis     • Cataract    • Chronic fatigue 10/3/2017   • Chronic renal disease, stage IV ([facility])    • Coronary artery disease    • CVA (cerebral vascular accident) ([facility]) 2018   • Hyperlipidemia    • Hypertension    • Palpitations 10/3/2017   • Premature atrial contractions 10/17/2017   • PVCs (premature ventricular contractions) 10/17/2017   • Sleep apnea    • Stage 4 chronic kidney disease ([facility]) 2018   • Stroke ([facility])    • Weakness     right leg weaker       Allergies   Allergen Reactions   • Hydralazine Nausea Only   • Losartan Potassium Nausea Only   • Penicillins Hives   • Spironolactone Swelling     Made  Breast sore and swell       Past Surgical History:   Procedure Laterality Date   • CARDIAC CATHETERIZATION     • CARDIAC CATHETERIZATION N/A 2020    Procedure: Left Heart Cath;  Surgeon: Rufino Brice MD;  Location:  [facility] CATH INVASIVE LOCATION;  Service: Cardiology;  Laterality: N/A;   • CORONARY ARTERY BYPASS GRAFT N/A 2020    Procedure: CABG X 3, LIMA, SHELBY, EVH WITH OPEN EXTENSION LOWER RIGHT LEG;  Surgeon: Nikunj Carballo MD;  Location:  [facility] OR;  Service: Cardiothoracic;  Laterality: N/A;       Family History   Problem Relation Age of Onset   • Cancer Mother    • Cancer Father        Social History     Socioeconomic History   • Marital status:    Tobacco Use   • Smoking status: Former     Packs/day: 1.50     Years: 25.00     Pack years: 37.50     Types: Cigarettes     Start date:      Quit date:      Years since quittin.2   • Smokeless tobacco: Never   Vaping Use   • Vaping Use: Never used   Substance and Sexual Activity   • Alcohol use: No   • Drug use: No   • Sexual activity: Defer           Objective   Physical Exam  Vitals and nursing note reviewed. Exam conducted with a chaperone present.   Constitutional:       General: He is not in acute distress.     Appearance: Normal appearance. He is well-developed. He is not toxic-appearing.   HENT:      Head:  Normocephalic and atraumatic.      Nose: Nose normal.      Mouth/Throat:      Mouth: Mucous membranes are moist.      Pharynx: Uvula midline.   Eyes:      General: Lids are normal. Lids are everted, no foreign bodies appreciated.      Conjunctiva/sclera: Conjunctivae normal.      Pupils: Pupils are equal, round, and reactive to light.   Neck:      Vascular: Normal carotid pulses. No carotid bruit or JVD.      Trachea: Trachea and phonation normal. No tracheal deviation.   Cardiovascular:      Rate and Rhythm: Regular rhythm. Bradycardia present.      Chest Wall: PMI is not displaced.      Pulses: Normal pulses.      Heart sounds: Normal heart sounds.     No gallop.   Pulmonary:      Effort: Pulmonary effort is normal. No tachypnea, accessory muscle usage or respiratory distress.      Breath sounds: Normal breath sounds. No stridor. No decreased breath sounds, wheezing, rhonchi or rales.   Abdominal:      General: Bowel sounds are normal. There is no distension.      Palpations: Abdomen is soft.      Tenderness: There is no abdominal tenderness.   Musculoskeletal:         General: No swelling. Normal range of motion.      Cervical back: Full passive range of motion without pain, normal range of motion and neck supple. No rigidity.      Comments: Lower extremity exam bilaterally is unremarkable.  There is no right or left calf tenderness .  There is no palpable venous cord.  No obvious difference in the size of the legs.  No pitting edema.  The dorsalis pedis and posterior tibial femoral and popliteal pulses are palpable and +2 bilaterally.  Homans sign is negative   Skin:     General: Skin is warm and dry.      Capillary Refill: Capillary refill takes less than 2 seconds.      Coloration: Skin is not jaundiced or pale.      Nails: There is no clubbing.   Neurological:      General: No focal deficit present.      Mental Status: He is alert and oriented to person, place, and time.      GCS: GCS eye subscore is 4. GCS  verbal subscore is 5. GCS motor subscore is 6.      Cranial Nerves: No cranial nerve deficit.      Motor: Motor function is intact.      Gait: Gait normal.      Deep Tendon Reflexes: Reflexes are normal and symmetric. Reflexes normal.   Psychiatric:         Speech: Speech normal.         Behavior: Behavior normal.         Procedures           ED Course  ED Course as of 03/31/23 1350   Fri Mar 31, 2023   1345 Delta troponin is negative baseline renal insufficiency.  Hemoglobin is baseline magnesium is normal.  I have discussed this case at length with the patient I have discussed this case with Dr. Justice with a negative delta Trope and EKG showing some PVCs we may place him on a Holter monitor and discharge him home I am not sure what the etiology of his weakness is.  The chest pain does not appear to be cardiac in nature but the possibly of that cannot be excluded and if he gets worse he needs to follow-up with the primary MD otherwise return the ER for any worsening symptoms. [TS]      ED Course User Index  [TS] Erik Holden MD                                           Medical Decision Making  Differential Diagnosis:  I considered chest wall pain, muscle strain, costochondritis, pleurisy, rib fracture, herpes zoster, cardiovascular etiology, myocardial infarction, intermediate coronary syndrome, unstable angina, angina, aortic dissection, pericarditis, pulmonary etiology, pulmonary embolism, pneumonia, pneumothorax, lung cancer, gastroesophageal reflux disease, esophagitis, esophageal spasm and gastrointestinal etiology as a possible cause of chest pain in this patient. This is a partial list of diagnoses considered.  Differential Diagnosis:  I considered chest wall pain, muscle strain, costochondritis, pleurisy, rib fracture, herpes zoster, cardiovascular etiology, myocardial infarction, intermediate coronary syndrome, unstable angina, angina, aortic dissection, pericarditis, pulmonary etiology, pulmonary  embolism, pneumonia, pneumothorax, lung cancer, gastroesophageal reflux disease, esophagitis, esophageal spasm and gastrointestinal etiology as a possible cause of chest pain in this patient. This is a partial list of diagnoses considered.      Chest tightness:     Details: Patient has some chest tightness but gets better on palpating over the chest wall itself.  Chronic kidney disease, unspecified CKD stage: chronic illness or injury     Details: Chronic kidney disease  Palpitations: acute illness or injury     Details: Episode of palpitation unifocal PVC underlying EKG shows bradycardia with a first-degree AV block which has been seen in the past also  Sinus bradycardia: chronic illness or injury  Unifocal PVCs: acute illness or injury  Amount and/or Complexity of Data Reviewed  Labs: ordered.     Details: Labs reviewed  Radiology: ordered.  ECG/medicine tests: ordered and independent interpretation performed.     Details: bradycardia pvc   Discussion of management or test interpretation with external provider(s): Case were discussed with  who reviewed EKGs recommendation was the patient can be discharged home with a Holter monitor I think that would be appropriate I discussed this with the patient.    Risk  Risk Details: This patient presents with chest pain , patient arrived hemodynamically stable was placed on the monitor and IV access obtained EKG was obtained and did not reveal any malignant/unstable dysrhythmias any acute ST elevation, no evidence of Brugada, or significantly prolonged QT .  Presentation not consistent with other acute, emergent cause of chest pain at this time.  Low suspicion for acute PE is low risk per Wells and Years criteria and no evidence of DVT such as calf swelling, tenderness, palpable tortuous lower extremity vein, or Homans' sign.  Low suspicion for pneumothorax as the patient is saturating well and has no radiographic evidence of a pneumothorax.  Low suspicion for  dissection there is no widened mediastinum, hypotension, pulses deficit, and no tearing back/abdominal pain.  Low suspicion for tamponade as there is no JVD, muffled heart sounds, electrical alternans on EKG, and no hypotension.  Low suspicion for pericarditis as there is no diffuse ST elevation or NE depression and the patient is afebrile.  No evidence of GI bleed per patient's history.  Low suspicion for CHF exacerbation as there is no evidence of volume overload and no evidence of severely elevated BNP.  Low suspicion for pneumonia since the patient has no fever no productive cough no chest x-ray findings of pneumonia and no leukocytosis.         Final diagnoses:   Palpitations   Unifocal PVCs   Chest tightness   Chronic kidney disease, unspecified CKD stage   Sinus bradycardia       ED Disposition  ED Disposition     ED Disposition   Discharge    Condition   Stable    Comment   --             Spike Polanco DO  [address] [facility] [id number] [phone]               Medication List      No changes were made to your prescriptions during this visit.          Erik Holden MD  03/31/23 0209

## 2023-03-31 NOTE — ED NOTES
The following fall interventions were initiated:    [x] Patient and/or family given education   [x] Call light within reach and educated on how to use   [x] Bed rails up per protocol    [x] Bed locked and in the lowest position   [] Bed alarm set and on loudest setting   [x] Fall wrist band applied   [] Non skid footwear applied   [x] Room free of clutter   [x] Patient items within reach   [x] Adequate lighting provided  [x] Falls sign present    [] Patient moved closer to nursing station   [] Restraints applied

## 2023-04-03 ENCOUNTER — HOSPITAL ENCOUNTER (OUTPATIENT)
Dept: CARDIOLOGY | Facility: HOSPITAL | Age: 83
Discharge: HOME | End: 2023-04-03
Admitting: EMERGENCY MEDICINE
Payer: MEDICARE

## 2023-04-03 DIAGNOSIS — R00.2 PALPITATIONS: ICD-10-CM

## 2023-04-03 PROCEDURE — 93242 EXT ECG>48HR<7D RECORDING: CPT

## 2023-04-05 LAB
QT INTERVAL: 460 MS
QTC INTERVAL: 451 MS

## 2023-04-21 LAB
MAXIMAL PREDICTED HEART RATE: 138 BPM
STRESS TARGET HR: 117 BPM

## 2023-04-22 ENCOUNTER — TELEPHONE (OUTPATIENT)
Dept: EMERGENCY DEPT | Facility: HOSPITAL | Age: 83
End: 2023-04-22
Payer: MEDICARE

## 2023-04-26 ENCOUNTER — OFFICE VISIT (OUTPATIENT)
Dept: PULMONOLOGY | Facility: CLINIC | Age: 83
End: 2023-04-26
Payer: MEDICARE

## 2023-04-26 VITALS
HEIGHT: 72 IN | WEIGHT: 215 LBS | SYSTOLIC BLOOD PRESSURE: 142 MMHG | BODY MASS INDEX: 29.12 KG/M2 | DIASTOLIC BLOOD PRESSURE: 80 MMHG | OXYGEN SATURATION: 97 % | HEART RATE: 56 BPM

## 2023-04-26 DIAGNOSIS — J44.9 STAGE 1 MILD COPD BY GOLD CLASSIFICATION: Primary | ICD-10-CM

## 2023-04-26 DIAGNOSIS — N18.4 STAGE 4 CHRONIC KIDNEY DISEASE: Chronic | ICD-10-CM

## 2023-04-26 PROCEDURE — 99214 OFFICE O/P EST MOD 30 MIN: CPT | Performed by: INTERNAL MEDICINE

## 2023-04-26 PROCEDURE — 3079F DIAST BP 80-89 MM HG: CPT | Performed by: INTERNAL MEDICINE

## 2023-04-26 PROCEDURE — 3077F SYST BP >= 140 MM HG: CPT | Performed by: INTERNAL MEDICINE

## 2023-04-26 NOTE — PROGRESS NOTES
"Background:  pt w copd 2, ckd   Chief Complaint  COPD    Subjective    History of Present Illness     Faisal Joseph is here for follow up with [facility].  History of Present Illness  He is completing doxycycline for a terrible cold that started a couple of weeks ago.  Inhalers don't help very much other than albuterol.  He feels if he can keep the congestion clear he can do very well.  Spring damp weather bothers him.  He takes mucinex.     Tobacco Use: Medium Risk   • Smoking Tobacco Use: Former   • Smokeless Tobacco Use: Never   • Passive Exposure: Not on file      Current Outpatient Medications   Medication Instructions   • albuterol sulfate  (90 Base) MCG/ACT inhaler No dose, route, or frequency recorded.   • amLODIPine (NORVASC) 10 mg, Oral, Daily   • aspirin 81 mg, Oral, Daily   • cloNIDine (CATAPRES) 0.1 mg, Oral, As Needed   • ferrous sulfate 325 (65 FE) MG tablet 2 tablets, Oral, Daily   • ferrous sulfate 325 mg, Oral, 2 Times Daily   • hydrALAZINE (APRESOLINE) 100 mg, Oral, 3 Times Daily   • levocetirizine (XYZAL) 5 MG tablet No dose, route, or frequency recorded.   • lisinopril (PRINIVIL,ZESTRIL) 20 mg, Oral, Daily   • magnesium oxide (MAGOX) 400 mg, Oral, Once   • metoprolol tartrate (LOPRESSOR) 50 MG tablet TAKE 1 TABLET BY MOUTH 2 (TWO) TIMES A DAY   • nitroglycerin (NITROSTAT) 0.4 MG SL tablet 1 under the tongue as needed for angina, may repeat q5mins for up three doses   • pravastatin (PRAVACHOL) 40 mg, Oral, Daily   • sodium bicarbonate 325 mg, Oral, 4 Times Daily   • tiotropium bromide-olodaterol (Stiolto Respimat) 2.5-2.5 MCG/ACT aerosol solution inhaler 2 puffs, Inhalation, Daily - RT   • vitamin B-12 (CYANOCOBALAMIN) 50 mcg, Oral, Daily   • Vitamin D 2,000 Units, Oral, Daily      Objective     Vital Signs:   /80   Pulse 56   Ht 182.9 cm (72\")   Wt 97.5 kg (215 lb)   SpO2 97% Comment: RA  BMI 29.16 kg/m²   Physical " Exam  Constitutional:       Appearance: Normal appearance. He is not ill-appearing or diaphoretic.   Eyes:      Extraocular Movements: Extraocular movements intact.   Pulmonary:      Effort: Pulmonary effort is normal. No respiratory distress.      Breath sounds: No wheezing, rhonchi or rales.   Skin:     Findings: No erythema or rash.   Neurological:      Mental Status: He is alert.        Result Review  Data Reviewed:         PFT Values        8/17/2022    10:30   Pre Drug PFT Results   FVC 78   FEV1 65   FEF 25-75% 38   FEV1/FVC 61   Other Tests PFT Results   TLC 89      DLCO 42   D/VAsb 49                Assessment and Plan    Diagnoses and all orders for this visit:    1. Stage 1 mild COPD by GOLD classification (Primary)    2. Stage 4 chronic kidney disease    complete the doxycycline course for recent exacerbation  Change stiolto to breztri for the next week; pt to call and report how it does.  Adding ics for antiinfalmmatory effect and prevention of exacerbation, and or change of delivery device to better distribute drug might improve overall status.  Query of insurance formulary indicates trelegy is preferred, so if breztri is helpful, we might have to try trelegy first on rx.  Rinse mouth after use.  We discussed breztri delivery is essentially the same as it is with the albuterol inhaler he has.    Follow Up   Return in about 3 months (around 7/26/2023) for spirometry.  Patient was given instructions and counseling regarding his condition or for health maintenance advice. Please see specific information pulled into the AVS if appropriate.    Electronically signed by Wyatt Greene MD, 4/26/2023, 10:37 CDT

## 2023-05-02 DIAGNOSIS — D50.9 IRON DEFICIENCY ANEMIA, UNSPECIFIED IRON DEFICIENCY ANEMIA TYPE: Primary | ICD-10-CM

## 2023-06-02 ENCOUNTER — LAB (OUTPATIENT)
Dept: LAB | Facility: HOSPITAL | Age: 83
End: 2023-06-02
Payer: MEDICARE

## 2023-06-02 ENCOUNTER — TRANSCRIBE ORDERS (OUTPATIENT)
Dept: LAB | Facility: HOSPITAL | Age: 83
End: 2023-06-02
Payer: MEDICARE

## 2023-06-02 DIAGNOSIS — N18.4 CHRONIC KIDNEY DISEASE, STAGE IV (SEVERE): ICD-10-CM

## 2023-06-02 DIAGNOSIS — D50.9 IRON DEFICIENCY ANEMIA, UNSPECIFIED IRON DEFICIENCY ANEMIA TYPE: Primary | ICD-10-CM

## 2023-06-02 DIAGNOSIS — N18.4 CHRONIC KIDNEY DISEASE, STAGE IV (SEVERE): Primary | ICD-10-CM

## 2023-06-02 LAB
ALBUMIN SERPL-MCNC: 3.8 G/DL (ref 3.5–5.2)
ALBUMIN/GLOB SERPL: 1.5 G/DL
ALP SERPL-CCNC: 54 U/L (ref 39–117)
ALT SERPL W P-5'-P-CCNC: 7 U/L (ref 1–41)
ANION GAP SERPL CALCULATED.3IONS-SCNC: 8 MMOL/L (ref 5–15)
AST SERPL-CCNC: 14 U/L (ref 1–40)
BACTERIA UR QL AUTO: ABNORMAL /HPF
BASOPHILS # BLD AUTO: 0.06 10*3/MM3 (ref 0–0.2)
BASOPHILS NFR BLD AUTO: 0.9 % (ref 0–1.5)
BILIRUB SERPL-MCNC: 0.4 MG/DL (ref 0–1.2)
BILIRUB UR QL STRIP: NEGATIVE
BUN SERPL-MCNC: 31 MG/DL (ref 8–23)
BUN/CREAT SERPL: 10.7 (ref 7–25)
CALCIUM SPEC-SCNC: 8.7 MG/DL (ref 8.6–10.5)
CHLORIDE SERPL-SCNC: 109 MMOL/L (ref 98–107)
CLARITY UR: CLEAR
CO2 SERPL-SCNC: 21 MMOL/L (ref 22–29)
COLOR UR: YELLOW
CREAT SERPL-MCNC: 2.89 MG/DL (ref 0.76–1.27)
CREAT UR-MCNC: 150.4 MG/DL
DEPRECATED RDW RBC AUTO: 44.2 FL (ref 37–54)
EGFRCR SERPLBLD CKD-EPI 2021: 21 ML/MIN/1.73
EOSINOPHIL # BLD AUTO: 0.46 10*3/MM3 (ref 0–0.4)
EOSINOPHIL NFR BLD AUTO: 6.8 % (ref 0.3–6.2)
ERYTHROCYTE [DISTWIDTH] IN BLOOD BY AUTOMATED COUNT: 12.9 % (ref 12.3–15.4)
FERRITIN SERPL-MCNC: 104.4 NG/ML (ref 30–400)
GLOBULIN UR ELPH-MCNC: 2.5 GM/DL
GLUCOSE SERPL-MCNC: 101 MG/DL (ref 65–99)
GLUCOSE UR STRIP-MCNC: NEGATIVE MG/DL
HCT VFR BLD AUTO: 35.7 % (ref 37.5–51)
HGB BLD-MCNC: 11.5 G/DL (ref 13–17.7)
HGB UR QL STRIP.AUTO: NEGATIVE
HOLD SPECIMEN: NORMAL
HYALINE CASTS UR QL AUTO: ABNORMAL /LPF
IMM GRANULOCYTES # BLD AUTO: 0.03 10*3/MM3 (ref 0–0.05)
IMM GRANULOCYTES NFR BLD AUTO: 0.4 % (ref 0–0.5)
IRON 24H UR-MRATE: 85 MCG/DL (ref 59–158)
IRON SATN MFR SERPL: 30 % (ref 20–50)
KETONES UR QL STRIP: ABNORMAL
LEUKOCYTE ESTERASE UR QL STRIP.AUTO: NEGATIVE
LYMPHOCYTES # BLD AUTO: 1.06 10*3/MM3 (ref 0.7–3.1)
LYMPHOCYTES NFR BLD AUTO: 15.6 % (ref 19.6–45.3)
MAGNESIUM SERPL-MCNC: 2.2 MG/DL (ref 1.6–2.4)
MCH RBC QN AUTO: 30.6 PG (ref 26.6–33)
MCHC RBC AUTO-ENTMCNC: 32.2 G/DL (ref 31.5–35.7)
MCV RBC AUTO: 94.9 FL (ref 79–97)
MONOCYTES # BLD AUTO: 0.56 10*3/MM3 (ref 0.1–0.9)
MONOCYTES NFR BLD AUTO: 8.3 % (ref 5–12)
NEUTROPHILS NFR BLD AUTO: 4.61 10*3/MM3 (ref 1.7–7)
NEUTROPHILS NFR BLD AUTO: 68 % (ref 42.7–76)
NITRITE UR QL STRIP: NEGATIVE
NRBC BLD AUTO-RTO: 0 /100 WBC (ref 0–0.2)
PH UR STRIP.AUTO: 6.5 [PH] (ref 5–8)
PHOSPHATE SERPL-MCNC: 3.6 MG/DL (ref 2.5–4.5)
PLATELET # BLD AUTO: 118 10*3/MM3 (ref 140–450)
PMV BLD AUTO: 10.8 FL (ref 6–12)
POTASSIUM SERPL-SCNC: 4.4 MMOL/L (ref 3.5–5.2)
PROT ?TM UR-MCNC: 164.4 MG/DL
PROT SERPL-MCNC: 6.3 G/DL (ref 6–8.5)
PROT UR QL STRIP: ABNORMAL
PROT/CREAT UR: 1093.1 MG/G CREA (ref 0–200)
RBC # BLD AUTO: 3.76 10*6/MM3 (ref 4.14–5.8)
RBC # UR STRIP: ABNORMAL /HPF
REF LAB TEST METHOD: ABNORMAL
SODIUM SERPL-SCNC: 138 MMOL/L (ref 136–145)
SP GR UR STRIP: 1.02 (ref 1–1.03)
SQUAMOUS #/AREA URNS HPF: ABNORMAL /HPF
TIBC SERPL-MCNC: 282 MCG/DL (ref 298–536)
TRANSFERRIN SERPL-MCNC: 189 MG/DL (ref 200–360)
URATE SERPL-MCNC: 4.5 MG/DL (ref 3.4–7)
UROBILINOGEN UR QL STRIP: ABNORMAL
WBC # UR STRIP: ABNORMAL /HPF
WBC NRBC COR # BLD: 6.78 10*3/MM3 (ref 3.4–10.8)

## 2023-06-02 PROCEDURE — 84550 ASSAY OF BLOOD/URIC ACID: CPT

## 2023-06-02 PROCEDURE — 84156 ASSAY OF PROTEIN URINE: CPT

## 2023-06-02 PROCEDURE — 83735 ASSAY OF MAGNESIUM: CPT

## 2023-06-02 PROCEDURE — 84466 ASSAY OF TRANSFERRIN: CPT

## 2023-06-02 PROCEDURE — 80053 COMPREHEN METABOLIC PANEL: CPT

## 2023-06-02 PROCEDURE — 84100 ASSAY OF PHOSPHORUS: CPT

## 2023-06-02 PROCEDURE — 83540 ASSAY OF IRON: CPT

## 2023-06-02 PROCEDURE — 85025 COMPLETE CBC W/AUTO DIFF WBC: CPT

## 2023-06-02 PROCEDURE — 82570 ASSAY OF URINE CREATININE: CPT

## 2023-06-02 PROCEDURE — 81001 URINALYSIS AUTO W/SCOPE: CPT

## 2023-06-02 PROCEDURE — 36415 COLL VENOUS BLD VENIPUNCTURE: CPT

## 2023-06-02 PROCEDURE — 82728 ASSAY OF FERRITIN: CPT

## 2023-08-02 ENCOUNTER — OFFICE VISIT (OUTPATIENT)
Dept: PULMONOLOGY | Facility: CLINIC | Age: 83
End: 2023-08-02
Payer: MEDICARE

## 2023-08-02 VITALS
DIASTOLIC BLOOD PRESSURE: 82 MMHG | SYSTOLIC BLOOD PRESSURE: 142 MMHG | BODY MASS INDEX: 30.94 KG/M2 | OXYGEN SATURATION: 97 % | HEIGHT: 71 IN | HEART RATE: 55 BPM | WEIGHT: 221 LBS

## 2023-08-02 DIAGNOSIS — J44.9 STAGE 2 MODERATE COPD BY GOLD CLASSIFICATION: Primary | Chronic | ICD-10-CM

## 2023-08-03 ENCOUNTER — OFFICE VISIT (OUTPATIENT)
Dept: CARDIOLOGY | Facility: CLINIC | Age: 83
End: 2023-08-03
Payer: MEDICARE

## 2023-08-03 VITALS
HEART RATE: 55 BPM | WEIGHT: 222 LBS | SYSTOLIC BLOOD PRESSURE: 138 MMHG | DIASTOLIC BLOOD PRESSURE: 84 MMHG | HEIGHT: 71 IN | OXYGEN SATURATION: 99 % | BODY MASS INDEX: 31.08 KG/M2

## 2023-08-03 DIAGNOSIS — E78.2 MIXED HYPERLIPIDEMIA: Chronic | ICD-10-CM

## 2023-08-03 DIAGNOSIS — I10 ESSENTIAL HYPERTENSION: Chronic | ICD-10-CM

## 2023-08-03 DIAGNOSIS — I25.10 CORONARY ARTERY DISEASE INVOLVING NATIVE CORONARY ARTERY OF NATIVE HEART WITHOUT ANGINA PECTORIS: Primary | ICD-10-CM

## 2023-08-03 PROCEDURE — 93000 ELECTROCARDIOGRAM COMPLETE: CPT | Performed by: INTERNAL MEDICINE

## 2023-08-03 PROCEDURE — 99214 OFFICE O/P EST MOD 30 MIN: CPT | Performed by: INTERNAL MEDICINE

## 2023-08-03 PROCEDURE — 3079F DIAST BP 80-89 MM HG: CPT | Performed by: INTERNAL MEDICINE

## 2023-08-03 PROCEDURE — 1159F MED LIST DOCD IN RCRD: CPT | Performed by: INTERNAL MEDICINE

## 2023-08-03 PROCEDURE — 3075F SYST BP GE 130 - 139MM HG: CPT | Performed by: INTERNAL MEDICINE

## 2023-08-03 PROCEDURE — 1160F RVW MEDS BY RX/DR IN RCRD: CPT | Performed by: INTERNAL MEDICINE

## 2023-10-04 ENCOUNTER — LAB (OUTPATIENT)
Dept: LAB | Facility: HOSPITAL | Age: 83
End: 2023-10-04
Payer: MEDICARE

## 2023-10-04 ENCOUNTER — TRANSCRIBE ORDERS (OUTPATIENT)
Dept: ADMINISTRATIVE | Facility: HOSPITAL | Age: 83
End: 2023-10-04
Payer: MEDICARE

## 2023-10-04 DIAGNOSIS — D50.9 IRON DEFICIENCY ANEMIA, UNSPECIFIED IRON DEFICIENCY ANEMIA TYPE: ICD-10-CM

## 2023-10-04 DIAGNOSIS — N18.4 STAGE 4 CHRONIC KIDNEY DISEASE: ICD-10-CM

## 2023-10-04 DIAGNOSIS — N18.4 CHRONIC RENAL DISEASE, STAGE IV: Primary | ICD-10-CM

## 2023-10-04 DIAGNOSIS — N18.4 CHRONIC RENAL DISEASE, STAGE IV: ICD-10-CM

## 2023-10-04 LAB
25(OH)D3 SERPL-MCNC: 39 NG/ML (ref 30–100)
ALBUMIN SERPL-MCNC: 3.8 G/DL (ref 3.5–5.2)
ALBUMIN/GLOB SERPL: 1.7 G/DL
ALP SERPL-CCNC: 53 U/L (ref 39–117)
ALT SERPL W P-5'-P-CCNC: 9 U/L (ref 1–41)
ANION GAP SERPL CALCULATED.3IONS-SCNC: 9 MMOL/L (ref 5–15)
AST SERPL-CCNC: 12 U/L (ref 1–40)
BASOPHILS # BLD AUTO: 0.04 10*3/MM3 (ref 0–0.2)
BASOPHILS NFR BLD AUTO: 0.7 % (ref 0–1.5)
BILIRUB SERPL-MCNC: 0.4 MG/DL (ref 0–1.2)
BUN SERPL-MCNC: 32 MG/DL (ref 8–23)
BUN/CREAT SERPL: 10.9 (ref 7–25)
CALCIUM SPEC-SCNC: 9 MG/DL (ref 8.6–10.5)
CHLORIDE SERPL-SCNC: 110 MMOL/L (ref 98–107)
CO2 SERPL-SCNC: 21 MMOL/L (ref 22–29)
CREAT SERPL-MCNC: 2.93 MG/DL (ref 0.76–1.27)
DEPRECATED RDW RBC AUTO: 42.5 FL (ref 37–54)
EGFRCR SERPLBLD CKD-EPI 2021: 20.6 ML/MIN/1.73
EOSINOPHIL # BLD AUTO: 0.42 10*3/MM3 (ref 0–0.4)
EOSINOPHIL NFR BLD AUTO: 7.8 % (ref 0.3–6.2)
ERYTHROCYTE [DISTWIDTH] IN BLOOD BY AUTOMATED COUNT: 12.4 % (ref 12.3–15.4)
FERRITIN SERPL-MCNC: 107 NG/ML (ref 30–400)
GLOBULIN UR ELPH-MCNC: 2.3 GM/DL
GLUCOSE SERPL-MCNC: 85 MG/DL (ref 65–99)
HCT VFR BLD AUTO: 34.8 % (ref 37.5–51)
HGB BLD-MCNC: 11.1 G/DL (ref 13–17.7)
IRON 24H UR-MRATE: 94 MCG/DL (ref 59–158)
IRON SATN MFR SERPL: 34 % (ref 20–50)
LYMPHOCYTES # BLD AUTO: 0.93 10*3/MM3 (ref 0.7–3.1)
LYMPHOCYTES NFR BLD AUTO: 17.4 % (ref 19.6–45.3)
MAGNESIUM SERPL-MCNC: 2.1 MG/DL (ref 1.6–2.4)
MCH RBC QN AUTO: 30.1 PG (ref 26.6–33)
MCHC RBC AUTO-ENTMCNC: 31.9 G/DL (ref 31.5–35.7)
MCV RBC AUTO: 94.3 FL (ref 79–97)
MONOCYTES # BLD AUTO: 0.44 10*3/MM3 (ref 0.1–0.9)
MONOCYTES NFR BLD AUTO: 8.2 % (ref 5–12)
NEUTROPHILS NFR BLD AUTO: 3.52 10*3/MM3 (ref 1.7–7)
NEUTROPHILS NFR BLD AUTO: 65.7 % (ref 42.7–76)
PHOSPHATE SERPL-MCNC: 3.4 MG/DL (ref 2.5–4.5)
PLATELET # BLD AUTO: 103 10*3/MM3 (ref 140–450)
PMV BLD AUTO: 10.7 FL (ref 6–12)
POTASSIUM SERPL-SCNC: 4.3 MMOL/L (ref 3.5–5.2)
PROT SERPL-MCNC: 6.1 G/DL (ref 6–8.5)
PTH-INTACT SERPL-MCNC: 113.4 PG/ML (ref 15–65)
RBC # BLD AUTO: 3.69 10*6/MM3 (ref 4.14–5.8)
SODIUM SERPL-SCNC: 140 MMOL/L (ref 136–145)
TIBC SERPL-MCNC: 274 MCG/DL (ref 298–536)
TRANSFERRIN SERPL-MCNC: 184 MG/DL (ref 200–360)
URATE SERPL-MCNC: 5.2 MG/DL (ref 3.4–7)
WBC NRBC COR # BLD: 5.36 10*3/MM3 (ref 3.4–10.8)

## 2023-10-04 PROCEDURE — 83540 ASSAY OF IRON: CPT

## 2023-10-04 PROCEDURE — 84100 ASSAY OF PHOSPHORUS: CPT

## 2023-10-04 PROCEDURE — 83735 ASSAY OF MAGNESIUM: CPT

## 2023-10-04 PROCEDURE — 80053 COMPREHEN METABOLIC PANEL: CPT

## 2023-10-04 PROCEDURE — 82728 ASSAY OF FERRITIN: CPT

## 2023-10-04 PROCEDURE — 83970 ASSAY OF PARATHORMONE: CPT

## 2023-10-04 PROCEDURE — 84466 ASSAY OF TRANSFERRIN: CPT

## 2023-10-04 PROCEDURE — 82306 VITAMIN D 25 HYDROXY: CPT

## 2023-10-04 PROCEDURE — 84550 ASSAY OF BLOOD/URIC ACID: CPT

## 2023-10-04 PROCEDURE — 85025 COMPLETE CBC W/AUTO DIFF WBC: CPT

## 2023-10-04 PROCEDURE — 36415 COLL VENOUS BLD VENIPUNCTURE: CPT

## 2023-10-24 ENCOUNTER — OFFICE VISIT (OUTPATIENT)
Dept: ONCOLOGY | Facility: CLINIC | Age: 83
End: 2023-10-24
Payer: MEDICARE

## 2023-10-24 VITALS
TEMPERATURE: 97.7 F | WEIGHT: 220.4 LBS | SYSTOLIC BLOOD PRESSURE: 162 MMHG | RESPIRATION RATE: 16 BRPM | HEIGHT: 71 IN | DIASTOLIC BLOOD PRESSURE: 72 MMHG | BODY MASS INDEX: 30.85 KG/M2 | HEART RATE: 56 BPM

## 2023-10-24 DIAGNOSIS — D69.6 THROMBOCYTOPENIA: ICD-10-CM

## 2023-10-24 DIAGNOSIS — D50.9 IRON DEFICIENCY ANEMIA, UNSPECIFIED IRON DEFICIENCY ANEMIA TYPE: ICD-10-CM

## 2023-10-24 DIAGNOSIS — Z86.73 HISTORY OF CVA (CEREBROVASCULAR ACCIDENT): ICD-10-CM

## 2023-10-24 DIAGNOSIS — N18.4 STAGE 4 CHRONIC KIDNEY DISEASE: Primary | ICD-10-CM

## 2023-10-24 NOTE — PROGRESS NOTES
[facility] GROUP HEMATOLOGY & ONCOLOGY  [address]  [place] [address] [phone]    Patient Name: Faisal Joseph  Encounter Date: 10/24/2023  YOB: 1940  Patient Number: [id number]    Hematology / Oncology Progress Note      HISTORY OF PRESENT ILLNESS: Faisal Joseph is a 83 y.o. male referred by LIZ Delaney for diagnostic and management recommendations for anemia and thrombocytopenia. History is obtained from patient. History is considered to be accurate.     He has health history significant for CKD, COPD, Centrilobular Emphysema, Hypertension, Coronary Artery Disease s/p CAGB in June 2020, Hyperlipidemia  He sees Dr. Brice for Cardiology, Peggy HILL for Pulmonology.  PCP is Dr. Polanco in [place]   He reports fatigue and that he has felt that way since his CABG.  He also reports ribs being sore on left side.  He had CT of Abdomen / Pelvis with out contrast on 01/05/22 which showed that the liver and spleen demonstrate fairly normal attenuation with small calcified granulomata in the spleen.      DIAGNOSTIC WORKUP  Work up was negative included Erythropoietin  14.2  Chemistry:  Creatinine 2.81, BUN 36, GFR 21.9.  Anemia Panel:  Serum iron 34, Ferritin 56.25, Iron Saturation 9%, TIBC 373  Peripheral Blood Smear:  Mild normocytic anemia, mild thrombocytopenia, no evidence of pseudothrombocytopenia  C-Reactive 5.3  SPEP:  Normal with out any M Edilson  VENKAT negative, Hapatoglobin 222   Sed Rate: 22  He was started on oral iron once daily.    Interval History   Patient presents to clinic today for continued follow-up.  He continues to take oral iron twice daily.       He complains of shortness of breath that sometimes interrupts his sleep.  He is followed by pulmonology.      At last visit, pt noted to have a lesion on his arm. He saw dermatology who performed biopsy.  Pt states it was basal cell carcinoma.      He had labs drawn on 10/4/23 and results were reviewed with him in office.     LABS    Lab Results - Last 18 Months   Lab Units 10/04/23  1151 06/02/23  1033 03/31/23  1019 03/16/23  1329 02/07/23  0921 12/19/22  1215 11/09/22  0948 08/09/22  0931 06/07/22  1000 05/03/22  1153   HEMOGLOBIN g/dL 11.1* 11.5* 12.6* 11.9* 12.3* 11.8*   < > 11.9* 11.8* 11.9*   HEMATOCRIT % 34.8* 35.7* 38.7 36.6* 37.5 36.0*   < > 35.8* 35.5* 35.5*   MCV fL 94.3 94.9 93.0 92.9 91.2 91.8   < > 89.5 89.0 89.2   WBC 10*3/mm3 5.36 6.78 6.88 7.33 6.53 7.05   < > 5.50 6.16 9.24   RDW % 12.4 12.9 13.3 13.3 13.2 12.5   < > 13.4 13.3 13.3   MPV fL 10.7 10.8 10.3 10.5 10.7 10.3   < > 10.8 10.7 10.5   PLATELETS 10*3/mm3 103* 118* 135* 136* 111* 130*   < > 85* 111* 115*   IMM GRAN % %  --  0.4  --  0.4  --  0.4  --  0.2 0.2 0.4   NEUTROS ABS 10*3/mm3 3.52 4.61 4.56 5.03 4.47 4.44   < > 3.62 3.90 6.55   LYMPHS ABS 10*3/mm3 0.93 1.06 1.19 1.19 1.10 1.04   < > 0.99 0.97 1.05   MONOS ABS 10*3/mm3 0.44 0.56 0.65 0.68 0.52 0.78   < > 0.48 0.65 0.99*   EOS ABS 10*3/mm3 0.42* 0.46* 0.39 0.35 0.37 0.71*   < > 0.35 0.58* 0.56*   BASOS ABS 10*3/mm3 0.04 0.06 0.06 0.05 0.05 0.05   < > 0.05 0.05 0.05   IMMATURE GRANS (ABS) 10*3/mm3  --  0.03  --  0.03  --  0.03  --  0.01 0.01 0.04   NRBC /100 WBC  --  0.0  --  0.0  --  0.0  --  0.0 0.0 0.0    < > = values in this interval not displayed.       Lab Results - Last 18 Months   Lab Units 10/04/23  1151 06/02/23  1033 03/31/23  1019 03/16/23  1329 02/07/23  0921 12/19/22  1215 11/09/22  0948   GLUCOSE mg/dL 85 101* 87 89 83 91 93   SODIUM mmol/L 140 138 142 139 140 140 135*   POTASSIUM mmol/L 4.3 4.4 3.9 4.0 4.2 4.3 4.1   CO2 mmol/L 21.0* 21.0* 22.0 24.0 23.0 27.0 21.0*   CHLORIDE mmol/L 110* 109* 109* 106 108* 105 105   ANION GAP mmol/L 9.0 8.0 11.0 9.0 9.0 8.0 9.0   CREATININE mg/dL 2.93* 2.89* 2.70* 2.73* 2.79* 2.82* 2.97*   BUN mg/dL 32* 31* 23 20 35* 30* 30*   BUN / CREAT RATIO  10.9 10.7 8.5 7.3 12.5 10.6 10.1    CALCIUM mg/dL 9.0 8.7 9.1 8.7 9.0 9.2 9.1   ALK PHOS U/L 53 54  --  63 56 70 62   TOTAL PROTEIN g/dL 6.1 6.3  --  6.2 6.6 6.9 6.5   ALT (SGPT) U/L 9 7  --  10 11 9 8   AST (SGOT) U/L 12 14  --  13 13 12 9   BILIRUBIN mg/dL 0.4 0.4  --  0.3 0.3 0.3 0.4   ALBUMIN g/dL 3.8 3.8  --  3.8 4.0 4.10 4.00   GLOBULIN gm/dL 2.3 2.5  --  2.4 2.6 2.8 2.5       Lab Results - Last 18 Months   Lab Units 10/04/23  1151 06/02/23  1033 02/07/23  0921 05/10/22  0950 05/03/22  1153   M-SPIKE g/dL  --   --   --   --  Not Observed   URIC ACID mg/dL 5.2 4.5 4.2  --   --    FREE KAPPA LT CHAINS, 24HR mg/24 hr  --   --   --  205.77  --        Lab Results - Last 18 Months   Lab Units 10/04/23  1151 06/02/23  1033 03/31/23  1019 03/16/23  1329 02/07/23  0921 12/19/22  1215 11/09/22  0948 06/07/22  1000 05/03/22  1153   IRON mcg/dL 94 85  --  53* 75 42* 53*   < > 34*   TIBC mcg/dL 274* 282*  --  265* 291* 282* 332   < > 373   IRON SATURATION (TSAT) % 34 30  --  20 26 15* 16*   < > 9*   FERRITIN ng/mL 107.00 104.40  --  84.77 96.15 98.15 59.81   < > 56.25   TSH uIU/mL  --   --  3.310  --   --   --   --   --   --    FOLATE ng/mL  --   --   --   --   --   --   --   --  8.21    < > = values in this interval not displayed.         PAST MEDICAL HISTORY:  ALLERGIES:  Allergies   Allergen Reactions    Hydralazine Nausea Only    Losartan Potassium Nausea Only    Penicillins Hives    Spironolactone Swelling     Made  Breast sore and swell     CURRENT MEDICATIONS:  Outpatient Encounter Medications as of 10/24/2023   Medication Sig Dispense Refill    albuterol sulfate  (90 Base) MCG/ACT inhaler       amLODIPine (NORVASC) 10 MG tablet Take 1 tablet by mouth Daily.      aspirin 81 MG EC tablet Take 1 tablet by mouth Daily. 100 tablet 99    Cholecalciferol (VITAMIN D) 2000 units capsule Take 1 capsule by mouth Daily.      cloNIDine (CATAPRES) 0.1 MG tablet Take 1 tablet by mouth As Needed for High Blood Pressure.      FeroSul 325 (65 Fe) MG tablet  TAKE 1 TABLET BY MOUTH 2 (TWO) TIMES A DAY. 180 tablet 1    hydrALAZINE (APRESOLINE) 100 MG tablet Take 1 tablet by mouth 3 (Three) Times a Day.      levocetirizine (XYZAL) 5 MG tablet       lisinopril (PRINIVIL,ZESTRIL) 20 MG tablet Take 1 tablet by mouth Daily.      magnesium oxide (MAGOX) 400 (241.3 Mg) MG tablet tablet Take 1 tablet by mouth 1 (One) Time.      metoprolol tartrate (LOPRESSOR) 50 MG tablet TAKE 1 TABLET BY MOUTH 2 (TWO) TIMES A DAY 60 tablet 11    nitroglycerin (NITROSTAT) 0.4 MG SL tablet 1 under the tongue as needed for angina, may repeat q5mins for up three doses 25 tablet 2    pravastatin (PRAVACHOL) 40 MG tablet TAKE 1 TABLET BY MOUTH DAILY. 90 tablet 3    sodium bicarbonate 325 MG tablet Take 1 tablet by mouth 4 (Four) Times a Day.      vitamin B-12 (CYANOCOBALAMIN) 100 MCG tablet Take 0.5 tablets by mouth Daily.      [DISCONTINUED] ferrous sulfate 325 (65 FE) MG tablet Take 2 tablets by mouth Daily. (Patient not taking: Reported on 10/24/2023)       No facility-administered encounter medications on file as of 10/24/2023.     ADULT ILLNESSES:  Patient Active Problem List   Diagnosis Code    Essential hypertension I10    Stage 4 chronic kidney disease N18.4    Coronary artery disease I25.10    History of non-ST elevation myocardial infarction (NSTEMI) I25.2    Chronic pericarditis I31.9    History of CVA (cerebrovascular accident) Z86.73    Hyperlipidemia E78.5    Physical debility R53.81    S/P CABG (coronary artery bypass graft) Z95.1    Palpitations R00.2    Gait difficulty R26.9     SURGERIES:  Past Surgical History:   Procedure Laterality Date    CARDIAC CATHETERIZATION      CARDIAC CATHETERIZATION N/A 5/28/2020    Procedure: Left Heart Cath;  Surgeon: Rufino Brice MD;  Location: [facility] CATH IN[facility]ASIVE LOC[facility];  Service: Cardiology;  Laterality: N/A;    CORONARY ARTERY BYPASS GRAFT N/A 6/9/2020    Procedure: CABG X 3, LIMA, SHELBY, EVH WITH OPEN EXTENSION LOWER RIGHT LEG;   Surgeon: Nikunj Carballo MD;  Location: [facility];  Service: Cardiothoracic;  Laterality: N/A;     HEALTH MAINTENANCE ITEMS:  Health Maintenance Due   Topic Date Due    ZOSTER VACCINE (1 of 2) Never done    Pneumococcal Vaccine 65+ (2 - PPSV23 or PCV20) 2019    ANNUAL WELLNESS VISIT  2020    TDAP/TD VACCINES (2 - Tdap) 2021    LIPID PANEL  2022    INFLUENZA VACCINE  2023    COVID-19 Vaccine (3 - - season) 2023       <no information>  Last Completed Colonoscopy       This patient has no relevant Health Maintenance data.          Immunization History   Administered Date(s) Administered    COVID-19 (MODERNA) 1st,2nd,3rd Dose Monovalent 2021, 2021    FLUAD TRI 65YR+ 2019, 2020    Flu Vaccine Intradermal Quad 18-64YR 10/18/2021, 10/01/2022    Fluzone High Dose =>65 Years ([employer] ONLY) 2015, 10/14/2016, 2017    Pneumococcal Conjugate 13-Valent (PCV13) 2019    TD Preservative Free (Tenivac) 2011     Last Completed Mammogram       This patient has no relevant Health Maintenance data.              FAMILY HISTORY:  Family History   Problem Relation Age of Onset    Cancer Mother     Cancer Father      SOCIAL HISTORY:  Social History     Socioeconomic History    Marital status:    Tobacco Use    Smoking status: Former     Packs/day: 1.50     Years: 25.00     Additional pack years: 0.00     Total pack years: 37.50     Types: Cigarettes     Start date:      Quit date:      Years since quittin.8    Smokeless tobacco: Never   Vaping Use    Vaping Use: Never used   Substance and Sexual Activity    Alcohol use: No    Drug use: No    Sexual activity: Defer       REVIEW OF SYSTEMS:  Review of Systems   Constitutional:  Positive for activity change and fatigue.   HENT:  Negative for swollen glands and trouble swallowing.    Respiratory:  Positive for shortness of breath (with exertion).    Gastrointestinal:  Negative for  "nausea and vomiting.   Neurological:  Positive for weakness.   Hematological:  Does not bruise/bleed easily.   Psychiatric/Behavioral:  Negative for suicidal ideas and stress.        /72   Pulse 56   Temp 97.7 °F (36.5 °C)   Resp 16   Ht 180.3 cm (71\")   Wt 100 kg (220 lb 6.4 oz)   BMI 30.74 kg/m²  Body surface area is 2.2 meters squared.    Pain Score    10/24/23 1047   PainSc: 0-No pain           Physical Exam  Constitutional:       Appearance: Normal appearance.   HENT:      Head: Normocephalic and atraumatic.   Eyes:      Extraocular Movements: Extraocular movements intact.   Cardiovascular:      Rate and Rhythm: Normal rate and regular rhythm.   Pulmonary:      Effort: Pulmonary effort is normal.      Comments: Diminished.  Abdominal:      General: Bowel sounds are normal.      Palpations: Abdomen is soft.   Musculoskeletal:         General: Normal range of motion.      Right lower leg: Edema present.   Skin:     General: Skin is warm and dry.   Neurological:      General: No focal deficit present.      Mental Status: He is alert and oriented to person, place, and time.   Psychiatric:         Mood and Affect: Mood normal.         Behavior: Behavior normal.         Faisal Joseph reports a pain score of 0.  No intervention indicated.          ASSESSMENT / PLAN    1. Stage 4 chronic kidney disease    2. Iron deficiency anemia, unspecified iron deficiency anemia type    3. Thrombocytopenia    4. History of CVA (cerebrovascular accident)          ASSESSMENT:      1.  Iron Deficiency Anemia   2.   Thrombocytopenia  3.  Chronic Kidney Disease Stage IV  -Taking oral iron BID  -Labs 10/4/2023:  Hgb 11.1, Hct 34.8, Plt 103  -Iron 94, Ferritin 107, Sat 34%, TIBC 274  BUN 32, Creatinine 2.93, GFR 20.6  -No signs/symptoms of bleeding   -Stable for observation   -If Hgb drops below 10g, and Ferritin >/+  100 and / or Sat >/+ 20%,  will start TANJA with Retcacrit 40,000 units.  If started, will continue " biweekly for Hgb < 11 or Hct < 33.  -Monitor for hypertension     4.  History of CVA / History of NSTEMI  -Patient is seen by Dr. Brice for Cardiology  -History of hypertension taking hydralazine, Coreg, Metoprolol, Clonidine, Amlodipine  -Blood pressure today at 162/72  5. COPD  -Continue bronchodilator and LABA and follow up per Pulmonology     Continue follow up with dermatology     PLAN:  -Contiue oral iron BID  Labs only in 3 momths   RTC in 6 months for continued follow up.  - Blood for CBC, CMP, Ferritin, TIBC, % Saturation, and Iron   Continue ongoing management per primary care physician and other specialists   Plan of care discussed with patient.  Understanding expressed.  -He agrees to tx plan      Samantha Winkler, APRN  10/24/2023

## 2023-11-19 ENCOUNTER — APPOINTMENT (OUTPATIENT)
Dept: CT IMAGING | Facility: HOSPITAL | Age: 83
End: 2023-11-19
Payer: MEDICARE

## 2023-11-19 ENCOUNTER — HOSPITAL ENCOUNTER (INPATIENT)
Facility: HOSPITAL | Age: 83
LOS: 3 days | Discharge: HOME | End: 2023-11-22
Attending: INTERNAL MEDICINE | Admitting: FAMILY MEDICINE
Payer: MEDICARE

## 2023-11-19 ENCOUNTER — APPOINTMENT (OUTPATIENT)
Dept: GENERAL RADIOLOGY | Facility: HOSPITAL | Age: 83
End: 2023-11-19
Payer: MEDICARE

## 2023-11-19 DIAGNOSIS — I50.9 ACUTE HEART FAILURE, UNSPECIFIED HEART FAILURE TYPE: Primary | ICD-10-CM

## 2023-11-19 PROBLEM — R60.0 PERIPHERAL EDEMA: Status: ACTIVE | Noted: 2023-11-19

## 2023-11-19 PROBLEM — J96.01 ACUTE HYPOXIC RESPIRATORY FAILURE: Status: ACTIVE | Noted: 2023-11-19

## 2023-11-19 PROBLEM — R60.9 PERIPHERAL EDEMA: Status: ACTIVE | Noted: 2023-11-19

## 2023-11-19 LAB
ALBUMIN SERPL-MCNC: 4.2 G/DL (ref 3.5–5.2)
ALBUMIN/GLOB SERPL: 1.6 G/DL
ALP SERPL-CCNC: 62 U/L (ref 39–117)
ALT SERPL W P-5'-P-CCNC: 12 U/L (ref 1–41)
ANION GAP SERPL CALCULATED.3IONS-SCNC: 12 MMOL/L (ref 5–15)
ANION GAP SERPL CALCULATED.3IONS-SCNC: 12 MMOL/L (ref 5–15)
AST SERPL-CCNC: 17 U/L (ref 1–40)
B PARAPERT DNA SPEC QL NAA+PROBE: NOT DETECTED
B PERT DNA SPEC QL NAA+PROBE: NOT DETECTED
BASOPHILS # BLD AUTO: 0.04 10*3/MM3 (ref 0–0.2)
BASOPHILS NFR BLD AUTO: 0.3 % (ref 0–1.5)
BILIRUB SERPL-MCNC: 0.5 MG/DL (ref 0–1.2)
BUN SERPL-MCNC: 25 MG/DL (ref 8–23)
BUN SERPL-MCNC: 26 MG/DL (ref 8–23)
BUN/CREAT SERPL: 8.6 (ref 7–25)
BUN/CREAT SERPL: 9.2 (ref 7–25)
C PNEUM DNA NPH QL NAA+NON-PROBE: NOT DETECTED
CALCIUM SPEC-SCNC: 8.9 MG/DL (ref 8.6–10.5)
CALCIUM SPEC-SCNC: 9 MG/DL (ref 8.6–10.5)
CHLORIDE SERPL-SCNC: 107 MMOL/L (ref 98–107)
CHLORIDE SERPL-SCNC: 108 MMOL/L (ref 98–107)
CO2 SERPL-SCNC: 19 MMOL/L (ref 22–29)
CO2 SERPL-SCNC: 21 MMOL/L (ref 22–29)
CREAT SERPL-MCNC: 2.73 MG/DL (ref 0.76–1.27)
CREAT SERPL-MCNC: 3.02 MG/DL (ref 0.76–1.27)
D-LACTATE SERPL-SCNC: 0.9 MMOL/L (ref 0.5–2)
DEPRECATED RDW RBC AUTO: 43.8 FL (ref 37–54)
EGFRCR SERPLBLD CKD-EPI 2021: 19.8 ML/MIN/1.73
EGFRCR SERPLBLD CKD-EPI 2021: 22.4 ML/MIN/1.73
EOSINOPHIL # BLD AUTO: 0.25 10*3/MM3 (ref 0–0.4)
EOSINOPHIL NFR BLD AUTO: 2.2 % (ref 0.3–6.2)
ERYTHROCYTE [DISTWIDTH] IN BLOOD BY AUTOMATED COUNT: 13 % (ref 12.3–15.4)
FLUAV RNA RESP QL NAA+PROBE: NOT DETECTED
FLUAV SUBTYP SPEC NAA+PROBE: NOT DETECTED
FLUBV RNA ISLT QL NAA+PROBE: NOT DETECTED
FLUBV RNA RESP QL NAA+PROBE: NOT DETECTED
GEN 5 2HR TROPONIN T REFLEX: 33 NG/L
GLOBULIN UR ELPH-MCNC: 2.6 GM/DL
GLUCOSE SERPL-MCNC: 103 MG/DL (ref 65–99)
GLUCOSE SERPL-MCNC: 206 MG/DL (ref 65–99)
HADV DNA SPEC NAA+PROBE: NOT DETECTED
HCOV 229E RNA SPEC QL NAA+PROBE: NOT DETECTED
HCOV HKU1 RNA SPEC QL NAA+PROBE: NOT DETECTED
HCOV NL63 RNA SPEC QL NAA+PROBE: NOT DETECTED
HCOV OC43 RNA SPEC QL NAA+PROBE: NOT DETECTED
HCT VFR BLD AUTO: 35.6 % (ref 37.5–51)
HGB BLD-MCNC: 11.7 G/DL (ref 13–17.7)
HMPV RNA NPH QL NAA+NON-PROBE: NOT DETECTED
HPIV1 RNA ISLT QL NAA+PROBE: NOT DETECTED
HPIV2 RNA SPEC QL NAA+PROBE: NOT DETECTED
HPIV3 RNA NPH QL NAA+PROBE: NOT DETECTED
HPIV4 P GENE NPH QL NAA+PROBE: NOT DETECTED
IMM GRANULOCYTES # BLD AUTO: 0.05 10*3/MM3 (ref 0–0.05)
IMM GRANULOCYTES NFR BLD AUTO: 0.4 % (ref 0–0.5)
LYMPHOCYTES # BLD AUTO: 0.56 10*3/MM3 (ref 0.7–3.1)
LYMPHOCYTES NFR BLD AUTO: 4.9 % (ref 19.6–45.3)
M PNEUMO IGG SER IA-ACNC: NOT DETECTED
MCH RBC QN AUTO: 30.5 PG (ref 26.6–33)
MCHC RBC AUTO-ENTMCNC: 32.9 G/DL (ref 31.5–35.7)
MCV RBC AUTO: 93 FL (ref 79–97)
MONOCYTES # BLD AUTO: 0.54 10*3/MM3 (ref 0.1–0.9)
MONOCYTES NFR BLD AUTO: 4.7 % (ref 5–12)
NEUTROPHILS NFR BLD AUTO: 10.07 10*3/MM3 (ref 1.7–7)
NEUTROPHILS NFR BLD AUTO: 87.5 % (ref 42.7–76)
NRBC BLD AUTO-RTO: 0 /100 WBC (ref 0–0.2)
NT-PROBNP SERPL-MCNC: 7460 PG/ML (ref 0–1800)
PLATELET # BLD AUTO: 107 10*3/MM3 (ref 140–450)
PMV BLD AUTO: 10.7 FL (ref 6–12)
POTASSIUM SERPL-SCNC: 4.2 MMOL/L (ref 3.5–5.2)
POTASSIUM SERPL-SCNC: 4.3 MMOL/L (ref 3.5–5.2)
PROT SERPL-MCNC: 6.8 G/DL (ref 6–8.5)
RBC # BLD AUTO: 3.83 10*6/MM3 (ref 4.14–5.8)
RHINOVIRUS RNA SPEC NAA+PROBE: DETECTED
RSV RNA NPH QL NAA+NON-PROBE: NOT DETECTED
SARS-COV-2 RNA NPH QL NAA+NON-PROBE: NOT DETECTED
SARS-COV-2 RNA RESP QL NAA+PROBE: NOT DETECTED
SODIUM SERPL-SCNC: 138 MMOL/L (ref 136–145)
SODIUM SERPL-SCNC: 141 MMOL/L (ref 136–145)
TROPONIN T DELTA: -1 NG/L
TROPONIN T SERPL HS-MCNC: 34 NG/L
WBC NRBC COR # BLD AUTO: 11.51 10*3/MM3 (ref 3.4–10.8)

## 2023-11-19 PROCEDURE — 71250 CT THORAX DX C-: CPT

## 2023-11-19 PROCEDURE — 80053 COMPREHEN METABOLIC PANEL: CPT | Performed by: INTERNAL MEDICINE

## 2023-11-19 PROCEDURE — 94799 UNLISTED PULMONARY SVC/PX: CPT

## 2023-11-19 PROCEDURE — 83880 ASSAY OF NATRIURETIC PEPTIDE: CPT | Performed by: INTERNAL MEDICINE

## 2023-11-19 PROCEDURE — 94640 AIRWAY INHALATION TREATMENT: CPT

## 2023-11-19 PROCEDURE — 71045 X-RAY EXAM CHEST 1 VIEW: CPT

## 2023-11-19 PROCEDURE — 25010000002 ENOXAPARIN PER 10 MG: Performed by: INTERNAL MEDICINE

## 2023-11-19 PROCEDURE — 84484 ASSAY OF TROPONIN QUANT: CPT | Performed by: INTERNAL MEDICINE

## 2023-11-19 PROCEDURE — 83605 ASSAY OF LACTIC ACID: CPT | Performed by: INTERNAL MEDICINE

## 2023-11-19 PROCEDURE — 0202U NFCT DS 22 TRGT SARS-COV-2: CPT | Performed by: FAMILY MEDICINE

## 2023-11-19 PROCEDURE — 87636 SARSCOV2 & INF A&B AMP PRB: CPT | Performed by: INTERNAL MEDICINE

## 2023-11-19 PROCEDURE — 85025 COMPLETE CBC W/AUTO DIFF WBC: CPT | Performed by: INTERNAL MEDICINE

## 2023-11-19 PROCEDURE — 99285 EMERGENCY DEPT VISIT HI MDM: CPT

## 2023-11-19 PROCEDURE — 25010000002 METHYLPREDNISOLONE PER 125 MG: Performed by: INTERNAL MEDICINE

## 2023-11-19 PROCEDURE — 25010000002 FUROSEMIDE PER 20 MG: Performed by: INTERNAL MEDICINE

## 2023-11-19 PROCEDURE — 36415 COLL VENOUS BLD VENIPUNCTURE: CPT | Performed by: FAMILY MEDICINE

## 2023-11-19 RX ORDER — FUROSEMIDE 10 MG/ML
40 INJECTION INTRAMUSCULAR; INTRAVENOUS ONCE
Status: COMPLETED | OUTPATIENT
Start: 2023-11-19 | End: 2023-11-19

## 2023-11-19 RX ORDER — AMOXICILLIN 250 MG
2 CAPSULE ORAL NIGHTLY PRN
Status: DISCONTINUED | OUTPATIENT
Start: 2023-11-19 | End: 2023-11-22 | Stop reason: HOSPADM

## 2023-11-19 RX ORDER — BISACODYL 10 MG
10 SUPPOSITORY, RECTAL RECTAL DAILY PRN
Status: DISCONTINUED | OUTPATIENT
Start: 2023-11-19 | End: 2023-11-22 | Stop reason: HOSPADM

## 2023-11-19 RX ORDER — NITROGLYCERIN 0.4 MG/1
0.4 TABLET SUBLINGUAL
Status: DISCONTINUED | OUTPATIENT
Start: 2023-11-19 | End: 2023-11-22 | Stop reason: HOSPADM

## 2023-11-19 RX ORDER — BISACODYL 5 MG/1
5 TABLET, DELAYED RELEASE ORAL DAILY PRN
Status: DISCONTINUED | OUTPATIENT
Start: 2023-11-19 | End: 2023-11-22 | Stop reason: HOSPADM

## 2023-11-19 RX ORDER — ENOXAPARIN SODIUM 100 MG/ML
30 INJECTION SUBCUTANEOUS EVERY 24 HOURS
Status: DISCONTINUED | OUTPATIENT
Start: 2023-11-19 | End: 2023-11-22 | Stop reason: HOSPADM

## 2023-11-19 RX ORDER — POLYETHYLENE GLYCOL 3350 17 G/17G
17 POWDER, FOR SOLUTION ORAL DAILY PRN
Status: DISCONTINUED | OUTPATIENT
Start: 2023-11-19 | End: 2023-11-22 | Stop reason: HOSPADM

## 2023-11-19 RX ORDER — ONDANSETRON 2 MG/ML
4 INJECTION INTRAMUSCULAR; INTRAVENOUS EVERY 6 HOURS PRN
Status: DISCONTINUED | OUTPATIENT
Start: 2023-11-19 | End: 2023-11-22 | Stop reason: HOSPADM

## 2023-11-19 RX ORDER — IPRATROPIUM BROMIDE AND ALBUTEROL SULFATE 2.5; .5 MG/3ML; MG/3ML
3 SOLUTION RESPIRATORY (INHALATION)
Status: DISCONTINUED | OUTPATIENT
Start: 2023-11-19 | End: 2023-11-22 | Stop reason: HOSPADM

## 2023-11-19 RX ORDER — METHYLPREDNISOLONE SODIUM SUCCINATE 125 MG/2ML
60 INJECTION, POWDER, LYOPHILIZED, FOR SOLUTION INTRAMUSCULAR; INTRAVENOUS ONCE
Status: COMPLETED | OUTPATIENT
Start: 2023-11-19 | End: 2023-11-19

## 2023-11-19 RX ORDER — ACETAMINOPHEN 325 MG/1
650 TABLET ORAL EVERY 4 HOURS PRN
Status: DISCONTINUED | OUTPATIENT
Start: 2023-11-19 | End: 2023-11-22 | Stop reason: HOSPADM

## 2023-11-19 RX ADMIN — FUROSEMIDE 40 MG: 10 INJECTION, SOLUTION INTRAVENOUS at 16:36

## 2023-11-19 RX ADMIN — METHYLPREDNISOLONE SODIUM SUCCINATE 60 MG: 125 INJECTION, POWDER, FOR SOLUTION INTRAMUSCULAR; INTRAVENOUS at 15:32

## 2023-11-19 RX ADMIN — IPRATROPIUM BROMIDE AND ALBUTEROL SULFATE 3 ML: 2.5; .5 SOLUTION RESPIRATORY (INHALATION) at 19:41

## 2023-11-19 RX ADMIN — ENOXAPARIN SODIUM 30 MG: 100 INJECTION SUBCUTANEOUS at 18:35

## 2023-11-19 RX ADMIN — IPRATROPIUM BROMIDE AND ALBUTEROL SULFATE 3 ML: 2.5; .5 SOLUTION RESPIRATORY (INHALATION) at 15:51

## 2023-11-19 NOTE — ED NOTES
Nursing report ED to floor  Faisal Joseph  83 y.o.  male    HPI:   Chief Complaint   Patient presents with    Shortness of Breath       Admitting doctor:   Shirley Bryant    Consulting provider(s):  Consults       Date and Time Order Name Status Description    11/19/2023  5:12 PM Inpatient Nephrology Consult               Admitting diagnosis:   The encounter diagnosis was Acute heart failure, unspecified heart failure type.    Code status:   Current Code Status       Date Active Code Status Order ID Comments User Context       11/19/2023 1712 CPR (Attempt to Resuscitate) [id number]  Shirley Bryant ED        Question Answer    Code Status (Patient has no pulse and is not breathing) CPR (Attempt to Resuscitate)    Medical Interventions (Patient has pulse or is breathing) Full Support    Level Of Support Discussed With Patient                    Allergies:   Hydralazine, Losartan potassium, Penicillins, and Spironolactone    Intake and Output  No intake or output data in the 24 hours ending 11/19/23 1720    Weight:       11/19/23  1448   Weight: 101 kg (223 lb)       Most recent vitals:   Vitals:    11/19/23 1559 11/19/23 1601 11/19/23 1616 11/19/23 1631   BP:  176/92 174/86 173/87   Pulse: 82 87 84 86   Resp: 20      Temp:       TempSrc:       SpO2: 94% 95% 93% 91%   Weight:       Height:         Oxygen Therapy: .    Active LDAs/IV Access:   Lines, Drains & Airways       Active LDAs       Name Placement date Placement time Site Days    Peripheral IV 11/19/23 1529 Left Antecubital 11/19/23  1529  Antecubital  less than 1    Peripheral IV 11/19/23 1530 Right Antecubital 11/19/23  1530  Antecubital  less than 1                    Labs (abnormal labs have a star):   Labs Reviewed   COMPREHENSIVE METABOLIC PANEL - Abnormal; Notable for the following components:       Result Value    Glucose 103 (*)     BUN 25 (*)     Creatinine 2.73 (*)     Chloride 108 (*)     CO2 21.0 (*)     eGFR 22.4 (*)     All other  components within normal limits    Narrative:     GFR Normal >60  Chronic Kidney Disease <60  Kidney Failure <15    The GFR formula is only valid for adults with stable renal function between ages 18 and 70.   TROPONIN - Abnormal; Notable for the following components:    HS Troponin T 34 (*)     All other components within normal limits    Narrative:     High Sensitive Troponin T Reference Range:  <14.0 ng/L- Negative Female for AMI  <22.0 ng/L- Negative Male for AMI  >=14 - Abnormal Female indicating possible myocardial injury.  >=22 - Abnormal Male indicating possible myocardial injury.   Clinicians would have to utilize clinical acumen, EKG, Troponin, and serial changes to determine if it is an Acute Myocardial Infarction or myocardial injury due to an underlying chronic condition.        BNP (IN-HOUSE) - Abnormal; Notable for the following components:    proBNP 7,460.0 (*)     All other components within normal limits    Narrative:     This assay is used as an aid in the diagnosis of individuals suspected of having heart failure. It can be used as an aid in the diagnosis of acute decompensated heart failure (ADHF) in patients presenting with signs and symptoms of ADHF to the emergency department (ED). In addition, NT-proBNP of <300 pg/mL indicates ADHF is not likely.    Age Range Result Interpretation  NT-proBNP Concentration (pg/mL:      <50             Positive            >450                   Gray                 300-450                    Negative             <300    50-75           Positive            >900                  Gray                300-900                  Negative            <300      >75             Positive            >1800                  Gray                300-1800                  Negative            <300   CBC WITH AUTO DIFFERENTIAL - Abnormal; Notable for the following components:    WBC 11.51 (*)     RBC 3.83 (*)     Hemoglobin 11.7 (*)     Hematocrit 35.6 (*)     Platelets 107 (*)      Neutrophil % 87.5 (*)     Lymphocyte % 4.9 (*)     Monocyte % 4.7 (*)     Neutrophils, Absolute 10.07 (*)     Lymphocytes, Absolute 0.56 (*)     All other components within normal limits   COVID-19 AND FLU A/B, NP SWAB IN TRANSPORT MEDIA 1 HR TAT - Normal    Narrative:     Fact sheet for providers: https://www.fda.gov/media/518315/download    Fact sheet for patients: https://www.fda.gov/media/959707/download    Test performed by PCR.   LACTIC ACID, PLASMA - Normal   COVID PRE-OP / PRE-PROCEDURE SCREENING ORDER (NO ISOLATION)    Narrative:     The following orders were created for panel order COVID PRE-OP / PRE-PROCEDURE SCREENING ORDER (NO ISOLATION) - Swab, Nasopharynx.  Procedure                               Abnormality         Status                     ---------                               -----------         ------                     COVID-19 and FLU A/B PCR...[[id number]]  Normal              Final result                 Please view results for these tests on the individual orders.   RESPIRATORY PANEL PCR W/ COVID-19 (SARS-COV-2), NP SWAB IN UTM/VTP, 2 HR TAT   HIGH SENSITIVITIY TROPONIN T 2HR   BASIC METABOLIC PANEL   CBC AND DIFFERENTIAL    Narrative:     The following orders were created for panel order CBC & Differential.  Procedure                               Abnormality         Status                     ---------                               -----------         ------                     CBC Auto Differential[[id number]]        Abnormal            Final result                 Please view results for these tests on the individual orders.       Meds given in ED:   Medications   ipratropium-albuterol (DUO-NEB) nebulizer solution 3 mL (3 mL Nebulization Given 11/19/23 4921)   Pharmacy to Dose enoxaparin (LOVENOX) (has no administration in time range)   nitroglycerin (NITROSTAT) SL tablet 0.4 mg (has no administration in time range)   acetaminophen (TYLENOL) tablet 650 mg (has no administration in  time range)   sennosides-docusate (PERICOLACE) 8.6-50 MG per tablet 2 tablet (has no administration in time range)     And   polyethylene glycol (MIRALAX) packet 17 g (has no administration in time range)     And   bisacodyl (DULCOLAX) EC tablet 5 mg (has no administration in time range)     And   bisacodyl (DULCOLAX) suppository 10 mg (has no administration in time range)   ondansetron (ZOFRAN) injection 4 mg (has no administration in time range)   Enoxaparin Sodium (LOVENOX) syringe 30 mg (has no administration in time range)   methylPREDNISolone sodium succinate (SOLU-Medrol) injection 60 mg (60 mg Intravenous Given 11/19/23 1532)   furosemide (LASIX) injection 40 mg (40 mg Intravenous Given 11/19/23 1636)     Pharmacy to Dose enoxaparin (LOVENOX),          NIH Stroke Scale:       Isolation/Infection(s):  No active isolations   COVID (rule out)     COVID Testing  Collected .  Resulted .    Nursing report ED to floor:  Mental status: .  Ambulatory status: .  Precautions: .    ED nurse phone extentsion- ..

## 2023-11-19 NOTE — ED PROVIDER NOTES
Subjective   History of Present Illness  83-year-old male who presents emergency department with complaints of shortness of breath.  He is wheezing audibly on exam.  He states he used his inhaler last night, but not today.  He currently has a sample powdered inhaler, that he states is irritating his throat.  His symptoms started around lunch yesterday.  He did not have any choking episodes.  He has had no fever or chills.  He tells me he has minor chest pain.  He is unable to lie flat without shortness of breath.  He has lower extremity edema, the right is worse than the left, but the family at bedside states that this is normal because of vein grafting from the right leg.  He states his bowels and kidneys have been working okay.  He denies any nausea.  He does not chronically use oxygen at home.    9/14/21 cardiac echo:  Interpretation Summary    Left ventricular systolic function is normal. Left ventricular ejection fraction appears to be 56 - 60%.  Left ventricular wall thickness is consistent with mild concentric hypertrophy.  The right ventricular cavity is borderline dilated. Normal right ventricular systolic function noted.  Biatrial enlargement is noted.  Mild mitral valve regurgitation is present.  Mild tricuspid valve regurgitation is present. Estimated right ventricular systolic pressure from tricuspid regurgitation is normal (<35 mmHg).    Review of Systems   Respiratory:  Positive for cough, shortness of breath and wheezing.    Cardiovascular:  Positive for chest pain and leg swelling.   Gastrointestinal:  Negative for constipation, diarrhea and nausea.   Genitourinary:  Negative for dysuria and frequency.       Past Medical History:   Diagnosis Date    Arthritis     Cataract     Chronic fatigue 10/3/2017    Chronic renal disease, stage IV     Coronary artery disease     CVA (cerebral vascular accident) 8/14/2018    Hyperlipidemia     Hypertension     Palpitations 10/3/2017    Premature atrial  contractions 10/17/2017    PVCs (premature ventricular contractions) 10/17/2017    Sleep apnea     Stage 4 chronic kidney disease 2018    Stroke 2007    Weakness     right leg weaker       Allergies   Allergen Reactions    Hydralazine Nausea Only    Losartan Potassium Nausea Only    Penicillins Hives    Spironolactone Swelling     Made  Breast sore and swell       Past Surgical History:   Procedure Laterality Date    CARDIAC CATHETERIZATION      CARDIAC CATHETERIZATION N/A 2020    Procedure: Left Heart Cath;  Surgeon: Rufino Brice MD;  Location:  [facility]ATH [facility];  Service: Cardiology;  Laterality: N/A;    CORONARY ARTERY BYPASS GRAFT N/A 2020    Procedure: CABG X 3, LIMA, SHELBY, EVH WITH OPEN EXTENSION LOWER RIGHT LEG;  Surgeon: Nikunj Carballo MD;  Location:  [facility] OR;  Service: Cardiothoracic;  Laterality: N/A;       Family History   Problem Relation Age of Onset    Cancer Mother     Cancer Father        Social History     Socioeconomic History    Marital status:    Tobacco Use    Smoking status: Former     Packs/day: 1.50     Years: 25.00     Additional pack years: 0.00     Total pack years: 37.50     Types: Cigarettes     Start date:      Quit date:      Years since quittin.9    Smokeless tobacco: Never   Vaping Use    Vaping Use: Never used   Substance and Sexual Activity    Alcohol use: No    Drug use: No    Sexual activity: Defer           Objective   Physical Exam  Vitals reviewed.   Constitutional:       Appearance: He is ill-appearing.   HENT:      Head: Normocephalic and atraumatic.      Nose: Nose normal.   Eyes:      Conjunctiva/sclera: Conjunctivae normal.   Cardiovascular:      Rate and Rhythm: Normal rate and regular rhythm.      Heart sounds: Normal heart sounds.   Pulmonary:      Effort: Pulmonary effort is normal.      Breath sounds: Examination of the right-middle field reveals wheezing. Examination of the left-middle field reveals  wheezing. Examination of the right-lower field reveals wheezing and rhonchi. Examination of the left-lower field reveals wheezing and rhonchi. Decreased breath sounds, wheezing and rhonchi present.   Abdominal:      General: Bowel sounds are normal.      Palpations: Abdomen is soft.   Musculoskeletal:         General: No tenderness.      Cervical back: Normal range of motion and neck supple.      Right lower leg: Edema present.      Left lower leg: Edema present.      Comments: Right greater than left lower extremity edema with chronic skin changes to the right lower extremity.   Skin:     General: Skin is warm and dry.   Neurological:      General: No focal deficit present.      Mental Status: He is alert.      Cranial Nerves: No cranial nerve deficit.   Psychiatric:         Mood and Affect: Mood normal. Mood is not anxious.         Procedures           ED Course  ED Course as of 11/19/23 1650   Sun Nov 19, 2023   1647 Spoke with hospitalist.  He is agreeable to admission. [AJ]      ED Course User Index  [AJ] Brian Shin DO      Lab Results (last 24 hours)       Procedure Component Value Units Date/Time    CBC & Differential [[id number]]  (Abnormal) Collected: 11/19/23 1527    Specimen: Blood Updated: 11/19/23 1540    Narrative:      The following orders were created for panel order CBC & Differential.  Procedure                               Abnormality         Status                     ---------                               -----------         ------                     CBC Auto Differential[[id number]]        Abnormal            Final result                 Please view results for these tests on the individual orders.    Comprehensive Metabolic Panel [[id number]]  (Abnormal) Collected: 11/19/23 1527    Specimen: Blood Updated: 11/19/23 1600     Glucose 103 mg/dL      BUN 25 mg/dL      Creatinine 2.73 mg/dL      Sodium 141 mmol/L      Potassium 4.2 mmol/L      Chloride 108 mmol/L      CO2 21.0 mmol/L       Calcium 8.9 mg/dL      Total Protein 6.8 g/dL      Albumin 4.2 g/dL      ALT (SGPT) 12 U/L      AST (SGOT) 17 U/L      Alkaline Phosphatase 62 U/L      Total Bilirubin 0.5 mg/dL      Globulin 2.6 gm/dL      A/G Ratio 1.6 g/dL      BUN/Creatinine Ratio 9.2     Anion Gap 12.0 mmol/L      eGFR 22.4 mL/min/1.73     Narrative:      GFR Normal >60  Chronic Kidney Disease <60  Kidney Failure <15    The GFR formula is only valid for adults with stable renal function between ages 18 and 70.    High Sensitivity Troponin T [[id number]]  (Abnormal) Collected: 11/19/23 1527    Specimen: Blood Updated: 11/19/23 1557     HS Troponin T 34 ng/L     Narrative:      High Sensitive Troponin T Reference Range:  <14.0 ng/L- Negative Female for AMI  <22.0 ng/L- Negative Male for AMI  >=14 - Abnormal Female indicating possible myocardial injury.  >=22 - Abnormal Male indicating possible myocardial injury.   Clinicians would have to utilize clinical acumen, EKG, Troponin, and serial changes to determine if it is an Acute Myocardial Infarction or myocardial injury due to an underlying chronic condition.         BNP [[id number]]  (Abnormal) Collected: 11/19/23 1527    Specimen: Blood Updated: 11/19/23 1557     proBNP 7,460.0 pg/mL     Narrative:      This assay is used as an aid in the diagnosis of individuals suspected of having heart failure. It can be used as an aid in the diagnosis of acute decompensated heart failure (ADHF) in patients presenting with signs and symptoms of ADHF to the emergency department (ED). In addition, NT-proBNP of <300 pg/mL indicates ADHF is not likely.    Age Range Result Interpretation  NT-proBNP Concentration (pg/mL:      <50             Positive            >450                   Gray                 300-450                    Negative             <300    50-75           Positive            >900                  Gray                300-900                  Negative            <300      >75             Positive             >1800                  Gray                300-1800                  Negative            <300    Lactic Acid, Plasma [[id number]]  (Normal) Collected: 11/19/23 1527    Specimen: Blood Updated: 11/19/23 1557     Lactate 0.9 mmol/L     CBC Auto Differential [[id number]]  (Abnormal) Collected: 11/19/23 1527    Specimen: Blood Updated: 11/19/23 1540     WBC 11.51 10*3/mm3      RBC 3.83 10*6/mm3      Hemoglobin 11.7 g/dL      Hematocrit 35.6 %      MCV 93.0 fL      MCH 30.5 pg      MCHC 32.9 g/dL      RDW 13.0 %      RDW-SD 43.8 fl      MPV 10.7 fL      Platelets 107 10*3/mm3      Neutrophil % 87.5 %      Lymphocyte % 4.9 %      Monocyte % 4.7 %      Eosinophil % 2.2 %      Basophil % 0.3 %      Immature Grans % 0.4 %      Neutrophils, Absolute 10.07 10*3/mm3      Lymphocytes, Absolute 0.56 10*3/mm3      Monocytes, Absolute 0.54 10*3/mm3      Eosinophils, Absolute 0.25 10*3/mm3      Basophils, Absolute 0.04 10*3/mm3      Immature Grans, Absolute 0.05 10*3/mm3      nRBC 0.0 /100 WBC     COVID PRE-OP / PRE-PROCEDURE SCREENING ORDER (NO ISOLATION) - Swab, Nasopharynx [[id number]]  (Normal) Collected: 11/19/23 1535    Specimen: Swab from Nasopharynx Updated: 11/19/23 1622    Narrative:      The following orders were created for panel order COVID PRE-OP / PRE-PROCEDURE SCREENING ORDER (NO ISOLATION) - Swab, Nasopharynx.  Procedure                               Abnormality         Status                     ---------                               -----------         ------                     COVID-19 and FLU A/B PCR...[[id number]]  Normal              Final result                 Please view results for these tests on the individual orders.    COVID-19 and FLU A/B PCR, 1 HR TAT - Swab, Nasopharynx [[id number]]  (Normal) Collected: 11/19/23 1535    Specimen: Swab from Nasopharynx Updated: 11/19/23 1622     COVID19 Not Detected     Influenza A PCR Not Detected     Influenza B PCR Not Detected    Narrative:      Fact sheet  for providers: https://www.fda.gov/media/985378/download    Fact sheet for patients: https://www.fda.gov/media/147279/download    Test performed by PCR.          XR Chest 1 View   Final Result   1. No acute appearing infiltrate or effusion.   2. Coarse markings and bronchial wall thickening, stable.               This report was signed and finalized on 11/19/2023 3:43 PM CST by Dr. Micheal Marquez MD.                                                 Medical Decision Making  Problems Addressed:  Acute heart failure, unspecified heart failure type: complicated acute illness or injury    Amount and/or Complexity of Data Reviewed  Labs: ordered.     Details: CBC CMP COVID BNP troponin  Radiology: ordered.     Details: Chest x-ray    Risk  Prescription drug management.  Decision regarding hospitalization.        Final diagnoses:   Acute heart failure, unspecified heart failure type       ED Disposition  ED Disposition       ED Disposition   Decision to Admit    Condition   --    Comment   Level of Care: Med/Surg [1]   Diagnosis: Acute heart failure [623301]   Admitting Physician: MILAGROS ARCHER [[id number]]   Certification: I Certify That Inpatient Hospital Services Are Medically Necessary For Greater Than 2 Midnights                 No follow-up provider specified.       Medication List      No changes were made to your prescriptions during this visit.            Brian Shin, DO  11/19/23 1536       Brian Shin, DO  11/19/23 1644       Brian Shin, DO  11/19/23 1650

## 2023-11-19 NOTE — H&P
[facility] Medicine Services  HISTORY AND PHYSICAL    Date of Admission: 11/19/2023  Primary Care Physician: Spike Polanco,     Subjective   Primary Historian: Patient  Chief Complaint: Shortness of breath    History of Present Illness  Patient relates that he has been having increased shortness of breath over the last 24 hours.  Yesterday afternoon he started having marked shortness of breath.  He does have some cough.  He does have some sputum production.  He is not had anything like this before.  He does use an inhaler occasionally.  He has no nausea or vomiting associated with it.  Does have some chest discomfort for that.  He is unable to lie flat.        Review of Systems   Otherwise complete ROS reviewed and negative except as mentioned in the HPI.    Past Medical History:   Past Medical History:   Diagnosis Date    Arthritis     Cataract     Chronic fatigue 10/3/2017    Chronic renal disease, stage IV     Coronary artery disease     CVA (cerebral vascular accident) 8/14/2018    Hyperlipidemia     Hypertension     Palpitations 10/3/2017    Premature atrial contractions 10/17/2017    PVCs (premature ventricular contractions) 10/17/2017    Sleep apnea     Stage 4 chronic kidney disease 7/30/2018    Stroke 2007    Weakness     right leg weaker     Past Surgical History:  Past Surgical History:   Procedure Laterality Date    CARDIAC CATHETERIZATION      CARDIAC CATHETERIZATION N/A 5/28/2020    Procedure: Left Heart Cath;  Surgeon: Rufino Brice MD;  Location: [facility] CATH INVASIVE LOCATION;  Service: Cardiology;  Laterality: N/A;    CORONARY ARTERY BYPASS GRAFT N/A 6/9/2020    Procedure: CABG X 3, LIMA, SHELBY, EVH WITH OPEN EXTENSION LOWER RIGHT LEG;  Surgeon: Nikunj Carballo MD;  Location: [facility] OR;  Service: Cardiothoracic;  Laterality: N/A;     Social History:  reports that he quit smoking about 39 years ago. His smoking use included cigarettes. He  started smoking about 64 years ago. He has a 37.50 pack-year smoking history. He has never used smokeless tobacco. He reports that he does not drink alcohol and does not use drugs.    Family History: family history includes Cancer in his father and mother.       Allergies:  Allergies   Allergen Reactions    Hydralazine Nausea Only    Losartan Potassium Nausea Only    Penicillins Hives    Spironolactone Swelling     Made  Breast sore and swell       Medications:  Prior to Admission medications    Medication Sig Start Date End Date Taking? Authorizing Provider   albuterol sulfate  (90 Base) MCG/ACT inhaler  3/14/22   Mitesh Contreras MD   amLODIPine (NORVASC) 10 MG tablet Take 1 tablet by mouth Daily.    Mitesh Contreras MD   aspirin 81 MG EC tablet Take 1 tablet by mouth Daily. 5/31/20   Wesley Lew DO   Cholecalciferol (VITAMIN D) 2000 units capsule Take 1 capsule by mouth Daily.    Mitesh Contreras MD   cloNIDine (CATAPRES) 0.1 MG tablet Take 1 tablet by mouth As Needed for High Blood Pressure.    Mitesh Contreras MD   FeroSul 325 (65 Fe) MG tablet TAKE 1 TABLET BY MOUTH 2 (TWO) TIMES A DAY. 6/21/23   Samantha Winkler APRN   hydrALAZINE (APRESOLINE) 100 MG tablet Take 1 tablet by mouth 3 (Three) Times a Day.    Mitesh Contreras MD   levocetirizine (XYZAL) 5 MG tablet  3/16/22   Mitesh Contreras MD   lisinopril (PRINIVIL,ZESTRIL) 20 MG tablet Take 1 tablet by mouth Daily.    Mitesh Contreras MD   magnesium oxide (MAGOX) 400 (241.3 Mg) MG tablet tablet Take 1 tablet by mouth 1 (One) Time.    Mitesh Contreras MD   metoprolol tartrate (LOPRESSOR) 50 MG tablet TAKE 1 TABLET BY MOUTH 2 (TWO) TIMES A DAY 2/6/23   Rufino Brice MD   nitroglycerin (NITROSTAT) 0.4 MG SL tablet 1 under the tongue as needed for angina, may repeat q5mins for up three doses 1/29/21   Jaz Musa APRN   pravastatin (PRAVACHOL) 40 MG tablet TAKE 1 TABLET BY MOUTH DAILY. 2/26/23  "  Rufino Brice MD   sodium bicarbonate 325 MG tablet Take 1 tablet by mouth 4 (Four) Times a Day.    ProviderMitesh MD   vitamin B-12 (CYANOCOBALAMIN) 100 MCG tablet Take 0.5 tablets by mouth Daily.    Provider, MD Mitesh     I have utilized all available immediate resources to obtain, update, or review the patient's current medications (including all prescriptions, over-the-counter products, herbals, cannabis/cannabidiol products, and vitamin/mineral/dietary (nutritional) supplements).    Objective     Vital Signs: /94 (BP Location: Left arm, Patient Position: Lying)   Pulse 92   Temp 97.4 °F (36.3 °C) (Oral)   Resp 18   Ht 182.9 cm (72\")   Wt 101 kg (223 lb)   SpO2 90%   BMI 30.24 kg/m²   Physical Exam  Vitals and nursing note reviewed.   Constitutional:       General: He is in acute distress.      Appearance: Normal appearance.   HENT:      Head: Normocephalic and atraumatic.      Right Ear: External ear normal.      Left Ear: External ear normal.      Nose: Nose normal.      Mouth/Throat:      Mouth: Mucous membranes are moist.   Eyes:      Extraocular Movements: Extraocular movements intact.      Conjunctiva/sclera: Conjunctivae normal.      Pupils: Pupils are equal, round, and reactive to light.   Cardiovascular:      Rate and Rhythm: Normal rate and regular rhythm.      Pulses: Normal pulses.      Heart sounds: No murmur heard.     No friction rub. No gallop.   Pulmonary:      Comments: Suppressions unlabored.  Patient has audible wheezing.  Abdominal:      General: Bowel sounds are normal.      Palpations: Abdomen is soft.   Musculoskeletal:         General: Normal range of motion.      Cervical back: Normal range of motion and neck supple.      Right lower leg: Edema present.      Left lower leg: Edema present.      Comments: Patient has pitting edema of his lower extremities the right lower extremity is 3+ left lower extremity is 2+   Skin:     General: Skin is warm and " dry.      Capillary Refill: Capillary refill takes less than 2 seconds.   Neurological:      General: No focal deficit present.      Mental Status: He is alert and oriented to person, place, and time.      Cranial Nerves: No cranial nerve deficit.   Psychiatric:         Mood and Affect: Mood normal.         Behavior: Behavior normal.              Results Reviewed:  Lab Results (last 24 hours)       Procedure Component Value Units Date/Time    Respiratory Panel PCR w/COVID-19(SARS-CoV-2) LEN/KATINA/MARK/PAD/COR/SAKINA In-House, NP Swab in UTM/VTM, 2 HR TAT - Swab, Nasopharynx [[id number]] Collected: 11/19/23 1535    Specimen: Swab from Nasopharynx Updated: 11/19/23 1722    COVID PRE-OP / PRE-PROCEDURE SCREENING ORDER (NO ISOLATION) - Swab, Nasopharynx [[id number]]  (Normal) Collected: 11/19/23 1535    Specimen: Swab from Nasopharynx Updated: 11/19/23 1622    Narrative:      The following orders were created for panel order COVID PRE-OP / PRE-PROCEDURE SCREENING ORDER (NO ISOLATION) - Swab, Nasopharynx.  Procedure                               Abnormality         Status                     ---------                               -----------         ------                     COVID-19 and FLU A/B PCR...[[id number]]  Normal              Final result                 Please view results for these tests on the individual orders.    COVID-19 and FLU A/B PCR, 1 HR TAT - Swab, Nasopharynx [[id number]]  (Normal) Collected: 11/19/23 1535    Specimen: Swab from Nasopharynx Updated: 11/19/23 1622     COVID19 Not Detected     Influenza A PCR Not Detected     Influenza B PCR Not Detected    Narrative:      Fact sheet for providers: https://www.fda.gov/media/270578/download    Fact sheet for patients: https://www.fda.gov/media/626962/download    Test performed by PCR.    Comprehensive Metabolic Panel [[id number]]  (Abnormal) Collected: 11/19/23 1527    Specimen: Blood Updated: 11/19/23 1600     Glucose 103 mg/dL      BUN 25 mg/dL       Creatinine 2.73 mg/dL      Sodium 141 mmol/L      Potassium 4.2 mmol/L      Chloride 108 mmol/L      CO2 21.0 mmol/L      Calcium 8.9 mg/dL      Total Protein 6.8 g/dL      Albumin 4.2 g/dL      ALT (SGPT) 12 U/L      AST (SGOT) 17 U/L      Alkaline Phosphatase 62 U/L      Total Bilirubin 0.5 mg/dL      Globulin 2.6 gm/dL      A/G Ratio 1.6 g/dL      BUN/Creatinine Ratio 9.2     Anion Gap 12.0 mmol/L      eGFR 22.4 mL/min/1.73     Narrative:      GFR Normal >60  Chronic Kidney Disease <60  Kidney Failure <15    The GFR formula is only valid for adults with stable renal function between ages 18 and 70.    BNP [[id number]]  (Abnormal) Collected: 11/19/23 1527    Specimen: Blood Updated: 11/19/23 1557     proBNP 7,460.0 pg/mL     Narrative:      This assay is used as an aid in the diagnosis of individuals suspected of having heart failure. It can be used as an aid in the diagnosis of acute decompensated heart failure (ADHF) in patients presenting with signs and symptoms of ADHF to the emergency department (ED). In addition, NT-proBNP of <300 pg/mL indicates ADHF is not likely.    Age Range Result Interpretation  NT-proBNP Concentration (pg/mL:      <50             Positive            >450                   Gray                 300-450                    Negative             <300    50-75           Positive            >900                  Gray                300-900                  Negative            <300      >75             Positive            >1800                  Gray                300-1800                  Negative            <300    Lactic Acid, Plasma [[id number]]  (Normal) Collected: 11/19/23 1527    Specimen: Blood Updated: 11/19/23 1557     Lactate 0.9 mmol/L     High Sensitivity Troponin T [[id number]]  (Abnormal) Collected: 11/19/23 1527    Specimen: Blood Updated: 11/19/23 1557     HS Troponin T 34 ng/L     Narrative:      High Sensitive Troponin T Reference Range:  <14.0 ng/L- Negative Female for  AMI  <22.0 ng/L- Negative Male for AMI  >=14 - Abnormal Female indicating possible myocardial injury.  >=22 - Abnormal Male indicating possible myocardial injury.   Clinicians would have to utilize clinical acumen, EKG, Troponin, and serial changes to determine if it is an Acute Myocardial Infarction or myocardial injury due to an underlying chronic condition.         CBC & Differential [[id number]]  (Abnormal) Collected: 11/19/23 1527    Specimen: Blood Updated: 11/19/23 1540    Narrative:      The following orders were created for panel order CBC & Differential.  Procedure                               Abnormality         Status                     ---------                               -----------         ------                     CBC Auto Differential[[id number]]        Abnormal            Final result                 Please view results for these tests on the individual orders.    CBC Auto Differential [[id number]]  (Abnormal) Collected: 11/19/23 1527    Specimen: Blood Updated: 11/19/23 1540     WBC 11.51 10*3/mm3      RBC 3.83 10*6/mm3      Hemoglobin 11.7 g/dL      Hematocrit 35.6 %      MCV 93.0 fL      MCH 30.5 pg      MCHC 32.9 g/dL      RDW 13.0 %      RDW-SD 43.8 fl      MPV 10.7 fL      Platelets 107 10*3/mm3      Neutrophil % 87.5 %      Lymphocyte % 4.9 %      Monocyte % 4.7 %      Eosinophil % 2.2 %      Basophil % 0.3 %      Immature Grans % 0.4 %      Neutrophils, Absolute 10.07 10*3/mm3      Lymphocytes, Absolute 0.56 10*3/mm3      Monocytes, Absolute 0.54 10*3/mm3      Eosinophils, Absolute 0.25 10*3/mm3      Basophils, Absolute 0.04 10*3/mm3      Immature Grans, Absolute 0.05 10*3/mm3      nRBC 0.0 /100 WBC           Imaging Results (Last 24 Hours)       Procedure Component Value Units Date/Time    CT Chest Without Contrast Diagnostic [[id number]] Resulted: 11/19/23 1759     Updated: 11/19/23 1738    XR Chest 1 View [[id number]] Collected: 11/19/23 1542     Updated: 11/19/23 1546    Narrative:       EXAMINATION:  XR CHEST 1 VW-  11/19/2023 3:20 PM CST     HISTORY: SOA with audible wheezing. PNA vs CHF.     COMPARISON: 3/31/2023.     TECHNIQUE: Single view AP image.     FINDINGS: Coarse markings and bronchial wall thickening appear stable.  No new infiltrate is seen. Heart size is normal. Prior median  sternotomy. The thoracic aorta is atheromatous. There are degenerative  changes of the spine and shoulders.          Impression:      1. No acute appearing infiltrate or effusion.  2. Coarse markings and bronchial wall thickening, stable.           This report was signed and finalized on 11/19/2023 3:43 PM CST by Dr. Micheal Marquez MD.             I have personally reviewed and interpreted the radiology studies and ECG obtained at time of admission.     Assessment / Plan   Assessment:   Active Hospital Problems    Diagnosis     **Acute hypoxic respiratory failure     Acute heart failure     Peripheral edema     Coronary artery disease     Stage 4 chronic kidney disease     Essential hypertension        Treatment Plan  The patient will be admitted to my service here at [facility].   Admit the patient to the telemetry floor   Full respiratory viral plan will  Supplemental oxygen  Diurese  Echocardiogram  Consult nephrology as patient does have stage IV renal failure  A.m. CMP CBC  CT chest without contrast          Medical Decision Making  Number and Complexity of problems:   Acute heart failure versus respiratory viral infection high complexity  Peripheral edema moderate complexity  Stage IV chronic kidney disease moderate complexity  Essential hypertension moderate complexity  Coronary artery disease moderate complexity    Differential Diagnosis: Viral upper respiratory tract infection    Conditions and Status        Condition is unchanged.     [facility] Data  External documents reviewed: Old records reviewed  Cardiac tracing (EKG, telemetry) interpretation: Reviewed  Radiology interpretation:  Reviewed  Labs reviewed: Reviewed  Any tests that were considered but not ordered: None     Decision rules/scores evaluated (example [id number], Wells, etc): None     Discussed with: Patient     Care Planning  Shared decision making: Patient  Code status and discussions: Full    Disposition  Social Determinants of Health that impact treatment or disposition: None  Estimated length of stay is 2 to 3 days.     I confirmed that the patient's advanced care plan is present, code status is documented, and a surrogate decision maker is listed in the patient's medical record.     The patient's surrogate decision maker is daughter Susan Ray.     The patient was seen and examined by me on 11/19/2023 at 1700 .    Electronically signed by Shirley Bryant, 11/19/23, 18:07 CST.

## 2023-11-19 NOTE — PROGRESS NOTES
"Pharmacy Dosing Service  Anticoagulant  Enoxaparin    Assessment/Action/Plan:  Lovenox dosed at 30mg sc q24 based on indication and renal function. Pharmacy will follow daily and adjust as needed.      Subjective:  Faisal Joseph is a 83 y.o. male   Pharmacy to dose Lovenox for indication of VTE prophylaxis.    Objective:    [Ht: 182.9 cm (72\"); Wt: 101 kg (223 lb); BMI: Body mass index is 30.24 kg/m².]  Estimated Creatinine Clearance: 25.2 mL/min (A) (by C-G formula based on SCr of 2.73 mg/dL (H)).       Lab Results   Component Value Date    HGB 11.7 (L) 11/19/2023   10/04/2023   06/02/2023      Lab Results   Component Value Date     (L) 11/19/2023       JENNIFER Gay, PharmD  11/19/23 17:14 CST     "

## 2023-11-20 ENCOUNTER — APPOINTMENT (OUTPATIENT)
Dept: ULTRASOUND IMAGING | Facility: HOSPITAL | Age: 83
End: 2023-11-20
Payer: MEDICARE

## 2023-11-20 ENCOUNTER — APPOINTMENT (OUTPATIENT)
Dept: CARDIOLOGY | Facility: HOSPITAL | Age: 83
End: 2023-11-20
Payer: MEDICARE

## 2023-11-20 LAB
ANION GAP SERPL CALCULATED.3IONS-SCNC: 9 MMOL/L (ref 5–15)
BUN SERPL-MCNC: 28 MG/DL (ref 8–23)
BUN/CREAT SERPL: 8.9 (ref 7–25)
CALCIUM SPEC-SCNC: 8.7 MG/DL (ref 8.6–10.5)
CHLORIDE SERPL-SCNC: 109 MMOL/L (ref 98–107)
CO2 SERPL-SCNC: 21 MMOL/L (ref 22–29)
CREAT SERPL-MCNC: 3.16 MG/DL (ref 0.76–1.27)
EGFRCR SERPLBLD CKD-EPI 2021: 18.8 ML/MIN/1.73
GLUCOSE SERPL-MCNC: 153 MG/DL (ref 65–99)
POTASSIUM SERPL-SCNC: 4.1 MMOL/L (ref 3.5–5.2)
SODIUM SERPL-SCNC: 139 MMOL/L (ref 136–145)
SODIUM UR-SCNC: 105 MMOL/L

## 2023-11-20 PROCEDURE — 76775 US EXAM ABDO BACK WALL LIM: CPT

## 2023-11-20 PROCEDURE — 93356 MYOCRD STRAIN IMG SPCKL TRCK: CPT | Performed by: INTERNAL MEDICINE

## 2023-11-20 PROCEDURE — 93356 MYOCRD STRAIN IMG SPCKL TRCK: CPT

## 2023-11-20 PROCEDURE — 84300 ASSAY OF URINE SODIUM: CPT | Performed by: INTERNAL MEDICINE

## 2023-11-20 PROCEDURE — 25010000002 ENOXAPARIN PER 10 MG: Performed by: INTERNAL MEDICINE

## 2023-11-20 PROCEDURE — 94799 UNLISTED PULMONARY SVC/PX: CPT

## 2023-11-20 PROCEDURE — 25010000002 METHYLPREDNISOLONE PER 40 MG: Performed by: NURSE PRACTITIONER

## 2023-11-20 PROCEDURE — 94664 DEMO&/EVAL PT USE INHALER: CPT

## 2023-11-20 PROCEDURE — 80048 BASIC METABOLIC PNL TOTAL CA: CPT | Performed by: FAMILY MEDICINE

## 2023-11-20 PROCEDURE — 93306 TTE W/DOPPLER COMPLETE: CPT

## 2023-11-20 PROCEDURE — 25510000001 PERFLUTREN 6.52 MG/ML SUSPENSION: Performed by: INTERNAL MEDICINE

## 2023-11-20 PROCEDURE — 82570 ASSAY OF URINE CREATININE: CPT | Performed by: FAMILY MEDICINE

## 2023-11-20 PROCEDURE — 93306 TTE W/DOPPLER COMPLETE: CPT | Performed by: INTERNAL MEDICINE

## 2023-11-20 RX ORDER — METOPROLOL TARTRATE 50 MG/1
50 TABLET, FILM COATED ORAL 2 TIMES DAILY
COMMUNITY
End: 2023-12-04 | Stop reason: HOSPADM

## 2023-11-20 RX ORDER — METOPROLOL TARTRATE 50 MG/1
50 TABLET, FILM COATED ORAL 2 TIMES DAILY
Status: DISCONTINUED | OUTPATIENT
Start: 2023-11-20 | End: 2023-11-22 | Stop reason: HOSPADM

## 2023-11-20 RX ORDER — FUROSEMIDE 40 MG/1
40 TABLET ORAL ONCE
Status: COMPLETED | OUTPATIENT
Start: 2023-11-20 | End: 2023-11-20

## 2023-11-20 RX ORDER — GUAIFENESIN 600 MG/1
1200 TABLET, EXTENDED RELEASE ORAL EVERY 12 HOURS SCHEDULED
Status: DISCONTINUED | OUTPATIENT
Start: 2023-11-20 | End: 2023-11-22 | Stop reason: HOSPADM

## 2023-11-20 RX ORDER — SODIUM BICARBONATE 650 MG/1
325 TABLET ORAL 4 TIMES DAILY
Status: DISCONTINUED | OUTPATIENT
Start: 2023-11-20 | End: 2023-11-22 | Stop reason: HOSPADM

## 2023-11-20 RX ORDER — PRAVASTATIN SODIUM 20 MG
40 TABLET ORAL DAILY
Status: DISCONTINUED | OUTPATIENT
Start: 2023-11-20 | End: 2023-11-22 | Stop reason: HOSPADM

## 2023-11-20 RX ORDER — ASPIRIN 81 MG/1
81 TABLET ORAL DAILY
Status: DISCONTINUED | OUTPATIENT
Start: 2023-11-20 | End: 2023-11-22 | Stop reason: HOSPADM

## 2023-11-20 RX ORDER — BUDESONIDE AND FORMOTEROL FUMARATE DIHYDRATE 160; 4.5 UG/1; UG/1
2 AEROSOL RESPIRATORY (INHALATION)
Status: DISCONTINUED | OUTPATIENT
Start: 2023-11-20 | End: 2023-11-22 | Stop reason: HOSPADM

## 2023-11-20 RX ORDER — HYDRALAZINE HYDROCHLORIDE 50 MG/1
100 TABLET, FILM COATED ORAL 3 TIMES DAILY
Status: DISCONTINUED | OUTPATIENT
Start: 2023-11-20 | End: 2023-11-22 | Stop reason: HOSPADM

## 2023-11-20 RX ORDER — TERAZOSIN 1 MG/1
1 CAPSULE ORAL NIGHTLY
COMMUNITY

## 2023-11-20 RX ORDER — CETIRIZINE HYDROCHLORIDE 10 MG/1
10 TABLET ORAL DAILY
Status: DISCONTINUED | OUTPATIENT
Start: 2023-11-20 | End: 2023-11-22 | Stop reason: HOSPADM

## 2023-11-20 RX ORDER — METHYLPREDNISOLONE SODIUM SUCCINATE 40 MG/ML
40 INJECTION, POWDER, LYOPHILIZED, FOR SOLUTION INTRAMUSCULAR; INTRAVENOUS EVERY 12 HOURS
Status: DISCONTINUED | OUTPATIENT
Start: 2023-11-20 | End: 2023-11-21

## 2023-11-20 RX ORDER — FERROUS SULFATE 325(65) MG
325 TABLET ORAL 2 TIMES DAILY
COMMUNITY

## 2023-11-20 RX ORDER — PRAVASTATIN SODIUM 40 MG
40 TABLET ORAL DAILY
COMMUNITY

## 2023-11-20 RX ADMIN — METOPROLOL TARTRATE 50 MG: 50 TABLET, FILM COATED ORAL at 09:40

## 2023-11-20 RX ADMIN — HYDRALAZINE HYDROCHLORIDE 100 MG: 50 TABLET ORAL at 09:40

## 2023-11-20 RX ADMIN — HYDRALAZINE HYDROCHLORIDE 100 MG: 50 TABLET ORAL at 16:23

## 2023-11-20 RX ADMIN — PRAVASTATIN SODIUM 40 MG: 20 TABLET ORAL at 09:40

## 2023-11-20 RX ADMIN — ENOXAPARIN SODIUM 30 MG: 100 INJECTION SUBCUTANEOUS at 17:34

## 2023-11-20 RX ADMIN — GUAIFENESIN 1200 MG: 600 TABLET, EXTENDED RELEASE ORAL at 21:54

## 2023-11-20 RX ADMIN — METOPROLOL TARTRATE 50 MG: 50 TABLET, FILM COATED ORAL at 21:54

## 2023-11-20 RX ADMIN — HYDRALAZINE HYDROCHLORIDE 100 MG: 50 TABLET ORAL at 21:54

## 2023-11-20 RX ADMIN — IPRATROPIUM BROMIDE AND ALBUTEROL SULFATE 3 ML: 2.5; .5 SOLUTION RESPIRATORY (INHALATION) at 19:39

## 2023-11-20 RX ADMIN — BUDESONIDE AND FORMOTEROL FUMARATE DIHYDRATE 2 PUFF: 160; 4.5 AEROSOL RESPIRATORY (INHALATION) at 19:39

## 2023-11-20 RX ADMIN — FUROSEMIDE 40 MG: 40 TABLET ORAL at 12:19

## 2023-11-20 RX ADMIN — IPRATROPIUM BROMIDE AND ALBUTEROL SULFATE 3 ML: 2.5; .5 SOLUTION RESPIRATORY (INHALATION) at 07:05

## 2023-11-20 RX ADMIN — IPRATROPIUM BROMIDE AND ALBUTEROL SULFATE 3 ML: 2.5; .5 SOLUTION RESPIRATORY (INHALATION) at 11:15

## 2023-11-20 RX ADMIN — SODIUM BICARBONATE 325 MG: 650 TABLET ORAL at 12:18

## 2023-11-20 RX ADMIN — SODIUM BICARBONATE 325 MG: 650 TABLET ORAL at 21:54

## 2023-11-20 RX ADMIN — IPRATROPIUM BROMIDE AND ALBUTEROL SULFATE 3 ML: 2.5; .5 SOLUTION RESPIRATORY (INHALATION) at 23:52

## 2023-11-20 RX ADMIN — PERFLUTREN 13.04 MG: 6.52 INJECTION, SUSPENSION INTRAVENOUS at 14:07

## 2023-11-20 RX ADMIN — ASPIRIN 81 MG: 81 TABLET, COATED ORAL at 09:40

## 2023-11-20 RX ADMIN — METHYLPREDNISOLONE SODIUM SUCCINATE 40 MG: 40 INJECTION, POWDER, LYOPHILIZED, FOR SOLUTION INTRAMUSCULAR; INTRAVENOUS at 16:23

## 2023-11-20 RX ADMIN — SODIUM BICARBONATE 325 MG: 650 TABLET ORAL at 17:35

## 2023-11-20 NOTE — PLAN OF CARE
Goal Outcome Evaluation:  Plan of Care Reviewed With: patient        Progress: improving  Outcome Evaluation: ADMITTED FROM ED WITH ACUTE HEART FAILURE. BNP = 7460. TROPONIN = 34.  NEPHROLOGY CONSULTED - DR. CASTANEDA. BUN = 25  CREATININE = 2.73  GFR = 22.4  O2 ON AT 2 L/M PER NASAL CANNULA. CONT. PULSE OX INTACT. BREATH SOUNDS DIMINISHED. WHEEZING NOTED. EDEMA NOTED BILATERAL LOWER EXTREMITIES (RIGHT WORSE THAN LLE). DAILY WEIGHT. I & O. NO DISTRESS NOTED.

## 2023-11-20 NOTE — PLAN OF CARE
Goal Outcome Evaluation:  Plan of Care Reviewed With: patient        Progress: improving  Outcome Evaluation: RENAL ULTRASOUND AND 2D ECHO DONE TODAY PER ORDER. PT. CONT. TO WEAR O2 PER NASAL CANNULA @ 2 L/M PER NASAL CANNULA. CONT. PULSE OX INTACT. ROOM AIR. PT. UP AD LOY. NO DISTRESS NOTED.

## 2023-11-20 NOTE — PROGRESS NOTES
[facility] Medicine Services  INPATIENT PROGRESS NOTE    Patient Name: Faisal Joseph  Date of Admission: 11/19/2023  Today's Date: 11/20/23  Length of Stay: 1  Primary Care Physician: Spike Polanco DO    Subjective   Chief Complaint: shortness of breath  HPI   Sitting up in bed.  He tells me that his breathing feels a little bit better today.  He still has shortness of breath with activity.  He is on 2 L nasal cannula.  He tells me normally he does not wear oxygen at home.  He received a dose of IV Lasix yesterday and p.o. dose today.  He is net -1 L since admission.    Review of Systems   All pertinent negatives and positives are as above. All other systems have been reviewed and are negative unless otherwise stated.     Objective    Temp:  [97.4 °F (36.3 °C)-98.5 °F (36.9 °C)] 98.2 °F (36.8 °C)  Heart Rate:  [63-95] 77  Resp:  [16-24] 18  BP: (149-178)/(60-94) 157/60  Physical Exam  Vitals reviewed.   Constitutional:       General: He is not in acute distress.     Appearance: He is not toxic-appearing.      Interventions: Nasal cannula in place.      Comments: Lying in bed.  No acute distress.  On 2 L.  No family at bedside.   HENT:      Head: Normocephalic and atraumatic.      Mouth/Throat:      Mouth: Mucous membranes are moist.      Pharynx: Oropharynx is clear.   Eyes:      Extraocular Movements: Extraocular movements intact.      Conjunctiva/sclera: Conjunctivae normal.      Pupils: Pupils are equal, round, and reactive to light.   Cardiovascular:      Rate and Rhythm: Normal rate and regular rhythm.      Pulses: Normal pulses.   Pulmonary:      Effort: Pulmonary effort is normal. No respiratory distress.      Breath sounds: Wheezing present.   Abdominal:      General: Bowel sounds are normal. There is no distension.      Palpations: Abdomen is soft.      Tenderness: There is no abdominal tenderness.   Musculoskeletal:         General: No swelling or  "tenderness. Normal range of motion.      Cervical back: Normal range of motion and neck supple. No muscular tenderness.   Skin:     General: Skin is warm and dry.      Findings: No erythema or rash.   Neurological:      General: No focal deficit present.      Mental Status: He is alert and oriented to person, place, and time.      Cranial Nerves: No cranial nerve deficit.      Motor: No weakness.   Psychiatric:         Mood and Affect: Mood normal.         Behavior: Behavior normal.       Results Review:  I have reviewed the labs, radiology results, and diagnostic studies.    Laboratory Data:   Results from last 7 days   Lab Units 11/19/23  1527   WBC 10*3/mm3 11.51*   HEMOGLOBIN g/dL 11.7*   HEMATOCRIT % 35.6*   PLATELETS 10*3/mm3 107*        Results from last 7 days   Lab Units 11/20/23  0332 11/19/23  1855 11/19/23  1527   SODIUM mmol/L 139 138 141   POTASSIUM mmol/L 4.1 4.3 4.2   CHLORIDE mmol/L 109* 107 108*   CO2 mmol/L 21.0* 19.0* 21.0*   BUN mg/dL 28* 26* 25*   CREATININE mg/dL 3.16* 3.02* 2.73*   CALCIUM mg/dL 8.7 9.0 8.9   BILIRUBIN mg/dL  --   --  0.5   ALK PHOS U/L  --   --  62   ALT (SGPT) U/L  --   --  12   AST (SGOT) U/L  --   --  17   GLUCOSE mg/dL 153* 206* 103*       Culture Data:   No results found for: \"ACANTHNAEG\", \"AFBCX\", \"BPERTUSSISCX\", \"BLOODCX\"  No results found for: \"BCIDPCR\", \"CXREFLEX\", \"CSFCX\", \"CULTURETIS\"  No results found for: \"CULTURES\", \"HSVCX\", \"URCX\"  No results found for: \"EYECULTURE\", \"GCCX\", \"HSVCULTURE\", \"LABHSV\"  No results found for: \"LEGIONELLA\", \"MRSACX\", \"MUMPSCX\", \"MYCOPLASCX\"  No results found for: \"NOCARDIACX\", \"STOOLCX\"  No results found for: \"THROATCX\", \"UNSTIMCULT\", \"URINECX\", \"CULTURE\", \"VZVCULTUR\"  No results found for: \"VIRALCULTU\", \"WOUNDCX\"    Radiology Data:   Imaging Results (Last 24 Hours)       Procedure Component Value Units Date/Time    CT Chest Without Contrast Diagnostic [[id number]] Collected: 11/19/23 1759     Updated: 11/19/23 1815    Narrative:      " EXAMINATION:  CT CHEST WO CONTRAST DIAGNOSTIC-  11/19/2023 5:31 PM CST     HISTORY: Shortness of breath. I50.9-Heart failure, unspecified.  Coughing. Wheezing.     COMPARISON : 5/29/2020.     DLP: 252 mGy-cm. Automated dosage reduction technique was utilized to  reduce patient dosage.     TECHNIQUE: Spiral CT was performed of the chest without contrast.  Sagittal and coronal images were reconstructed.     MEDIASTINUM, HEART AND VASCULAR STRUCTURES: There is atheromatous  calcification of the thoracic aorta and coronary arteries. There has  been prior median sternotomy and bypass surgery. The ascending thoracic  aorta is ectatic measuring 3.7 cm. The main pulmonary artery segment is  dilated measuring 3.3 cm. There is cardiomegaly. There are multiple  small mediastinal lymph nodes consistent with lymphadenopathy. A lymph  node to the left of the trachea has a short axis diameter of 2.3 cm,  previously 1.7 cm. The hilar areas are difficult to evaluate due to the  lack of contrast utilization.     LUNGS: There is centrilobular and paraseptal emphysema. There is a trace  amount of pleural effusion bilaterally. There is no dense infiltrate or  effusion.     LUNG NODULES:  1. Stable 3 mm left upper lobe perivascular nodule image 48 series 3.  2. Stable 3-4 mm left upper lobe perivascular nodule image 52 series 3.  3. Stable 4 mm perivascular nodule right upper lobe image 66 series 3.  4. Stable 6-7 mm left upper lobe perivascular nodule image 80 series 3.  5. Stable 4-5 mm right lower lobe perivascular nodule image 82 series 3.  6. Stable 6 mm fissural nodule adjacent to the left major fissure image  122 series 3.     UPPER ABDOMEN: There is a small hiatal hernia. There are couple of left  renal cysts. There is a cyst in the upper pole right kidney. There is an  indeterminate lesion arising from the midpole left kidney measuring 1.7  cm with a Hounsfield unit measurement of 41.     BONES: There are degenerative changes of  the spine. No acute appearing  bony abnormality is seen. 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 There has been prior median sternotomy.          Impression:      1. 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  Prior heart bypass surgery. Ectatic ascending thoracic aorta measuring  3.7 cm. Main pulmonary artery segment is dilated at 3.3 cm.  Cardiomegaly.  2. Continued mediastinal lymphadenopathy. The largest lymph node to the  left of the trachea is larger on the current study with a short axis  diameter of 2.3 cm, previously 1.7 cm. There may be a very slight  increase in size of multiple other mediastinal lymph nodes. These lymph  nodes may be reactive or neoplastic. Sarcoidosis is also in the  differential as there are multiple stable perivascular lung nodules.  3.. Multiple stable perivascular lung nodules. Stability over this  length of time would suggest benign disease.  4. Centrilobular and paraseptal emphysema. Trace amount of pleural  effusion bilaterally.  5. A 1.7 cm indeterminate left renal lesion in the midpole region  posteriorly. This could be a small solid lesion. A complex cyst is also  possible. Follow-up ultrasound recommended to evaluate further. There  are bilateral renal cysts.  6. Small hiatal hernia.           This report was signed and finalized on 11/19/2023 6:12 PM CST by Dr. Micheal Marquez MD.       XR Chest 1 View [[id number]] Collected: 11/19/23 1542     Updated: 11/19/23 1546    Narrative:      EXAMINATION:  XR CHEST 1 VW-  11/19/2023 3:20 PM CST     HISTORY: SOA with audible wheezing. PNA vs CHF.     COMPARISON: 3/31/2023.     TECHNIQUE: Single view AP image.     FINDINGS: Coarse markings and bronchial wall thickening appear stable.  No new infiltrate is seen. Heart size is normal. Prior median  sternotomy. The thoracic aorta is atheromatous. There are degenerative  changes of the spine and shoulders.          Impression:      1. No acute  appearing infiltrate or effusion.  2. Coarse markings and bronchial wall thickening, stable.           This report was signed and finalized on 11/19/2023 3:43 PM CST by Dr. Micheal Marquez MD.               I have reviewed the patient's current medications.     Assessment/Plan   Assessment  Active Hospital Problems    Diagnosis     **Acute hypoxic respiratory failure     Acute heart failure     Peripheral edema     Coronary artery disease     Stage 4 chronic kidney disease     Essential hypertension      Mr. Joseph is an 83-year-old male who presented to [facility] on [facility]/19 for 1 day history of worsening shortness of breath and orthopnea.  Chest x-ray showed coarse markings and bronchial wall thickening.  BNP elevated at 7000.  He was given 1 dose of Lasix 40 mg and IV Solu-Medrol 60 mg x 1.    Treatment Plan  Respiratory panel positive for human rhinovirus/enterovirus.  Chest x-ray showed no acute appearing infiltrate or effusion.  CT showed continued mediastinal lymphadenopathy.  Stable lung nodules.  Trace amount of pleural effusion bilaterally.  He has a history of stage II moderate COPD and is on sample Trelegy followed by pulmonary clinic.  He was given IV Solu-Medrol in ED. Continue IV solumedrol 40 every 12 hours. Add symbicort, mucinex and incentive spirometry. Continue duonebs.     He was given Lasix IV x1 in ED.  Lasix p.o. x1 today.  Obtain transthoracic echocardiogram. Transthoracic echocardiogram in 2021 showed EF 56-60%.    Nephrology consulted he has a history of stage IV chronic kidney disease.    CT chest noted 1.7 cm indeterminate left renal lesion in the midpole region posteriorly-ultrasound recommended to further evaluate.  Obtain renal ultrasound.    CT chest showed stable lung nodules. Sarcoidosis is also in the   differential as there are multiple stable perivascular lung nodules.   3. Multiple stable perivascular lung nodules. Stability over this   length of time would suggest  benign disease.     Lovenox for VTE prophylaxis.    Medical Decision Making  Number and Complexity of problems: 1 acute problem in the form of volume overload and respiratory viral infection  Differential Diagnosis: Acute heart failure versus respiratory viral infection    Conditions and Status        Condition is unchanged.     [facility] [place] Data  External documents reviewed: Prior epic records  Cardiac tracing (EKG, telemetry) interpretation: Reviewed  Radiology interpretation: Interpreted by radiology  Labs reviewed:  As above   Any tests that were considered but not ordered: none considered at present     Decision rules/scores evaluated (example [id number], [place], etc): None considered at present     Discussed with: Patient and Dr. Lew     Care Planning  Shared decision making: Patient is agreeable to ongoing work-up and treatment  Code status and discussions: Full code with full interventions    Disposition  Social Determinants of Health that impact treatment or disposition: None identified at present  I expect the patient to be discharged to home in 1-2 days.     Electronically signed by LIZ Dick, 11/20/23, 14:01 CST.

## 2023-11-20 NOTE — PLAN OF CARE
Goal Outcome Evaluation:      Patient resting overnight. No CO pain/nausea. BP elevated. Up adlib. LE swelling. Expiratory wheezing. Safety maintained.

## 2023-11-20 NOTE — CONSULTS
Nephrology ([facility]) Consult Note      Patient:  Faisal Joseph  YOB: 1940  Date of Service: 11/20/2023  MRN: [id number]   Acct: [id number]   Primary Care Physician: Spike Polanco DO  Advance Directive:   Code Status and Medical Interventions:   Ordered at: 11/19/23 1712     Level Of Support Discussed With:    Patient     Code Status (Patient has no pulse and is not breathing):    CPR (Attempt to Resuscitate)     Medical Interventions (Patient has pulse or is breathing):    Full Support     Admit Date: 11/19/2023       Hospital Day: 1  Referring Provider: No ref. provider found      Patient personally seen and examined.  Complete chart including Consults, Notes, Operative Reports, Labs, Cardiology, and Radiology studies reviewed as able.        Subjective:  Faisal Joseph is a 83 y.o. male for whom we were consulted for evaluation and treatment of chronic kidney disease stage 4. Follows with Veda Brito in our office, baseline creatinine approximately 2.9. History of coronary artery disease, prior CVA, sleep apnea, hypertension. Presented to ER with complaint of worsening dyspnea. Also having a cough and audible wheezing. Received IV Lasix for one dose last evening. Currently is awake and alert. Dyspnea somewhat better today, remains edematous. Urine output nonoliguric    Allergies:  Hydralazine, Losartan potassium, Penicillins, and Spironolactone    Home Meds:  Medications Prior to Admission   Medication Sig Dispense Refill Last Dose    albuterol sulfate  (90 Base) MCG/ACT inhaler        amLODIPine (NORVASC) 10 MG tablet Take 1 tablet by mouth Daily.       aspirin 81 MG EC tablet Take 1 tablet by mouth Daily. 100 tablet 99     Cholecalciferol (VITAMIN D) 2000 units capsule Take 1 capsule by mouth Daily.       cloNIDine (CATAPRES) 0.1 MG tablet Take 1 tablet by mouth As Needed for High Blood Pressure.       FeroSul 325 (65 Fe) MG tablet TAKE 1  TABLET BY MOUTH 2 (TWO) TIMES A DAY. 180 tablet 1     hydrALAZINE (APRESOLINE) 100 MG tablet Take 1 tablet by mouth 3 (Three) Times a Day.       lisinopril (PRINIVIL,ZESTRIL) 20 MG tablet Take 1 tablet by mouth Daily.       magnesium oxide (MAGOX) 400 (241.3 Mg) MG tablet tablet Take 1 tablet by mouth 1 (One) Time.       metoprolol tartrate (LOPRESSOR) 50 MG tablet TAKE 1 TABLET BY MOUTH 2 (TWO) TIMES A DAY 60 tablet 11     nitroglycerin (NITROSTAT) 0.4 MG SL tablet 1 under the tongue as needed for angina, may repeat q5mins for up three doses 25 tablet 2     pravastatin (PRAVACHOL) 40 MG tablet TAKE 1 TABLET BY MOUTH DAILY. 90 tablet 3     sodium bicarbonate 325 MG tablet Take 1 tablet by mouth 2 (Two) Times a Day.       vitamin B-12 (CYANOCOBALAMIN) 100 MCG tablet Take 0.5 tablets by mouth Daily.          Medicines:  Current Facility-Administered Medications   Medication Dose Route Frequency Provider Last Rate Last Admin    acetaminophen (TYLENOL) tablet 650 mg  650 mg Oral Q4H PRN Shirley Bryant        aspirin EC tablet 81 mg  81 mg Oral Daily Covarrubias, Mariela, APRN   81 mg at 11/20/23 0940    sennosides-docusate (PERICOLACE) 8.6-50 MG per tablet 2 tablet  2 tablet Oral Nightly PRN Shirley Bryant        And    polyethylene glycol (MIRALAX) packet 17 g  17 g Oral Daily PRN Shirley Bryant        And    bisacodyl (DULCOLAX) EC tablet 5 mg  5 mg Oral Daily PRN Shirley Bryant        And    bisacodyl (DULCOLAX) suppository 10 mg  10 mg Rectal Daily PRN Shirley Bryant        cetirizine (zyrTEC) tablet 10 mg  10 mg Oral Daily Covarrubias Mariela, APRN        Enoxaparin Sodium (LOVENOX) syringe 30 mg  30 mg Subcutaneous Q24H Brian Shin DO   30 mg at 11/19/23 1835    hydrALAZINE (APRESOLINE) tablet 100 mg  100 mg Oral TID Covarrubias, Mariela, APRN   100 mg at 11/20/23 0940    ipratropium-albuterol (DUO-NEB) nebulizer solution 3 mL  3 mL Nebulization Q4H - RT Brian Shin DO   3 mL at 11/20/23 1111     metoprolol tartrate (LOPRESSOR) tablet 50 mg  50 mg Oral BID Covarrubias, Mariela, APRN   50 mg at 11/20/23 0940    nitroglycerin (NITROSTAT) SL tablet 0.4 mg  0.4 mg Sublingual Q5 Min PRN Shirley Bryant        ondansetron (ZOFRAN) injection 4 mg  4 mg Intravenous Q6H PRN Shirley Bryant        Pharmacy to Dose enoxaparin (LOVENOX)   Does not apply Continuous PRN Shirley Bryant        pravastatin (PRAVACHOL) tablet 40 mg  40 mg Oral Daily Covarrubias, Mariela, APRN   40 mg at 11/20/23 0940    sodium bicarbonate tablet 325 mg  325 mg Oral 4x Daily Mariela Covarrubias, APRN           Past Medical History:  Past Medical History:   Diagnosis Date    Arthritis     Cataract     Chronic fatigue 10/3/2017    Chronic renal disease, stage IV     Coronary artery disease     CVA (cerebral vascular accident) 8/14/2018    Hyperlipidemia     Hypertension     Palpitations 10/3/2017    Premature atrial contractions 10/17/2017    PVCs (premature ventricular contractions) 10/17/2017    Sleep apnea     Stage 4 chronic kidney disease 7/30/2018    Stroke 2007    Weakness     right leg weaker       Past Surgical History:  Past Surgical History:   Procedure Laterality Date    CARDIAC CATHETERIZATION      CARDIAC CATHETERIZATION N/A 5/28/2020    Procedure: Left Heart Cath;  Surgeon: Rufino Brice MD;  Location:  [facility];  Service: Cardiology;  Laterality: N/A;    CORONARY ARTERY BYPASS GRAFT N/A 6/9/2020    Procedure: CABG X 3, LIMA, SHELBY, EVH WITH OPEN EXTENSION LOWER RIGHT LEG;  Surgeon: Nikunj Carballo MD;  Location:  [facility] OR;  Service: Cardiothoracic;  Laterality: N/A;       Family History  Family History   Problem Relation Age of Onset    Cancer Mother     Cancer Father        Social History  Social History     Socioeconomic History    Marital status:    Tobacco Use    Smoking status: Former     Packs/day: 1.50     Years: 25.00     Additional pack years: 0.00     Total pack years: 37.50     Types:  "Cigarettes     Start date:      Quit date:      Years since quittin.9    Smokeless tobacco: Never   Vaping Use    Vaping Use: Never used   Substance and Sexual Activity    Alcohol use: No    Drug use: No    Sexual activity: Defer         Review of Systems:  History obtained from chart review and the patient  General ROS: No fever or chills  Respiratory ROS: negative  Cardiovascular ROS: positive for - dyspnea on exertion, edema, and shortness of breath  No chest pain or palpitations  Gastrointestinal ROS: No abdominal pain or melena  Genito-Urinary ROS: No dysuria or hematuria  Psych ROS: No anxiety and depression  14 point ROS reviewed with the patient and negative except as noted above and in the HPI unless unable to obtain.      Objective:  Patient Vitals for the past 24 hrs:   BP Temp Temp src Pulse Resp SpO2 Height Weight   23 1126 157/60 98.2 °F (36.8 °C) Oral 77 18 95 % -- --   23 1121 -- -- -- 63 18 100 % -- --   23 1115 -- -- -- 69 18 96 % -- --   23 0725 161/70 97.9 °F (36.6 °C) Oral 72 18 97 % -- --   23 0711 -- -- -- 71 18 98 % -- --   23 0705 -- -- -- 68 18 93 % -- --   23 0500 -- -- -- -- -- -- -- 96.5 kg (212 lb 12.8 oz)   23 0401 151/61 98 °F (36.7 °C) Oral 78 18 94 % -- --   23 2318 178/87 98 °F (36.7 °C) Oral 95 16 95 % -- --   23 2047 149/81 98.5 °F (36.9 °C) Oral 94 18 91 % -- --   23 -- -- -- 93 18 97 % -- --   23 -- -- -- 92 18 96 % -- --   23 1748 171/94 97.4 °F (36.3 °C) Oral 92 18 90 % -- --   23 1631 173/87 -- -- 86 -- 91 % -- --   23 1616 174/86 -- -- 84 -- 93 % -- --   23 1601 176/92 -- -- 87 -- 95 % -- --   23 1559 -- -- -- 82 20 94 % -- --   23 1551 -- -- -- 87 22 96 % -- --   23 1452 -- -- -- 70 -- 91 % -- --   23 1448 150/80 98.5 °F (36.9 °C) Oral 74 24 (!) 88 % 182.9 cm (72\") 101 kg (223 lb)   23 1446 -- -- -- -- -- (!) 85 % -- -- "       Intake/Output Summary (Last 24 hours) at 11/20/2023 1137  Last data filed at 11/20/2023 0725  Gross per 24 hour   Intake 125 ml   Output 1150 ml   Net -1025 ml     General: awake/alert   Chest:  Wheezes bilaterally  CVS: regular rate and rhythm  Abdominal: soft, nontender, positive bowel sounds  Extremities:  2+ lower extremity edema  Skin: warm and dry without rash      Labs:  Results from last 7 days   Lab Units 11/19/23  1527   WBC 10*3/mm3 11.51*   HEMOGLOBIN g/dL 11.7*   HEMATOCRIT % 35.6*   PLATELETS 10*3/mm3 107*         Results from last 7 days   Lab Units 11/20/23  0332 11/19/23  1855 11/19/23  1527   SODIUM mmol/L 139 138 141   POTASSIUM mmol/L 4.1 4.3 4.2   CHLORIDE mmol/L 109* 107 108*   CO2 mmol/L 21.0* 19.0* 21.0*   BUN mg/dL 28* 26* 25*   CREATININE mg/dL 3.16* 3.02* 2.73*   CALCIUM mg/dL 8.7 9.0 8.9   EGFR mL/min/1.73 18.8* 19.8* 22.4*   BILIRUBIN mg/dL  --   --  0.5   ALK PHOS U/L  --   --  62   ALT (SGPT) U/L  --   --  12   AST (SGOT) U/L  --   --  17   GLUCOSE mg/dL 153* 206* 103*       Radiology:   Imaging Results (Last 72 Hours)       Procedure Component Value Units Date/Time    CT Chest Without Contrast Diagnostic [[id number]] Collected: 11/19/23 1759     Updated: 11/19/23 1815    Narrative:      EXAMINATION:  CT CHEST WO CONTRAST DIAGNOSTIC-  11/19/2023 5:31 PM CST     HISTORY: Shortness of breath. I50.9-Heart failure, unspecified.  Coughing. Wheezing.     COMPARISON : 5/29/2020.     DLP: 252 mGy-cm. Automated dosage reduction technique was utilized to  reduce patient dosage.     TECHNIQUE: Spiral CT was performed of the chest without contrast.  Sagittal and coronal images were reconstructed.     MEDIASTINUM, HEART AND VASCULAR STRUCTURES: There is atheromatous  calcification of the thoracic aorta and coronary arteries. There has  been prior median sternotomy and bypass surgery. The ascending thoracic  aorta is ectatic measuring 3.7 cm. The main pulmonary artery segment is  dilated  measuring 3.3 cm. There is cardiomegaly. There are multiple  small mediastinal lymph nodes consistent with lymphadenopathy. A lymph  node to the left of the trachea has a short axis diameter of 2.3 cm,  previously 1.7 cm. The hilar areas are difficult to evaluate due to the  lack of contrast utilization.     LUNGS: There is centrilobular and paraseptal emphysema. There is a trace  amount of pleural effusion bilaterally. There is no dense infiltrate or  effusion.     LUNG NODULES:  1. Stable 3 mm left upper lobe perivascular nodule image 48 series 3.  2. Stable 3-4 mm left upper lobe perivascular nodule image 52 series 3.  3. Stable 4 mm perivascular nodule right upper lobe image 66 series 3.  4. Stable 6-7 mm left upper lobe perivascular nodule image 80 series 3.  5. Stable 4-5 mm right lower lobe perivascular nodule image 82 series 3.  6. Stable 6 mm fissural nodule adjacent to the left major fissure image  122 series 3.     UPPER ABDOMEN: There is a small hiatal hernia. There are couple of left  renal cysts. There is a cyst in the upper pole right kidney. There is an  indeterminate lesion arising from the midpole left kidney measuring 1.7  cm with a Hounsfield unit measurement of 41.     BONES: There are degenerative changes of the spine. No acute appearing  bony abnormality is seen. 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 There has been prior median sternotomy.          Impression:      1. 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  Prior heart bypass surgery. Ectatic ascending thoracic aorta measuring  3.7 cm. Main pulmonary artery segment is dilated at 3.3 cm.  Cardiomegaly.  2. Continued mediastinal lymphadenopathy. The largest lymph node to the  left of the trachea is larger on the current study with a short axis  diameter of 2.3 cm, previously 1.7 cm. There may be a very slight  increase in size of multiple other mediastinal lymph nodes. These lymph  nodes may be  "reactive or neoplastic. Sarcoidosis is also in the  differential as there are multiple stable perivascular lung nodules.  3.. Multiple stable perivascular lung nodules. Stability over this  length of time would suggest benign disease.  4. Centrilobular and paraseptal emphysema. Trace amount of pleural  effusion bilaterally.  5. A 1.7 cm indeterminate left renal lesion in the midpole region  posteriorly. This could be a small solid lesion. A complex cyst is also  possible. Follow-up ultrasound recommended to evaluate further. There  are bilateral renal cysts.  6. Small hiatal hernia.           This report was signed and finalized on 11/19/2023 6:12 PM CST by Dr. Micheal Marquez MD.       XR Chest 1 View [[id number]] Collected: 11/19/23 1542     Updated: 11/19/23 1546    Narrative:      EXAMINATION:  XR CHEST 1 VW-  11/19/2023 3:20 PM CST     HISTORY: SOA with audible wheezing. PNA vs CHF.     COMPARISON: 3/31/2023.     TECHNIQUE: Single view AP image.     FINDINGS: Coarse markings and bronchial wall thickening appear stable.  No new infiltrate is seen. Heart size is normal. Prior median  sternotomy. The thoracic aorta is atheromatous. There are degenerative  changes of the spine and shoulders.          Impression:      1. No acute appearing infiltrate or effusion.  2. Coarse markings and bronchial wall thickening, stable.           This report was signed and finalized on 11/19/2023 3:43 PM CST by Dr. Micheal Marquez MD.               Culture:  No results found for: \"BLOODCX\", \"URINECX\", \"WOUNDCX\", \"MRSACX\", \"RESPCX\", \"STOOLCX\"      Assessment    Chronic kidney disease stage 4  Volume overload  Acute hypoxic respiratory failure  Hypertension   Metabolic acidosis  Left renal lesion noted on 11/19 CT abdomen    Plan:   Give Lasix 40 mg PO once today  Will need to follow renal function closely while diuresing  Obtain renal US as recommended for follow up of left renal lesion  Further assessment and plan pending Dr Torres's " evaluation of patient      Thank you for the consult, we appreciate the opportunity to provide care to your patients.  Feel free to contact me if I can be of any further assistance.      Sergio Alas, APRN  11/20/2023  11:37 CST

## 2023-11-21 LAB
ALBUMIN SERPL-MCNC: 3.4 G/DL (ref 3.5–5.2)
ALBUMIN/GLOB SERPL: 1.3 G/DL
ALP SERPL-CCNC: 46 U/L (ref 39–117)
ALT SERPL W P-5'-P-CCNC: 12 U/L (ref 1–41)
ANION GAP SERPL CALCULATED.3IONS-SCNC: 10 MMOL/L (ref 5–15)
AST SERPL-CCNC: 19 U/L (ref 1–40)
BASOPHILS # BLD AUTO: 0.01 10*3/MM3 (ref 0–0.2)
BASOPHILS NFR BLD AUTO: 0.2 % (ref 0–1.5)
BH CV ECHO LEFT VENTRICLE GLOBAL LONGITUDINAL STRAIN: -11.5 %
BH CV ECHO MEAS - AO MAX PG: 10.8 MMHG
BH CV ECHO MEAS - AO MEAN PG: 5.3 MMHG
BH CV ECHO MEAS - AO ROOT DIAM: 3.6 CM
BH CV ECHO MEAS - AO V2 MAX: 164 CM/SEC
BH CV ECHO MEAS - AO V2 VTI: 34.9 CM
BH CV ECHO MEAS - AVA(I,D): 2.34 CM2
BH CV ECHO MEAS - EDV(CUBED): 124.3 ML
BH CV ECHO MEAS - EDV(MOD-SP2): 157 ML
BH CV ECHO MEAS - EDV(MOD-SP4): 164 ML
BH CV ECHO MEAS - EF(MOD-BP): 55.3 %
BH CV ECHO MEAS - EF(MOD-SP2): 56.5 %
BH CV ECHO MEAS - EF(MOD-SP4): 53.8 %
BH CV ECHO MEAS - ESV(CUBED): 38.3 ML
BH CV ECHO MEAS - ESV(MOD-SP2): 68.3 ML
BH CV ECHO MEAS - ESV(MOD-SP4): 75.8 ML
BH CV ECHO MEAS - FS: 32.5 %
BH CV ECHO MEAS - IVS/LVPW: 1.02 CM
BH CV ECHO MEAS - IVSD: 1.28 CM
BH CV ECHO MEAS - LA DIMENSION: 5.4 CM
BH CV ECHO MEAS - LAT PEAK E' VEL: 12.8 CM/SEC
BH CV ECHO MEAS - LV DIASTOLIC VOL/BSA (35-75): 75.1 CM2
BH CV ECHO MEAS - LV MASS(C)D: 252.5 GRAMS
BH CV ECHO MEAS - LV MAX PG: 5.2 MMHG
BH CV ECHO MEAS - LV MEAN PG: 3 MMHG
BH CV ECHO MEAS - LV SYSTOLIC VOL/BSA (12-30): 34.7 CM2
BH CV ECHO MEAS - LV V1 MAX: 114 CM/SEC
BH CV ECHO MEAS - LV V1 VTI: 23.6 CM
BH CV ECHO MEAS - LVIDD: 5 CM
BH CV ECHO MEAS - LVIDS: 3.4 CM
BH CV ECHO MEAS - LVOT AREA: 3.5 CM2
BH CV ECHO MEAS - LVOT DIAM: 2.1 CM
BH CV ECHO MEAS - LVPWD: 1.26 CM
BH CV ECHO MEAS - MED PEAK E' VEL: 6.6 CM/SEC
BH CV ECHO MEAS - MV A MAX VEL: 53.9 CM/SEC
BH CV ECHO MEAS - MV DEC SLOPE: 897.3 CM/SEC2
BH CV ECHO MEAS - MV DEC TIME: 0.16 SEC
BH CV ECHO MEAS - MV E MAX VEL: 147.3 CM/SEC
BH CV ECHO MEAS - MV E/A: 2.7
BH CV ECHO MEAS - MV MAX PG: 8.8 MMHG
BH CV ECHO MEAS - MV MEAN PG: 3 MMHG
BH CV ECHO MEAS - MV V2 VTI: 36.1 CM
BH CV ECHO MEAS - MVA(VTI): 2.26 CM2
BH CV ECHO MEAS - PA V2 MAX: 94.8 CM/SEC
BH CV ECHO MEAS - RAP SYSTOLE: 5 MMHG
BH CV ECHO MEAS - RV MAX PG: 3 MMHG
BH CV ECHO MEAS - RV V1 MAX: 86.5 CM/SEC
BH CV ECHO MEAS - RV V1 VTI: 22.1 CM
BH CV ECHO MEAS - RVDD: 4.3 CM
BH CV ECHO MEAS - RVSP: 51.8 MMHG
BH CV ECHO MEAS - SI(MOD-SP2): 40.6 ML/M2
BH CV ECHO MEAS - SI(MOD-SP4): 40.4 ML/M2
BH CV ECHO MEAS - SV(LVOT): 81.7 ML
BH CV ECHO MEAS - SV(MOD-SP2): 88.7 ML
BH CV ECHO MEAS - SV(MOD-SP4): 88.2 ML
BH CV ECHO MEAS - TR MAX PG: 46.8 MMHG
BH CV ECHO MEAS - TR MAX VEL: 342 CM/SEC
BH CV ECHO MEASUREMENTS AVERAGE E/E' RATIO: 15.19
BH CV XLRA - TDI S': 9.9 CM/SEC
BILIRUB SERPL-MCNC: 0.2 MG/DL (ref 0–1.2)
BUN SERPL-MCNC: 35 MG/DL (ref 8–23)
BUN/CREAT SERPL: 10.7 (ref 7–25)
CALCIUM SPEC-SCNC: 8.5 MG/DL (ref 8.6–10.5)
CHLORIDE SERPL-SCNC: 107 MMOL/L (ref 98–107)
CO2 SERPL-SCNC: 22 MMOL/L (ref 22–29)
CREAT SERPL-MCNC: 3.27 MG/DL (ref 0.76–1.27)
CREAT UR-MCNC: 25.7 MG/DL
DEPRECATED RDW RBC AUTO: 43.6 FL (ref 37–54)
EGFRCR SERPLBLD CKD-EPI 2021: 18 ML/MIN/1.73
EOSINOPHIL # BLD AUTO: 0 10*3/MM3 (ref 0–0.4)
EOSINOPHIL NFR BLD AUTO: 0 % (ref 0.3–6.2)
ERYTHROCYTE [DISTWIDTH] IN BLOOD BY AUTOMATED COUNT: 12.8 % (ref 12.3–15.4)
GLOBULIN UR ELPH-MCNC: 2.6 GM/DL
GLUCOSE SERPL-MCNC: 130 MG/DL (ref 65–99)
HCT VFR BLD AUTO: 32.7 % (ref 37.5–51)
HGB BLD-MCNC: 10.7 G/DL (ref 13–17.7)
LEFT ATRIUM VOLUME INDEX: 57.3 ML/M2
LEFT ATRIUM VOLUME: 139 ML
LYMPHOCYTES # BLD AUTO: 0.5 10*3/MM3 (ref 0.7–3.1)
LYMPHOCYTES NFR BLD AUTO: 7.7 % (ref 19.6–45.3)
MCH RBC QN AUTO: 30.7 PG (ref 26.6–33)
MCHC RBC AUTO-ENTMCNC: 32.7 G/DL (ref 31.5–35.7)
MCV RBC AUTO: 93.7 FL (ref 79–97)
MONOCYTES # BLD AUTO: 0.29 10*3/MM3 (ref 0.1–0.9)
MONOCYTES NFR BLD AUTO: 4.5 % (ref 5–12)
NEUTROPHILS NFR BLD AUTO: 5.69 10*3/MM3 (ref 1.7–7)
NEUTROPHILS NFR BLD AUTO: 87.3 % (ref 42.7–76)
PLATELET # BLD AUTO: 105 10*3/MM3 (ref 140–450)
PMV BLD AUTO: 11.1 FL (ref 6–12)
POTASSIUM SERPL-SCNC: 4.4 MMOL/L (ref 3.5–5.2)
PROT SERPL-MCNC: 6 G/DL (ref 6–8.5)
RBC # BLD AUTO: 3.49 10*6/MM3 (ref 4.14–5.8)
SODIUM SERPL-SCNC: 139 MMOL/L (ref 136–145)
WBC NRBC COR # BLD AUTO: 6.51 10*3/MM3 (ref 3.4–10.8)

## 2023-11-21 PROCEDURE — 85025 COMPLETE CBC W/AUTO DIFF WBC: CPT | Performed by: INTERNAL MEDICINE

## 2023-11-21 PROCEDURE — 25010000002 ENOXAPARIN PER 10 MG: Performed by: INTERNAL MEDICINE

## 2023-11-21 PROCEDURE — 94799 UNLISTED PULMONARY SVC/PX: CPT

## 2023-11-21 PROCEDURE — 25010000002 METHYLPREDNISOLONE PER 40 MG: Performed by: NURSE PRACTITIONER

## 2023-11-21 PROCEDURE — 80053 COMPREHEN METABOLIC PANEL: CPT | Performed by: INTERNAL MEDICINE

## 2023-11-21 PROCEDURE — 94761 N-INVAS EAR/PLS OXIMETRY MLT: CPT

## 2023-11-21 PROCEDURE — 94664 DEMO&/EVAL PT USE INHALER: CPT

## 2023-11-21 RX ORDER — PREDNISONE 20 MG/1
40 TABLET ORAL
Status: DISCONTINUED | OUTPATIENT
Start: 2023-11-22 | End: 2023-11-22 | Stop reason: HOSPADM

## 2023-11-21 RX ORDER — FLUTICASONE PROPIONATE 50 MCG
2 SPRAY, SUSPENSION (ML) NASAL DAILY
Status: DISCONTINUED | OUTPATIENT
Start: 2023-11-21 | End: 2023-11-22 | Stop reason: HOSPADM

## 2023-11-21 RX ORDER — METHYLPREDNISOLONE SODIUM SUCCINATE 40 MG/ML
40 INJECTION, POWDER, LYOPHILIZED, FOR SOLUTION INTRAMUSCULAR; INTRAVENOUS EVERY 12 HOURS
Status: COMPLETED | OUTPATIENT
Start: 2023-11-21 | End: 2023-11-21

## 2023-11-21 RX ORDER — FUROSEMIDE 40 MG/1
40 TABLET ORAL DAILY
Status: DISCONTINUED | OUTPATIENT
Start: 2023-11-21 | End: 2023-11-22 | Stop reason: HOSPADM

## 2023-11-21 RX ADMIN — SODIUM BICARBONATE 325 MG: 650 TABLET ORAL at 17:24

## 2023-11-21 RX ADMIN — IPRATROPIUM BROMIDE AND ALBUTEROL SULFATE 3 ML: 2.5; .5 SOLUTION RESPIRATORY (INHALATION) at 19:06

## 2023-11-21 RX ADMIN — IPRATROPIUM BROMIDE AND ALBUTEROL SULFATE 3 ML: 2.5; .5 SOLUTION RESPIRATORY (INHALATION) at 14:25

## 2023-11-21 RX ADMIN — SODIUM BICARBONATE 325 MG: 650 TABLET ORAL at 20:43

## 2023-11-21 RX ADMIN — GUAIFENESIN 1200 MG: 600 TABLET, EXTENDED RELEASE ORAL at 08:32

## 2023-11-21 RX ADMIN — PRAVASTATIN SODIUM 40 MG: 20 TABLET ORAL at 08:32

## 2023-11-21 RX ADMIN — IPRATROPIUM BROMIDE AND ALBUTEROL SULFATE 3 ML: 2.5; .5 SOLUTION RESPIRATORY (INHALATION) at 06:16

## 2023-11-21 RX ADMIN — HYDRALAZINE HYDROCHLORIDE 100 MG: 50 TABLET ORAL at 08:32

## 2023-11-21 RX ADMIN — BUDESONIDE AND FORMOTEROL FUMARATE DIHYDRATE 2 PUFF: 160; 4.5 AEROSOL RESPIRATORY (INHALATION) at 19:06

## 2023-11-21 RX ADMIN — METHYLPREDNISOLONE SODIUM SUCCINATE 40 MG: 40 INJECTION, POWDER, LYOPHILIZED, FOR SOLUTION INTRAMUSCULAR; INTRAVENOUS at 17:24

## 2023-11-21 RX ADMIN — BUDESONIDE AND FORMOTEROL FUMARATE DIHYDRATE 2 PUFF: 160; 4.5 AEROSOL RESPIRATORY (INHALATION) at 06:16

## 2023-11-21 RX ADMIN — METOPROLOL TARTRATE 50 MG: 50 TABLET, FILM COATED ORAL at 08:32

## 2023-11-21 RX ADMIN — IPRATROPIUM BROMIDE AND ALBUTEROL SULFATE 3 ML: 2.5; .5 SOLUTION RESPIRATORY (INHALATION) at 03:11

## 2023-11-21 RX ADMIN — ASPIRIN 81 MG: 81 TABLET, COATED ORAL at 08:32

## 2023-11-21 RX ADMIN — ENOXAPARIN SODIUM 30 MG: 100 INJECTION SUBCUTANEOUS at 17:24

## 2023-11-21 RX ADMIN — IPRATROPIUM BROMIDE AND ALBUTEROL SULFATE 3 ML: 2.5; .5 SOLUTION RESPIRATORY (INHALATION) at 10:02

## 2023-11-21 RX ADMIN — HYDRALAZINE HYDROCHLORIDE 100 MG: 50 TABLET ORAL at 20:43

## 2023-11-21 RX ADMIN — HYDRALAZINE HYDROCHLORIDE 100 MG: 50 TABLET ORAL at 15:03

## 2023-11-21 RX ADMIN — METHYLPREDNISOLONE SODIUM SUCCINATE 40 MG: 40 INJECTION, POWDER, LYOPHILIZED, FOR SOLUTION INTRAMUSCULAR; INTRAVENOUS at 05:17

## 2023-11-21 RX ADMIN — METOPROLOL TARTRATE 50 MG: 50 TABLET, FILM COATED ORAL at 20:43

## 2023-11-21 RX ADMIN — GUAIFENESIN 1200 MG: 600 TABLET, EXTENDED RELEASE ORAL at 20:43

## 2023-11-21 RX ADMIN — FLUTICASONE PROPIONATE 2 SPRAY: 50 SPRAY, METERED NASAL at 15:03

## 2023-11-21 RX ADMIN — SODIUM BICARBONATE 325 MG: 650 TABLET ORAL at 08:32

## 2023-11-21 RX ADMIN — FUROSEMIDE 40 MG: 40 TABLET ORAL at 15:03

## 2023-11-21 RX ADMIN — SODIUM BICARBONATE 325 MG: 650 TABLET ORAL at 11:51

## 2023-11-21 RX ADMIN — CETIRIZINE HYDROCHLORIDE 10 MG: 10 TABLET ORAL at 08:32

## 2023-11-21 NOTE — PLAN OF CARE
Goal Outcome Evaluation:      Room air. [id number]%. Bilateral lower extremity edema. Greater on right side. Starting PO steroids. Voiding. Cardiac monitor. Monitoring labs.

## 2023-11-21 NOTE — PROGRESS NOTES
Nephrology ([facility]) Progress Note      Patient:  Faisal Joseph  YOB: 1940  Date of Service: 11/21/2023  MRN: [id number]   Acct: [id number]   Primary Care Physician: Spike Polanco DO  Advance Directive:   Code Status and Medical Interventions:   Ordered at: 11/19/23 1712     Level Of Support Discussed With:    Patient     Code Status (Patient has no pulse and is not breathing):    CPR (Attempt to Resuscitate)     Medical Interventions (Patient has pulse or is breathing):    Full Support     Admit Date: 11/19/2023       Hospital Day: 2  Referring Provider: No ref. provider found      Patient personally seen and examined.  Complete chart including Consults, Notes, Operative Reports, Labs, Cardiology, and Radiology studies reviewed as able.        Subjective:  Faisal Joseph is a 83 y.o. male for whom we were consulted for evaluation and treatment of chronic kidney disease stage 4. Follows with Veda Brito in our office, baseline creatinine approximately 2.9. History of coronary artery disease, prior CVA, sleep apnea, hypertension. Presented to ER with complaint of worsening dyspnea. Also had a cough and audible wheezing. Received IV Lasix for one dose with moderate response.  Has been maintaining adequate diuresis    Today is awake and alert. Edema and dyspnea showing some improvement. Urine output nonoliguric     Allergies:  Hydralazine, Losartan potassium, Penicillins, and Spironolactone    Home Meds:  Medications Prior to Admission   Medication Sig Dispense Refill Last Dose    amLODIPine (NORVASC) 10 MG tablet Take 1 tablet by mouth Daily.       aspirin 81 MG EC tablet Take 1 tablet by mouth Daily. 100 tablet 99     ferrous sulfate 325 (65 FE) MG tablet Take 1 tablet by mouth 2 (Two) Times a Day.       hydrALAZINE (APRESOLINE) 100 MG tablet Take 1 tablet by mouth 3 (Three) Times a Day.       lisinopril (PRINIVIL,ZESTRIL) 20 MG tablet Take 1 tablet by  mouth 2 (Two) Times a Day.       metoprolol tartrate (LOPRESSOR) 50 MG tablet Take 1 tablet by mouth 2 (Two) Times a Day.       pravastatin (PRAVACHOL) 40 MG tablet Take 1 tablet by mouth Daily.       sodium bicarbonate 650 MG tablet Take 0.5 tablets by mouth 2 (Two) Times a Day.       terazosin (HYTRIN) 1 MG capsule Take 1 capsule by mouth Every Night.       vitamin B-12 (CYANOCOBALAMIN) 100 MCG tablet Take 0.5 tablets by mouth Daily.       Cholecalciferol (VITAMIN D) 2000 units capsule Take 1 capsule by mouth Daily.       nitroglycerin (NITROSTAT) 0.4 MG SL tablet 1 under the tongue as needed for angina, may repeat q5mins for up three doses 25 tablet 2        Medicines:  Current Facility-Administered Medications   Medication Dose Route Frequency Provider Last Rate Last Admin    acetaminophen (TYLENOL) tablet 650 mg  650 mg Oral Q4H PRN Shirley Bryant        aspirin EC tablet 81 mg  81 mg Oral Daily Mariela Covarrubias APRN   81 mg at 11/21/23 0832    sennosides-docusate (PERICOLACE) 8.6-50 MG per tablet 2 tablet  2 tablet Oral Nightly PRN Shirley Bryant        And    polyethylene glycol (MIRALAX) packet 17 g  17 g Oral Daily PRN Shirley Bryant        And    bisacodyl (DULCOLAX) EC tablet 5 mg  5 mg Oral Daily PRN Shirley Bryant        And    bisacodyl (DULCOLAX) suppository 10 mg  10 mg Rectal Daily PRN Shirley Bryant        budesonide-formoterol (SYMBICORT) 160-4.5 MCG/ACT inhaler 2 puff  2 puff Inhalation BID - RT Mariela Covarrubias APRN   2 puff at 11/21/23 0616    cetirizine (zyrTEC) tablet 10 mg  10 mg Oral Daily Mariela Covarrubias APRN   10 mg at 11/21/23 0832    Enoxaparin Sodium (LOVENOX) syringe 30 mg  30 mg Subcutaneous Q24H Brian Shin DO   30 mg at 11/20/23 1734    guaiFENesin (MUCINEX) 12 hr tablet 1,200 mg  1,200 mg Oral Q12H Mariela Covarrubias APRN   1,200 mg at 11/21/23 0832    hydrALAZINE (APRESOLINE) tablet 100 mg  100 mg Oral TID Mariela Covarrubias APRN   100 mg at 11/21/23 0832     ipratropium-albuterol (DUO-NEB) nebulizer solution 3 mL  3 mL Nebulization Q4H - RT Brian Shin, DO   3 mL at 11/21/23 1002    methylPREDNISolone sodium succinate (SOLU-Medrol) injection 40 mg  40 mg Intravenous Q12H Covarrubias, Mariela, APRN   40 mg at 11/21/23 0517    metoprolol tartrate (LOPRESSOR) tablet 50 mg  50 mg Oral BID Covarrubias, Mariela, APRN   50 mg at 11/21/23 0832    nitroglycerin (NITROSTAT) SL tablet 0.4 mg  0.4 mg Sublingual Q5 Min PRN Shirley Bryant        ondansetron (ZOFRAN) injection 4 mg  4 mg Intravenous Q6H PRN Shirley Bryant        Pharmacy to Dose enoxaparin (LOVENOX)   Does not apply Continuous PRN Shirley Bryant        pravastatin (PRAVACHOL) tablet 40 mg  40 mg Oral Daily Covarrubias, Mariela, APRN   40 mg at 11/21/23 0832    sodium bicarbonate tablet 325 mg  325 mg Oral 4x Daily Covarrubias, Mariela, APRN   325 mg at 11/21/23 0832       Past Medical History:  Past Medical History:   Diagnosis Date    Arthritis     Cataract     Chronic fatigue 10/3/2017    Chronic renal disease, stage IV     Coronary artery disease     CVA (cerebral vascular accident) 8/14/2018    Hyperlipidemia     Hypertension     Palpitations 10/3/2017    Premature atrial contractions 10/17/2017    PVCs (premature ventricular contractions) 10/17/2017    Sleep apnea     Stage 4 chronic kidney disease 7/30/2018    Stroke 2007    Weakness     right leg weaker       Past Surgical History:  Past Surgical History:   Procedure Laterality Date    CARDIAC CATHETERIZATION      CARDIAC CATHETERIZATION N/A 5/28/2020    Procedure: Left Heart Cath;  Surgeon: Rufino Brice MD;  Location:  [facility]AS[facility];  Service: Cardiology;  Laterality: N/A;    CORONARY ARTERY BYPASS GRAFT N/A 6/9/2020    Procedure: CABG X 3, LIMA, SHELBY, EVH WITH OPEN EXTENSION LOWER RIGHT LEG;  Surgeon: Nikunj Carballo MD;  Location:  [facility] OR;  Service: Cardiothoracic;  Laterality: N/A;       Family History  Family History   Problem Relation Age  "of Onset    Cancer Mother     Cancer Father        Social History  Social History     Socioeconomic History    Marital status:    Tobacco Use    Smoking status: Former     Packs/day: 1.50     Years: 25.00     Additional pack years: 0.00     Total pack years: 37.50     Types: Cigarettes     Start date:      Quit date:      Years since quittin.9    Smokeless tobacco: Never   Vaping Use    Vaping Use: Never used   Substance and Sexual Activity    Alcohol use: No    Drug use: No    Sexual activity: Defer       Review of Systems:  History obtained from chart review and the patient  General ROS: No fever or chills  Respiratory ROS: positive for - cough and shortness of breath  Cardiovascular ROS: positive for - dyspnea on exertion and edema  No chest pain or palpitations  Gastrointestinal ROS: No abdominal pain or melena  Genito-Urinary ROS: No dysuria or hematuria  Psych ROS: No anxiety and depression  14 point ROS reviewed with the patient and negative except as noted above and in the HPI unless unable to obtain.    Objective:  Patient Vitals for the past 24 hrs:   BP Temp Temp src Pulse Resp SpO2 Height Weight   23 1002 -- -- -- 82 18 94 % -- --   23 0710 163/66 98.4 °F (36.9 °C) Oral 65 16 99 % -- --   23 0616 -- -- -- 71 16 96 % -- --   23 0401 151/57 97.5 °F (36.4 °C) Oral 71 16 96 % -- 96.5 kg (212 lb 12.8 oz)   23 0327 -- -- -- 86 18 -- -- --   23 0311 -- -- -- 86 17 96 % -- --   232 -- -- -- 59 17 94 % -- --   23 2339 168/72 97.8 °F (36.6 °C) Oral 74 16 96 % -- --   23 -- -- -- 71 -- -- -- --   23 150/85 98.2 °F (36.8 °C) Oral 72 16 98 % -- --   23 -- -- -- 70 18 -- -- --   23 -- -- -- 71 18 95 % -- --   23 1609 169/88 98.1 °F (36.7 °C) Oral 85 18 99 % -- --   23 1326 157/60 -- -- -- -- -- 182.9 cm (72\") 96.2 kg (212 lb)   23 1126 157/60 98.2 °F (36.8 °C) Oral 77 18 95 % -- -- "   11/20/23 1121 -- -- -- 63 18 100 % -- --   11/20/23 1115 -- -- -- 69 18 96 % -- --       Intake/Output Summary (Last 24 hours) at 11/21/2023 1022  Last data filed at 11/21/2023 0900  Gross per 24 hour   Intake 120 ml   Output 2250 ml   Net -2130 ml     General: awake/alert   Chest:  clear to auscultation bilaterally without respiratory distress  CVS: regular rate and rhythm  Abdominal: soft, nontender, positive bowel sounds  Extremities:  lower extremity 2+ edema  Skin: warm and dry without rash      Labs:  Results from last 7 days   Lab Units 11/21/23  0433 11/19/23  1527   WBC 10*3/mm3 6.51 11.51*   HEMOGLOBIN g/dL 10.7* 11.7*   HEMATOCRIT % 32.7* 35.6*   PLATELETS 10*3/mm3 105* 107*         Results from last 7 days   Lab Units 11/21/23  0433 11/20/23  0332 11/19/23  1855 11/19/23  1527   SODIUM mmol/L 139 139 138 141   POTASSIUM mmol/L 4.4 4.1 4.3 4.2   CHLORIDE mmol/L 107 109* 107 108*   CO2 mmol/L 22.0 21.0* 19.0* 21.0*   BUN mg/dL 35* 28* 26* 25*   CREATININE mg/dL 3.27* 3.16* 3.02* 2.73*   CALCIUM mg/dL 8.5* 8.7 9.0 8.9   EGFR mL/min/1.73 18.0* 18.8* 19.8* 22.4*   BILIRUBIN mg/dL 0.2  --   --  0.5   ALK PHOS U/L 46  --   --  62   ALT (SGPT) U/L 12  --   --  12   AST (SGOT) U/L 19  --   --  17   GLUCOSE mg/dL 130* 153* 206* 103*       Radiology:   Imaging Results (Last 72 Hours)       Procedure Component Value Units Date/Time    US Renal Bilateral [[id number]] Collected: 11/20/23 1656     Updated: 11/20/23 1709    Narrative:      RENAL ULTRASOUND COMPLETE 11/20/2023 2:10 PM CST     REASON FOR EXAM: CKD with attention to left renal lesion noted on 11/19  CT scan; I50.9-Heart failure, unspecified       COMPARISON: CT chest on 11/19/2023 demonstrating an indeterminate mid  left renal lesion posteriorly.     TECHNIQUE: Multiple longitudinal and transverse realtime sonographic  images of the kidneys and urinary bladder are obtained.     FINDINGS:     RIGHT KIDNEY: The right kidney measures 12.1 cm in length. The  cortical  thickness and echogenicity are normal. There are multiple right renal  cysts. There is one cyst in the lower pole measuring 4 x 3.3 x 3.5 cm.  There is another cyst in the lower pole measuring 2.8 x 2.2 x 3 cm.  There is no hydronephrosis. No nephrolithiasis or abnormal perinephric  fluid collections.     LEFT KIDNEY: The left kidney measures 12.2 cm. Cm in length. The  cortical thickness is normal. There are 2 cysts adjacent to one another  in the upper to midpole left kidney. One cyst measures up to 1.2 cm and  the other cyst measures up to 1.4 cm. The technologist measures an area  in the central left kidney as a potential mass measuring 1.8 x 1.9 x 1.9  cm. I feel this is probably normal structures within the central kidney.  The posterior and partially exophytic lesion seen on CT in the left mid  kidney is not further characterized on the ultrasound study and  therefore remains indeterminate.. There is no hydronephrosis. No  nephrolithiasis or abnormal perinephric fluid collections.     PELVIS: There is thickening of the bladder wall. The bladder is not well  distended. There is no surrounding ascites.     ADDITIONAL FINDINGS: The visualized abdominal aorta is normal caliber.       Impression:      1. The 1.7 cm partially exophytic lesion in the left mid kidney  posteriorly seen on CT was not seen or measured by the technologist on  this study. Therefore, this lesion remains indeterminate and is not  fully evaluated. Consider follow-up renal mass protocol CT or MRI to  evaluate further.  2. There are bilateral simple renal cysts.  3. The technologist measured an area in the central left kidney as a  potential mass. I doubt that this is a mass. It can be further evaluated  if the follow-up recommended imaging is performed.  4. Bladder wall thickening is probably an artifact of under distention.              This report was signed and finalized on 11/20/2023 5:06 PM CST by Dr. Micheal Marquez MD.        CT Chest Without Contrast Diagnostic [[id number]] Collected: 11/19/23 1759     Updated: 11/19/23 1815    Narrative:      EXAMINATION:  CT CHEST WO CONTRAST DIAGNOSTIC-  11/19/2023 5:31 PM CST     HISTORY: Shortness of breath. I50.9-Heart failure, unspecified.  Coughing. Wheezing.     COMPARISON : 5/29/2020.     DLP: 252 mGy-cm. Automated dosage reduction technique was utilized to  reduce patient dosage.     TECHNIQUE: Spiral CT was performed of the chest without contrast.  Sagittal and coronal images were reconstructed.     MEDIASTINUM, HEART AND VASCULAR STRUCTURES: There is atheromatous  calcification of the thoracic aorta and coronary arteries. There has  been prior median sternotomy and bypass surgery. The ascending thoracic  aorta is ectatic measuring 3.7 cm. The main pulmonary artery segment is  dilated measuring 3.3 cm. There is cardiomegaly. There are multiple  small mediastinal lymph nodes consistent with lymphadenopathy. A lymph  node to the left of the trachea has a short axis diameter of 2.3 cm,  previously 1.7 cm. The hilar areas are difficult to evaluate due to the  lack of contrast utilization.     LUNGS: There is centrilobular and paraseptal emphysema. There is a trace  amount of pleural effusion bilaterally. There is no dense infiltrate or  effusion.     LUNG NODULES:  1. Stable 3 mm left upper lobe perivascular nodule image 48 series 3.  2. Stable 3-4 mm left upper lobe perivascular nodule image 52 series 3.  3. Stable 4 mm perivascular nodule right upper lobe image 66 series 3.  4. Stable 6-7 mm left upper lobe perivascular nodule image 80 series 3.  5. Stable 4-5 mm right lower lobe perivascular nodule image 82 series 3.  6. Stable 6 mm fissural nodule adjacent to the left major fissure image  122 series 3.     UPPER ABDOMEN: There is a small hiatal hernia. There are couple of left  renal cysts. There is a cyst in the upper pole right kidney. There is an  indeterminate lesion arising from the  midpole left kidney measuring 1.7  cm with a Hounsfield unit measurement of 41.     BONES: There are degenerative changes of the spine. No acute appearing  bony abnormality is seen. 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 There has been prior median sternotomy.          Impression:      1. 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  Prior heart bypass surgery. Ectatic ascending thoracic aorta measuring  3.7 cm. Main pulmonary artery segment is dilated at 3.3 cm.  Cardiomegaly.  2. Continued mediastinal lymphadenopathy. The largest lymph node to the  left of the trachea is larger on the current study with a short axis  diameter of 2.3 cm, previously 1.7 cm. There may be a very slight  increase in size of multiple other mediastinal lymph nodes. These lymph  nodes may be reactive or neoplastic. Sarcoidosis is also in the  differential as there are multiple stable perivascular lung nodules.  3.. Multiple stable perivascular lung nodules. Stability over this  length of time would suggest benign disease.  4. Centrilobular and paraseptal emphysema. Trace amount of pleural  effusion bilaterally.  5. A 1.7 cm indeterminate left renal lesion in the midpole region  posteriorly. This could be a small solid lesion. A complex cyst is also  possible. Follow-up ultrasound recommended to evaluate further. There  are bilateral renal cysts.  6. Small hiatal hernia.           This report was signed and finalized on 11/19/2023 6:12 PM CST by Dr. Micheal Marquez MD.       XR Chest 1 View [[id number]] Collected: 11/19/23 1542     Updated: 11/19/23 1546    Narrative:      EXAMINATION:  XR CHEST 1 VW-  11/19/2023 3:20 PM CST     HISTORY: SOA with audible wheezing. PNA vs CHF.     COMPARISON: 3/31/2023.     TECHNIQUE: Single view AP image.     FINDINGS: Coarse markings and bronchial wall thickening appear stable.  No new infiltrate is seen. Heart size is normal. Prior median  sternotomy. The  "thoracic aorta is atheromatous. There are degenerative  changes of the spine and shoulders.          Impression:      1. No acute appearing infiltrate or effusion.  2. Coarse markings and bronchial wall thickening, stable.           This report was signed and finalized on 11/19/2023 3:43 PM CST by Dr. Micheal Marquez MD.               Culture:  No results found for: \"BLOODCX\", \"URINECX\", \"WOUNDCX\", \"MRSACX\", \"RESPCX\", \"STOOLCX\"      Assessment    Chronic kidney disease stage 4  Volume overload  Acute hypoxic respiratory failure  Hypertension   Metabolic acidosis  Left renal lesion noted on 11/19 CT abdomen    Plan:   Continue PO Lasix  Follow up renal US was indeterminate for characterization of left renal lesion. Could consider outpatient CT scan once renal function recovers      Sergio Alas, APRN  11/21/2023  10:22 CST    "

## 2023-11-21 NOTE — PROGRESS NOTES
[facility] Medicine Services  INPATIENT PROGRESS NOTE    Patient Name: Faisal Joseph  Date of Admission: 11/19/2023  Today's Date: 11/21/23  Length of Stay: 2  Primary Care Physician: Spike Polanco DO    Subjective   Chief Complaint: shortness of breath  HPI   Sitting up in bed.  He is feeling better today.  Oxygen has been weaned to room air.  He was 94% on room air.  He was able to demonstrate use of the incentive spirometer up to 2000.  Tolerating diet.  He was able to get up and take a shower today. Discussed with LIZ ervin with nephrology - continue lasix 40 mg daily.    Review of Systems   All pertinent negatives and positives are as above. All other systems have been reviewed and are negative unless otherwise stated.     Objective    Temp:  [97.5 °F (36.4 °C)-98.4 °F (36.9 °C)] 97.9 °F (36.6 °C)  Heart Rate:  [59-86] 71  Resp:  [16-18] 18  BP: (150-169)/(57-88) 151/66  Physical Exam  Vitals reviewed.   Constitutional:       General: He is not in acute distress.     Appearance: He is not toxic-appearing.      Interventions: Nasal cannula in place.      Comments: Lying in bed.  No acute distress.  On room air.  No family at bedside.  Discussed with his nurse khushi.   HENT:      Head: Normocephalic and atraumatic.      Mouth/Throat:      Mouth: Mucous membranes are moist.      Pharynx: Oropharynx is clear.   Eyes:      Extraocular Movements: Extraocular movements intact.      Conjunctiva/sclera: Conjunctivae normal.      Pupils: Pupils are equal, round, and reactive to light.   Cardiovascular:      Rate and Rhythm: Normal rate and regular rhythm.      Pulses: Normal pulses.   Pulmonary:      Effort: Pulmonary effort is normal. No respiratory distress.      Breath sounds: Wheezing present.   Abdominal:      General: Bowel sounds are normal. There is no distension.      Palpations: Abdomen is soft.      Tenderness: There is no abdominal tenderness.  "  Musculoskeletal:         General: No swelling or tenderness. Normal range of motion.      Cervical back: Normal range of motion and neck supple. No muscular tenderness.      Right lower leg: Edema present.      Left lower leg: Edema present.   Skin:     General: Skin is warm and dry.      Findings: No erythema or rash.   Neurological:      General: No focal deficit present.      Mental Status: He is alert and oriented to person, place, and time.      Cranial Nerves: No cranial nerve deficit.      Motor: No weakness.   Psychiatric:         Mood and Affect: Mood normal.         Behavior: Behavior normal.       Results Review:  I have reviewed the labs, radiology results, and diagnostic studies.    Laboratory Data:   Results from last 7 days   Lab Units 11/21/23  0433 11/19/23  1527   WBC 10*3/mm3 6.51 11.51*   HEMOGLOBIN g/dL 10.7* 11.7*   HEMATOCRIT % 32.7* 35.6*   PLATELETS 10*3/mm3 105* 107*        Results from last 7 days   Lab Units 11/21/23  0433 11/20/23  0332 11/19/23  1855 11/19/23  1527   SODIUM mmol/L 139 139 138 141   POTASSIUM mmol/L 4.4 4.1 4.3 4.2   CHLORIDE mmol/L 107 109* 107 108*   CO2 mmol/L 22.0 21.0* 19.0* 21.0*   BUN mg/dL 35* 28* 26* 25*   CREATININE mg/dL 3.27* 3.16* 3.02* 2.73*   CALCIUM mg/dL 8.5* 8.7 9.0 8.9   BILIRUBIN mg/dL 0.2  --   --  0.5   ALK PHOS U/L 46  --   --  62   ALT (SGPT) U/L 12  --   --  12   AST (SGOT) U/L 19  --   --  17   GLUCOSE mg/dL 130* 153* 206* 103*       Culture Data:   No results found for: \"ACANTHNAEG\", \"AFBCX\", \"BPERTUSSISCX\", \"BLOODCX\"  No results found for: \"BCIDPCR\", \"CXREFLEX\", \"CSFCX\", \"CULTURETIS\"  No results found for: \"CULTURES\", \"HSVCX\", \"URCX\"  No results found for: \"EYECULTURE\", \"GCCX\", \"HSVCULTURE\", \"LABHSV\"  No results found for: \"LEGIONELLA\", \"MRSACX\", \"MUMPSCX\", \"MYCOPLASCX\"  No results found for: \"NOCARDIACX\", \"STOOLCX\"  No results found for: \"THROATCX\", \"UNSTIMCULT\", \"URINECX\", \"CULTURE\", \"VZVCULTUR\"  No results found for: \"VIRALCULTU\", " "\"WOUNDCX\"    Radiology Data:   Imaging Results (Last 24 Hours)       Procedure Component Value Units Date/Time    US Renal Bilateral [[id number]] Collected: 11/20/23 1656     Updated: 11/20/23 1709    Narrative:      RENAL ULTRASOUND COMPLETE 11/20/2023 2:10 PM CST     REASON FOR EXAM: CKD with attention to left renal lesion noted on 11/19  CT scan; I50.9-Heart failure, unspecified       COMPARISON: CT chest on 11/19/2023 demonstrating an indeterminate mid  left renal lesion posteriorly.     TECHNIQUE: Multiple longitudinal and transverse realtime sonographic  images of the kidneys and urinary bladder are obtained.     FINDINGS:     RIGHT KIDNEY: The right kidney measures 12.1 cm in length. The cortical  thickness and echogenicity are normal. There are multiple right renal  cysts. There is one cyst in the lower pole measuring 4 x 3.3 x 3.5 cm.  There is another cyst in the lower pole measuring 2.8 x 2.2 x 3 cm.  There is no hydronephrosis. No nephrolithiasis or abnormal perinephric  fluid collections.     LEFT KIDNEY: The left kidney measures 12.2 cm. Cm in length. The  cortical thickness is normal. There are 2 cysts adjacent to one another  in the upper to midpole left kidney. One cyst measures up to 1.2 cm and  the other cyst measures up to 1.4 cm. The technologist measures an area  in the central left kidney as a potential mass measuring 1.8 x 1.9 x 1.9  cm. I feel this is probably normal structures within the central kidney.  The posterior and partially exophytic lesion seen on CT in the left mid  kidney is not further characterized on the ultrasound study and  therefore remains indeterminate.. There is no hydronephrosis. No  nephrolithiasis or abnormal perinephric fluid collections.     PELVIS: There is thickening of the bladder wall. The bladder is not well  distended. There is no surrounding ascites.     ADDITIONAL FINDINGS: The visualized abdominal aorta is normal caliber.       Impression:      1. The 1.7 cm " partially exophytic lesion in the left mid kidney  posteriorly seen on CT was not seen or measured by the technologist on  this study. Therefore, this lesion remains indeterminate and is not  fully evaluated. Consider follow-up renal mass protocol CT or MRI to  evaluate further.  2. There are bilateral simple renal cysts.  3. The technologist measured an area in the central left kidney as a  potential mass. I doubt that this is a mass. It can be further evaluated  if the follow-up recommended imaging is performed.  4. Bladder wall thickening is probably an artifact of under distention.              This report was signed and finalized on 11/20/2023 5:06 PM CST by Dr. Micheal Marquez MD.               I have reviewed the patient's current medications.     Assessment/Plan   Assessment  Active Hospital Problems    Diagnosis     **Acute hypoxic respiratory failure     Acute heart failure     Peripheral edema     Coronary artery disease     Stage 4 chronic kidney disease     Essential hypertension      Mr. Joseph is an 83-year-old male who presented to [facility] on 11/19 for 1 day history of worsening shortness of breath and orthopnea.  Chest x-ray showed coarse markings and bronchial wall thickening.  BNP elevated at 7000.  He was given 1 dose of Lasix 40 mg and IV Solu-Medrol 60 mg x 1.    Treatment Plan  COPD with acute exacerbation in the setting of respiratory panel positive for human rhinovirus/enterovirus.  Chest x-ray showed no acute appearing infiltrate or effusion.  He has a history of stage II moderate COPD and is on sample Trelegy followed by pulmonary clinic.  He was given IV Solu-Medrol in ED. Continue IV solumedrol 40 every 12 hours. Transition steroids to po tomorrow. Continue symbicort, mucinex and incentive spirometry. Continue duonebs.  Wean oxygen as tolerated.    Volume overload. He was given Lasix IV x1 in ED Discussed with LIZ ervin with nephrology - continue lasix 40 mg daily.  Transthoracic echocardiogram in 2021 showed EF 56-60%.  Results for orders placed during the hospital encounter of 11/19/23    Adult Transthoracic Echo Complete W/ Cont if Necessary Per Protocol    Interpretation Summary    Left ventricular ejection fraction appears to be 56 - 60%.    Left ventricular wall thickness is consistent with mild concentric hypertrophy.    Left ventricular diastolic function is consistent with (grade II w/high LAP) pseudonormalization.    Left atrial volume is severely increased.    The right atrial cavity is dilated.    Moderate pulmonary hypertension is present.    There is a trivial pericardial effusion. There is no evidence of cardiac tamponade.    Abnormal global longitudinal LV strain (GLS) = -11.5%    Nephrology consulted he has a history of stage IV chronic kidney disease.    CT chest noted 1.7 cm indeterminate left renal lesion in the midpole region posteriorly-ultrasound recommended to further evaluate.  Renal ultrasound was indeterminate for characterization of left renal lesion.  He will need follow-up renal mass protocol CT or MRI as outpatient to further evaluate.    CT chest showed stable lung nodules. Sarcoidosis is also in the   differential as there are multiple stable perivascular lung nodules.   3. Multiple stable perivascular lung nodules. Stability over this   length of time would suggest benign disease.  He follows with pulmonology as outpatient.    Lovenox for VTE prophylaxis.    Medical Decision Making  Number and Complexity of problems: 1 acute problem in the form of volume overload and respiratory viral infection  Differential Diagnosis: Acute heart failure versus respiratory viral infection    Conditions and Status        Condition is unchanged.     [facility] Data  External documents reviewed: Prior [facility] records  Cardiac tracing (EKG, telemetry) interpretation: Reviewed  Radiology interpretation: Interpreted by radiology  Labs reviewed:  As above   Any tests that were  considered but not ordered: none considered at present     Decision rules/scores evaluated (example [id number], Wells, etc): None considered at present     Discussed with: aziza Stanley with nephrology and Dr. Lew     Care Planning  Shared decision making: Patient is agreeable to ongoing work-up and treatment  Code status and discussions: Full code with full interventions    Disposition  Social Determinants of Health that impact treatment or disposition: None identified at present  I expect the patient to be discharged to home in 1-2 days.     Electronically signed by LIZ Dick, 11/21/23, 14:01 CST.

## 2023-11-21 NOTE — PLAN OF CARE
Goal Outcome Evaluation:  Plan of Care Reviewed With: patient        Progress: no change     Pt A&O x4. IID. 2L NC, O2 sats WDL. Voiding. IV steroids per order. Pt resting comfortably through the shift. VSS safety maintained.

## 2023-11-22 ENCOUNTER — READMISSION MANAGEMENT (OUTPATIENT)
Dept: CALL CENTER | Facility: HOSPITAL | Age: 83
End: 2023-11-22
Payer: MEDICARE

## 2023-11-22 VITALS
HEART RATE: 90 BPM | HEIGHT: 72 IN | RESPIRATION RATE: 16 BRPM | SYSTOLIC BLOOD PRESSURE: 167 MMHG | TEMPERATURE: 97.6 F | WEIGHT: 214.8 LBS | OXYGEN SATURATION: 94 % | DIASTOLIC BLOOD PRESSURE: 73 MMHG | BODY MASS INDEX: 29.09 KG/M2

## 2023-11-22 LAB
ALBUMIN SERPL-MCNC: 3.7 G/DL (ref 3.5–5.2)
ALBUMIN/GLOB SERPL: 1.6 G/DL
ALP SERPL-CCNC: 44 U/L (ref 39–117)
ALT SERPL W P-5'-P-CCNC: 12 U/L (ref 1–41)
ANION GAP SERPL CALCULATED.3IONS-SCNC: 8 MMOL/L (ref 5–15)
AST SERPL-CCNC: 17 U/L (ref 1–40)
BASOPHILS # BLD AUTO: 0.01 10*3/MM3 (ref 0–0.2)
BASOPHILS NFR BLD AUTO: 0.1 % (ref 0–1.5)
BILIRUB SERPL-MCNC: <0.2 MG/DL (ref 0–1.2)
BUN SERPL-MCNC: 40 MG/DL (ref 8–23)
BUN/CREAT SERPL: 12.4 (ref 7–25)
CALCIUM SPEC-SCNC: 8.7 MG/DL (ref 8.6–10.5)
CHLORIDE SERPL-SCNC: 106 MMOL/L (ref 98–107)
CO2 SERPL-SCNC: 24 MMOL/L (ref 22–29)
CREAT SERPL-MCNC: 3.23 MG/DL (ref 0.76–1.27)
DEPRECATED RDW RBC AUTO: 43.3 FL (ref 37–54)
EGFRCR SERPLBLD CKD-EPI 2021: 18.3 ML/MIN/1.73
EOSINOPHIL # BLD AUTO: 0 10*3/MM3 (ref 0–0.4)
EOSINOPHIL NFR BLD AUTO: 0 % (ref 0.3–6.2)
ERYTHROCYTE [DISTWIDTH] IN BLOOD BY AUTOMATED COUNT: 12.6 % (ref 12.3–15.4)
GLOBULIN UR ELPH-MCNC: 2.3 GM/DL
GLUCOSE SERPL-MCNC: 112 MG/DL (ref 65–99)
HCT VFR BLD AUTO: 36.6 % (ref 37.5–51)
HGB BLD-MCNC: 11.7 G/DL (ref 13–17.7)
IMM GRANULOCYTES # BLD AUTO: 0.05 10*3/MM3 (ref 0–0.05)
IMM GRANULOCYTES NFR BLD AUTO: 0.6 % (ref 0–0.5)
LYMPHOCYTES # BLD AUTO: 0.76 10*3/MM3 (ref 0.7–3.1)
LYMPHOCYTES NFR BLD AUTO: 8.6 % (ref 19.6–45.3)
MCH RBC QN AUTO: 30.2 PG (ref 26.6–33)
MCHC RBC AUTO-ENTMCNC: 32 G/DL (ref 31.5–35.7)
MCV RBC AUTO: 94.3 FL (ref 79–97)
MONOCYTES # BLD AUTO: 0.7 10*3/MM3 (ref 0.1–0.9)
MONOCYTES NFR BLD AUTO: 7.9 % (ref 5–12)
NEUTROPHILS NFR BLD AUTO: 7.35 10*3/MM3 (ref 1.7–7)
NEUTROPHILS NFR BLD AUTO: 82.8 % (ref 42.7–76)
NRBC BLD AUTO-RTO: 0 /100 WBC (ref 0–0.2)
PLATELET # BLD AUTO: 150 10*3/MM3 (ref 140–450)
PMV BLD AUTO: 10.6 FL (ref 6–12)
POTASSIUM SERPL-SCNC: 4.1 MMOL/L (ref 3.5–5.2)
PROT SERPL-MCNC: 6 G/DL (ref 6–8.5)
RBC # BLD AUTO: 3.88 10*6/MM3 (ref 4.14–5.8)
SODIUM SERPL-SCNC: 138 MMOL/L (ref 136–145)
WBC NRBC COR # BLD AUTO: 8.87 10*3/MM3 (ref 3.4–10.8)

## 2023-11-22 PROCEDURE — 94664 DEMO&/EVAL PT USE INHALER: CPT

## 2023-11-22 PROCEDURE — 85025 COMPLETE CBC W/AUTO DIFF WBC: CPT | Performed by: INTERNAL MEDICINE

## 2023-11-22 PROCEDURE — 94799 UNLISTED PULMONARY SVC/PX: CPT

## 2023-11-22 PROCEDURE — 63710000001 PREDNISONE PER 1 MG: Performed by: NURSE PRACTITIONER

## 2023-11-22 PROCEDURE — 80053 COMPREHEN METABOLIC PANEL: CPT | Performed by: INTERNAL MEDICINE

## 2023-11-22 RX ORDER — BUDESONIDE AND FORMOTEROL FUMARATE DIHYDRATE 160; 4.5 UG/1; UG/1
2 AEROSOL RESPIRATORY (INHALATION)
Start: 2023-11-22

## 2023-11-22 RX ORDER — FLUTICASONE PROPIONATE 50 MCG
2 SPRAY, SUSPENSION (ML) NASAL DAILY
Start: 2023-11-23

## 2023-11-22 RX ORDER — FUROSEMIDE 40 MG/1
40 TABLET ORAL DAILY
Qty: 30 TABLET | Refills: 0 | Status: SHIPPED | OUTPATIENT
Start: 2023-11-23 | End: 2023-12-23

## 2023-11-22 RX ORDER — GUAIFENESIN 600 MG/1
1200 TABLET, EXTENDED RELEASE ORAL EVERY 12 HOURS SCHEDULED
Start: 2023-11-22

## 2023-11-22 RX ORDER — PREDNISONE 10 MG/1
TABLET ORAL
Qty: 30 TABLET | Refills: 0 | Status: SHIPPED | OUTPATIENT
Start: 2023-11-23 | End: 2023-12-04 | Stop reason: HOSPADM

## 2023-11-22 RX ADMIN — BUDESONIDE AND FORMOTEROL FUMARATE DIHYDRATE 2 PUFF: 160; 4.5 AEROSOL RESPIRATORY (INHALATION) at 06:16

## 2023-11-22 RX ADMIN — GUAIFENESIN 1200 MG: 600 TABLET, EXTENDED RELEASE ORAL at 09:17

## 2023-11-22 RX ADMIN — SODIUM BICARBONATE 325 MG: 650 TABLET ORAL at 09:17

## 2023-11-22 RX ADMIN — PREDNISONE 40 MG: 20 TABLET ORAL at 09:17

## 2023-11-22 RX ADMIN — PRAVASTATIN SODIUM 40 MG: 20 TABLET ORAL at 09:17

## 2023-11-22 RX ADMIN — IPRATROPIUM BROMIDE AND ALBUTEROL SULFATE 3 ML: 2.5; .5 SOLUTION RESPIRATORY (INHALATION) at 02:28

## 2023-11-22 RX ADMIN — FUROSEMIDE 40 MG: 40 TABLET ORAL at 09:17

## 2023-11-22 RX ADMIN — FLUTICASONE PROPIONATE 2 SPRAY: 50 SPRAY, METERED NASAL at 09:18

## 2023-11-22 RX ADMIN — METOPROLOL TARTRATE 50 MG: 50 TABLET, FILM COATED ORAL at 09:17

## 2023-11-22 RX ADMIN — HYDRALAZINE HYDROCHLORIDE 100 MG: 50 TABLET ORAL at 09:17

## 2023-11-22 RX ADMIN — ASPIRIN 81 MG: 81 TABLET, COATED ORAL at 09:17

## 2023-11-22 RX ADMIN — SODIUM BICARBONATE 325 MG: 650 TABLET ORAL at 11:57

## 2023-11-22 RX ADMIN — IPRATROPIUM BROMIDE AND ALBUTEROL SULFATE 3 ML: 2.5; .5 SOLUTION RESPIRATORY (INHALATION) at 06:16

## 2023-11-22 RX ADMIN — IPRATROPIUM BROMIDE AND ALBUTEROL SULFATE 3 ML: 2.5; .5 SOLUTION RESPIRATORY (INHALATION) at 09:56

## 2023-11-22 RX ADMIN — CETIRIZINE HYDROCHLORIDE 10 MG: 10 TABLET ORAL at 09:17

## 2023-11-22 NOTE — DISCHARGE SUMMARY
[facility] Medicine Services  DISCHARGE SUMMARY       Date of Admission: 11/19/2023  Date of Discharge:  11/22/2023  Primary Care Physician: Spike Polanco DO    Presenting Problem/History of Present Illness:  Shortness of breath    Final Discharge Diagnoses:  Active Hospital Problems    Diagnosis     **Acute hypoxic respiratory failure     Acute heart failure     Peripheral edema     Coronary artery disease     Stage 4 chronic kidney disease     Essential hypertension        Consults: Dr. Torres with nephrology.    Procedures Performed: none.    Pertinent Test Results:   Results for orders placed during the hospital encounter of 11/19/23    Adult Transthoracic Echo Complete W/ Cont if Necessary Per Protocol    Interpretation Summary    Left ventricular ejection fraction appears to be 56 - 60%.    Left ventricular wall thickness is consistent with mild concentric hypertrophy.    Left ventricular diastolic function is consistent with (grade II w/high LAP) pseudonormalization.    Left atrial volume is severely increased.    The right atrial cavity is dilated.    Moderate pulmonary hypertension is present.    There is a trivial pericardial effusion. There is no evidence of cardiac tamponade.    Abnormal global longitudinal LV strain (GLS) = -11.5%      Imaging Results (All)       Procedure Component Value Units Date/Time    US Renal Bilateral [[id number]] Collected: 11/20/23 1656     Updated: 11/20/23 1709    Narrative:      RENAL ULTRASOUND COMPLETE 11/20/2023 2:10 PM CST     REASON FOR EXAM: CKD with attention to left renal lesion noted on 11/19  CT scan; I50.9-Heart failure, unspecified       COMPARISON: CT chest on 11/19/2023 demonstrating an indeterminate mid  left renal lesion posteriorly.     TECHNIQUE: Multiple longitudinal and transverse realtime sonographic  images of the kidneys and urinary bladder are obtained.     FINDINGS:     RIGHT KIDNEY: The right kidney  measures 12.1 cm in length. The cortical  thickness and echogenicity are normal. There are multiple right renal  cysts. There is one cyst in the lower pole measuring 4 x 3.3 x 3.5 cm.  There is another cyst in the lower pole measuring 2.8 x 2.2 x 3 cm.  There is no hydronephrosis. No nephrolithiasis or abnormal perinephric  fluid collections.     LEFT KIDNEY: The left kidney measures 12.2 cm. Cm in length. The  cortical thickness is normal. There are 2 cysts adjacent to one another  in the upper to midpole left kidney. One cyst measures up to 1.2 cm and  the other cyst measures up to 1.4 cm. The technologist measures an area  in the central left kidney as a potential mass measuring 1.8 x 1.9 x 1.9  cm. I feel this is probably normal structures within the central kidney.  The posterior and partially exophytic lesion seen on CT in the left mid  kidney is not further characterized on the ultrasound study and  therefore remains indeterminate.. There is no hydronephrosis. No  nephrolithiasis or abnormal perinephric fluid collections.     PELVIS: There is thickening of the bladder wall. The bladder is not well  distended. There is no surrounding ascites.     ADDITIONAL FINDINGS: The visualized abdominal aorta is normal caliber.       Impression:      1. The 1.7 cm partially exophytic lesion in the left mid kidney  posteriorly seen on CT was not seen or measured by the technologist on  this study. Therefore, this lesion remains indeterminate and is not  fully evaluated. Consider follow-up renal mass protocol CT or MRI to  evaluate further.  2. There are bilateral simple renal cysts.  3. The technologist measured an area in the central left kidney as a  potential mass. I doubt that this is a mass. It can be further evaluated  if the follow-up recommended imaging is performed.  4. Bladder wall thickening is probably an artifact of under distention.              This report was signed and finalized on 11/20/2023 5:06 PM CST  by Dr. Micheal Marquez MD.       CT Chest Without Contrast Diagnostic [[id number]] Collected: 11/19/23 1759     Updated: 11/19/23 1815    Narrative:      EXAMINATION:  CT CHEST WO CONTRAST DIAGNOSTIC-  11/19/2023 5:31 PM CST     HISTORY: Shortness of breath. I50.9-Heart failure, unspecified.  Coughing. Wheezing.     COMPARISON : 5/29/2020.     DLP: 252 mGy-cm. Automated dosage reduction technique was utilized to  reduce patient dosage.     TECHNIQUE: Spiral CT was performed of the chest without contrast.  Sagittal and coronal images were reconstructed.     MEDIASTINUM, HEART AND VASCULAR STRUCTURES: There is atheromatous  calcification of the thoracic aorta and coronary arteries. There has  been prior median sternotomy and bypass surgery. The ascending thoracic  aorta is ectatic measuring 3.7 cm. The main pulmonary artery segment is  dilated measuring 3.3 cm. There is cardiomegaly. There are multiple  small mediastinal lymph nodes consistent with lymphadenopathy. A lymph  node to the left of the trachea has a short axis diameter of 2.3 cm,  previously 1.7 cm. The hilar areas are difficult to evaluate due to the  lack of contrast utilization.     LUNGS: There is centrilobular and paraseptal emphysema. There is a trace  amount of pleural effusion bilaterally. There is no dense infiltrate or  effusion.     LUNG NODULES:  1. Stable 3 mm left upper lobe perivascular nodule image 48 series 3.  2. Stable 3-4 mm left upper lobe perivascular nodule image 52 series 3.  3. Stable 4 mm perivascular nodule right upper lobe image 66 series 3.  4. Stable 6-7 mm left upper lobe perivascular nodule image 80 series 3.  5. Stable 4-5 mm right lower lobe perivascular nodule image 82 series 3.  6. Stable 6 mm fissural nodule adjacent to the left major fissure image  122 series 3.     UPPER ABDOMEN: There is a small hiatal hernia. There are couple of left  renal cysts. There is a cyst in the upper pole right kidney. There is  an  indeterminate lesion arising from the midpole left kidney measuring 1.7  cm with a Hounsfield unit measurement of 41.     BONES: There are degenerative changes of the spine. No acute appearing  bony abnormality is seen. 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 There has been prior median sternotomy.          Impression:      1. 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  Prior heart bypass surgery. Ectatic ascending thoracic aorta measuring  3.7 cm. Main pulmonary artery segment is dilated at 3.3 cm.  Cardiomegaly.  2. Continued mediastinal lymphadenopathy. The largest lymph node to the  left of the trachea is larger on the current study with a short axis  diameter of 2.3 cm, previously 1.7 cm. There may be a very slight  increase in size of multiple other mediastinal lymph nodes. These lymph  nodes may be reactive or neoplastic. Sarcoidosis is also in the  differential as there are multiple stable perivascular lung nodules.  3.. Multiple stable perivascular lung nodules. Stability over this  length of time would suggest benign disease.  4. Centrilobular and paraseptal emphysema. Trace amount of pleural  effusion bilaterally.  5. A 1.7 cm indeterminate left renal lesion in the midpole region  posteriorly. This could be a small solid lesion. A complex cyst is also  possible. Follow-up ultrasound recommended to evaluate further. There  are bilateral renal cysts.  6. Small hiatal hernia.           This report was signed and finalized on 11/19/2023 6:12 PM CST by Dr. Micheal Marquez MD.       XR Chest 1 View [[id number]] Collected: 11/19/23 1542     Updated: 11/19/23 1546    Narrative:      EXAMINATION:  XR CHEST 1 VW-  11/19/2023 3:20 PM CST     HISTORY: SOA with audible wheezing. PNA vs CHF.     COMPARISON: 3/31/2023.     TECHNIQUE: Single view AP image.     FINDINGS: Coarse markings and bronchial wall thickening appear stable.  No new infiltrate is seen. Heart size is  normal. Prior median  sternotomy. The thoracic aorta is atheromatous. There are degenerative  changes of the spine and shoulders.          Impression:      1. No acute appearing infiltrate or effusion.  2. Coarse markings and bronchial wall thickening, stable.           This report was signed and finalized on 11/19/2023 3:43 PM CST by Dr. Micheal Marquez MD.             LAB RESULTS:      Lab 11/22/23  0643 11/21/23  0433 11/19/23  1527   WBC 8.87 6.51 11.51*   HEMOGLOBIN 11.7* 10.7* 11.7*   HEMATOCRIT 36.6* 32.7* 35.6*   PLATELETS 150 105* 107*   NEUTROS ABS 7.35* 5.69 10.07*   IMMATURE GRANS (ABS) 0.05  --  0.05   LYMPHS ABS 0.76 0.50* 0.56*   MONOS ABS 0.70 0.29 0.54   EOS ABS 0.00 0.00 0.25   MCV 94.3 93.7 93.0   LACTATE  --   --  0.9         Lab 11/22/23  0643 11/21/23 0433 11/20/23  0332 11/19/23  1855 11/19/23  1527   SODIUM 138 139 139 138 141   POTASSIUM 4.1 4.4 4.1 4.3 4.2   CHLORIDE 106 107 109* 107 108*   CO2 24.0 22.0 21.0* 19.0* 21.0*   ANION GAP 8.0 10.0 9.0 12.0 12.0   BUN 40* 35* 28* 26* 25*   CREATININE 3.23* 3.27* 3.16* 3.02* 2.73*   EGFR 18.3* 18.0* 18.8* 19.8* 22.4*   GLUCOSE 112* 130* 153* 206* 103*   CALCIUM 8.7 8.5* 8.7 9.0 8.9         Lab 11/22/23  0643 11/21/23  0433 11/19/23  1527   TOTAL PROTEIN 6.0 6.0 6.8   ALBUMIN 3.7 3.4* 4.2   GLOBULIN 2.3 2.6 2.6   ALT (SGPT) 12 12 12   AST (SGOT) 17 19 17   BILIRUBIN <0.2 0.2 0.5   ALK PHOS 44 46 62         Lab 11/19/23  1855 11/19/23  1527   PROBNP  --  7,460.0*   HSTROP T 33* 34*                 Brief Urine Lab Results  (Last result in the past 365 days)        Color   Clarity   Blood   Leuk Est   Nitrite   Protein   CREAT   Urine HCG        11/20/23 1817             25.7               Microbiology Results (last 10 days)       Procedure Component Value - Date/Time    COVID PRE-OP / PRE-PROCEDURE SCREENING ORDER (NO ISOLATION) - Swab, Nasopharynx [[id number]]  (Normal) Collected: 11/19/23 1535    Lab Status: Final result Specimen: Swab from  Nasopharynx Updated: 11/19/23 1622    Narrative:      The following orders were created for panel order COVID PRE-OP / PRE-PROCEDURE SCREENING ORDER (NO ISOLATION) - Swab, Nasopharynx.  Procedure                               Abnormality         Status                     ---------                               -----------         ------                     COVID-19 and FLU A/B PCR...[[id number]]  Normal              Final result                 Please view results for these tests on the individual orders.    COVID-19 and FLU A/B PCR, 1 HR TAT - Swab, Nasopharynx [[id number]]  (Normal) Collected: 11/19/23 1535    Lab Status: Final result Specimen: Swab from Nasopharynx Updated: 11/19/23 1622     COVID19 Not Detected     Influenza A PCR Not Detected     Influenza B PCR Not Detected    Narrative:      Fact sheet for providers: https://www.fda.gov/media/020156/download    Fact sheet for patients: https://www.fda.gov/media/846840/download    Test performed by PCR.    Respiratory Panel PCR w/COVID-19(SARS-CoV-2) [facility] In-House, NP Swab in UTM/VTM, 2 HR TAT - Swab, Nasopharynx [[id number]]  (Abnormal) Collected: 11/19/23 1535    Lab Status: Final result Specimen: Swab from Nasopharynx Updated: 11/19/23 1812     ADENOVIRUS, PCR Not Detected     Coronavirus 229E Not Detected     Coronavirus HKU1 Not Detected     Coronavirus NL63 Not Detected     Coronavirus OC43 Not Detected     COVID19 Not Detected     Human Metapneumovirus Not Detected     Human Rhinovirus/Enterovirus Detected     Influenza A PCR Not Detected     Influenza B PCR Not Detected     Parainfluenza Virus 1 Not Detected     Parainfluenza Virus 2 Not Detected     Parainfluenza Virus 3 Not Detected     Parainfluenza Virus 4 Not Detected     RSV, PCR Not Detected     Bordetella pertussis pcr Not Detected     Bordetella parapertussis PCR Not Detected     Chlamydophila pneumoniae PCR Not Detected     Mycoplasma pneumo by PCR Not Detected    Narrative:       In the setting of a positive respiratory panel with a viral infection PLUS a negative procalcitonin without other underlying concern for bacterial infection, consider observing off antibiotics or discontinuation of antibiotics and continue supportive care. If the respiratory panel is positive for atypical bacterial infection (Bordetella pertussis, Chlamydophila pneumoniae, or Mycoplasma pneumoniae), consider antibiotic de-escalation to target atypical bacterial infection.            Hospital Course:   Mr. Joseph is an 83-year-old male who presented to [facility] on [facility]19 for 1 day history of worsening shortness of breath and orthopnea.  Chest x-ray showed coarse markings and bronchial wall thickening.  BNP elevated at 7000.  He was given 1 dose of Lasix 40 mg and IV Solu-Medrol 60 mg x 1.     COPD with acute exacerbation in the setting of respiratory panel positive for human rhinovirus/enterovirus.  Chest x-ray showed no acute appearing infiltrate or effusion.  He has a history of stage II moderate COPD and is on sample Trelegy followed by pulmonary clinic.  He was given IV Solu-Medrol in ED. IV Steroids have been transitioned to prednisone taper at time of discharge.  Duonebs, symbicort, mucinex and incentive spirometry in hospital.  Oxygen weaned to room air.  He has a follow-up with Dr. Greene scheduled on 12/6.     Volume overload. Nephrology consulted he has a history of stage IV chronic kidney disease, baseline creatinine 2.9.  Creatinine 2.7 on admission.  Creatinine today 3.23.  He was given Lasix IV x1 in ED Discussed with LIZ ervin with nephrology - continue lasix 40 mg daily at time of discharge.  He is okay for discharge from nephrology standpoint with close follow-up in 1 week with repeat blood work.    Transthoracic echocardiogram in 2021 showed EF 56-60%.  Results for orders placed during the hospital encounter of 11/19/23     Adult Transthoracic Echo Complete W/ Cont if Necessary Per  "Protocol     Interpretation Summary    Left ventricular ejection fraction appears to be 56 - 60%.    Left ventricular wall thickness is consistent with mild concentric hypertrophy.    Left ventricular diastolic function is consistent with (grade II w/high LAP) pseudonormalization.    Left atrial volume is severely increased.    The right atrial cavity is dilated.    Moderate pulmonary hypertension is present.    There is a trivial pericardial effusion. There is no evidence of cardiac tamponade.    Abnormal global longitudinal LV strain (GLS) = -11.5%     CT chest noted 1.7 cm indeterminate left renal lesion in the midpole region posteriorly-ultrasound recommended to further evaluate.  Renal ultrasound was indeterminate for characterization of left renal lesion.  He will need follow-up renal mass protocol CT or MRI as outpatient to further evaluate.     CT chest showed stable lung nodules. Sarcoidosis is also in the   differential as there are multiple stable perivascular lung nodules.   3. Multiple stable perivascular lung nodules. Stability over this   length of time would suggest benign disease.  He follows with pulmonology as outpatient.     He has reached maximum benefit of hospitalization.  He is medically stable and appropriate for discharge today.    Physical Exam on Discharge:  /73 (BP Location: Right arm, Patient Position: Lying)   Pulse 90   Temp 97.6 °F (36.4 °C) (Oral)   Resp 16   Ht 182.9 cm (72\")   Wt 97.4 kg (214 lb 12.8 oz)   SpO2 94%   BMI 29.13 kg/m²   Physical Exam  Vitals reviewed.   Constitutional:       General: He is not in acute distress.     Appearance: He is not toxic-appearing.      Interventions: Nasal cannula in place.      Comments: Lying in bed.  No acute distress.  On room air.  No family at bedside.  Discussed with his nurse aliyah   HENT:      Head: Normocephalic and atraumatic.      Mouth/Throat:      Mouth: Mucous membranes are moist.      Pharynx: Oropharynx is " clear.   Eyes:      Extraocular Movements: Extraocular movements intact.      Conjunctiva/sclera: Conjunctivae normal.      Pupils: Pupils are equal, round, and reactive to light.   Cardiovascular:      Rate and Rhythm: Normal rate and regular rhythm.      Pulses: Normal pulses.   Pulmonary:      Effort: Pulmonary effort is normal. No respiratory distress.      Breath sounds: Wheezing present.   Abdominal:      General: Bowel sounds are normal. There is no distension.      Palpations: Abdomen is soft.      Tenderness: There is no abdominal tenderness.   Musculoskeletal:         General: No swelling or tenderness. Normal range of motion.      Cervical back: Normal range of motion and neck supple. No muscular tenderness.      Right lower leg: Edema present.      Left lower leg: Edema present.   Skin:     General: Skin is warm and dry.      Findings: No erythema or rash.   Neurological:      General: No focal deficit present.      Mental Status: He is alert and oriented to person, place, and time.      Cranial Nerves: No cranial nerve deficit.      Motor: No weakness.   Psychiatric:         Mood and Affect: Mood normal.         Behavior: Behavior normal.        Condition on Discharge: medically stable.    Discharge Disposition:  Home or Self Care    Discharge Medications:     Discharge Medications        New Medications        Instructions Start Date   budesonide-formoterol 160-4.5 MCG/ACT inhaler  Commonly known as: SYMBICORT   2 puffs, Inhalation, 2 Times Daily - RT      fluticasone 50 MCG/ACT nasal spray  Commonly known as: FLONASE   2 sprays, Each Nare, Daily, Obtain otc   Start Date: November 23, 2023     furosemide 40 MG tablet  Commonly known as: LASIX   40 mg, Oral, Daily   Start Date: November 23, 2023     guaiFENesin 600 MG 12 hr tablet  Commonly known as: MUCINEX   1,200 mg, Oral, Every 12 Hours Scheduled      predniSONE 10 MG tablet  Commonly known as: DELTASONE   Take 4 tablets by mouth Daily With  Breakfast for 3 days, THEN 3 tablets Daily With Breakfast for 3 days, THEN 2 tablets Daily With Breakfast for 3 days, THEN 1 tablet Daily With Breakfast for 3 days.   Start Date: November 23, 2023            Continue These Medications        Instructions Start Date   amLODIPine 10 MG tablet  Commonly known as: NORVASC   10 mg, Oral, Daily      aspirin 81 MG EC tablet   81 mg, Oral, Daily      ferrous sulfate 325 (65 FE) MG tablet   325 mg, Oral, 2 Times Daily      hydrALAZINE 100 MG tablet  Commonly known as: APRESOLINE   100 mg, Oral, 3 Times Daily      lisinopril 20 MG tablet  Commonly known as: PRINIVIL,ZESTRIL   20 mg, Oral, 2 Times Daily      metoprolol tartrate 50 MG tablet  Commonly known as: LOPRESSOR   50 mg, Oral, 2 Times Daily      nitroglycerin 0.4 MG SL tablet  Commonly known as: NITROSTAT   1 under the tongue as needed for angina, may repeat q5mins for up three doses      pravastatin 40 MG tablet  Commonly known as: PRAVACHOL   40 mg, Oral, Daily      sodium bicarbonate 650 MG tablet   325 mg, Oral, 2 Times Daily      terazosin 1 MG capsule  Commonly known as: HYTRIN   1 mg, Oral, Nightly      vitamin B-12 100 MCG tablet  Commonly known as: CYANOCOBALAMIN   50 mcg, Oral, Daily      Vitamin D 50 MCG (2000 UT) capsule   2,000 Units, Oral, Daily             Discharge Diet:   Diet Instructions       Diet: Regular/House Diet, Cardiac Diets, Diabetic Diets; Healthy Heart (2-3 Na+); Regular Texture (IDDSI 7); Thin (IDDSI 0); Consistent Carbohydrate      Discharge Diet:  Regular/House Diet  Cardiac Diets  Diabetic Diets       Cardiac Diet: Healthy Heart (2-3 Na+)    Texture: Regular Texture (IDDSI 7)    Fluid Consistency: Thin (IDDSI 0)    Diabetic Diet: Consistent Carbohydrate            Activity at Discharge:   Activity Instructions       Activity as Tolerated              Follow-up Appointments:   1.  Follow-up with Dr. Polanco in 1 week.  2.  Follow-up with nephrology in 1 week with repeat blood  work.  Future Appointments   Date Time Provider Department Center   12/6/2023  9:45 AM Wyatt Greene MD [facility]   1/24/2024 10:00 AM  [facility]  [facility]   2/7/2024  1:45 PM Rufino Brice MD [facility]   4/24/2024 10:00 AM  [facility]  [facility]   4/24/2024 10:30 AM Samantha Winkler APRN [facility]       Test Results Pending at Discharge: none.    Electronically signed by LIZ Dick, 11/22/23, 13:07 CST.    Time: 35 minutes.

## 2023-11-22 NOTE — PLAN OF CARE
Problem: Adult Inpatient Plan of Care  Goal: Plan of Care Review  Outcome: Met  Flowsheets (Taken 11/22/2023 6448)  Outcome Evaluation: Patient RA. IV CDI, IID. No C/O pain. Up x1. VSS, safety maintained.

## 2023-11-22 NOTE — PLAN OF CARE
Goal Outcome Evaluation:  Plan of Care Reviewed With: patient        Progress: no change     Pt A&O x4. Lovenox for VTE. IID. Voiding per urinal. RA, cont. Pulse ox in place; O2 WNL. Tolerating diet well. Pt resting comfortably between care. VSS safety maintained, no distress noted.

## 2023-11-22 NOTE — PROGRESS NOTES
Nephrology ([facility]) Progress Note      Patient:  Faisal Joseph  YOB: 1940  Date of Service: 11/22/2023  MRN: [id number]   Acct: [id number]   Primary Care Physician: Spike Polanco DO  Advance Directive:   Code Status and Medical Interventions:   Ordered at: 11/19/23 1712     Level Of Support Discussed With:    Patient     Code Status (Patient has no pulse and is not breathing):    CPR (Attempt to Resuscitate)     Medical Interventions (Patient has pulse or is breathing):    Full Support     Admit Date: 11/19/2023       Hospital Day: 3  Referring Provider: No ref. provider found      Patient personally seen and examined.  Complete chart including Consults, Notes, Operative Reports, Labs, Cardiology, and Radiology studies reviewed as able.        Subjective:  Faisal Joseph is a 83 y.o. male for whom we were consulted for evaluation and treatment of chronic kidney disease stage 4. Follows with Veda Brito in our office, baseline creatinine approximately 2.9. History of coronary artery disease, prior CVA, sleep apnea, hypertension. Presented to ER with complaint of worsening dyspnea. Also had a cough and audible wheezing. Received IV Lasix for one dose with moderate response.  Has been maintaining adequate diuresis    Today is awake and alert. No new complaints. Edema and dyspnea with steady improvement. Urine output nonoliguric     Allergies:  Hydralazine, Losartan potassium, Penicillins, and Spironolactone    Home Meds:  Medications Prior to Admission   Medication Sig Dispense Refill Last Dose    amLODIPine (NORVASC) 10 MG tablet Take 1 tablet by mouth Daily.       aspirin 81 MG EC tablet Take 1 tablet by mouth Daily. 100 tablet 99     ferrous sulfate 325 (65 FE) MG tablet Take 1 tablet by mouth 2 (Two) Times a Day.       hydrALAZINE (APRESOLINE) 100 MG tablet Take 1 tablet by mouth 3 (Three) Times a Day.       lisinopril (PRINIVIL,ZESTRIL) 20 MG  tablet Take 1 tablet by mouth 2 (Two) Times a Day.       metoprolol tartrate (LOPRESSOR) 50 MG tablet Take 1 tablet by mouth 2 (Two) Times a Day.       pravastatin (PRAVACHOL) 40 MG tablet Take 1 tablet by mouth Daily.       sodium bicarbonate 650 MG tablet Take 0.5 tablets by mouth 2 (Two) Times a Day.       terazosin (HYTRIN) 1 MG capsule Take 1 capsule by mouth Every Night.       vitamin B-12 (CYANOCOBALAMIN) 100 MCG tablet Take 0.5 tablets by mouth Daily.       Cholecalciferol (VITAMIN D) 2000 units capsule Take 1 capsule by mouth Daily.       nitroglycerin (NITROSTAT) 0.4 MG SL tablet 1 under the tongue as needed for angina, may repeat q5mins for up three doses 25 tablet 2        Medicines:  Current Facility-Administered Medications   Medication Dose Route Frequency Provider Last Rate Last Admin    acetaminophen (TYLENOL) tablet 650 mg  650 mg Oral Q4H PRN Shirley Bryant        aspirin EC tablet 81 mg  81 mg Oral Daily Mariela Covarrubias APRN   81 mg at 11/22/23 0917    sennosides-docusate (PERICOLACE) 8.6-50 MG per tablet 2 tablet  2 tablet Oral Nightly PRN Shirley Bryant        And    polyethylene glycol (MIRALAX) packet 17 g  17 g Oral Daily PRN Shirley Bryant        And    bisacodyl (DULCOLAX) EC tablet 5 mg  5 mg Oral Daily PRN Shirley Bryant        And    bisacodyl (DULCOLAX) suppository 10 mg  10 mg Rectal Daily PRN Shirley Bryant        budesonide-formoterol (SYMBICORT) 160-4.5 MCG/ACT inhaler 2 puff  2 puff Inhalation BID - RT Mariela Covarrubias APRN   2 puff at 11/22/23 0616    cetirizine (zyrTEC) tablet 10 mg  10 mg Oral Daily Mariela Covarrubias APRN   10 mg at 11/22/23 0917    Enoxaparin Sodium (LOVENOX) syringe 30 mg  30 mg Subcutaneous Q24H Brian Shin DO   30 mg at 11/21/23 1724    fluticasone (FLONASE) 50 MCG/ACT nasal spray 2 spray  2 spray Each Nare Daily Mariela Covarrubias APRN   2 spray at 11/22/23 0918    furosemide (LASIX) tablet 40 mg  40 mg Oral Daily Mariela Covarrubias APRN   40 mg  at 11/22/23 0917    guaiFENesin (MUCINEX) 12 hr tablet 1,200 mg  1,200 mg Oral Q12H Covarrubias, Mariela, APRN   1,200 mg at 11/22/23 0917    hydrALAZINE (APRESOLINE) tablet 100 mg  100 mg Oral TID Covarrubias, Mariela, APRN   100 mg at 11/22/23 0917    ipratropium-albuterol (DUO-NEB) nebulizer solution 3 mL  3 mL Nebulization Q4H - RT Brian Shin, DO   3 mL at 11/22/23 0956    metoprolol tartrate (LOPRESSOR) tablet 50 mg  50 mg Oral BID Covarrubias, Mariela, APRN   50 mg at 11/22/23 0917    nitroglycerin (NITROSTAT) SL tablet 0.4 mg  0.4 mg Sublingual Q5 Min PRN Shirley Bryant        ondansetron (ZOFRAN) injection 4 mg  4 mg Intravenous Q6H PRN Shirley Bryant        Pharmacy to Dose enoxaparin (LOVENOX)   Does not apply Continuous PRN Shirley Bryant        pravastatin (PRAVACHOL) tablet 40 mg  40 mg Oral Daily Covarrubias, Mariela, APRN   40 mg at 11/22/23 0917    predniSONE (DELTASONE) tablet 40 mg  40 mg Oral Daily With Breakfast Covarrubias, Mariela, APRN   40 mg at 11/22/23 0917    sodium bicarbonate tablet 325 mg  325 mg Oral 4x Daily Covarrubias, Mariela, APRN   325 mg at 11/22/23 0917       Past Medical History:  Past Medical History:   Diagnosis Date    Arthritis     Cataract     Chronic fatigue 10/3/2017    Chronic renal disease, stage IV     Coronary artery disease     CVA (cerebral vascular accident) 8/14/2018    Hyperlipidemia     Hypertension     Palpitations 10/3/2017    Premature atrial contractions 10/17/2017    PVCs (premature ventricular contractions) 10/17/2017    Sleep apnea     Stage 4 chronic kidney disease 7/30/2018    Stroke 2007    Weakness     right leg weaker       Past Surgical History:  Past Surgical History:   Procedure Laterality Date    CARDIAC CATHETERIZATION      CARDIAC CATHETERIZATION N/A 5/28/2020    Procedure: Left Heart Cath;  Surgeon: Rufino Brice MD;  Location: [facility] CATH INVASIVE LOCATION;  Service: Cardiology;  Laterality: N/A;    CORONARY ARTERY BYPASS GRAFT N/A 6/9/2020    Procedure: CABG X  3, LIMA, SHELBY, EVH WITH OPEN EXTENSION LOWER RIGHT LEG;  Surgeon: Nikunj Carballo MD;  Location:  [facility] OR;  Service: Cardiothoracic;  Laterality: N/A;       Family History  Family History   Problem Relation Age of Onset    Cancer Mother     Cancer Father        Social History  Social History     Socioeconomic History    Marital status:    Tobacco Use    Smoking status: Former     Packs/day: 1.50     Years: 25.00     Additional pack years: 0.00     Total pack years: 37.50     Types: Cigarettes     Start date:      Quit date:      Years since quittin.9    Smokeless tobacco: Never   Vaping Use    Vaping Use: Never used   Substance and Sexual Activity    Alcohol use: No    Drug use: No    Sexual activity: Defer       Review of Systems:  History obtained from chart review and the patient  General ROS: No fever or chills  Respiratory ROS: positive for - cough and shortness of breath  Cardiovascular ROS: positive for - dyspnea on exertion and edema  No chest pain or palpitations  Gastrointestinal ROS: No abdominal pain or melena  Genito-Urinary ROS: No dysuria or hematuria  Psych ROS: No anxiety and depression  14 point ROS reviewed with the patient and negative except as noted above and in the HPI unless unable to obtain.    Objective:  Patient Vitals for the past 24 hrs:   BP Temp Temp src Pulse Resp SpO2 Weight   23 0956 -- -- -- 72 16 93 % --   23 0759 159/65 97.7 °F (36.5 °C) Oral 65 16 91 % --   23 0616 -- -- -- 70 16 93 % --   23 0428 145/70 97.9 °F (36.6 °C) Oral 72 16 91 % 97.4 kg (214 lb 12.8 oz)   23 0234 -- -- -- 65 14 100 % --   23 0228 -- -- -- 70 14 93 % --   23 1936 156/63 97.9 °F (36.6 °C) Oral 79 16 94 % --   23 1911 -- -- -- 77 16 98 % --   23 1906 -- -- -- 70 16 90 % --   23 1528 155/63 98.1 °F (36.7 °C) Oral 78 16 92 % --   23 1425 -- -- -- 68 16 93 % --   23 1120 151/66 97.9 °F (36.6 °C) Oral 71 18 94 % --        Intake/Output Summary (Last 24 hours) at 11/22/2023 1055  Last data filed at 11/22/2023 0857  Gross per 24 hour   Intake 1200 ml   Output 1950 ml   Net -750 ml     General: awake/alert   Chest:  clear to auscultation bilaterally without respiratory distress  CVS: regular rate and rhythm  Abdominal: soft, nontender, positive bowel sounds  Extremities:  lower extremity 2+ edema  Skin: warm and dry without rash      Labs:  Results from last 7 days   Lab Units 11/22/23  0643 11/21/23  0433 11/19/23  1527   WBC 10*3/mm3 8.87 6.51 11.51*   HEMOGLOBIN g/dL 11.7* 10.7* 11.7*   HEMATOCRIT % 36.6* 32.7* 35.6*   PLATELETS 10*3/mm3 150 105* 107*         Results from last 7 days   Lab Units 11/22/23  0643 11/21/23  0433 11/20/23  0332 11/19/23  1855 11/19/23  1527   SODIUM mmol/L 138 139 139   < > 141   POTASSIUM mmol/L 4.1 4.4 4.1   < > 4.2   CHLORIDE mmol/L 106 107 109*   < > 108*   CO2 mmol/L 24.0 22.0 21.0*   < > 21.0*   BUN mg/dL 40* 35* 28*   < > 25*   CREATININE mg/dL 3.23* 3.27* 3.16*   < > 2.73*   CALCIUM mg/dL 8.7 8.5* 8.7   < > 8.9   EGFR mL/min/1.73 18.3* 18.0* 18.8*   < > 22.4*   BILIRUBIN mg/dL <0.2 0.2  --   --  0.5   ALK PHOS U/L 44 46  --   --  62   ALT (SGPT) U/L 12 12  --   --  12   AST (SGOT) U/L 17 19  --   --  17   GLUCOSE mg/dL 112* 130* 153*   < > 103*    < > = values in this interval not displayed.       Radiology:   Imaging Results (Last 72 Hours)       Procedure Component Value Units Date/Time    US Renal Bilateral [[id number]] Collected: 11/20/23 1656     Updated: 11/20/23 1709    Narrative:      RENAL ULTRASOUND COMPLETE 11/20/2023 2:10 PM CST     REASON FOR EXAM: CKD with attention to left renal lesion noted on 11/19  CT scan; I50.9-Heart failure, unspecified       COMPARISON: CT chest on 11/19/2023 demonstrating an indeterminate mid  left renal lesion posteriorly.     TECHNIQUE: Multiple longitudinal and transverse realtime sonographic  images of the kidneys and urinary bladder are obtained.      FINDINGS:     RIGHT KIDNEY: The right kidney measures 12.1 cm in length. The cortical  thickness and echogenicity are normal. There are multiple right renal  cysts. There is one cyst in the lower pole measuring 4 x 3.3 x 3.5 cm.  There is another cyst in the lower pole measuring 2.8 x 2.2 x 3 cm.  There is no hydronephrosis. No nephrolithiasis or abnormal perinephric  fluid collections.     LEFT KIDNEY: The left kidney measures 12.2 cm. Cm in length. The  cortical thickness is normal. There are 2 cysts adjacent to one another  in the upper to midpole left kidney. One cyst measures up to 1.2 cm and  the other cyst measures up to 1.4 cm. The technologist measures an area  in the central left kidney as a potential mass measuring 1.8 x 1.9 x 1.9  cm. I feel this is probably normal structures within the central kidney.  The posterior and partially exophytic lesion seen on CT in the left mid  kidney is not further characterized on the ultrasound study and  therefore remains indeterminate.. There is no hydronephrosis. No  nephrolithiasis or abnormal perinephric fluid collections.     PELVIS: There is thickening of the bladder wall. The bladder is not well  distended. There is no surrounding ascites.     ADDITIONAL FINDINGS: The visualized abdominal aorta is normal caliber.       Impression:      1. The 1.7 cm partially exophytic lesion in the left mid kidney  posteriorly seen on CT was not seen or measured by the technologist on  this study. Therefore, this lesion remains indeterminate and is not  fully evaluated. Consider follow-up renal mass protocol CT or MRI to  evaluate further.  2. There are bilateral simple renal cysts.  3. The technologist measured an area in the central left kidney as a  potential mass. I doubt that this is a mass. It can be further evaluated  if the follow-up recommended imaging is performed.  4. Bladder wall thickening is probably an artifact of under distention.              This report was  signed and finalized on 11/20/2023 5:06 PM CST by Dr. Micheal Marquez MD.       CT Chest Without Contrast Diagnostic [[id number]] Collected: 11/19/23 1759     Updated: 11/19/23 1815    Narrative:      EXAMINATION:  CT CHEST WO CONTRAST DIAGNOSTIC-  11/19/2023 5:31 PM CST     HISTORY: Shortness of breath. I50.9-Heart failure, unspecified.  Coughing. Wheezing.     COMPARISON : 5/29/2020.     DLP: 252 mGy-cm. Automated dosage reduction technique was utilized to  reduce patient dosage.     TECHNIQUE: Spiral CT was performed of the chest without contrast.  Sagittal and coronal images were reconstructed.     MEDIASTINUM, HEART AND VASCULAR STRUCTURES: There is atheromatous  calcification of the thoracic aorta and coronary arteries. There has  been prior median sternotomy and bypass surgery. The ascending thoracic  aorta is ectatic measuring 3.7 cm. The main pulmonary artery segment is  dilated measuring 3.3 cm. There is cardiomegaly. There are multiple  small mediastinal lymph nodes consistent with lymphadenopathy. A lymph  node to the left of the trachea has a short axis diameter of 2.3 cm,  previously 1.7 cm. The hilar areas are difficult to evaluate due to the  lack of contrast utilization.     LUNGS: There is centrilobular and paraseptal emphysema. There is a trace  amount of pleural effusion bilaterally. There is no dense infiltrate or  effusion.     LUNG NODULES:  1. Stable 3 mm left upper lobe perivascular nodule image 48 series 3.  2. Stable 3-4 mm left upper lobe perivascular nodule image 52 series 3.  3. Stable 4 mm perivascular nodule right upper lobe image 66 series 3.  4. Stable 6-7 mm left upper lobe perivascular nodule image 80 series 3.  5. Stable 4-5 mm right lower lobe perivascular nodule image 82 series 3.  6. Stable 6 mm fissural nodule adjacent to the left major fissure image  122 series 3.     UPPER ABDOMEN: There is a small hiatal hernia. There are couple of left  renal cysts. There is a cyst in the  upper pole right kidney. There is an  indeterminate lesion arising from the midpole left kidney measuring 1.7  cm with a Hounsfield unit measurement of 41.     BONES: There are degenerative changes of the spine. No acute appearing  bony abnormality is seen. 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 There has been prior median sternotomy.          Impression:      1. 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  Prior heart bypass surgery. Ectatic ascending thoracic aorta measuring  3.7 cm. Main pulmonary artery segment is dilated at 3.3 cm.  Cardiomegaly.  2. Continued mediastinal lymphadenopathy. The largest lymph node to the  left of the trachea is larger on the current study with a short axis  diameter of 2.3 cm, previously 1.7 cm. There may be a very slight  increase in size of multiple other mediastinal lymph nodes. These lymph  nodes may be reactive or neoplastic. Sarcoidosis is also in the  differential as there are multiple stable perivascular lung nodules.  3.. Multiple stable perivascular lung nodules. Stability over this  length of time would suggest benign disease.  4. Centrilobular and paraseptal emphysema. Trace amount of pleural  effusion bilaterally.  5. A 1.7 cm indeterminate left renal lesion in the midpole region  posteriorly. This could be a small solid lesion. A complex cyst is also  possible. Follow-up ultrasound recommended to evaluate further. There  are bilateral renal cysts.  6. Small hiatal hernia.           This report was signed and finalized on 11/19/2023 6:12 PM CST by Dr. Micheal Marquez MD.       XR Chest 1 View [[id number]] Collected: 11/19/23 1542     Updated: 11/19/23 1546    Narrative:      EXAMINATION:  XR CHEST 1 VW-  11/19/2023 3:20 PM CST     HISTORY: SOA with audible wheezing. PNA vs CHF.     COMPARISON: 3/31/2023.     TECHNIQUE: Single view AP image.     FINDINGS: Coarse markings and bronchial wall thickening appear stable.  No new  "infiltrate is seen. Heart size is normal. Prior median  sternotomy. The thoracic aorta is atheromatous. There are degenerative  changes of the spine and shoulders.          Impression:      1. No acute appearing infiltrate or effusion.  2. Coarse markings and bronchial wall thickening, stable.           This report was signed and finalized on 11/19/2023 3:43 PM CST by Dr. Micheal Marquez MD.               Culture:  No results found for: \"BLOODCX\", \"URINECX\", \"WOUNDCX\", \"MRSACX\", \"RESPCX\", \"STOOLCX\"      Assessment    Chronic kidney disease stage 4  Volume overload--improving  Acute hypoxic respiratory failure  Hypertension   Metabolic acidosis--improved  Left renal lesion noted on 11/19 CT abdomen    Plan:   Continue PO Lasix. Renal function a bit below baseline but is remaining stable during diuresis.  Follow up renal US was indeterminate for characterization of left renal lesion. Could consider outpatient CT scan once renal function recovers  Monitor labs      Sergio Alas, APRN  11/22/2023  10:55 CST    "

## 2023-11-23 NOTE — OUTREACH NOTE
Prep Survey      Flowsheet Row Responses   [facility] facility patient discharged from? [place]   Is LACE score < 7 ? No   Eligibility Readm Mgmt   Discharge diagnosis Acute hypoxic respiratory failure       Acute heart failure   Does the patient have one of the following disease processes/diagnoses(primary or secondary)? CHF   Does the patient have Home health ordered? No   Is there a DME ordered? No   Prep survey completed? Yes            PRIMO VALLE - Registered Nurse

## 2023-11-29 ENCOUNTER — APPOINTMENT (OUTPATIENT)
Dept: GENERAL RADIOLOGY | Facility: HOSPITAL | Age: 83
DRG: 291 | End: 2023-11-29
Payer: MEDICARE

## 2023-11-29 ENCOUNTER — APPOINTMENT (OUTPATIENT)
Dept: NUCLEAR MEDICINE | Facility: HOSPITAL | Age: 83
DRG: 291 | End: 2023-11-29
Payer: MEDICARE

## 2023-11-29 ENCOUNTER — READMISSION MANAGEMENT (OUTPATIENT)
Dept: CALL CENTER | Facility: HOSPITAL | Age: 83
End: 2023-11-29
Payer: MEDICARE

## 2023-11-29 ENCOUNTER — HOSPITAL ENCOUNTER (INPATIENT)
Facility: HOSPITAL | Age: 83
LOS: 5 days | Discharge: HOME | DRG: 291 | End: 2023-12-04
Attending: EMERGENCY MEDICINE | Admitting: INTERNAL MEDICINE
Payer: MEDICARE

## 2023-11-29 DIAGNOSIS — N18.9 CHRONIC RENAL IMPAIRMENT, UNSPECIFIED CKD STAGE: ICD-10-CM

## 2023-11-29 DIAGNOSIS — I50.33 ACUTE ON CHRONIC DIASTOLIC CHF (CONGESTIVE HEART FAILURE): ICD-10-CM

## 2023-11-29 DIAGNOSIS — J96.01 ACUTE RESPIRATORY FAILURE WITH HYPOXIA: ICD-10-CM

## 2023-11-29 DIAGNOSIS — I50.9 ACUTE ON CHRONIC CONGESTIVE HEART FAILURE, UNSPECIFIED HEART FAILURE TYPE: Primary | ICD-10-CM

## 2023-11-29 PROBLEM — N28.9 RENAL LESION: Status: ACTIVE | Noted: 2023-11-29

## 2023-11-29 LAB
ALBUMIN SERPL-MCNC: 3.5 G/DL (ref 3.5–5.2)
ALBUMIN/GLOB SERPL: 1.6 G/DL
ALP SERPL-CCNC: 45 U/L (ref 39–117)
ALT SERPL W P-5'-P-CCNC: 17 U/L (ref 1–41)
ANION GAP SERPL CALCULATED.3IONS-SCNC: 14 MMOL/L (ref 5–15)
AST SERPL-CCNC: 16 U/L (ref 1–40)
BASOPHILS # BLD AUTO: 0.02 10*3/MM3 (ref 0–0.2)
BASOPHILS NFR BLD AUTO: 0.1 % (ref 0–1.5)
BILIRUB SERPL-MCNC: 0.3 MG/DL (ref 0–1.2)
BUN SERPL-MCNC: 41 MG/DL (ref 8–23)
BUN/CREAT SERPL: 13.5 (ref 7–25)
CALCIUM SPEC-SCNC: 8.6 MG/DL (ref 8.6–10.5)
CHLORIDE SERPL-SCNC: 107 MMOL/L (ref 98–107)
CO2 SERPL-SCNC: 20 MMOL/L (ref 22–29)
CREAT SERPL-MCNC: 3.03 MG/DL (ref 0.76–1.27)
D-LACTATE SERPL-SCNC: 0.8 MMOL/L (ref 0.5–2)
DEPRECATED RDW RBC AUTO: 44.7 FL (ref 37–54)
EGFRCR SERPLBLD CKD-EPI 2021: 19.7 ML/MIN/1.73
EOSINOPHIL # BLD AUTO: 0.16 10*3/MM3 (ref 0–0.4)
EOSINOPHIL NFR BLD AUTO: 0.9 % (ref 0.3–6.2)
ERYTHROCYTE [DISTWIDTH] IN BLOOD BY AUTOMATED COUNT: 13 % (ref 12.3–15.4)
GLOBULIN UR ELPH-MCNC: 2.2 GM/DL
GLUCOSE BLDC GLUCOMTR-MCNC: 106 MG/DL (ref 70–130)
GLUCOSE SERPL-MCNC: 101 MG/DL (ref 65–99)
HCT VFR BLD AUTO: 35.8 % (ref 37.5–51)
HGB BLD-MCNC: 11.5 G/DL (ref 13–17.7)
HOLD SPECIMEN: NORMAL
HOLD SPECIMEN: NORMAL
IMM GRANULOCYTES # BLD AUTO: 0.23 10*3/MM3 (ref 0–0.05)
IMM GRANULOCYTES NFR BLD AUTO: 1.3 % (ref 0–0.5)
LYMPHOCYTES # BLD AUTO: 0.39 10*3/MM3 (ref 0.7–3.1)
LYMPHOCYTES NFR BLD AUTO: 2.3 % (ref 19.6–45.3)
MCH RBC QN AUTO: 30.1 PG (ref 26.6–33)
MCHC RBC AUTO-ENTMCNC: 32.1 G/DL (ref 31.5–35.7)
MCV RBC AUTO: 93.7 FL (ref 79–97)
MONOCYTES # BLD AUTO: 0.91 10*3/MM3 (ref 0.1–0.9)
MONOCYTES NFR BLD AUTO: 5.3 % (ref 5–12)
NEUTROPHILS NFR BLD AUTO: 15.47 10*3/MM3 (ref 1.7–7)
NEUTROPHILS NFR BLD AUTO: 90.1 % (ref 42.7–76)
NRBC BLD AUTO-RTO: 0 /100 WBC (ref 0–0.2)
NT-PROBNP SERPL-MCNC: 8821 PG/ML (ref 0–1800)
PLATELET # BLD AUTO: 157 10*3/MM3 (ref 140–450)
PMV BLD AUTO: 11 FL (ref 6–12)
POTASSIUM SERPL-SCNC: 3.8 MMOL/L (ref 3.5–5.2)
PROCALCITONIN SERPL-MCNC: 1.36 NG/ML (ref 0–0.25)
PROT SERPL-MCNC: 5.7 G/DL (ref 6–8.5)
RBC # BLD AUTO: 3.82 10*6/MM3 (ref 4.14–5.8)
SODIUM SERPL-SCNC: 141 MMOL/L (ref 136–145)
TROPONIN T SERPL HS-MCNC: 42 NG/L
WBC NRBC COR # BLD AUTO: 17.18 10*3/MM3 (ref 3.4–10.8)
WHOLE BLOOD HOLD COAG: NORMAL
WHOLE BLOOD HOLD SPECIMEN: NORMAL

## 2023-11-29 PROCEDURE — 99285 EMERGENCY DEPT VISIT HI MDM: CPT

## 2023-11-29 PROCEDURE — 25010000002 VANCOMYCIN 1 G RECONSTITUTED SOLUTION: Performed by: EMERGENCY MEDICINE

## 2023-11-29 PROCEDURE — 94799 UNLISTED PULMONARY SVC/PX: CPT

## 2023-11-29 PROCEDURE — 25010000002 FUROSEMIDE PER 20 MG: Performed by: EMERGENCY MEDICINE

## 2023-11-29 PROCEDURE — 36415 COLL VENOUS BLD VENIPUNCTURE: CPT

## 2023-11-29 PROCEDURE — 80053 COMPREHEN METABOLIC PANEL: CPT | Performed by: EMERGENCY MEDICINE

## 2023-11-29 PROCEDURE — 93005 ELECTROCARDIOGRAM TRACING: CPT | Performed by: EMERGENCY MEDICINE

## 2023-11-29 PROCEDURE — 87040 BLOOD CULTURE FOR BACTERIA: CPT | Performed by: EMERGENCY MEDICINE

## 2023-11-29 PROCEDURE — 25010000002 ENOXAPARIN PER 10 MG: Performed by: INTERNAL MEDICINE

## 2023-11-29 PROCEDURE — 83605 ASSAY OF LACTIC ACID: CPT | Performed by: EMERGENCY MEDICINE

## 2023-11-29 PROCEDURE — 94761 N-INVAS EAR/PLS OXIMETRY MLT: CPT

## 2023-11-29 PROCEDURE — 94640 AIRWAY INHALATION TREATMENT: CPT

## 2023-11-29 PROCEDURE — 93010 ELECTROCARDIOGRAM REPORT: CPT | Performed by: STUDENT IN AN ORGANIZED HEALTH CARE EDUCATION/TRAINING PROGRAM

## 2023-11-29 PROCEDURE — A9540 TC99M MAA: HCPCS | Performed by: EMERGENCY MEDICINE

## 2023-11-29 PROCEDURE — 25810000003 SODIUM CHLORIDE 0.9 % SOLUTION: Performed by: EMERGENCY MEDICINE

## 2023-11-29 PROCEDURE — 71045 X-RAY EXAM CHEST 1 VIEW: CPT

## 2023-11-29 PROCEDURE — 84484 ASSAY OF TROPONIN QUANT: CPT | Performed by: EMERGENCY MEDICINE

## 2023-11-29 PROCEDURE — 82948 REAGENT STRIP/BLOOD GLUCOSE: CPT

## 2023-11-29 PROCEDURE — 84145 PROCALCITONIN (PCT): CPT | Performed by: EMERGENCY MEDICINE

## 2023-11-29 PROCEDURE — 83880 ASSAY OF NATRIURETIC PEPTIDE: CPT | Performed by: EMERGENCY MEDICINE

## 2023-11-29 PROCEDURE — 85025 COMPLETE CBC W/AUTO DIFF WBC: CPT | Performed by: EMERGENCY MEDICINE

## 2023-11-29 PROCEDURE — 78580 LUNG PERFUSION IMAGING: CPT

## 2023-11-29 PROCEDURE — 0 TECHNETIUM ALBUMIN AGGREGATED: Performed by: EMERGENCY MEDICINE

## 2023-11-29 RX ORDER — UBIDECARENONE 75 MG
50 CAPSULE ORAL DAILY
Status: DISCONTINUED | OUTPATIENT
Start: 2023-11-30 | End: 2023-12-04 | Stop reason: HOSPADM

## 2023-11-29 RX ORDER — BISACODYL 10 MG
10 SUPPOSITORY, RECTAL RECTAL DAILY PRN
Status: DISCONTINUED | OUTPATIENT
Start: 2023-11-29 | End: 2023-12-04 | Stop reason: HOSPADM

## 2023-11-29 RX ORDER — PREDNISONE 20 MG/1
20 TABLET ORAL
Status: COMPLETED | OUTPATIENT
Start: 2023-11-30 | End: 2023-12-01

## 2023-11-29 RX ORDER — PRAVASTATIN SODIUM 20 MG
40 TABLET ORAL DAILY
Status: DISCONTINUED | OUTPATIENT
Start: 2023-11-30 | End: 2023-12-04 | Stop reason: HOSPADM

## 2023-11-29 RX ORDER — METOPROLOL TARTRATE 50 MG/1
50 TABLET, FILM COATED ORAL 2 TIMES DAILY
Status: DISCONTINUED | OUTPATIENT
Start: 2023-11-29 | End: 2023-12-01

## 2023-11-29 RX ORDER — SODIUM BICARBONATE 650 MG/1
325 TABLET ORAL DAILY
COMMUNITY

## 2023-11-29 RX ORDER — BUDESONIDE AND FORMOTEROL FUMARATE DIHYDRATE 160; 4.5 UG/1; UG/1
2 AEROSOL RESPIRATORY (INHALATION)
Status: DISCONTINUED | OUTPATIENT
Start: 2023-11-29 | End: 2023-12-04 | Stop reason: HOSPADM

## 2023-11-29 RX ORDER — SODIUM CHLORIDE 9 MG/ML
40 INJECTION, SOLUTION INTRAVENOUS AS NEEDED
Status: DISCONTINUED | OUTPATIENT
Start: 2023-11-29 | End: 2023-12-04 | Stop reason: HOSPADM

## 2023-11-29 RX ORDER — FUROSEMIDE 10 MG/ML
80 INJECTION INTRAMUSCULAR; INTRAVENOUS ONCE
Status: COMPLETED | OUTPATIENT
Start: 2023-11-29 | End: 2023-11-29

## 2023-11-29 RX ORDER — VANCOMYCIN 2 GRAM/500 ML IN 0.9 % SODIUM CHLORIDE INTRAVENOUS
20 ONCE
Status: COMPLETED | OUTPATIENT
Start: 2023-11-29 | End: 2023-11-29

## 2023-11-29 RX ORDER — FLUTICASONE PROPIONATE 50 MCG
2 SPRAY, SUSPENSION (ML) NASAL DAILY
Status: DISCONTINUED | OUTPATIENT
Start: 2023-11-30 | End: 2023-12-04 | Stop reason: HOSPADM

## 2023-11-29 RX ORDER — SODIUM CHLORIDE 0.9 % (FLUSH) 0.9 %
10 SYRINGE (ML) INJECTION AS NEEDED
Status: DISCONTINUED | OUTPATIENT
Start: 2023-11-29 | End: 2023-12-04 | Stop reason: HOSPADM

## 2023-11-29 RX ORDER — PREDNISONE 20 MG/1
10 TABLET ORAL
Status: COMPLETED | OUTPATIENT
Start: 2023-12-02 | End: 2023-12-04

## 2023-11-29 RX ORDER — TERAZOSIN 1 MG/1
1 CAPSULE ORAL NIGHTLY
Status: DISCONTINUED | OUTPATIENT
Start: 2023-11-29 | End: 2023-12-04 | Stop reason: HOSPADM

## 2023-11-29 RX ORDER — GUAIFENESIN 600 MG/1
1200 TABLET, EXTENDED RELEASE ORAL EVERY 12 HOURS SCHEDULED
Status: DISCONTINUED | OUTPATIENT
Start: 2023-11-29 | End: 2023-12-04 | Stop reason: HOSPADM

## 2023-11-29 RX ORDER — FUROSEMIDE 10 MG/ML
40 INJECTION INTRAMUSCULAR; INTRAVENOUS EVERY 12 HOURS
Status: DISCONTINUED | OUTPATIENT
Start: 2023-11-30 | End: 2023-12-03

## 2023-11-29 RX ORDER — ALBUTEROL SULFATE 2.5 MG/3ML
2.5 SOLUTION RESPIRATORY (INHALATION) ONCE
Status: COMPLETED | OUTPATIENT
Start: 2023-11-29 | End: 2023-11-29

## 2023-11-29 RX ORDER — BISACODYL 5 MG/1
5 TABLET, DELAYED RELEASE ORAL DAILY PRN
Status: DISCONTINUED | OUTPATIENT
Start: 2023-11-29 | End: 2023-12-04 | Stop reason: HOSPADM

## 2023-11-29 RX ORDER — ACETAMINOPHEN 325 MG/1
650 TABLET ORAL EVERY 4 HOURS PRN
Status: DISCONTINUED | OUTPATIENT
Start: 2023-11-29 | End: 2023-12-04 | Stop reason: HOSPADM

## 2023-11-29 RX ORDER — AMOXICILLIN 250 MG
2 CAPSULE ORAL 2 TIMES DAILY
Status: DISCONTINUED | OUTPATIENT
Start: 2023-11-29 | End: 2023-12-04 | Stop reason: HOSPADM

## 2023-11-29 RX ORDER — SODIUM CHLORIDE 0.9 % (FLUSH) 0.9 %
10 SYRINGE (ML) INJECTION EVERY 12 HOURS SCHEDULED
Status: DISCONTINUED | OUTPATIENT
Start: 2023-11-29 | End: 2023-12-04 | Stop reason: HOSPADM

## 2023-11-29 RX ORDER — ONDANSETRON 2 MG/ML
4 INJECTION INTRAMUSCULAR; INTRAVENOUS EVERY 6 HOURS PRN
Status: DISCONTINUED | OUTPATIENT
Start: 2023-11-29 | End: 2023-12-04 | Stop reason: HOSPADM

## 2023-11-29 RX ORDER — NITROGLYCERIN 0.4 MG/1
0.4 TABLET SUBLINGUAL
Status: DISCONTINUED | OUTPATIENT
Start: 2023-11-29 | End: 2023-12-04 | Stop reason: HOSPADM

## 2023-11-29 RX ORDER — POLYETHYLENE GLYCOL 3350 17 G/17G
17 POWDER, FOR SOLUTION ORAL DAILY PRN
Status: DISCONTINUED | OUTPATIENT
Start: 2023-11-29 | End: 2023-12-04 | Stop reason: HOSPADM

## 2023-11-29 RX ORDER — SODIUM BICARBONATE 650 MG/1
325 TABLET ORAL 2 TIMES DAILY
Status: DISCONTINUED | OUTPATIENT
Start: 2023-11-29 | End: 2023-12-04 | Stop reason: HOSPADM

## 2023-11-29 RX ORDER — LISINOPRIL 10 MG/1
20 TABLET ORAL 2 TIMES DAILY
Status: DISCONTINUED | OUTPATIENT
Start: 2023-11-29 | End: 2023-12-04 | Stop reason: HOSPADM

## 2023-11-29 RX ORDER — NITROGLYCERIN 0.4 MG/1
0.4 TABLET SUBLINGUAL
COMMUNITY

## 2023-11-29 RX ORDER — FERROUS SULFATE 325(65) MG
325 TABLET ORAL 2 TIMES DAILY
Status: DISCONTINUED | OUTPATIENT
Start: 2023-11-29 | End: 2023-12-04 | Stop reason: HOSPADM

## 2023-11-29 RX ORDER — ENOXAPARIN SODIUM 100 MG/ML
30 INJECTION SUBCUTANEOUS
Status: DISCONTINUED | OUTPATIENT
Start: 2023-11-29 | End: 2023-12-04 | Stop reason: HOSPADM

## 2023-11-29 RX ORDER — AMLODIPINE BESYLATE 10 MG/1
10 TABLET ORAL DAILY
Status: DISCONTINUED | OUTPATIENT
Start: 2023-11-30 | End: 2023-12-04 | Stop reason: HOSPADM

## 2023-11-29 RX ORDER — ASPIRIN 81 MG/1
81 TABLET ORAL DAILY
Status: DISCONTINUED | OUTPATIENT
Start: 2023-11-30 | End: 2023-12-04 | Stop reason: HOSPADM

## 2023-11-29 RX ADMIN — FUROSEMIDE 80 MG: 10 INJECTION, SOLUTION INTRAMUSCULAR; INTRAVENOUS at 16:07

## 2023-11-29 RX ADMIN — LISINOPRIL 20 MG: 10 TABLET ORAL at 21:47

## 2023-11-29 RX ADMIN — TERAZOSIN HYDROCHLORIDE 1 MG: 1 CAPSULE ORAL at 21:47

## 2023-11-29 RX ADMIN — GUAIFENESIN 1200 MG: 600 TABLET, EXTENDED RELEASE ORAL at 21:35

## 2023-11-29 RX ADMIN — METOPROLOL TARTRATE 50 MG: 50 TABLET, FILM COATED ORAL at 21:47

## 2023-11-29 RX ADMIN — SODIUM BICARBONATE 325 MG: 650 TABLET ORAL at 21:35

## 2023-11-29 RX ADMIN — VANCOMYCIN HYDROCHLORIDE 2000 MG: 1 INJECTION, POWDER, LYOPHILIZED, FOR SOLUTION INTRAVENOUS at 16:07

## 2023-11-29 RX ADMIN — Medication 10 ML: at 21:36

## 2023-11-29 RX ADMIN — ENOXAPARIN SODIUM 30 MG: 100 INJECTION SUBCUTANEOUS at 18:27

## 2023-11-29 RX ADMIN — BUDESONIDE AND FORMOTEROL FUMARATE DIHYDRATE 2 PUFF: 160; 4.5 AEROSOL RESPIRATORY (INHALATION) at 21:23

## 2023-11-29 RX ADMIN — ALBUTEROL SULFATE 2.5 MG: 2.5 SOLUTION RESPIRATORY (INHALATION) at 14:17

## 2023-11-29 RX ADMIN — KIT FOR THE PREPARATION OF TECHNETIUM TC 99M ALBUMIN AGGREGATED 1 DOSE: 2.5 INJECTION, POWDER, FOR SOLUTION INTRAVENOUS at 17:06

## 2023-11-29 RX ADMIN — FERROUS SULFATE TAB 325 MG (65 MG ELEMENTAL FE) 325 MG: 325 (65 FE) TAB at 21:35

## 2023-11-29 NOTE — H&P
[facility]  HISTORY AND PHYSICAL    Date of Admission: 11/29/2023  Primary Care Physician: Spike Polanco,     Subjective   Primary Historian: patient    Chief Complaint: sob    History of Present Illness  Patient is an 83-year-old male with a history of prior CVA, CKD 4, CAD, COPD, hypertension, hyperlipidemia, left renal lesion who was recently admitted to the hospital with rhinovirus, respiratory failure, COPD exacerbation on 11/19 through 11/22.  Patient states he still was not feeling very well and got discharged home.  He had been weaned to room air and has not required chronic oxygen prior.  He been on ongoing steroid taper since discharge which is now down to 20 mg daily.  Patient states he previously had cough and phlegm but it has improved.  In general though over the last few days he feels like his leg swelling has increased.  He has been more short of breath.  He denies any dizziness or syncope.  No chest pain.  No nausea vomiting or diarrhea.  Tolerating p.o.  No focal weakness.  On arrival to the ER he was in hypoxic respiratory failure with sats in the 80s.  He required 4 L O2 to maintain sats in the mid 90s which is what he was on when I saw him.  Chest x-ray was not overtly positive for acute disease.  He was felt to be in heart failure exacerbation however given 80 of Lasix.  BNP was 8800.  Was 7460 last stay.  He was also given a dose of antibiotics due to elevated white count.  We have been asked to admit for further care.        Review of Systems   Otherwise complete ROS reviewed and negative except as mentioned in the HPI.    Past Medical History:   Past Medical History:   Diagnosis Date    Arthritis     Cataract     Chronic fatigue 10/3/2017    Chronic renal disease, stage IV     Coronary artery disease     CVA (cerebral vascular accident) 8/14/2018    Hyperlipidemia     Hypertension     Palpitations 10/3/2017    Premature atrial  contractions 10/17/2017    PVCs (premature ventricular contractions) 10/17/2017    Sleep apnea     Stage 4 chronic kidney disease 7/30/2018    Stroke 2007    Weakness     right leg weaker     Past Surgical History:  Past Surgical History:   Procedure Laterality Date    CARDIAC CATHETERIZATION      CARDIAC CATHETERIZATION N/A 5/28/2020    Procedure: Left Heart Cath;  Surgeon: Rufino Brice MD;  Location:  [facility] CATH INVASIVE LOCATION;  Service: Cardiology;  Laterality: N/A;    CORONARY ARTERY BYPASS GRAFT N/A 6/9/2020    Procedure: CABG X 3, LIMA, SHELBY, EVH WITH OPEN EXTENSION LOWER RIGHT LEG;  Surgeon: Nikunj Carballo MD;  Location:  [facility] OR;  Service: Cardiothoracic;  Laterality: N/A;     Social History:  reports that he quit smoking about 39 years ago. His smoking use included cigarettes. He started smoking about 64 years ago. He has a 37.50 pack-year smoking history. He has never used smokeless tobacco. He reports that he does not drink alcohol and does not use drugs.    Family History: family history includes Cancer in his father and mother.       Allergies:  Allergies   Allergen Reactions    Hydralazine Nausea Only    Losartan Potassium Nausea Only    Penicillins Hives    Spironolactone Swelling     Made  Breast sore and swell       Medications:  Prior to Admission medications    Medication Sig Start Date End Date Taking? Authorizing Provider   amLODIPine (NORVASC) 10 MG tablet Take 1 tablet by mouth Daily.    ProviderMitesh MD   aspirin 81 MG EC tablet Take 1 tablet by mouth Daily. 5/31/20   Wesley Lew,    budesonide-formoterol (SYMBICORT) 160-4.5 MCG/ACT inhaler Inhale 2 puffs 2 (Two) Times a Day. 11/22/23   Mariela Covarrubias APRN   Cholecalciferol (VITAMIN D) 2000 units capsule Take 1 capsule by mouth Daily.    ProviderMitesh MD   ferrous sulfate 325 (65 FE) MG tablet Take 1 tablet by mouth 2 (Two) Times a Day.    ProviderMitesh MD   fluticasone (FLONASE) 50 MCG/ACT  "nasal spray 2 sprays by Each Nare route Daily. Obtain otc 11/23/23   Mariela Covarrubias APRN   furosemide (LASIX) 40 MG tablet Take 1 tablet by mouth Daily for 30 days. 11/23/23 12/23/23  Mariela Covarrubias APRN   guaiFENesin (MUCINEX) 600 MG 12 hr tablet Take 2 tablets by mouth Every 12 (Twelve) Hours. 11/22/23   Mariela Covarrubias APRN   hydrALAZINE (APRESOLINE) 100 MG tablet Take 1 tablet by mouth 3 (Three) Times a Day.    Mitesh Contreras MD   lisinopril (PRINIVIL,ZESTRIL) 20 MG tablet Take 1 tablet by mouth 2 (Two) Times a Day.    Mitesh Contreras MD   metoprolol tartrate (LOPRESSOR) 50 MG tablet Take 1 tablet by mouth 2 (Two) Times a Day.    Mitesh Contreras MD   nitroglycerin (NITROSTAT) 0.4 MG SL tablet 1 under the tongue as needed for angina, may repeat q5mins for up three doses 1/29/21   Jaz Musa APRN   pravastatin (PRAVACHOL) 40 MG tablet Take 1 tablet by mouth Daily.    Mitesh Contreras MD   predniSONE (DELTASONE) 10 MG tablet Take 4 tablets by mouth Daily With Breakfast for 3 days, THEN 3 tablets Daily With Breakfast for 3 days, THEN 2 tablets Daily With Breakfast for 3 days, THEN 1 tablet Daily With Breakfast for 3 days. 11/23/23 12/5/23  Mariela Covarrubias APRN   sodium bicarbonate 650 MG tablet Take 0.5 tablets by mouth 2 (Two) Times a Day.    Mitesh Contreras MD   terazosin (HYTRIN) 1 MG capsule Take 1 capsule by mouth Every Night.    Mitesh Contreras MD   vitamin B-12 (CYANOCOBALAMIN) 100 MCG tablet Take 0.5 tablets by mouth Daily.    Mitesh Contreras MD     I have utilized all available immediate resources to obtain, update, or review the patient's current medications (including all prescriptions, over-the-counter products, herbals, cannabis/cannabidiol products, and vitamin/mineral/dietary (nutritional) supplements).    Objective     Vital Signs: /79   Pulse 58   Temp 98.2 °F (36.8 °C) (Temporal)   Resp 20   Ht 182.9 cm (72\")   Wt 100 kg (221 lb)   SpO2 95%   BMI " 29.97 kg/m²   Physical Exam   GEN: Awake, alert, interactive, in NAD, appears non-toxic  HEENT: PERRLA, EOMI, Anicteric, Trachea midline  Lungs: no wheezing, no obvious rales, somewhat diminished/shallow breaths however  Heart: RRR, +S1/s2, no rub  ABD: soft, nt/nd, +BS, no guarding/rebound  Extremities: atraumatic, no cyanosis, 2-3+ b/l LE edema  Skin: no rashes or petechiae   Neuro: AAOx3, no focal deficits  Psych: normal mood & affect        Results Reviewed:  Lab Results (last 24 hours)       Procedure Component Value Units Date/Time    [place] Draw [[id number]] Collected: 11/29/23 1430    Specimen: Blood Updated: 11/29/23 1531    Narrative:      The following orders were created for panel order [place] Draw.  Procedure                               Abnormality         Status                     ---------                               -----------         ------                     Green Top (Gel)[[id number]]                                  Final result               Lavender Top[[id number]]                                     Final result               Red Top[[id number]]                                          Final result               Light Blue Top[[id number]]                                   Final result                 Please view results for these tests on the individual orders.    Green Top (Gel) [[id number]] Collected: 11/29/23 1430    Specimen: Blood Updated: 11/29/23 1531     Extra Tube Hold for add-ons.     Comment: Auto resulted.       Lavender Top [[id number]] Collected: 11/29/23 1430    Specimen: Blood Updated: 11/29/23 1531     Extra Tube hold for add-on     Comment: Auto resulted       Red Top [[id number]] Collected: 11/29/23 1430    Specimen: Blood Updated: 11/29/23 1531     Extra Tube Hold for add-ons.     Comment: Auto resulted.       Light Blue Top [[id number]] Collected: 11/29/23 1430    Specimen: Blood Updated: 11/29/23 1531     Extra Tube Hold for add-ons.     Comment: Auto resulted        "Single High Sensitivity Troponin T [[id number]]  (Abnormal) Collected: 11/29/23 1501    Specimen: Blood Updated: 11/29/23 1525     HS Troponin T 42 ng/L     Narrative:      High Sensitive Troponin T Reference Range:  <14.0 ng/L- Negative Female for AMI  <22.0 ng/L- Negative Male for AMI  >=14 - Abnormal Female indicating possible myocardial injury.  >=22 - Abnormal Male indicating possible myocardial injury.   Clinicians would have to utilize clinical acumen, EKG, Troponin, and serial changes to determine if it is an Acute Myocardial Infarction or myocardial injury due to an underlying chronic condition.         Procalcitonin [[id number]]  (Abnormal) Collected: 11/29/23 1430    Specimen: Blood Updated: 11/29/23 1512     Procalcitonin 1.36 ng/mL     Narrative:      As a Marker for Sepsis (Non-Neonates):    1. <0.5 ng/mL represents a low risk of severe sepsis and/or septic shock.  2. >2 ng/mL represents a high risk of severe sepsis and/or septic shock.    As a Marker for Lower Respiratory Tract Infections that require antibiotic therapy:    PCT on Admission    Antibiotic Therapy       6-12 Hrs later    >0.5                Strongly Recommended  >0.25 - <0.5        Recommended   0.1 - 0.25          Discouraged              Remeasure/reassess PCT  <0.1                Strongly Discouraged     Remeasure/reassess PCT    As 28 day mortality risk marker: \"Change in Procalcitonin Result\" (>80% or <=80%) if Day 0 (or Day 1) and Day 4 values are available. Refer to http://www.[facility]-pct-calculator.com    Change in PCT <=80%  A decrease of PCT levels below or equal to 80% defines a positive change in PCT test result representing a higher risk for 28-day all-cause mortality of patients diagnosed with severe sepsis for septic shock.    Change in PCT >80%  A decrease of PCT levels of more than 80% defines a negative change in PCT result representing a lower risk for 28-day all-cause mortality of patients diagnosed with severe sepsis " or septic shock.       Comprehensive Metabolic Panel [[id number]]  (Abnormal) Collected: 11/29/23 1430    Specimen: Blood Updated: 11/29/23 1510     Glucose 101 mg/dL      BUN 41 mg/dL      Creatinine 3.03 mg/dL      Sodium 141 mmol/L      Potassium 3.8 mmol/L      Chloride 107 mmol/L      CO2 20.0 mmol/L      Calcium 8.6 mg/dL      Total Protein 5.7 g/dL      Albumin 3.5 g/dL      ALT (SGPT) 17 U/L      AST (SGOT) 16 U/L      Alkaline Phosphatase 45 U/L      Total Bilirubin 0.3 mg/dL      Globulin 2.2 gm/dL      A/G Ratio 1.6 g/dL      BUN/Creatinine Ratio 13.5     Anion Gap 14.0 mmol/L      eGFR 19.7 mL/min/1.73     Narrative:      GFR Normal >60  Chronic Kidney Disease <60  Kidney Failure <15    The GFR formula is only valid for adults with stable renal function between ages 18 and 70.    Lactic Acid, Plasma [[id number]]  (Normal) Collected: 11/29/23 1430    Specimen: Blood Updated: 11/29/23 1509     Lactate 0.8 mmol/L     BNP [[id number]]  (Abnormal) Collected: 11/29/23 1430    Specimen: Blood Updated: 11/29/23 1505     proBNP 8,821.0 pg/mL     Narrative:      This assay is used as an aid in the diagnosis of individuals suspected of having heart failure. It can be used as an aid in the diagnosis of acute decompensated heart failure (ADHF) in patients presenting with signs and symptoms of ADHF to the emergency department (ED). In addition, NT-proBNP of <300 pg/mL indicates ADHF is not likely.    Age Range Result Interpretation  NT-proBNP Concentration (pg/mL:      <50             Positive            >450                   Gray                 300-450                    Negative             <300    50-75           Positive            >900                  Gray                300-900                  Negative            <300      >75             Positive            >1800                  Gray                300-1800                  Negative            <300    Blood Culture - Blood, Arm, Right [[id number]] Collected:  11/29/23 1430    Specimen: Blood from Arm, Right Updated: 11/29/23 1445    Blood Culture - Blood, Arm, Right [[id number]] Collected: 11/29/23 1430    Specimen: Blood from Arm, Right Updated: 11/29/23 1445    CBC & Differential [[id number]]  (Abnormal) Collected: 11/29/23 1430    Specimen: Blood Updated: 11/29/23 1443    Narrative:      The following orders were created for panel order CBC & Differential.  Procedure                               Abnormality         Status                     ---------                               -----------         ------                     CBC Auto Differential[[id number]]        Abnormal            Final result                 Please view results for these tests on the individual orders.    CBC Auto Differential [[id number]]  (Abnormal) Collected: 11/29/23 1430    Specimen: Blood Updated: 11/29/23 1443     WBC 17.18 10*3/mm3      RBC 3.82 10*6/mm3      Hemoglobin 11.5 g/dL      Hematocrit 35.8 %      MCV 93.7 fL      MCH 30.1 pg      MCHC 32.1 g/dL      RDW 13.0 %      RDW-SD 44.7 fl      MPV 11.0 fL      Platelets 157 10*3/mm3      Neutrophil % 90.1 %      Lymphocyte % 2.3 %      Monocyte % 5.3 %      Eosinophil % 0.9 %      Basophil % 0.1 %      Immature Grans % 1.3 %      Neutrophils, Absolute 15.47 10*3/mm3      Lymphocytes, Absolute 0.39 10*3/mm3      Monocytes, Absolute 0.91 10*3/mm3      Eosinophils, Absolute 0.16 10*3/mm3      Basophils, Absolute 0.02 10*3/mm3      Immature Grans, Absolute 0.23 10*3/mm3      nRBC 0.0 /100 WBC           Imaging Results (Last 24 Hours)       Procedure Component Value Units Date/Time    NM Lung Scan Perfusion Particulate [[id number]] Resulted: 11/29/23 1642     Updated: 11/29/23 1642    XR Chest 1 View [[id number]] Collected: 11/29/23 1407     Updated: 11/29/23 1414    Narrative:      EXAM/TECHNIQUE: XR CHEST 1 VW-     INDICATION: Shortness of air     COMPARISON: 11/19/2023     FINDINGS:     Cardiac silhouette is within normal limits and stable.  Prior  midsternotomy. Atherosclerotic aortic arch. No pleural effusion,  pneumothorax, or focal consolidation. Mild diffuse chronic interstitial  coarsening is again noted. No acute osseous finding.       Impression:      No acute findings.     This report was signed and finalized on 11/29/2023 2:11 PM CST by Dr. Arsalan Ybarra MD.             I have personally reviewed and interpreted the radiology studies and ECG obtained at time of admission.     Assessment / Plan   Assessment:   Active Hospital Problems    Diagnosis     **Acute hypoxic respiratory failure     Acute on chronic diastolic CHF (congestive heart failure)     Renal lesion - 1.7 cm L midpole lesion     Hyperlipidemia     Stage 4 chronic kidney disease     Essential hypertension        Treatment Plan  The patient will be admitted to my service here at [facility].     #1 acute respiratory failure with hypoxia -on 4 L. ?  Etiology.  Chest x-ray was read as nonacute but on personal review there does appear to be some Perihilar vascular congestion.  No dense consolidation seen.  WBC of 17 but has been on high-dose steroids.  WBC was normal on discharge.  He was given Lasix and antibiotics in the ER.  Continue previous steroid taper.  Continue Lasix.  Check VQ scan and lower extremity Dopplers to rule out clot given degree of hypoxia not quite matching chest x-ray given patient's history of skin cancer and current renal lesion.    #2 acute on chronic diastolic heart failure -patient complaining of increased leg swelling.  Has significant pitting edema bilaterally.  Started on Lasix in the ER.  Continue.  Strict I's and O's.  Daily weights.    #3 CKD 4 -renal function stable for discharge.  Monitor with diuretics.  Avoid nephrotoxic meds.    #4 hyperlipidemia -statin    #5 hypertension -resume appropriate home meds    #6 renal lesion -1.7 cm partially exophytic lesion in the left midpole.  This is seen on imaging from last stay and not on acute  imaging here.  He had a renal ultrasound done which was interpreted during last stay.  Nephrology plans for outpatient follow-up and CT not appropriate.    #7 elevated troponin -chronically elevated.  Was elevated last hospital stay as well.  He is having no active chest pain.  EKG nonacute.  Not consistent with ACS.  Likely up in the setting of his renal dysfunction and CHF.    Medical Decision Making  Number and Complexity of problems: 1-2 acute, 1 acute on chronic, multiple chronic  Differential Diagnosis: As above    Conditions and Status  Patient is new to me.  Appears nontoxic.  Stable on 4 L O2.     [facility] Data  External documents reviewed: [facility] [place]  Cardiac tracing (EKG, telemetry) interpretation: Sinus, no acute ST-T changes, unchanged from prior  Radiology interpretation: Chest x-ray personally reviewed.  Some perihilar congestion noted infiltrate  Labs reviewed: As above  Any tests that were considered but not ordered: None     Decision rules/scores evaluated (example [id number], [place], etc): None     Discussed with: Patient     Care Planning  Shared decision making: Patient apprised of current labs, vitals, imaging and treatment plan.  They are agreeable with proceeding with plans as discussed.    Code status and discussions: full code    Disposition  Social Determinants of Health that impact treatment or disposition: none  Estimated length of stay is 2-4 days.     I confirmed that the patient's advanced care plan is present, code status is documented, and a surrogate decision maker is listed in the patient's medical record.     The patient's surrogate decision maker is his daughter Susan.     The patient was seen and examined by me on 11/29/23 at 4:15 PM.    Electronically signed by Rufino Zambrano DO, 11/29/23, 16:44 CST.

## 2023-11-29 NOTE — PLAN OF CARE
Problem: Adult Inpatient Plan of Care  Goal: Plan of Care Review  Outcome: Ongoing, Progressing  Goal: Patient-Specific Goal (Individualized)  Outcome: Ongoing, Progressing  Goal: Absence of Hospital-Acquired Illness or Injury  Outcome: Ongoing, Progressing  Intervention: Prevent Skin Injury  Recent Flowsheet Documentation  Taken 11/29/2023 1730 by Jocelyne Chapin RN  Body Position: position changed independently  Intervention: Prevent and Manage VTE (Venous Thromboembolism) Risk  Recent Flowsheet Documentation  Taken 11/29/2023 1730 by Jocelyne Chapin RN  Activity Management: up ad kimberly  VTE Prevention/Management: (see mar) other (see comments)  Range of Motion: active ROM (range of motion) encouraged  Intervention: Prevent Infection  Recent Flowsheet Documentation  Taken 11/29/2023 1730 by Jocelyne Chapin RN  Infection Prevention: single patient room provided  Goal: Optimal Comfort and Wellbeing  Outcome: Ongoing, Progressing  Intervention: Provide Person-Centered Care  Recent Flowsheet Documentation  Taken 11/29/2023 1730 by Jocelyne Chapin RN  Trust Relationship/Rapport: care explained  Goal: Readiness for Transition of Care  Outcome: Ongoing, Progressing   Goal Outcome Evaluation:

## 2023-11-29 NOTE — ED PROVIDER NOTES
Subjective   History of Present Illness  Patient is an 83-year-old male with a history of hypertension, COPD and CHF who presents to the ER with shortness of air.  Patient was admitted here from November 19 to 22 with rhinovirus, COPD exacerbation and CHF exacerbation.  Patient states he has only worsened since being discharged.  He complains of shortness of air as well as a dry cough and bilateral lower extremity swelling.  Patient also complains of mild intermittent diffuse chest discomfort.  Patient does not use oxygen at home.  He denies any fever, abdominal pain, nausea vomiting diarrhea, urinary changes, neurologic changes.      Review of Systems   Constitutional: Negative.    HENT: Negative.     Eyes: Negative.    Respiratory:  Positive for cough and shortness of breath.    Cardiovascular:  Positive for chest pain and leg swelling.   Gastrointestinal: Negative.    Endocrine: Negative.    Genitourinary: Negative.    Musculoskeletal: Negative.    Skin: Negative.    Allergic/Immunologic: Negative.    Neurological: Negative.    Hematological: Negative.    Psychiatric/Behavioral: Negative.     All other systems reviewed and are negative.      Past Medical History:   Diagnosis Date    Arthritis     Cataract     Chronic fatigue 10/3/2017    Chronic renal disease, stage IV     Coronary artery disease     CVA (cerebral vascular accident) 8/14/2018    Hyperlipidemia     Hypertension     Palpitations 10/3/2017    Premature atrial contractions 10/17/2017    PVCs (premature ventricular contractions) 10/17/2017    Sleep apnea     Stage 4 chronic kidney disease 7/30/2018    Stroke 2007    Weakness     right leg weaker       Allergies   Allergen Reactions    Hydralazine Nausea Only    Losartan Potassium Nausea Only    Penicillins Hives    Spironolactone Swelling     Made  Breast sore and swell       Past Surgical History:   Procedure Laterality Date    CARDIAC CATHETERIZATION      CARDIAC CATHETERIZATION N/A 5/28/2020     Procedure: Left Heart Cath;  Surgeon: Rufino Brice MD;  Location:  [facility]ATH [facility];  Service: Cardiology;  Laterality: N/A;    CORONARY ARTERY BYPASS GRAFT N/A 2020    Procedure: CABG X 3, LIMA, SHELBY, EVH WITH OPEN EXTENSION LOWER RIGHT LEG;  Surgeon: Nikunj Carballo MD;  Location:  [facility] OR;  Service: Cardiothoracic;  Laterality: N/A;       Family History   Problem Relation Age of Onset    Cancer Mother     Cancer Father        Social History     Socioeconomic History    Marital status:    Tobacco Use    Smoking status: Former     Packs/day: 1.50     Years: 25.00     Additional pack years: 0.00     Total pack years: 37.50     Types: Cigarettes     Start date:      Quit date:      Years since quittin.9    Smokeless tobacco: Never   Vaping Use    Vaping Use: Never used   Substance and Sexual Activity    Alcohol use: No    Drug use: No    Sexual activity: Defer           Objective   Physical Exam  Vitals and nursing note reviewed.   Constitutional:       Appearance: He is well-developed.   HENT:      Head: Normocephalic and atraumatic.   Eyes:      Conjunctiva/sclera: Conjunctivae normal.      Pupils: Pupils are equal, round, and reactive to light.   Cardiovascular:      Rate and Rhythm: Normal rate and regular rhythm.      Heart sounds: Normal heart sounds.   Pulmonary:      Effort: Pulmonary effort is normal.      Breath sounds: Wheezing and rales present.   Abdominal:      Palpations: Abdomen is soft.      Tenderness: There is no abdominal tenderness.   Musculoskeletal:         General: No deformity. Normal range of motion.      Cervical back: Normal range of motion.   Skin:     General: Skin is warm.      Comments: 2+ pitting edema bilateral lower extremities   Neurological:      Mental Status: He is alert and oriented to person, place, and time.   Psychiatric:         Behavior: Behavior normal.         Procedures           ED Course      EKG as interpreted by me:  Sinus rhythm with a rate of 60 with sinus arrhythmia, incomplete left bundle branch block, no acute ischemia or infarction                                 Lab Results (last 24 hours)       Procedure Component Value Units Date/Time    CBC & Differential [[id number]]  (Abnormal) Collected: 11/29/23 1430    Specimen: Blood Updated: 11/29/23 1443    Narrative:      The following orders were created for panel order CBC & Differential.  Procedure                               Abnormality         Status                     ---------                               -----------         ------                     CBC Auto Differential[[id number]]        Abnormal            Final result                 Please view results for these tests on the individual orders.    Comprehensive Metabolic Panel [[id number]]  (Abnormal) Collected: 11/29/23 1430    Specimen: Blood Updated: 11/29/23 1510     Glucose 101 mg/dL      BUN 41 mg/dL      Creatinine 3.03 mg/dL      Sodium 141 mmol/L      Potassium 3.8 mmol/L      Chloride 107 mmol/L      CO2 20.0 mmol/L      Calcium 8.6 mg/dL      Total Protein 5.7 g/dL      Albumin 3.5 g/dL      ALT (SGPT) 17 U/L      AST (SGOT) 16 U/L      Alkaline Phosphatase 45 U/L      Total Bilirubin 0.3 mg/dL      Globulin 2.2 gm/dL      A/G Ratio 1.6 g/dL      BUN/Creatinine Ratio 13.5     Anion Gap 14.0 mmol/L      eGFR 19.7 mL/min/1.73     Narrative:      GFR Normal >60  Chronic Kidney Disease <60  Kidney Failure <15    The GFR formula is only valid for adults with stable renal function between ages 18 and 70.    BNP [[id number]]  (Abnormal) Collected: 11/29/23 1430    Specimen: Blood Updated: 11/29/23 1505     proBNP 8,821.0 pg/mL     Narrative:      This assay is used as an aid in the diagnosis of individuals suspected of having heart failure. It can be used as an aid in the diagnosis of acute decompensated heart failure (ADHF) in patients presenting with signs and symptoms of ADHF to the emergency department  (ED). In addition, NT-proBNP of <300 pg/mL indicates ADHF is not likely.    Age Range Result Interpretation  NT-proBNP Concentration (pg/mL:      <50             Positive            >450                   Gray                 300-450                    Negative             <300    50-75           Positive            >900                  Gray                300-900                  Negative            <300      >75             Positive            >1800                  Gray                300-1800                  Negative            <300    CBC Auto Differential [[id number]]  (Abnormal) Collected: 11/29/23 1430    Specimen: Blood Updated: 11/29/23 1443     WBC 17.18 10*3/mm3      RBC 3.82 10*6/mm3      Hemoglobin 11.5 g/dL      Hematocrit 35.8 %      MCV 93.7 fL      MCH 30.1 pg      MCHC 32.1 g/dL      RDW 13.0 %      RDW-SD 44.7 fl      MPV 11.0 fL      Platelets 157 10*3/mm3      Neutrophil % 90.1 %      Lymphocyte % 2.3 %      Monocyte % 5.3 %      Eosinophil % 0.9 %      Basophil % 0.1 %      Immature Grans % 1.3 %      Neutrophils, Absolute 15.47 10*3/mm3      Lymphocytes, Absolute 0.39 10*3/mm3      Monocytes, Absolute 0.91 10*3/mm3      Eosinophils, Absolute 0.16 10*3/mm3      Basophils, Absolute 0.02 10*3/mm3      Immature Grans, Absolute 0.23 10*3/mm3      nRBC 0.0 /100 WBC     Blood Culture - Blood, Arm, Right [[id number]] Collected: 11/29/23 1430    Specimen: Blood from Arm, Right Updated: 11/29/23 1445    Blood Culture - Blood, Arm, Right [[id number]] Collected: 11/29/23 1430    Specimen: Blood from Arm, Right Updated: 11/29/23 1445    Lactic Acid, Plasma [[id number]]  (Normal) Collected: 11/29/23 1430    Specimen: Blood Updated: 11/29/23 1509     Lactate 0.8 mmol/L     Procalcitonin [[id number]]  (Abnormal) Collected: 11/29/23 1430    Specimen: Blood Updated: 11/29/23 1512     Procalcitonin 1.36 ng/mL     Narrative:      As a Marker for Sepsis (Non-Neonates):    1. <0.5 ng/mL represents a low risk of  "severe sepsis and/or septic shock.  2. >2 ng/mL represents a high risk of severe sepsis and/or septic shock.    As a Marker for Lower Respiratory Tract Infections that require antibiotic therapy:    PCT on Admission    Antibiotic Therapy       6-12 Hrs later    >0.5                Strongly Recommended  >0.25 - <0.5        Recommended   0.1 - 0.25          Discouraged              Remeasure/reassess PCT  <0.1                Strongly Discouraged     Remeasure/reassess PCT    As 28 day mortality risk marker: \"Change in Procalcitonin Result\" (>80% or <=80%) if Day 0 (or Day 1) and Day 4 values are available. Refer to http://www.[email or web address]-pct-calculator.com    Change in PCT <=80%  A decrease of PCT levels below or equal to 80% defines a positive change in PCT test result representing a higher risk for 28-day all-cause mortality of patients diagnosed with severe sepsis for septic shock.    Change in PCT >80%  A decrease of PCT levels of more than 80% defines a negative change in PCT result representing a lower risk for 28-day all-cause mortality of patients diagnosed with severe sepsis or septic shock.       Single High Sensitivity Troponin T [[id number]]  (Abnormal) Collected: 11/29/23 1501    Specimen: Blood Updated: 11/29/23 1525     HS Troponin T 42 ng/L     Narrative:      High Sensitive Troponin T Reference Range:  <14.0 ng/L- Negative Female for AMI  <22.0 ng/L- Negative Male for AMI  >=14 - Abnormal Female indicating possible myocardial injury.  >=22 - Abnormal Male indicating possible myocardial injury.   Clinicians would have to utilize clinical acumen, EKG, Troponin, and serial changes to determine if it is an Acute Myocardial Infarction or myocardial injury due to an underlying chronic condition.                XR Chest 1 View   Final Result   No acute findings.       This report was signed and finalized on 11/29/2023 2:11 PM CST by Dr. Arsalan Ybarra MD.          NM Lung Scan Perfusion Particulate    (Results " Pending)              Medical Decision Making  Patient is an 83-year-old male with a history of hypertension, COPD and CHF who presents to the ER with shortness of air.  Patient was admitted here from November 19 to 22 with rhinovirus, COPD exacerbation and CHF exacerbation.  Patient states he has only worsened since being discharged.  He complains of shortness of air as well as a dry cough and bilateral lower extremity swelling.  Patient also complains of mild intermittent diffuse chest discomfort.  Patient does not use oxygen at home.  He denies any fever, abdominal pain, nausea vomiting diarrhea, urinary changes, neurologic changes.    Differential diagnosis: CHF exacerbation, COPD exacerbation, pneumonia,, pulmonary embolus    Patient arrived hypoxic.  He was placed on oxygen.  Labs showed an elevated BUN and creatinine consistent with patient's chronic renal insufficiency.  Also showed an elevated BNP along with leukocytosis and elevated procalcitonin.  Troponin was also elevated.  Cultures were obtained.  Patient was given cefepime and vancomycin.  Patient was also given Lasix.  Chest x-ray showed no acute findings.  Clinically, patient appears fluid overloaded.  I discussed the case with Dr. Bryant and Dr. Zambrano with the hospitalist group and patient was admitted for further workup and treatment.  VQ scan was still pending at admission.  Dr. Zambrano is aware and will follow results.    Problems Addressed:  Acute on chronic congestive heart failure, unspecified heart failure type: complicated acute illness or injury  Acute respiratory failure with hypoxia: complicated acute illness or injury  Chronic renal impairment, unspecified CKD stage: chronic illness or injury    Amount and/or Complexity of Data Reviewed  Labs: ordered. Decision-making details documented in ED Course.  Radiology: ordered. Decision-making details documented in ED Course.  ECG/medicine tests: ordered. Decision-making details documented in ED  Course.  Discussion of management or test interpretation with external provider(s): Dr. Bryant and Dr. Zambrano with the hospitalist group    Risk  Prescription drug management.  Decision regarding hospitalization.        Final diagnoses:   Acute on chronic congestive heart failure, unspecified heart failure type   Acute respiratory failure with hypoxia   Chronic renal impairment, unspecified CKD stage       ED Disposition  ED Disposition       ED Disposition   Decision to Admit    Condition   --    Comment   Level of Care: Telemetry [5]   Diagnosis: CHF (congestive heart failure) [929501]   Admitting Physician: HILARY ZAMBRANO [[id number]]   Attending Physician: HILARY ZAMBRANO [[id number]]   Certification: I Certify That Inpatient Hospital Services Are Medically Necessary For Greater Than 2 Midnights                 No follow-up provider specified.       Medication List      No changes were made to your prescriptions during this visit.            Abi Darling MD  11/29/23 0634

## 2023-11-30 ENCOUNTER — APPOINTMENT (OUTPATIENT)
Dept: ULTRASOUND IMAGING | Facility: HOSPITAL | Age: 83
DRG: 291 | End: 2023-11-30
Payer: MEDICARE

## 2023-11-30 LAB
ANION GAP SERPL CALCULATED.3IONS-SCNC: 10 MMOL/L (ref 5–15)
BASOPHILS # BLD AUTO: 0.01 10*3/MM3 (ref 0–0.2)
BASOPHILS NFR BLD AUTO: 0.1 % (ref 0–1.5)
BUN SERPL-MCNC: 44 MG/DL (ref 8–23)
BUN/CREAT SERPL: 13.8 (ref 7–25)
CALCIUM SPEC-SCNC: 8.8 MG/DL (ref 8.6–10.5)
CHLORIDE SERPL-SCNC: 108 MMOL/L (ref 98–107)
CO2 SERPL-SCNC: 23 MMOL/L (ref 22–29)
CREAT SERPL-MCNC: 3.2 MG/DL (ref 0.76–1.27)
DEPRECATED RDW RBC AUTO: 44.7 FL (ref 37–54)
EGFRCR SERPLBLD CKD-EPI 2021: 18.5 ML/MIN/1.73
EOSINOPHIL # BLD AUTO: 0.16 10*3/MM3 (ref 0–0.4)
EOSINOPHIL NFR BLD AUTO: 1.6 % (ref 0.3–6.2)
ERYTHROCYTE [DISTWIDTH] IN BLOOD BY AUTOMATED COUNT: 12.9 % (ref 12.3–15.4)
GLUCOSE SERPL-MCNC: 101 MG/DL (ref 65–99)
HCT VFR BLD AUTO: 34.9 % (ref 37.5–51)
HGB BLD-MCNC: 11 G/DL (ref 13–17.7)
IMM GRANULOCYTES # BLD AUTO: 0.12 10*3/MM3 (ref 0–0.05)
IMM GRANULOCYTES NFR BLD AUTO: 1.2 % (ref 0–0.5)
LYMPHOCYTES # BLD AUTO: 0.66 10*3/MM3 (ref 0.7–3.1)
LYMPHOCYTES NFR BLD AUTO: 6.6 % (ref 19.6–45.3)
MAGNESIUM SERPL-MCNC: 2.3 MG/DL (ref 1.6–2.4)
MCH RBC QN AUTO: 30 PG (ref 26.6–33)
MCHC RBC AUTO-ENTMCNC: 31.5 G/DL (ref 31.5–35.7)
MCV RBC AUTO: 95.1 FL (ref 79–97)
MONOCYTES # BLD AUTO: 0.67 10*3/MM3 (ref 0.1–0.9)
MONOCYTES NFR BLD AUTO: 6.7 % (ref 5–12)
NEUTROPHILS NFR BLD AUTO: 8.32 10*3/MM3 (ref 1.7–7)
NEUTROPHILS NFR BLD AUTO: 83.8 % (ref 42.7–76)
NRBC BLD AUTO-RTO: 0 /100 WBC (ref 0–0.2)
PLATELET # BLD AUTO: 155 10*3/MM3 (ref 140–450)
PMV BLD AUTO: 11.2 FL (ref 6–12)
POTASSIUM SERPL-SCNC: 3.9 MMOL/L (ref 3.5–5.2)
RBC # BLD AUTO: 3.67 10*6/MM3 (ref 4.14–5.8)
SODIUM SERPL-SCNC: 141 MMOL/L (ref 136–145)
WBC NRBC COR # BLD AUTO: 9.94 10*3/MM3 (ref 3.4–10.8)

## 2023-11-30 PROCEDURE — 83735 ASSAY OF MAGNESIUM: CPT | Performed by: INTERNAL MEDICINE

## 2023-11-30 PROCEDURE — 63710000001 PREDNISONE PER 1 MG: Performed by: INTERNAL MEDICINE

## 2023-11-30 PROCEDURE — 80048 BASIC METABOLIC PNL TOTAL CA: CPT | Performed by: INTERNAL MEDICINE

## 2023-11-30 PROCEDURE — 25010000002 FUROSEMIDE PER 20 MG: Performed by: INTERNAL MEDICINE

## 2023-11-30 PROCEDURE — 85025 COMPLETE CBC W/AUTO DIFF WBC: CPT | Performed by: INTERNAL MEDICINE

## 2023-11-30 PROCEDURE — 94799 UNLISTED PULMONARY SVC/PX: CPT

## 2023-11-30 PROCEDURE — 93970 EXTREMITY STUDY: CPT | Performed by: SURGERY

## 2023-11-30 PROCEDURE — 94664 DEMO&/EVAL PT USE INHALER: CPT

## 2023-11-30 PROCEDURE — 36415 COLL VENOUS BLD VENIPUNCTURE: CPT | Performed by: INTERNAL MEDICINE

## 2023-11-30 PROCEDURE — 93970 EXTREMITY STUDY: CPT

## 2023-11-30 PROCEDURE — 25010000002 ENOXAPARIN PER 10 MG: Performed by: INTERNAL MEDICINE

## 2023-11-30 RX ORDER — LABETALOL HYDROCHLORIDE 5 MG/ML
10 INJECTION, SOLUTION INTRAVENOUS EVERY 6 HOURS PRN
Status: DISCONTINUED | OUTPATIENT
Start: 2023-11-30 | End: 2023-12-04 | Stop reason: HOSPADM

## 2023-11-30 RX ORDER — HYDRALAZINE HYDROCHLORIDE 25 MG/1
25 TABLET, FILM COATED ORAL EVERY 8 HOURS SCHEDULED
Status: DISCONTINUED | OUTPATIENT
Start: 2023-11-30 | End: 2023-12-01

## 2023-11-30 RX ADMIN — GUAIFENESIN 1200 MG: 600 TABLET, EXTENDED RELEASE ORAL at 08:56

## 2023-11-30 RX ADMIN — AMLODIPINE BESYLATE 10 MG: 10 TABLET ORAL at 08:57

## 2023-11-30 RX ADMIN — Medication 10 ML: at 20:28

## 2023-11-30 RX ADMIN — SODIUM BICARBONATE 325 MG: 650 TABLET ORAL at 20:29

## 2023-11-30 RX ADMIN — FERROUS SULFATE TAB 325 MG (65 MG ELEMENTAL FE) 325 MG: 325 (65 FE) TAB at 20:28

## 2023-11-30 RX ADMIN — FLUTICASONE PROPIONATE 2 SPRAY: 50 SPRAY, METERED NASAL at 08:58

## 2023-11-30 RX ADMIN — SODIUM BICARBONATE 325 MG: 650 TABLET ORAL at 08:56

## 2023-11-30 RX ADMIN — HYDRALAZINE HYDROCHLORIDE 25 MG: 25 TABLET ORAL at 22:05

## 2023-11-30 RX ADMIN — GUAIFENESIN 1200 MG: 600 TABLET, EXTENDED RELEASE ORAL at 20:28

## 2023-11-30 RX ADMIN — PREDNISONE 20 MG: 20 TABLET ORAL at 08:57

## 2023-11-30 RX ADMIN — HYDRALAZINE HYDROCHLORIDE 25 MG: 25 TABLET ORAL at 15:45

## 2023-11-30 RX ADMIN — TERAZOSIN HYDROCHLORIDE 1 MG: 1 CAPSULE ORAL at 20:29

## 2023-11-30 RX ADMIN — LISINOPRIL 20 MG: 10 TABLET ORAL at 08:57

## 2023-11-30 RX ADMIN — LISINOPRIL 20 MG: 10 TABLET ORAL at 20:28

## 2023-11-30 RX ADMIN — FUROSEMIDE 40 MG: 10 INJECTION, SOLUTION INTRAMUSCULAR; INTRAVENOUS at 17:14

## 2023-11-30 RX ADMIN — METOPROLOL TARTRATE 50 MG: 50 TABLET, FILM COATED ORAL at 08:57

## 2023-11-30 RX ADMIN — FUROSEMIDE 40 MG: 10 INJECTION, SOLUTION INTRAMUSCULAR; INTRAVENOUS at 06:06

## 2023-11-30 RX ADMIN — METOPROLOL TARTRATE 50 MG: 50 TABLET, FILM COATED ORAL at 20:28

## 2023-11-30 RX ADMIN — ASPIRIN 81 MG: 81 TABLET, COATED ORAL at 08:57

## 2023-11-30 RX ADMIN — BUDESONIDE AND FORMOTEROL FUMARATE DIHYDRATE 2 PUFF: 160; 4.5 AEROSOL RESPIRATORY (INHALATION) at 09:03

## 2023-11-30 RX ADMIN — Medication 10 ML: at 08:58

## 2023-11-30 RX ADMIN — BUDESONIDE AND FORMOTEROL FUMARATE DIHYDRATE 2 PUFF: 160; 4.5 AEROSOL RESPIRATORY (INHALATION) at 20:59

## 2023-11-30 RX ADMIN — FERROUS SULFATE TAB 325 MG (65 MG ELEMENTAL FE) 325 MG: 325 (65 FE) TAB at 08:57

## 2023-11-30 RX ADMIN — PRAVASTATIN SODIUM 40 MG: 20 TABLET ORAL at 08:57

## 2023-11-30 RX ADMIN — ENOXAPARIN SODIUM 30 MG: 100 INJECTION SUBCUTANEOUS at 17:13

## 2023-11-30 RX ADMIN — VITAM B12 50 MCG: 100 TAB at 08:57

## 2023-11-30 NOTE — CASE MANAGEMENT/SOCIAL WORK
Discharge Planning Assessment   [place]     Patient Name: Faisal Joseph  MRN: [id number]  Today's Date: 11/30/2023    Admit Date: 11/29/2023        Discharge Needs Assessment       Row Name 11/30/23 1528       Living Environment    People in Home child(bon), adult    Name(s) of People in Home Susan Ray- daughter    Current Living Arrangements home    Potentially Unsafe Housing Conditions none    Primary Care Provided by self;child(bon)    Family Caregiver if Needed child(bon), adult    Quality of Family Relationships helpful;involved    Able to Return to Prior Arrangements yes       Resource/Environmental Concerns    Resource/Environmental Concerns none       Transition Planning    Patient/Family Anticipates Transition to home with family    Patient/Family Anticipated Services at Transition none    Transportation Anticipated family or friend will provide       Discharge Needs Assessment    Equipment Currently Used at Home none    Concerns to be Addressed no discharge needs identified;denies needs/concerns at this time    Discharge Coordination/Progress Pt lives at home with daughter. Pt is independent with ADLs.  Pt does have PCP and RX Coverage. Pt plans to return home with daughter at discharge and denies any needs. Noted patient is currently on oxygen. If patient will need at home oxygen at discharge, will need orders to arrange. Will continue to follow.                   Discharge Plan    No documentation.                 Continued Care and Services - Admitted Since 11/29/2023    Coordination has not been started for this encounter.          Demographic Summary    No documentation.                  Functional Status    No documentation.                  Psychosocial    No documentation.                  Abuse/Neglect    No documentation.                  Legal    No documentation.                  Substance Abuse    No documentation.                  Patient Forms    No documentation.                      Destiny Bourne, RN

## 2023-11-30 NOTE — OUTREACH NOTE
CHF Week 2 Survey      Flowsheet Row Responses   [facility] facility patient discharged from? [facility]   Does the patient have one of the following disease processes/diagnoses(primary or secondary)? CHF   Week 2 attempt successful? No   Unsuccessful attempts Attempt 1   Revoke Readmitted            LEYLA COPELAND - Registered Nurse

## 2023-11-30 NOTE — PROGRESS NOTES
[facility]  INPATIENT PROGRESS NOTE    Patient Name: Faisal Joseph  Date of Admission: 11/29/2023  Today's Date: 11/30/23  Length of Stay: 1  Primary Care Physician: Spike Polanco DO    Subjective   Chief Complaint: f/u chf, sob    HPI   Patient resting in bed with family at bedside.  Patient feeling well but on yesterday.  Weaned to 3 L from 4.  Denies chest pain or dizziness.  Good urine output so far with diuretics.  No fevers or adverse events noted overnight.        Review of Systems   All pertinent negatives and positives are as above. All other systems have been reviewed and are negative unless otherwise stated.     Objective    Temp:  [97.2 °F (36.2 °C)-98.2 °F (36.8 °C)] 98 °F (36.7 °C)  Heart Rate:  [55-79] 57  Resp:  [16-22] 16  BP: (156-186)/(67-87) 164/67  Physical Exam  GEN: Awake, alert, interactive, in NAD, appears non-toxic  HEENT: PERRLA, EOMI, Anicteric, Trachea midline  Lungs: diminished, no wheezing  Heart: RRR, +S1/s2, no rub  ABD: soft, nt/nd, +BS, no guarding/rebound  Extremities: atraumatic, no cyanosis, 2-3+ b/l LE edema  Skin: no rashes or petechiae   Neuro: AAOx3, no focal deficits  Psych: normal mood & affect      Results Review:  I have reviewed the labs, radiology results, and diagnostic studies.    Laboratory Data:   Results from last 7 days   Lab Units 11/30/23  0458 11/29/23  1430   WBC 10*3/mm3 9.94 17.18*   HEMOGLOBIN g/dL 11.0* 11.5*   HEMATOCRIT % 34.9* 35.8*   PLATELETS 10*3/mm3 155 157        Results from last 7 days   Lab Units 11/30/23  0458 11/29/23  1430   SODIUM mmol/L 141 141   POTASSIUM mmol/L 3.9 3.8   CHLORIDE mmol/L 108* 107   CO2 mmol/L 23.0 20.0*   BUN mg/dL 44* 41*   CREATININE mg/dL 3.20* 3.03*   CALCIUM mg/dL 8.8 8.6   BILIRUBIN mg/dL  --  0.3   ALK PHOS U/L  --  45   ALT (SGPT) U/L  --  17   AST (SGOT) U/L  --  16   GLUCOSE mg/dL 101* 101*       Culture Data:   No results found for:  "\"BLOODCX\", \"URINECX\", \"WOUNDCX\", \"MRSACX\", \"RESPCX\", \"STOOLCX\"    Radiology Data:   Imaging Results (Last 24 Hours)       Procedure Component Value Units Date/Time    US Venous Doppler Lower Extremity Bilateral (duplex) [[id number]] Resulted: 11/30/23 0736     Updated: 11/30/23 0749    NM Lung Scan Perfusion Particulate [[id number]] Collected: 11/29/23 1711     Updated: 11/29/23 1716    Narrative:      NM LUNG SCAN PERFUSION PARTICULATE-     HISTORY: Pulmonary embolism (PE) suspected, unknown D-dimer; I50.9-Heart  failure, unspecified; J96.01-Acute respiratory failure with hypoxia;  N18.9-Chronic kidney disease, unspecified; I50.33-Acute on chronic  diastolic (congestive) heart failure     COMPARISON: Chest x-ray 11/29/2023     FINDINGS: Following IV injection of 5.5 mCi of Tc 99m MAA particles,  multiple projection images of the chest are obtained.     Slightly heterogeneous perfusion of the lung parenchyma with no discrete  segmental perfusion defect identified.       Impression:      1. Findings consistent with low probability of pulmonary emboli.     This report was signed and finalized on 11/29/2023 5:13 PM CST by Dr. Helen Goins MD.       XR Chest 1 View [[id number]] Collected: 11/29/23 1407     Updated: 11/29/23 1414    Narrative:      EXAM/TECHNIQUE: XR CHEST 1 VW-     INDICATION: Shortness of air     COMPARISON: 11/19/2023     FINDINGS:     Cardiac silhouette is within normal limits and stable. Prior  midsternotomy. Atherosclerotic aortic arch. No pleural effusion,  pneumothorax, or focal consolidation. Mild diffuse chronic interstitial  coarsening is again noted. No acute osseous finding.       Impression:      No acute findings.     This report was signed and finalized on 11/29/2023 2:11 PM CST by Dr. Arsalan Ybarra MD.               I have reviewed the patient's current medications.     Assessment/Plan   Assessment  Active Hospital Problems    Diagnosis     **Acute hypoxic respiratory failure     " Acute on chronic diastolic CHF (congestive heart failure)     Renal lesion - 1.7 cm L midpole lesion     Hyperlipidemia     Stage 4 chronic kidney disease     Essential hypertension        Treatment Plan  #1 acute respiratory failure with hypoxia -could be multifactorial but suspect acute on chronic diastolic heart failure.  Doubt pneumonia.  White count resolved.  No fevers.  Of note patient only got Vanco in the ER.  He was never given the cefepime that was ordered.  Diuresing well with improvement.  VQ scan low risk for PE Dopplers negative for DVT.  Continue diuresis and wean O2 as able.    #2 acute on chronic diastolic heart failure -as above continue diuresis and monitor closely given renal function.  Monitor electrolytes.  Still significant edema and signs of generalized volume overload.  Monitor daily weights and I's and O's.    #3 CKD 4 -renal function around baseline.  Avoid nephrotoxic meds.    #4 hyperlipidemia -statin     #5 hypertension - on amlodipine, metoprolol, and lisinopril from home. BP still up, add back hydralazine. Continue to titrate.     #6 renal lesion -1.7 cm partially exophytic lesion in the left midpole.  This is seen on imaging from last stay and not on acute imaging here.  He had a renal ultrasound done which was interpreted during last stay.  Outpatient follow-up and CT when appropriate.     #7 elevated troponin -chronically elevated.  Was elevated last hospital stay as well.  He is having no active chest pain.  EKG nonacute.  Not consistent with ACS.  Likely up in the setting of his renal dysfunction and CHF.    Medical Decision Making  Number and Complexity of problems: 1-2 acute, 1 acute on chronic, multiple chronic  Differential Diagnosis: as above    Conditions and Status        Condition is improving. Not at goal     MDM Data  External documents reviewed: none  Cardiac tracing (EKG, telemetry) interpretation: sinus on monitor  Radiology interpretation: no new images  Labs  reviewed: as above  Any tests that were considered but not ordered: none     Decision rules/scores evaluated (example [id number], [place], etc): none     Discussed with: patient, nursing staff     Care Planning  Shared decision making: Patient apprised of current labs, vitals, imaging and treatment plan.  They are agreeable with proceeding with plans as discussed.    Code status and discussions: full code    Disposition  Social Determinants of Health that impact treatment or disposition: none  I expect the patient to be discharged to home in 2-3 days.     Electronically signed by Rufino Zambrano DO, 11/30/23, 11:36 CST.

## 2023-11-30 NOTE — CONSULTS
Nephrology ([facility]) Consult Note      Patient:  Faisal Joseph  YOB: 1940  Date of Service: 11/30/2023  MRN: [id number]   Acct: [id number]   Primary Care Physician: Spike Polanco DO  Advance Directive:   Code Status and Medical Interventions:   Ordered at: 11/29/23 1650     Level Of Support Discussed With:    Patient     Code Status (Patient has no pulse and is not breathing):    CPR (Attempt to Resuscitate)     Medical Interventions (Patient has pulse or is breathing):    Full Support     Admit Date: 11/29/2023       Hospital Day: 1  Referring Provider: No ref. provider found      Patient personally seen and examined.  Complete chart including Consults, Notes, Operative Reports, Labs, Cardiology, and Radiology studies reviewed as able.        Subjective:  Faisal Joseph is a 83 y.o. male for whom we were consulted for evaluation and treatment of chronic kidney disease stage IV.  Patient follows with [facility] baseline GFR appears to be around 20.  Was just seen last week at this facility for volume overload.  Returned with complaints of edema and dyspnea.  Continued to have some hypoxemia and was also recently diagnosed with rhinovirus.  Felt that his breathing had improved.  Denied chest pain, nausea or vomiting, dysuria or hematuria.    Allergies:  Hydralazine, Losartan potassium, Penicillins, and Spironolactone    Home Meds:  Medications Prior to Admission   Medication Sig Dispense Refill Last Dose    amLODIPine (NORVASC) 10 MG tablet Take 1 tablet by mouth Daily.       aspirin 81 MG EC tablet Take 1 tablet by mouth Daily. 100 tablet 99     Cholecalciferol (VITAMIN D) 2000 units capsule Take 1 capsule by mouth Daily.       ferrous sulfate 325 (65 FE) MG tablet Take 1 tablet by mouth 2 (Two) Times a Day.       fluticasone (FLONASE) 50 MCG/ACT nasal spray 2 sprays by Each Nare route Daily. Obtain otc       furosemide (LASIX) 40 MG tablet  Take 1 tablet by mouth Daily for 30 days. 30 tablet 0     guaiFENesin (MUCINEX) 600 MG 12 hr tablet Take 2 tablets by mouth Every 12 (Twelve) Hours.       hydrALAZINE (APRESOLINE) 100 MG tablet Take 1 tablet by mouth 3 (Three) Times a Day.       lisinopril (PRINIVIL,ZESTRIL) 20 MG tablet Take 1 tablet by mouth 2 (Two) Times a Day.       metoprolol tartrate (LOPRESSOR) 50 MG tablet Take 1 tablet by mouth 2 (Two) Times a Day.       pravastatin (PRAVACHOL) 40 MG tablet Take 1 tablet by mouth Daily.       predniSONE (DELTASONE) 10 MG tablet Take 4 tablets by mouth Daily With Breakfast for 3 days, THEN 3 tablets Daily With Breakfast for 3 days, THEN 2 tablets Daily With Breakfast for 3 days, THEN 1 tablet Daily With Breakfast for 3 days. 30 tablet 0     sodium bicarbonate 650 MG tablet Take 0.5 tablets by mouth Daily.       terazosin (HYTRIN) 1 MG capsule Take 1 capsule by mouth Every Night.       vitamin B-12 (CYANOCOBALAMIN) 100 MCG tablet Take 0.5 tablets by mouth Daily.       budesonide-formoterol (SYMBICORT) 160-4.5 MCG/ACT inhaler Inhale 2 puffs 2 (Two) Times a Day.       nitroglycerin (NITROSTAT) 0.4 MG SL tablet Place 1 tablet under the tongue Every 5 (Five) Minutes As Needed for Chest Pain. Take no more than 3 doses in 15 minutes.          Medicines:  Current Facility-Administered Medications   Medication Dose Route Frequency Provider Last Rate Last Admin    acetaminophen (TYLENOL) tablet 650 mg  650 mg Oral Q4H PRN Rufino Zambrano DO        amLODIPine (NORVASC) tablet 10 mg  10 mg Oral Daily Rufino Zambrano DO   10 mg at 11/30/23 0857    aspirin EC tablet 81 mg  81 mg Oral Daily Rufino Zambrano DO   81 mg at 11/30/23 0857    sennosides-docusate (PERICOLACE) 8.6-50 MG per tablet 2 tablet  2 tablet Oral BID Rufino Zambrano DO        And    polyethylene glycol (MIRALAX) packet 17 g  17 g Oral Daily PRN Rufino Zambrano DO        And    bisacodyl (DULCOLAX) EC tablet 5 mg  5 mg Oral Daily PRN Juan Manuel  Rufino HUGO DO        And    bisacodyl (DULCOLAX) suppository 10 mg  10 mg Rectal Daily PRN Rufino Zambrano DO        budesonide-formoterol (SYMBICORT) 160-4.5 MCG/ACT inhaler 2 puff  2 puff Inhalation BID - RT Rufino Zambrano DO   2 puff at 11/30/23 0903    cefepime 2 gm IVPB in 100 ml NS (MBP)  2,000 mg Intravenous Once Abi Darling MD        Enoxaparin Sodium (LOVENOX) syringe 30 mg  30 mg Subcutaneous Q24H Rufino Zambrano DO   30 mg at 11/29/23 1827    ferrous sulfate tablet 325 mg  325 mg Oral BID Rufino Zambrano DO   325 mg at 11/30/23 0857    fluticasone (FLONASE) 50 MCG/ACT nasal spray 2 spray  2 spray Each Nare Daily Rufino Zambrano DO   2 spray at 11/30/23 0858    furosemide (LASIX) injection 40 mg  40 mg Intravenous Q12H Rufino Zambrano DO   40 mg at 11/30/23 0606    guaiFENesin (MUCINEX) 12 hr tablet 1,200 mg  1,200 mg Oral Q12H Rufino Zambrano DO   1,200 mg at 11/30/23 0856    hydrALAZINE (APRESOLINE) tablet 25 mg  25 mg Oral Q8H Rufino Zambrano DO   25 mg at 11/30/23 1545    labetalol (NORMODYNE,TRANDATE) injection 10 mg  10 mg Intravenous Q6H PRN Rufino Zambrano DO        lisinopril (PRINIVIL,ZESTRIL) tablet 20 mg  20 mg Oral BID Rufino Zambrano DO   20 mg at 11/30/23 0857    metoprolol tartrate (LOPRESSOR) tablet 50 mg  50 mg Oral BID Rufino Zambrano DO   50 mg at 11/30/23 0857    nitroglycerin (NITROSTAT) SL tablet 0.4 mg  0.4 mg Sublingual Q5 Min PRN Rufino Zambrano DO        ondansetron (ZOFRAN) injection 4 mg  4 mg Intravenous Q6H PRN Rufino Zambrano DO        pravastatin (PRAVACHOL) tablet 40 mg  40 mg Oral Daily Rufino Zambrano DO   40 mg at 11/30/23 0857    predniSONE (DELTASONE) tablet 20 mg  20 mg Oral Daily With Breakfast Rufino Zambrano DO   20 mg at 11/30/23 0857    Followed by    [START ON 12/2/2023] predniSONE (DELTASONE) tablet 10 mg  10 mg Oral Daily With Breakfast Rufino Zambrano DO        sodium bicarbonate tablet 325 mg  325 mg Oral BID Juan Manuel  Rufino HUGO DO   325 mg at 11/30/23 0856    sodium chloride 0.9 % flush 10 mL  10 mL Intravenous PRN Abi Darling MD        sodium chloride 0.9 % flush 10 mL  10 mL Intravenous PRN Abi Darling MD        sodium chloride 0.9 % flush 10 mL  10 mL Intravenous Q12H Rufino Zambrano DO   10 mL at 11/30/23 0858    sodium chloride 0.9 % flush 10 mL  10 mL Intravenous PRN Rufino Zambrano DO        sodium chloride 0.9 % infusion 40 mL  40 mL Intravenous PRN Rufino Zambrano DO        terazosin (HYTRIN) capsule 1 mg  1 mg Oral Nightly Rufino Zambrano DO   1 mg at 11/29/23 2147    vitamin B-12 (CYANOCOBALAMIN) tablet 50 mcg  50 mcg Oral Daily Rufino Zambrano DO   50 mcg at 11/30/23 0857       Past Medical History:  Past Medical History:   Diagnosis Date    Arthritis     Cataract     Chronic fatigue 10/3/2017    Chronic renal disease, stage IV     Coronary artery disease     CVA (cerebral vascular accident) 8/14/2018    Hyperlipidemia     Hypertension     Palpitations 10/3/2017    Premature atrial contractions 10/17/2017    PVCs (premature ventricular contractions) 10/17/2017    Sleep apnea     Stage 4 chronic kidney disease 7/30/2018    Stroke 2007    Weakness     right leg weaker       Past Surgical History:  Past Surgical History:   Procedure Laterality Date    CARDIAC CATHETERIZATION      CARDIAC CATHETERIZATION N/A 5/28/2020    Procedure: Left Heart Cath;  Surgeon: Rufino Brice MD;  Location:  [facility] CATH [facility]AS[facility];  Service: Cardiology;  Laterality: N/A;    CORONARY ARTERY BYPASS GRAFT N/A 6/9/2020    Procedure: CABG X 3, LIMA, SHELBY, EVH WITH OPEN EXTENSION LOWER RIGHT LEG;  Surgeon: Nikunj Carballo MD;  Location:  [facility] OR;  Service: Cardiothoracic;  Laterality: N/A;       Family History  Family History   Problem Relation Age of Onset    Cancer Mother     Cancer Father        Social History  Social History     Socioeconomic History    Marital status:    Tobacco Use    Smoking  status: Former     Packs/day: 1.50     Years: 25.00     Additional pack years: 0.00     Total pack years: 37.50     Types: Cigarettes     Start date:      Quit date:      Years since quittin.9    Smokeless tobacco: Never   Vaping Use    Vaping Use: Never used   Substance and Sexual Activity    Alcohol use: No    Drug use: No    Sexual activity: Defer         Review of Systems:  History obtained from chart review and the patient  General ROS: No fever or chills  Respiratory ROS: No cough, +shortness of breath  Cardiovascular ROS: No chest pain or palpitations  Gastrointestinal ROS: No abdominal pain or melena  Genito-Urinary ROS: No dysuria or hematuria  Psych ROS: No anxiety and depression  14 point ROS reviewed with the patient and negative except as noted above and in the HPI unless unable to obtain.      Objective:  Patient Vitals for the past 24 hrs:   BP Temp Temp src Pulse Resp SpO2 Weight   23 1505 168/73 98.1 °F (36.7 °C) Oral 68 16 94 % --   23 1121 164/67 98 °F (36.7 °C) Oral 57 16 95 % --   23 0903 -- -- -- 59 16 94 % --   23 0824 (!) 186/87 97.7 °F (36.5 °C) Oral 55 18 94 % --   23 0605 -- -- -- -- -- -- 96 kg (211 lb 9.6 oz)   23 0429 178/71 97.4 °F (36.3 °C) Oral 58 18 94 % --   23 0023 156/87 97.2 °F (36.2 °C) Oral 58 20 97 % --   23 2145 178/76 97.5 °F (36.4 °C) Oral 59 18 96 % --   23 1730 179/71 97.9 °F (36.6 °C) Oral 75 20 96 % --       Intake/Output Summary (Last 24 hours) at 2023 1620  Last data filed at 2023 1505  Gross per 24 hour   Intake 1420 ml   Output 4565 ml   Net -3145 ml     General: awake/alert   Chest:  clear to auscultation bilaterally without respiratory distress  CVS: regular rate and rhythm  Abdominal: soft, nontender, positive bowel sounds  Extremities: ble edema  Skin: warm and dry without rash      Labs:  Results from last 7 days   Lab Units 23  0458 23  1430   WBC 10*3/mm3 9.94 17.18*    HEMOGLOBIN g/dL 11.0* 11.5*   HEMATOCRIT % 34.9* 35.8*   PLATELETS 10*3/mm3 155 157         Results from last 7 days   Lab Units 11/30/23  0458 11/29/23  1430   SODIUM mmol/L 141 141   POTASSIUM mmol/L 3.9 3.8   CHLORIDE mmol/L 108* 107   CO2 mmol/L 23.0 20.0*   BUN mg/dL 44* 41*   CREATININE mg/dL 3.20* 3.03*   CALCIUM mg/dL 8.8 8.6   EGFR mL/min/1.73 18.5* 19.7*   BILIRUBIN mg/dL  --  0.3   ALK PHOS U/L  --  45   ALT (SGPT) U/L  --  17   AST (SGOT) U/L  --  16   GLUCOSE mg/dL 101* 101*       Radiology:   Imaging Results (Last 72 Hours)       Procedure Component Value Units Date/Time    US Venous Doppler Lower Extremity Bilateral (duplex) [[id number]] Collected: 11/30/23 1600     Updated: 11/30/23 1603    Narrative:      History: Swelling       Impression:      Impression: There is no evidence of deep venous thrombosis or  superficial thrombophlebitis of right or left lower extremities.     Comments: Bilateral lower extremity venous duplex exam was performed  using color Doppler flow, Doppler waveform analysis, and grayscale  imaging, with and without compression. There is no evidence of deep  venous thrombosis in the common femoral, superficial femoral, popliteal,  peroneal, anterior tibial, and posterior tibial veins bilaterally. No  thrombus is identified in the saphenofemoral junctions and greater  saphenous veins bilaterally.            This report was signed and finalized on 11/30/2023 4:00 PM CST by Dr. Holland Glover MD.       NM Lung Scan Perfusion Particulate [[id number]] Collected: 11/29/23 1711     Updated: 11/29/23 1716    Narrative:      NM LUNG SCAN PERFUSION PARTICULATE-     HISTORY: Pulmonary embolism (PE) suspected, unknown D-dimer; I50.9-Heart  failure, unspecified; J96.01-Acute respiratory failure with hypoxia;  N18.9-Chronic kidney disease, unspecified; I50.33-Acute on chronic  diastolic (congestive) heart failure     COMPARISON: Chest x-ray 11/29/2023     FINDINGS: Following IV injection of  5.5 mCi of Tc 99m MAA particles,  multiple projection images of the chest are obtained.     Slightly heterogeneous perfusion of the lung parenchyma with no discrete  segmental perfusion defect identified.       Impression:      1. Findings consistent with low probability of pulmonary emboli.     This report was signed and finalized on 11/29/2023 5:13 PM CST by Dr. Helen Goins MD.       XR Chest 1 View [[id number]] Collected: 11/29/23 1407     Updated: 11/29/23 1414    Narrative:      EXAM/TECHNIQUE: XR CHEST 1 VW-     INDICATION: Shortness of air     COMPARISON: 11/19/2023     FINDINGS:     Cardiac silhouette is within normal limits and stable. Prior  midsternotomy. Atherosclerotic aortic arch. No pleural effusion,  pneumothorax, or focal consolidation. Mild diffuse chronic interstitial  coarsening is again noted. No acute osseous finding.       Impression:      No acute findings.     This report was signed and finalized on 11/29/2023 2:11 PM CST by Dr. Arsalan Ybarra MD.               Culture:  Blood Culture   Date Value Ref Range Status   11/29/2023 No growth at 24 hours  Preliminary   11/29/2023 No growth at 24 hours  Preliminary         Assessment   Chronic kidney disease stage IV  Acute hypoxemic respiratory failure  Acute on chronic diastolic congestive heart failure  Hypertension  Incidental finding of renal lesion at previous admission    Plan:  Discussed with patient, nursing, Dr. Zambrano  Work-up reviewed today  Monitor labs  Continue IV diuretics with close lab monitoring as obtaining adequate diuresis  Further testing ordered ongoing  Renal lesion unrelated to admission, can follow-up with outpatient imaging and/or urology evaluation as needed      Thank you for the consult, we appreciate the opportunity to provide care to your patients.  Feel free to contact me if I can be of any further assistance.      Darryn Oliver MD  11/30/2023  16:20 CST

## 2023-11-30 NOTE — PLAN OF CARE
Goal Outcome Evaluation:  Plan of Care Reviewed With: patient        Progress: no change  Outcome Evaluation: VSS. Pt resting well this shift. No c/o pain. Safety maintained. Tele- SB/S 54-65.

## 2023-12-01 LAB
25(OH)D3 SERPL-MCNC: 38.3 NG/ML (ref 30–100)
ANION GAP SERPL CALCULATED.3IONS-SCNC: 9 MMOL/L (ref 5–15)
BUN SERPL-MCNC: 45 MG/DL (ref 8–23)
BUN/CREAT SERPL: 14.2 (ref 7–25)
CALCIUM SPEC-SCNC: 8.7 MG/DL (ref 8.6–10.5)
CHLORIDE SERPL-SCNC: 104 MMOL/L (ref 98–107)
CO2 SERPL-SCNC: 26 MMOL/L (ref 22–29)
CREAT SERPL-MCNC: 3.18 MG/DL (ref 0.76–1.27)
EGFRCR SERPLBLD CKD-EPI 2021: 18.6 ML/MIN/1.73
GLUCOSE SERPL-MCNC: 109 MG/DL (ref 65–99)
MAGNESIUM SERPL-MCNC: 2.2 MG/DL (ref 1.6–2.4)
PHOSPHATE SERPL-MCNC: 4.5 MG/DL (ref 2.5–4.5)
POTASSIUM SERPL-SCNC: 3.7 MMOL/L (ref 3.5–5.2)
PTH-INTACT SERPL-MCNC: 174.3 PG/ML (ref 15–65)
QT INTERVAL: 424 MS
QTC INTERVAL: 424 MS
SODIUM SERPL-SCNC: 139 MMOL/L (ref 136–145)

## 2023-12-01 PROCEDURE — 93010 ELECTROCARDIOGRAM REPORT: CPT | Performed by: STUDENT IN AN ORGANIZED HEALTH CARE EDUCATION/TRAINING PROGRAM

## 2023-12-01 PROCEDURE — 25010000002 FUROSEMIDE PER 20 MG: Performed by: INTERNAL MEDICINE

## 2023-12-01 PROCEDURE — 80048 BASIC METABOLIC PNL TOTAL CA: CPT | Performed by: INTERNAL MEDICINE

## 2023-12-01 PROCEDURE — 83735 ASSAY OF MAGNESIUM: CPT | Performed by: INTERNAL MEDICINE

## 2023-12-01 PROCEDURE — 82306 VITAMIN D 25 HYDROXY: CPT | Performed by: INTERNAL MEDICINE

## 2023-12-01 PROCEDURE — 94799 UNLISTED PULMONARY SVC/PX: CPT

## 2023-12-01 PROCEDURE — 94664 DEMO&/EVAL PT USE INHALER: CPT

## 2023-12-01 PROCEDURE — 63710000001 PREDNISONE PER 1 MG: Performed by: INTERNAL MEDICINE

## 2023-12-01 PROCEDURE — 84100 ASSAY OF PHOSPHORUS: CPT | Performed by: INTERNAL MEDICINE

## 2023-12-01 PROCEDURE — 83970 ASSAY OF PARATHORMONE: CPT | Performed by: INTERNAL MEDICINE

## 2023-12-01 PROCEDURE — 25010000002 ENOXAPARIN PER 10 MG: Performed by: INTERNAL MEDICINE

## 2023-12-01 PROCEDURE — 93005 ELECTROCARDIOGRAM TRACING: CPT | Performed by: INTERNAL MEDICINE

## 2023-12-01 RX ORDER — HYDRALAZINE HYDROCHLORIDE 50 MG/1
100 TABLET, FILM COATED ORAL EVERY 8 HOURS SCHEDULED
Status: DISCONTINUED | OUTPATIENT
Start: 2023-12-01 | End: 2023-12-04 | Stop reason: HOSPADM

## 2023-12-01 RX ORDER — HYDRALAZINE HYDROCHLORIDE 50 MG/1
50 TABLET, FILM COATED ORAL EVERY 8 HOURS SCHEDULED
Status: DISCONTINUED | OUTPATIENT
Start: 2023-12-01 | End: 2023-12-01

## 2023-12-01 RX ADMIN — HYDRALAZINE HYDROCHLORIDE 100 MG: 50 TABLET ORAL at 20:37

## 2023-12-01 RX ADMIN — GUAIFENESIN 1200 MG: 600 TABLET, EXTENDED RELEASE ORAL at 08:46

## 2023-12-01 RX ADMIN — FUROSEMIDE 40 MG: 10 INJECTION, SOLUTION INTRAMUSCULAR; INTRAVENOUS at 17:32

## 2023-12-01 RX ADMIN — SODIUM BICARBONATE 325 MG: 650 TABLET ORAL at 08:46

## 2023-12-01 RX ADMIN — BUDESONIDE AND FORMOTEROL FUMARATE DIHYDRATE 2 PUFF: 160; 4.5 AEROSOL RESPIRATORY (INHALATION) at 07:44

## 2023-12-01 RX ADMIN — PRAVASTATIN SODIUM 40 MG: 20 TABLET ORAL at 08:46

## 2023-12-01 RX ADMIN — LISINOPRIL 20 MG: 10 TABLET ORAL at 08:46

## 2023-12-01 RX ADMIN — BUDESONIDE AND FORMOTEROL FUMARATE DIHYDRATE 2 PUFF: 160; 4.5 AEROSOL RESPIRATORY (INHALATION) at 21:08

## 2023-12-01 RX ADMIN — Medication 10 ML: at 08:47

## 2023-12-01 RX ADMIN — VITAM B12 50 MCG: 100 TAB at 08:46

## 2023-12-01 RX ADMIN — PREDNISONE 20 MG: 20 TABLET ORAL at 08:46

## 2023-12-01 RX ADMIN — ENOXAPARIN SODIUM 30 MG: 100 INJECTION SUBCUTANEOUS at 17:32

## 2023-12-01 RX ADMIN — GUAIFENESIN 1200 MG: 600 TABLET, EXTENDED RELEASE ORAL at 20:37

## 2023-12-01 RX ADMIN — FERROUS SULFATE TAB 325 MG (65 MG ELEMENTAL FE) 325 MG: 325 (65 FE) TAB at 08:46

## 2023-12-01 RX ADMIN — AMLODIPINE BESYLATE 10 MG: 10 TABLET ORAL at 08:46

## 2023-12-01 RX ADMIN — SODIUM BICARBONATE 325 MG: 650 TABLET ORAL at 20:37

## 2023-12-01 RX ADMIN — HYDRALAZINE HYDROCHLORIDE 100 MG: 50 TABLET ORAL at 12:57

## 2023-12-01 RX ADMIN — Medication 10 ML: at 20:38

## 2023-12-01 RX ADMIN — METOPROLOL TARTRATE 50 MG: 50 TABLET, FILM COATED ORAL at 08:46

## 2023-12-01 RX ADMIN — TERAZOSIN HYDROCHLORIDE 1 MG: 1 CAPSULE ORAL at 20:37

## 2023-12-01 RX ADMIN — ASPIRIN 81 MG: 81 TABLET, COATED ORAL at 08:46

## 2023-12-01 RX ADMIN — LISINOPRIL 20 MG: 10 TABLET ORAL at 20:36

## 2023-12-01 RX ADMIN — FLUTICASONE PROPIONATE 2 SPRAY: 50 SPRAY, METERED NASAL at 08:46

## 2023-12-01 RX ADMIN — FUROSEMIDE 40 MG: 10 INJECTION, SOLUTION INTRAMUSCULAR; INTRAVENOUS at 05:09

## 2023-12-01 RX ADMIN — FERROUS SULFATE TAB 325 MG (65 MG ELEMENTAL FE) 325 MG: 325 (65 FE) TAB at 20:37

## 2023-12-01 RX ADMIN — HYDRALAZINE HYDROCHLORIDE 25 MG: 25 TABLET ORAL at 05:09

## 2023-12-01 NOTE — NURSING NOTE
Dr. Zambrano made aware of patient that is having 2nd' degree heart block. Ordered to dc metoprolol and increase hydralazine to 100mg q8hr.

## 2023-12-01 NOTE — PROGRESS NOTES
[facility] Medicine Services  INPATIENT PROGRESS NOTE    Patient Name: Faisal Joseph  Date of Admission: 11/29/2023  Today's Date: 12/01/23  Length of Stay: 2  Primary Care Physician: Spiek Polanco DO    Subjective   Chief Complaint: f/u chf, sob    HPI   Patient complains of mild cough.  Complains of urinary frequency.  He had some second-degree heart block on the monitor this morning and metoprolol has been discontinued.  Oxygen requirements improved to 2 L/min today.    Review of Systems   All pertinent negatives and positives are as above. All other systems have been reviewed and are negative unless otherwise stated.     Objective    Temp:  [97.3 °F (36.3 °C)-98.1 °F (36.7 °C)] 97.3 °F (36.3 °C)  Heart Rate:  [52-68] 54  Resp:  [16-18] 18  BP: (158-186)/(73-96) 170/74  Physical Exam  GEN: Awake, alert, interactive, in NAD, appears non-toxic  HEENT: PERRLA, EOMI, Anicteric, Trachea midline  Lungs: diminished, no wheezing  Heart: RRR, +S1/s2, no rub  ABD: soft, nt/nd, +BS, no guarding/rebound  Extremities: atraumatic, no cyanosis, 2-3+ b/l LE edema  Skin: no rashes or petechiae   Neuro: AAOx3, no focal deficits  Psych: normal mood & affect    Results Review:  I have reviewed the labs, radiology results, and diagnostic studies.    Laboratory Data:   Results from last 7 days   Lab Units 11/30/23  0458 11/29/23  1430   WBC 10*3/mm3 9.94 17.18*   HEMOGLOBIN g/dL 11.0* 11.5*   HEMATOCRIT % 34.9* 35.8*   PLATELETS 10*3/mm3 155 157        Results from last 7 days   Lab Units 12/01/23  0334 11/30/23  0458 11/29/23  1430   SODIUM mmol/L 139 141 141   POTASSIUM mmol/L 3.7 3.9 3.8   CHLORIDE mmol/L 104 108* 107   CO2 mmol/L 26.0 23.0 20.0*   BUN mg/dL 45* 44* 41*   CREATININE mg/dL 3.18* 3.20* 3.03*   CALCIUM mg/dL 8.7 8.8 8.6   BILIRUBIN mg/dL  --   --  0.3   ALK PHOS U/L  --   --  45   ALT (SGPT) U/L  --   --  17   AST (SGOT) U/L  --   --  16   GLUCOSE mg/dL 109* 101*  "101*       Culture Data:   No results found for: \"BLOODCX\", \"URINECX\", \"WOUNDCX\", \"MRSACX\", \"RESPCX\", \"STOOLCX\"    Radiology Data:   Imaging Results (Last 24 Hours)       Procedure Component Value Units Date/Time    US Venous Doppler Lower Extremity Bilateral (duplex) [[id number]] Collected: 11/30/23 1600     Updated: 11/30/23 1603    Narrative:      History: Swelling       Impression:      Impression: There is no evidence of deep venous thrombosis or  superficial thrombophlebitis of right or left lower extremities.     Comments: Bilateral lower extremity venous duplex exam was performed  using color Doppler flow, Doppler waveform analysis, and grayscale  imaging, with and without compression. There is no evidence of deep  venous thrombosis in the common femoral, superficial femoral, popliteal,  peroneal, anterior tibial, and posterior tibial veins bilaterally. No  thrombus is identified in the saphenofemoral junctions and greater  saphenous veins bilaterally.            This report was signed and finalized on 11/30/2023 4:00 PM CST by Dr. Holland Glover MD.               I have reviewed the patient's current medications.     Assessment/Plan   Assessment  Active Hospital Problems    Diagnosis     **Acute hypoxic respiratory failure     Acute on chronic diastolic CHF (congestive heart failure)     Renal lesion - 1.7 cm L midpole lesion     Hyperlipidemia     Stage 4 chronic kidney disease     Essential hypertension        Treatment Plan  #1 acute respiratory failure with hypoxia -could be multifactorial due to fluid overload, rhinovirus infection and acute on chronic diastolic heart failure.  Doubt pneumonia.  White count resolved.  No fevers.  Of note patient only got Vanco in the ER.  He was never given the cefepime that was ordered.  Continue to watch off antibiotics.  His oxygen requirements are improving down to 2 L/min today.  Continue diuresis and wean oxygen as tolerated.  VQ scan low risk for PE Dopplers " negative for DVT.     #2 acute on chronic diastolic heart failure -as above continue diuresis and monitor closely given renal function.  Monitor electrolytes.  Still significant edema and signs of generalized volume overload.  Monitor daily weights and I's and O's.    #3 CKD 4 -renal function around baseline.  Avoid nephrotoxic meds.  Nephrology input appreciated.  Will follow nephrology recommendations.    #4 hyperlipidemia -statin     #5 hypertension -continue amlodipine and lisinopril.  Increase hydralazine.  Metoprolol was discontinued due to second-degree heart block on the monitor.  Will check EKG..     #6 renal lesion -1.7 cm partially exophytic lesion in the left midpole.  This is seen on imaging from last stay and not on acute imaging here.  He had a renal ultrasound done which was interpreted during last stay.  Outpatient follow-up and CT when appropriate.     #7 elevated troponin -chronically elevated.  Was elevated last hospital stay as well.  He is having no active chest pain.  EKG nonacute.  Not consistent with ACS.  Likely up in the setting of his renal dysfunction and CHF.    DVT prophylaxis with subcutaneous Lovenox    CODE STATUS is full code    Medical Decision Making  Number and Complexity of problems: 1-2 acute, 1 acute on chronic, multiple chronic  Differential Diagnosis: as above    Conditions and Status        Condition is improving. Not at goal     MDM Data  External documents reviewed: none  Cardiac tracing (EKG, telemetry) interpretation: sinus on monitor  Radiology interpretation: no new images  Labs reviewed: as above  Any tests that were considered but not ordered: none     Decision rules/scores evaluated (example [id number], Wells, etc): none     Discussed with: patient, nursing staff     Care Planning  Shared decision making: Patient apprised of current labs, vitals, imaging and treatment plan.  They are agreeable with proceeding with plans as discussed.    Code status and  discussions: full code    Disposition  Social Determinants of Health that impact treatment or disposition: none  I expect the patient to be discharged to home in 2-3 days.     Electronically signed by Ann Marie Horton MD, 12/01/23, 14:45 CST.

## 2023-12-01 NOTE — PROGRESS NOTES
Nephrology ([facility]) Progress Note      Patient:  Faisal Joseph  YOB: 1940  Date of Service: 12/1/2023  MRN: [id number]   Acct: [id number]   Primary Care Physician: Spike Polanco DO  Advance Directive:   Code Status and Medical Interventions:   Ordered at: 11/29/23 1650     Level Of Support Discussed With:    Patient     Code Status (Patient has no pulse and is not breathing):    CPR (Attempt to Resuscitate)     Medical Interventions (Patient has pulse or is breathing):    Full Support     Admit Date: 11/29/2023       Hospital Day: 2  Referring Provider: No ref. provider found      Patient personally seen and examined.  Complete chart including Consults, Notes, Operative Reports, Labs, Cardiology, and Radiology studies reviewed as able.        Subjective:  Faisal Joseph is a 83 y.o. male for whom we were consulted for evaluation and treatment of chronic kidney disease stage IV.  Patient follows with [facility] baseline GFR appears to be around 20.  Was just seen last week at this facility for volume overload.  Returned with complaints of edema and dyspnea.  Continued to have some hypoxemia and was also recently diagnosed with rhinovirus.  Felt that his breathing had improved.  Denied chest pain, nausea or vomiting, dysuria or hematuria.     Today, second-degree heart block noted on the monitor.  Patient with complaints of cough and chest tightness associated.  Denies other complaints upon questioning.    Allergies:  Hydralazine, Losartan potassium, Penicillins, and Spironolactone    Home Meds:  Medications Prior to Admission   Medication Sig Dispense Refill Last Dose    amLODIPine (NORVASC) 10 MG tablet Take 1 tablet by mouth Daily.       aspirin 81 MG EC tablet Take 1 tablet by mouth Daily. 100 tablet 99     Cholecalciferol (VITAMIN D) 2000 units capsule Take 1 capsule by mouth Daily.       ferrous sulfate 325 (65 FE) MG tablet Take 1 tablet  by mouth 2 (Two) Times a Day.       fluticasone (FLONASE) 50 MCG/ACT nasal spray 2 sprays by Each Nare route Daily. Obtain otc       furosemide (LASIX) 40 MG tablet Take 1 tablet by mouth Daily for 30 days. 30 tablet 0     guaiFENesin (MUCINEX) 600 MG 12 hr tablet Take 2 tablets by mouth Every 12 (Twelve) Hours.       hydrALAZINE (APRESOLINE) 100 MG tablet Take 1 tablet by mouth 3 (Three) Times a Day.       lisinopril (PRINIVIL,ZESTRIL) 20 MG tablet Take 1 tablet by mouth 2 (Two) Times a Day.       metoprolol tartrate (LOPRESSOR) 50 MG tablet Take 1 tablet by mouth 2 (Two) Times a Day.       pravastatin (PRAVACHOL) 40 MG tablet Take 1 tablet by mouth Daily.       predniSONE (DELTASONE) 10 MG tablet Take 4 tablets by mouth Daily With Breakfast for 3 days, THEN 3 tablets Daily With Breakfast for 3 days, THEN 2 tablets Daily With Breakfast for 3 days, THEN 1 tablet Daily With Breakfast for 3 days. 30 tablet 0     sodium bicarbonate 650 MG tablet Take 0.5 tablets by mouth Daily.       terazosin (HYTRIN) 1 MG capsule Take 1 capsule by mouth Every Night.       vitamin B-12 (CYANOCOBALAMIN) 100 MCG tablet Take 0.5 tablets by mouth Daily.       budesonide-formoterol (SYMBICORT) 160-4.5 MCG/ACT inhaler Inhale 2 puffs 2 (Two) Times a Day.       nitroglycerin (NITROSTAT) 0.4 MG SL tablet Place 1 tablet under the tongue Every 5 (Five) Minutes As Needed for Chest Pain. Take no more than 3 doses in 15 minutes.          Medicines:  Current Facility-Administered Medications   Medication Dose Route Frequency Provider Last Rate Last Admin    acetaminophen (TYLENOL) tablet 650 mg  650 mg Oral Q4H PRN Rufino Zambrano DO        amLODIPine (NORVASC) tablet 10 mg  10 mg Oral Daily Rufino Zambrano DO   10 mg at 12/01/23 0846    aspirin EC tablet 81 mg  81 mg Oral Daily Rufino Zambrano DO   81 mg at 12/01/23 0846    sennosides-docusate (PERICOLACE) 8.6-50 MG per tablet 2 tablet  2 tablet Oral BID Rufino Zambrano DO        And     polyethylene glycol (MIRALAX) packet 17 g  17 g Oral Daily PRN Rufino Zambrano DO        And    bisacodyl (DULCOLAX) EC tablet 5 mg  5 mg Oral Daily PRN Rufino Zambrano DO        And    bisacodyl (DULCOLAX) suppository 10 mg  10 mg Rectal Daily PRN Rufino Zambrano DO        budesonide-formoterol (SYMBICORT) 160-4.5 MCG/ACT inhaler 2 puff  2 puff Inhalation BID - RT Rufino Zambrano DO   2 puff at 12/01/23 0744    cefepime 2 gm IVPB in 100 ml NS (MBP)  2,000 mg Intravenous Once CollierAbi MD        Enoxaparin Sodium (LOVENOX) syringe 30 mg  30 mg Subcutaneous Q24H Rufino Zambrano DO   30 mg at 11/30/23 1713    ferrous sulfate tablet 325 mg  325 mg Oral BID Rufino Zambrano DO   325 mg at 12/01/23 0846    fluticasone (FLONASE) 50 MCG/ACT nasal spray 2 spray  2 spray Each Nare Daily Rufino Zambrano DO   2 spray at 12/01/23 0846    furosemide (LASIX) injection 40 mg  40 mg Intravenous Q12H Rufino Zambrano DO   40 mg at 12/01/23 0509    guaiFENesin (MUCINEX) 12 hr tablet 1,200 mg  1,200 mg Oral Q12H Rufino Zambrano DO   1,200 mg at 12/01/23 0846    hydrALAZINE (APRESOLINE) tablet 100 mg  100 mg Oral Q8H Rufino Zambrano DO   100 mg at 12/01/23 1257    labetalol (NORMODYNE,TRANDATE) injection 10 mg  10 mg Intravenous Q6H PRN Rufino Zambrano DO        lisinopril (PRINIVIL,ZESTRIL) tablet 20 mg  20 mg Oral BID Rufino Zambrano DO   20 mg at 12/01/23 0846    nitroglycerin (NITROSTAT) SL tablet 0.4 mg  0.4 mg Sublingual Q5 Min PRN Rufino Zambrano DO        ondansetron (ZOFRAN) injection 4 mg  4 mg Intravenous Q6H PRN Rufino Zambrano DO        pravastatin (PRAVACHOL) tablet 40 mg  40 mg Oral Daily Rufino Zambrano DO   40 mg at 12/01/23 0846    [START ON 12/2/2023] predniSONE (DELTASONE) tablet 10 mg  10 mg Oral Daily With Breakfast Rufino Zambrano DO        sodium bicarbonate tablet 325 mg  325 mg Oral BID Rufino Zambrano DO   325 mg at 12/01/23 0846    sodium chloride 0.9 % flush 10 mL  10  mL Intravenous PRN Abi Darling MD        sodium chloride 0.9 % flush 10 mL  10 mL Intravenous PRN Abi Darling MD        sodium chloride 0.9 % flush 10 mL  10 mL Intravenous Q12H Rufino Zambrano DO   10 mL at 12/01/23 0847    sodium chloride 0.9 % flush 10 mL  10 mL Intravenous PRN Rufino Zambrano DO        sodium chloride 0.9 % infusion 40 mL  40 mL Intravenous PRN Rufino Zambrano DO        terazosin (HYTRIN) capsule 1 mg  1 mg Oral Nightly Rufino Zambrano DO   1 mg at 11/30/23 2029    vitamin B-12 (CYANOCOBALAMIN) tablet 50 mcg  50 mcg Oral Daily Rufino Zambrano DO   50 mcg at 12/01/23 0846       Past Medical History:  Past Medical History:   Diagnosis Date    Arthritis     Cataract     Chronic fatigue 10/3/2017    Chronic renal disease, stage IV     Coronary artery disease     CVA (cerebral vascular accident) 8/14/2018    Hyperlipidemia     Hypertension     Palpitations 10/3/2017    Premature atrial contractions 10/17/2017    PVCs (premature ventricular contractions) 10/17/2017    Sleep apnea     Stage 4 chronic kidney disease 7/30/2018    Stroke 2007    Weakness     right leg weaker       Past Surgical History:  Past Surgical History:   Procedure Laterality Date    CARDIAC CATHETERIZATION      CARDIAC CATHETERIZATION N/A 5/28/2020    Procedure: Left Heart Cath;  Surgeon: Rufino Brice MD;  Location:  [facility];  Service: Cardiology;  Laterality: N/A;    CORONARY ARTERY BYPASS GRAFT N/A 6/9/2020    Procedure: CABG X 3, LIMA, SHELBY, EVH WITH OPEN EXTENSION LOWER RIGHT LEG;  Surgeon: Nikunj Carballo MD;  Location: [facility];  Service: Cardiothoracic;  Laterality: N/A;       Family History  Family History   Problem Relation Age of Onset    Cancer Mother     Cancer Father        Social History  Social History     Socioeconomic History    Marital status:    Tobacco Use    Smoking status: Former     Packs/day: 1.50     Years: 25.00     Additional pack years:  0.00     Total pack years: 37.50     Types: Cigarettes     Start date:      Quit date:      Years since quittin.9    Smokeless tobacco: Never   Vaping Use    Vaping Use: Never used   Substance and Sexual Activity    Alcohol use: No    Drug use: No    Sexual activity: Defer       Review of Systems:  History obtained from chart review and the patient  General ROS: No fever or chills  Respiratory ROS: +cough, shortness of breath  Cardiovascular ROS: No chest pain or palpitations  Gastrointestinal ROS: No abdominal pain or melena  Genito-Urinary ROS: No dysuria or hematuria  Psych ROS: No anxiety and depression  14 point ROS reviewed with the patient and negative except as noted above and in the HPI unless unable to obtain.    Objective:  Patient Vitals for the past 24 hrs:   BP Temp Temp src Pulse Resp SpO2 Weight   23 1631 168/74 98 °F (36.7 °C) Oral 56 20 94 % --   23 1157 170/74 97.3 °F (36.3 °C) Oral 54 18 93 % --   23 0755 178/75 97.9 °F (36.6 °C) Oral 57 18 93 % --   23 0744 -- -- -- 56 18 95 % --   23 0551 -- -- -- -- -- -- 94.5 kg (208 lb 6.4 oz)   23 0330 175/85 97.3 °F (36.3 °C) Oral 57 18 94 % --   23 0015 162/76 -- -- -- -- -- --   23 0000 170/90 97.6 °F (36.4 °C) Oral 52 16 97 % --   23 (!) 186/84 -- -- -- -- -- --   23 (!) 183/96 97.5 °F (36.4 °C) Oral 62 16 93 % --   23 162/ -- -- 57 -- -- --   23 -- -- -- 54 16 95 % --   23 -- -- -- 55 16 94 % --   23 160/76 -- -- 54 -- -- --   23 1719 158/84 -- -- 66 -- -- --       Intake/Output Summary (Last 24 hours) at 2023 1637  Last data filed at 2023 0849  Gross per 24 hour   Intake 740 ml   Output 3200 ml   Net -2460 ml     General: awake/alert   Chest:  clear to auscultation bilaterally without respiratory distress  CVS: regular rate and rhythm  Abdominal: soft, nontender, positive bowel sounds  Extremities: ble  edema  Skin: warm and dry without rash      Labs:  Results from last 7 days   Lab Units 11/30/23  0458 11/29/23  1430   WBC 10*3/mm3 9.94 17.18*   HEMOGLOBIN g/dL 11.0* 11.5*   HEMATOCRIT % 34.9* 35.8*   PLATELETS 10*3/mm3 155 157         Results from last 7 days   Lab Units 12/01/23  0334 11/30/23  0458 11/29/23  1430   SODIUM mmol/L 139 141 141   POTASSIUM mmol/L 3.7 3.9 3.8   CHLORIDE mmol/L 104 108* 107   CO2 mmol/L 26.0 23.0 20.0*   BUN mg/dL 45* 44* 41*   CREATININE mg/dL 3.18* 3.20* 3.03*   CALCIUM mg/dL 8.7 8.8 8.6   EGFR mL/min/1.73 18.6* 18.5* 19.7*   BILIRUBIN mg/dL  --   --  0.3   ALK PHOS U/L  --   --  45   ALT (SGPT) U/L  --   --  17   AST (SGOT) U/L  --   --  16   GLUCOSE mg/dL 109* 101* 101*       Radiology:   Imaging Results (Last 72 Hours)       Procedure Component Value Units Date/Time    US Venous Doppler Lower Extremity Bilateral (duplex) [[id number]] Collected: 11/30/23 1600     Updated: 11/30/23 1603    Narrative:      History: Swelling       Impression:      Impression: There is no evidence of deep venous thrombosis or  superficial thrombophlebitis of right or left lower extremities.     Comments: Bilateral lower extremity venous duplex exam was performed  using color Doppler flow, Doppler waveform analysis, and grayscale  imaging, with and without compression. There is no evidence of deep  venous thrombosis in the common femoral, superficial femoral, popliteal,  peroneal, anterior tibial, and posterior tibial veins bilaterally. No  thrombus is identified in the saphenofemoral junctions and greater  saphenous veins bilaterally.            This report was signed and finalized on 11/30/2023 4:00 PM CST by Dr. Holland Glover MD.       NM Lung Scan Perfusion Particulate [[id number]] Collected: 11/29/23 1711     Updated: 11/29/23 1716    Narrative:      NM LUNG SCAN PERFUSION PARTICULATE-     HISTORY: Pulmonary embolism (PE) suspected, unknown D-dimer; I50.9-Heart  failure, unspecified;  J96.01-Acute respiratory failure with hypoxia;  N18.9-Chronic kidney disease, unspecified; I50.33-Acute on chronic  diastolic (congestive) heart failure     COMPARISON: Chest x-ray 11/29/2023     FINDINGS: Following IV injection of 5.5 mCi of Tc 99m MAA particles,  multiple projection images of the chest are obtained.     Slightly heterogeneous perfusion of the lung parenchyma with no discrete  segmental perfusion defect identified.       Impression:      1. Findings consistent with low probability of pulmonary emboli.     This report was signed and finalized on 11/29/2023 5:13 PM CST by Dr. Helen Goins MD.       XR Chest 1 View [[id number]] Collected: 11/29/23 1407     Updated: 11/29/23 1414    Narrative:      EXAM/TECHNIQUE: XR CHEST 1 VW-     INDICATION: Shortness of air     COMPARISON: 11/19/2023     FINDINGS:     Cardiac silhouette is within normal limits and stable. Prior  midsternotomy. Atherosclerotic aortic arch. No pleural effusion,  pneumothorax, or focal consolidation. Mild diffuse chronic interstitial  coarsening is again noted. No acute osseous finding.       Impression:      No acute findings.     This report was signed and finalized on 11/29/2023 2:11 PM CST by Dr. Arsalan Ybarra MD.               Culture:  Blood Culture   Date Value Ref Range Status   11/29/2023 No growth at 2 days  Preliminary   11/29/2023 No growth at 2 days  Preliminary         Assessment   Chronic kidney disease stage IV  Acute hypoxemic respiratory failure  Acute on chronic diastolic congestive heart failure  Hypertension  Incidental finding of renal lesion at previous admission  Secondary hyperparathyroidism    Plan:  Discussed with patient, nursing  Work-up reviewed today  Monitor labs  Continue IV diuretics with close lab monitoring as obtaining adequate diuresis  Renal lesion unrelated to admission, can follow-up with outpatient imaging and/or urology evaluation as needed  Vitamin D levels adequate, if hypocalcemia  develops can start low-dose calcitriol        Darryn Oliver MD  12/1/2023  16:37 CST

## 2023-12-01 NOTE — PLAN OF CARE
Goal Outcome Evaluation:  Plan of Care Reviewed With: patient        Progress: no change  Outcome Evaluation: VSS. Pt resting well this shift. No c/o pain. Good UOP. Safety maintained. Tele- SB/S intermittent 2nd degree 47-63.

## 2023-12-02 LAB
ANION GAP SERPL CALCULATED.3IONS-SCNC: 10 MMOL/L (ref 5–15)
BUN SERPL-MCNC: 43 MG/DL (ref 8–23)
BUN/CREAT SERPL: 14.3 (ref 7–25)
CALCIUM SPEC-SCNC: 8.8 MG/DL (ref 8.6–10.5)
CHLORIDE SERPL-SCNC: 100 MMOL/L (ref 98–107)
CO2 SERPL-SCNC: 27 MMOL/L (ref 22–29)
CREAT SERPL-MCNC: 3 MG/DL (ref 0.76–1.27)
EGFRCR SERPLBLD CKD-EPI 2021: 20 ML/MIN/1.73
GLUCOSE SERPL-MCNC: 103 MG/DL (ref 65–99)
POTASSIUM SERPL-SCNC: 3.4 MMOL/L (ref 3.5–5.2)
SODIUM SERPL-SCNC: 137 MMOL/L (ref 136–145)

## 2023-12-02 PROCEDURE — 94799 UNLISTED PULMONARY SVC/PX: CPT

## 2023-12-02 PROCEDURE — 63710000001 PREDNISONE PER 1 MG: Performed by: INTERNAL MEDICINE

## 2023-12-02 PROCEDURE — 94664 DEMO&/EVAL PT USE INHALER: CPT

## 2023-12-02 PROCEDURE — 80048 BASIC METABOLIC PNL TOTAL CA: CPT | Performed by: INTERNAL MEDICINE

## 2023-12-02 PROCEDURE — 25010000002 FUROSEMIDE PER 20 MG: Performed by: INTERNAL MEDICINE

## 2023-12-02 PROCEDURE — 25010000002 ENOXAPARIN PER 10 MG: Performed by: INTERNAL MEDICINE

## 2023-12-02 RX ADMIN — ENOXAPARIN SODIUM 30 MG: 100 INJECTION SUBCUTANEOUS at 17:30

## 2023-12-02 RX ADMIN — Medication 10 ML: at 20:42

## 2023-12-02 RX ADMIN — SODIUM BICARBONATE 325 MG: 650 TABLET ORAL at 20:42

## 2023-12-02 RX ADMIN — SODIUM BICARBONATE 325 MG: 650 TABLET ORAL at 09:55

## 2023-12-02 RX ADMIN — BUDESONIDE AND FORMOTEROL FUMARATE DIHYDRATE 2 PUFF: 160; 4.5 AEROSOL RESPIRATORY (INHALATION) at 07:22

## 2023-12-02 RX ADMIN — FERROUS SULFATE TAB 325 MG (65 MG ELEMENTAL FE) 325 MG: 325 (65 FE) TAB at 09:56

## 2023-12-02 RX ADMIN — HYDRALAZINE HYDROCHLORIDE 100 MG: 50 TABLET ORAL at 20:42

## 2023-12-02 RX ADMIN — PRAVASTATIN SODIUM 40 MG: 20 TABLET ORAL at 09:55

## 2023-12-02 RX ADMIN — Medication 10 ML: at 09:59

## 2023-12-02 RX ADMIN — FUROSEMIDE 40 MG: 10 INJECTION, SOLUTION INTRAMUSCULAR; INTRAVENOUS at 05:51

## 2023-12-02 RX ADMIN — FERROUS SULFATE TAB 325 MG (65 MG ELEMENTAL FE) 325 MG: 325 (65 FE) TAB at 20:42

## 2023-12-02 RX ADMIN — AMLODIPINE BESYLATE 10 MG: 10 TABLET ORAL at 09:56

## 2023-12-02 RX ADMIN — PREDNISONE 10 MG: 20 TABLET ORAL at 09:56

## 2023-12-02 RX ADMIN — VITAM B12 50 MCG: 100 TAB at 09:55

## 2023-12-02 RX ADMIN — GUAIFENESIN 1200 MG: 600 TABLET, EXTENDED RELEASE ORAL at 20:42

## 2023-12-02 RX ADMIN — GUAIFENESIN 1200 MG: 600 TABLET, EXTENDED RELEASE ORAL at 09:56

## 2023-12-02 RX ADMIN — LISINOPRIL 20 MG: 10 TABLET ORAL at 20:42

## 2023-12-02 RX ADMIN — ASPIRIN 81 MG: 81 TABLET, COATED ORAL at 09:56

## 2023-12-02 RX ADMIN — BUDESONIDE AND FORMOTEROL FUMARATE DIHYDRATE 2 PUFF: 160; 4.5 AEROSOL RESPIRATORY (INHALATION) at 21:55

## 2023-12-02 RX ADMIN — LISINOPRIL 20 MG: 10 TABLET ORAL at 09:56

## 2023-12-02 RX ADMIN — TERAZOSIN HYDROCHLORIDE 1 MG: 1 CAPSULE ORAL at 20:42

## 2023-12-02 RX ADMIN — HYDRALAZINE HYDROCHLORIDE 100 MG: 50 TABLET ORAL at 05:51

## 2023-12-02 RX ADMIN — FUROSEMIDE 40 MG: 10 INJECTION, SOLUTION INTRAMUSCULAR; INTRAVENOUS at 17:33

## 2023-12-02 RX ADMIN — HYDRALAZINE HYDROCHLORIDE 100 MG: 50 TABLET ORAL at 14:57

## 2023-12-02 NOTE — PROGRESS NOTES
1         [facility]  INPATIENT PROGRESS NOTE    Patient Name: Faisal Joseph  Date of Admission: 11/29/2023  Today's Date: 12/02/23  Length of Stay: 3  Primary Care Physician: Spike Polanco DO    Subjective   Chief Complaint: Follow-up  HPI   Patient on day 3 of hospitalization  This is my first encounter with patient  Patient was admitted on November 29 for shortness of breath by Dr. Zambrano  His comorbidities include CVA, chronic kidney disease stage IV, coronary artery disease, COPD, hypertension and hyperlipidemia.  Record indicates that patient was recently hospitalized for rhinovirus, respiratory failure and COPD exacerbation from November 19 and November 22.  He was under the care of Dr. Lew back then.    His echocardiogram as documented from the discharge summary indicates presence of LV diastolic dysfunction, normal EF at 56 to 60%, left atrial volume is severely increased and moderate pulmonary hypertension.      Results for orders placed during the hospital encounter of 11/19/23    Adult Transthoracic Echo Complete W/ Cont if Necessary Per Protocol    Interpretation Summary    Left ventricular ejection fraction appears to be 56 - 60%.    Left ventricular wall thickness is consistent with mild concentric hypertrophy.    Left ventricular diastolic function is consistent with (grade II w/high LAP) pseudonormalization.    Left atrial volume is severely increased.    The right atrial cavity is dilated.    Moderate pulmonary hypertension is present.    There is a trivial pericardial effusion. There is no evidence of cardiac tamponade.    Abnormal global longitudinal LV strain (GLS) = -11.5%        Dr. Leavitt  seen him in follow-up for the hospitalists  He discontinued metoprolol as he mentioned second-degree heart block on the monitor.    1st deg av block. Hr as low as 50 on tele staff  Last night had dropped as low as 30 (3:28 AM -  transient)    Bp noted elevated    Patient has no new complaints other than nausea and intermittent coughing particularly when taking a deep breath.  I informed him that he has common colds.    In terms of nausea and noticed that there are only few broccoli left in his plate.  Otherwise he ate everything.        Nephrology also following patient for chronic kidney disease stage IV which they recommend continuing IV diuretic.  There is a renal lesion be described but unrelated to this admission and can follow-up in outpatient imaging and/or urology evaluation as needed.    Review of Systems   All pertinent negatives and positives are as above. All other systems have been reviewed and are negative unless otherwise stated.     Objective    Temp:  [97.2 °F (36.2 °C)-98 °F (36.7 °C)] 97.5 °F (36.4 °C)  Heart Rate:  [54-67] 67  Resp:  [16-20] 16  BP: (143-182)/(63-79) 176/63  Physical Exam  No distress  Intermittent bronchospastic spell during auscultation; positive fine crackles bases.  GEN: Awake, alert, interactive, in NAD, appears non-toxic  HEENT: PERRLA, EOMI, Anicteric, Trachea midline  Lungs: diminished, no wheezing  Heart: RRR, +S1/s2, no rub  ABD: soft, nt/nd, +BS, no guarding/rebound  Extremities: atraumatic, no cyanosis, 2-3+ b/l LE edema (I did not quite notice edema)  Skin: no rashes or petechiae   Neuro: AAOx3, no focal deficits  Psych: normal mood & affect         Results Review:  I have reviewed the labs, radiology results, and diagnostic studies.    Laboratory Data:   Results from last 7 days   Lab Units 11/30/23  0458 11/29/23  1430   WBC 10*3/mm3 9.94 17.18*   HEMOGLOBIN g/dL 11.0* 11.5*   HEMATOCRIT % 34.9* 35.8*   PLATELETS 10*3/mm3 155 157        Results from last 7 days   Lab Units 12/02/23  0433 12/01/23  0334 11/30/23  0458 11/29/23  1430   SODIUM mmol/L 137 139 141 141   POTASSIUM mmol/L 3.4* 3.7 3.9 3.8   CHLORIDE mmol/L 100 104 108* 107   CO2 mmol/L 27.0 26.0 23.0 20.0*   BUN mg/dL 43* 45* 44*  41*   CREATININE mg/dL 3.00* 3.18* 3.20* 3.03*   CALCIUM mg/dL 8.8 8.7 8.8 8.6   BILIRUBIN mg/dL  --   --   --  0.3   ALK PHOS U/L  --   --   --  45   ALT (SGPT) U/L  --   --   --  17   AST (SGOT) U/L  --   --   --  16   GLUCOSE mg/dL 103* 109* 101* 101*       Culture Data:   Blood Culture   Date Value Ref Range Status   11/29/2023 No growth at 2 days  Preliminary   11/29/2023 No growth at 2 days  Preliminary       Radiology Data:   Imaging Results (Last 24 Hours)       ** No results found for the last 24 hours. **          PTH elevated at 174  25-hydroxy vitamin D is 38 which I think is still normal  I have reviewed the patient's current medications.     Assessment/Plan   Assessment  Active Hospital Problems    Diagnosis     **Acute hypoxic respiratory failure     Acute on chronic diastolic CHF (congestive heart failure)     Renal lesion - 1.7 cm L midpole lesion     Hyperlipidemia     Stage 4 chronic kidney disease     Essential hypertension            Medical Decision Making  Number and Complexity of problems:   Data collection:  Presenting symptom admission was shortness of breath  Found with leukocytosis which is now improved  VQ scan low probability of PE; venous ultrasound showed no evidence of DVT or superficial thrombophlebitis bilateral    Chest x-ray no acute finding    Problem list:  Shortness of breath-VTE ruled out; differentials include: fluid overload, rhinovirus infection, acute on chronic diastolic heart failure.   Hypertension-hypertensive cardiovascular disease as evidenced by diastolic dysfunction and mild concentric hypertrophy on echo currently on amlodipine, hydralazine, lisinopril, terazosin and Lasix.  Monitor blood pressure and adjust accordingly  CKD stage IV-nephrology following; secondary hyperparathyroidism  Bradycardia-beta-blocker discontinued; if persistent consider discontinuing amlodipine and monitor blood pressure.  May need to look at other options for blood pressure  control  Mild hypokalemia-monitor  Acute respiratory failure with hypoxia (I did not see any record of blood gas.  I am also limited in terms of what I can's see from recent past in terms of oxygen documentation) as per previous progress note believed to be multifactorial-fluid overload, rhinovirus infection, acute on chronic diastolic heart failure.  Yesterday, Dr. Leavitt doubted pneumonia but patient is on cefepime currently.  White count resolved         Patient afebrile;O2 requirement improved  Recent history of rhinovirus infection          Treatment Plan  Supplement with oxygen to maintain saturation greater than 90%  I apprised the patient of current condition and plan of care.  Noted that patient only received one-time dose of cefepime.  So far he has been afebrile, normal white count.  He has  elevation of procalcitonin at 1.36  He is nontoxic-appearing.  His culture has been unrevealing.  I think monitoring him off antibiotic is appropriate.  Will change course of action if any slight change to suggest an ongoing infection that would require antibiotic.      Medications reviewed  amLODIPine, 10 mg, Oral, Daily  aspirin, 81 mg, Oral, Daily  budesonide-formoterol, 2 puff, Inhalation, BID - RT  cefepime, 2,000 mg, Intravenous, Once  enoxaparin, 30 mg, Subcutaneous, Q24H  ferrous sulfate, 325 mg, Oral, BID  fluticasone, 2 spray, Each Nare, Daily  furosemide, 40 mg, Intravenous, Q12H  guaiFENesin, 1,200 mg, Oral, Q12H  hydrALAZINE, 100 mg, Oral, Q8H  lisinopril, 20 mg, Oral, BID  pravastatin, 40 mg, Oral, Daily  predniSONE, 10 mg, Oral, Daily With Breakfast  senna-docusate sodium, 2 tablet, Oral, BID  sodium bicarbonate, 325 mg, Oral, BID  sodium chloride, 10 mL, Intravenous, Q12H  terazosin, 1 mg, Oral, Nightly  vitamin B-12, 50 mcg, Oral, Daily        Conditions and Status         Fair  MDM Data  External documents reviewed: None  Cardiac tracing (EKG, telemetry) interpretation: I reviewed with the telemetry  staff the reading.  Patient has first-degree AV block.  Had bradycardia.  Beta-blocker stopped from yesterday    Results for orders placed during the hospital encounter of 11/19/23    Adult Transthoracic Echo Complete W/ Cont if Necessary Per Protocol    Interpretation Summary    Left ventricular ejection fraction appears to be 56 - 60%.    Left ventricular wall thickness is consistent with mild concentric hypertrophy.    Left ventricular diastolic function is consistent with (grade II w/high LAP) pseudonormalization.    Left atrial volume is severely increased.    The right atrial cavity is dilated.    Moderate pulmonary hypertension is present.    There is a trivial pericardial effusion. There is no evidence of cardiac tamponade.    Abnormal global longitudinal LV strain (GLS) = -11.5%        Radiology interpretation: I personally reviewed the chest x-ray, the chest x-ray from November 19 appears to be more magnified than the latest chest x-ray.  I also think that there is increased haziness on the newer x-ray.  Otherwise no gross consolidation, no gross cardiomegaly, diaphragms seem to be more flattened in the more recent x-ray but otherwise nothing suggestive of pleural effusion.  This is my personal review.      Labs reviewed: y  Any tests that were considered but not ordered: none     Decision rules/scores evaluated (example [id number], Wells, etc): none     Discussed with: Patient     Care Planning  Shared decision making: Patient  Code status and discussions: Full code    Disposition  Social Determinants of Health that impact treatment or disposition: None identified at this time.  He anticipates going to his daughter's place when he leaves the hospital  I expect the patient to be discharged to (TBD)  Electronically signed by Faisal Mcelroy MD, 12/02/23, 12:21 CST.

## 2023-12-02 NOTE — PROGRESS NOTES
Nephrology ([facility]) Progress Note      Patient:  Faisal Joseph  YOB: 1940  Date of Service: 12/2/2023  MRN: [id number]   Acct: [id number]   Primary Care Physician: Spike Polanco DO  Advance Directive:   Code Status and Medical Interventions:   Ordered at: 11/29/23 1650     Level Of Support Discussed With:    Patient     Code Status (Patient has no pulse and is not breathing):    CPR (Attempt to Resuscitate)     Medical Interventions (Patient has pulse or is breathing):    Full Support     Admit Date: 11/29/2023       Hospital Day: 3  Referring Provider: No ref. provider found      Patient personally seen and examined.  Complete chart including Consults, Notes, Operative Reports, Labs, Cardiology, and Radiology studies reviewed as able.        Subjective:  Faisal Joseph is a 83 y.o. male for whom we were consulted for evaluation and treatment of chronic kidney disease stage IV.  Patient follows with [facility] baseline GFR appears to be around 20.  Was just seen last week at this facility for volume overload.  Returned with complaints of edema and dyspnea.  Continued to have some hypoxemia and was also recently diagnosed with rhinovirus.  Felt that his breathing had improved.  Denied chest pain, nausea or vomiting, dysuria or hematuria.     Today, second-degree heart block noted on the monitor.  Patient with complaints of cough.  Denies other complaints upon questioning.    Allergies:  Hydralazine, Losartan potassium, Penicillins, and Spironolactone    Home Meds:  Medications Prior to Admission   Medication Sig Dispense Refill Last Dose    amLODIPine (NORVASC) 10 MG tablet Take 1 tablet by mouth Daily.       aspirin 81 MG EC tablet Take 1 tablet by mouth Daily. 100 tablet 99     Cholecalciferol (VITAMIN D) 2000 units capsule Take 1 capsule by mouth Daily.       ferrous sulfate 325 (65 FE) MG tablet Take 1 tablet by mouth 2 (Two) Times a Day.        fluticasone (FLONASE) 50 MCG/ACT nasal spray 2 sprays by Each Nare route Daily. Obtain otc       furosemide (LASIX) 40 MG tablet Take 1 tablet by mouth Daily for 30 days. 30 tablet 0     guaiFENesin (MUCINEX) 600 MG 12 hr tablet Take 2 tablets by mouth Every 12 (Twelve) Hours.       hydrALAZINE (APRESOLINE) 100 MG tablet Take 1 tablet by mouth 3 (Three) Times a Day.       lisinopril (PRINIVIL,ZESTRIL) 20 MG tablet Take 1 tablet by mouth 2 (Two) Times a Day.       metoprolol tartrate (LOPRESSOR) 50 MG tablet Take 1 tablet by mouth 2 (Two) Times a Day.       pravastatin (PRAVACHOL) 40 MG tablet Take 1 tablet by mouth Daily.       predniSONE (DELTASONE) 10 MG tablet Take 4 tablets by mouth Daily With Breakfast for 3 days, THEN 3 tablets Daily With Breakfast for 3 days, THEN 2 tablets Daily With Breakfast for 3 days, THEN 1 tablet Daily With Breakfast for 3 days. 30 tablet 0     sodium bicarbonate 650 MG tablet Take 0.5 tablets by mouth Daily.       terazosin (HYTRIN) 1 MG capsule Take 1 capsule by mouth Every Night.       vitamin B-12 (CYANOCOBALAMIN) 100 MCG tablet Take 0.5 tablets by mouth Daily.       budesonide-formoterol (SYMBICORT) 160-4.5 MCG/ACT inhaler Inhale 2 puffs 2 (Two) Times a Day.       nitroglycerin (NITROSTAT) 0.4 MG SL tablet Place 1 tablet under the tongue Every 5 (Five) Minutes As Needed for Chest Pain. Take no more than 3 doses in 15 minutes.          Medicines:  Current Facility-Administered Medications   Medication Dose Route Frequency Provider Last Rate Last Admin    acetaminophen (TYLENOL) tablet 650 mg  650 mg Oral Q4H PRN Rufino Zambrano DO        amLODIPine (NORVASC) tablet 10 mg  10 mg Oral Daily Rufino Zambrano DO   10 mg at 12/02/23 0956    aspirin EC tablet 81 mg  81 mg Oral Daily Rufino Zambrano DO   81 mg at 12/02/23 0956    sennosides-docusate (PERICOLACE) 8.6-50 MG per tablet 2 tablet  2 tablet Oral BID Rufino Zambrano DO        And    polyethylene glycol (MIRALAX)  packet 17 g  17 g Oral Daily PRN Rufino Zambrano DO        And    bisacodyl (DULCOLAX) EC tablet 5 mg  5 mg Oral Daily PRN Rufino Zambrano DO        And    bisacodyl (DULCOLAX) suppository 10 mg  10 mg Rectal Daily PRN Rufino Zambrano DO        budesonide-formoterol (SYMBICORT) 160-4.5 MCG/ACT inhaler 2 puff  2 puff Inhalation BID - RT Rufino Zambrano DO   2 puff at 12/02/23 0722    cefepime 2 gm IVPB in 100 ml NS (MBP)  2,000 mg Intravenous Once [place], Abi JACOBS MD        Enoxaparin Sodium (LOVENOX) syringe 30 mg  30 mg Subcutaneous Q24H Rufino Zambrano DO   30 mg at 12/02/23 1730    ferrous sulfate tablet 325 mg  325 mg Oral BID Rufino Zambrano DO   325 mg at 12/02/23 0956    fluticasone (FLONASE) 50 MCG/ACT nasal spray 2 spray  2 spray Each Nare Daily Rufino Zambrano DO   2 spray at 12/01/23 0846    furosemide (LASIX) injection 40 mg  40 mg Intravenous Q12H Rufino Zambrano DO   40 mg at 12/02/23 1733    guaiFENesin (MUCINEX) 12 hr tablet 1,200 mg  1,200 mg Oral Q12H Rufino Zambrano DO   1,200 mg at 12/02/23 0956    hydrALAZINE (APRESOLINE) tablet 100 mg  100 mg Oral Q8H Rufino Zambrano DO   100 mg at 12/02/23 1457    labetalol (NORMODYNE,TRANDATE) injection 10 mg  10 mg Intravenous Q6H PRN Rufino Zambrano DO        lisinopril (PRINIVIL,ZESTRIL) tablet 20 mg  20 mg Oral BID Rufino Zambrano DO   20 mg at 12/02/23 0956    nitroglycerin (NITROSTAT) SL tablet 0.4 mg  0.4 mg Sublingual Q5 Min PRN Rufino Zambrano DO        ondansetron (ZOFRAN) injection 4 mg  4 mg Intravenous Q6H PRN Rufino Zambrano DO        pravastatin (PRAVACHOL) tablet 40 mg  40 mg Oral Daily Rufino Zambrano DO   40 mg at 12/02/23 0955    predniSONE (DELTASONE) tablet 10 mg  10 mg Oral Daily With Breakfast Rufino Zambrano DO   10 mg at 12/02/23 0956    sodium bicarbonate tablet 325 mg  325 mg Oral BID Rufino Zambrano DO   325 mg at 12/02/23 0955    sodium chloride 0.9 % flush 10 mL  10 mL Intravenous PRN [place],  Abi JACOBS MD        sodium chloride 0.9 % flush 10 mL  10 mL Intravenous PRN Abi Darling MD        sodium chloride 0.9 % flush 10 mL  10 mL Intravenous Q12H Rufino Zambrano DO   10 mL at 12/02/23 0959    sodium chloride 0.9 % flush 10 mL  10 mL Intravenous PRN Rufino Zambrano DO        sodium chloride 0.9 % infusion 40 mL  40 mL Intravenous PRN Rufino Zambrano DO        terazosin (HYTRIN) capsule 1 mg  1 mg Oral Nightly Rufino Zambrano DO   1 mg at 12/01/23 2037    vitamin B-12 (CYANOCOBALAMIN) tablet 50 mcg  50 mcg Oral Daily Rufino Zambrano DO   50 mcg at 12/02/23 0955       Past Medical History:  Past Medical History:   Diagnosis Date    Arthritis     Cataract     Chronic fatigue 10/3/2017    Chronic renal disease, stage IV     Coronary artery disease     CVA (cerebral vascular accident) 8/14/2018    Hyperlipidemia     Hypertension     Palpitations 10/3/2017    Premature atrial contractions 10/17/2017    PVCs (premature ventricular contractions) 10/17/2017    Sleep apnea     Stage 4 chronic kidney disease 7/30/2018    Stroke 2007    Weakness     right leg weaker       Past Surgical History:  Past Surgical History:   Procedure Laterality Date    CARDIAC CATHETERIZATION      CARDIAC CATHETERIZATION N/A 5/28/2020    Procedure: Left Heart Cath;  Surgeon: Rufino Brice MD;  Location:  [facility] CATH INVASIVE LOC[facility];  Service: Cardiology;  Laterality: N/A;    CORONARY ARTERY BYPASS GRAFT N/A 6/9/2020    Procedure: CABG X 3, LIMA, SHELBY, EVH WITH OPEN EXTENSION LOWER RIGHT LEG;  Surgeon: Nikunj Carballo MD;  Location:  [facility] OR;  Service: Cardiothoracic;  Laterality: N/A;       Family History  Family History   Problem Relation Age of Onset    Cancer Mother     Cancer Father        Social History  Social History     Socioeconomic History    Marital status:    Tobacco Use    Smoking status: Former     Packs/day: 1.50     Years: 25.00     Additional pack years: 0.00     Total pack years:  37.50     Types: Cigarettes     Start date:      Quit date:      Years since quittin.9    Smokeless tobacco: Never   Vaping Use    Vaping Use: Never used   Substance and Sexual Activity    Alcohol use: No    Drug use: No    Sexual activity: Defer       Review of Systems:  History obtained from chart review and the patient  General ROS: No fever or chills  Respiratory ROS: +cough, shortness of breath  Cardiovascular ROS: No chest pain or palpitations  Gastrointestinal ROS: No abdominal pain or melena  Genito-Urinary ROS: No dysuria or hematuria  Psych ROS: No anxiety and depression  14 point ROS reviewed with the patient and negative except as noted above and in the HPI unless unable to obtain.    Objective:  Patient Vitals for the past 24 hrs:   BP Temp Temp src Pulse Resp SpO2 Weight   23 1515 153/68 97.8 °F (36.6 °C) Oral 67 16 94 % --   23 1100 176/63 97.5 °F (36.4 °C) Oral 67 16 93 % --   23 0809 149/75 98 °F (36.7 °C) Oral 60 16 93 % --   23 0600 -- -- -- -- -- -- 93.4 kg (205 lb 12.8 oz)   23 0300 143/72 97.9 °F (36.6 °C) Oral 57 18 93 % --   23 2300 150/71 97.2 °F (36.2 °C) Oral 58 18 92 % --   23 -- -- -- 54 18 98 % --   23 -- -- -- 54 18 97 % --   23 (!) 182/79 97.7 °F (36.5 °C) Oral 58 18 97 % --       Intake/Output Summary (Last 24 hours) at 2023 1737  Last data filed at 2023 1016  Gross per 24 hour   Intake 240 ml   Output 1200 ml   Net -960 ml     General: awake/alert   Chest:  clear to auscultation bilaterally without respiratory distress  CVS: regular rate and rhythm  Abdominal: soft, nontender, positive bowel sounds  Extremities: ble edema  Skin: warm and dry without rash      Labs:  Results from last 7 days   Lab Units 23  0458 23  1430   WBC 10*3/mm3 9.94 17.18*   HEMOGLOBIN g/dL 11.0* 11.5*   HEMATOCRIT % 34.9* 35.8*   PLATELETS 10*3/mm3 155 157         Results from last 7 days   Lab Units  12/02/23  0433 12/01/23  0334 11/30/23  0458 11/29/23  1430   SODIUM mmol/L 137 139 141 141   POTASSIUM mmol/L 3.4* 3.7 3.9 3.8   CHLORIDE mmol/L 100 104 108* 107   CO2 mmol/L 27.0 26.0 23.0 20.0*   BUN mg/dL 43* 45* 44* 41*   CREATININE mg/dL 3.00* 3.18* 3.20* 3.03*   CALCIUM mg/dL 8.8 8.7 8.8 8.6   EGFR mL/min/1.73 20.0* 18.6* 18.5* 19.7*   BILIRUBIN mg/dL  --   --   --  0.3   ALK PHOS U/L  --   --   --  45   ALT (SGPT) U/L  --   --   --  17   AST (SGOT) U/L  --   --   --  16   GLUCOSE mg/dL 103* 109* 101* 101*       Radiology:   Imaging Results (Last 72 Hours)       Procedure Component Value Units Date/Time    US Venous Doppler Lower Extremity Bilateral (duplex) [[id number]] Collected: 11/30/23 1600     Updated: 11/30/23 1603    Narrative:      History: Swelling       Impression:      Impression: There is no evidence of deep venous thrombosis or  superficial thrombophlebitis of right or left lower extremities.     Comments: Bilateral lower extremity venous duplex exam was performed  using color Doppler flow, Doppler waveform analysis, and grayscale  imaging, with and without compression. There is no evidence of deep  venous thrombosis in the common femoral, superficial femoral, popliteal,  peroneal, anterior tibial, and posterior tibial veins bilaterally. No  thrombus is identified in the saphenofemoral junctions and greater  saphenous veins bilaterally.            This report was signed and finalized on 11/30/2023 4:00 PM CST by Dr. Holland Glover MD.               Culture:  Blood Culture   Date Value Ref Range Status   11/29/2023 No growth at 2 days  Preliminary   11/29/2023 No growth at 2 days  Preliminary         Assessment   Chronic kidney disease stage IV  Acute hypoxemic respiratory failure  Acute on chronic diastolic congestive heart failure  Hypertension  Incidental finding of renal lesion at previous admission  Secondary hyperparathyroidism    Plan:  Discussed with patient, nursing  Work-up reviewed  today  Monitor labs  Continue IV diuretics with close lab monitoring as obtaining adequate diuresis - can switch to PO at any point  Renal lesion unrelated to admission, can follow-up with outpatient imaging and/or urology evaluation as needed  Vitamin D levels adequate, if hypocalcemia develops can start low-dose calcitriol        Darryn Oliver MD  12/2/2023  17:37 CST

## 2023-12-03 ENCOUNTER — APPOINTMENT (OUTPATIENT)
Dept: GENERAL RADIOLOGY | Facility: HOSPITAL | Age: 83
DRG: 291 | End: 2023-12-03
Payer: MEDICARE

## 2023-12-03 LAB
ANION GAP SERPL CALCULATED.3IONS-SCNC: 11 MMOL/L (ref 5–15)
BUN SERPL-MCNC: 49 MG/DL (ref 8–23)
BUN/CREAT SERPL: 16.1 (ref 7–25)
CALCIUM SPEC-SCNC: 8.6 MG/DL (ref 8.6–10.5)
CHLORIDE SERPL-SCNC: 101 MMOL/L (ref 98–107)
CO2 SERPL-SCNC: 28 MMOL/L (ref 22–29)
CREAT SERPL-MCNC: 3.04 MG/DL (ref 0.76–1.27)
EGFRCR SERPLBLD CKD-EPI 2021: 19.7 ML/MIN/1.73
GLUCOSE SERPL-MCNC: 109 MG/DL (ref 65–99)
POTASSIUM SERPL-SCNC: 3.2 MMOL/L (ref 3.5–5.2)
QT INTERVAL: 474 MS
QTC INTERVAL: 481 MS
SODIUM SERPL-SCNC: 140 MMOL/L (ref 136–145)

## 2023-12-03 PROCEDURE — 94799 UNLISTED PULMONARY SVC/PX: CPT

## 2023-12-03 PROCEDURE — 94664 DEMO&/EVAL PT USE INHALER: CPT

## 2023-12-03 PROCEDURE — 25010000002 FUROSEMIDE PER 20 MG: Performed by: INTERNAL MEDICINE

## 2023-12-03 PROCEDURE — 80048 BASIC METABOLIC PNL TOTAL CA: CPT | Performed by: INTERNAL MEDICINE

## 2023-12-03 PROCEDURE — 63710000001 PREDNISONE PER 1 MG: Performed by: INTERNAL MEDICINE

## 2023-12-03 PROCEDURE — 25010000002 ENOXAPARIN PER 10 MG: Performed by: INTERNAL MEDICINE

## 2023-12-03 PROCEDURE — 71046 X-RAY EXAM CHEST 2 VIEWS: CPT

## 2023-12-03 RX ORDER — FUROSEMIDE 40 MG/1
40 TABLET ORAL
Status: DISCONTINUED | OUTPATIENT
Start: 2023-12-03 | End: 2023-12-04 | Stop reason: HOSPADM

## 2023-12-03 RX ORDER — POTASSIUM CHLORIDE 750 MG/1
40 CAPSULE, EXTENDED RELEASE ORAL DAILY
Status: DISCONTINUED | OUTPATIENT
Start: 2023-12-03 | End: 2023-12-04 | Stop reason: HOSPADM

## 2023-12-03 RX ADMIN — GUAIFENESIN 1200 MG: 600 TABLET, EXTENDED RELEASE ORAL at 22:31

## 2023-12-03 RX ADMIN — VITAM B12 50 MCG: 100 TAB at 10:21

## 2023-12-03 RX ADMIN — FUROSEMIDE 40 MG: 10 INJECTION, SOLUTION INTRAMUSCULAR; INTRAVENOUS at 05:32

## 2023-12-03 RX ADMIN — LISINOPRIL 20 MG: 10 TABLET ORAL at 22:30

## 2023-12-03 RX ADMIN — HYDRALAZINE HYDROCHLORIDE 100 MG: 50 TABLET ORAL at 22:31

## 2023-12-03 RX ADMIN — POLYETHYLENE GLYCOL 3350 17 G: 17 POWDER, FOR SOLUTION ORAL at 06:44

## 2023-12-03 RX ADMIN — FERROUS SULFATE TAB 325 MG (65 MG ELEMENTAL FE) 325 MG: 325 (65 FE) TAB at 10:21

## 2023-12-03 RX ADMIN — ASPIRIN 81 MG: 81 TABLET, COATED ORAL at 10:20

## 2023-12-03 RX ADMIN — SODIUM BICARBONATE 325 MG: 650 TABLET ORAL at 10:21

## 2023-12-03 RX ADMIN — FUROSEMIDE 40 MG: 40 TABLET ORAL at 17:37

## 2023-12-03 RX ADMIN — POTASSIUM CHLORIDE 40 MEQ: 750 CAPSULE, EXTENDED RELEASE ORAL at 14:36

## 2023-12-03 RX ADMIN — LISINOPRIL 20 MG: 10 TABLET ORAL at 10:20

## 2023-12-03 RX ADMIN — DOCUSATE SODIUM 50 MG AND SENNOSIDES 8.6 MG 2 TABLET: 8.6; 5 TABLET, FILM COATED ORAL at 22:30

## 2023-12-03 RX ADMIN — Medication 10 ML: at 10:23

## 2023-12-03 RX ADMIN — PRAVASTATIN SODIUM 40 MG: 20 TABLET ORAL at 10:21

## 2023-12-03 RX ADMIN — HYDRALAZINE HYDROCHLORIDE 100 MG: 50 TABLET ORAL at 14:36

## 2023-12-03 RX ADMIN — GUAIFENESIN 1200 MG: 600 TABLET, EXTENDED RELEASE ORAL at 10:21

## 2023-12-03 RX ADMIN — SODIUM BICARBONATE 325 MG: 650 TABLET ORAL at 22:31

## 2023-12-03 RX ADMIN — HYDRALAZINE HYDROCHLORIDE 100 MG: 50 TABLET ORAL at 05:32

## 2023-12-03 RX ADMIN — AMLODIPINE BESYLATE 10 MG: 10 TABLET ORAL at 10:20

## 2023-12-03 RX ADMIN — BUDESONIDE AND FORMOTEROL FUMARATE DIHYDRATE 2 PUFF: 160; 4.5 AEROSOL RESPIRATORY (INHALATION) at 06:11

## 2023-12-03 RX ADMIN — BUDESONIDE AND FORMOTEROL FUMARATE DIHYDRATE 2 PUFF: 160; 4.5 AEROSOL RESPIRATORY (INHALATION) at 20:51

## 2023-12-03 RX ADMIN — PREDNISONE 10 MG: 20 TABLET ORAL at 10:20

## 2023-12-03 RX ADMIN — FERROUS SULFATE TAB 325 MG (65 MG ELEMENTAL FE) 325 MG: 325 (65 FE) TAB at 22:31

## 2023-12-03 RX ADMIN — ENOXAPARIN SODIUM 30 MG: 100 INJECTION SUBCUTANEOUS at 17:37

## 2023-12-03 RX ADMIN — Medication 10 ML: at 22:31

## 2023-12-03 RX ADMIN — TERAZOSIN HYDROCHLORIDE 1 MG: 1 CAPSULE ORAL at 22:30

## 2023-12-03 NOTE — PROGRESS NOTES
"Nephrology ([facility]) Progress Note      Patient:  Faisal Joseph  YOB: 1940  Date of Service: 12/3/2023  MRN: [id number]   Acct: [id number]   Primary Care Physician: Spike Polanco DO  Advance Directive:   Code Status and Medical Interventions:   Ordered at: 11/29/23 1650     Level Of Support Discussed With:    Patient     Code Status (Patient has no pulse and is not breathing):    CPR (Attempt to Resuscitate)     Medical Interventions (Patient has pulse or is breathing):    Full Support     Admit Date: 11/29/2023       Hospital Day: 4  Referring Provider: No ref. provider found      Patient personally seen and examined.  Complete chart including Consults, Notes, Operative Reports, Labs, Cardiology, and Radiology studies reviewed as able.        Subjective:  Faisal Joseph is a 83 y.o. male for whom we were consulted for evaluation and treatment of chronic kidney disease stage IV.  Patient follows with [facility] baseline GFR appears to be around 20.  Was just seen last week at this facility for volume overload.  Returned with complaints of edema and dyspnea.  Continued to have some hypoxemia and was also recently diagnosed with rhinovirus.  Felt that his breathing had improved.  Denied chest pain, nausea or vomiting, dysuria or hematuria.     Today, no overnight events.  Patient with continued complaints of cough.  Denies other complaints upon questioning.  Hopeful for discharge soon.  Feels \"a little bit better today.\"    Allergies:  Hydralazine, Losartan potassium, Penicillins, and Spironolactone    Home Meds:  Medications Prior to Admission   Medication Sig Dispense Refill Last Dose    amLODIPine (NORVASC) 10 MG tablet Take 1 tablet by mouth Daily.       aspirin 81 MG EC tablet Take 1 tablet by mouth Daily. 100 tablet 99     Cholecalciferol (VITAMIN D) 2000 units capsule Take 1 capsule by mouth Daily.       ferrous sulfate 325 (65 FE) MG " tablet Take 1 tablet by mouth 2 (Two) Times a Day.       fluticasone (FLONASE) 50 MCG/ACT nasal spray 2 sprays by Each Nare route Daily. Obtain otc       furosemide (LASIX) 40 MG tablet Take 1 tablet by mouth Daily for 30 days. 30 tablet 0     guaiFENesin (MUCINEX) 600 MG 12 hr tablet Take 2 tablets by mouth Every 12 (Twelve) Hours.       hydrALAZINE (APRESOLINE) 100 MG tablet Take 1 tablet by mouth 3 (Three) Times a Day.       lisinopril (PRINIVIL,ZESTRIL) 20 MG tablet Take 1 tablet by mouth 2 (Two) Times a Day.       metoprolol tartrate (LOPRESSOR) 50 MG tablet Take 1 tablet by mouth 2 (Two) Times a Day.       pravastatin (PRAVACHOL) 40 MG tablet Take 1 tablet by mouth Daily.       predniSONE (DELTASONE) 10 MG tablet Take 4 tablets by mouth Daily With Breakfast for 3 days, THEN 3 tablets Daily With Breakfast for 3 days, THEN 2 tablets Daily With Breakfast for 3 days, THEN 1 tablet Daily With Breakfast for 3 days. 30 tablet 0     sodium bicarbonate 650 MG tablet Take 0.5 tablets by mouth Daily.       terazosin (HYTRIN) 1 MG capsule Take 1 capsule by mouth Every Night.       vitamin B-12 (CYANOCOBALAMIN) 100 MCG tablet Take 0.5 tablets by mouth Daily.       budesonide-formoterol (SYMBICORT) 160-4.5 MCG/ACT inhaler Inhale 2 puffs 2 (Two) Times a Day.       nitroglycerin (NITROSTAT) 0.4 MG SL tablet Place 1 tablet under the tongue Every 5 (Five) Minutes As Needed for Chest Pain. Take no more than 3 doses in 15 minutes.          Medicines:  Current Facility-Administered Medications   Medication Dose Route Frequency Provider Last Rate Last Admin    acetaminophen (TYLENOL) tablet 650 mg  650 mg Oral Q4H PRN Rufino Zambrano DO        amLODIPine (NORVASC) tablet 10 mg  10 mg Oral Daily Rufino Zambrano DO   10 mg at 12/03/23 1020    aspirin EC tablet 81 mg  81 mg Oral Daily Rufino Zambrano DO   81 mg at 12/03/23 1020    sennosides-docusate (PERICOLACE) 8.6-50 MG per tablet 2 tablet  2 tablet Oral BID Rufino Zambrano,  DO        And    polyethylene glycol (MIRALAX) packet 17 g  17 g Oral Daily PRN Rufino Zambrano DO   17 g at 12/03/23 0644    And    bisacodyl (DULCOLAX) EC tablet 5 mg  5 mg Oral Daily PRN Rufino Zambrano DO        And    bisacodyl (DULCOLAX) suppository 10 mg  10 mg Rectal Daily PRN Rufino Zambrano DO        budesonide-formoterol (SYMBICORT) 160-4.5 MCG/ACT inhaler 2 puff  2 puff Inhalation BID - RT Rufino Zambrano DO   2 puff at 12/03/23 0611    cefepime 2 gm IVPB in 100 ml NS (MBP)  2,000 mg Intravenous Once Abi Darling MD        Enoxaparin Sodium (LOVENOX) syringe 30 mg  30 mg Subcutaneous Q24H Rufino Zambrano DO   30 mg at 12/02/23 1730    ferrous sulfate tablet 325 mg  325 mg Oral BID Rufino Zambrano DO   325 mg at 12/03/23 1021    fluticasone (FLONASE) 50 MCG/ACT nasal spray 2 spray  2 spray Each Nare Daily Rufino Zambrano DO   2 spray at 12/01/23 0846    furosemide (LASIX) tablet 40 mg  40 mg Oral BID Faisal Mcelroy MD        guaiFENesin (MUCINEX) 12 hr tablet 1,200 mg  1,200 mg Oral Q12H Rufino Zambrano DO   1,200 mg at 12/03/23 1021    hydrALAZINE (APRESOLINE) tablet 100 mg  100 mg Oral Q8H Rufino Zambrano DO   100 mg at 12/03/23 1436    labetalol (NORMODYNE,TRANDATE) injection 10 mg  10 mg Intravenous Q6H PRN Rufino Zambrano DO        lisinopril (PRINIVIL,ZESTRIL) tablet 20 mg  20 mg Oral BID Rufino Zambrano DO   20 mg at 12/03/23 1020    nitroglycerin (NITROSTAT) SL tablet 0.4 mg  0.4 mg Sublingual Q5 Min PRN Rufino Zambrano DO        ondansetron (ZOFRAN) injection 4 mg  4 mg Intravenous Q6H PRN Rufino Zambrano DO        potassium chloride (MICRO-K/KLOR-CON) CR capsule  40 mEq Oral Daily Faisal Mcelroy MD   40 mEq at 12/03/23 1436    pravastatin (PRAVACHOL) tablet 40 mg  40 mg Oral Daily Rufino Zambrano DO   40 mg at 12/03/23 1021    predniSONE (DELTASONE) tablet 10 mg  10 mg Oral Daily With Breakfast Rufino Zambrano DO   10 mg at 12/03/23 1020     sodium bicarbonate tablet 325 mg  325 mg Oral BID Rufino Zambrano, DO   325 mg at 12/03/23 1021    sodium chloride 0.9 % flush 10 mL  10 mL Intravenous PRN Abi Darling MD        sodium chloride 0.9 % flush 10 mL  10 mL Intravenous PRN Abi Darling MD        sodium chloride 0.9 % flush 10 mL  10 mL Intravenous Q12H Rufino Zambrano, DO   10 mL at 12/03/23 1023    sodium chloride 0.9 % flush 10 mL  10 mL Intravenous PRN Rufino Zambrano DO        sodium chloride 0.9 % infusion 40 mL  40 mL Intravenous PRN Rufino Zambraon DO        terazosin (HYTRIN) capsule 1 mg  1 mg Oral Nightly Rufino Zambrano DO   1 mg at 12/02/23 2042    vitamin B-12 (CYANOCOBALAMIN) tablet 50 mcg  50 mcg Oral Daily Rufino Zambrano DO   50 mcg at 12/03/23 1021       Past Medical History:  Past Medical History:   Diagnosis Date    Arthritis     Cataract     Chronic fatigue 10/3/2017    Chronic renal disease, stage IV     Coronary artery disease     CVA (cerebral vascular accident) 8/14/2018    Hyperlipidemia     Hypertension     Palpitations 10/3/2017    Premature atrial contractions 10/17/2017    PVCs (premature ventricular contractions) 10/17/2017    Sleep apnea     Stage 4 chronic kidney disease 7/30/2018    Stroke 2007    Weakness     right leg weaker       Past Surgical History:  Past Surgical History:   Procedure Laterality Date    CARDIAC CATHETERIZATION      CARDIAC CATHETERIZATION N/A 5/28/2020    Procedure: Left Heart Cath;  Surgeon: Rufino Brice MD;  Location:  [facility];  Service: Cardiology;  Laterality: N/A;    CORONARY ARTERY BYPASS GRAFT N/A 6/9/2020    Procedure: CABG X 3, LIMA, SHELBY, EVH WITH OPEN EXTENSION LOWER RIGHT LEG;  Surgeon: Niuknj Carballo MD;  Location:  [facility] OR;  Service: Cardiothoracic;  Laterality: N/A;       Family History  Family History   Problem Relation Age of Onset    Cancer Mother     Cancer Father        Social History  Social History     Socioeconomic  History    Marital status:    Tobacco Use    Smoking status: Former     Packs/day: 1.50     Years: 25.00     Additional pack years: 0.00     Total pack years: 37.50     Types: Cigarettes     Start date:      Quit date:      Years since quittin.9    Smokeless tobacco: Never   Vaping Use    Vaping Use: Never used   Substance and Sexual Activity    Alcohol use: No    Drug use: No    Sexual activity: Defer       Review of Systems:  History obtained from chart review and the patient  General ROS: No fever or chills  Respiratory ROS: +cough, shortness of breath  Cardiovascular ROS: No chest pain or palpitations  Gastrointestinal ROS: No abdominal pain or melena  Genito-Urinary ROS: No dysuria or hematuria  Psych ROS: No anxiety and depression  14 point ROS reviewed with the patient and negative except as noted above and in the HPI unless unable to obtain.    Objective:  Patient Vitals for the past 24 hrs:   BP Temp Temp src Pulse Resp SpO2 Weight   23 0700 155/81 97.8 °F (36.6 °C) Oral 73 16 94 % --   23 0611 -- -- -- 71 16 95 % --   23 0440 151/71 97.9 °F (36.6 °C) Oral 82 16 92 % 93 kg (205 lb)   23 0023 141/88 97.9 °F (36.6 °C) Oral 94 16 94 % --   23 2155 -- -- -- 62 16 95 % --   23 2135 162/74 97.9 °F (36.6 °C) Oral 70 16 96 % --   23 1515 153/68 97.8 °F (36.6 °C) Oral 67 16 94 % --       Intake/Output Summary (Last 24 hours) at 12/3/2023 1507  Last data filed at 12/3/2023 0600  Gross per 24 hour   Intake 480 ml   Output 1975 ml   Net -1495 ml     General: awake/alert   Chest:  clear to auscultation bilaterally without respiratory distress  CVS: regular rate and rhythm  Abdominal: soft, nontender, positive bowel sounds  Extremities: ble edema  Skin: warm and dry without rash      Labs:  Results from last 7 days   Lab Units 23  0458 23  1430   WBC 10*3/mm3 9.94 17.18*   HEMOGLOBIN g/dL 11.0* 11.5*   HEMATOCRIT % 34.9* 35.8*   PLATELETS 10*3/mm3  155 157         Results from last 7 days   Lab Units 12/03/23  0415 12/02/23  0433 12/01/23  0334 11/30/23  0458 11/29/23  1430   SODIUM mmol/L 140 137 139   < > 141   POTASSIUM mmol/L 3.2* 3.4* 3.7   < > 3.8   CHLORIDE mmol/L 101 100 104   < > 107   CO2 mmol/L 28.0 27.0 26.0   < > 20.0*   BUN mg/dL 49* 43* 45*   < > 41*   CREATININE mg/dL 3.04* 3.00* 3.18*   < > 3.03*   CALCIUM mg/dL 8.6 8.8 8.7   < > 8.6   EGFR mL/min/1.73 19.7* 20.0* 18.6*   < > 19.7*   BILIRUBIN mg/dL  --   --   --   --  0.3   ALK PHOS U/L  --   --   --   --  45   ALT (SGPT) U/L  --   --   --   --  17   AST (SGOT) U/L  --   --   --   --  16   GLUCOSE mg/dL 109* 103* 109*   < > 101*    < > = values in this interval not displayed.       Radiology:   Imaging Results (Last 72 Hours)       Procedure Component Value Units Date/Time    US Venous Doppler Lower Extremity Bilateral (duplex) [[id number]] Collected: 11/30/23 1600     Updated: 11/30/23 1603    Narrative:      History: Swelling       Impression:      Impression: There is no evidence of deep venous thrombosis or  superficial thrombophlebitis of right or left lower extremities.     Comments: Bilateral lower extremity venous duplex exam was performed  using color Doppler flow, Doppler waveform analysis, and grayscale  imaging, with and without compression. There is no evidence of deep  venous thrombosis in the common femoral, superficial femoral, popliteal,  peroneal, anterior tibial, and posterior tibial veins bilaterally. No  thrombus is identified in the saphenofemoral junctions and greater  saphenous veins bilaterally.            This report was signed and finalized on 11/30/2023 4:00 PM CST by Dr. Holland Glover MD.               Culture:  Blood Culture   Date Value Ref Range Status   11/29/2023 No growth at 2 days  Preliminary   11/29/2023 No growth at 2 days  Preliminary         Assessment   Chronic kidney disease stage IV  Acute hypoxemic respiratory failure  Acute on chronic diastolic  congestive heart failure  Hypertension  Incidental finding of renal lesion at previous admission  Secondary hyperparathyroidism    Plan:  Discussed with patient, nursing  Work-up reviewed today  Monitor labs  Continue diuretics with close lab monitoring as obtaining adequate diuresis - switched to PO 12/3  Renal lesion unrelated to admission, can follow-up with outpatient imaging and/or urology evaluation as needed  Vitamin D levels adequate, if hypocalcemia develops can start low-dose calcitriol        Darryn Oliver MD  12/3/2023  15:07 CST

## 2023-12-03 NOTE — PROGRESS NOTES
1         [facility] Medicine Services  INPATIENT PROGRESS NOTE    Patient Name: Faisal Joseph  Date of Admission: 11/29/2023  Today's Date: 12/03/23  Length of Stay: 4  Primary Care Physician: Spike Polanco DO    Subjective   Chief Complaint: Follow-up  HPI   Patient was admitted on November 29 for shortness of breath by Dr. Zambrano  His comorbidities include CVA, chronic kidney disease stage IV, coronary artery disease, COPD, hypertension and hyperlipidemia.  Record indicates that patient was recently hospitalized for rhinovirus, respiratory failure and COPD exacerbation from November 19 and November 22.  He was under the care of Dr. Lew back then.     His echocardiogram as documented from the discharge summary indicates presence of LV diastolic dysfunction, normal EF at 56 to 60%, left atrial volume is severely increased and moderate pulmonary hypertension.        Results for orders placed during the hospital encounter of 11/19/23     Adult Transthoracic Echo Complete W/ Cont if Necessary Per Protocol     Interpretation Summary    Left ventricular ejection fraction appears to be 56 - 60%.    Left ventricular wall thickness is consistent with mild concentric hypertrophy.    Left ventricular diastolic function is consistent with (grade II w/high LAP) pseudonormalization.    Left atrial volume is severely increased.    The right atrial cavity is dilated.    Moderate pulmonary hypertension is present.    There is a trivial pericardial effusion. There is no evidence of cardiac tamponade.    Abnormal global longitudinal LV strain (GLS) = -11.5%           Dr. Leavitt  seen him in follow-up for the hospitalists  He discontinued metoprolol as he mentioned second-degree heart block on the monitor.      When I saw him yesterday his complaints was nausea and intermittent coughing when taking deep breath.  He has diagnosis of rhinovirus.    Patient on IV diuretic.   Nephrology following  Blood pressure elevated from 141-155.  Improved compared yesterday  He has negative fluid balance in the past couple of days.  (2.3 and 1.4 L)    Patient express he feels weak    Review of Systems   All pertinent negatives and positives are as above. All other systems have been reviewed and are negative unless otherwise stated.     Objective    Temp:  [97.8 °F (36.6 °C)-97.9 °F (36.6 °C)] 97.8 °F (36.6 °C)  Heart Rate:  [62-94] 73  Resp:  [16] 16  BP: (141-162)/(68-88) 155/81  Physical Exam  No distress  He has not had any bronchospastic spell during this visit; positive fine crackles bases.  GEN: Awake, alert, interactive, in NAD, appears non-toxic  HEENT: PERRLA, EOMI, Anicteric, Trachea midline  Lungs: diminished, no wheezing  Heart: RRR, +S1/s2, no rub  ABD: soft, nt/nd, +BS, no guarding/rebound  Extremities: atraumatic, no cyanosis, I did not see any significant edema at this time.    Skin: no rashes or petechiae   Neuro: AAOx3, no focal deficits  Psych: normal mood & affect       Results Review:  I have reviewed the labs, radiology results, and diagnostic studies.    Laboratory Data:   Results from last 7 days   Lab Units 11/30/23  0458 11/29/23  1430   WBC 10*3/mm3 9.94 17.18*   HEMOGLOBIN g/dL 11.0* 11.5*   HEMATOCRIT % 34.9* 35.8*   PLATELETS 10*3/mm3 155 157        Results from last 7 days   Lab Units 12/03/23  0415 12/02/23  0433 12/01/23  0334 11/30/23  0458 11/29/23  1430   SODIUM mmol/L 140 137 139   < > 141   POTASSIUM mmol/L 3.2* 3.4* 3.7   < > 3.8   CHLORIDE mmol/L 101 100 104   < > 107   CO2 mmol/L 28.0 27.0 26.0   < > 20.0*   BUN mg/dL 49* 43* 45*   < > 41*   CREATININE mg/dL 3.04* 3.00* 3.18*   < > 3.03*   CALCIUM mg/dL 8.6 8.8 8.7   < > 8.6   BILIRUBIN mg/dL  --   --   --   --  0.3   ALK PHOS U/L  --   --   --   --  45   ALT (SGPT) U/L  --   --   --   --  17   AST (SGOT) U/L  --   --   --   --  16   GLUCOSE mg/dL 109* 103* 109*   < > 101*    < > = values in this interval  not displayed.       Culture Data:   Blood Culture   Date Value Ref Range Status   11/29/2023 No growth at 3 days  Preliminary   11/29/2023 No growth at 3 days  Preliminary       Radiology Data:   Imaging Results (Last 24 Hours)       ** No results found for the last 24 hours. **            I have reviewed the patient's current medications.     Assessment/Plan   Assessment  Active Hospital Problems    Diagnosis     **Acute hypoxic respiratory failure     Acute on chronic diastolic CHF (congestive heart failure)     Renal lesion - 1.7 cm L midpole lesion     Hyperlipidemia     Stage 4 chronic kidney disease     Essential hypertension          Medical Decision Making  Number and Complexity of problems:   Shortness of breath-VTE ruled out; differentials include: fluid overload, rhinovirus infection, acute on chronic diastolic heart failure.   Hypertension-hypertensive cardiovascular disease as evidenced by diastolic dysfunction and mild concentric hypertrophy on echo currently on amlodipine, hydralazine, lisinopril, terazosin and Lasix.  Monitor blood pressure and adjust accordingly  CKD stage IV-nephrology following; secondary hyperparathyroidism  Bradycardia-beta-blocker discontinued; if persistent consider discontinuing amlodipine and monitor blood pressure.  May need to look at other options for blood pressure control  hypokalemia-replace particularly while still on IV diuretic; monitor closely given stage IV chronic kidney disease  Acute respiratory failure with hypoxia (I did not see any record of blood gas.  I am also limited in terms of what I can's see from recent past in terms of oxygen documentation) as per previous progress note believed to be multifactorial-fluid overload, rhinovirus infection, acute on chronic diastolic heart failure.  On December 1, Dr. Leavitt doubted pneumonia but patient is on cefepime currently.  White count resolved         Patient afebrile;O2 requirement improved.  Blood culture no  growth to date.  COVID-19, influenza A and B are negative on the finding is a human rhinovirus  Recent history of rhinovirus infection           Treatment Plan  Potassium replacement daily while still on Lasix twice daily IV  Daily basic metabolic panel to assess renal function and stability of electrolytes    1                                                               [facility] Medicine Services  INPATIENT PROGRESS NOTE     Patient Name: Faisal Joseph  Date of Admission: 11/29/2023  Today's Date: 12/02/23  Length of Stay: 3  Primary Care Physician: Spike Polanco DO     Subjective   Chief Complaint: Follow-up  HPI   Patient on day 3 of hospitalization  This is my first encounter with patient  Patient was admitted on November 29 for shortness of breath by Dr. Zambrano  His comorbidities include CVA, chronic kidney disease stage IV, coronary artery disease, COPD, hypertension and hyperlipidemia.  Record indicates that patient was recently hospitalized for rhinovirus, respiratory failure and COPD exacerbation from November 19 and November 22.  He was under the care of Dr. Lew back then.     His echocardiogram as documented from the discharge summary indicates presence of LV diastolic dysfunction, normal EF at 56 to 60%, left atrial volume is severely increased and moderate pulmonary hypertension.        Results for orders placed during the hospital encounter of 11/19/23     Adult Transthoracic Echo Complete W/ Cont if Necessary Per Protocol     Interpretation Summary    Left ventricular ejection fraction appears to be 56 - 60%.    Left ventricular wall thickness is consistent with mild concentric hypertrophy.    Left ventricular diastolic function is consistent with (grade II w/high LAP) pseudonormalization.    Left atrial volume is severely increased.    The right atrial cavity is dilated.    Moderate pulmonary hypertension is present.    There is a trivial  pericardial effusion. There is no evidence of cardiac tamponade.    Abnormal global longitudinal LV strain (GLS) = -11.5%           Dr. Leavitt  seen him in follow-up for the hospitalists  He discontinued metoprolol as he mentioned second-degree heart block on the monitor.     1st deg av block. Hr as low as 50 on tele staff  Last night had dropped as low as 30 (3:28 AM - transient)     Bp noted elevated     Patient has no new complaints other than nausea and intermittent coughing particularly when taking a deep breath.  I informed him that he has common colds.     In terms of nausea and noticed that there are only few broccoli left in his plate.  Otherwise he ate everything.           Nephrology also following patient for chronic kidney disease stage IV which they recommend continuing IV diuretic.  There is a renal lesion be described but unrelated to this admission and can follow-up in outpatient imaging and/or urology evaluation as needed.     Review of Systems   All pertinent negatives and positives are as above. All other systems have been reviewed and are negative unless otherwise stated.      Objective    Temp:  [97.2 °F (36.2 °C)-98 °F (36.7 °C)] 97.5 °F (36.4 °C)  Heart Rate:  [54-67] 67  Resp:  [16-20] 16  BP: (143-182)/(63-79) 176/63  Physical Exam  No distress  Intermittent bronchospastic spell during auscultation; positive fine crackles bases.  GEN: Awake, alert, interactive, in NAD, appears non-toxic  HEENT: PERRLA, EOMI, Anicteric, Trachea midline  Lungs: diminished, no wheezing  Heart: RRR, +S1/s2, no rub  ABD: soft, nt/nd, +BS, no guarding/rebound  Extremities: atraumatic, no cyanosis, 2-3+ b/l LE edema (I did not quite notice edema)  Skin: no rashes or petechiae   Neuro: AAOx3, no focal deficits  Psych: normal mood & affect           Results Review:  I have reviewed the labs, radiology results, and diagnostic studies.     Laboratory Data:         Results from last 7 days   Lab Units 11/30/23  5783  11/29/23  1430   WBC 10*3/mm3 9.94 17.18*   HEMOGLOBIN g/dL 11.0* 11.5*   HEMATOCRIT % 34.9* 35.8*   PLATELETS 10*3/mm3 155 157                 Results from last 7 days   Lab Units 12/02/23  0433 12/01/23  0334 11/30/23  0458 11/29/23  1430   SODIUM mmol/L 137 139 141 141   POTASSIUM mmol/L 3.4* 3.7 3.9 3.8   CHLORIDE mmol/L 100 104 108* 107   CO2 mmol/L 27.0 26.0 23.0 20.0*   BUN mg/dL 43* 45* 44* 41*   CREATININE mg/dL 3.00* 3.18* 3.20* 3.03*   CALCIUM mg/dL 8.8 8.7 8.8 8.6   BILIRUBIN mg/dL  --   --   --  0.3   ALK PHOS U/L  --   --   --  45   ALT (SGPT) U/L  --   --   --  17   AST (SGOT) U/L  --   --   --  16   GLUCOSE mg/dL 103* 109* 101* 101*         Culture Data:         Blood Culture   Date Value Ref Range Status   11/29/2023 No growth at 2 days   Preliminary   11/29/2023 No growth at 2 days   Preliminary         Radiology Data:   Imaging Results (Last 24 Hours)         ** No results found for the last 24 hours. **             PTH elevated at 174  25-hydroxy vitamin D is 38 which I think is still normal  I have reviewed the patient's current medications.      Assessment/Plan   Assessment       Active Hospital Problems     Diagnosis      **Acute hypoxic respiratory failure      Acute on chronic diastolic CHF (congestive heart failure)      Renal lesion - 1.7 cm L midpole lesion      Hyperlipidemia      Stage 4 chronic kidney disease      Essential hypertension                 Medical Decision Making  Number and Complexity of problems:   Data collection:  Presenting symptom admission was shortness of breath  Found with leukocytosis which is now improved  VQ scan low probability of PE; venous ultrasound showed no evidence of DVT or superficial thrombophlebitis bilateral     Chest x-ray no acute finding     Problem list:  Shortness of breath-VTE ruled out; differentials include: fluid overload, rhinovirus infection, acute on chronic diastolic heart failure.  His echocardiogram also mention presence of pulmonary  hypertension  Hypertension-hypertensive cardiovascular disease as evidenced by diastolic dysfunction and mild concentric hypertrophy on echo currently on amlodipine, hydralazine, lisinopril, terazosin and Lasix.  Monitor blood pressure and adjust accordingly  CKD stage IV-nephrology following; secondary hyperparathyroidism  Bradycardia-beta-blocker discontinued; if persistent consider discontinuing amlodipine and monitor blood pressure.  May need to look at other options for blood pressure control  Mild hypokalemia-monitor  Acute respiratory failure with hypoxia (I did not see any record of blood gas.  I am also limited in terms of what I can's see from recent past in terms of oxygen documentation) as per previous progress note believed to be multifactorial-fluid overload, rhinovirus infection, acute on chronic diastolic heart failure.  Yesterday, Dr. Leavitt doubted pneumonia but patient is on cefepime currently.  White count resolved         Patient afebrile;O2 requirement improved  Recent history of rhinovirus infection              Treatment Plan  Supplement with oxygen to maintain saturation greater than 90%  I apprised the patient of current condition and plan of care.  Noted that patient only received one-time dose of cefepime.  So far he has been afebrile, normal white count.  He has  elevation of procalcitonin at 1.36  He is nontoxic-appearing.  His culture has been unrevealing.  I think monitoring him off antibiotic is appropriate.  Will change course of action if any slight change to suggest an ongoing infection that would require antibiotic.     Patient mentioned his perspective from nephrology that he will be going home and about couple more days.  I reviewed nephrology's note and he mentioned that can switch to p.o. at any point.  He also mentioned that there is a renal lesion which is unrelated to this admission and can follow-up outpatient imaging.    Ambulate as tolerated  Up in chair  Increase        F/u bmp in AM    Medications reviewed  amLODIPine, 10 mg, Oral, Daily  aspirin, 81 mg, Oral, Daily  budesonide-formoterol, 2 puff, Inhalation, BID - RT  cefepime, 2,000 mg, Intravenous, Once (discussed with pharmacist Yudith, patient had never received cefepime or ceftriaxone during this hospitalization).  I do not see any current indication for it at this time.  Patient has common colds.      Meds reviewed  enoxaparin, 30 mg, Subcutaneous, Q24H  ferrous sulfate, 325 mg, Oral, BID  fluticasone, 2 spray, Each Nare, Daily  furosemide, 40 mg, Intravenous, Q12H  guaiFENesin, 1,200 mg, Oral, Q12H  hydrALAZINE, 100 mg, Oral, Q8H  lisinopril, 20 mg, Oral, BID  pravastatin, 40 mg, Oral, Daily  predniSONE, 10 mg, Oral, Daily With Breakfast  senna-docusate sodium, 2 tablet, Oral, BID  sodium bicarbonate, 325 mg, Oral, BID  sodium chloride, 10 mL, Intravenous, Q12H  terazosin, 1 mg, Oral, Nightly  vitamin B-12, 50 mcg, Oral, Daily      follow up cxr : reported sob      Conditions and Status         Fair  MDM Data  External documents reviewed: None  Cardiac tracing (EKG, telemetry) interpretation: I reviewed with the telemetry staff the reading.  Patient has first-degree AV block.  Had bradycardia.  Beta-blocker stopped from yesterday     Results for orders placed during the hospital encounter of 11/19/23     Adult Transthoracic Echo Complete W/ Cont if Necessary Per Protocol     Interpretation Summary    Left ventricular ejection fraction appears to be 56 - 60%.    Left ventricular wall thickness is consistent with mild concentric hypertrophy.    Left ventricular diastolic function is consistent with (grade II w/high LAP) pseudonormalization.    Left atrial volume is severely increased.    The right atrial cavity is dilated.    Moderate pulmonary hypertension is present.    There is a trivial pericardial effusion. There is no evidence of cardiac tamponade.    Abnormal global longitudinal LV strain (GLS) = -11.5%            Radiology interpretation: I personally reviewed the chest x-ray, the chest x-ray from November 19 appears to be more magnified than the latest chest x-ray.  I also think that there is increased haziness on the newer x-ray.  Otherwise no gross consolidation, no gross cardiomegaly, diaphragms seem to be more flattened in the more recent x-ray but otherwise nothing suggestive of pleural effusion.  This is my personal review.         I revisited the chest x-ray as I answered the questions that the daughter has during our telephone conversation.  I am repeating a chest x-ray the a lateral today.  Labs reviewed: y  Any tests that were considered but not ordered: none     Decision rules/scores evaluated (example [id number], Wells, etc): none     Discussed with: Patient     Care Planning  Shared decision making: Patient and daughter on separate occasions  Code status and discussions: Full code     Disposition  Social Determinants of Health that impact treatment or disposition: None identified at this time.  He anticipates going to his daughter's place when he leaves the hospital  I expect the patient to be discharged to (TBD)        Electronically signed by Faisal Mcelroy MD, 12/03/23, 14:45 CST.

## 2023-12-04 ENCOUNTER — READMISSION MANAGEMENT (OUTPATIENT)
Dept: CALL CENTER | Facility: HOSPITAL | Age: 83
End: 2023-12-04
Payer: MEDICARE

## 2023-12-04 VITALS
HEART RATE: 87 BPM | DIASTOLIC BLOOD PRESSURE: 77 MMHG | OXYGEN SATURATION: 94 % | HEIGHT: 72 IN | TEMPERATURE: 97.2 F | BODY MASS INDEX: 27.63 KG/M2 | RESPIRATION RATE: 18 BRPM | SYSTOLIC BLOOD PRESSURE: 149 MMHG | WEIGHT: 204 LBS

## 2023-12-04 LAB
ANION GAP SERPL CALCULATED.3IONS-SCNC: 10 MMOL/L (ref 5–15)
BACTERIA SPEC AEROBE CULT: NORMAL
BACTERIA SPEC AEROBE CULT: NORMAL
BUN SERPL-MCNC: 52 MG/DL (ref 8–23)
BUN/CREAT SERPL: 15.7 (ref 7–25)
CALCIUM SPEC-SCNC: 8.7 MG/DL (ref 8.6–10.5)
CHLORIDE SERPL-SCNC: 104 MMOL/L (ref 98–107)
CO2 SERPL-SCNC: 27 MMOL/L (ref 22–29)
CREAT SERPL-MCNC: 3.32 MG/DL (ref 0.76–1.27)
EGFRCR SERPLBLD CKD-EPI 2021: 17.7 ML/MIN/1.73
GLUCOSE SERPL-MCNC: 102 MG/DL (ref 65–99)
POTASSIUM SERPL-SCNC: 3.8 MMOL/L (ref 3.5–5.2)
SODIUM SERPL-SCNC: 141 MMOL/L (ref 136–145)

## 2023-12-04 PROCEDURE — 94799 UNLISTED PULMONARY SVC/PX: CPT

## 2023-12-04 PROCEDURE — 80048 BASIC METABOLIC PNL TOTAL CA: CPT | Performed by: INTERNAL MEDICINE

## 2023-12-04 PROCEDURE — 63710000001 PREDNISONE PER 1 MG: Performed by: INTERNAL MEDICINE

## 2023-12-04 PROCEDURE — 94618 PULMONARY STRESS TESTING: CPT

## 2023-12-04 PROCEDURE — 94761 N-INVAS EAR/PLS OXIMETRY MLT: CPT

## 2023-12-04 PROCEDURE — 94664 DEMO&/EVAL PT USE INHALER: CPT

## 2023-12-04 RX ADMIN — FUROSEMIDE 40 MG: 40 TABLET ORAL at 18:47

## 2023-12-04 RX ADMIN — POTASSIUM CHLORIDE 40 MEQ: 750 CAPSULE, EXTENDED RELEASE ORAL at 08:31

## 2023-12-04 RX ADMIN — VITAM B12 50 MCG: 100 TAB at 08:31

## 2023-12-04 RX ADMIN — GUAIFENESIN 1200 MG: 600 TABLET, EXTENDED RELEASE ORAL at 08:32

## 2023-12-04 RX ADMIN — PRAVASTATIN SODIUM 40 MG: 20 TABLET ORAL at 08:32

## 2023-12-04 RX ADMIN — BUDESONIDE AND FORMOTEROL FUMARATE DIHYDRATE 2 PUFF: 160; 4.5 AEROSOL RESPIRATORY (INHALATION) at 06:39

## 2023-12-04 RX ADMIN — LISINOPRIL 20 MG: 10 TABLET ORAL at 08:31

## 2023-12-04 RX ADMIN — SODIUM BICARBONATE 325 MG: 650 TABLET ORAL at 08:31

## 2023-12-04 RX ADMIN — Medication 10 ML: at 08:36

## 2023-12-04 RX ADMIN — HYDRALAZINE HYDROCHLORIDE 100 MG: 50 TABLET ORAL at 14:57

## 2023-12-04 RX ADMIN — AMLODIPINE BESYLATE 10 MG: 10 TABLET ORAL at 08:32

## 2023-12-04 RX ADMIN — FERROUS SULFATE TAB 325 MG (65 MG ELEMENTAL FE) 325 MG: 325 (65 FE) TAB at 08:32

## 2023-12-04 RX ADMIN — FUROSEMIDE 40 MG: 40 TABLET ORAL at 08:31

## 2023-12-04 RX ADMIN — PREDNISONE 10 MG: 20 TABLET ORAL at 08:32

## 2023-12-04 RX ADMIN — HYDRALAZINE HYDROCHLORIDE 100 MG: 50 TABLET ORAL at 06:10

## 2023-12-04 RX ADMIN — ASPIRIN 81 MG: 81 TABLET, COATED ORAL at 08:32

## 2023-12-04 NOTE — PLAN OF CARE
Goal Outcome Evaluation:  Plan of Care Reviewed With: patient           Outcome Evaluation: AOx4, 2L NC, safety maintained and continue to monitor.

## 2023-12-04 NOTE — PROCEDURES
Exercise Oximetry    Patient Name:Faisal Joseph   MRN: [id number]   Date: 12/04/23             ROOM AIR BASELINE   SpO2% 93   Heart Rate 88   Blood Pressure      EXERCISE ON ROOM AIR SpO2% EXERCISE ON O2 @  LPM SpO2%   1 MINUTE 92 1 MINUTE  2lpm 94   2 MINUTES 91 2 MINUTES      3 MINUTES 88 3 MINUTES 95   4 MINUTES  4 MINUTES    5 MINUTES  5 MINUTES    6 MINUTES  6 MINUTES               Distance Walked  ambulated in room Distance Walked  ambulated in room   Dyspnea (Madhuri Scale)   Dyspnea (Madhuri Scale)   Fatigue (Madhuri Scale)   Fatigue (Madhuri Scale)   SpO2% Post Exercise  88 SpO2% Post Exercise  95   HR Post Exercise  118 HR Post Exercise  90   Time to Recovery   Time to Recovery     Comments: TIME OF WALK OX WAS 1500 LATE ENTRY      RECOMMENDATIONS OF 2LPM WITH ACTIVITY

## 2023-12-04 NOTE — CASE MANAGEMENT/SOCIAL WORK
Continued Stay Note   [place]     Patient Name: Faisal Joseph  MRN: [id number]  Today's Date: 12/4/2023    Admit Date: 11/29/2023    Plan: Home Oxygen   Discharge Plan       Row Name 12/04/23 1533       Plan    Plan Home Oxygen    Final Discharge Disposition Code 01 - home or self-care    Final Note Patient is discharged today with orders for home oxygen and qualifying testing. Patient's daughter would like to use [employer] in [place] but a portable tank would not be able to be delivered for discharge. Daughter says any DME agency that can deliver to room quickly is fine. [employer] has a [place] office and patient is from [place], [place] so MSW has sent referral to Jennie Coleman with [employer]. Portable tank will be delivered to room shortly.               DANIEL Kiser

## 2023-12-04 NOTE — PLAN OF CARE
Goal Outcome Evaluation:  Plan of Care Reviewed With: patient           Outcome Evaluation: AOx4 on 2.5L O2 per NC plan attempt to titrate up to side of bed for dinner encouraged ambulation w/assist in rm

## 2023-12-04 NOTE — PROGRESS NOTES
Nephrology ([facility]) Progress Note      Patient:  Faisal Joseph  YOB: 1940  Date of Service: 12/4/2023  MRN: [id number]   Acct: [id number]   Primary Care Physician: Spike Polanco DO  Advance Directive:   Code Status and Medical Interventions:   Ordered at: 11/29/23 1650     Level Of Support Discussed With:    Patient     Code Status (Patient has no pulse and is not breathing):    CPR (Attempt to Resuscitate)     Medical Interventions (Patient has pulse or is breathing):    Full Support     Admit Date: 11/29/2023       Hospital Day: 5  Referring Provider: No ref. provider found      Patient personally seen and examined.  Complete chart including Consults, Notes, Operative Reports, Labs, Cardiology, and Radiology studies reviewed as able.        Subjective:  Faisal Joseph is a 83 y.o. male for whom we were consulted for evaluation and treatment of chronic kidney disease stage IV.  Patient follows with [facility] baseline GFR appears to be around 20.  Was just seen last week at this facility for volume overload.  Returned with complaints of edema and dyspnea.  Continued to have some hypoxemia and was also recently diagnosed with rhinovirus.  Felt that his breathing had improved.  Denied chest pain, nausea or vomiting, dysuria or hematuria.     Today, no overnight events.  Patient with continued complaints of cough.  Denies other complaints upon questioning.  Has good urine output.    Allergies:  Hydralazine, Losartan potassium, Penicillins, and Spironolactone    Home Meds:  Medications Prior to Admission   Medication Sig Dispense Refill Last Dose    amLODIPine (NORVASC) 10 MG tablet Take 1 tablet by mouth Daily.       aspirin 81 MG EC tablet Take 1 tablet by mouth Daily. 100 tablet 99     Cholecalciferol (VITAMIN D) 2000 units capsule Take 1 capsule by mouth Daily.       ferrous sulfate 325 (65 FE) MG tablet Take 1 tablet by mouth 2 (Two) Times a  Day.       fluticasone (FLONASE) 50 MCG/ACT nasal spray 2 sprays by Each Nare route Daily. Obtain otc       furosemide (LASIX) 40 MG tablet Take 1 tablet by mouth Daily for 30 days. 30 tablet 0     guaiFENesin (MUCINEX) 600 MG 12 hr tablet Take 2 tablets by mouth Every 12 (Twelve) Hours.       hydrALAZINE (APRESOLINE) 100 MG tablet Take 1 tablet by mouth 3 (Three) Times a Day.       lisinopril (PRINIVIL,ZESTRIL) 20 MG tablet Take 1 tablet by mouth 2 (Two) Times a Day.       metoprolol tartrate (LOPRESSOR) 50 MG tablet Take 1 tablet by mouth 2 (Two) Times a Day.       pravastatin (PRAVACHOL) 40 MG tablet Take 1 tablet by mouth Daily.       predniSONE (DELTASONE) 10 MG tablet Take 4 tablets by mouth Daily With Breakfast for 3 days, THEN 3 tablets Daily With Breakfast for 3 days, THEN 2 tablets Daily With Breakfast for 3 days, THEN 1 tablet Daily With Breakfast for 3 days. 30 tablet 0     sodium bicarbonate 650 MG tablet Take 0.5 tablets by mouth Daily.       terazosin (HYTRIN) 1 MG capsule Take 1 capsule by mouth Every Night.       vitamin B-12 (CYANOCOBALAMIN) 100 MCG tablet Take 0.5 tablets by mouth Daily.       budesonide-formoterol (SYMBICORT) 160-4.5 MCG/ACT inhaler Inhale 2 puffs 2 (Two) Times a Day.       nitroglycerin (NITROSTAT) 0.4 MG SL tablet Place 1 tablet under the tongue Every 5 (Five) Minutes As Needed for Chest Pain. Take no more than 3 doses in 15 minutes.          Medicines:  Current Facility-Administered Medications   Medication Dose Route Frequency Provider Last Rate Last Admin    acetaminophen (TYLENOL) tablet 650 mg  650 mg Oral Q4H PRN Rufino Zambrano DO        amLODIPine (NORVASC) tablet 10 mg  10 mg Oral Daily Rufino Zambrano DO   10 mg at 12/04/23 0832    aspirin EC tablet 81 mg  81 mg Oral Daily Rufino Zambrano DO   81 mg at 12/04/23 0832    sennosides-docusate (PERICOLACE) 8.6-50 MG per tablet 2 tablet  2 tablet Oral BID Rufino Zambrano DO   2 tablet at 12/03/23 2230    And     polyethylene glycol (MIRALAX) packet 17 g  17 g Oral Daily PRN Rufino Zambrano DO   17 g at 12/03/23 0644    And    bisacodyl (DULCOLAX) EC tablet 5 mg  5 mg Oral Daily PRN Rufino Zambrano DO        And    bisacodyl (DULCOLAX) suppository 10 mg  10 mg Rectal Daily PRN Rufino Zambrano DO        budesonide-formoterol (SYMBICORT) 160-4.5 MCG/ACT inhaler 2 puff  2 puff Inhalation BID - RT Rufino Zambrano DO   2 puff at 12/04/23 0639    cefepime 2 gm IVPB in 100 ml NS (MBP)  2,000 mg Intravenous Once Abi Darling MD        Enoxaparin Sodium (LOVENOX) syringe 30 mg  30 mg Subcutaneous Q24H Rufino Zambrano DO   30 mg at 12/03/23 1737    ferrous sulfate tablet 325 mg  325 mg Oral BID Rufino Zambrano DO   325 mg at 12/04/23 0832    fluticasone (FLONASE) 50 MCG/ACT nasal spray 2 spray  2 spray Each Nare Daily Rufino Zambrano DO   2 spray at 12/01/23 0846    furosemide (LASIX) tablet 40 mg  40 mg Oral BID Faisal Mcelroy MD   40 mg at 12/04/23 0831    guaiFENesin (MUCINEX) 12 hr tablet 1,200 mg  1,200 mg Oral Q12H Rufino Zambrano DO   1,200 mg at 12/04/23 0832    hydrALAZINE (APRESOLINE) tablet 100 mg  100 mg Oral Q8H Rufino Zambrano DO   100 mg at 12/04/23 0610    labetalol (NORMODYNE,TRANDATE) injection 10 mg  10 mg Intravenous Q6H PRN Rufino Zambrano DO        lisinopril (PRINIVIL,ZESTRIL) tablet 20 mg  20 mg Oral BID Rufino Zambrano DO   20 mg at 12/04/23 0831    nitroglycerin (NITROSTAT) SL tablet 0.4 mg  0.4 mg Sublingual Q5 Min PRN Rufino Zambrano DO        ondansetron (ZOFRAN) injection 4 mg  4 mg Intravenous Q6H PRN Rufino Zambrano DO        potassium chloride (MICRO-K/KLOR-CON) CR capsule  40 mEq Oral Daily Faisal Mcelroy MD   40 mEq at 12/04/23 0831    pravastatin (PRAVACHOL) tablet 40 mg  40 mg Oral Daily Rufino Zambrano DO   40 mg at 12/04/23 0832    sodium bicarbonate tablet 325 mg  325 mg Oral BID Rufino Zamrbano DO   325 mg at 12/04/23 0831    sodium  chloride 0.9 % flush 10 mL  10 mL Intravenous PRN Abi Darling MD        sodium chloride 0.9 % flush 10 mL  10 mL Intravenous PRN Abi Darling MD        sodium chloride 0.9 % flush 10 mL  10 mL Intravenous Q12H Rufino Zambrano DO   10 mL at 12/04/23 0836    sodium chloride 0.9 % flush 10 mL  10 mL Intravenous PRN Rufino Zambrano DO        sodium chloride 0.9 % infusion 40 mL  40 mL Intravenous PRN Rufino Zambrano DO        terazosin (HYTRIN) capsule 1 mg  1 mg Oral Nightly Rufino Zambrano DO   1 mg at 12/03/23 2230    vitamin B-12 (CYANOCOBALAMIN) tablet 50 mcg  50 mcg Oral Daily Rufino Zambrano DO   50 mcg at 12/04/23 0831       Past Medical History:  Past Medical History:   Diagnosis Date    Arthritis     Cataract     Chronic fatigue 10/3/2017    Chronic renal disease, stage IV     Coronary artery disease     CVA (cerebral vascular accident) 8/14/2018    Hyperlipidemia     Hypertension     Palpitations 10/3/2017    Premature atrial contractions 10/17/2017    PVCs (premature ventricular contractions) 10/17/2017    Sleep apnea     Stage 4 chronic kidney disease 7/30/2018    Stroke 2007    Weakness     right leg weaker       Past Surgical History:  Past Surgical History:   Procedure Laterality Date    CARDIAC CATHETERIZATION      CARDIAC CATHETERIZATION N/A 5/28/2020    Procedure: Left Heart Cath;  Surgeon: Rufino Brice MD;  Location:  [facility];  Service: Cardiology;  Laterality: N/A;    CORONARY ARTERY BYPASS GRAFT N/A 6/9/2020    Procedure: CABG X 3, LIMA, SHELBY, EVH WITH OPEN EXTENSION LOWER RIGHT LEG;  Surgeon: Nikunj Carballo MD;  Location:  [facility] OR;  Service: Cardiothoracic;  Laterality: N/A;       Family History  Family History   Problem Relation Age of Onset    Cancer Mother     Cancer Father        Social History  Social History     Socioeconomic History    Marital status:    Tobacco Use    Smoking status: Former     Packs/day: 1.50     Years:  25.00     Additional pack years: 0.00     Total pack years: 37.50     Types: Cigarettes     Start date:      Quit date:      Years since quittin.9    Smokeless tobacco: Never   Vaping Use    Vaping Use: Never used   Substance and Sexual Activity    Alcohol use: No    Drug use: No    Sexual activity: Defer       Review of Systems:  History obtained from chart review and the patient  General ROS: No fever or chills  Respiratory ROS: +cough, shortness of breath  Cardiovascular ROS: No chest pain or palpitations  Gastrointestinal ROS: No abdominal pain or melena  Genito-Urinary ROS: No dysuria or hematuria  Psych ROS: No anxiety and depression  14 point ROS reviewed with the patient and negative except as noted above and in the HPI unless unable to obtain.    Objective:  Patient Vitals for the past 24 hrs:   BP Temp Temp src Pulse Resp SpO2 Weight   23 1042 149/77 97.2 °F (36.2 °C) Oral 87 18 -- --   23 0700 155/70 97.6 °F (36.4 °C) Oral 71 16 94 % --   23 0639 -- -- -- 75 16 95 % --   23 0400 152/78 97.5 °F (36.4 °C) Oral 80 16 92 % 92.5 kg (204 lb)   23 2359 150/76 97.9 °F (36.6 °C) Oral 82 16 94 % --   23 2114 168/84 97.6 °F (36.4 °C) Oral 76 16 92 % --   23 -- -- -- 82 16 94 % --   23 1843 -- -- -- -- -- 92 % --       Intake/Output Summary (Last 24 hours) at 2023 1426  Last data filed at 2023 1300  Gross per 24 hour   Intake 800 ml   Output 850 ml   Net -50 ml     General: awake/alert   Chest:  clear to auscultation bilaterally without respiratory distress  CVS: regular rate and rhythm  Abdominal: soft, nontender, positive bowel sounds  Extremities: ble edema  Skin: warm and dry without rash      Labs:  Results from last 7 days   Lab Units 23  0458 23  1430   WBC 10*3/mm3 9.94 17.18*   HEMOGLOBIN g/dL 11.0* 11.5*   HEMATOCRIT % 34.9* 35.8*   PLATELETS 10*3/mm3 155 157         Results from last 7 days   Lab Units 23  2758  12/03/23  0415 12/02/23  0433 11/30/23  0458 11/29/23  1430   SODIUM mmol/L 141 140 137   < > 141   POTASSIUM mmol/L 3.8 3.2* 3.4*   < > 3.8   CHLORIDE mmol/L 104 101 100   < > 107   CO2 mmol/L 27.0 28.0 27.0   < > 20.0*   BUN mg/dL 52* 49* 43*   < > 41*   CREATININE mg/dL 3.32* 3.04* 3.00*   < > 3.03*   CALCIUM mg/dL 8.7 8.6 8.8   < > 8.6   EGFR mL/min/1.73 17.7* 19.7* 20.0*   < > 19.7*   BILIRUBIN mg/dL  --   --   --   --  0.3   ALK PHOS U/L  --   --   --   --  45   ALT (SGPT) U/L  --   --   --   --  17   AST (SGOT) U/L  --   --   --   --  16   GLUCOSE mg/dL 102* 109* 103*   < > 101*    < > = values in this interval not displayed.       Radiology:   Imaging Results (Last 72 Hours)       Procedure Component Value Units Date/Time    XR Chest PA & Lateral [[id number]] Collected: 12/03/23 1626     Updated: 12/03/23 1630    Narrative:      EXAM/TECHNIQUE: XR CHEST PA AND LATERAL-     INDICATION: Shortness of breath, heart failure     COMPARISON: 11/29/2023     FINDINGS:     Cardiac silhouette is within normal limits. Sternotomy wires are  vertically aligned. Patient is rotated to the right. Small bilateral  pleural effusions with mild overlying atelectasis. The mid and upper  lung zones are clear. No visible pneumothorax. No acute osseous finding.       Impression:      Small bilateral pleural effusions with overlying atelectasis.           This report was signed and finalized on 12/3/2023 4:27 PM by Dr. Arsalan Ybarra MD.               Culture:  Blood Culture   Date Value Ref Range Status   11/29/2023 No growth at 2 days  Preliminary   11/29/2023 No growth at 2 days  Preliminary         Assessment   Chronic kidney disease stage IV  Acute hypoxemic respiratory failure  Acute on chronic diastolic congestive heart failure  Hypertension  Incidental finding of renal lesion at previous admission  Secondary hyperparathyroidism      Plan:  Discussed with patient, Dr Mcelroy  Work-up reviewed today  Monitor  labs  Continue diuretics by mouth  Vitamin D levels adequate   Okay with discharge from renal standpoint.        Fede Gallardo MD  12/4/2023  14:26 CST

## 2023-12-04 NOTE — DISCHARGE SUMMARY
[facility] Medicine Services  DISCHARGE SUMMARY       Date of Admission: 11/29/2023  Date of Discharge:  12/4/2023  Primary Care Physician: Spike Polanco DO    Presenting Problem/History of Present Illness:  Sob     Final Discharge Diagnoses:    Problem list  Shortness of breath-VTE ruled out; differentials include: fluid overload, rhinovirus infection, acute on chronic diastolic heart failure.   Hypertension-hypertensive cardiovascular disease as evidenced by diastolic dysfunction and mild concentric hypertrophy on echo   CKD stage IV-nephrology following; secondary hyperparathyroidism  Bradycardia-beta-blocker discontinued; if persistent consider discontinuing amlodipine and monitor blood pressure.  May need to look at other options for blood pressure control  hypokalemia-replace particularly while still on IV diuretic; monitor closely given stage IV chronic kidney disease  Acute respiratory failure with hypoxia (I've not seen any record of blood gas nor able to put up information)  as per previous progress note believed to be multifactorial-fluid overload, rhinovirus infection, acute on chronic diastolic heart failure.  On December 1, Dr. Leavitt doubted pneumonia but patient is on cefepime currently.  White count resolved         Patient afebrile;O2 requirement improved. COVID-19, influenza A and B  Negative;  only finding is a human rhinovirus  Recent history of rhinovirus infection  Left Renal lesion - requires follow up     Consults:   Nephrology    Procedures Performed: None    Pertinent Test Results:   Results for orders placed during the hospital encounter of 11/19/23    Adult Transthoracic Echo Complete W/ Cont if Necessary Per Protocol    Interpretation Summary    Left ventricular ejection fraction appears to be 56 - 60%.    Left ventricular wall thickness is consistent with mild concentric hypertrophy.    Left ventricular diastolic function is consistent  with (grade II w/high LAP) pseudonormalization.    Left atrial volume is severely increased.    The right atrial cavity is dilated.    Moderate pulmonary hypertension is present.    There is a trivial pericardial effusion. There is no evidence of cardiac tamponade.    Abnormal global longitudinal LV strain (GLS) = -11.5%      Imaging Results (All)       Procedure Component Value Units Date/Time    XR Chest PA & Lateral [[id number]] Collected: 12/03/23 1626     Updated: 12/03/23 1630    Narrative:      EXAM/TECHNIQUE: XR CHEST PA AND LATERAL-     INDICATION: Shortness of breath, heart failure     COMPARISON: 11/29/2023     FINDINGS:     Cardiac silhouette is within normal limits. Sternotomy wires are  vertically aligned. Patient is rotated to the right. Small bilateral  pleural effusions with mild overlying atelectasis. The mid and upper  lung zones are clear. No visible pneumothorax. No acute osseous finding.       Impression:      Small bilateral pleural effusions with overlying atelectasis.           This report was signed and finalized on 12/3/2023 4:27 PM by Dr. Arsalan Ybarra MD.       US Venous Doppler Lower Extremity Bilateral (duplex) [[id number]] Collected: 11/30/23 1600     Updated: 11/30/23 1603    Narrative:      History: Swelling       Impression:      Impression: There is no evidence of deep venous thrombosis or  superficial thrombophlebitis of right or left lower extremities.     Comments: Bilateral lower extremity venous duplex exam was performed  using color Doppler flow, Doppler waveform analysis, and grayscale  imaging, with and without compression. There is no evidence of deep  venous thrombosis in the common femoral, superficial femoral, popliteal,  peroneal, anterior tibial, and posterior tibial veins bilaterally. No  thrombus is identified in the saphenofemoral junctions and greater  saphenous veins bilaterally.            This report was signed and finalized on 11/30/2023 4:00 PM CST by  Dr. Holland Glover MD.       NM Lung Scan Perfusion Particulate [[id number]] Collected: 11/29/23 1711     Updated: 11/29/23 1716    Narrative:      NM LUNG SCAN PERFUSION PARTICULATE-     HISTORY: Pulmonary embolism (PE) suspected, unknown D-dimer; I50.9-Heart  failure, unspecified; J96.01-Acute respiratory failure with hypoxia;  N18.9-Chronic kidney disease, unspecified; I50.33-Acute on chronic  diastolic (congestive) heart failure     COMPARISON: Chest x-ray 11/29/2023     FINDINGS: Following IV injection of 5.5 mCi of Tc 99m MAA particles,  multiple projection images of the chest are obtained.     Slightly heterogeneous perfusion of the lung parenchyma with no discrete  segmental perfusion defect identified.       Impression:      1. Findings consistent with low probability of pulmonary emboli.     This report was signed and finalized on 11/29/2023 5:13 PM CST by Dr. Helen Goins MD.       XR Chest 1 View [[id number]] Collected: 11/29/23 1407     Updated: 11/29/23 1414    Narrative:      EXAM/TECHNIQUE: XR CHEST 1 VW-     INDICATION: Shortness of air     COMPARISON: 11/19/2023     FINDINGS:     Cardiac silhouette is within normal limits and stable. Prior  midsternotomy. Atherosclerotic aortic arch. No pleural effusion,  pneumothorax, or focal consolidation. Mild diffuse chronic interstitial  coarsening is again noted. No acute osseous finding.       Impression:      No acute findings.     This report was signed and finalized on 11/29/2023 2:11 PM CST by Dr. Arsalan Ybarra MD.             LAB RESULTS:      Lab 11/30/23  0458 11/29/23  1430   WBC 9.94 17.18*   HEMOGLOBIN 11.0* 11.5*   HEMATOCRIT 34.9* 35.8*   PLATELETS 155 157   NEUTROS ABS 8.32* 15.47*   IMMATURE GRANS (ABS) 0.12* 0.23*   LYMPHS ABS 0.66* 0.39*   MONOS ABS 0.67 0.91*   EOS ABS 0.16 0.16   MCV 95.1 93.7   PROCALCITONIN  --  1.36*   LACTATE  --  0.8         Lab 12/04/23  0347 12/03/23  0415 12/02/23  0433 12/01/23  0334 11/30/23  0458    SODIUM 141 140 137 139 141   POTASSIUM 3.8 3.2* 3.4* 3.7 3.9   CHLORIDE 104 101 100 104 108*   CO2 27.0 28.0 27.0 26.0 23.0   ANION GAP 10.0 11.0 10.0 9.0 10.0   BUN 52* 49* 43* 45* 44*   CREATININE 3.32* 3.04* 3.00* 3.18* 3.20*   EGFR 17.7* 19.7* 20.0* 18.6* 18.5*   GLUCOSE 102* 109* 103* 109* 101*   CALCIUM 8.7 8.6 8.8 8.7 8.8   MAGNESIUM  --   --   --  2.2 2.3   PHOSPHORUS  --   --   --  4.5  --          Lab 11/29/23  1430   TOTAL PROTEIN 5.7*   ALBUMIN 3.5   GLOBULIN 2.2   ALT (SGPT) 17   AST (SGOT) 16   BILIRUBIN 0.3   ALK PHOS 45         Lab 11/29/23  1501 11/29/23  1430   PROBNP  --  8,821.0*   HSTROP T 42*  --                  Brief Urine Lab Results  (Last result in the past 365 days)        Color   Clarity   Blood   Leuk Est   Nitrite   Protein   CREAT   Urine HCG        11/20/23 1817             25.7               Microbiology Results (last 10 days)       Procedure Component Value - Date/Time    Blood Culture - Blood, Arm, Right [[id number]]  (Normal) Collected: 11/29/23 1430    Lab Status: Final result Specimen: Blood from Arm, Right Updated: 12/04/23 1445     Blood Culture No growth at 5 days    Blood Culture - Blood, Arm, Right [[id number]]  (Normal) Collected: 11/29/23 1430    Lab Status: Final result Specimen: Blood from Arm, Right Updated: 12/04/23 1445     Blood Culture No growth at 5 days            Hospital Course:   Patient is an 83-year-old man who I first saw on day 3 of hospitalization.  He was admitted by Dr. Zambrano on November 29 presenting with shortness of breath.  Record indicates comorbidities including CVA, chronic kidney disease stage IV, coronary artery disease, COPD, hypertension and hyperlipidemia.  He was recently hospitalized for rhinovirus, respiratory failure and COPD exacerbation from November 19 November 22.    During this hospitalization he was found with:  Leukocytosis  VQ scan showed low probability for PE   Venous ultrasound no evidence of DVT or superficial  "thrombophlebitis bilateral  Chest x-ray showed no acute finding    At some point during hospitalization beta-blocker was stopped due to bradycardia.    Patient has hypertension.  Stable blood pressure.  Continue present management and adjust medications accordingly through primary care provider    At some point there was an order for ceftriaxone and or cefepime but never has it been given to patient.  I followed through and did not see an indication for any antibiotic since I first encounter with him.  Patient has rhinovirus.  Patient's culture (blood), COVID-19, influenza a and B are negative.      During this hospitalization, patient was diuresed.  Echocardiogram mentioned presence of left atrial volume that is severely increased.  On top of this patient has moderate pulmonary hypertension.    Overall patient doing better and believed to be medically stable and appropriate for discharge.    Patient is cleared by nephrology for discharge.    Physical Exam on Discharge:  /77 (BP Location: Right arm, Patient Position: Lying)   Pulse 87   Temp 97.2 °F (36.2 °C) (Oral)   Resp 18   Ht 182.9 cm (72\")   Wt 92.5 kg (204 lb)   SpO2 94%   BMI 27.67 kg/m²   Physical Exam  GEN: Awake, alert, interactive, in NAD  HEENT: Atraumatic, PERRLA, EOMI, Anicteric, Trachea midline  Lungs: adequate air exchange. No crackles heard today   Heart: RRR, +S1/s2, no rub  ABD: soft, nt/nd, +BS, no guarding/rebound  Extremities: atraumatic, no cyanosis, no edema  Skin: no rashes or lesions  Neuro: AAOx3, no focal deficits  Psych: normal mood & affect     Condition on Discharge:  stable    Discharge Disposition:  Home or Self Care    Discharge Medications:     Discharge Medications        Continue These Medications        Instructions Start Date   amLODIPine 10 MG tablet  Commonly known as: NORVASC   10 mg, Oral, Daily      aspirin 81 MG EC tablet   81 mg, Oral, Daily      budesonide-formoterol 160-4.5 MCG/ACT inhaler  Commonly known " as: SYMBICORT   2 puffs, Inhalation, 2 Times Daily - RT      ferrous sulfate 325 (65 FE) MG tablet   325 mg, Oral, 2 Times Daily      fluticasone 50 MCG/ACT nasal spray  Commonly known as: FLONASE   2 sprays, Each Nare, Daily, Obtain otc      furosemide 40 MG tablet  Commonly known as: LASIX   40 mg, Oral, Daily      guaiFENesin 600 MG 12 hr tablet  Commonly known as: MUCINEX   1,200 mg, Oral, Every 12 Hours Scheduled      hydrALAZINE 100 MG tablet  Commonly known as: APRESOLINE   100 mg, Oral, 3 Times Daily      lisinopril 20 MG tablet  Commonly known as: PRINIVIL,ZESTRIL   20 mg, Oral, 2 Times Daily      nitroglycerin 0.4 MG SL tablet  Commonly known as: NITROSTAT   0.4 mg, Sublingual, Every 5 Minutes PRN, Take no more than 3 doses in 15 minutes.      pravastatin 40 MG tablet  Commonly known as: PRAVACHOL   40 mg, Oral, Daily      sodium bicarbonate 650 MG tablet   325 mg, Oral, Daily      terazosin 1 MG capsule  Commonly known as: HYTRIN   1 mg, Oral, Nightly      vitamin B-12 100 MCG tablet  Commonly known as: CYANOCOBALAMIN   50 mcg, Oral, Daily             Stop These Medications      metoprolol tartrate 50 MG tablet  Commonly known as: LOPRESSOR     predniSONE 10 MG tablet  Commonly known as: DELTASONE     Vitamin D 50 MCG (2000 UT) capsule              This patient has current or prior documentation of an left ventricular ejection fraction (LVEF) of less than or equal to 40%. - not applicable      Results for orders placed during the hospital encounter of 11/19/23    Adult Transthoracic Echo Complete W/ Cont if Necessary Per Protocol    Interpretation Summary    Left ventricular ejection fraction appears to be 56 - 60%.    Left ventricular wall thickness is consistent with mild concentric hypertrophy.    Left ventricular diastolic function is consistent with (grade II w/high LAP) pseudonormalization.    Left atrial volume is severely increased.    The right atrial cavity is dilated.    Moderate pulmonary  hypertension is present.    There is a trivial pericardial effusion. There is no evidence of cardiac tamponade.    Abnormal global longitudinal LV strain (GLS) = -11.5%          Discharge Diet:   Diet Instructions       Diet: Renal Diets; Low Potassium; Regular Texture (IDDSI 7); Thin (IDDSI 0)      Discharge Diet: Renal Diets    Renal Diet: Low Potassium    Texture: Regular Texture (IDDSI 7)    Fluid Consistency: Thin (IDDSI 0)      Heart healthy diet           Activity at Discharge:   Activity Instructions       Gradually Increase Activity Until at Pre-Hospitalization Level              Follow-up Appointments:  Primary care provider within 1 week; facilitate further evaluation/referral to urology regarding the left renal lesion, monitor blood pressure  Nephrology per their recommendation with basic metabolic panel  Other future appointments as outlined below    Future Appointments   Date Time Provider Department Center   12/6/2023  9:45 AM Wyatt Greene MD [facility]   1/24/2024 10:00 AM  [facility]  [facility]   2/7/2024  1:45 PM Rufino Brice MD [facility]   4/24/2024 10:00 AM  [facility]  [facility]   4/24/2024 10:30 AM Samantha Winkler APRN [facility]       Test Results Pending at Discharge: none    Electronically signed by Faisal Mcelroy MD, 12/04/23, 15:03 CST.    Time: > 30  minutes.

## 2023-12-05 NOTE — OUTREACH NOTE
Prep Survey      Flowsheet Row Responses   [facility] facility patient discharged from? [place]   Is LACE score < 7 ? No   Eligibility Readm Mgmt   Discharge diagnosis Acute hypoxic resp failure, A/C CHF, rhinovirus infection,   Does the patient have one of the following disease processes/diagnoses(primary or secondary)? CHF   Does the patient have Home health ordered? No   Is there a DME ordered? Yes   What DME was ordered? O2 ordered from [employer]   Prep survey completed? Yes            Kamala MEYERS - Registered Nurse

## 2023-12-06 ENCOUNTER — READMISSION MANAGEMENT (OUTPATIENT)
Dept: CALL CENTER | Facility: HOSPITAL | Age: 83
End: 2023-12-06
Payer: MEDICARE

## 2023-12-06 NOTE — OUTREACH NOTE
CHF Week 1 Survey      Flowsheet Row Responses   [facility] patient discharged from? [place]   Does the patient have one of the following disease processes/diagnoses(primary or secondary)? CHF   CHF Week 1 attempt successful? Yes   Call start time 1428   Call end time 1436   Discharge diagnosis Acute hypoxic resp failure, A/C CHF, rhinovirus infection,   Meds reviewed with patient/caregiver? Yes   Is the patient taking all medications as directed (includes completed medication regime)? Yes   Does the patient have a primary care provider?  Yes   Has home health visited the patient within 72 hours of discharge? N/A   What DME was ordered? O2 ordered from [employer]   Has all DME been delivered? Yes   Pulse Ox monitoring Intermittent   Pulse Ox device source Patient   O2 Sat: education provided Sat levels   Psychosocial issues? No   Did the patient receive a copy of their discharge instructions? Yes   Nursing interventions Reviewed instructions with patient   What is the patient's perception of their health status since discharge? Improving   Nursing interventions Nurse provided patient education   Is the patient able to teach back signs and symptoms of worsening condition? (i.e. weight gain, shortness of air, etc.) Yes   If the patient is a current smoker, are they able to teach back resources for cessation? Not a smoker   Is the patient/caregiver able to teach back the hierarchy of who to call/visit for symptoms/problems? PCP, Specialist, Home health nurse, Urgent Care, ED, 911 Yes   Is the patient able to teach back Heart Failure Zones? Yes   CHF Nursing Interventions Education provided on various zones   CHF Zone this Call Green Zone   Green Zone Patient reports doing well, No new or worsening shortness of breath, Physical activity level is normal for you, No new swelling -  feet, ankles and legs look normal for you, Weight check stable, No chest pain   Green Zone Interventions Daily weight check, Meds as  directed, Low sodium diet, Follow up visits planned  [Patient states he was weighed in the hospital, but he wasn't aware he should do daily weights at home. Patient educated regarding daily weights.]    CHF Week 1 call completed? Yes   Call end time 1118            Danita MEYERS - Licensed Nurse

## 2023-12-13 ENCOUNTER — READMISSION MANAGEMENT (OUTPATIENT)
Dept: CALL CENTER | Facility: HOSPITAL | Age: 83
End: 2023-12-13
Payer: MEDICARE

## 2023-12-13 ENCOUNTER — OFFICE VISIT (OUTPATIENT)
Dept: CARDIOLOGY | Facility: CLINIC | Age: 83
End: 2023-12-13
Payer: MEDICARE

## 2023-12-13 VITALS
HEART RATE: 83 BPM | WEIGHT: 202 LBS | SYSTOLIC BLOOD PRESSURE: 128 MMHG | HEIGHT: 72 IN | OXYGEN SATURATION: 98 % | BODY MASS INDEX: 27.36 KG/M2 | DIASTOLIC BLOOD PRESSURE: 60 MMHG

## 2023-12-13 DIAGNOSIS — I25.10 CORONARY ARTERY DISEASE INVOLVING NATIVE CORONARY ARTERY OF NATIVE HEART WITHOUT ANGINA PECTORIS: Primary | ICD-10-CM

## 2023-12-13 DIAGNOSIS — I50.32 CHRONIC DIASTOLIC CONGESTIVE HEART FAILURE: ICD-10-CM

## 2023-12-13 NOTE — OUTREACH NOTE
CHF Week 2 Survey      Flowsheet Row Responses   [facility] patient discharged from? [place]   Does the patient have one of the following disease processes/diagnoses(primary or secondary)? CHF   Week 2 attempt successful? Yes   Call start time 1308   Call end time 1312   Discharge diagnosis Acute hypoxic resp failure, A/C CHF, rhinovirus infection,   Meds reviewed with patient/caregiver? Yes   Is the patient having any side effects they believe may be caused by any medication additions or changes? No   Does the patient have all medications ordered at discharge? N/A   Is the patient taking all medications as directed (includes completed medication regime)? Yes   Comments regarding appointments Cardiology follow up 12/13/23. Nephrology appt 12/15/23   Does the patient have a primary care provider?  Yes   Does the patient have an appointment with their PCP within 7 days of discharge? Yes   Has the patient kept scheduled appointments due by today? Yes   Has home health visited the patient within 72 hours of discharge? N/A   What DME was ordered? O2 ordered from [employer]   Has all DME been delivered? Yes   DME comments [email or web address]@[facility]   Pulse Ox monitoring Intermittent   Pulse Ox device source Patient   O2 Sat comments O2 sat 94% on RA   O2 Sat: education provided Sat levels, Monitoring frequency, When to seek care   Psychosocial issues? No   Did the patient receive a copy of their discharge instructions? Yes   Nursing interventions Reviewed instructions with patient   What is the patient's perception of their health status since discharge? Improving   Nursing interventions Nurse provided patient education   Is the patient able to teach back signs and symptoms of worsening condition? (i.e. weight gain, shortness of air, etc.) Yes   Is the patient able to teach back Heart Failure Zones? Yes   CHF Nursing Interventions Education provided on various zones   CHF Zone this Call Green Zone   Green Zone Weight check stable, Patient  reports doing well, No new or worsening shortness of breath, No new swelling -  feet, ankles and legs look normal for you, No chest pain   Green Zone Interventions Daily weight check, Meds as directed, Low sodium diet, Follow up visits planned   CHF Week 2 call completed? Yes   Is the patient interested in additional calls from an ambulatory ? No   Would this patient benefit from a Referral to [employer] Social Work? No   Wrap up additional comments Patient denies any new swelling but reports he is very weak, seeing cardiology today.   Call end time 1312            Deb VALLE - Registered Nurse

## 2023-12-13 NOTE — PROGRESS NOTES
Subjective:     Encounter Date:12/13/2023      Patient ID: Faisal Joseph is a 83 y.o. male with coronary artery disease, previous coronary bypass grafting in 2020 (LIMA to LAD, SVG to diagonal, SVG to distal right coronary artery), hypertension, hyperlipidemia, previous stroke, stage IV chronic kidney disease, COPD, who presents today for hospital follow-up.    Chief Complaint: Here for hospital follow-up    History of Present Illness    This patient presents today for hospital follow-up.  He has been hospitalized 2 times over the last month or so.  He was hospitalized in November at which point he was thought to have respiratory failure that was likely a combination of COPD as he was administered Solu-Medrol and transition to prednisone during the hospitalization.  He was also thought to be volume overloaded, a combination of chronic kidney disease and likely some element of diastolic heart failure.  He was given diuretics and felt much better.  He then presented again to the hospital on 11/29/2023 and was discharged on 12/4/23.  The discharge summary is unclear of the final diagnosis.  In the discharge summary under the heading of the final discharge diagnosis, the provider simply copied and pasted the problem list which does not necessarily give any obvious cause of the patient's presenting problem which was shortness of breath.  This discharge summary indicate shortness of breath, hypertension-hypertensive cardiovascular disease, stage IV CKD, bradycardia, hypokalemia, acute respiratory failure with hypoxia that is described in the same bullet point as being multifactorial with fluid overload, rhinovirus infection, acute on chronic diastolic heart failure, also rhinovirus listed as well.    The patient says that since being discharged in the hospital on 12/4, his breathing is stable.  He denies having orthopnea, PND.  His weight has been stable.  He currently denies having any cough or wheezing.  He  was discharged home with oxygen but is not requiring oxygen right now with suitable oxygen saturations noted and no significant shortness of breath or dyspnea with exertion.  He was taken off of beta-blocker therapy during the hospitalization due to bradycardia.  He denies having lightheadedness, dizziness or syncope at this time he also reports that his blood pressure has been well-controlled he denies having any side effects many of his medications.      Current Outpatient Medications:     amLODIPine (NORVASC) 10 MG tablet, Take 1 tablet by mouth Daily., Disp: , Rfl:     aspirin 81 MG EC tablet, Take 1 tablet by mouth Daily., Disp: 100 tablet, Rfl: 99    budesonide-formoterol (SYMBICORT) 160-4.5 MCG/ACT inhaler, Inhale 2 puffs 2 (Two) Times a Day., Disp: , Rfl:     ferrous sulfate 325 (65 FE) MG tablet, Take 1 tablet by mouth 2 (Two) Times a Day., Disp: , Rfl:     fluticasone (FLONASE) 50 MCG/ACT nasal spray, 2 sprays by Each Nare route Daily. Obtain otc, Disp: , Rfl:     furosemide (LASIX) 40 MG tablet, Take 1 tablet by mouth Daily for 30 days., Disp: 30 tablet, Rfl: 0    guaiFENesin (MUCINEX) 600 MG 12 hr tablet, Take 2 tablets by mouth Every 12 (Twelve) Hours., Disp: , Rfl:     hydrALAZINE (APRESOLINE) 100 MG tablet, Take 1 tablet by mouth 3 (Three) Times a Day., Disp: , Rfl:     lisinopril (PRINIVIL,ZESTRIL) 20 MG tablet, Take 1 tablet by mouth 2 (Two) Times a Day., Disp: , Rfl:     nitroglycerin (NITROSTAT) 0.4 MG SL tablet, Place 1 tablet under the tongue Every 5 (Five) Minutes As Needed for Chest Pain. Take no more than 3 doses in 15 minutes., Disp: , Rfl:     pravastatin (PRAVACHOL) 40 MG tablet, Take 1 tablet by mouth Daily., Disp: , Rfl:     sodium bicarbonate 650 MG tablet, Take 0.5 tablets by mouth Daily., Disp: , Rfl:     terazosin (HYTRIN) 1 MG capsule, Take 1 capsule by mouth Every Night., Disp: , Rfl:     vitamin B-12 (CYANOCOBALAMIN) 100 MCG tablet, Take 0.5 tablets by mouth Daily., Disp: , Rfl:      Allergies   Allergen Reactions    Hydralazine Nausea Only    Losartan Potassium Nausea Only    Penicillins Hives    Spironolactone Swelling     Made  Breast sore and swell     Social History     Tobacco Use    Smoking status: Former     Packs/day: 1.50     Years: 25.00     Additional pack years: 0.00     Total pack years: 37.50     Types: Cigarettes     Start date:      Quit date:      Years since quittin.9    Smokeless tobacco: Never   Substance Use Topics    Alcohol use: No     Review of Systems   Constitutional: Positive for malaise/fatigue. Negative for fever and weight loss.   Cardiovascular:  Negative for chest pain, claudication, dyspnea on exertion, leg swelling, orthopnea, palpitations, paroxysmal nocturnal dyspnea and syncope.   Respiratory:  Negative for cough, shortness of breath and wheezing.    Hematologic/Lymphatic: Negative for bleeding problem.   Gastrointestinal:  Negative for abdominal pain, nausea and vomiting.   Neurological:  Negative for dizziness, headaches and loss of balance.         ECG 12 Lead    Date/Time: 2023 3:44 PM  Performed by: Rufino Brice MD    Authorized by: Rufino Brice MD  Comparison: compared with previous ECG from 2023  Similar to previous ECG  Rhythm: sinus rhythm  Ectopy: unifocal PVCs  Rate: normal  BPM: 81  Conduction: incomplete left bundle branch block  QRS axis: normal    Clinical impression: abnormal EKG             Objective:     Vitals reviewed.   Constitutional:       General: Not in acute distress.     Appearance: Well-developed and not in distress.   Eyes:      Extraocular Movements: Extraocular movements intact.   HENT:      Head: Normocephalic and atraumatic.   Neck:      Vascular: No JVD.   Pulmonary:      Effort: Pulmonary effort is normal.      Breath sounds: Normal breath sounds. No wheezing. No rhonchi. No rales.   Cardiovascular:      Normal rate. Occasional ectopic beats. Regular rhythm.      Murmurs:  "There is no murmur.      No gallop.    Pulses:     Intact distal pulses.   Edema:     Peripheral edema absent.   Abdominal:      General: Bowel sounds are normal. There is no distension.      Palpations: Abdomen is soft.      Tenderness: There is no abdominal tenderness.   Skin:     General: Skin is warm and dry. There is no cyanosis.      Findings: No erythema or rash.   Neurological:      Mental Status: Alert and oriented to person, place, and time.      Cranial Nerves: No cranial nerve deficit.       /60   Pulse 83   Ht 182.9 cm (72\")   Wt 91.6 kg (202 lb)   SpO2 98%   BMI 27.40 kg/m²     Data/Lab Review:     CXR 12/3/23: Small bilateral pleural effusions with overlying atelectasis.     ==============================================================================    Lab Results   Component Value Date    WBC 9.94 11/30/2023    HGB 11.0 (L) 11/30/2023    HCT 34.9 (L) 11/30/2023    MCV 95.1 11/30/2023     11/30/2023     Lab Results   Component Value Date    GLUCOSE 102 (H) 12/04/2023    CALCIUM 8.7 12/04/2023     12/04/2023    K 3.8 12/04/2023    CO2 27.0 12/04/2023     12/04/2023    BUN 52 (H) 12/04/2023    CREATININE 3.32 (H) 12/04/2023    EGFR 17.7 (L) 12/04/2023    BCR 15.7 12/04/2023    ANIONGAP 10.0 12/04/2023     ==============================================================================    Results for orders placed during the hospital encounter of 11/19/23    Adult Transthoracic Echo Complete W/ Cont if Necessary Per Protocol    Interpretation Summary    Left ventricular ejection fraction appears to be 56 - 60%.    Left ventricular wall thickness is consistent with mild concentric hypertrophy.    Left ventricular diastolic function is consistent with (grade II w/high LAP) pseudonormalization.    Left atrial volume is severely increased.    The right atrial cavity is dilated.    Moderate pulmonary hypertension is present.    There is a trivial pericardial effusion. There is " no evidence of cardiac tamponade.    Abnormal global longitudinal LV strain (GLS) = -11.5%        Assessment:          Diagnosis Plan   1. Coronary artery disease involving native coronary artery of native heart without angina pectoris  ECG 12 Lead      2. Chronic diastolic congestive heart failure             Plan:       1.  Coronary artery disease: The patient is stable at this time and does not describe any anginal symptoms.  He does remain on aspirin therapy which should be continued indefinitely.  Beta-blocker therapy has been stopped due to bradycardia.  His heart rate today is 81 with occasional PVCs.  This will need to be monitored moving forward.  It may be that he would require low-dose beta-blocker therapy again or benefit from low-dose beta-blocker therapy if heart rate remains reasonable over time.  Will have him follow-up in approximately 3 months to reevaluate his overall status at that time and consider placing him back on beta-blocker therapy if he needs further blood pressure control or if heart rate will tolerate.  He also does remain on statin therapy.    2.  Chronic diastolic heart failure: The patient's recent hospitalizations seem to describe multifactorial shortness of breath but there was also some element of volume overload in the setting of chronic kidney disease as well that required diuretic therapy.  Most certainly, the patient does have diastolic dysfunction and with fluid overload, I would presume this constitutes chronic heart failure with preserved ejection fraction.  Currently, he appears to be euvolemic.  He is taking Lasix 40 mg daily.  His weight has been stable.  He does not show any evidence of volume overload or report any symptoms of heart failure at this time.  We will have him follow-up in 3 months to reevaluate.  He also has follow-up in the nephrology clinic later this week or early next week.    We will see the patient again in 3 months unless otherwise needed  sooner.

## 2023-12-20 ENCOUNTER — TELEPHONE (OUTPATIENT)
Dept: CARDIOLOGY | Facility: CLINIC | Age: 83
End: 2023-12-20
Payer: MEDICARE

## 2023-12-20 ENCOUNTER — READMISSION MANAGEMENT (OUTPATIENT)
Dept: CALL CENTER | Facility: HOSPITAL | Age: 83
End: 2023-12-20
Payer: MEDICARE

## 2023-12-20 NOTE — OUTREACH NOTE
CHF Week 3 Survey      Flowsheet Row Responses   [facility] patient discharged from? [place]   Does the patient have one of the following disease processes/diagnoses(primary or secondary)? CHF   Week 3 attempt successful? Yes   Call start time 1118   Call end time 1123   Discharge diagnosis Acute hypoxic resp failure, A/C CHF, rhinovirus infection,   Meds reviewed with patient/caregiver? Yes   Is the patient having any side effects they believe may be caused by any medication additions or changes? No   Does the patient have all medications ordered at discharge? Yes   Is the patient taking all medications as directed (includes completed medication regime)? Yes   Does the patient have a primary care provider?  Yes   Does the patient have an appointment with their PCP within 7 days of discharge? Yes   Has the patient kept scheduled appointments due by today? Yes   Has home health visited the patient within 72 hours of discharge? N/A   What DME was ordered? O2 ordered from [employer] not using O2 level WNR   Has all DME been delivered? Yes   Pulse Ox monitoring Intermittent   Pulse Ox device source Patient   O2 Sat comments O2 sat 95-98% on RA   O2 Sat: education provided Sat levels, Monitoring frequency, When to seek care   O2 Sat education comments Advised if unable to maintain O2 level 90% or greater go to ED.   Psychosocial issues? No   Did the patient receive a copy of their discharge instructions? Yes   Nursing interventions Reviewed instructions with patient   What is the patient's perception of their health status since discharge? Improving   Nursing interventions Nurse provided patient education   Is the patient/caregiver able to teach back the hierarchy of who to call/visit for symptoms/problems? PCP, Specialist, Home health nurse, Urgent Care, ED, 911 Yes   Is the patient able to teach back Heart Failure Zones? Yes   CHF Nursing Interventions Education provided on various zones   CHF Zone this Call Nathanael  Zone   Green Zone Patient reports doing well, No new or worsening shortness of breath, Weight check stable, No new swelling -  feet, ankles and legs look normal for you   Green Zone Interventions Daily weight check, Meds as directed, Low sodium diet, Follow up visits planned   CHF Week 3 call completed? Yes   Is the patient interested in additional calls from an ambulatory ? No   Would this patient benefit from a Referral to [employer] Social Work? No   Wrap up additional comments Pt. reports HR has been elevated, denies any SOB. Awaiting reply back from MD office. Advised any worsening symptoms go to ED, any questions contact PCP/cardiologist pt. v/u.   Call end time 1123            Liza ZAPIEN - Registered Nurse

## 2023-12-20 NOTE — TELEPHONE ENCOUNTER
Patient says his heart rate has been running anywhere from 96 to 111 over the last few days. Wants to know if he should go back on the Metoprolol. He was taking 50 mg bid. Please advise.

## 2024-02-16 DIAGNOSIS — D50.9 IRON DEFICIENCY ANEMIA, UNSPECIFIED IRON DEFICIENCY ANEMIA TYPE: Primary | ICD-10-CM

## 2024-02-19 RX ORDER — FERROUS SULFATE 325(65) MG
1 TABLET ORAL 2 TIMES DAILY
Qty: 180 TABLET | Refills: 1 | Status: SHIPPED | OUTPATIENT
Start: 2024-02-19

## 2024-03-12 ENCOUNTER — OFFICE VISIT (OUTPATIENT)
Dept: CARDIOLOGY | Facility: CLINIC | Age: 84
End: 2024-03-12
Payer: MEDICARE

## 2024-03-12 VITALS
WEIGHT: 202 LBS | SYSTOLIC BLOOD PRESSURE: 120 MMHG | BODY MASS INDEX: 27.36 KG/M2 | HEIGHT: 72 IN | HEART RATE: 71 BPM | DIASTOLIC BLOOD PRESSURE: 68 MMHG

## 2024-03-12 DIAGNOSIS — I25.10 CORONARY ARTERY DISEASE INVOLVING NATIVE CORONARY ARTERY OF NATIVE HEART WITHOUT ANGINA PECTORIS: Primary | Chronic | ICD-10-CM

## 2024-03-12 DIAGNOSIS — I10 ESSENTIAL HYPERTENSION: Chronic | ICD-10-CM

## 2024-03-12 DIAGNOSIS — N18.4 CKD (CHRONIC KIDNEY DISEASE) STAGE 4, GFR 15-29 ML/MIN: ICD-10-CM

## 2024-03-12 DIAGNOSIS — R07.89 CHEST PAIN, ATYPICAL: ICD-10-CM

## 2024-03-12 DIAGNOSIS — Z95.1 S/P CABG (CORONARY ARTERY BYPASS GRAFT): ICD-10-CM

## 2024-03-12 PROBLEM — I50.9 ACUTE HEART FAILURE: Status: RESOLVED | Noted: 2023-11-19 | Resolved: 2024-03-12

## 2024-03-12 PROBLEM — I50.33 ACUTE ON CHRONIC DIASTOLIC CHF (CONGESTIVE HEART FAILURE): Status: RESOLVED | Noted: 2023-11-29 | Resolved: 2024-03-12

## 2024-03-12 PROBLEM — R00.2 PALPITATIONS: Status: RESOLVED | Noted: 2021-08-30 | Resolved: 2024-03-12

## 2024-03-12 PROCEDURE — 93000 ELECTROCARDIOGRAM COMPLETE: CPT | Performed by: NURSE PRACTITIONER

## 2024-03-12 PROCEDURE — 99214 OFFICE O/P EST MOD 30 MIN: CPT | Performed by: NURSE PRACTITIONER

## 2024-03-12 PROCEDURE — 1160F RVW MEDS BY RX/DR IN RCRD: CPT | Performed by: NURSE PRACTITIONER

## 2024-03-12 PROCEDURE — 3074F SYST BP LT 130 MM HG: CPT | Performed by: NURSE PRACTITIONER

## 2024-03-12 PROCEDURE — 1159F MED LIST DOCD IN RCRD: CPT | Performed by: NURSE PRACTITIONER

## 2024-03-12 PROCEDURE — 3078F DIAST BP <80 MM HG: CPT | Performed by: NURSE PRACTITIONER

## 2024-03-12 RX ORDER — NITROGLYCERIN 0.4 MG/1
0.4 TABLET SUBLINGUAL
Qty: 25 TABLET | Refills: 2 | Status: SHIPPED | OUTPATIENT
Start: 2024-03-12

## 2024-03-12 NOTE — PROGRESS NOTES
Subjective:     Encounter Date:03/12/2024      Patient ID: Faisal Joseph is a 83 y.o. male with known coronary artery disease and prior coronary artery bypass grafting, hypertension, hyperlipidemia, chronic hypoxic respiratory failure, stage IV CKD, left ventricular diastolic dysfunction, prior stroke, who presents the office today for routine follow-up.    Chief Complaint: CAD follow-up/chest pain/dysphagia  Congestive Heart Failure  Presents for follow-up visit. Associated symptoms include chest pain (after eating - difficulty swallowing), fatigue (chronic) and shortness of breath (chronic - COPD). Pertinent negatives include no abdominal pain, chest pressure, edema, near-syncope, orthopnea, palpitations, paroxysmal nocturnal dyspnea or unexpected weight change. The symptoms have been stable. The quality of the pain is described as tightness. The pain does not radiate. His past medical history is significant for CAD. Compliance with diet is %. Compliance with exercise is 51-75%. Compliance with medications is %.   Coronary Artery Disease  Presents for follow-up visit. Symptoms include chest pain (after eating - difficulty swallowing), chest tightness, leg swelling (noted in leg with prio vein harvest: chronic) and shortness of breath (chronic - COPD). Pertinent negatives include no chest pressure, dizziness, palpitations or weight gain. His past medical history is significant for CHF. The symptoms have been stable. Compliance with diet is good. Compliance with exercise is variable. Compliance with medications is good.     Mr. Joseph presents to the office today for routine cardiovascular follow-up.  He was seen approximately 3 months ago in the office after he had been hospitalized.  He has coronary artery disease with prior coronary artery bypass grafting in 2020.  He has known chronic respiratory failure with intermittent use of home oxygen and COPD.  He has left ventricular diastolic  dysfunction and chronic stage IV CKD.  He follows with nephrology and sees them tomorrow.    He has been stable from a breathing standpoint.  He has no increased shortness of breath.  He has not required increased use of diuretics since his discharge at the end of last year. His weight today is consistent with his weight at his office visit in December.    He reports no chest pain in the last week.  He has had intermittent episodes of chest tightness.  He describes this to occur most often after every meal for he feels tightness in his chest after swallowing food.  He describes that it almost feels as if his food is stuck or will not go down well.  He has had an episode of vomiting for relief of the pain.  More often he takes Rolaids, Tylenol with improvement of his symptoms.  Generally after 30 to 40 minutes symptoms resolve.  He actually ate prior to his office visit today.  He still has some discomfort in his chest now from eating earlier.  He has not taken any nitroglycerin.  He sometimes feels the pain in his back.  He has tried to change what he is eating increasing intake of this softer foods or foods that are easier to swallow.  This has made some improvement but continues to struggle.  He has not been seen by his primary care doctor for this complaint.  He does tell me that he actually saw him a couple of weeks ago however was not having issues with this at that time.  He has never had symptoms similar to this in the past.    We talked about his ischemic chest pain that he presented with in 2020 prior to his coronary artery bypass.  He states that pain was characteristically different than the chest pain that he is experiencing now.  Records indicate he described then more have a intense pressure in his chest or this is described as a tightness.    The following portions of the patient's history were reviewed and updated as appropriate: allergies, current medications, past family history, past medical  "history, past social history, and past surgical history.    Allergies   Allergen Reactions    Hydralazine Nausea Only    Losartan Potassium Nausea Only    Penicillins Hives    Spironolactone Swelling     Made  Breast sore and swell       Current Outpatient Medications:     amLODIPine (NORVASC) 10 MG tablet, Take 0.5 tablets by mouth 2 (Two) Times a Day., Disp: , Rfl:     aspirin 81 MG EC tablet, Take 1 tablet by mouth Daily., Disp: 100 tablet, Rfl: 99    budesonide-formoterol (SYMBICORT) 160-4.5 MCG/ACT inhaler, Inhale 2 puffs 2 (Two) Times a Day., Disp: , Rfl:     ferrous sulfate (FeroSul) 325 (65 FE) MG tablet, TAKE 1 TABLET BY MOUTH 2 (TWO) TIMES A DAY., Disp: 180 tablet, Rfl: 1    fluticasone (FLONASE) 50 MCG/ACT nasal spray, 2 sprays by Each Nare route Daily. Obtain otc, Disp: , Rfl:     furosemide (LASIX) 40 MG tablet, Take 1 tablet by mouth Daily for 30 days., Disp: 30 tablet, Rfl: 0    guaiFENesin (MUCINEX) 600 MG 12 hr tablet, Take 2 tablets by mouth Every 12 (Twelve) Hours., Disp: , Rfl:     hydrALAZINE (APRESOLINE) 100 MG tablet, Take 1 tablet by mouth 3 (Three) Times a Day., Disp: , Rfl:     lisinopril (PRINIVIL,ZESTRIL) 20 MG tablet, Take 1 tablet by mouth 2 (Two) Times a Day., Disp: , Rfl:     nitroglycerin (NITROSTAT) 0.4 MG SL tablet, Place 1 tablet under the tongue Every 5 (Five) Minutes As Needed for Chest Pain. Take no more than 3 doses in 15 minutes., Disp: 25 tablet, Rfl: 2    pravastatin (PRAVACHOL) 40 MG tablet, Take 1 tablet by mouth Daily., Disp: , Rfl:     sodium bicarbonate 650 MG tablet, Take 0.5 tablets by mouth Daily., Disp: , Rfl:     terazosin (HYTRIN) 1 MG capsule, Take 1 capsule by mouth Every Night., Disp: , Rfl:     vitamin B-12 (CYANOCOBALAMIN) 100 MCG tablet, Take 0.5 tablets by mouth Daily., Disp: , Rfl:       Review of Systems   Constitutional: Positive for fatigue (chronic) and malaise/fatigue (chronic for years he feels like \"lost energy\"). Negative for diaphoresis, fever, " unexpected weight change and weight gain.   Cardiovascular:  Positive for chest pain (after eating - difficulty swallowing), dyspnea on exertion and leg swelling (noted in leg with prio vein harvest: chronic). Negative for irregular heartbeat, near-syncope, orthopnea, palpitations, paroxysmal nocturnal dyspnea and syncope.   Respiratory:  Positive for chest tightness, cough, shortness of breath (chronic - COPD) and sputum production (mucous - taking mucinex). Negative for wheezing.    Hematologic/Lymphatic: Negative for bleeding problem.   Gastrointestinal:  Positive for dysphagia and heartburn. Negative for abdominal pain and nausea.   Neurological:  Negative for dizziness and light-headedness.   Psychiatric/Behavioral:  Negative for altered mental status.        ECG 12 Lead    Date/Time: 3/12/2024 3:18 PM  Performed by: Jaz Musa APRN    Authorized by: Jaz Musa APRN  Comparison: compared with previous ECG from 12/13/2023  Similar to previous ECG  Rhythm: sinus rhythm  Ectopy: atrial premature contractions and infrequent PVCs  Rate: normal  BPM: 71  Conduction: left bundle branch block  QRS axis: normal  Other findings: poor R wave progression    Clinical impression: abnormal EKG         Objective:     Vitals reviewed.   Constitutional:       General: Awake. Not in acute distress.     Appearance: Normal appearance. Well-developed, well-groomed, overweight and not in distress. Chronically ill-appearing. Not ill-appearing.   HENT:      Head: Normocephalic and atraumatic.   Pulmonary:      Effort: Pulmonary effort is normal.      Breath sounds: No wheezing. No rales.   Cardiovascular:      Normal rate. Occasional ectopic beats. Regular rhythm.      Murmurs: There is no murmur.      No gallop.  No rub.   Edema:     Peripheral edema absent.   Musculoskeletal:      Cervical back: Normal range of motion and neck supple. Skin:     General: Skin is warm and dry.   Neurological:      Mental Status: Alert,  "oriented to person, place, and time and oriented to person, place and time.   Psychiatric:         Attention and Perception: Attention normal.         Mood and Affect: Mood normal.         Speech: Speech normal.         Behavior: Behavior normal. Behavior is cooperative.         Thought Content: Thought content normal.         Cognition and Memory: Cognition normal.       /68   Pulse 71   Ht 182.9 cm (72\")   Wt 91.6 kg (202 lb)   BMI 27.40 kg/m²   Lab Review:     Results for orders placed during the hospital encounter of 11/19/23    Adult Transthoracic Echo Complete W/ Cont if Necessary Per Protocol    Interpretation Summary    Left ventricular ejection fraction appears to be 56 - 60%.    Left ventricular wall thickness is consistent with mild concentric hypertrophy.    Left ventricular diastolic function is consistent with (grade II w/high LAP) pseudonormalization.    Left atrial volume is severely increased.    The right atrial cavity is dilated.    Moderate pulmonary hypertension is present.    There is a trivial pericardial effusion. There is no evidence of cardiac tamponade.    Abnormal global longitudinal LV strain (GLS) = -11.5%          Assessment:          Diagnosis Plan   1. Coronary artery disease involving native coronary artery of native heart without angina pectoris        2. S/P CABG (coronary artery bypass graft)        3. Chest pain, atypical        4. Essential hypertension        5. CKD (chronic kidney disease) stage 4, GFR 15-29 ml/min               Plan:       - Coronary artery disease/CABG: Stable.  No recurrent chest pain similar to his previous anginal symptoms although does report chest tightness after meals that may represent esophageal issues or esophageal spasm.  I have encouraged him to follow-up with his PCP.  Continue aspirin, statin therapies given his prior CAD diagnosis and CABG.  Prior coronary artery bypass grafting performed in 2020 with LIMA to LAD, saphenous vein " graft to the diagonal branch, and saphenous vein graft to distal right coronary artery.    - Atypical chest pain: Related to eating.  Experiences after swallowing foods described as a tightness versus pressure which was his previous anginal description.  When asked about his chest pain he states that characteristics of his chest pain differ from previous anginal chest pain.  We did discuss that if he is experiencing esophageal spasms he may have improvement with use of nitroglycerin.  He could take sublingual nitroglycerin as needed but certainly would follow-up with his primary care doctor for further recommendations.    - Hypertension: Well-controlled on current medications.  Continue monitoring and following up with nephrology.    - Stage IV CKD: Known chronic kidney disease and follows closely with nephrology.    - Hyperlipidemia: Currently managed on pravastatin 40 mg daily.  LDL goal less than 70 given known coronary artery disease.  His lipids are followed through his primary care office.      Follow-up with PCP for atypical chest pain and dysphagia felt to be related to a GI source.  Sublingual nitroglycerin refill  Cardiac follow-up in 6 months, call sooner if needed.  Follow-up with nephrology as scheduled.

## 2024-03-27 RX ORDER — METOPROLOL TARTRATE 50 MG/1
TABLET, FILM COATED ORAL
Qty: 60 TABLET | Refills: 11 | OUTPATIENT
Start: 2024-03-27

## 2024-04-05 RX ORDER — METOPROLOL TARTRATE 50 MG/1
25 TABLET, FILM COATED ORAL 2 TIMES DAILY
Qty: 90 TABLET | Refills: 3 | Status: SHIPPED | OUTPATIENT
Start: 2024-04-05

## 2024-04-09 ENCOUNTER — HOSPITAL ENCOUNTER (OUTPATIENT)
Facility: HOSPITAL | Age: 84
Discharge: HOME | End: 2024-04-11
Attending: INTERNAL MEDICINE
Payer: MEDICARE

## 2024-04-09 ENCOUNTER — APPOINTMENT (OUTPATIENT)
Dept: GENERAL RADIOLOGY | Facility: HOSPITAL | Age: 84
End: 2024-04-09
Payer: MEDICARE

## 2024-04-09 DIAGNOSIS — R53.81 PHYSICAL DEBILITY: ICD-10-CM

## 2024-04-09 DIAGNOSIS — E43 SEVERE MALNUTRITION: ICD-10-CM

## 2024-04-09 DIAGNOSIS — I31.9 CHRONIC PERICARDITIS, UNSPECIFIED COMPLICATION STATUS, UNSPECIFIED TYPE: Chronic | ICD-10-CM

## 2024-04-09 DIAGNOSIS — R26.9 GAIT DIFFICULTY: ICD-10-CM

## 2024-04-09 DIAGNOSIS — J96.01 ACUTE HYPOXIC RESPIRATORY FAILURE: ICD-10-CM

## 2024-04-09 DIAGNOSIS — I25.10 CORONARY ARTERY DISEASE INVOLVING NATIVE CORONARY ARTERY OF NATIVE HEART WITHOUT ANGINA PECTORIS: Chronic | ICD-10-CM

## 2024-04-09 DIAGNOSIS — R13.10 DYSPHAGIA, UNSPECIFIED TYPE: Primary | ICD-10-CM

## 2024-04-09 DIAGNOSIS — I10 ESSENTIAL HYPERTENSION: Chronic | ICD-10-CM

## 2024-04-09 DIAGNOSIS — K22.2 ESOPHAGEAL STENOSIS: ICD-10-CM

## 2024-04-09 DIAGNOSIS — R63.4 WEIGHT LOSS, UNINTENTIONAL: ICD-10-CM

## 2024-04-09 DIAGNOSIS — Z95.1 S/P CABG (CORONARY ARTERY BYPASS GRAFT): Chronic | ICD-10-CM

## 2024-04-09 DIAGNOSIS — N18.4 CKD (CHRONIC KIDNEY DISEASE) STAGE 4, GFR 15-29 ML/MIN: Chronic | ICD-10-CM

## 2024-04-09 DIAGNOSIS — I25.2 HISTORY OF NON-ST ELEVATION MYOCARDIAL INFARCTION (NSTEMI): Chronic | ICD-10-CM

## 2024-04-09 DIAGNOSIS — E78.2 MIXED HYPERLIPIDEMIA: Chronic | ICD-10-CM

## 2024-04-09 DIAGNOSIS — R07.89 ATYPICAL CHEST PAIN: ICD-10-CM

## 2024-04-09 DIAGNOSIS — Z86.73 HISTORY OF CVA (CEREBROVASCULAR ACCIDENT): Chronic | ICD-10-CM

## 2024-04-09 DIAGNOSIS — N28.9 RENAL LESION: ICD-10-CM

## 2024-04-09 DIAGNOSIS — R60.0 PERIPHERAL EDEMA: ICD-10-CM

## 2024-04-09 DIAGNOSIS — R13.19 ESOPHAGEAL DYSPHAGIA: ICD-10-CM

## 2024-04-09 LAB
ALBUMIN SERPL-MCNC: 3.9 G/DL (ref 3.5–5.2)
ALBUMIN/GLOB SERPL: 1.8 G/DL
ALP SERPL-CCNC: 65 U/L (ref 39–117)
ALT SERPL W P-5'-P-CCNC: 6 U/L (ref 1–41)
ANION GAP SERPL CALCULATED.3IONS-SCNC: 11 MMOL/L (ref 5–15)
AST SERPL-CCNC: 12 U/L (ref 1–40)
BASOPHILS # BLD AUTO: 0.04 10*3/MM3 (ref 0–0.2)
BASOPHILS NFR BLD AUTO: 0.6 % (ref 0–1.5)
BILIRUB SERPL-MCNC: 0.4 MG/DL (ref 0–1.2)
BUN SERPL-MCNC: 16 MG/DL (ref 8–23)
BUN/CREAT SERPL: 6.6 (ref 7–25)
CALCIUM SPEC-SCNC: 8.8 MG/DL (ref 8.6–10.5)
CHLORIDE SERPL-SCNC: 111 MMOL/L (ref 98–107)
CO2 SERPL-SCNC: 20 MMOL/L (ref 22–29)
CREAT SERPL-MCNC: 2.43 MG/DL (ref 0.76–1.27)
DEPRECATED RDW RBC AUTO: 42.6 FL (ref 37–54)
EGFRCR SERPLBLD CKD-EPI 2021: 25.7 ML/MIN/1.73
EOSINOPHIL # BLD AUTO: 0.17 10*3/MM3 (ref 0–0.4)
EOSINOPHIL NFR BLD AUTO: 2.7 % (ref 0.3–6.2)
ERYTHROCYTE [DISTWIDTH] IN BLOOD BY AUTOMATED COUNT: 13 % (ref 12.3–15.4)
GLOBULIN UR ELPH-MCNC: 2.2 GM/DL
GLUCOSE SERPL-MCNC: 100 MG/DL (ref 65–99)
HCT VFR BLD AUTO: 33.7 % (ref 37.5–51)
HGB BLD-MCNC: 11.5 G/DL (ref 13–17.7)
LIPASE SERPL-CCNC: 26 U/L (ref 13–60)
LYMPHOCYTES # BLD AUTO: 0.83 10*3/MM3 (ref 0.7–3.1)
LYMPHOCYTES NFR BLD AUTO: 13.1 % (ref 19.6–45.3)
MCH RBC QN AUTO: 30.6 PG (ref 26.6–33)
MCHC RBC AUTO-ENTMCNC: 34.1 G/DL (ref 31.5–35.7)
MCV RBC AUTO: 89.6 FL (ref 79–97)
MONOCYTES # BLD AUTO: 0.47 10*3/MM3 (ref 0.1–0.9)
MONOCYTES NFR BLD AUTO: 7.4 % (ref 5–12)
NEUTROPHILS NFR BLD AUTO: 4.8 10*3/MM3 (ref 1.7–7)
NEUTROPHILS NFR BLD AUTO: 76 % (ref 42.7–76)
PLATELET # BLD AUTO: 115 10*3/MM3 (ref 140–450)
PMV BLD AUTO: 10.7 FL (ref 6–12)
POTASSIUM SERPL-SCNC: 3.6 MMOL/L (ref 3.5–5.2)
PROT SERPL-MCNC: 6.1 G/DL (ref 6–8.5)
QT INTERVAL: 392 MS
QTC INTERVAL: 457 MS
RBC # BLD AUTO: 3.76 10*6/MM3 (ref 4.14–5.8)
SODIUM SERPL-SCNC: 142 MMOL/L (ref 136–145)
TROPONIN T SERPL HS-MCNC: 42 NG/L
TROPONIN T SERPL HS-MCNC: 44 NG/L
WBC NRBC COR # BLD AUTO: 6.32 10*3/MM3 (ref 3.4–10.8)

## 2024-04-09 PROCEDURE — 70360 X-RAY EXAM OF NECK: CPT

## 2024-04-09 PROCEDURE — G0378 HOSPITAL OBSERVATION PER HR: HCPCS

## 2024-04-09 PROCEDURE — 25010000002 LABETALOL 5 MG/ML SOLUTION: Performed by: INTERNAL MEDICINE

## 2024-04-09 PROCEDURE — 96375 TX/PRO/DX INJ NEW DRUG ADDON: CPT

## 2024-04-09 PROCEDURE — 85025 COMPLETE CBC W/AUTO DIFF WBC: CPT | Performed by: NURSE PRACTITIONER

## 2024-04-09 PROCEDURE — 80053 COMPREHEN METABOLIC PANEL: CPT | Performed by: NURSE PRACTITIONER

## 2024-04-09 PROCEDURE — 83690 ASSAY OF LIPASE: CPT | Performed by: NURSE PRACTITIONER

## 2024-04-09 PROCEDURE — 96374 THER/PROPH/DIAG INJ IV PUSH: CPT

## 2024-04-09 PROCEDURE — 25010000002 GLUCAGON (RDNA) PER 1 MG: Performed by: NURSE PRACTITIONER

## 2024-04-09 PROCEDURE — 25810000003 LACTATED RINGERS PER 1000 ML: Performed by: INTERNAL MEDICINE

## 2024-04-09 PROCEDURE — 99285 EMERGENCY DEPT VISIT HI MDM: CPT

## 2024-04-09 PROCEDURE — 36415 COLL VENOUS BLD VENIPUNCTURE: CPT

## 2024-04-09 PROCEDURE — 25810000003 SODIUM CHLORIDE 0.9 % SOLUTION: Performed by: NURSE PRACTITIONER

## 2024-04-09 PROCEDURE — 74019 RADEX ABDOMEN 2 VIEWS: CPT

## 2024-04-09 PROCEDURE — 84484 ASSAY OF TROPONIN QUANT: CPT | Performed by: NURSE PRACTITIONER

## 2024-04-09 PROCEDURE — 93005 ELECTROCARDIOGRAM TRACING: CPT | Performed by: NURSE PRACTITIONER

## 2024-04-09 PROCEDURE — 25010000002 ONDANSETRON PER 1 MG: Performed by: NURSE PRACTITIONER

## 2024-04-09 PROCEDURE — 25010000002 MORPHINE PER 10 MG: Performed by: INTERNAL MEDICINE

## 2024-04-09 RX ORDER — FAMOTIDINE 10 MG/ML
20 INJECTION, SOLUTION INTRAVENOUS EVERY 12 HOURS SCHEDULED
Status: DISCONTINUED | OUTPATIENT
Start: 2024-04-10 | End: 2024-04-11 | Stop reason: HOSPADM

## 2024-04-09 RX ORDER — MORPHINE SULFATE 2 MG/ML
1 INJECTION, SOLUTION INTRAMUSCULAR; INTRAVENOUS EVERY 4 HOURS PRN
Status: DISCONTINUED | OUTPATIENT
Start: 2024-04-09 | End: 2024-04-11 | Stop reason: HOSPADM

## 2024-04-09 RX ORDER — IBUPROFEN 600 MG/1
1 TABLET ORAL ONCE
Status: COMPLETED | OUTPATIENT
Start: 2024-04-09 | End: 2024-04-09

## 2024-04-09 RX ORDER — SODIUM CHLORIDE 0.9 % (FLUSH) 0.9 %
10 SYRINGE (ML) INJECTION AS NEEDED
Status: DISCONTINUED | OUTPATIENT
Start: 2024-04-09 | End: 2024-04-11 | Stop reason: HOSPADM

## 2024-04-09 RX ORDER — BUDESONIDE AND FORMOTEROL FUMARATE DIHYDRATE 160; 4.5 UG/1; UG/1
2 AEROSOL RESPIRATORY (INHALATION)
Status: DISCONTINUED | OUTPATIENT
Start: 2024-04-10 | End: 2024-04-09

## 2024-04-09 RX ORDER — ONDANSETRON 4 MG/1
4 TABLET, ORALLY DISINTEGRATING ORAL EVERY 6 HOURS PRN
Status: DISCONTINUED | OUTPATIENT
Start: 2024-04-09 | End: 2024-04-11 | Stop reason: HOSPADM

## 2024-04-09 RX ORDER — ONDANSETRON 2 MG/ML
4 INJECTION INTRAMUSCULAR; INTRAVENOUS ONCE
Status: COMPLETED | OUTPATIENT
Start: 2024-04-09 | End: 2024-04-09

## 2024-04-09 RX ORDER — FLUTICASONE PROPIONATE 50 MCG
2 SPRAY, SUSPENSION (ML) NASAL DAILY
Status: DISCONTINUED | OUTPATIENT
Start: 2024-04-10 | End: 2024-04-11 | Stop reason: HOSPADM

## 2024-04-09 RX ORDER — SODIUM CHLORIDE 0.9 % (FLUSH) 0.9 %
10 SYRINGE (ML) INJECTION EVERY 12 HOURS SCHEDULED
Status: DISCONTINUED | OUTPATIENT
Start: 2024-04-10 | End: 2024-04-11 | Stop reason: HOSPADM

## 2024-04-09 RX ORDER — LABETALOL HYDROCHLORIDE 5 MG/ML
10 INJECTION, SOLUTION INTRAVENOUS ONCE
Status: COMPLETED | OUTPATIENT
Start: 2024-04-10 | End: 2024-04-09

## 2024-04-09 RX ORDER — SODIUM CHLORIDE 9 MG/ML
40 INJECTION, SOLUTION INTRAVENOUS AS NEEDED
Status: DISCONTINUED | OUTPATIENT
Start: 2024-04-09 | End: 2024-04-11 | Stop reason: HOSPADM

## 2024-04-09 RX ORDER — ONDANSETRON 2 MG/ML
4 INJECTION INTRAMUSCULAR; INTRAVENOUS EVERY 6 HOURS PRN
Status: DISCONTINUED | OUTPATIENT
Start: 2024-04-09 | End: 2024-04-11 | Stop reason: HOSPADM

## 2024-04-09 RX ORDER — SODIUM CHLORIDE, SODIUM LACTATE, POTASSIUM CHLORIDE, CALCIUM CHLORIDE 600; 310; 30; 20 MG/100ML; MG/100ML; MG/100ML; MG/100ML
75 INJECTION, SOLUTION INTRAVENOUS CONTINUOUS
Status: DISPENSED | OUTPATIENT
Start: 2024-04-10 | End: 2024-04-10

## 2024-04-09 RX ORDER — FAMOTIDINE 10 MG/ML
20 INJECTION, SOLUTION INTRAVENOUS ONCE
Status: COMPLETED | OUTPATIENT
Start: 2024-04-09 | End: 2024-04-09

## 2024-04-09 RX ADMIN — SODIUM CHLORIDE, POTASSIUM CHLORIDE, SODIUM LACTATE AND CALCIUM CHLORIDE 75 ML/HR: 600; 310; 30; 20 INJECTION, SOLUTION INTRAVENOUS at 23:16

## 2024-04-09 RX ADMIN — GLUCAGON HYDROCHLORIDE 1 MG: KIT at 18:57

## 2024-04-09 RX ADMIN — MORPHINE SULFATE 1 MG: 2 INJECTION, SOLUTION INTRAMUSCULAR; INTRAVENOUS at 23:57

## 2024-04-09 RX ADMIN — Medication 10 ML: at 23:16

## 2024-04-09 RX ADMIN — ONDANSETRON 4 MG: 2 INJECTION INTRAMUSCULAR; INTRAVENOUS at 18:00

## 2024-04-09 RX ADMIN — LABETALOL HYDROCHLORIDE 10 MG: 5 INJECTION, SOLUTION INTRAVENOUS at 23:27

## 2024-04-09 RX ADMIN — SODIUM CHLORIDE 250 ML: 9 INJECTION, SOLUTION INTRAVENOUS at 17:59

## 2024-04-09 RX ADMIN — FAMOTIDINE 20 MG: 10 INJECTION INTRAVENOUS at 18:02

## 2024-04-09 NOTE — ED NOTES
Patient stated that he feels like he has acid reflux but worse.  Patient stated that when he eats any solid food it comes back up with a bunch of flem

## 2024-04-10 ENCOUNTER — ANESTHESIA EVENT (OUTPATIENT)
Dept: GASTROENTEROLOGY | Facility: HOSPITAL | Age: 84
End: 2024-04-10
Payer: MEDICARE

## 2024-04-10 ENCOUNTER — ANESTHESIA (OUTPATIENT)
Dept: GASTROENTEROLOGY | Facility: HOSPITAL | Age: 84
End: 2024-04-10
Payer: MEDICARE

## 2024-04-10 ENCOUNTER — APPOINTMENT (OUTPATIENT)
Dept: CT IMAGING | Facility: HOSPITAL | Age: 84
End: 2024-04-10
Payer: MEDICARE

## 2024-04-10 PROBLEM — E43 SEVERE MALNUTRITION: Status: ACTIVE | Noted: 2024-04-10

## 2024-04-10 PROBLEM — R63.4 WEIGHT LOSS, UNINTENTIONAL: Status: ACTIVE | Noted: 2024-04-10

## 2024-04-10 PROBLEM — K22.2 ESOPHAGEAL STENOSIS: Status: ACTIVE | Noted: 2024-04-10

## 2024-04-10 LAB
ALBUMIN SERPL-MCNC: 3.4 G/DL (ref 3.5–5.2)
ALBUMIN SERPL-MCNC: 3.7 G/DL (ref 3.5–5.2)
ALBUMIN/GLOB SERPL: 1.8 G/DL
ALBUMIN/GLOB SERPL: 1.8 G/DL
ALP SERPL-CCNC: 56 U/L (ref 39–117)
ALP SERPL-CCNC: 62 U/L (ref 39–117)
ALT SERPL W P-5'-P-CCNC: 7 U/L (ref 1–41)
ALT SERPL W P-5'-P-CCNC: 7 U/L (ref 1–41)
ANION GAP SERPL CALCULATED.3IONS-SCNC: 11 MMOL/L (ref 5–15)
ANION GAP SERPL CALCULATED.3IONS-SCNC: 7 MMOL/L (ref 5–15)
AST SERPL-CCNC: 10 U/L (ref 1–40)
AST SERPL-CCNC: 13 U/L (ref 1–40)
BASOPHILS # BLD AUTO: 0.04 10*3/MM3 (ref 0–0.2)
BASOPHILS NFR BLD AUTO: 1 % (ref 0–1.5)
BILIRUB SERPL-MCNC: 0.4 MG/DL (ref 0–1.2)
BILIRUB SERPL-MCNC: 0.4 MG/DL (ref 0–1.2)
BUN SERPL-MCNC: 14 MG/DL (ref 8–23)
BUN SERPL-MCNC: 15 MG/DL (ref 8–23)
BUN/CREAT SERPL: 5.8 (ref 7–25)
BUN/CREAT SERPL: 5.8 (ref 7–25)
CALCIUM SPEC-SCNC: 8.7 MG/DL (ref 8.6–10.5)
CALCIUM SPEC-SCNC: 8.8 MG/DL (ref 8.6–10.5)
CHLORIDE SERPL-SCNC: 111 MMOL/L (ref 98–107)
CHLORIDE SERPL-SCNC: 111 MMOL/L (ref 98–107)
CO2 SERPL-SCNC: 21 MMOL/L (ref 22–29)
CO2 SERPL-SCNC: 24 MMOL/L (ref 22–29)
CREAT SERPL-MCNC: 2.4 MG/DL (ref 0.76–1.27)
CREAT SERPL-MCNC: 2.6 MG/DL (ref 0.76–1.27)
DEPRECATED RDW RBC AUTO: 44.7 FL (ref 37–54)
EGFRCR SERPLBLD CKD-EPI 2021: 23.7 ML/MIN/1.73
EGFRCR SERPLBLD CKD-EPI 2021: 26.1 ML/MIN/1.73
EOSINOPHIL # BLD AUTO: 0.19 10*3/MM3 (ref 0–0.4)
EOSINOPHIL NFR BLD AUTO: 4.5 % (ref 0.3–6.2)
ERYTHROCYTE [DISTWIDTH] IN BLOOD BY AUTOMATED COUNT: 13.2 % (ref 12.3–15.4)
GLOBULIN UR ELPH-MCNC: 1.9 GM/DL
GLOBULIN UR ELPH-MCNC: 2.1 GM/DL
GLUCOSE SERPL-MCNC: 82 MG/DL (ref 65–99)
GLUCOSE SERPL-MCNC: 89 MG/DL (ref 65–99)
HCT VFR BLD AUTO: 30.7 % (ref 37.5–51)
HGB BLD-MCNC: 10 G/DL (ref 13–17.7)
IMM GRANULOCYTES # BLD AUTO: 0.01 10*3/MM3 (ref 0–0.05)
IMM GRANULOCYTES NFR BLD AUTO: 0.2 % (ref 0–0.5)
LYMPHOCYTES # BLD AUTO: 0.96 10*3/MM3 (ref 0.7–3.1)
LYMPHOCYTES NFR BLD AUTO: 23 % (ref 19.6–45.3)
MAGNESIUM SERPL-MCNC: 2 MG/DL (ref 1.6–2.4)
MCH RBC QN AUTO: 29.9 PG (ref 26.6–33)
MCHC RBC AUTO-ENTMCNC: 32.6 G/DL (ref 31.5–35.7)
MCV RBC AUTO: 91.6 FL (ref 79–97)
MONOCYTES # BLD AUTO: 0.44 10*3/MM3 (ref 0.1–0.9)
MONOCYTES NFR BLD AUTO: 10.5 % (ref 5–12)
NEUTROPHILS NFR BLD AUTO: 2.54 10*3/MM3 (ref 1.7–7)
NEUTROPHILS NFR BLD AUTO: 60.8 % (ref 42.7–76)
NRBC BLD AUTO-RTO: 0 /100 WBC (ref 0–0.2)
PLATELET # BLD AUTO: 85 10*3/MM3 (ref 140–450)
PMV BLD AUTO: 11.1 FL (ref 6–12)
POTASSIUM SERPL-SCNC: 3.5 MMOL/L (ref 3.5–5.2)
POTASSIUM SERPL-SCNC: 3.6 MMOL/L (ref 3.5–5.2)
PROT SERPL-MCNC: 5.3 G/DL (ref 6–8.5)
PROT SERPL-MCNC: 5.8 G/DL (ref 6–8.5)
RBC # BLD AUTO: 3.35 10*6/MM3 (ref 4.14–5.8)
SODIUM SERPL-SCNC: 142 MMOL/L (ref 136–145)
SODIUM SERPL-SCNC: 143 MMOL/L (ref 136–145)
WBC NRBC COR # BLD AUTO: 4.18 10*3/MM3 (ref 3.4–10.8)

## 2024-04-10 PROCEDURE — 25810000003 LACTATED RINGERS PER 1000 ML: Performed by: INTERNAL MEDICINE

## 2024-04-10 PROCEDURE — G0378 HOSPITAL OBSERVATION PER HR: HCPCS

## 2024-04-10 PROCEDURE — 88305 TISSUE EXAM BY PATHOLOGIST: CPT | Performed by: INTERNAL MEDICINE

## 2024-04-10 PROCEDURE — 96376 TX/PRO/DX INJ SAME DRUG ADON: CPT

## 2024-04-10 PROCEDURE — 25810000003 SODIUM CHLORIDE 0.9 % SOLUTION: Performed by: NURSE ANESTHETIST, CERTIFIED REGISTERED

## 2024-04-10 PROCEDURE — 85025 COMPLETE CBC W/AUTO DIFF WBC: CPT | Performed by: INTERNAL MEDICINE

## 2024-04-10 PROCEDURE — 80053 COMPREHEN METABOLIC PANEL: CPT | Performed by: INTERNAL MEDICINE

## 2024-04-10 PROCEDURE — 71250 CT THORAX DX C-: CPT

## 2024-04-10 PROCEDURE — 43249 ESOPH EGD DILATION <30 MM: CPT | Performed by: INTERNAL MEDICINE

## 2024-04-10 PROCEDURE — 43239 EGD BIOPSY SINGLE/MULTIPLE: CPT | Performed by: INTERNAL MEDICINE

## 2024-04-10 PROCEDURE — 25010000002 PROPOFOL 10 MG/ML EMULSION: Performed by: NURSE ANESTHETIST, CERTIFIED REGISTERED

## 2024-04-10 PROCEDURE — C1726 CATH, BAL DIL, NON-VASCULAR: HCPCS | Performed by: INTERNAL MEDICINE

## 2024-04-10 PROCEDURE — 92610 EVALUATE SWALLOWING FUNCTION: CPT

## 2024-04-10 PROCEDURE — 80053 COMPREHEN METABOLIC PANEL: CPT | Performed by: NURSE PRACTITIONER

## 2024-04-10 PROCEDURE — 83735 ASSAY OF MAGNESIUM: CPT | Performed by: INTERNAL MEDICINE

## 2024-04-10 PROCEDURE — 99221 1ST HOSP IP/OBS SF/LOW 40: CPT | Performed by: INTERNAL MEDICINE

## 2024-04-10 PROCEDURE — 25010000002 MORPHINE PER 10 MG: Performed by: INTERNAL MEDICINE

## 2024-04-10 RX ORDER — SODIUM CHLORIDE 0.9 % (FLUSH) 0.9 %
10 SYRINGE (ML) INJECTION AS NEEDED
Status: DISCONTINUED | OUTPATIENT
Start: 2024-04-10 | End: 2024-04-10 | Stop reason: HOSPADM

## 2024-04-10 RX ORDER — LIDOCAINE HYDROCHLORIDE 20 MG/ML
INJECTION, SOLUTION EPIDURAL; INFILTRATION; INTRACAUDAL; PERINEURAL AS NEEDED
Status: DISCONTINUED | OUTPATIENT
Start: 2024-04-10 | End: 2024-04-10 | Stop reason: SURG

## 2024-04-10 RX ORDER — LIDOCAINE HYDROCHLORIDE 10 MG/ML
0.5 INJECTION, SOLUTION EPIDURAL; INFILTRATION; INTRACAUDAL; PERINEURAL ONCE AS NEEDED
Status: DISCONTINUED | OUTPATIENT
Start: 2024-04-10 | End: 2024-04-10 | Stop reason: HOSPADM

## 2024-04-10 RX ORDER — UBIDECARENONE 75 MG
50 CAPSULE ORAL DAILY
Status: DISCONTINUED | OUTPATIENT
Start: 2024-04-10 | End: 2024-04-11 | Stop reason: HOSPADM

## 2024-04-10 RX ORDER — ALUMINA, MAGNESIA, AND SIMETHICONE 2400; 2400; 240 MG/30ML; MG/30ML; MG/30ML
15 SUSPENSION ORAL 3 TIMES DAILY
Status: DISCONTINUED | OUTPATIENT
Start: 2024-04-10 | End: 2024-04-11 | Stop reason: HOSPADM

## 2024-04-10 RX ORDER — AMLODIPINE BESYLATE 5 MG/1
5 TABLET ORAL 2 TIMES DAILY
Status: DISCONTINUED | OUTPATIENT
Start: 2024-04-10 | End: 2024-04-11 | Stop reason: HOSPADM

## 2024-04-10 RX ORDER — SODIUM CHLORIDE 9 MG/ML
500 INJECTION, SOLUTION INTRAVENOUS CONTINUOUS PRN
Status: DISCONTINUED | OUTPATIENT
Start: 2024-04-10 | End: 2024-04-11 | Stop reason: HOSPADM

## 2024-04-10 RX ORDER — METOPROLOL TARTRATE 50 MG/1
50 TABLET, FILM COATED ORAL EVERY 12 HOURS SCHEDULED
Status: DISCONTINUED | OUTPATIENT
Start: 2024-04-10 | End: 2024-04-11 | Stop reason: HOSPADM

## 2024-04-10 RX ORDER — OXYCODONE HYDROCHLORIDE AND ACETAMINOPHEN 5; 325 MG/1; MG/1
1 TABLET ORAL EVERY 6 HOURS PRN
Status: DISCONTINUED | OUTPATIENT
Start: 2024-04-10 | End: 2024-04-11 | Stop reason: HOSPADM

## 2024-04-10 RX ORDER — HYDRALAZINE HYDROCHLORIDE 50 MG/1
50 TABLET, FILM COATED ORAL 2 TIMES DAILY
Status: DISCONTINUED | OUTPATIENT
Start: 2024-04-10 | End: 2024-04-11 | Stop reason: HOSPADM

## 2024-04-10 RX ORDER — SODIUM BICARBONATE 650 MG/1
325 TABLET ORAL DAILY
Status: DISCONTINUED | OUTPATIENT
Start: 2024-04-10 | End: 2024-04-11 | Stop reason: HOSPADM

## 2024-04-10 RX ORDER — NITROGLYCERIN 0.4 MG/1
0.4 TABLET SUBLINGUAL
Status: DISCONTINUED | OUTPATIENT
Start: 2024-04-10 | End: 2024-04-11 | Stop reason: HOSPADM

## 2024-04-10 RX ORDER — PROPOFOL 10 MG/ML
VIAL (ML) INTRAVENOUS AS NEEDED
Status: DISCONTINUED | OUTPATIENT
Start: 2024-04-10 | End: 2024-04-10 | Stop reason: SURG

## 2024-04-10 RX ORDER — ASPIRIN 81 MG/1
81 TABLET ORAL DAILY
Status: DISCONTINUED | OUTPATIENT
Start: 2024-04-10 | End: 2024-04-11 | Stop reason: HOSPADM

## 2024-04-10 RX ORDER — TERAZOSIN 1 MG/1
1 CAPSULE ORAL NIGHTLY
Status: DISCONTINUED | OUTPATIENT
Start: 2024-04-10 | End: 2024-04-11 | Stop reason: HOSPADM

## 2024-04-10 RX ORDER — PRAVASTATIN SODIUM 20 MG
40 TABLET ORAL NIGHTLY
Status: DISCONTINUED | OUTPATIENT
Start: 2024-04-10 | End: 2024-04-11 | Stop reason: HOSPADM

## 2024-04-10 RX ADMIN — FLUTICASONE PROPIONATE 2 SPRAY: 50 SPRAY, METERED NASAL at 18:59

## 2024-04-10 RX ADMIN — FAMOTIDINE 20 MG: 10 INJECTION INTRAVENOUS at 21:02

## 2024-04-10 RX ADMIN — ALUMINUM HYDROXIDE, MAGNESIUM HYDROXIDE, AND DIMETHICONE 15 ML: 400; 400; 40 SUSPENSION ORAL at 21:02

## 2024-04-10 RX ADMIN — SODIUM BICARBONATE 325 MG: 650 TABLET ORAL at 18:57

## 2024-04-10 RX ADMIN — SODIUM CHLORIDE 500 ML: 0.9 INJECTION, SOLUTION INTRAVENOUS at 12:43

## 2024-04-10 RX ADMIN — ASPIRIN 81 MG: 81 TABLET, COATED ORAL at 18:58

## 2024-04-10 RX ADMIN — MORPHINE SULFATE 1 MG: 2 INJECTION, SOLUTION INTRAMUSCULAR; INTRAVENOUS at 14:45

## 2024-04-10 RX ADMIN — PROPOFOL INJECTABLE EMULSION 100 MG: 10 INJECTION, EMULSION INTRAVENOUS at 13:22

## 2024-04-10 RX ADMIN — METOPROLOL TARTRATE 50 MG: 50 TABLET, FILM COATED ORAL at 17:11

## 2024-04-10 RX ADMIN — VITAM B12 50 MCG: 100 TAB at 18:58

## 2024-04-10 RX ADMIN — FAMOTIDINE 20 MG: 10 INJECTION INTRAVENOUS at 09:17

## 2024-04-10 RX ADMIN — LIDOCAINE HYDROCHLORIDE 100 MG: 20 INJECTION, SOLUTION EPIDURAL; INFILTRATION; INTRACAUDAL; PERINEURAL at 13:11

## 2024-04-10 RX ADMIN — PRAVASTATIN SODIUM 40 MG: 20 TABLET ORAL at 21:01

## 2024-04-10 RX ADMIN — TERAZOSIN HYDROCHLORIDE 1 MG: 1 CAPSULE ORAL at 21:01

## 2024-04-10 RX ADMIN — SODIUM CHLORIDE, POTASSIUM CHLORIDE, SODIUM LACTATE AND CALCIUM CHLORIDE 75 ML/HR: 600; 310; 30; 20 INJECTION, SOLUTION INTRAVENOUS at 17:12

## 2024-04-10 RX ADMIN — MORPHINE SULFATE 1 MG: 2 INJECTION, SOLUTION INTRAMUSCULAR; INTRAVENOUS at 04:05

## 2024-04-10 RX ADMIN — PROPOFOL INJECTABLE EMULSION 100 MG: 10 INJECTION, EMULSION INTRAVENOUS at 13:12

## 2024-04-10 RX ADMIN — Medication 10 ML: at 21:02

## 2024-04-10 RX ADMIN — Medication 10 ML: at 09:19

## 2024-04-10 RX ADMIN — MORPHINE SULFATE 1 MG: 2 INJECTION, SOLUTION INTRAMUSCULAR; INTRAVENOUS at 09:17

## 2024-04-10 RX ADMIN — OXYCODONE AND ACETAMINOPHEN 1 TABLET: 5; 325 TABLET ORAL at 18:58

## 2024-04-10 RX ADMIN — SODIUM CHLORIDE, POTASSIUM CHLORIDE, SODIUM LACTATE AND CALCIUM CHLORIDE 75 ML/HR: 600; 310; 30; 20 INJECTION, SOLUTION INTRAVENOUS at 14:50

## 2024-04-10 NOTE — PLAN OF CARE
Goal Outcome Evaluation:  Plan of Care Reviewed With: patient, family, caregiver        Progress: no change  Outcome Evaluation: Ntn assesment completed. Pt reports he started trying intentionally trying to lose weight to help with his kidneys;however reports he had only lost about 3-4 lbs. Pt reports that he started losing weight last fall. Weight loss of 31 lbs (13.9%) over the past four months.Moderate muscle wasting and moderate fat loss per [facility]. Await diet advancement. Con to follow for plan of care.

## 2024-04-10 NOTE — PLAN OF CARE
Goal Outcome Evaluation:  Plan of Care Reviewed With: patient        Progress: no change  Outcome Evaluation: Pt A&Ox4. VSS on RA. PPP. C/O pain managed with PRN medications. NPO. Up aib kimberly. IV L AC infusing LR 75ml/hr. Call light within reach, safety maintained.

## 2024-04-10 NOTE — PLAN OF CARE
Goal Outcome Evaluation:  Plan of Care Reviewed With: patient        Progress: improving  Outcome Evaluation: Patient A+Ox4, DIETZ- had endoscopy today and cleared by SLP for liquid diet- patient tolerated all of supper meal-tray- tolerating PO pill- no c/o nausea or active vomitting at this time. Patient c/o right sided chest discomfort that he states could be related to the esophageal/lung mass found- requesting medication to last longer. MD notified and new PO pain medication ordered. Daughter in room and assisting patient. Voiding without difficulties. IVF going. Safety maintained.

## 2024-04-10 NOTE — ED PROVIDER NOTES
Subjective   History of Present Illness  Patient is a 89-year-old male who presents to the ER with chief complaints of possible food bolus.  Patient is spitting in the trash can on exam.  He states it feels almost as if he has acid reflux only much worse.  Patient states approximately 1 month ago he was prescribed doxycycline for a sinus and ear infection.  He states around this time he started noticing having worse reflux symptoms.  Initially he was unable to eat solid foods because it caused vomiting and he was able to tolerate eating only soft foods and very small amounts at a time.  Within the past few weeks it has worsened and now he is unable to tolerate eating anything without vomiting for the past few days.  He states he has had a 35 pound weight loss since November.  Patient denies any abdominal pain.  He states he had some chest pain to the right side of his ribs and x-rays were performed by his PCP which were negative.  He also mentions history of constipation secondary to iron replacement medication.  He feels as if he has had good bowel movements every other day.  He has had no recorded fevers and again denies any abdominal pain.  He has appt scheduled with GI in [place] for tomorrow but since he has been unable to keep anything down and his cardiologist is at this facility he felt he needed to be evaluated tonight. Past medical history significant for arthritis, chronic fatigue, renal disease stage IV, coronary artery disease, CVA, hyperlipidemia, hypertension, palpitations, PACs, PVCs, sleep apnea, stroke, right leg weakness        Review of Systems   Constitutional: Negative.  Negative for fever.   HENT: Negative.  Negative for congestion.    Eyes: Negative.    Respiratory: Negative.  Negative for cough and shortness of breath.    Cardiovascular:  Positive for chest pain.   Gastrointestinal:  Positive for vomiting. Negative for abdominal pain.   Genitourinary: Negative.  Negative for dysuria.    Musculoskeletal: Negative.  Negative for back pain.   Skin: Negative.    All other systems reviewed and are negative.      Past Medical History:   Diagnosis Date    Arthritis     Cataract     Chronic fatigue 10/3/2017    Chronic renal disease, stage IV     Coronary artery disease     CVA (cerebral vascular accident) 2018    Hyperlipidemia     Hypertension     Palpitations 10/3/2017    Premature atrial contractions 10/17/2017    PVCs (premature ventricular contractions) 10/17/2017    Sleep apnea     Stage 4 chronic kidney disease 2018    Stroke 2007    Weakness     right leg weaker       Allergies   Allergen Reactions    Hydralazine Nausea Only    Losartan Potassium Nausea Only    Penicillins Hives    Spironolactone Swelling     Made  Breast sore and swell       Past Surgical History:   Procedure Laterality Date    CARDIAC CATHETERIZATION      CARDIAC CATHETERIZATION N/A 2020    Procedure: Left Heart Cath;  Surgeon: Rufino Brice MD;  Location:  [facility]ATH [facility];  Service: Cardiology;  Laterality: N/A;    CORONARY ARTERY BYPASS GRAFT N/A 2020    Procedure: CABG X 3, LIMA, SHELBY, EVH WITH OPEN EXTENSION LOWER RIGHT LEG;  Surgeon: Nikunj Carballo MD;  Location:  [facility] OR;  Service: Cardiothoracic;  Laterality: N/A;       Family History   Problem Relation Age of Onset    Cancer Mother     Cancer Father        Social History     Socioeconomic History    Marital status:    Tobacco Use    Smoking status: Former     Current packs/day: 0.00     Average packs/day: 1.5 packs/day for 25.0 years (37.5 ttl pk-yrs)     Types: Cigarettes     Start date:      Quit date:      Years since quittin.3    Smokeless tobacco: Never   Vaping Use    Vaping status: Never Used   Substance and Sexual Activity    Alcohol use: No    Drug use: No    Sexual activity: Defer           Objective   Physical Exam  Vitals and nursing note reviewed.   Constitutional:       General: He is not  in acute distress.     Appearance: He is well-developed. He is not diaphoretic.   HENT:      Head: Atraumatic.      Right Ear: External ear normal.      Left Ear: External ear normal.      Nose: Nose normal.      Mouth/Throat:      Pharynx: Oropharynx is clear.   Eyes:      General: No scleral icterus.     Extraocular Movements: Extraocular movements intact.      Conjunctiva/sclera: Conjunctivae normal.   Neck:      Thyroid: No thyromegaly.      Vascular: No JVD.   Cardiovascular:      Rate and Rhythm: Normal rate and regular rhythm.      Heart sounds: Normal heart sounds. No murmur heard.  Pulmonary:      Effort: Pulmonary effort is normal. No respiratory distress.      Breath sounds: Normal breath sounds. No wheezing or rales.   Chest:      Chest wall: No tenderness.   Abdominal:      General: Bowel sounds are normal. There is no distension.      Palpations: Abdomen is soft. There is no mass.      Tenderness: There is no abdominal tenderness. There is no guarding or rebound.   Musculoskeletal:         General: Normal range of motion.      Cervical back: Normal range of motion and neck supple.   Lymphadenopathy:      Cervical: No cervical adenopathy.   Skin:     General: Skin is warm and dry.      Coloration: Skin is not pale.      Findings: No erythema or rash.   Neurological:      Mental Status: He is alert and oriented to person, place, and time.      Cranial Nerves: No cranial nerve deficit.      Coordination: Coordination normal.      Deep Tendon Reflexes: Reflexes are normal and symmetric.   Psychiatric:         Behavior: Behavior normal.         Thought Content: Thought content normal.         Judgment: Judgment normal.       Labs Reviewed   COMPREHENSIVE METABOLIC PANEL - Abnormal; Notable for the following components:       Result Value    Glucose 100 (*)     Creatinine 2.43 (*)     Chloride 111 (*)     CO2 20.0 (*)     BUN/Creatinine Ratio 6.6 (*)     eGFR 25.7 (*)     All other components within normal  limits    Narrative:     GFR Normal >60  Chronic Kidney Disease <60  Kidney Failure <15    The GFR formula is only valid for adults with stable renal function between ages 18 and 70.   SINGLE HS TROPONIN T - Abnormal; Notable for the following components:    HS Troponin T 44 (*)     All other components within normal limits    Narrative:     High Sensitive Troponin T Reference Range:  <14.0 ng/L- Negative Female for AMI  <22.0 ng/L- Negative Male for AMI  >=14 - Abnormal Female indicating possible myocardial injury.  >=22 - Abnormal Male indicating possible myocardial injury.   Clinicians would have to utilize clinical acumen, EKG, Troponin, and serial changes to determine if it is an Acute Myocardial Infarction or myocardial injury due to an underlying chronic condition.        CBC WITH AUTO DIFFERENTIAL - Abnormal; Notable for the following components:    RBC 3.76 (*)     Hemoglobin 11.5 (*)     Hematocrit 33.7 (*)     Platelets 115 (*)     Lymphocyte % 13.1 (*)     All other components within normal limits   TROPONIN - Abnormal; Notable for the following components:    HS Troponin T 42 (*)     All other components within normal limits    Narrative:     High Sensitive Troponin T Reference Range:  <14.0 ng/L- Negative Female for AMI  <22.0 ng/L- Negative Male for AMI  >=14 - Abnormal Female indicating possible myocardial injury.  >=22 - Abnormal Male indicating possible myocardial injury.   Clinicians would have to utilize clinical acumen, EKG, Troponin, and serial changes to determine if it is an Acute Myocardial Infarction or myocardial injury due to an underlying chronic condition.        CBC WITH AUTO DIFFERENTIAL - Abnormal; Notable for the following components:    RBC 3.35 (*)     Hemoglobin 10.0 (*)     Hematocrit 30.7 (*)     Platelets 85 (*)     All other components within normal limits   COMPREHENSIVE METABOLIC PANEL - Abnormal; Notable for the following components:    Creatinine 2.60 (*)     Chloride 111  (*)     Total Protein 5.3 (*)     Albumin 3.4 (*)     BUN/Creatinine Ratio 5.8 (*)     eGFR 23.7 (*)     All other components within normal limits    Narrative:     GFR Normal >60  Chronic Kidney Disease <60  Kidney Failure <15    The GFR formula is only valid for adults with stable renal function between ages 18 and 70.   COMPREHENSIVE METABOLIC PANEL - Abnormal; Notable for the following components:    Creatinine 2.40 (*)     Chloride 111 (*)     CO2 21.0 (*)     Total Protein 5.8 (*)     BUN/Creatinine Ratio 5.8 (*)     eGFR 26.1 (*)     All other components within normal limits    Narrative:     GFR Normal >60  Chronic Kidney Disease <60  Kidney Failure <15    The GFR formula is only valid for adults with stable renal function between ages 18 and 70.   LIPASE - Normal   MAGNESIUM - Normal   TISSUE PATHOLOGY EXAM   CBC AND DIFFERENTIAL    Narrative:     The following orders were created for panel order CBC & Differential.  Procedure                               Abnormality         Status                     ---------                               -----------         ------                     CBC Auto Differential[[id number]]        Abnormal            Final result                 Please view results for these tests on the individual orders.      CT Chest Without Contrast Diagnostic   Final Result   1.  No acute cardiopulmonary process.   2.  Lung emphysema. There is a 6 mm left upper lobe nodule which is   stable at least dating back to May 2020. This is considered benign. No   new nodules or consolidation.   3.  Previous coronary bypass.   4.  No acute fracture identified. No acute process identified in the   chest wall soft tissues to explain right axillary pain.               This report was signed and finalized on 4/10/2024 6:41 AM by Dr Xavi Nguyen.          XR Neck Soft Tissue   Final Result       No radiopaque foreign body or dense retained food bolus evident by   x-ray.       This report was signed  and finalized on 4/9/2024 5:47 PM by Dr. Arsalan Ybarra MD.          XR Abdomen Flat & Upright   Final Result   Nonobstructive bowel gas pattern.       This report was signed and finalized on 4/9/2024 5:47 PM by Dr. Arsalan Ybarra MD.               Procedures           ED Course  ED Course as of 04/10/24 2109   Tue Apr 09, 2024 1956 Work up remains pending. This will be a turn over for mekhi garza [TW]   2115 Care of this patient was initiated by LIZ Wharton.  Care of the patient was turned over to me pending repeat troponin with plans of discharging patient home with GI follow-up tomorrow in office outpatient.  Upon reevaluation of the patient, family voices concerns of discharge.  They state that they originally had an appointment scheduled with GI at [place] for tomorrow but due to patient's multiple comorbidities and all specialist at this facility they were encouraged to seek treatment with GI at this facility.  Patient reports he does not have an appointment scheduled with our GI until 4/30/2024.  Patient states he was able to tolerate liquids during today's encounter in the ED but has extreme discomfort with swallowing and will vomit an hour or so later after oral intake.  Family voices concerns of dehydration and malnourishment if patient continues to be in the state as he is already lost 30+ pounds over the last few months. [KF]   2128 GI paged at this time [KF]   2134 Spoke with Dr. Timmons, GI provider on-call regarding my concerns of discharging patient home.  He states to admit patient to hospitalist and he will consult tomorrow with plans to scope patient tomorrow.  I discussed this plan with family and patient who are all in agreement with plans of admission.  I spoke with hospitalist, Dr. Shin regarding admission.  Patient was admitted to her service in stable condition. [KF]      ED Course User Index  [KF] Mekhi Brian APRN  [TW] Akiko Caputo APRN                                              Medical Decision Making  Patient is a 89-year-old male who presents to the ER with chief complaints of possible food bolus.  Patient is spitting in the trash can on exam.  He states it feels almost as if he has acid reflux only much worse.  Patient states approximately 1 month ago he was prescribed doxycycline for a sinus and ear infection.  He states around this time he started noticing having worse reflux symptoms.  Initially he was unable to eat solid foods because it caused vomiting and he was able to tolerate eating only soft foods and very small amounts at a time.  Within the past few weeks it has worsened and now he is unable to tolerate eating anything without vomiting for the past few days.  He states he has had a 35 pound weight loss since November.  Patient denies any abdominal pain.  He states he had some chest pain to the right side of his ribs and x-rays were performed by his PCP which were negative.  He also mentions history of constipation secondary to iron replacement medication.  He feels as if he has had good bowel movements every other day.  He has had no recorded fevers and again denies any abdominal pain.  He has appt scheduled with GI in [place] for tomorrow but since he has been unable to keep anything down and his cardiologist is at this facility he felt he needed to be evaluated tonight. Past medical history significant for arthritis, chronic fatigue, renal disease stage IV, coronary artery disease, CVA, hyperlipidemia, hypertension, palpitations, PACs, PVCs, sleep apnea, stroke, right leg weakness  Differential diagnosis: Food bolus, reflux, esophagitis, mass, and other    Problems Addressed:  Atypical chest pain: complicated acute illness or injury  Dysphagia, unspecified type: complicated acute illness or injury    Amount and/or Complexity of Data Reviewed  Labs: ordered.  Radiology: ordered.  ECG/medicine tests: ordered.    Risk  Prescription drug  management.  Decision regarding hospitalization.        Final diagnoses:   Dysphagia, unspecified type   Atypical chest pain       ED Disposition  ED Disposition       ED Disposition   Decision to Admit    Condition   --    Comment   Level of Care: Med/Surg [1]   Diagnosis: Dysphagia [1561007]   Admitting Physician: THIAGO ENCINAS [[id number]]   Attending Physician: THIAGO ENCINAS [[id number]]                 Spike Polanco DO  [address] [facility] [id number] [phone]    Schedule an appointment as soon as possible for a visit       [facility] EMERGENCY DEPARTMENT  [address] [place] [id number] [phone]    If symptoms worsen    GI  Tomorrow as scheduled             Medication List      No changes were made to your prescriptions during this visit.            Gali Brian, APRN  04/09/24 2303       Akiko Caputo, APRN  04/10/24 2118

## 2024-04-10 NOTE — PROGRESS NOTES
Malnutrition Severity Assessment    Patient Name:  Faisal Joseph  YOB: 1940  MRN: [id number]  Admit Date:  4/9/2024    Patient meets criteria for : Severe Malnutrition      Malnutrition Severity Assessment  Malnutrition Type: Chronic Disease - Related Malnutrition  Malnutrition Type (Last 8 Hours)       Malnutrition Severity Assessment       Row Name 04/10/24 1408       Malnutrition Severity Assessment    Malnutrition Type Chronic Disease - Related Malnutrition      Row Name 04/10/24 1408       Insufficient Energy Intake     Insufficient Energy Intake Findings Severe    Insufficient Energy Intake  <75% of est. energy requirement for > or equal to 1 month      Row Name 04/10/24 1408       Unintentional Weight Loss     Unintentional Weight Loss Findings Severe    Unintentional Weight Loss  Weight loss greater than 7.5% in three months      Row Name 04/10/24 1408       Muscle Loss    Loss of Muscle Mass Findings Moderate    [place] Region Moderate - slight depression    Clavicle Bone Region Moderate - some protrusion in females, visible in males    Acromion Bone Region Moderate - acromion may slightly protrude    Dorsal Hand Region Moderate - slight depression    Patellar Region Moderate - patella more prominent, less muscle definition around patella    Anterior Thigh Region Moderate - mild depression on inner thigh    Posterior Calf Region Moderate - some roundness, slight firmness      Row Name 04/10/24 1408       Fat Loss    Subcutaneous Fat Loss Findings Moderate    Orbital Region  Moderate -  somewhat hollowness, slightly dark circles    Upper Arm Region Moderate - some fat tissue, not ample      Row Name 04/10/24 1408       Fluid Accumulation (Edema)    Fluid Acumulation Findings Mild    Fluid Accumulation  Mild equals 1+ pitting edema      Row Name 04/10/24 1408       Criteria Met (Must meet criteria for severity in at least 2 of these categories: M Wasting, Fat Loss, Fluid, Secondary  Signs, Wt. Status, Intake)    Patient meets criteria for  Severe Malnutrition                    Electronically signed by:  Eliza Bourne MS,RDN,LD  04/10/24 14:16 CDT

## 2024-04-10 NOTE — THERAPY EVALUATION
Acute Care - Speech Language Pathology   Swallow Initial Evaluation [facility]     Patient Name: Faisal Joseph  : 1940  MRN: [id number]  Today's Date: 4/10/2024               Admit Date: 2024    SPEECH-LANGUAGE PATHOLOGY EVALUATION - SWALLOW  Subjective: The patient was seen on this date for a Clinical Swallow evaluation.  Patient was alert and cooperative.  Significant history: Reflux, solid food coming back up, n/v, 35lb weight loss since November, abdominal and chest pain. 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 Dilated during endo. Hx renal disease stage IV, CAD, CVA, HLD, HTN, sleep apnea.   Objective: Textures given included  pudding thick and thin liquid.  Assessment: Difficulties were noted with  pudding thick and thin liquid.  Observations:  Delayed throat clear one time with thin, however vocal quality remained clear. Pt report of pressure in lower right chest area. See endoscopy report for information regarding possible causes of esophageal dysphagia.   SLP Findings:  Patient presents with functional  oral phase , with esophageal component, rule out pharyngeal dysphagia.   Recommendations: Diet Textures: thin liquid,  full liquid diet .  Medications should be taken whole  with thin liquids or pudding/yogurt .   Recommended Strategies:  Reflux precautions, Upright for PO, small bites and sips, and alternate liquids and solids. Oral care before breakfast, after all meals and PRN.  Dysphagia therapy is recommended to monitor diet tolerance.   Lori Kirkpatrick, CCC-SLP 4/10/2024 15:40 CDT     Visit Dx:     ICD-10-CM ICD-9-CM   1. Dysphagia, unspecified type  R13.10 787.20   2. Atypical chest pain  R07.89 786.59   3. Weight loss, unintentional  R63.4 783.21   4. 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    Severe malnutrition     Past Medical History:   Diagnosis Date    Arthritis     Cataract     Chronic fatigue 10/3/2017    Chronic renal disease, stage IV     Coronary artery disease     CVA (cerebral vascular accident) 8/14/2018    Hyperlipidemia     Hypertension     Palpitations 10/3/2017    Premature atrial contractions 10/17/2017    PVCs (premature ventricular contractions) 10/17/2017    Sleep apnea     Stage 4 chronic kidney disease 7/30/2018    Stroke 2007    Weakness     right leg weaker     Past Surgical History:   Procedure Laterality Date    CARDIAC CATHETERIZATION      CARDIAC CATHETERIZATION N/A 5/28/2020    Procedure: Left Heart Cath;  Surgeon: Rufino Brice MD;  Location:  [facility] CATH [facility]ASIVE [facility];  Service: Cardiology;  Laterality: N/A;    CORONARY ARTERY BYPASS GRAFT N/A 6/9/2020    Procedure: CABG X 3, LIMA, SHELBY, EVH WITH OPEN EXTENSION LOWER RIGHT LEG;  Surgeon: Nikunj Carballo MD;  Location:  [facility] OR;  Service: Cardiothoracic;  Laterality: N/A;       SLP Recommendation and Plan  SLP Swallowing Diagnosis: functional oral phase, R/O pharyngeal dysphagia, suspected esophageal dysphagia (04/10/24 1444)  SLP Diet Recommendation: thin liquids, full liquid diet (04/10/24 1444)  Recommended Precautions and Strategies: upright posture during/after eating, small bites of food and sips of liquid, alternate between small bites of food and sips of liquid, general aspiration  precautions, reflux precautions (04/10/24 1444)  SLP Rec. for Method of Medication Administration: meds whole, with thin liquids, with puree (04/10/24 1444)     Monitor for Signs of Aspiration: yes, cough, gurgly voice, throat clearing, pneumonia, notify SLP if any concerns (04/10/24 1444)     Swallow Criteria for Skilled Therapeutic Interventions Met: demonstrates skilled criteria (04/10/24 1444)  Anticipated Discharge Disposition (SLP): unknown (04/10/24 1444)  Rehab Potential/Prognosis, Swallowing: good, to achieve stated therapy goals (04/10/24 1444)  Therapy Frequency (Swallow): 2 days per week (04/10/24 1444)  Predicted Duration Therapy Intervention (Days): until discharge (04/10/24 1444)  Oral Care Recommendations: Oral Care BID/PRN (04/10/24 1444)                                        Plan of Care Reviewed With: patient, family  Outcome Evaluation: See note      SWALLOW EVALUATION (Last 72 Hours)       SLP Adult Swallow Evaluation       Row Name 04/10/24 1444                   Rehab Evaluation    Document Type evaluation  -MB        Subjective Information complains of  discomfort in lower right chest  -MB        Patient Observations alert;cooperative  -MB        Patient/Family/Caregiver Comments/Observations Female family member present  -MB           General Information    Patient Profile Reviewed yes  -MB        Pertinent History Of Current Problem Reflux, solid food coming back up, n/v, 35lb weight loss since November, abdominal and chest pain. 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 Dilated during endo. Hx renal disease stage IV, CAD, CVA, HLD, HTN, sleep apnea.  -MB        Current Method of Nutrition thin liquids;full liquids  -MB        Precautions/Limitations, Vision WFL;for purposes of eval  -MB        Precautions/Limitations, Hearing WFL  -MB        Prior Level of Function-Communication WFL  -MB        Prior Level of Function-Swallowing no diet consistency  restrictions  -MB        Plans/Goals Discussed with patient and family  -MB        Barriers to Rehab medically complex  -MB        Patient's Goals for Discharge patient did not state  -MB        Family Goals for Discharge family did not state  -MB           Pain    Additional Documentation Pain Scale: FACES Pre/Post-Treatment (Group)  -MB           Pain Scale: FACES Pre/Post-Treatment    Pain: FACES Scale, Pretreatment 2-->hurts little bit  -MB           Oral Motor Structure and Function    Dentition Assessment missing teeth;other (see comments)  Upper and lower partial not available  -MB        Secretion Management WNL/WFL  -MB        Mucosal Quality moist, healthy  -MB           Oral Musculature and Cranial Nerve Assessment    Oral Motor General Assessment WFL  -MB           General Eating/Swallowing Observations    Respiratory Support Currently in Use room air  -MB        Eating/Swallowing Skills fed by SLP  -MB        Positioning During Eating upright in bed  -MB        Utensils Used spoon;straw  -MB        Consistencies Trialed thin liquids;pudding thick  -MB           Clinical Swallow Eval    Oral Prep Phase WFL  -MB        Oral Transit WFL  -MB        Oral Residue WFL  -MB        Pharyngeal Phase suspected pharyngeal impairment  -MB        Esophageal Phase unremarkable  -MB        Clinical Swallow Evaluation Summary See note  -MB           Pharyngeal Phase Concerns    Pharyngeal Phase Concerns throat clear  -MB        Throat Clear thin  -MB           SLP Evaluation Clinical Impression    SLP Swallowing Diagnosis functional oral phase;R/O pharyngeal dysphagia;suspected esophageal dysphagia  -MB        Functional Impact risk of malnutrition;risk of dehydration  -MB        Rehab Potential/Prognosis, Swallowing good, to achieve stated therapy goals  -MB        Swallow Criteria for Skilled Therapeutic Interventions Met demonstrates skilled criteria  -MB           Recommendations    Therapy Frequency (Swallow) 2  days per week  -MB        Predicted Duration Therapy Intervention (Days) until discharge  -MB        SLP Diet Recommendation thin liquids;full liquid diet  -MB        Recommended Precautions and Strategies upright posture during/after eating;small bites of food and sips of liquid;alternate between small bites of food and sips of liquid;general aspiration precautions;reflux precautions  -MB        Oral Care Recommendations Oral Care BID/PRN  -MB        SLP Rec. for Method of Medication Administration meds whole;with thin liquids;with puree  -MB        Monitor for Signs of Aspiration yes;cough;gurgly voice;throat clearing;pneumonia;notify SLP if any concerns  -MB        Anticipated Discharge Disposition (SLP) unknown  -MB           Swallow Goals (SLP)    Swallow LTGs Swallow Long Term Goal (free text)  -MB        Swallow STGs diet tolerance goal selection (SLP)  -MB        Diet Tolerance Goal Selection (SLP) Patient will tolerate trials of  -MB           (LTG) Swallow    (LTG) Swallow Pt will tolerate least restrictive diet with no overt s/s of aspiration.  -MB        [place] (Swallow Long Term Goal) independently (over 90% accuracy)  -MB        Time Frame (Swallow Long Term Goal) by discharge  -MB        Barriers (Swallow Long Term Goal) n/a  -MB        Progress/Outcomes (Swallow Long Term Goal) new goal  -MB        Comment (Swallow Long Term Goal) n/a  -MB           (STG) Patient will tolerate trials of    Consistencies Trialed (Tolerate trials) thin liquids;nectar/ mildly thick liquids;honey/ moderately thick liquids;pudding/ extremely thick liquids  -MB        Desired Outcome (Tolerate trials) without signs/symptoms of aspiration  -MB        [place] (Tolerate trials) independently (over 90% accuracy)  -MB        Time Frame (Tolerate trials) by discharge  -MB        Progress/Outcomes (Tolerate trials) new goal  -MB        Comment (Tolerate trials) n/a  -MB                  User Key  (r) = Recorded By, (t)  = Taken By, (c) = Cosigned By      Initials Name Effective Dates    Lori Gross CCC-SLP 02/03/23 -                     EDUCATION  The patient has been educated in the following areas:   Dysphagia (Swallowing Impairment).        SLP GOALS       Row Name 04/10/24 1444             (LTG) Swallow    (LTG) Swallow Pt will tolerate least restrictive diet with no overt s/s of aspiration.  -MB      [place] (Swallow Long Term Goal) independently (over 90% accuracy)  -MB      Time Frame (Swallow Long Term Goal) by discharge  -MB      Barriers (Swallow Long Term Goal) n/a  -MB      Progress/Outcomes (Swallow Long Term Goal) new goal  -MB      Comment (Swallow Long Term Goal) n/a  -MB         (STG) Patient will tolerate trials of    Consistencies Trialed (Tolerate trials) thin liquids;nectar/ mildly thick liquids;honey/ moderately thick liquids;pudding/ extremely thick liquids  -MB      Desired Outcome (Tolerate trials) without signs/symptoms of aspiration  -MB      [place] (Tolerate trials) independently (over 90% accuracy)  -MB      Time Frame (Tolerate trials) by discharge  -MB      Progress/Outcomes (Tolerate trials) new goal  -MB      Comment (Tolerate trials) n/a  -MB                User Key  (r) = Recorded By, (t) = Taken By, (c) = Cosigned By      Initials Name Provider Type    Lori Gross CCC-SLP Speech and Language Pathologist                       Time Calculation:    Time Calculation- SLP       Row Name 04/10/24 1539             Time Calculation- SLP    SLP Start Time 1444  -MB      SLP Stop Time 1539  -MB      SLP Time Calculation (min) 55 min  -MB      SLP Received On 04/10/24  -MB      SLP Goal Re-Cert Due Date 04/20/24  -MB         Untimed Charges    71673-MU Eval Oral Pharyng Swallow Minutes 55  -MB         Total Minutes    Untimed Charges Total Minutes 55  -MB       Total Minutes 55  -MB                User Key  (r) = Recorded By, (t) = Taken By, (c) = Cosigned By      Initials  Name Provider Type    Lori Gross CCC-SLP Speech and Language Pathologist                    Therapy Charges for Today       Code Description Service Date Service Provider Modifiers Qty    [id number]  ST EVAL ORAL PHARYNG SWALLOW 4 4/10/2024 Lori Kirkpatrick CCC-SLP GN 1                 Lori Kirkpatrick CCC-DARRELL  4/10/2024   [place]

## 2024-04-10 NOTE — CASE MANAGEMENT/SOCIAL WORK
Discharge Planning Assessment  Psychiatric     Patient Name: Faisal Joseph  MRN: [id number]  Today's Date: 4/10/2024    Admit Date: 4/9/2024        Discharge Needs Assessment       Row Name 04/10/24 1327       Living Environment    People in Home child(bon), adult    Name(s) of People in Home Susan    Current Living Arrangements home    Primary Care Provided by self    Provides Primary Care For no one    Family Caregiver if Needed child(bon), adult    Family Caregiver Names Susan    Able to Return to Prior Arrangements yes       Resource/Environmental Concerns    Resource/Environmental Concerns none       Transition Planning    Patient/Family Anticipates Transition to home with family    Transportation Anticipated family or friend will provide       Discharge Needs Assessment    Readmission Within the Last 30 Days no previous admission in last 30 days    Equipment Currently Used at Home oxygen    Concerns to be Addressed no discharge needs identified    Equipment Needed After Discharge none    Discharge Coordination/Progress spoke to daughter patient lives with daughter and is independent at home prior to illness; has RX coverage and PCP; will follow for DC needs                   Discharge Plan    No documentation.                 Continued Care and Services - Admitted Since 4/9/2024    No active coordination exists for this encounter.          Demographic Summary    No documentation.                  Functional Status    No documentation.                  Psychosocial    No documentation.                  Abuse/Neglect    No documentation.                  Legal    No documentation.                  Substance Abuse    No documentation.                  Patient Forms    No documentation.                     Ramona Figueroa RN

## 2024-04-10 NOTE — DISCHARGE INSTRUCTIONS
It was very nice to meet you, Faisal. Thank you for allowing us to take care of you today at [facility].    Today you were seen in the emergency department for your symptoms. Please understand that an ER evaluation is just the start of your evaluation. We do the best we can, but we are often unable to fully find what is causing your symptoms from one evaluation.  Because of this, the goal is to determine whether you need to be evaluated in the hospital or if it is safe for you to go home and see other doctors provided such as primary care physicians or specialist on an outpatient basis.     Like we discussed, I strongly urge that you follow up with your primary care doctor and GI that is scheduled for tomorrow. Please call their office to set up an appointment as soon as possible so that you can be re-evaluated for improvement in your symptoms or for any other questions.  I have provided the information needed, including phone number, to call to set up an appointment below in these discharge papers.     Educational material has also been provided in the following pages regarding what we have discussed today.     Please return to the emergency room within 12-48 hours if you experience symptoms such as the following:   Fever, chills, chest pain or shortness of breath, pain with inspiration/expiration, pain that travels to your arms, neck or back, nausea, vomiting, severe headache, tearing pain in your chest, dizziness, feel as though you are about to pass out, OR if you have any worsening symptoms, or any other concerns.

## 2024-04-10 NOTE — CONSULTS
[facility] Gastroenterology  Inpatient Consult Note  Today's date:  04/10/24    Faisal Joseph  1940       Referring Provider: Brian Shin DO  Primary Physician: Spike Polanco DO   Primary Gastroenterologist: Dr. Brown [to be seen]    Date of Admission: 4/9/2024  Date of Service:  04/10/24    Reason for Consultation/Chief Complaint: Dysphagia, weight loss    History of present illness: 89-year-old male with past medical history of hyperlipidemia, CAD, hypertension presented to the emergency room with progressive dysphagia.  Patient reports that over the past one month, he has had significant progression of dysphagia with vomiting and inability to take any solid foods.  He has been surviving on oatmeal in the past 2 weeks.  He has lost about 35 pounds in the last 2 months.  He has a history of occasional reflux in the distant past but no recent worsening of symptoms.  Denies any hematemesis.  Denies any melena.  He is scheduled to see GI at the end of April but symptoms progressed and he came to the emergency room for further evaluation.  There was some concern about foreign body of esophagus but imaging studies including CT of the chest and soft tissue x-ray of the neck were unremarkable.  He does report having severe right-sided chest discomfort and pain.    Past Medical History:   Diagnosis Date    Arthritis     Cataract     Chronic fatigue 10/3/2017    Chronic renal disease, stage IV     Coronary artery disease     CVA (cerebral vascular accident) 8/14/2018    Hyperlipidemia     Hypertension     Palpitations 10/3/2017    Premature atrial contractions 10/17/2017    PVCs (premature ventricular contractions) 10/17/2017    Sleep apnea     Stage 4 chronic kidney disease 7/30/2018    Stroke 2007    Weakness     right leg weaker       Past Surgical History:   Procedure Laterality Date    CARDIAC CATHETERIZATION      CARDIAC CATHETERIZATION N/A 5/28/2020    Procedure: Left Heart  Cath;  Surgeon: Rufino Brice MD;  Location:  [facility] CATH INVASIVE LOCATION;  Service: Cardiology;  Laterality: N/A;    CORONARY ARTERY BYPASS GRAFT N/A 6/9/2020    Procedure: CABG X 3, LIMA, SHELBY, EVH WITH OPEN EXTENSION LOWER RIGHT LEG;  Surgeon: Nikunj Carballo MD;  Location:  [facility] OR;  Service: Cardiothoracic;  Laterality: N/A;        Allergies   Allergen Reactions    Hydralazine Nausea Only    Losartan Potassium Nausea Only    Penicillins Hives    Spironolactone Swelling     Made  Breast sore and swell       Medications Prior to Admission   Medication Sig Dispense Refill Last Dose    amLODIPine (NORVASC) 10 MG tablet Take 0.5 tablets by mouth 2 (Two) Times a Day.       aspirin 81 MG EC tablet Take 1 tablet by mouth Daily. 100 tablet 99     budesonide-formoterol (SYMBICORT) 160-4.5 MCG/ACT inhaler Inhale 2 puffs 2 (Two) Times a Day.       ferrous sulfate (FeroSul) 325 (65 FE) MG tablet TAKE 1 TABLET BY MOUTH 2 (TWO) TIMES A DAY. 180 tablet 1     fluticasone (FLONASE) 50 MCG/ACT nasal spray 2 sprays by Each Nare route Daily. Obtain otc       furosemide (LASIX) 40 MG tablet Take 1 tablet by mouth Daily for 30 days. 30 tablet 0     guaiFENesin (MUCINEX) 600 MG 12 hr tablet Take 2 tablets by mouth Every 12 (Twelve) Hours.       hydrALAZINE (APRESOLINE) 100 MG tablet Take 1 tablet by mouth 3 (Three) Times a Day.       lisinopril (PRINIVIL,ZESTRIL) 20 MG tablet Take 1 tablet by mouth 2 (Two) Times a Day.       metoprolol tartrate (LOPRESSOR) 50 MG tablet Take 0.5 tablets by mouth 2 (Two) Times a Day. 90 tablet 3     nitroglycerin (NITROSTAT) 0.4 MG SL tablet Place 1 tablet under the tongue Every 5 (Five) Minutes As Needed for Chest Pain. Take no more than 3 doses in 15 minutes. 25 tablet 2     pravastatin (PRAVACHOL) 40 MG tablet Take 1 tablet by mouth Daily.       sodium bicarbonate 650 MG tablet Take 0.5 tablets by mouth Daily.       terazosin (HYTRIN) 1 MG capsule Take 1 capsule by mouth Every Night.     "   vitamin B-12 (CYANOCOBALAMIN) 100 MCG tablet Take 0.5 tablets by mouth Daily.          Hospital Medications (active)         Dose Frequency Start End    famotidine (PEPCID) injection 20 mg 20 mg Every 12 Hours Scheduled 4/10/2024 --    Admin Instructions: Dilute vial to 10ml total volume and give IV push over 2 minutes.  Give IV push over 2 minutes.    Route: Intravenous    fluticasone (FLONASE) 50 MCG/ACT nasal spray 2 spray 2 spray Daily 4/10/2024 --    Route: Each Nare    lactated ringers infusion 75 mL/hr Continuous 4/10/2024 4/10/2024    Route: Intravenous    Morphine sulfate (PF) injection 1 mg 1 mg Every 4 Hours PRN 4/9/2024 4/14/2024    Admin Instructions: Based on patient request - if ordered for moderate or severe pain, provider allows for administration of a medication prescribed for a lower pain scale.      Caution: Look alike/sound alike drug alert    If given for pain, use the following pain scale:  Mild Pain = Pain Score of 1-3, CPOT 1-2  Moderate Pain = Pain Score of 4-6, CPOT 3-4  Severe Pain = Pain Score of 7-10, CPOT 5-8    Route: Intravenous    ondansetron (ZOFRAN) injection 4 mg 4 mg Every 6 Hours PRN 4/9/2024 --    Admin Instructions: If BOTH ondansetron (ZOFRAN) and promethazine (PHENERGAN) are ordered use ondansetron first and THEN promethazine IF ondansetron is ineffective.    Route: Intravenous    Linked Group 1: Placed in \"Or\" Linked Group        ondansetron ODT (ZOFRAN-ODT) disintegrating tablet 4 mg 4 mg Every 6 Hours PRN 4/9/2024 --    Admin Instructions: If BOTH ondansetron (ZOFRAN) and promethazine (PHENERGAN) are ordered use ondansetron first and THEN promethazine IF ondansetron is ineffective.  Place on tongue and allow to dissolve.    Route: Oral    Linked Group 1: Placed in \"Or\" Linked Group        sodium chloride 0.9 % flush 10 mL 10 mL As Needed 4/9/2024 --    Route: Intravenous    Linked Group 2: Placed in \"And\" Linked Group        sodium chloride 0.9 % flush 10 mL 10 mL " Every 12 Hours Scheduled 4/10/2024 --    Route: Intravenous    sodium chloride 0.9 % flush 10 mL 10 mL As Needed 2024 --    Route: Intravenous    sodium chloride 0.9 % infusion 40 mL 40 mL As Needed 2024 --    Admin Instructions: Following administration of an IV intermittent medication, flush line with 40mL NS at 100mL/hr.    Route: Intravenous            Social History     Tobacco Use    Smoking status: Former     Current packs/day: 0.00     Average packs/day: 1.5 packs/day for 25.0 years (37.5 ttl pk-yrs)     Types: Cigarettes     Start date:      Quit date:      Years since quittin.3    Smokeless tobacco: Never   Substance Use Topics    Alcohol use: No        Past Family History:  Family History   Problem Relation Age of Onset    Cancer Mother     Cancer Father        Review of Systems:  Constitutional: No unexpected weight change, no fatigue, no unexplained fever, no sweats or chills.   HEENT: No icteric sclera.  No hearing or visual deficits.  No sore throat.  No chronic nasal discharge.  Pulmonary: No chronic cough.  No hemoptysis.  No shortness of breath.  Cardiovascular: No chest pain.  No palpitations.  No shortness of breath.  Gastrointestinal: As above.  Musculoskeletal/extremities: No peripheral edema.  No cyanosis.  No claudications.  No back pain.  Genitourinary: No dysuria.  No blood in stool.  No urethral discharges.  Neurologic: No seizures.  No headaches.  No dizziness.  No gait problems.  Skin: No rash.  No icterus.  Mental: No psychosis.  No confusions.  No hallucinations.      Physical Exam:  Temp:  [97.7 °F (36.5 °C)-98.2 °F (36.8 °C)] 97.7 °F (36.5 °C)  Heart Rate:  [] 70  Resp:  [16-18] 18  BP: (138-196)/() 163/86  Body mass index is 26.07 kg/m².    Intake/Output Summary (Last 24 hours) at 4/10/2024 0830  Last data filed at 4/10/2024 0433  Gross per 24 hour   Intake 250 ml   Output 250 ml   Net 0 ml     No intake/output data recorded.    General appearance:    HEENT: Nonicteric sclerae.  Moist oral mucosa.  PERRLA.  EOMI.  Clear pharynx.  Lungs: Clear to auscultation bilaterally.  No wheezing, rales or rhonchi.  Heart: Regular rate and rhythm.  Normal S1 and S2, no S3, S4 or murmur.  Abdomen: Soft, nondistended, nontender to palpation, with normoactive bowel sounds, no hepatosplenomegaly, no palpable masses.  Extremities: No cyanosis, edema or pulse deficits.  Skin: No rash or jaundice.    Results Review:  Lab Results (last 24 hours)       Procedure Component Value Units Date/Time    Comprehensive Metabolic Panel [[id number]]  (Abnormal) Collected: 04/10/24 0403    Specimen: Blood Updated: 04/10/24 0553     Glucose 82 mg/dL      BUN 15 mg/dL      Creatinine 2.60 mg/dL      Sodium 142 mmol/L      Potassium 3.5 mmol/L      Chloride 111 mmol/L      CO2 24.0 mmol/L      Calcium 8.7 mg/dL      Total Protein 5.3 g/dL      Albumin 3.4 g/dL      ALT (SGPT) 7 U/L      AST (SGOT) 10 U/L      Alkaline Phosphatase 56 U/L      Total Bilirubin 0.4 mg/dL      Globulin 1.9 gm/dL      A/G Ratio 1.8 g/dL      BUN/Creatinine Ratio 5.8     Anion Gap 7.0 mmol/L      eGFR 23.7 mL/min/1.73     Narrative:      GFR Normal >60  Chronic Kidney Disease <60  Kidney Failure <15    The GFR formula is only valid for adults with stable renal function between ages 18 and 70.    Magnesium [[id number]]  (Normal) Collected: 04/10/24 0403    Specimen: Blood Updated: 04/10/24 0553     Magnesium 2.0 mg/dL     CBC Auto Differential [[id number]]  (Abnormal) Collected: 04/10/24 0403    Specimen: Blood Updated: 04/10/24 0535     WBC 4.18 10*3/mm3      RBC 3.35 10*6/mm3      Hemoglobin 10.0 g/dL      Hematocrit 30.7 %      MCV 91.6 fL      MCH 29.9 pg      MCHC 32.6 g/dL      RDW 13.2 %      RDW-SD 44.7 fl      MPV 11.1 fL      Platelets 85 10*3/mm3      Neutrophil % 60.8 %      Lymphocyte % 23.0 %      Monocyte % 10.5 %      Eosinophil % 4.5 %      Basophil % 1.0 %      Immature Grans % 0.2 %      Neutrophils,  Absolute 2.54 10*3/mm3      Lymphocytes, Absolute 0.96 10*3/mm3      Monocytes, Absolute 0.44 10*3/mm3      Eosinophils, Absolute 0.19 10*3/mm3      Basophils, Absolute 0.04 10*3/mm3      Immature Grans, Absolute 0.01 10*3/mm3      nRBC 0.0 /100 WBC     High Sensitivity Troponin T [[id number]]  (Abnormal) Collected: 04/09/24 1938    Specimen: Blood from Hand, Right Updated: 04/09/24 2004     HS Troponin T 42 ng/L     Narrative:      High Sensitive Troponin T Reference Range:  <14.0 ng/L- Negative Female for AMI  <22.0 ng/L- Negative Male for AMI  >=14 - Abnormal Female indicating possible myocardial injury.  >=22 - Abnormal Male indicating possible myocardial injury.   Clinicians would have to utilize clinical acumen, EKG, Troponin, and serial changes to determine if it is an Acute Myocardial Infarction or myocardial injury due to an underlying chronic condition.         Comprehensive Metabolic Panel [[id number]]  (Abnormal) Collected: 04/09/24 1751    Specimen: Blood Updated: 04/09/24 1824     Glucose 100 mg/dL      BUN 16 mg/dL      Creatinine 2.43 mg/dL      Sodium 142 mmol/L      Potassium 3.6 mmol/L      Chloride 111 mmol/L      CO2 20.0 mmol/L      Calcium 8.8 mg/dL      Total Protein 6.1 g/dL      Albumin 3.9 g/dL      ALT (SGPT) 6 U/L      AST (SGOT) 12 U/L      Alkaline Phosphatase 65 U/L      Total Bilirubin 0.4 mg/dL      Globulin 2.2 gm/dL      A/G Ratio 1.8 g/dL      BUN/Creatinine Ratio 6.6     Anion Gap 11.0 mmol/L      eGFR 25.7 mL/min/1.73     Narrative:      GFR Normal >60  Chronic Kidney Disease <60  Kidney Failure <15    The GFR formula is only valid for adults with stable renal function between ages 18 and 70.    Single High Sensitivity Troponin T [[id number]]  (Abnormal) Collected: 04/09/24 1751    Specimen: Blood Updated: 04/09/24 1819     HS Troponin T 44 ng/L     Narrative:      High Sensitive Troponin T Reference Range:  <14.0 ng/L- Negative Female for AMI  <22.0 ng/L- Negative Male for  AMI  >=14 - Abnormal Female indicating possible myocardial injury.  >=22 - Abnormal Male indicating possible myocardial injury.   Clinicians would have to utilize clinical acumen, EKG, Troponin, and serial changes to determine if it is an Acute Myocardial Infarction or myocardial injury due to an underlying chronic condition.         Lipase [[id number]]  (Normal) Collected: 04/09/24 1751    Specimen: Blood Updated: 04/09/24 1819     Lipase 26 U/L     CBC & Differential [[id number]]  (Abnormal) Collected: 04/09/24 1751    Specimen: Blood Updated: 04/09/24 1804    Narrative:      The following orders were created for panel order CBC & Differential.  Procedure                               Abnormality         Status                     ---------                               -----------         ------                     CBC Auto Differential[[id number]]        Abnormal            Final result                 Please view results for these tests on the individual orders.    CBC Auto Differential [[id number]]  (Abnormal) Collected: 04/09/24 1751    Specimen: Blood Updated: 04/09/24 1804     WBC 6.32 10*3/mm3      RBC 3.76 10*6/mm3      Hemoglobin 11.5 g/dL      Hematocrit 33.7 %      MCV 89.6 fL      MCH 30.6 pg      MCHC 34.1 g/dL      RDW 13.0 %      RDW-SD 42.6 fl      MPV 10.7 fL      Platelets 115 10*3/mm3      Neutrophil % 76.0 %      Lymphocyte % 13.1 %      Monocyte % 7.4 %      Eosinophil % 2.7 %      Basophil % 0.6 %      Neutrophils, Absolute 4.80 10*3/mm3      Lymphocytes, Absolute 0.83 10*3/mm3      Monocytes, Absolute 0.47 10*3/mm3      Eosinophils, Absolute 0.17 10*3/mm3      Basophils, Absolute 0.04 10*3/mm3             Radiology Review:  Imaging Results (Last 72 Hours)       Procedure Component Value Units Date/Time    CT Chest Without Contrast Diagnostic [[id number]] Collected: 04/10/24 0632     Updated: 04/10/24 0645    Narrative:      CT CHEST WO CONTRAST DIAGNOSTIC- 4/10/2024 3:46 AM     HISTORY: Right  axillary pain and dysphagia. Unable to use contrast due  to GFR.; R13.10-Dysphagia, unspecified; R07.89-Other chest pain     COMPARISON: 11/19/2023     DOSE LENGTH PRODUCT: 220 mGy.cm. Automated exposure control was also  utilized to decrease patient radiation dose.     TECHNIQUE: Serial helical tomographic images of the chest were acquired  without contrast. Multiplanar reformatted images were provided for  review.     FINDINGS:     Visualized neck base: The imaged portion of the base of the neck appears  unremarkable.     Lungs: Centrilobular emphysema. Stable 6 mm left upper lobe nodule on  axial image 80. No consolidation or new pulmonary nodules. No pleural  effusion. The airways are clear.     Heart: The heart is normal in size. There is no pericardial effusion.  Previous coronary bypass. Aortic valve calcification.     Vasculature: Thoracic aorta is normal in caliber. Central pulmonary  arteries are nondilated.     Mediastinum and lymph nodes: No suspicious hilar or mediastinal  adenopathy. Incidental granulomatous calcification.     Skeletal and soft tissues: Chest wall soft tissues are unremarkable. No  acute bony abnormality. Bridging spinal osteophytes.     Upper abdomen: The imaged portion of the upper abdomen demonstrates no  acute process.          Impression:      1.  No acute cardiopulmonary process.  2.  Lung emphysema. There is a 6 mm left upper lobe nodule which is  stable at least dating back to May 2020. This is considered benign. No  new nodules or consolidation.  3.  Previous coronary bypass.  4.  No acute fracture identified. No acute process identified in the  chest wall soft tissues to explain right axillary pain.           This report was signed and finalized on 4/10/2024 6:41 AM by Dr Xavi Nguyen.       XR Abdomen Flat & Upright [[id number]] Collected: 04/09/24 1747     Updated: 04/09/24 8454    Narrative:      EXAM/TECHNIQUE: XR ABDOMEN FLAT AND UPRIGHT-     INDICATION: vomiting      COMPARISON: None available.     FINDINGS:     Nonobstructive bowel gas pattern. No mass effect or suspicious  calcifications. Multilevel degenerative change in the lumbar spine. No  acute osseous finding.       Impression:      Nonobstructive bowel gas pattern.     This report was signed and finalized on 4/9/2024 5:47 PM by Dr. Arsalan Ybarra MD.       XR Neck Soft Tissue [[id number]] Collected: 04/09/24 1746     Updated: 04/09/24 1750    Narrative:      EXAM/TECHNIQUE: XR NECK SOFT TISSUE-     INDICATION: vomiting; assess for food bolus     COMPARISON: None available.     FINDINGS:     No radiographic evidence of hyperdense foreign body in the upper  aerodigestive tract. Mild degenerative change in the cervical spine.  Multiple dental amalgams are noted. No acute osseous finding. Clear lung  apices are clear. Prior mean sternotomy noted.       Impression:         No radiopaque foreign body or dense retained food bolus evident by  x-ray.     This report was signed and finalized on 4/9/2024 5:47 PM by Dr. Arsalan Ybarra MD.               Impression/Plan:    Dysphagia    Essential hypertension    CKD (chronic kidney disease) stage 4, GFR 15-29 ml/min    Coronary artery disease    Hyperlipidemia    Weight loss, unintentional      83-year-old male with past medical history of CAD, hypertension, hyperlipidemia, longstanding reflux presents here with progressive dysphagia and concern for foreign body of esophagus.  Given the patient's overall clinical picture, symptomatology, I am concerned about esophageal stricture versus less likely esophageal neoplastic process.  Also need to consider Schatzki's ring.  CT of the chest, x-ray of the soft tissue of neck reviewed.  No evidence of obvious obstructive pathology or any other etiology is identified.  In light of overall clinical picture, we will proceed with upper endoscopy.  Risk, benefits and alternatives of procedure explained in detail and consent obtained.  I  agree with supportive care.  Further recommendations based on the above.    Will continue to follow the patient.    Thank you for allowing us to participate in the care of your patient.    Gilles Pineda MD  04/10/24   08:30 CDT    DISCLAIMER:    This physician works through a [employer] as an inpatient consultant gastroenterologist only and has no outpatient clinic for patient follow up.  Any results not available at time of inpatient discharge and/or GI clinic follow up should be managed by the hospitalist team, PCP, or outpatient gastroenterologist.

## 2024-04-10 NOTE — H&P
[facility] Medicine Services  HISTORY AND PHYSICAL    Date of Admission: 4/9/2024  Primary Care Physician: Spike Polanco DO    Subjective   Primary Historian: Patient    Chief Complaint: Unable to tolerate PO    GI Problem  The primary symptoms include nausea and vomiting. Primary symptoms do not include abdominal pain.     83-year-old male presents emergency department with dysphagia.  He has had a 35 pound weight loss in the last couple months.  He describes acid reflux or what he feels like it is.  He states he is only able to hold out oatmeal, and he has lived on that the past couple weeks.  He states he had a very hard day today with vomiting and foam coming up.  He did have a bowel movement this morning that he states is normal for not having anything to eat.  He noted no blood in his stool.  He denies any abdominal pain.  He is noting some right axillary pain that wraps around in a rib type distribution.  He has some lower extremity edema.  He was unable to get into GI until the end of April.  He has chronic renal disease.      Review of Systems   Constitutional:  Positive for unexpected weight change.   Gastrointestinal:  Positive for nausea and vomiting. Negative for abdominal pain.      Otherwise complete ROS reviewed and negative except as mentioned in the HPI.    Past Medical History:   Past Medical History:   Diagnosis Date    Arthritis     Cataract     Chronic fatigue 10/3/2017    Chronic renal disease, stage IV     Coronary artery disease     CVA (cerebral vascular accident) 8/14/2018    Hyperlipidemia     Hypertension     Palpitations 10/3/2017    Premature atrial contractions 10/17/2017    PVCs (premature ventricular contractions) 10/17/2017    Sleep apnea     Stage 4 chronic kidney disease 7/30/2018    Stroke 2007    Weakness     right leg weaker     Past Surgical History:  Past Surgical History:   Procedure Laterality Date    CARDIAC CATHETERIZATION       CARDIAC CATHETERIZATION N/A 5/28/2020    Procedure: Left Heart Cath;  Surgeon: Rufino Brice MD;  Location:  [facility] CATH INVASIVE LOCATION;  Service: Cardiology;  Laterality: N/A;    CORONARY ARTERY BYPASS GRAFT N/A 6/9/2020    Procedure: CABG X 3, LIMA, SHELBY, EVH WITH OPEN EXTENSION LOWER RIGHT LEG;  Surgeon: Nikunj Craballo MD;  Location:  [facility] OR;  Service: Cardiothoracic;  Laterality: N/A;     Social History:  reports that he quit smoking about 40 years ago. His smoking use included cigarettes. He started smoking about 65 years ago. He has a 37.5 pack-year smoking history. He has never used smokeless tobacco. He reports that he does not drink alcohol and does not use drugs.    Family History: family history includes Cancer in his father and mother.       Allergies:  Allergies   Allergen Reactions    Hydralazine Nausea Only    Losartan Potassium Nausea Only    Penicillins Hives    Spironolactone Swelling     Made  Breast sore and swell       Medications:  Prior to Admission medications    Medication Sig Start Date End Date Taking? Authorizing Provider   amLODIPine (NORVASC) 10 MG tablet Take 0.5 tablets by mouth 2 (Two) Times a Day.    Provider, MD Mitesh   aspirin 81 MG EC tablet Take 1 tablet by mouth Daily. 5/31/20   Wesley Lew,    budesonide-formoterol (SYMBICORT) 160-4.5 MCG/ACT inhaler Inhale 2 puffs 2 (Two) Times a Day. 11/22/23   Mariela Covarrubias APRN   ferrous sulfate (FeroSul) 325 (65 FE) MG tablet TAKE 1 TABLET BY MOUTH 2 (TWO) TIMES A DAY. 2/19/24   Samantha Winkler APRN   fluticasone (FLONASE) 50 MCG/ACT nasal spray 2 sprays by Each Nare route Daily. Obtain otc 11/23/23   Mariela Covarrubias APRN   furosemide (LASIX) 40 MG tablet Take 1 tablet by mouth Daily for 30 days. 11/23/23 3/12/24  Mariela Covarrubias APRN   guaiFENesin (MUCINEX) 600 MG 12 hr tablet Take 2 tablets by mouth Every 12 (Twelve) Hours. 11/22/23   Mariela Covarrubias APRN   hydrALAZINE (APRESOLINE) 100 MG tablet Take 1 tablet  "by mouth 3 (Three) Times a Day.    Mitesh Contreras MD   lisinopril (PRINIVIL,ZESTRIL) 20 MG tablet Take 1 tablet by mouth 2 (Two) Times a Day.    Mitesh Contreras MD   metoprolol tartrate (LOPRESSOR) 50 MG tablet Take 0.5 tablets by mouth 2 (Two) Times a Day. 4/5/24   Rufino Brice MD   nitroglycerin (NITROSTAT) 0.4 MG SL tablet Place 1 tablet under the tongue Every 5 (Five) Minutes As Needed for Chest Pain. Take no more than 3 doses in 15 minutes. 3/12/24   Jaz Musa APRN   pravastatin (PRAVACHOL) 40 MG tablet Take 1 tablet by mouth Daily.    Mitesh Contreras MD   sodium bicarbonate 650 MG tablet Take 0.5 tablets by mouth Daily.    Mitesh Contreras MD   terazosin (HYTRIN) 1 MG capsule Take 1 capsule by mouth Every Night.    Mitesh Contreras MD   vitamin B-12 (CYANOCOBALAMIN) 100 MCG tablet Take 0.5 tablets by mouth Daily.    Mitesh Contreras MD     I have utilized all available immediate resources to obtain, update, or review the patient's current medications (including all prescriptions, over-the-counter products, herbals, cannabis/cannabidiol products, and vitamin/mineral/dietary (nutritional) supplements).    Objective     Vital Signs: BP (!) 189/118   Pulse 80   Temp 97.7 °F (36.5 °C) (Oral)   Resp 16   Ht 182.9 cm (72\")   Wt 88.9 kg (196 lb)   SpO2 96%   BMI 26.58 kg/m²   Physical Exam  Vitals reviewed.   Constitutional:       Appearance: Normal appearance.   HENT:      Head: Normocephalic and atraumatic.      Right Ear: External ear normal.      Left Ear: External ear normal.      Nose: Nose normal.   Eyes:      General: No scleral icterus.     Conjunctiva/sclera: Conjunctivae normal.   Cardiovascular:      Rate and Rhythm: Normal rate and regular rhythm.      Heart sounds: Normal heart sounds.      Comments: Right axillary palpable pain.   Pulmonary:      Effort: Pulmonary effort is normal.      Breath sounds: Normal breath sounds.   Abdominal:      " General: There is no distension.      Palpations: Abdomen is soft.   Musculoskeletal:         General: Swelling present. No tenderness.      Cervical back: Normal range of motion and neck supple.      Right lower leg: Edema present.      Left lower leg: Edema present.      Comments: +1 pitting bilateral LE edema   Skin:     General: Skin is warm and dry.   Neurological:      Mental Status: He is alert and oriented to person, place, and time.      Cranial Nerves: No cranial nerve deficit.   Psychiatric:         Mood and Affect: Mood normal.         Behavior: Behavior normal.          Results Reviewed:  Lab Results (last 24 hours)       Procedure Component Value Units Date/Time    High Sensitivity Troponin T [[id number]]  (Abnormal) Collected: 04/09/24 1938    Specimen: Blood from Hand, Right Updated: 04/09/24 2004     HS Troponin T 42 ng/L     Narrative:      High Sensitive Troponin T Reference Range:  <14.0 ng/L- Negative Female for AMI  <22.0 ng/L- Negative Male for AMI  >=14 - Abnormal Female indicating possible myocardial injury.  >=22 - Abnormal Male indicating possible myocardial injury.   Clinicians would have to utilize clinical acumen, EKG, Troponin, and serial changes to determine if it is an Acute Myocardial Infarction or myocardial injury due to an underlying chronic condition.         Comprehensive Metabolic Panel [[id number]]  (Abnormal) Collected: 04/09/24 1751    Specimen: Blood Updated: 04/09/24 1824     Glucose 100 mg/dL      BUN 16 mg/dL      Creatinine 2.43 mg/dL      Sodium 142 mmol/L      Potassium 3.6 mmol/L      Chloride 111 mmol/L      CO2 20.0 mmol/L      Calcium 8.8 mg/dL      Total Protein 6.1 g/dL      Albumin 3.9 g/dL      ALT (SGPT) 6 U/L      AST (SGOT) 12 U/L      Alkaline Phosphatase 65 U/L      Total Bilirubin 0.4 mg/dL      Globulin 2.2 gm/dL      A/G Ratio 1.8 g/dL      BUN/Creatinine Ratio 6.6     Anion Gap 11.0 mmol/L      eGFR 25.7 mL/min/1.73     Narrative:      GFR Normal  >60  Chronic Kidney Disease <60  Kidney Failure <15    The GFR formula is only valid for adults with stable renal function between ages 18 and 70.    Single High Sensitivity Troponin T [[id number]]  (Abnormal) Collected: 04/09/24 1751    Specimen: Blood Updated: 04/09/24 1819     HS Troponin T 44 ng/L     Narrative:      High Sensitive Troponin T Reference Range:  <14.0 ng/L- Negative Female for AMI  <22.0 ng/L- Negative Male for AMI  >=14 - Abnormal Female indicating possible myocardial injury.  >=22 - Abnormal Male indicating possible myocardial injury.   Clinicians would have to utilize clinical acumen, EKG, Troponin, and serial changes to determine if it is an Acute Myocardial Infarction or myocardial injury due to an underlying chronic condition.         Lipase [[id number]]  (Normal) Collected: 04/09/24 1751    Specimen: Blood Updated: 04/09/24 1819     Lipase 26 U/L     CBC & Differential [[id number]]  (Abnormal) Collected: 04/09/24 1751    Specimen: Blood Updated: 04/09/24 1804    Narrative:      The following orders were created for panel order CBC & Differential.  Procedure                               Abnormality         Status                     ---------                               -----------         ------                     CBC Auto Differential[[id number]]        Abnormal            Final result                 Please view results for these tests on the individual orders.    CBC Auto Differential [[id number]]  (Abnormal) Collected: 04/09/24 1751    Specimen: Blood Updated: 04/09/24 1804     WBC 6.32 10*3/mm3      RBC 3.76 10*6/mm3      Hemoglobin 11.5 g/dL      Hematocrit 33.7 %      MCV 89.6 fL      MCH 30.6 pg      MCHC 34.1 g/dL      RDW 13.0 %      RDW-SD 42.6 fl      MPV 10.7 fL      Platelets 115 10*3/mm3      Neutrophil % 76.0 %      Lymphocyte % 13.1 %      Monocyte % 7.4 %      Eosinophil % 2.7 %      Basophil % 0.6 %      Neutrophils, Absolute 4.80 10*3/mm3      Lymphocytes, Absolute 0.83  10*3/mm3      Monocytes, Absolute 0.47 10*3/mm3      Eosinophils, Absolute 0.17 10*3/mm3      Basophils, Absolute 0.04 10*3/mm3           Imaging Results (Last 24 Hours)       Procedure Component Value Units Date/Time    XR Abdomen Flat & Upright [[id number]] Collected: 04/09/24 1747     Updated: 04/09/24 1751    Narrative:      EXAM/TECHNIQUE: XR ABDOMEN FLAT AND UPRIGHT-     INDICATION: vomiting     COMPARISON: None available.     FINDINGS:     Nonobstructive bowel gas pattern. No mass effect or suspicious  calcifications. Multilevel degenerative change in the lumbar spine. No  acute osseous finding.       Impression:      Nonobstructive bowel gas pattern.     This report was signed and finalized on 4/9/2024 5:47 PM by Dr. Arsalan Ybarra MD.       XR Neck Soft Tissue [[id number]] Collected: 04/09/24 1746     Updated: 04/09/24 1750    Narrative:      EXAM/TECHNIQUE: XR NECK SOFT TISSUE-     INDICATION: vomiting; assess for food bolus     COMPARISON: None available.     FINDINGS:     No radiographic evidence of hyperdense foreign body in the upper  aerodigestive tract. Mild degenerative change in the cervical spine.  Multiple dental amalgams are noted. No acute osseous finding. Clear lung  apices are clear. Prior mean sternotomy noted.       Impression:         No radiopaque foreign body or dense retained food bolus evident by  x-ray.     This report was signed and finalized on 4/9/2024 5:47 PM by Dr. Arsalan Ybarra MD.             I have personally reviewed and interpreted the radiology studies and ECG obtained at time of admission.     Assessment / Plan   Assessment:   Active Hospital Problems    Diagnosis     **Dysphagia      Impression:  Dysphagia  Nausea and vomiting  Right chest pain/axillary pain  Weight loss, unexpected  Hypertension  CKD stage IV    Treatment Plan  Admit to observation  Consult GI  NPO after midnight  IV labetalol 10 mg every 6 hours as needed for systolic blood pressure greater than  160  IV fluids, gentle  CT of the chest without secondary to kidney function  Nutrition consult  As needed Zofran  Pepcid IV    The patient will be admitted to my service here at [facility].  Primary team to take over the morning    Medical Decision Making  Number and Complexity of problems: Greater than 4, complex  Differential Diagnosis: Food bolus    Conditions and Status        Condition is unchanged.     [facility] Data  External documents reviewed: None  Cardiac tracing (EKG, telemetry) interpretation: None  Radiology interpretation: None  Labs reviewed: Reviewed  Any tests that were considered but not ordered: None     Decision rules/scores evaluated (example [id number], Wells, etc): None     Discussed with: Patient     Care Planning  Shared decision making: Patient and ED staff  Code status and discussions: Full    Disposition  Social Determinants of Health that impact treatment or disposition: None  Estimated length of stay is overnight.     I confirmed that the patient's advanced care plan is present, code status is documented, and a surrogate decision maker is listed in the patient's medical record.     The patient's surrogate decision maker is family.     The patient was seen and examined by me on 4/9/2024 at 10.    Electronically signed by Brian Shin DO, 04/09/24, 22:44 CDT.

## 2024-04-10 NOTE — PLAN OF CARE
Goal Outcome Evaluation:  Plan of Care Reviewed With: patient, family           Outcome Evaluation: See note      Anticipated Discharge Disposition (SLP): unknown          SLP Swallowing Diagnosis: functional oral phase, R/O pharyngeal dysphagia, suspected esophageal dysphagia (04/10/24 0478)

## 2024-04-10 NOTE — ANESTHESIA POSTPROCEDURE EVALUATION
Patient: Faisal Joseph    Procedure Summary       Date: 04/10/24 Room / Location: [facility] ENDOSCOPY 5 / [facility] END[facility]    Anesthesia Start: 1308 Anesthesia Stop: 1332    Procedure: ESOPHAGOGASTRODUODENOSCOPY WITH ANESTHESIA Diagnosis:       Weight loss, unintentional      Esophageal dysphagia      (Weight loss, unintentional [R63.4])      (Esophageal dysphagia [R13.19])    Surgeons: Gilles Pineda MD Provider: Cecilio Davison CRNA    Anesthesia Type: MAC ASA Status: 3            Anesthesia Type: MAC    Vitals  Vitals Value Taken Time   /79 04/10/24 1332   Temp     Pulse 76 04/10/24 1332   Resp 20 04/10/24 1332   SpO2 96 % 04/10/24 1332           Post Anesthesia Care and Evaluation    Patient location during evaluation: PHASE II  Patient participation: complete - patient participated  Level of consciousness: awake and alert  Pain score: 0  Pain management: adequate    Airway patency: patent  Anesthetic complications: No anesthetic complications  PONV Status: none  Cardiovascular status: acceptable and stable  Respiratory status: acceptable  Hydration status: acceptable

## 2024-04-10 NOTE — PROGRESS NOTES
"    [facility] Medicine Services  INPATIENT PROGRESS NOTE    Patient Name: Faisal Joseph  Date of Admission: 4/9/2024  Today's Date: 04/10/24  Length of Stay: 0  Primary Care Physician: Spike Polanco DO    Subjective   Chief Complaint: Unable to tolerate oral intake  HPI   Faisal Joseph presented to [facility] emergency room 4/9/24 with dysphagia, weight loss 35 pounds, unable to eat solid foods due to vomiting, worsening reflux.  Patient reported that he has history of acid reflux that has worsened.  1 month ago he was prescribed doxycycline for sinus and ear infections.  After that he reported worsening reflux symptoms and has not been able to eat solid foods due to vomiting.  Patient has only been able to eat small amount of oatmeal for the past 2 weeks.  He denies abdominal pain but reports \"burning and bad reflux\" when he tries to eat.  Patient had appointment scheduled with gastroenterology end of April.  He denied any blood in his stool.  Symptoms worsened and he presented to ER.  Creatinine 2.43, high-sensitivity troponin 44 with no complaints of chest pain.  Right abdomen nonobstructive bowel gas pattern.  X-ray neck soft tissue no radiopaque foreign body or dense retained bolus.  CT chest no acute cardiopulmonary process.  6 mm left upper lobe nodule stable since May 2022.    Today  Patient seen prior to going down for endoscopy.  Patient reports \"burning\" when trying to eat.  He has not had any solid food.  He is only been able to hold down oatmeal for the past 2 weeks.  Notes nausea with vomiting.  Denies abdominal pain.  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    Review of Systems   Constitutional:  Positive for appetite change and fatigue.   HENT:  Positive for trouble swallowing. Negative for congestion.    Eyes:  " Negative for photophobia and visual disturbance.   Respiratory:  Negative for cough and shortness of breath.    Cardiovascular:  Negative for chest pain, palpitations and leg swelling.   Gastrointestinal:  Positive for nausea and vomiting. Negative for abdominal pain, blood in stool and diarrhea.   Endocrine: Negative for cold intolerance, heat intolerance and polyuria.   Genitourinary:  Negative for dysuria, frequency and urgency.   Musculoskeletal:  Negative for gait problem.   Skin:  Negative for color change, pallor, rash and wound.   Allergic/Immunologic: Negative for immunocompromised state.   Neurological:  Positive for weakness. Negative for light-headedness.   Hematological:  Negative for adenopathy. Does not bruise/bleed easily.   Psychiatric/Behavioral:  Negative for agitation, behavioral problems and confusion.       All pertinent negatives and positives are as above. All other systems have been reviewed and are negative unless otherwise stated.     Objective    Temp:  [97.7 °F (36.5 °C)-98.2 °F (36.8 °C)] 97.9 °F (36.6 °C)  Heart Rate:  [] 73  Resp:  [16-20] 18  BP: (138-196)/() 166/85  Physical Exam  Vitals and nursing note reviewed.   Constitutional:       Comments: Sitting up in bed.  No oxygen use.  No visitors in room.   HENT:      Head: Normocephalic and atraumatic.      Nose: No congestion.      Mouth/Throat:      Pharynx: Oropharynx is clear. No oropharyngeal exudate or posterior oropharyngeal erythema.   Eyes:      Extraocular Movements: Extraocular movements intact.      Pupils: Pupils are equal, round, and reactive to light.   Cardiovascular:      Rate and Rhythm: Normal rate and regular rhythm.   Pulmonary:      Breath sounds: No wheezing, rhonchi or rales.      Comments: No oxygen use.  Abdominal:      Palpations: Abdomen is soft.      Tenderness: There is no abdominal tenderness.   Genitourinary:     Comments: Voiding.  Musculoskeletal:         General: No swelling or  "tenderness.      Cervical back: Normal range of motion and neck supple.   Skin:     General: Skin is warm and dry.   Neurological:      General: No focal deficit present.      Mental Status: He is alert and oriented to person, place, and time.   Psychiatric:         Mood and Affect: Mood normal.         Behavior: Behavior normal.         Thought Content: Thought content normal.         Judgment: Judgment normal.       Results Review:  I have reviewed the labs, radiology results, and diagnostic studies.    Laboratory Data:   Results from last 7 days   Lab Units 04/10/24  0403 04/09/24  1751   WBC 10*3/mm3 4.18 6.32   HEMOGLOBIN g/dL 10.0* 11.5*   HEMATOCRIT % 30.7* 33.7*   PLATELETS 10*3/mm3 85* 115*        Results from last 7 days   Lab Units 04/10/24  0403 04/09/24  1751   SODIUM mmol/L 142 142   POTASSIUM mmol/L 3.5 3.6   CHLORIDE mmol/L 111* 111*   CO2 mmol/L 24.0 20.0*   BUN mg/dL 15 16   CREATININE mg/dL 2.60* 2.43*   CALCIUM mg/dL 8.7 8.8   BILIRUBIN mg/dL 0.4 0.4   ALK PHOS U/L 56 65   ALT (SGPT) U/L 7 6   AST (SGOT) U/L 10 12   GLUCOSE mg/dL 82 100*       Culture Data:   No results found for: \"BLOODCX\", \"URINECX\", \"WOUNDCX\", \"MRSACX\", \"RESPCX\", \"STOOLCX\"    Radiology Data:   Imaging Results (Last 24 Hours)       Procedure Component Value Units Date/Time    CT Chest Without Contrast Diagnostic [[id number]] Collected: 04/10/24 0632     Updated: 04/10/24 0645    Narrative:      CT CHEST WO CONTRAST DIAGNOSTIC- 4/10/2024 3:46 AM     HISTORY: Right axillary pain and dysphagia. Unable to use contrast due  to GFR.; R13.10-Dysphagia, unspecified; R07.89-Other chest pain     COMPARISON: 11/19/2023     DOSE LENGTH PRODUCT: 220 mGy.cm. Automated exposure control was also  utilized to decrease patient radiation dose.     TECHNIQUE: Serial helical tomographic images of the chest were acquired  without contrast. Multiplanar reformatted images were provided for  review.     FINDINGS:     Visualized neck base: The imaged " portion of the base of the neck appears  unremarkable.     Lungs: Centrilobular emphysema. Stable 6 mm left upper lobe nodule on  axial image 80. No consolidation or new pulmonary nodules. No pleural  effusion. The airways are clear.     Heart: The heart is normal in size. There is no pericardial effusion.  Previous coronary bypass. Aortic valve calcification.     Vasculature: Thoracic aorta is normal in caliber. Central pulmonary  arteries are nondilated.     Mediastinum and lymph nodes: No suspicious hilar or mediastinal  adenopathy. Incidental granulomatous calcification.     Skeletal and soft tissues: Chest wall soft tissues are unremarkable. No  acute bony abnormality. Bridging spinal osteophytes.     Upper abdomen: The imaged portion of the upper abdomen demonstrates no  acute process.          Impression:      1.  No acute cardiopulmonary process.  2.  Lung emphysema. There is a 6 mm left upper lobe nodule which is  stable at least dating back to May 2020. This is considered benign. No  new nodules or consolidation.  3.  Previous coronary bypass.  4.  No acute fracture identified. No acute process identified in the  chest wall soft tissues to explain right axillary pain.           This report was signed and finalized on 4/10/2024 6:41 AM by Dr Xavi Nguyen.       XR Abdomen Flat & Upright [[id number]] Collected: 04/09/24 1747     Updated: 04/09/24 1751    Narrative:      EXAM/TECHNIQUE: XR ABDOMEN FLAT AND UPRIGHT-     INDICATION: vomiting     COMPARISON: None available.     FINDINGS:     Nonobstructive bowel gas pattern. No mass effect or suspicious  calcifications. Multilevel degenerative change in the lumbar spine. No  acute osseous finding.       Impression:      Nonobstructive bowel gas pattern.     This report was signed and finalized on 4/9/2024 5:47 PM by Dr. Arsalan Ybarra MD.       XR Neck Soft Tissue [[id number]] Collected: 04/09/24 1746     Updated: 04/09/24 1750    Narrative:       EXAM/TECHNIQUE: XR NECK SOFT TISSUE-     INDICATION: vomiting; assess for food bolus     COMPARISON: None available.     FINDINGS:     No radiographic evidence of hyperdense foreign body in the upper  aerodigestive tract. Mild degenerative change in the cervical spine.  Multiple dental amalgams are noted. No acute osseous finding. Clear lung  apices are clear. Prior mean sternotomy noted.       Impression:         No radiopaque foreign body or dense retained food bolus evident by  x-ray.     This report was signed and finalized on 4/9/2024 5:47 PM by Dr. Arsalan Ybarra MD.             famotidine, 20 mg, Intravenous, Q12H  fluticasone, 2 spray, Each Nare, Daily  metoprolol tartrate, 50 mg, Oral, Q12H  sodium chloride, 10 mL, Intravenous, Q12H       I have reviewed the patient's current medications.     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  Dysphagia with unintentional weight loss.  Unable to tolerate oral intake for the past month.  Only able to tolerate small amount of oatmeal.  35 pound weight loss.  Consult nutrition    GI consulted and concern for esophageal stricture versus esophageal neoplastic process versus Schatzki ring.  CT chest soft tissue neck no obvious obstructive pathology.    Endoscopy 4/10/2024      Full liquid diet.  Continue IV fluids.  Await CEA.  Will need CT surgery consult.    2.  CKD stage IV.  Creatinine 2.43 on admission, 2.6 today.  Previous creatinine 3-3.3.  Creatinine 3.32 on 12/4/2023.  Repeat CMP in AM.    3.  Primary hypertension.  Blood pressure 166/85.  Restart metoprolol at  increased dose 50 mg twice daily. He is on multiple medications amlodipine, hydralazine, lisinopril, metoprolol.    4.  History of CAD is stable.  No complaints of chest pain or palpitations.    5.  SCDs for deep vein thrombosis prophylaxis.      Medical Decision Making  Number and Complexity of problems: 7  Dysphagia: Acute, high complexity poses threat to life and bodily function  Unintentional weight loss: Acute, high complexity poses threat to life and bodily function  Esophageal stenosis suspected Lassiter's esophagus with adenocarcinoma: Acute, high complexity poses threat to life and bodily function  CKD stage IV: Chronic, moderate complexity, stable  Primary hypertension: Chronic, moderate complexity, not at baseline  History of CAD  Severe malnutrition: Acute, high complexity      Differential Diagnosis: Adenocarcinoma esophagus    Conditions and Status        Condition is unchanged.     [facility] Data  External documents reviewed: [employer].  Cardiology office visit  Cardiac tracing (EKG, telemetry) interpretation: None  Radiology interpretation: Urology interpretation CT scan of the chest x-ray neck soft tissue  Labs reviewed:   CMP 4/10/2024.  Repeat CMP in AM.  CBC 4/10/2024    Any tests that were considered but not ordered: None     Decision rules/scores evaluated (example [id number], [place], etc): None     Discussed with: Dr. Denton and patient.     Care Planning  Shared decision making: Dr. Denton and patient.  Patient agrees to GI consult, endoscopy, await pathology will require CTS consult.  Full liquids.  Code status and discussions: Full code  The patient surrogate decision maker is his daughter Susan Musa  Social Determinants of Health that impact treatment or disposition: None  I expect the patient to be discharged to home to be determined pending pathology and recommendations.    Electronically signed by LIZ Temple, 04/10/24, 14:58 CDT.     The above documentation resulted  from a face-to-face encounter by me Brenda HILL, ACNP-BC.

## 2024-04-11 VITALS
HEART RATE: 59 BPM | TEMPERATURE: 97.8 F | SYSTOLIC BLOOD PRESSURE: 157 MMHG | DIASTOLIC BLOOD PRESSURE: 79 MMHG | BODY MASS INDEX: 26.03 KG/M2 | HEIGHT: 72 IN | OXYGEN SATURATION: 97 % | RESPIRATION RATE: 16 BRPM | WEIGHT: 192.2 LBS

## 2024-04-11 DIAGNOSIS — C16.0 ADENOCARCINOMA OF GASTROESOPHAGEAL JUNCTION (HCC): Primary | ICD-10-CM

## 2024-04-11 LAB
CEA SERPL-MCNC: 0.86 NG/ML
CYTO UR: NORMAL
DEPRECATED RDW RBC AUTO: 44.1 FL (ref 37–54)
ERYTHROCYTE [DISTWIDTH] IN BLOOD BY AUTOMATED COUNT: 13 % (ref 12.3–15.4)
HCT VFR BLD AUTO: 29.5 % (ref 37.5–51)
HGB BLD-MCNC: 9.8 G/DL (ref 13–17.7)
LAB AP CASE REPORT: NORMAL
Lab: NORMAL
MCH RBC QN AUTO: 30.4 PG (ref 26.6–33)
MCHC RBC AUTO-ENTMCNC: 33.2 G/DL (ref 31.5–35.7)
MCV RBC AUTO: 91.6 FL (ref 79–97)
PATH REPORT.FINAL DX SPEC: NORMAL
PATH REPORT.GROSS SPEC: NORMAL
PLATELET # BLD AUTO: 84 10*3/MM3 (ref 140–450)
PMV BLD AUTO: 10.9 FL (ref 6–12)
RBC # BLD AUTO: 3.22 10*6/MM3 (ref 4.14–5.8)
WBC NRBC COR # BLD AUTO: 3.75 10*3/MM3 (ref 3.4–10.8)

## 2024-04-11 PROCEDURE — 85027 COMPLETE CBC AUTOMATED: CPT | Performed by: NURSE PRACTITIONER

## 2024-04-11 PROCEDURE — G0378 HOSPITAL OBSERVATION PER HR: HCPCS

## 2024-04-11 PROCEDURE — 25810000003 SODIUM CHLORIDE 0.9 % SOLUTION

## 2024-04-11 PROCEDURE — 82378 CARCINOEMBRYONIC ANTIGEN: CPT | Performed by: NURSE PRACTITIONER

## 2024-04-11 PROCEDURE — 92526 ORAL FUNCTION THERAPY: CPT

## 2024-04-11 PROCEDURE — 99232 SBSQ HOSP IP/OBS MODERATE 35: CPT | Performed by: INTERNAL MEDICINE

## 2024-04-11 PROCEDURE — 97161 PT EVAL LOW COMPLEX 20 MIN: CPT | Performed by: PHYSICAL THERAPIST

## 2024-04-11 RX ORDER — NITROGLYCERIN 0.4 MG/1
0.4 TABLET SUBLINGUAL
COMMUNITY
Start: 2021-01-29

## 2024-04-11 RX ORDER — SODIUM CHLORIDE 9 MG/ML
75 INJECTION, SOLUTION INTRAVENOUS CONTINUOUS
Status: DISCONTINUED | OUTPATIENT
Start: 2024-04-11 | End: 2024-04-11 | Stop reason: HOSPADM

## 2024-04-11 RX ORDER — SODIUM BICARBONATE 650 MG/1
325 TABLET ORAL DAILY
COMMUNITY

## 2024-04-11 RX ORDER — PANTOPRAZOLE SODIUM 40 MG/1
40 TABLET, DELAYED RELEASE ORAL DAILY
Qty: 30 TABLET | Refills: 0 | Status: SHIPPED | OUTPATIENT
Start: 2024-04-11

## 2024-04-11 RX ORDER — FUROSEMIDE 40 MG/1
40 TABLET ORAL 2 TIMES DAILY
COMMUNITY

## 2024-04-11 RX ORDER — LISINOPRIL 20 MG/1
20 TABLET ORAL 2 TIMES DAILY
COMMUNITY
Start: 2021-10-26

## 2024-04-11 RX ORDER — TERAZOSIN 1 MG/1
1 CAPSULE ORAL NIGHTLY
COMMUNITY

## 2024-04-11 RX ORDER — BUDESONIDE AND FORMOTEROL FUMARATE DIHYDRATE 160; 4.5 UG/1; UG/1
2 AEROSOL RESPIRATORY (INHALATION) 2 TIMES DAILY
COMMUNITY

## 2024-04-11 RX ORDER — PRAVASTATIN SODIUM 40 MG
40 TABLET ORAL NIGHTLY
COMMUNITY
Start: 2021-11-23

## 2024-04-11 RX ORDER — HYDROCODONE BITARTRATE AND ACETAMINOPHEN 5; 325 MG/1; MG/1
1 TABLET ORAL EVERY 6 HOURS PRN
Qty: 24 TABLET | Refills: 0 | Status: SHIPPED | OUTPATIENT
Start: 2024-04-11

## 2024-04-11 RX ORDER — GUAIFENESIN 600 MG/1
1200 TABLET, EXTENDED RELEASE ORAL EVERY 12 HOURS SCHEDULED
COMMUNITY
Start: 2023-11-22

## 2024-04-11 RX ORDER — ASPIRIN 81 MG/1
81 TABLET ORAL DAILY
COMMUNITY
Start: 2020-05-31

## 2024-04-11 RX ORDER — METOPROLOL TARTRATE 50 MG/1
25 TABLET, FILM COATED ORAL 2 TIMES DAILY
COMMUNITY
Start: 2021-12-18

## 2024-04-11 RX ORDER — FLUTICASONE PROPIONATE 50 MCG
2 SPRAY, SUSPENSION (ML) NASAL DAILY
COMMUNITY
Start: 2023-11-23

## 2024-04-11 RX ORDER — AMLODIPINE BESYLATE 10 MG/1
5 TABLET ORAL 2 TIMES DAILY
COMMUNITY
Start: 2021-12-18

## 2024-04-11 RX ORDER — HYDRALAZINE HYDROCHLORIDE 100 MG/1
100 TABLET, FILM COATED ORAL 2 TIMES DAILY
COMMUNITY

## 2024-04-11 RX ORDER — ALUMINA, MAGNESIA, AND SIMETHICONE 2400; 2400; 240 MG/30ML; MG/30ML; MG/30ML
15 SUSPENSION ORAL 3 TIMES DAILY
Qty: 769 ML | Refills: 0 | Status: SHIPPED | OUTPATIENT
Start: 2024-04-11

## 2024-04-11 RX ADMIN — ASPIRIN 81 MG: 81 TABLET, COATED ORAL at 10:00

## 2024-04-11 RX ADMIN — ALUMINUM HYDROXIDE, MAGNESIUM HYDROXIDE, AND DIMETHICONE 15 ML: 400; 400; 40 SUSPENSION ORAL at 10:00

## 2024-04-11 RX ADMIN — FLUTICASONE PROPIONATE 2 SPRAY: 50 SPRAY, METERED NASAL at 10:00

## 2024-04-11 RX ADMIN — HYDRALAZINE HYDROCHLORIDE 50 MG: 50 TABLET ORAL at 10:00

## 2024-04-11 RX ADMIN — VITAM B12 50 MCG: 100 TAB at 10:00

## 2024-04-11 RX ADMIN — AMLODIPINE BESYLATE 5 MG: 5 TABLET ORAL at 10:00

## 2024-04-11 RX ADMIN — SODIUM CHLORIDE 75 ML/HR: 9 INJECTION, SOLUTION INTRAVENOUS at 10:01

## 2024-04-11 RX ADMIN — Medication 10 ML: at 10:00

## 2024-04-11 RX ADMIN — METOPROLOL TARTRATE 50 MG: 50 TABLET, FILM COATED ORAL at 10:00

## 2024-04-11 RX ADMIN — FAMOTIDINE 20 MG: 10 INJECTION INTRAVENOUS at 10:00

## 2024-04-11 RX ADMIN — SODIUM BICARBONATE 325 MG: 650 TABLET ORAL at 10:00

## 2024-04-11 NOTE — PLAN OF CARE
Goal Outcome Evaluation:  Plan of Care Reviewed With: patient        Progress: no change  Outcome Evaluation: see note      Anticipated Discharge Disposition (SLP): unknown          SLP Swallowing Diagnosis: functional oral phase, R/O pharyngeal dysphagia, suspected esophageal dysphagia (04/11/24 0931)  Treatment Assessment (SLP): continued (04/11/24 0931)  Treatment Assessment Comments (SLP): see note (04/11/24 0931)  Plan for Continued Treatment (SLP): continue treatment per plan of care (04/11/24 0931)

## 2024-04-11 NOTE — PLAN OF CARE
Goal Outcome Evaluation:  Plan of Care Reviewed With: patient        Progress: no change  Outcome Evaluation: Pt A&Ox4 lying in bed reporting 2/10 R sided chest pain. Pt completes all mobility independently throughout session. Gait distance limited secondary to COPD which is pt's baseline. MMT 5/5 throughout BUE and BLE with decreased sensation in R foot and decreased R hand dexterity which has been present since his last stroke. Pt has no concerns regarding mobility and function. Skilled PT not required at this time.      Anticipated Discharge Disposition (PT): home

## 2024-04-11 NOTE — CONSULTS
MEDICAL ONCOLOGY CONSULTATION    Pt Name: Faisal Joseph  MRN: [id number]  [id number]  YOB: 1940  Date of evaluation: 4/11/2024    Reason for Consultation: Esophageal carcinoma    Requesting Physician: Dr. Denton    History Obtained From: The patient and EMR    HISTORY OF PRESENT ILLNESS:      Faisal Joseph is an 83-year-old  gentleman who presented to the ED at [facility] on 4/9/2024 with nausea and vomiting, dysphagia to solid foods, only able to eat oatmeal, 35 pound weight loss over the previous 2 months.  He also complains of  right chest wall/axillary pain.  He also has stage IV CKD with a creatinine clearance of 28.8, associated with a creatinine of 2.4 on 4/10/2024.    CT scan of the chest without contrast on 4/10/2024 reported the following:  No acute cardiopulmonary process.  Lung emphysema.   There is a 6 mm left upper lobe nodule which is stable at least dating back to May 2020. This is considered benign. No new nodules or consolidation.  Previous coronary bypass.  No acute fracture identified. No acute process identified in the chest wall soft tissues to explain right axillary pain.    EGD by Gilles Pineda MD on 4/10/2024 reported the following:  One malignant-appearing, intrinsic severe stenosis with abnormal, irregluar mucosa and nodules was found 35 cm from the incisors. This stenosis measured 9 mm (inner diameter) x 5 cm (in length). The stenosis was traversed after downsizing scope. A TTS dilator was passed through the scope. Dilation with a 15-16.5-18 mm balloon dilator was performed to 15 mm. The dilation site was examined following endoscope reinsertion and showed mild mucosal disruption, moderate improvement in luminal narrowing and no bleeding, mucosal tear or perforation.   Biopsies were taken with a cold forceps for histology.  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     Oncology consultation  requested            Past Medical History:    Past Medical History:   Diagnosis Date    Arthritis     Cataract     Chronic fatigue 10/3/2017    Chronic renal disease, stage IV     Coronary artery disease     CVA (cerebral vascular accident) 8/14/2018    Hyperlipidemia     Hypertension     Palpitations 10/3/2017    Premature atrial contractions 10/17/2017    PVCs (premature ventricular contractions) 10/17/2017    Sleep apnea     Stage 4 chronic kidney disease 7/30/2018    Stroke 2007    Weakness     right leg weaker       Past Surgical History:    Past Surgical History:   Procedure Laterality Date    CARDIAC CATHETERIZATION      CARDIAC CATHETERIZATION N/A 5/28/2020    Procedure: Left Heart Cath;  Surgeon: Rufino Brice MD;  Location: [facility] CATH INVASIVE LOCATION;  Service: Cardiology;  Laterality: N/A;    CORONARY ARTERY BYPASS GRAFT N/A 6/9/2020    Procedure: CABG X 3, LIMA, SHELBY, EVH WITH OPEN EXTENSION LOWER RIGHT LEG;  Surgeon: Nikunj Carballo MD;  Location: [facility] OR;  Service: Cardiothoracic;  Laterality: N/A;    ENDOSCOPY N/A 4/10/2024    Procedure: ESOPHAGOGASTRODUODENOSCOPY WITH ANESTHESIA;  Surgeon: Gilles Pineda MD;  Location: [facility] ENDOSCOPY;  Service: Gastroenterology;  Laterality: N/A;  pre op dysphagia  post esophageal mass  pcp Spike Polanco       Current Hospital Medications:      Current Facility-Administered Medications:     aluminum-magnesium hydroxide-simethicone (MAALOX MAX) 400-400-40 MG/5ML suspension 15 mL, 15 mL, Oral, TID, Gordon Denton MD, 15 mL at 04/10/24 2102    amLODIPine (NORVASC) tablet 5 mg, 5 mg, Oral, BID, Gordon Denton MD    aspirin EC tablet 81 mg, 81 mg, Oral, Daily, Gordon Denton MD, 81 mg at 04/10/24 1858    famotidine (PEPCID) injection 20 mg, 20 mg, Intravenous, Q12H, Brian Shin DO, 20 mg at 04/10/24 2102    fluticasone (FLONASE) 50 MCG/ACT nasal spray 2 spray, 2 spray, Each Nare, Daily, Brian Shin DO, 2 spray at 04/10/24 1859     hydrALAZINE (APRESOLINE) tablet 50 mg, 50 mg, Oral, BID, Gordon Denton MD    metoprolol tartrate (LOPRESSOR) tablet 50 mg, 50 mg, Oral, Q12H, Brenda Rincon APRN, 50 mg at 04/10/24 1711    Morphine sulfate (PF) injection 1 mg, 1 mg, Intravenous, Q4H PRN, Brian Shin DO, 1 mg at 04/10/24 1445    nitroglycerin (NITROSTAT) SL tablet 0.4 mg, 0.4 mg, Sublingual, Q5 Min PRN, Gordon Denton MD    ondansetron ODT (ZOFRAN-ODT) disintegrating tablet 4 mg, 4 mg, Oral, Q6H PRN **OR** ondansetron (ZOFRAN) injection 4 mg, 4 mg, Intravenous, Q6H PRN, Brian Shin DO    oxyCODONE-acetaminophen (PERCOCET) 5-325 MG per tablet 1 tablet, 1 tablet, Oral, Q6H PRN, Gordon Denton MD, 1 tablet at 04/10/24 1858    pravastatin (PRAVACHOL) tablet 40 mg, 40 mg, Oral, Nightly, Gordon Denton MD, 40 mg at 04/10/24 2101    sodium bicarbonate tablet 325 mg, 325 mg, Oral, Daily, Gordon Denton MD, 325 mg at 04/10/24 1857    [COMPLETED] Insert Peripheral IV, , , Once **AND** sodium chloride 0.9 % flush 10 mL, 10 mL, Intravenous, PRN, Akiko Caputo, APRN    sodium chloride 0.9 % flush 10 mL, 10 mL, Intravenous, Q12H, Brian Shin DO, 10 mL at 04/10/24 2102    sodium chloride 0.9 % flush 10 mL, 10 mL, Intravenous, PRN, Brian Shin DO    sodium chloride 0.9 % infusion 40 mL, 40 mL, Intravenous, PRN, Brian Shin DO    sodium chloride 0.9 % infusion 500 mL, 500 mL, Intravenous, Continuous PRN, Cecilio Davison CRNA, Stopped at 04/10/24 1344    sodium chloride 0.9 % infusion, 75 mL/hr, Intravenous, Continuous, Faisal Barlow APRN    terazosin (HYTRIN) capsule 1 mg, 1 mg, Oral, Nightly, Gordon Denton MD, 1 mg at 04/10/24 2101    vitamin B-12 (CYANOCOBALAMIN) tablet 50 mcg, 50 mcg, Oral, Daily, Gordon Denton MD, 50 mcg at 04/10/24 1858    Allergies:   Allergies   Allergen Reactions    Hydralazine Nausea Only    Losartan Potassium Nausea Only    Penicillins Hives    Spironolactone Swelling      "Made  Breast sore and swell       Social History:    Social History     Socioeconomic History    Marital status:    Tobacco Use    Smoking status: Former     Current packs/day: 0.00     Average packs/day: 1.5 packs/day for 25.0 years (37.5 ttl pk-yrs)     Types: Cigarettes     Start date:      Quit date:      Years since quittin.3    Smokeless tobacco: Never   Vaping Use    Vaping status: Never Used   Substance and Sexual Activity    Alcohol use: No    Drug use: No    Sexual activity: Defer       Family History:   Family History   Problem Relation Age of Onset    Cancer Mother     Cancer Father        REVIEW OF SYSTEMS:    Constitutional: 35 pound weight loss in 2 months  HEENT: no blurring of vision, no double vision, no hearing difficulty, no tinnitus,no ulceration, no dental caries, no dysphagia     Lungs: no cough, no shortness of breath, no wheeze, right chest wall/axillary pain  CVS: no palpitation, no chest pain, no shortness of breath  GI: Nausea vomiting dysphagia to solids  JESSICA: no dysuria, frequency and urgency, no hematuria, no kidney stones  Musculoskeletal: no joint pain, swelling , stiffness  Endocrine: no polyuria, polydipsia, no cold or heat intolerance  Hematology: no anemia, no easy brusing or bleeding, no hx of clotting disorder  Dermatology: no skin rash, no eczema, no pruritus  Psychiatry: no depression, no anxiety,no panic attacks, no suicide ideation  Neurology: no syncope, no seizures, no numbness or tingling of hands, no numbness or tingling of feet, no paresis    Vitals:    BP (!) 182/84 (BP Location: Left arm, Patient Position: Lying)   Pulse 56   Temp 97.4 °F (36.3 °C) (Oral)   Resp 16   Ht 182.9 cm (72\")   Wt 87.2 kg (192 lb 3.2 oz)   SpO2 96%   BMI 26.07 kg/m²     PHYSICAL EXAM:    CONSTITUTIONAL: Alert, appropriate, no acute distress  EYES: Nonicteric, EOM intact, pupils equal round   ENT: Mucous membranes moist, no oropharyngeal lesions   NECK: Supple, no " "masses   CHEST/LUNGS: CTA bilaterally, normal respiratory effort   CARDIOVASCULAR: RRR, no murmurs  ABDOMEN: soft non-tender, active bowel sounds, no HSM  EXTREMITIES: warm, moves all fours  SKIN: warm, dry with no rashes or lesions  LYMPH: No cervical, clavicular, axillary, or inguinal lymphadenopathy  NEUROLOGIC: follows commands, non focal   PSYCH: mood and affect appropriate    CBC  Results from last 7 days   Lab Units 04/11/24  0332 04/10/24  0403 04/09/24  1751   WBC 10*3/mm3 3.75 4.18 6.32   HEMOGLOBIN g/dL 9.8* 10.0* 11.5*   HEMATOCRIT % 29.5* 30.7* 33.7*   PLATELETS 10*3/mm3 84* 85* 115*         Lab Results   Component Value Date     04/10/2024    K 3.6 04/10/2024     (H) 04/10/2024    CO2 21.0 (L) 04/10/2024    BUN 14 04/10/2024    CREATININE 2.40 (H) 04/10/2024    GLUCOSE 89 04/10/2024    CALCIUM 8.8 04/10/2024    BILITOT 0.4 04/10/2024    ALKPHOS 62 04/10/2024    AST 13 04/10/2024    ALT 7 04/10/2024    AGRATIO 1.8 04/10/2024    GLOB 2.1 04/10/2024       Lab Results   Component Value Date    INR 1.29 (H) 06/10/2020    INR 1.40 (H) 06/09/2020    INR 1.05 06/02/2020    PROTIME 15.7 (H) 06/10/2020    PROTIME 16.8 (H) 06/09/2020    PROTIME 13.3 06/02/2020       Cultures:    Lab Results   Component Value Date    BLOODCX No growth at 5 days 11/29/2023    BLOODCX No growth at 5 days 11/29/2023     No components found for: \"URINCX\"    ASSESSMENT/PLAN:    Faisal Joseph is an 83-year-old  gentleman who presented to the ED at [facility] on 4/9/2024 with nausea and vomiting, dysphagia to solid foods, only able to eat oatmeal, 35 pound weight loss over the previous 2 months.  He also complains of  right chest wall/axillary pain.  He also has stage IV CKD with a creatinine clearance of 28.8, associated with a creatinine of 2.4 on 4/10/2024.    CT scan of the chest without contrast on 4/10/2024 reported the following:  No acute cardiopulmonary process.  Lung emphysema.   There is a 6 mm left upper " lobe nodule which is stable at least dating back to May 2020. This is considered benign. No new nodules or consolidation.  Previous coronary bypass.  No acute fracture identified. No acute process identified in the chest wall soft tissues to explain right axillary pain.    EGD by Gilles Pineda MD on 4/10/2024 reported the following:  One malignant-appearing, intrinsic severe stenosis with abnormal, irregluar mucosa and nodules was found 35 cm from the incisors. This stenosis measured 9 mm (inner diameter) x 5 cm (in length). The stenosis was traversed after downsizing scope. A TTS dilator was passed through the scope. Dilation with a 15-16.5-18 mm balloon dilator was performed to 15 mm. The dilation site was examined following endoscope reinsertion and showed mild mucosal disruption, moderate improvement in luminal narrowing and no bleeding, mucosal tear or perforation.   Biopsies were taken with a cold forceps for histology.  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     Oncology consultation requested    I saw and examined Mr. Joseph.  I discussed the entire case and potential plans for workup and treatment with him personally and with his wife Susan (by phone)  Discussed the case with Dr. Denton.    Discussed the case with Dr. Pineda    RECOMMENDATION AND PLAN  I will make outpatient arrangements for EGD and EUS for staging  I will make outpatient arrangements for distal esophageal stent placement  I will make outpatient arrangements for this to Jake to see me in the clinic 4/17/2024 at 11:45 AM  PET scan will be arranged as an outpatient.  Depending on workup, and patient tolerability especially given his CKD problem, arrangements will be made for either neoadjuvant or definitive chemo XRT.  Okay with me if discharged      Sid Heard MD    04/11/24  09:09 CDT

## 2024-04-11 NOTE — THERAPY TREATMENT NOTE
"Acute Care - Speech Language Pathology   Swallow Treatment Note [facility]     Patient Name: Faisal Joseph  : 1940  MRN: [id number]  Today's Date: 2024               Admit Date: 2024  Pt seen to determine diet safety. Upon arrival, pt was upright in chair with no family at bedside. He was agreeable to tx. Pt consumed trials of thin liquids with no overt s/s of aspiration despite pt reporting that he was \"not swallowing well\" this morning. Vocal quality remained clear. It is recommended that pt continue full thin liquid diet. ST to continue to follow and tx.     Kasia Landaverde, MS CCC-SLP 2024 10:11 CDT      Visit Dx:     ICD-10-CM ICD-9-CM   1. Dysphagia, unspecified type  R13.10 787.20   2. Atypical chest pain  R07.89 786.59   3. Weight loss, unintentional  R63.4 783.21   4. 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    Severe malnutrition     Past Medical History:   Diagnosis Date    Arthritis     Cataract     Chronic fatigue 10/3/2017    Chronic renal disease, stage IV     Coronary artery disease     CVA (cerebral vascular accident) 2018    Hyperlipidemia     Hypertension     Palpitations 10/3/2017    Premature atrial contractions 10/17/2017    PVCs (premature ventricular contractions) 10/17/2017    Sleep apnea     Stage 4 chronic " kidney disease 7/30/2018    Stroke 2007    Weakness     right leg weaker     Past Surgical History:   Procedure Laterality Date    CARDIAC CATHETERIZATION      CARDIAC CATHETERIZATION N/A 5/28/2020    Procedure: Left Heart Cath;  Surgeon: Rufino Brice MD;  Location: [facility] CATH INVASIVE LOCATION;  Service: Cardiology;  Laterality: N/A;    CORONARY ARTERY BYPASS GRAFT N/A 6/9/2020    Procedure: CABG X 3, LIMA, SHELBY, EVH WITH OPEN EXTENSION LOWER RIGHT LEG;  Surgeon: Nikunj Carballo MD;  Location: [facility] OR;  Service: Cardiothoracic;  Laterality: N/A;    ENDOSCOPY N/A 4/10/2024    Procedure: ESOPHAGOGASTRODUODENOSCOPY WITH ANESTHESIA;  Surgeon: Gilles Pineda MD;  Location: [facility] ENDOSCOPY;  Service: Gastroenterology;  Laterality: N/A;  pre op dysphagia  post esophageal mass  pcp Spike Polanco       SLP Recommendation and Plan  SLP Swallowing Diagnosis: functional oral phase, R/O pharyngeal dysphagia, suspected esophageal dysphagia (04/11/24 0931)  SLP Diet Recommendation: thin liquids, full liquid diet (04/11/24 0931)  Recommended Precautions and Strategies: upright posture during/after eating, small bites of food and sips of liquid, alternate between small bites of food and sips of liquid, general aspiration precautions, reflux precautions (04/11/24 0931)  SLP Rec. for Method of Medication Administration: meds whole, with thin liquids, with puree (04/11/24 0931)     Monitor for Signs of Aspiration: yes, cough, gurgly voice, throat clearing, pneumonia, notify SLP if any concerns (04/11/24 0931)     Swallow Criteria for Skilled Therapeutic Interventions Met: demonstrates skilled criteria (04/11/24 0931)  Anticipated Discharge Disposition (SLP): unknown (04/11/24 0931)  Rehab Potential/Prognosis, Swallowing: good, to achieve stated therapy goals (04/11/24 0931)  Therapy Frequency (Swallow): 2 days per week (04/11/24 0931)  Predicted Duration Therapy Intervention (Days): until discharge (04/11/24  0931)  Oral Care Recommendations: Oral Care BID/PRN (04/11/24 0931)        Daily Summary of Progress (SLP): progress toward functional goals is good (04/11/24 0931)               Treatment Assessment (SLP): continued (04/11/24 0931)  Treatment Assessment Comments (SLP): see note (04/11/24 0931)  Plan for Continued Treatment (SLP): continue treatment per plan of care (04/11/24 0931)         Plan of Care Reviewed With: patient  Progress: no change  Outcome Evaluation: see note      SWALLOW EVALUATION (Last 72 Hours)       SLP Adult Swallow Evaluation       Row Name 04/11/24 0931 04/10/24 4103                Rehab Evaluation    Document Type therapy note (daily note)  -JR evaluation  -MB       Subjective Information no complaints  -JR complains of  discomfort in lower right chest  -MB       Patient Observations alert;cooperative  -JR alert;cooperative  -MB       Patient/Family/Caregiver Comments/Observations no family present  -JR Female family member present  -MB       Patient Effort good  -JR --       Symptoms Noted During/After Treatment none  -JR --          General Information    Patient Profile Reviewed yes  -JR yes  -MB       Pertinent History Of Current Problem -- Reflux, solid food coming back up, n/v, 35lb weight loss since November, abdominal and chest pain. 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 Dilated during endo. Hx renal disease stage IV, CAD, CVA, HLD, HTN, sleep apnea.  -MB       Current Method of Nutrition -- thin liquids;full liquids  -MB       Precautions/Limitations, Vision -- WFL;for purposes of eval  -MB       Precautions/Limitations, Hearing -- WFL  -MB       Prior Level of Function-Communication -- WFL  -MB       Prior Level of Function-Swallowing -- no diet consistency restrictions  -MB       Plans/Goals Discussed with -- patient and family  -MB       Barriers to Rehab -- medically complex  -MB       Patient's Goals for Discharge -- patient did not  state  -MB       Family Goals for Discharge -- family did not state  -MB          Pain    Additional Documentation Pain Scale: FACES Pre/Post-Treatment (Group)  -JR Pain Scale: FACES Pre/Post-Treatment (Group)  -MB          Pain Scale: FACES Pre/Post-Treatment    Pain: FACES Scale, Pretreatment 2-->hurts little bit  -JR 2-->hurts little bit  -MB       Posttreatment Pain Rating 2-->hurts little bit  -JR --          Oral Motor Structure and Function    Dentition Assessment -- missing teeth;other (see comments)  Upper and lower partial not available  -MB       Secretion Management -- WNL/WFL  -MB       Mucosal Quality -- moist, healthy  -MB          Oral Musculature and Cranial Nerve Assessment    Oral Motor General Assessment -- WFL  -MB          General Eating/Swallowing Observations    Respiratory Support Currently in Use -- room air  -MB       Eating/Swallowing Skills -- fed by SLP  -MB       Positioning During Eating -- upright in bed  -MB       Utensils Used -- spoon;straw  -MB       Consistencies Trialed -- thin liquids;pudding thick  -MB          Clinical Swallow Eval    Oral Prep Phase -- WFL  -MB       Oral Transit -- WFL  -MB       Oral Residue -- WFL  -MB       Pharyngeal Phase -- suspected pharyngeal impairment  -MB       Esophageal Phase -- unremarkable  -MB       Clinical Swallow Evaluation Summary -- See note  -MB          Pharyngeal Phase Concerns    Pharyngeal Phase Concerns -- throat clear  -MB       Throat Clear -- thin  -MB          SLP Evaluation Clinical Impression    SLP Swallowing Diagnosis functional oral phase;R/O pharyngeal dysphagia;suspected esophageal dysphagia  -JR functional oral phase;R/O pharyngeal dysphagia;suspected esophageal dysphagia  -MB       Functional Impact risk of malnutrition;risk of dehydration  -JR risk of malnutrition;risk of dehydration  -MB       Rehab Potential/Prognosis, Swallowing good, to achieve stated therapy goals  -JR good, to achieve stated therapy goals  -MB        Swallow Criteria for Skilled Therapeutic Interventions Met demonstrates skilled criteria  -JR demonstrates skilled criteria  -MB          SLP Treatment Clinical Impressions    Treatment Assessment (SLP) continued  -JR --       Treatment Assessment Comments (SLP) see note  -JR --       Daily Summary of Progress (SLP) progress toward functional goals is good  -JR --       Barriers to Overall Progress (SLP) Medically complex  -JR --       Plan for Continued Treatment (SLP) continue treatment per plan of care  -JR --       Care Plan Review evaluation/treatment results reviewed;care plan/treatment goals reviewed;risks/benefits reviewed;current/potential barriers reviewed  -JR --          Recommendations    Therapy Frequency (Swallow) 2 days per week  -JR 2 days per week  -MB       Predicted Duration Therapy Intervention (Days) until discharge  -JR until discharge  -MB       SLP Diet Recommendation thin liquids;full liquid diet  -JR thin liquids;full liquid diet  -MB       Recommended Precautions and Strategies upright posture during/after eating;small bites of food and sips of liquid;alternate between small bites of food and sips of liquid;general aspiration precautions;reflux precautions  -JR upright posture during/after eating;small bites of food and sips of liquid;alternate between small bites of food and sips of liquid;general aspiration precautions;reflux precautions  -MB       Oral Care Recommendations Oral Care BID/PRN  -JR Oral Care BID/PRN  -MB       SLP Rec. for Method of Medication Administration meds whole;with thin liquids;with puree  -JR meds whole;with thin liquids;with puree  -MB       Monitor for Signs of Aspiration yes;cough;gurgly voice;throat clearing;pneumonia;notify SLP if any concerns  -JR yes;cough;gurgly voice;throat clearing;pneumonia;notify SLP if any concerns  -MB       Anticipated Discharge Disposition (SLP) unknown  -JR unknown  -MB          Swallow Goals (SLP)    Swallow LTGs Swallow  Long Term Goal (free text)  -JR Swallow Long Term Goal (free text)  -MB       Swallow STGs diet tolerance goal selection (SLP)  -JR diet tolerance goal selection (SLP)  -MB       Diet Tolerance Goal Selection (SLP) Patient will tolerate trials of  -JR Patient will tolerate trials of  -MB          (LTG) Swallow    (LTG) Swallow Pt will tolerate least restrictive diet with no overt s/s of aspiration.  -JR Pt will tolerate least restrictive diet with no overt s/s of aspiration.  -MB       [place] (Swallow Long Term Goal) independently (over 90% accuracy)  -JR independently (over 90% accuracy)  -MB       Time Frame (Swallow Long Term Goal) by discharge  -JR by discharge  -MB       Barriers (Swallow Long Term Goal) n/a  -JR n/a  -MB       Progress/Outcomes (Swallow Long Term Goal) continuing progress toward goal  -JR new goal  -MB       Comment (Swallow Long Term Goal) -- n/a  -MB          (STG) Patient will tolerate trials of    Consistencies Trialed (Tolerate trials) thin liquids;nectar/ mildly thick liquids;honey/ moderately thick liquids;pudding/ extremely thick liquids  -JR thin liquids;nectar/ mildly thick liquids;honey/ moderately thick liquids;pudding/ extremely thick liquids  -MB       Desired Outcome (Tolerate trials) without signs/symptoms of aspiration  -JR without signs/symptoms of aspiration  -MB       [place] (Tolerate trials) independently (over 90% accuracy)  -JR independently (over 90% accuracy)  -MB       Time Frame (Tolerate trials) by discharge  -JR by discharge  -MB       Progress/Outcomes (Tolerate trials) continuing progress toward goal  -JR new goal  -MB       Comment (Tolerate trials) -- n/a  -MB                 User Key  (r) = Recorded By, (t) = Taken By, (c) = Cosigned By      Initials Name Effective Dates    Lori Gross CCC-SLP 02/03/23 -     Kasia Cunha MS CCC-SLP 08/22/23 -                     EDUCATION  The patient has been educated in the following areas:    Dysphagia (Swallowing Impairment).        SLP GOALS       Row Name 04/11/24 0931 04/10/24 1444          (LTG) Swallow    (LTG) Swallow Pt will tolerate least restrictive diet with no overt s/s of aspiration.  -JR Pt will tolerate least restrictive diet with no overt s/s of aspiration.  -MB     [place] (Swallow Long Term Goal) independently (over 90% accuracy)  -JR independently (over 90% accuracy)  -MB     Time Frame (Swallow Long Term Goal) by discharge  -JR by discharge  -MB     Barriers (Swallow Long Term Goal) n/a  -JR n/a  -MB     Progress/Outcomes (Swallow Long Term Goal) continuing progress toward goal  -JR new goal  -MB     Comment (Swallow Long Term Goal) -- n/a  -MB        (STG) Patient will tolerate trials of    Consistencies Trialed (Tolerate trials) thin liquids;nectar/ mildly thick liquids;honey/ moderately thick liquids;pudding/ extremely thick liquids  -JR thin liquids;nectar/ mildly thick liquids;honey/ moderately thick liquids;pudding/ extremely thick liquids  -MB     Desired Outcome (Tolerate trials) without signs/symptoms of aspiration  -JR without signs/symptoms of aspiration  -MB     [place] (Tolerate trials) independently (over 90% accuracy)  -JR independently (over 90% accuracy)  -MB     Time Frame (Tolerate trials) by discharge  -JR by discharge  -MB     Progress/Outcomes (Tolerate trials) continuing progress toward goal  -JR new goal  -MB     Comment (Tolerate trials) -- n/a  -MB               User Key  (r) = Recorded By, (t) = Taken By, (c) = Cosigned By      Initials Name Provider Type    Lori Gross CCC-SLP Speech and Language Pathologist    Kasia Cunha MS CCC-SLP Speech and Language Pathologist                       Time Calculation:    Time Calculation- SLP       Row Name 04/11/24 1009             Time Calculation- SLP    SLP Start Time 0931  -      SLP Stop Time 1000  -      SLP Time Calculation (min) 29 min  -      SLP Received On 04/11/24  -          Untimed Charges    26898-[id number] Treatment Swallow Minutes 29  -JR         Total Minutes    Untimed Charges Total Minutes 29  -JR       Total Minutes 29  -JR                User Key  (r) = Recorded By, (t) = Taken By, (c) = Cosigned By      Initials Name Provider Type    Kasia Cunha MS CCC-SLP Speech and Language Pathologist                    Therapy Charges for Today       Code Description Service Date Service Provider Modifiers Qty    [id number]  ST TREATMENT SWALLOW 2 4/11/2024 Kasia Landaverde MS CCC-SLP GN 1                 Kasia Landaverde MS CCC-SLP  4/11/2024

## 2024-04-11 NOTE — PROGRESS NOTES
[facility] Gastroenterology  Inpatient Progress Note  Today's date:  04/11/24    Faisal Joseph  1940       Reason for Follow Up: Dysphagia, esophageal malignancy    Subjective:   Reports that he was able to tolerate soup last evening without any difficulty in swallowing.  No new symptoms.    Allergies   Allergen Reactions    Hydralazine Nausea Only    Losartan Potassium Nausea Only    Penicillins Hives    Spironolactone Swelling     Made  Breast sore and swell       Current Facility-Administered Medications:     aluminum-magnesium hydroxide-simethicone (MAALOX MAX) 400-400-40 MG/5ML suspension 15 mL, 15 mL, Oral, TID, Gordon Denton MD, 15 mL at 04/11/24 1000    amLODIPine (NORVASC) tablet 5 mg, 5 mg, Oral, BID, Gordon Denton MD, 5 mg at 04/11/24 1000    aspirin EC tablet 81 mg, 81 mg, Oral, Daily, Gordon Denton MD, 81 mg at 04/11/24 1000    famotidine (PEPCID) injection 20 mg, 20 mg, Intravenous, Q12H, Brian Shin DO, 20 mg at 04/11/24 1000    fluticasone (FLONASE) 50 MCG/ACT nasal spray 2 spray, 2 spray, Each Nare, Daily, Brian Shin DO, 2 spray at 04/11/24 1000    hydrALAZINE (APRESOLINE) tablet 50 mg, 50 mg, Oral, BID, Gordon Denton MD, 50 mg at 04/11/24 1000    metoprolol tartrate (LOPRESSOR) tablet 50 mg, 50 mg, Oral, Q12H, Brenda Rincon, APRN, 50 mg at 04/11/24 1000    Morphine sulfate (PF) injection 1 mg, 1 mg, Intravenous, Q4H PRN, Brian Shin DO, 1 mg at 04/10/24 1445    nitroglycerin (NITROSTAT) SL tablet 0.4 mg, 0.4 mg, Sublingual, Q5 Min PRN, Gordon Denton MD    ondansetron ODT (ZOFRAN-ODT) disintegrating tablet 4 mg, 4 mg, Oral, Q6H PRN **OR** ondansetron (ZOFRAN) injection 4 mg, 4 mg, Intravenous, Q6H PRN, Brian Shin DO    oxyCODONE-acetaminophen (PERCOCET) 5-325 MG per tablet 1 tablet, 1 tablet, Oral, Q6H PRN, Gordon Denton MD, 1 tablet at 04/10/24 2400    pravastatin (PRAVACHOL) tablet 40 mg, 40 mg, Oral, Nightly, Gordon Denton,  MD, 40 mg at 04/10/24 2101    sodium bicarbonate tablet 325 mg, 325 mg, Oral, Daily, Gordon Denton MD, 325 mg at 04/11/24 1000    [COMPLETED] Insert Peripheral IV, , , Once **AND** sodium chloride 0.9 % flush 10 mL, 10 mL, Intravenous, PRN, Akiko Caputo, APRKRISTAL    sodium chloride 0.9 % flush 10 mL, 10 mL, Intravenous, Q12H, Brian Shin, DO, 10 mL at 04/11/24 1000    sodium chloride 0.9 % flush 10 mL, 10 mL, Intravenous, PRN, Brian Shin, DO    sodium chloride 0.9 % infusion 40 mL, 40 mL, Intravenous, PRN, KipBrian nielsen, DO    sodium chloride 0.9 % infusion 500 mL, 500 mL, Intravenous, Continuous PRN, Cecilio Davison, CRNA, Stopped at 04/10/24 1344    sodium chloride 0.9 % infusion, 75 mL/hr, Intravenous, Continuous, Faisal Barlow APRN, Last Rate: 75 mL/hr at 04/11/24 1001, 75 mL/hr at 04/11/24 1001    terazosin (HYTRIN) capsule 1 mg, 1 mg, Oral, Nightly, Gordon Denton MD, 1 mg at 04/10/24 2101    vitamin B-12 (CYANOCOBALAMIN) tablet 50 mcg, 50 mcg, Oral, Daily, Gordon Denton MD, 50 mcg at 04/11/24 1000    Review of Systems:   Review of Systems     Vital Signs:  Temp:  [97.4 °F (36.3 °C)-98.1 °F (36.7 °C)] 97.4 °F (36.3 °C)  Heart Rate:  [54-86] 56  Resp:  [16-22] 16  BP: (139-197)/(66-85) 182/84  Body mass index is 26.07 kg/m².     Intake/Output Summary (Last 24 hours) at 4/11/2024 1038  Last data filed at 4/11/2024 0745  Gross per 24 hour   Intake 950 ml   Output 1250 ml   Net -300 ml     I/O this shift:  In: -   Out: 300 [Urine:300]  Physical Exam:  Physical Exam  Vitals reviewed.   Constitutional:       Appearance: Normal appearance.   HENT:      Head: Normocephalic.      Mouth/Throat:      Mouth: Mucous membranes are moist.   Eyes:      Conjunctiva/sclera: Conjunctivae normal.   Cardiovascular:      Rate and Rhythm: Normal rate.   Pulmonary:      Effort: Pulmonary effort is normal. No respiratory distress.   Abdominal:      General: There is no distension.      Palpations:  There is no mass.      Tenderness: There is no abdominal tenderness. There is no guarding.      Hernia: No hernia is present.   Skin:     Coloration: Skin is not jaundiced or pale.   Neurological:      Mental Status: He is alert.   Psychiatric:         Mood and Affect: Mood normal.         Behavior: Behavior normal.         Thought Content: Thought content normal.         Judgment: Judgment normal.          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   Electronically signed by Delmar Robert MD on 4/11/2024 at 0832     I have reviewed all of the patient's current test results    Results from last 7 days   Lab Units 04/11/24  0332 04/10/24  0403 04/09/24  1751   WBC 10*3/mm3 3.75 4.18 6.32   HEMOGLOBIN g/dL 9.8* 10.0* 11.5*   HEMATOCRIT % 29.5* 30.7* 33.7*   PLATELETS 10*3/mm3 84* 85* 115*       Results from last 7 days   Lab Units 04/10/24  1511 04/10/24  0403 04/09/24  1751   SODIUM mmol/L 143 142 142   POTASSIUM mmol/L 3.6 3.5 3.6   CHLORIDE mmol/L 111* 111* 111*   CO2 mmol/L 21.0* 24.0 20.0*   BUN mg/dL 14 15 16   CREATININE mg/dL 2.40* 2.60* 2.43*   CALCIUM mg/dL 8.8 8.7 8.8   BILIRUBIN mg/dL 0.4 0.4 0.4   ALK PHOS U/L 62 56 65   ALT (SGPT) U/L 7 7 6   AST (SGOT) U/L 13 10 12   GLUCOSE mg/dL 89 82 100*             Lab Results   Lab Value Date/Time    LIPASE 26 04/09/2024 1751       Radiology Review:  Imaging Results (Last 24 Hours)       ** No results found for the last 24 hours. 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  Biopsies with evidence of adenocarcinoma with signet ring features.  Given the patient's endoscopic findings and absence of any metastatic disease based on imaging available, will need further evaluation to evaluate for staging.  Need to consider endoscopic ultrasound.  Given the patient's symptoms, he would benefit from stenting of the distal esophagus.  All the above will be accomplished at a tertiary center.  Patient was seen by medical oncology at the time of my visit and I have discussed the above with medical oncology.  Arrangements to be made for him to have EUS and EGD with stenting.  Likely to have PET scan as outpatient.  Further recommendations based on the above.  Patient to follow-up with medical oncology within 1 week.  Further recommendations based on the above.  GI will sign off at this time.  Please call with any additional questions.    Thank you for allowing us to participate in the care of your patient.      Gilles Pineda MD  04/11/24  10:38 CDT    DISCLAIMER:    This physician works through a [employer] as an inpatient consultant gastroenterologist only and has no outpatient clinic for patient follow up.  Any results not available at time of inpatient discharge and/or GI clinic follow up should be managed by the hospitalist team, PCP, or outpatient gastroenterologist.

## 2024-04-11 NOTE — THERAPY EVALUATION
Patient Name: Faisal Joseph  : 1940    MRN: [id number]                              Today's Date: 2024       Admit Date: 2024    Visit Dx:     ICD-10-CM ICD-9-CM   1. Dysphagia, unspecified type  R13.10 787.20   2. Atypical chest pain  R07.89 786.59   3. Weight loss, unintentional  R63.4 783.21   4. 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    Severe malnutrition     Past Medical History:   Diagnosis Date    Arthritis     Cataract     Chronic fatigue 10/3/2017    Chronic renal disease, stage IV     Coronary artery disease     CVA (cerebral vascular accident) 2018    Hyperlipidemia     Hypertension     Palpitations 10/3/2017    Premature atrial contractions 10/17/2017    PVCs (premature ventricular contractions) 10/17/2017    Sleep apnea     Stage 4 chronic kidney disease 2018    Stroke 2007    Weakness     right leg weaker     Past Surgical History:   Procedure Laterality Date    CARDIAC CATHETERIZATION      CARDIAC CATHETERIZATION N/A 2020    Procedure: Left Heart Cath;  Surgeon: Rufino Brice MD;  Location: [facility] CATH INVASIVE LOCATION;  Service: Cardiology;  Laterality: N/A;    CORONARY ARTERY BYPASS GRAFT N/A 2020    Procedure: CABG X 3, LIMA, SHELBY, EVH WITH OPEN EXTENSION LOWER RIGHT LEG;  Surgeon: Haris  Nikunj DAMIAN MD;  Location: [facility] OR;  Service: Cardiothoracic;  Laterality: N/A;    ENDOSCOPY N/A 4/10/2024    Procedure: ESOPHAGOGASTRODUODENOSCOPY WITH ANESTHESIA;  Surgeon: Gilles Pineda MD;  Location: [facility] ENDOSCOPY;  Service: Gastroenterology;  Laterality: N/A;  pre op dysphagia  post esophageal mass  pcp Spike Polanco      General Information       Row Name 04/11/24 0852 04/11/24 0738       Physical Therapy Time and Intention    Document Type evaluation  dysphagia PMH: CVA, CAD, CABG, COPD, right LE weakness, renal disease stage 4  -MS (r) CH (t) MS (c) --  -MS (r) CH (t) MS (c)    Mode of Treatment individual therapy;physical therapy  -MS (r) CH (t) MS (c) --  -MS (r) CH (t) MS (c)      Row Name 04/11/24 0852 04/11/24 0738       General Information    Patient Profile Reviewed yes  -MS (r) CH (t) MS (c) --  -MS (r) CH (t) MS (c)    Prior Level of Function independent:;all household mobility;ADL's;community mobility;home management;driving;shopping  -MS (r) CH (t) MS (c) --    Existing Precautions/Restrictions -- --  -MS (r) CH (t) MS (c)    Barriers to Rehab -- --  -MS (r) CH (t) MS (c)      Row Name 04/11/24 0852          Living Environment    People in Home child(bon), adult  -MS (r) CH (t) MS (c)       Row Name 04/11/24 0852          Home Main Entrance    Number of Stairs, Main Entrance two  -MS (r) CH (t) MS (c)     Stair Railings, Main Entrance none  -MS (r) CH (t) MS (c)       Row Name 04/11/24 0852          Stairs Within Home, Primary    Stairs, Within Home, Primary 1 flight up to bedrooms  -MS (r) CH (t) MS (c)       Row Name 04/11/24 0852 04/11/24 0738       Cognition    Orientation Status (Cognition) oriented x 4  -MS (r) CH (t) MS (c) --  -MS (r) CH (t) MS (c)      Row Name 04/11/24 0852 04/11/24 0738       Safety Issues, Functional Mobility    Safety Issues Affecting Function (Mobility) -- --  -MS (r) CH (t) MS (c)    Impairments Affecting Function (Mobility) shortness of breath  -MS (r)  CH (t) MS (c) --  -MS (r) CH (t) MS (c)              User Key  (r) = Recorded By, (t) = Taken By, (c) = Cosigned By      Initials Name Provider Type    Candace Curiel R, PT, DPT, NCS Physical Therapist     Blanca Munguia PT Student PT Student                   Mobility       Row Name 04/11/24 0852          Bed Mobility    Bed Mobility supine-sit  -MS (r) CH (t) MS (c)     Supine-Sit [place] (Bed Mobility) independent  -MS (r) CH (t) MS (c)       Row Name 04/11/24 0852          Sit-Stand Transfer    Sit-Stand [place] (Transfers) independent  -MS (r) CH (t) MS (c)       Row Name 04/11/24 0852          Gait/Stairs (Locomotion)    [place] Level (Gait) independent  -MS (r) CH (t) MS (c)     Patient was able to Ambulate yes  -MS (r) CH (t) MS (c)     Distance in Feet (Gait) 200  -MS (r) CH (t) MS (c)     Deviations/Abnormal Patterns (Gait) stride length decreased  -MS (r) CH (t) MS (c)     Comment, (Gait/Stairs) pt ambulates without significant deviations but is limited in distance secondary to COPD  -MS (r) CH (t) MS (c)               User Key  (r) = Recorded By, (t) = Taken By, (c) = Cosigned By      Initials Name Provider Type    Candace Curiel R, PT, DPT, NCS Physical Therapist     Blanca Munguia PT Student PT Student                   Obj/Interventions       Row Name 04/11/24 0852          Range of Motion Comprehensive    General Range of Motion bilateral lower extremity ROM WNL;bilateral upper extremity ROM WFL  -MS (r) CH (t) MS (c)       Row Name 04/11/24 0852          Strength Comprehensive (MMT)    General Manual Muscle Testing (MMT) Assessment no strength deficits identified  -MS (r) CH (t) MS (c)       Row Name 04/11/24 0852          Balance    Balance Assessment sitting static balance;sitting dynamic balance;standing static balance;standing dynamic balance  -MS (r) CH (t) MS (c)     Static Sitting Balance independent  -MS (r) CH (t) MS (c)     Dynamic Sitting Balance independent  -MS  (r) CH (t) MS (c)     Position, Sitting Balance unsupported;sitting edge of bed;sitting in chair  -MS (r) CH (t) MS (c)     Static Standing Balance independent  -MS (r) CH (t) MS (c)     Dynamic Standing Balance independent  -MS (r) CH (t) MS (c)     Position/Device Used, Standing Balance unsupported  -MS (r) CH (t) MS (c)       Row Name 04/11/24 0852          Sensory Assessment (Somatosensory)    Sensory Assessment (Somatosensory) UE sensation intact;other (see comments)  R foot numbness since prior stroke  -MS (r) CH (t) MS (c)               User Key  (r) = Recorded By, (t) = Taken By, (c) = Cosigned By      Initials Name Provider Type    MS [place] Candace R, PT, DPT, NCS Physical Therapist    CH Blanca Munguia, PT Student PT Student                   Goals/Plan    No documentation.                  Clinical Impression       Row Name 04/11/24 0852          Pain    Pretreatment Pain Rating 2/10  -MS (r) CH (t) MS (c)     Posttreatment Pain Rating 2/10  -MS (r) CH (t) MS (c)     Pain Location - Side/Orientation Right  -MS (r) CH (t) MS (c)     Pain Location lateral  -MS (r) CH (t) MS (c)     Pain Location - chest  -MS (r) CH (t) MS (c)     Pain Intervention(s) Repositioned;Ambulation/increased activity  -MS (r) CH (t) MS (c)       Row Name 04/11/24 0852          Plan of Care Review    Plan of Care Reviewed With patient  -MS (r) CH (t) MS (c)     Progress no change  -MS (r) CH (t) MS (c)     Outcome Evaluation Pt A&Ox4 lying in bed reporting 2/10 R sided chest pain. Pt completes all mobility independently throughout session. Gait distance limited secondary to COPD which is pt's baseline. MMT 5/5 throughout BUE and BLE with decreased sensation in R foot and decreased R hand dexterity which has been present since his last stroke. Pt has no concerns regarding mobility and function. Skilled PT not required at this time.  -MS (r) CH (t) MS (c)       Row Name 04/11/24 0852          Therapy Assessment/Plan (PT)    Criteria  for Skilled Interventions Met (PT) does not meet criteria for skilled intervention  -MS (r) CH (t) MS (c)     Therapy Frequency (PT) evaluation only  -MS (r) CH (t) MS (c)       Row Name 04/11/24 0852          Vital Signs    O2 Delivery Pre Treatment room air  -MS (r) CH (t) MS (c)     Pre Patient Position Supine  -MS (r) CH (t) MS (c)       Row Name 04/11/24 0852          Positioning and Restraints    Pre-Treatment Position in bed  -MS (r) CH (t) MS (c)     Post Treatment Position chair  -MS (r) CH (t) MS (c)     In Chair sitting;with other staff  -MS (r) CH (t) MS (c)               User Key  (r) = Recorded By, (t) = Taken By, (c) = Cosigned By      Initials Name Provider Type    MS Ar[place]aza Candace R, PT, DPT, NCS Physical Therapist    CH Blanca Munguia, PT Student PT Student                   Outcome Measures       Row Name 04/11/24 0852          How much help from another person do you currently need...    Turning from your back to your side while in flat bed without using bedrails? 4  -MS (r) CH (t) MS (c)     Moving from lying on back to sitting on the side of a flat bed without bedrails? 4  -MS (r) CH (t) MS (c)     Moving to and from a bed to a chair (including a wheelchair)? 4  -MS (r) CH (t) MS (c)     Standing up from a chair using your arms (e.g., wheelchair, bedside chair)? 4  -MS (r) CH (t) MS (c)     Climbing 3-5 steps with a railing? 4  -MS (r) CH (t) MS (c)     To walk in hospital room? 4  -MS (r) CH (t) MS (c)     AM-PAC 6 Clicks Score (PT) 24  -MS (r) CH (t)     Highest Level of Mobility Goal 8 --> Walked 250 feet or more  -MS (r) CH (t)       Row Name 04/11/24 0852          Functional Assessment    Outcome Measure Options AM-PAC 6 Clicks Basic Mobility (PT)  -MS (r) CH (t) MS (c)               User Key  (r) = Recorded By, (t) = Taken By, (c) = Cosigned By      Initials Name Provider Type    Candace Curiel, PT, DPT, NCS Physical Therapist    CH Blanca Munguia, PT Student PT Student                                  Physical Therapy Education       Title: PT OT SLP Therapies (In Progress)       Topic: Physical Therapy (In Progress)       Point: Mobility training (Done)       Learning Progress Summary             Patient Acceptance, E, VU by  at 4/11/2024 0967    Comment: Pt educated onthe role of PT in his care to test function and strength post significant weightloss.                         Point: Home exercise program (Not Started)       Learner Progress:  Not documented in this visit.              Point: Body mechanics (Not Started)       Learner Progress:  Not documented in this visit.              Point: Precautions (Not Started)       Learner Progress:  Not documented in this visit.                              User Key       Initials Effective Dates Name Provider Type Discipline     02/22/24 -  Blanca Munguia, PT Student PT Student PT                  PT Recommendation and Plan     Plan of Care Reviewed With: patient  Progress: no change  Outcome Evaluation: Pt A&Ox4 lying in bed reporting 2/10 R sided chest pain. Pt completes all mobility independently throughout session. Gait distance limited secondary to COPD which is pt's baseline. MMT 5/5 throughout BUE and BLE with decreased sensation in R foot and decreased R hand dexterity which has been present since his last stroke. Pt has no concerns regarding mobility and function. Skilled PT not required at this time.     Time Calculation:         PT Charges       Row Name 04/11/24 0852             Time Calculation    Start Time 0852  -MS (r) CH (t) MS (c)      Stop Time 0912  -MS (r) CH (t) MS (c)      Time Calculation (min) 20 min  -MS (r) CH (t)      PT Received On 04/11/24  -MS (r) CH (t) MS (c)         Untimed Charges    PT Eval/Re-eval Minutes 30  10 minutes chart review  -MS (r) CH (t) MS (c)         Total Minutes    Untimed Charges Total Minutes 30  -MS (r) CH (t)       Total Minutes 30  -MS (r) CH (t)                User Key  (r) = Recorded  By, (t) = Taken By, (c) = Cosigned By      Initials Name Provider Type    Candace Curiel R, PT, DPT, NCS Physical Therapist    CH Blanca Munguia, PT Student PT Student                      PT G-Codes  Outcome Measure Options: AM-PAC 6 Clicks Basic Mobility (PT)  AM-PAC 6 Clicks Score (PT): 24  PT Discharge Summary  Anticipated Discharge Disposition (PT): home    Blanca Munguia PT Student  4/11/2024

## 2024-04-11 NOTE — PROGRESS NOTES
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  Ming comes in [facility] follow-up accompanied by his daughter Angela for continued management, workup and treatment planning.     Since discharge from the hospital, he has undergone:  EGD with EUS and esophageal stent placement by Dr. Robert Claudio on 4/17/2024  CT scan of the abdomen and pelvis on 4/28/2024  CT scan of the chest on 4/29/2024   PET scan on 5/1/2024.    Unfortunately, since the stent placement on 4/17/2024, Ming has not been able to take in and keep down solid foods and only minimal fluids and has lost another 10 pounds.    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  Ming

## 2024-04-11 NOTE — PLAN OF CARE
Goal Outcome Evaluation:      Pt. A&Ox4, VSS, GI MD sat with pt and discussed care in room, Oncology saw pt and will see pt outpt, slp saw pt for dysphagia, pt up ad kimberly to bathroom, no c/o pain outside of discomfort to esophagus and mid abdomen, d/c orders received, pt d/c to home.  1340- Pt taken out via wheelchair to home with daughter.

## 2024-04-11 NOTE — CASE MANAGEMENT/SOCIAL WORK
Continued Stay Note   [place]     Patient Name: Faisal Joseph  MRN: [id number]  Today's Date: 4/11/2024    Admit Date: 4/9/2024    Plan: Home   Discharge Plan       Row Name 04/11/24 1224       Plan    Plan Home    Final Discharge Disposition Code 01 - home or self-care    Final Note Patient is dc home today with no dc planning needs / orders.               Expected Discharge Date and Time       Expected Discharge Date Expected Discharge Time    Apr 11, 2024         DANIEL Kiser

## 2024-04-11 NOTE — PLAN OF CARE
Goal Outcome Evaluation:  Plan of Care Reviewed With: patient        Progress: improving  Outcome Evaluation: Pt A&Ox4. VSS on RA. PPP. No C/O pain reported. Liquid diet. Up aib kimberly. IV R FA infusing LR 75ml/hr. Call light within reach, safety maintained.

## 2024-04-12 ENCOUNTER — READMISSION MANAGEMENT (OUTPATIENT)
Dept: CALL CENTER | Facility: HOSPITAL | Age: 84
End: 2024-04-12
Payer: MEDICARE

## 2024-04-12 NOTE — OUTREACH NOTE
Prep Survey      Flowsheet Row Responses   [facility] facility patient discharged from? [facility]   Is LACE score < 7 ? No   Eligibility Readm Mgmt   Discharge diagnosis Dysphagia   Does the patient have one of the following disease processes/diagnoses(primary or secondary)? Other   Does the patient have Home health ordered? No   Is there a DME ordered? No   Medication alerts for this patient see AVS   Prep survey completed? Yes            Anila GREENBERG - Registered Nurse

## 2024-04-15 ENCOUNTER — TELEPHONE (OUTPATIENT)
Dept: HEMATOLOGY | Age: 84
End: 2024-04-15

## 2024-04-15 ENCOUNTER — TRANSCRIBE ORDERS (OUTPATIENT)
Dept: ADMINISTRATIVE | Facility: HOSPITAL | Age: 84
End: 2024-04-15
Payer: MEDICARE

## 2024-04-15 DIAGNOSIS — C16.0 ADENOCARCINOMA OF GASTROESOPHAGEAL JUNCTION: ICD-10-CM

## 2024-04-15 DIAGNOSIS — R91.1 LUNG NODULE: Primary | ICD-10-CM

## 2024-04-15 DIAGNOSIS — R91.1 LUNG NODULE SEEN ON IMAGING STUDY: Primary | ICD-10-CM

## 2024-04-15 DIAGNOSIS — C16.0 ADENOCARCINOMA OF GASTROESOPHAGEAL JUNCTION (HCC): ICD-10-CM

## 2024-04-15 DIAGNOSIS — C16.0 ADENOCARCINOMA OF GASTROESOPHAGEAL JUNCTION (HCC): Primary | ICD-10-CM

## 2024-04-15 DIAGNOSIS — G89.3 CANCER RELATED PAIN: ICD-10-CM

## 2024-04-15 RX ORDER — PRAVASTATIN SODIUM 40 MG
40 TABLET ORAL DAILY
Qty: 90 TABLET | Refills: 3 | Status: SHIPPED | OUTPATIENT
Start: 2024-04-15

## 2024-04-15 NOTE — TELEPHONE ENCOUNTER
Patient's dtr, Angela, phoned to request status update of requested diagnostics discussed with Dr Christopher prior to [facility] discharge, and to report that hospitalist did not provided Rx pain medicine as discussed.      Orders are in place for PET scan (pending insurance authorization to schedule) and referral in place to Dr Robert Claudio for EGD/EUS for staging and stent placement.    [facility] discharge records reveal Rx Lortab 5/325mg Q 6 hours #24 count, but apparently not filled/picked up by patient.   New Rx provided.

## 2024-04-16 RX ORDER — HYDROCODONE BITARTRATE AND ACETAMINOPHEN 5; 325 MG/1; MG/1
1 TABLET ORAL EVERY 6 HOURS PRN
Qty: 120 TABLET | Refills: 0 | Status: ON HOLD | OUTPATIENT
Start: 2024-04-16 | End: 2024-04-17 | Stop reason: HOSPADM

## 2024-04-17 ENCOUNTER — ANESTHESIA (OUTPATIENT)
Dept: ENDOSCOPY | Age: 84
End: 2024-04-17
Payer: MEDICARE

## 2024-04-17 ENCOUNTER — ANESTHESIA EVENT (OUTPATIENT)
Dept: ENDOSCOPY | Age: 84
End: 2024-04-17
Payer: MEDICARE

## 2024-04-17 ENCOUNTER — TELEPHONE (OUTPATIENT)
Dept: HEMATOLOGY | Age: 84
End: 2024-04-17

## 2024-04-17 ENCOUNTER — HOSPITAL ENCOUNTER (OUTPATIENT)
Age: 84
Setting detail: OUTPATIENT SURGERY
Discharge: HOME | End: 2024-04-17
Attending: INTERNAL MEDICINE | Admitting: INTERNAL MEDICINE
Payer: MEDICARE

## 2024-04-17 ENCOUNTER — READMISSION MANAGEMENT (OUTPATIENT)
Dept: CALL CENTER | Facility: HOSPITAL | Age: 84
End: 2024-04-17
Payer: MEDICARE

## 2024-04-17 VITALS
TEMPERATURE: 96.8 F | OXYGEN SATURATION: 98 % | HEIGHT: 72 IN | BODY MASS INDEX: 26.41 KG/M2 | DIASTOLIC BLOOD PRESSURE: 74 MMHG | HEART RATE: 52 BPM | WEIGHT: 195 LBS | RESPIRATION RATE: 16 BRPM | SYSTOLIC BLOOD PRESSURE: 168 MMHG

## 2024-04-17 DIAGNOSIS — C16.0 ADENOCARCINOMA OF GASTROESOPHAGEAL JUNCTION (HCC): Primary | ICD-10-CM

## 2024-04-17 DIAGNOSIS — G89.18 OTHER ACUTE POSTPROCEDURAL PAIN: ICD-10-CM

## 2024-04-17 LAB
QT INTERVAL: 392 MS
QTC INTERVAL: 457 MS

## 2024-04-17 PROCEDURE — 6360000002 HC RX W HCPCS: Performed by: INTERNAL MEDICINE

## 2024-04-17 PROCEDURE — 2500000003 HC RX 250 WO HCPCS: Performed by: NURSE ANESTHETIST, CERTIFIED REGISTERED

## 2024-04-17 PROCEDURE — 3609018500 HC EGD US SCOPE W/ADJACENT STRUCTURES: Performed by: INTERNAL MEDICINE

## 2024-04-17 PROCEDURE — C1769 GUIDE WIRE: HCPCS | Performed by: INTERNAL MEDICINE

## 2024-04-17 PROCEDURE — 3609013700 HC EGD STENT PLACEMENT: Performed by: INTERNAL MEDICINE

## 2024-04-17 PROCEDURE — 3700000000 HC ANESTHESIA ATTENDED CARE: Performed by: INTERNAL MEDICINE

## 2024-04-17 PROCEDURE — 3700000001 HC ADD 15 MINUTES (ANESTHESIA): Performed by: INTERNAL MEDICINE

## 2024-04-17 PROCEDURE — 2709999900 HC NON-CHARGEABLE SUPPLY: Performed by: INTERNAL MEDICINE

## 2024-04-17 PROCEDURE — 7100000010 HC PHASE II RECOVERY - FIRST 15 MIN: Performed by: INTERNAL MEDICINE

## 2024-04-17 PROCEDURE — 6370000000 HC RX 637 (ALT 250 FOR IP): Performed by: INTERNAL MEDICINE

## 2024-04-17 PROCEDURE — 7100000011 HC PHASE II RECOVERY - ADDTL 15 MIN: Performed by: INTERNAL MEDICINE

## 2024-04-17 PROCEDURE — 43266 EGD ENDOSCOPIC STENT PLACE: CPT | Performed by: INTERNAL MEDICINE

## 2024-04-17 PROCEDURE — 6360000002 HC RX W HCPCS: Performed by: NURSE ANESTHETIST, CERTIFIED REGISTERED

## 2024-04-17 PROCEDURE — C1874 STENT, COATED/COV W/DEL SYS: HCPCS | Performed by: INTERNAL MEDICINE

## 2024-04-17 PROCEDURE — 43259 EGD US EXAM DUODENUM/JEJUNUM: CPT | Performed by: INTERNAL MEDICINE

## 2024-04-17 PROCEDURE — 2580000003 HC RX 258: Performed by: INTERNAL MEDICINE

## 2024-04-17 DEVICE — STENT SYSTEM
Type: IMPLANTABLE DEVICE | Site: ESOPHAGUS | Status: FUNCTIONAL
Brand: WALLFLEX™ ESOPHAGEAL

## 2024-04-17 RX ORDER — LIDOCAINE HYDROCHLORIDE 10 MG/ML
INJECTION, SOLUTION EPIDURAL; INFILTRATION; INTRACAUDAL; PERINEURAL PRN
Status: DISCONTINUED | OUTPATIENT
Start: 2024-04-17 | End: 2024-04-17 | Stop reason: SDUPTHER

## 2024-04-17 RX ORDER — ONDANSETRON 4 MG/1
4 TABLET, ORALLY DISINTEGRATING ORAL 3 TIMES DAILY PRN
Qty: 21 TABLET | Refills: 0 | Status: SHIPPED | OUTPATIENT
Start: 2024-04-17

## 2024-04-17 RX ORDER — DEXMEDETOMIDINE HYDROCHLORIDE 100 UG/ML
INJECTION, SOLUTION INTRAVENOUS PRN
Status: DISCONTINUED | OUTPATIENT
Start: 2024-04-17 | End: 2024-04-17 | Stop reason: SDUPTHER

## 2024-04-17 RX ORDER — MORPHINE SULFATE 4 MG/ML
3 INJECTION, SOLUTION INTRAMUSCULAR; INTRAVENOUS ONCE
Status: COMPLETED | OUTPATIENT
Start: 2024-04-17 | End: 2024-04-17

## 2024-04-17 RX ORDER — PROPOFOL 10 MG/ML
INJECTION, EMULSION INTRAVENOUS PRN
Status: DISCONTINUED | OUTPATIENT
Start: 2024-04-17 | End: 2024-04-17 | Stop reason: SDUPTHER

## 2024-04-17 RX ORDER — PANTOPRAZOLE SODIUM 40 MG/1
40 TABLET, DELAYED RELEASE ORAL
Qty: 180 TABLET | Refills: 1 | Status: SHIPPED | OUTPATIENT
Start: 2024-04-17

## 2024-04-17 RX ORDER — MORPHINE SULFATE 4 MG/ML
2 INJECTION, SOLUTION INTRAMUSCULAR; INTRAVENOUS ONCE
Status: COMPLETED | OUTPATIENT
Start: 2024-04-17 | End: 2024-04-17

## 2024-04-17 RX ORDER — OXYCODONE AND ACETAMINOPHEN 7.5; 325 MG/1; MG/1
1 TABLET ORAL EVERY 4 HOURS PRN
Qty: 12 TABLET | Refills: 0 | Status: SHIPPED | OUTPATIENT
Start: 2024-04-17 | End: 2024-04-20

## 2024-04-17 RX ORDER — SODIUM CHLORIDE, SODIUM LACTATE, POTASSIUM CHLORIDE, CALCIUM CHLORIDE 600; 310; 30; 20 MG/100ML; MG/100ML; MG/100ML; MG/100ML
INJECTION, SOLUTION INTRAVENOUS CONTINUOUS
Status: DISCONTINUED | OUTPATIENT
Start: 2024-04-17 | End: 2024-04-17 | Stop reason: HOSPADM

## 2024-04-17 RX ORDER — OXYCODONE AND ACETAMINOPHEN 7.5; 325 MG/1; MG/1
1 TABLET ORAL ONCE
Status: COMPLETED | OUTPATIENT
Start: 2024-04-17 | End: 2024-04-17

## 2024-04-17 RX ADMIN — DEXMEDETOMIDINE HYDROCHLORIDE 8 MCG: 100 INJECTION, SOLUTION, CONCENTRATE INTRAVENOUS at 10:16

## 2024-04-17 RX ADMIN — PROPOFOL 20 MG: 10 INJECTION, EMULSION INTRAVENOUS at 10:33

## 2024-04-17 RX ADMIN — PROPOFOL 20 MG: 10 INJECTION, EMULSION INTRAVENOUS at 10:13

## 2024-04-17 RX ADMIN — PROPOFOL 20 MG: 10 INJECTION, EMULSION INTRAVENOUS at 10:18

## 2024-04-17 RX ADMIN — DEXMEDETOMIDINE HYDROCHLORIDE 8 MCG: 100 INJECTION, SOLUTION, CONCENTRATE INTRAVENOUS at 10:13

## 2024-04-17 RX ADMIN — PROPOFOL 20 MG: 10 INJECTION, EMULSION INTRAVENOUS at 10:24

## 2024-04-17 RX ADMIN — PROPOFOL 20 MG: 10 INJECTION, EMULSION INTRAVENOUS at 10:29

## 2024-04-17 RX ADMIN — OXYCODONE HYDROCHLORIDE AND ACETAMINOPHEN 1 TABLET: 7.5; 325 TABLET ORAL at 12:14

## 2024-04-17 RX ADMIN — SODIUM CHLORIDE, POTASSIUM CHLORIDE, SODIUM LACTATE AND CALCIUM CHLORIDE: 600; 310; 30; 20 INJECTION, SOLUTION INTRAVENOUS at 09:27

## 2024-04-17 RX ADMIN — PROPOFOL 20 MG: 10 INJECTION, EMULSION INTRAVENOUS at 10:50

## 2024-04-17 RX ADMIN — PROPOFOL 20 MG: 10 INJECTION, EMULSION INTRAVENOUS at 10:42

## 2024-04-17 RX ADMIN — DEXMEDETOMIDINE HYDROCHLORIDE 4 MCG: 100 INJECTION, SOLUTION, CONCENTRATE INTRAVENOUS at 10:09

## 2024-04-17 RX ADMIN — PROPOFOL 80 MG: 10 INJECTION, EMULSION INTRAVENOUS at 10:09

## 2024-04-17 RX ADMIN — PROPOFOL 20 MG: 10 INJECTION, EMULSION INTRAVENOUS at 10:35

## 2024-04-17 RX ADMIN — LIDOCAINE HYDROCHLORIDE 100 MG: 10 INJECTION, SOLUTION EPIDURAL; INFILTRATION; INTRACAUDAL; PERINEURAL at 10:09

## 2024-04-17 RX ADMIN — PROPOFOL 30 MG: 10 INJECTION, EMULSION INTRAVENOUS at 10:16

## 2024-04-17 RX ADMIN — ALUMINUM HYDROXIDE, MAGNESIUM HYDROXIDE, AND SIMETHICONE: 1200; 120; 1200 SUSPENSION ORAL at 11:28

## 2024-04-17 RX ADMIN — PROPOFOL 20 MG: 10 INJECTION, EMULSION INTRAVENOUS at 10:27

## 2024-04-17 RX ADMIN — MORPHINE SULFATE 2 MG: 4 INJECTION, SOLUTION INTRAMUSCULAR; INTRAVENOUS at 12:12

## 2024-04-17 RX ADMIN — PROPOFOL 20 MG: 10 INJECTION, EMULSION INTRAVENOUS at 10:38

## 2024-04-17 RX ADMIN — PROPOFOL 20 MG: 10 INJECTION, EMULSION INTRAVENOUS at 10:46

## 2024-04-17 RX ADMIN — PROPOFOL 30 MG: 10 INJECTION, EMULSION INTRAVENOUS at 10:20

## 2024-04-17 RX ADMIN — MORPHINE SULFATE 3 MG: 4 INJECTION, SOLUTION INTRAMUSCULAR; INTRAVENOUS at 11:24

## 2024-04-17 ASSESSMENT — PAIN SCALES - GENERAL
PAINLEVEL_OUTOF10: 6
PAINLEVEL_OUTOF10: 10
PAINLEVEL_OUTOF10: 6

## 2024-04-17 ASSESSMENT — PAIN - FUNCTIONAL ASSESSMENT: PAIN_FUNCTIONAL_ASSESSMENT: 0-10

## 2024-04-17 NOTE — H&P
nasal spray 2 sprays by Nasal route daily 11/23/23   Marta Rosales MD   guaiFENesin (MUCINEX) 600 MG extended release tablet Take 2 tablets by mouth every 12 hours 11/22/23   Marta Rosales MD   sodium bicarbonate 650 MG tablet Take 0.5 tablets by mouth daily    Marta Rosales MD   nitroGLYCERIN (NITROSTAT) 0.4 MG SL tablet Place 1 tablet under the tongue 1/29/21   Marta Rosales MD   metoprolol tartrate (LOPRESSOR) 50 MG tablet Take 0.5 tablets by mouth 2 times daily 12/18/21   Marta Rosales MD   furosemide (LASIX) 40 MG tablet Take 1 tablet by mouth in the morning and 1 tablet in the evening.    Marta Rosales MD   budesonide-formoterol (SYMBICORT) 160-4.5 MCG/ACT AERO Inhale 2 puffs into the lungs 2 times daily    Marta Rosales MD       Past Medical History:  Past Medical History:   Diagnosis Date    CAD (coronary artery disease) 2020    Cabg x 3    Cerebral artery occlusion with cerebral infarction (HCC)     x 2 (2007, 2020) right side weakness    Chronic kidney disease (CKD), stage IV (severe) (HCC)     COPD (chronic obstructive pulmonary disease) (HCC)     Esophageal cancer (HCC) 04/2024    Hyperlipidemia     Hypertension        Past Surgical History:  Past Surgical History:   Procedure Laterality Date    CARDIAC CATHETERIZATION  05/28/2020    Dr Cruz    CATARACT EXTRACTION, BILATERAL Bilateral     COLONOSCOPY  12/06/2006    Dr Bella-[facility]    COLONOSCOPY  12/12/2003    Dr Bella-[facility]    CORONARY ARTERY BYPASS GRAFT  06/09/2020    Dr Dimas    UPPER GASTROINTESTINAL ENDOSCOPY  04/10/2024    Dr Jeremy SweetXruzdhxhh-Tfsyncu-Thjtfm differentiated adenocarcinoma with signet ring features, distal esophagus       Social History:  Social History     Tobacco Use    Smoking status: Former     Current packs/day: 0.00     Average packs/day: 1.5 packs/day for 28.0 years (42.0 ttl pk-yrs)     Types: Cigarettes     Start date: 1956     Quit date: 1984     Years since quitting:

## 2024-04-17 NOTE — OP NOTE
Endoscopic Procedure Note    Patient: Ming Rocha : 1940  Med Rec#: [id number] Acc#: [id number]     Primary Care Provider Irving Larsen DO  Referring Provider: Seth Christopher MD    Endoscopist: Robert Claudio MD    Date of Procedure:  2024    Procedure:   1. EGD with esophageal stent placement   2. EGD with EUS     Indications:   1. Known esophageal adenocarcinoma  2. Dysphagia     Anesthesia:  Sedation was administered by anesthesia who monitored the patient during the procedure.    Estimated Blood Loss: minimal    Procedure:   After reviewing the patient's chart and obtaining informed consent, the patient was placed in the left lateral decubitus position.  A forward-viewing Olympus endoscope was lubricated and inserted through the mouth into the oropharynx. Under direct visualization, the upper esophagus was intubated. The scope was advanced to the level of the third portion of duodenum. Scope was slowly withdrawn with careful inspection of the mucosal surfaces. The scope was retroflexed for inspection of the gastric fundus and incisura. Findings and maneuvers are listed in impression below. The patient tolerated the procedure well. The scope was removed. There were no immediate complications.    Findings:   Esophagus: abnormal: starting at 32cm and extending to 39cm there was abnormal edematous and circumferential tissue c/w known esophageal malignancy. This tissue was very friable and difficult to traverse.   There is a 2-3cm hiatal hernia present.      Stomach:  normal  Duodenum: normal    EUS: Using the 160 series radial echo endoscope, the esophagus was intubated under indirect visualization.  The scope was advanced down to approximately 33 cm.  Due to the level of constriction of the known esophageal malignancy, I was unable to pass the larger caliber scope into the stomach as intended.  Therefore I was only able to evaluate the most proximal portions of the mass itself.  The

## 2024-04-17 NOTE — DISCHARGE INSTRUCTIONS
RECOMMENDATIONS:    1.  [employer] Stent Diet   2.  Check Chest Xray on Friday 4/19/24  3.  GI cocktail in recovery  4.  I will send 3 days or pain and nausea medication for post-operative pain  5.  PPI twice daily        Endoscopic Ultrasound (Oral): What to Expect At Home  Your Recovery  After you have an endoscopic ultrasound--a test to look for problems in the stomach, liver, gallbladder, and other organs--you will stay at the hospital or clinic for 1 to 2 hours. This will allow the medicine to wear off. You will be able to go home after your doctor or nurse checks to make sure you are not having any problems.  You may have a sore throat for a day or two after the test.  This care sheet gives you a general idea about what to expect after the test.  How can you care for yourself at home?  Activity    Rest as much as you need to after you go home.     Ask your doctor when you can drive again.     You should be able to go back to your usual activities the day after the test.   Diet    Follow your doctor's directions for eating after the test.     Drink plenty of fluids (unless your doctor has told you not to).   Medicines    If you have a sore throat the day after the test, use an over-the-counter spray to numb your throat.   Other instructions    Do not sign legal documents or make major decisions until the medicine effects are gone and you can think clearly. The anesthesia medicine can make it hard for you to fully understand what you are agreeing to.   Follow-up care is a key part of your treatment and safety. Be sure to make and go to all appointments, and call your doctor if you are having problems. It's also a good idea to know your test results and keep a list of the medicines you take.  When should you call for help?   Call 911 anytime you think you may need emergency care. For example, call if:    You passed out (lost consciousness).     You have trouble breathing.     You vomit blood or what looks

## 2024-04-17 NOTE — ANESTHESIA PRE PROCEDURE
Department of Anesthesiology  Preprocedure Note       Name:  Ming Rocha   Age:  83 y.o.  :  1940                                          MRN:  [id number]         Date:  2024      Surgeon: Surgeon(s):  Robert Claudio MD    Procedure: Procedure(s):  ESOPHAGOGASTRODUODENOSCOPY BIOPSY  ESOPHAGOGASTRODUODENOSCOPY ULTRASOUND    Medications prior to admission:   Prior to Admission medications    Medication Sig Start Date End Date Taking? Authorizing Provider   HYDROcodone-acetaminophen (NORCO) 5-325 MG per tablet Take 1 tablet by mouth every 6 hours as needed for Pain for up to 30 days. Intended supply: 30 days Max Daily Amount: 4 tablets  Patient taking differently: Take 1 tablet by mouth every 6 hours as needed for Pain (Dr. Christopher). Intended supply: 30 days 24  Seth Christopher MD   aspirin 81 MG EC tablet Take 1 tablet by mouth daily 20   Marta Rosales MD   amLODIPine (NORVASC) 10 MG tablet Take 0.5 tablets by mouth 2 times daily 21   Marta Rosales MD   lisinopril (PRINIVIL;ZESTRIL) 20 MG tablet Take 1 tablet by mouth 2 times daily 10/26/21   Marta Rosales MD   cyanocobalamin 100 MCG tablet Take 0.5 tablets by mouth daily    Marta Rosales MD   hydrALAZINE (APRESOLINE) 100 MG tablet Take 1 tablet by mouth 2 times daily    Marta Rosales MD   pravastatin (PRAVACHOL) 40 MG tablet Take 1 tablet by mouth nightly 21   Marta Rosales MD   fluticasone (FLONASE) 50 MCG/ACT nasal spray 2 sprays by Nasal route daily 23   Marta Rosales MD   guaiFENesin (MUCINEX) 600 MG extended release tablet Take 2 tablets by mouth every 12 hours 23   Marta Rosales MD   sodium bicarbonate 650 MG tablet Take 0.5 tablets by mouth daily    Marta Rosales MD   nitroGLYCERIN (NITROSTAT) 0.4 MG SL tablet Place 1 tablet under the tongue 21   Marta Rosales MD   metoprolol tartrate (LOPRESSOR) 50 MG tablet Take 0.5

## 2024-04-17 NOTE — ANESTHESIA POSTPROCEDURE EVALUATION
Department of Anesthesiology  Postprocedure Note    Patient: Ming Rocha  MRN: [id number]  YOB: 1940  Date of evaluation: 4/17/2024    Procedure Summary       Date: 04/17/24 Room / Location: [place] [address] / [facility]    Anesthesia Start: 1001 Anesthesia Stop: 1057    Procedures:       ESOPHAGOGASTRODUODENOSCOPY STENT PLACEMENT      ESOPHAGOGASTRODUODENOSCOPY ULTRASOUND Diagnosis:       Adenocarcinoma of gastroesophageal junction (HCC)      (Adenocarcinoma of gastroesophageal junction (HCC) [C16.0])    Surgeons: Robert Claudio MD Responsible Provider: Marli Madrid APRN - CRNA    Anesthesia Type: general, TIVA ASA Status: 3            Anesthesia Type: No value filed.    Naz Phase I:      Naz Phase II:      Anesthesia Post Evaluation    Patient location during evaluation: bedside  Patient participation: complete - patient participated  Level of consciousness: sleepy but conscious  Pain score: 0  Airway patency: patent  Nausea & Vomiting: no vomiting and no nausea  Cardiovascular status: blood pressure returned to baseline  Respiratory status: acceptable  Hydration status: stable  Pain management: adequate    No notable events documented.

## 2024-04-17 NOTE — TELEPHONE ENCOUNTER
Left message to notify of MRI Abdomen scheduled at [facility] 4/23 at 2:30 with 2:00 arrival. Nothing to eat or drink 4 hours prior to scan.

## 2024-04-17 NOTE — OUTREACH NOTE
Medical Week 1 Survey      Flowsheet Row Responses   [facility] patient discharged from? [place]   Does the patient have one of the following disease processes/diagnoses(primary or secondary)? Other   Week 1 attempt successful? No   Unsuccessful attempts Attempt 1            Ai KENNEDY - Licensed Nurse

## 2024-04-18 ENCOUNTER — TELEPHONE (OUTPATIENT)
Dept: GASTROENTEROLOGY | Age: 84
End: 2024-04-18

## 2024-04-18 DIAGNOSIS — D50.9 IRON DEFICIENCY ANEMIA, UNSPECIFIED IRON DEFICIENCY ANEMIA TYPE: ICD-10-CM

## 2024-04-18 DIAGNOSIS — N18.4 STAGE 4 CHRONIC KIDNEY DISEASE: ICD-10-CM

## 2024-04-18 NOTE — TELEPHONE ENCOUNTER
04- Angela called for Patient Lvm that he had a EGD stent placed with Dr Claudio on 04-.  He is suppose to have an x ray done tomorrow but want to know if they could wait til next Tuesday.       They live two hours away and have another apt on Tuesday.     Please return call to the patient     Routed to ELINOR Arreaga MA

## 2024-04-22 ENCOUNTER — TRANSCRIBE ORDERS (OUTPATIENT)
Dept: ADMINISTRATIVE | Facility: HOSPITAL | Age: 84
End: 2024-04-22
Payer: MEDICARE

## 2024-04-22 DIAGNOSIS — C16.0 ADENOCARCINOMA OF GASTROESOPHAGEAL JUNCTION (HCC): Primary | ICD-10-CM

## 2024-04-22 DIAGNOSIS — C16.0 ADENOCARCINOMA OF GASTROESOPHAGEAL JUNCTION: Primary | ICD-10-CM

## 2024-04-22 NOTE — PROGRESS NOTES
Call received from Elena at [facility] Radiology dept to confirm order for MRI for staging purposes of adenocarcinoma GE junction.     Per radiologist, will need to order CT Chest with oral contrast- esophageal protocol to be performed at [facility].   New order placed and patient will be notified of new appt.

## 2024-04-23 ENCOUNTER — READMISSION MANAGEMENT (OUTPATIENT)
Dept: CALL CENTER | Facility: HOSPITAL | Age: 84
End: 2024-04-23
Payer: MEDICARE

## 2024-04-23 ENCOUNTER — HOSPITAL ENCOUNTER (OUTPATIENT)
Dept: GENERAL RADIOLOGY | Age: 84
Discharge: HOME | End: 2024-04-23
Payer: MEDICARE

## 2024-04-23 DIAGNOSIS — C16.0 ADENOCARCINOMA OF GASTROESOPHAGEAL JUNCTION (HCC): ICD-10-CM

## 2024-04-23 DIAGNOSIS — G89.18 OTHER ACUTE POSTPROCEDURAL PAIN: ICD-10-CM

## 2024-04-23 PROCEDURE — 71046 X-RAY EXAM CHEST 2 VIEWS: CPT

## 2024-04-23 RX ORDER — OXYCODONE AND ACETAMINOPHEN 7.5; 325 MG/1; MG/1
1 TABLET ORAL EVERY 6 HOURS PRN
Qty: 120 TABLET | Refills: 0 | Status: SHIPPED | OUTPATIENT
Start: 2024-04-23 | End: 2024-05-23

## 2024-04-23 NOTE — TELEPHONE ENCOUNTER
Spoke with Mr Rocha about cancer-related pain and request for pain medication.  He reports that the Rx Percocet prescribed short term by Dr Claudio was effective, but he ran out.  Moving forward, Dr Christopher will manage Rx Percocet.  Mr Rocha verbalized understanding that he will only get pain meds from Dr Christopher (per [place] law) and will sign narcotic agreement in office per protocol.   Rx will be sent to [facility] per patient request.

## 2024-04-23 NOTE — OUTREACH NOTE
Medical Week 2 Survey      Flowsheet Row Responses   [facility] patient discharged from? [place]   Does the patient have one of the following disease processes/diagnoses(primary or secondary)? Other   Week 2 attempt successful? Yes   Call start time 1707   Discharge diagnosis Dysphagia   Call end time 1712   Is patient permission given to speak with other caregiver? Yes   List who call center can speak with Dtr   Person spoke with today (if not patient) and relationship Dtr   Meds reviewed with patient/caregiver? Yes   Is the patient having any side effects they believe may be caused by any medication additions or changes? No   Does the patient have all medications ordered at discharge? Yes   Is the patient taking all medications as directed (includes completed medication regime)? Yes   Has the patient kept scheduled appointments due by today? Yes   Comments Per pt's dtr the pt continues w/pain that is made tolerable with pain meds. Pt is able to tolerate liquid diet without vomiting since DC but the upper chest pain remains, dtr reports, MD is aware and pt is scheduled for multiple tests. Per pt's dtr the reflux has improved.   Did the patient receive a copy of their discharge instructions? Yes   Nursing interventions Reviewed instructions with patient   What is the patient's perception of their health status since discharge? Improving   Is the patient/caregiver able to teach back signs and symptoms related to disease process for when to call PCP? Yes   Is the patient/caregiver able to teach back signs and symptoms related to disease process for when to call 911? Yes   Week 2 Call Completed? Yes   Call end time 1712            Melanie MOCK - Registered Nurse

## 2024-04-28 ENCOUNTER — HOSPITAL ENCOUNTER (EMERGENCY)
Facility: HOSPITAL | Age: 84
Discharge: HOME | End: 2024-04-29
Admitting: FAMILY MEDICINE
Payer: MEDICARE

## 2024-04-28 ENCOUNTER — APPOINTMENT (OUTPATIENT)
Dept: CT IMAGING | Facility: HOSPITAL | Age: 84
End: 2024-04-28
Payer: MEDICARE

## 2024-04-28 DIAGNOSIS — K59.00 CONSTIPATION, UNSPECIFIED CONSTIPATION TYPE: Primary | ICD-10-CM

## 2024-04-28 LAB
ALBUMIN SERPL-MCNC: 3.7 G/DL (ref 3.5–5.2)
ALBUMIN/GLOB SERPL: 1.5 G/DL
ALP SERPL-CCNC: 56 U/L (ref 39–117)
ALT SERPL W P-5'-P-CCNC: 6 U/L (ref 1–41)
ANION GAP SERPL CALCULATED.3IONS-SCNC: 10 MMOL/L (ref 5–15)
AST SERPL-CCNC: 10 U/L (ref 1–40)
BASOPHILS # BLD AUTO: 0.05 10*3/MM3 (ref 0–0.2)
BASOPHILS NFR BLD AUTO: 0.5 % (ref 0–1.5)
BILIRUB SERPL-MCNC: 0.4 MG/DL (ref 0–1.2)
BUN SERPL-MCNC: 20 MG/DL (ref 8–23)
BUN/CREAT SERPL: 6.7 (ref 7–25)
CALCIUM SPEC-SCNC: 9.1 MG/DL (ref 8.6–10.5)
CHLORIDE SERPL-SCNC: 106 MMOL/L (ref 98–107)
CO2 SERPL-SCNC: 23 MMOL/L (ref 22–29)
CREAT SERPL-MCNC: 2.99 MG/DL (ref 0.76–1.27)
D-LACTATE SERPL-SCNC: 0.8 MMOL/L (ref 0.5–2)
DEPRECATED RDW RBC AUTO: 42.5 FL (ref 37–54)
EGFRCR SERPLBLD CKD-EPI 2021: 20.1 ML/MIN/1.73
EOSINOPHIL # BLD AUTO: 0.33 10*3/MM3 (ref 0–0.4)
EOSINOPHIL NFR BLD AUTO: 3.5 % (ref 0.3–6.2)
ERYTHROCYTE [DISTWIDTH] IN BLOOD BY AUTOMATED COUNT: 12.8 % (ref 12.3–15.4)
GLOBULIN UR ELPH-MCNC: 2.4 GM/DL
GLUCOSE SERPL-MCNC: 99 MG/DL (ref 65–99)
HCT VFR BLD AUTO: 34.2 % (ref 37.5–51)
HGB BLD-MCNC: 11.5 G/DL (ref 13–17.7)
IMM GRANULOCYTES # BLD AUTO: 0.04 10*3/MM3 (ref 0–0.05)
IMM GRANULOCYTES NFR BLD AUTO: 0.4 % (ref 0–0.5)
LIPASE SERPL-CCNC: 21 U/L (ref 13–60)
LYMPHOCYTES # BLD AUTO: 0.71 10*3/MM3 (ref 0.7–3.1)
LYMPHOCYTES NFR BLD AUTO: 7.6 % (ref 19.6–45.3)
MCH RBC QN AUTO: 30.2 PG (ref 26.6–33)
MCHC RBC AUTO-ENTMCNC: 33.6 G/DL (ref 31.5–35.7)
MCV RBC AUTO: 89.8 FL (ref 79–97)
MONOCYTES # BLD AUTO: 0.79 10*3/MM3 (ref 0.1–0.9)
MONOCYTES NFR BLD AUTO: 8.4 % (ref 5–12)
NEUTROPHILS NFR BLD AUTO: 7.46 10*3/MM3 (ref 1.7–7)
NEUTROPHILS NFR BLD AUTO: 79.6 % (ref 42.7–76)
NRBC BLD AUTO-RTO: 0 /100 WBC (ref 0–0.2)
PLATELET # BLD AUTO: 129 10*3/MM3 (ref 140–450)
PMV BLD AUTO: 10.5 FL (ref 6–12)
POTASSIUM SERPL-SCNC: 3.9 MMOL/L (ref 3.5–5.2)
PROT SERPL-MCNC: 6.1 G/DL (ref 6–8.5)
RBC # BLD AUTO: 3.81 10*6/MM3 (ref 4.14–5.8)
SODIUM SERPL-SCNC: 139 MMOL/L (ref 136–145)
WBC NRBC COR # BLD AUTO: 9.38 10*3/MM3 (ref 3.4–10.8)

## 2024-04-28 PROCEDURE — 74176 CT ABD & PELVIS W/O CONTRAST: CPT

## 2024-04-28 PROCEDURE — 80053 COMPREHEN METABOLIC PANEL: CPT | Performed by: NURSE PRACTITIONER

## 2024-04-28 PROCEDURE — 83605 ASSAY OF LACTIC ACID: CPT | Performed by: NURSE PRACTITIONER

## 2024-04-28 PROCEDURE — 83690 ASSAY OF LIPASE: CPT | Performed by: NURSE PRACTITIONER

## 2024-04-28 PROCEDURE — 85025 COMPLETE CBC W/AUTO DIFF WBC: CPT | Performed by: NURSE PRACTITIONER

## 2024-04-28 PROCEDURE — 99284 EMERGENCY DEPT VISIT MOD MDM: CPT

## 2024-04-28 RX ORDER — SODIUM CHLORIDE 0.9 % (FLUSH) 0.9 %
10 SYRINGE (ML) INJECTION AS NEEDED
Status: DISCONTINUED | OUTPATIENT
Start: 2024-04-28 | End: 2024-04-29 | Stop reason: HOSPADM

## 2024-04-28 RX ORDER — OXYCODONE AND ACETAMINOPHEN 7.5; 325 MG/1; MG/1
1 TABLET ORAL
COMMUNITY
Start: 2024-04-23 | End: 2024-05-24

## 2024-04-29 ENCOUNTER — HOSPITAL ENCOUNTER (OUTPATIENT)
Dept: CT IMAGING | Facility: HOSPITAL | Age: 84
Discharge: HOME | End: 2024-04-29
Admitting: INTERNAL MEDICINE
Payer: MEDICARE

## 2024-04-29 VITALS
TEMPERATURE: 98.7 F | HEIGHT: 72 IN | OXYGEN SATURATION: 95 % | HEART RATE: 71 BPM | RESPIRATION RATE: 18 BRPM | SYSTOLIC BLOOD PRESSURE: 129 MMHG | DIASTOLIC BLOOD PRESSURE: 69 MMHG | BODY MASS INDEX: 25.19 KG/M2 | WEIGHT: 186 LBS

## 2024-04-29 DIAGNOSIS — C16.0 ADENOCARCINOMA OF GASTROESOPHAGEAL JUNCTION: ICD-10-CM

## 2024-04-29 PROCEDURE — 71250 CT THORAX DX C-: CPT

## 2024-04-29 RX ORDER — POLYETHYLENE GLYCOL 3350 17 G/17G
17 POWDER, FOR SOLUTION ORAL DAILY
Qty: 30 PACKET | Refills: 0 | Status: SHIPPED | OUTPATIENT
Start: 2024-04-29

## 2024-04-29 NOTE — DISCHARGE INSTRUCTIONS
Return to ER if symptoms worsen   Increase oral fluids   Follow up with primary care provider this week  Return to the er if abdominal pain, vomiting, fever

## 2024-04-29 NOTE — ED PROVIDER NOTES
Subjective   History of Present Illness  Patient is a 83-year-old male presents to the emergency department with constipation.  He states he has not had a bowel movement within the last 2 weeks.  He states he passed a little liquid about a week ago.  He was hospitalized April 9 through April 11 with dysphagia and had a EGD and was noted to have esophageal cancer.  He has not started treatment yet.  He did get an esophageal stent.  He denies any abdominal pain.  He has had a little nausea but he states has had that for the past several weeks.  He denies any vomiting.  No fever or chills.  He used a suppository and an enema today without relief of symptoms.  No fever or chills.    History provided by:  Patient   used: No        Review of Systems   Constitutional: Negative.    HENT: Negative.     Eyes: Negative.    Respiratory: Negative.     Cardiovascular: Negative.    Gastrointestinal:         Patient is a 83-year-old male presents to the emergency department with constipation.  He states he has not had a bowel movement within the last 2 weeks.  He states he passed a little liquid about a week ago.  He was hospitalized April 9 through April 11 with dysphagia and had a EGD and was noted to have esophageal cancer.  He has not started treatment yet.  He did get an esophageal stent.  He denies any abdominal pain.  He has had a little nausea but he states has had that for the past several weeks.  He denies any vomiting.  No fever or chills.  He used a suppository and an enema today without relief of symptoms.  No fever or chills.   Endocrine: Negative.    Genitourinary: Negative.    Musculoskeletal: Negative.    Skin: Negative.    Allergic/Immunologic: Negative.    Neurological: Negative.    Hematological: Negative.    Psychiatric/Behavioral: Negative.     All other systems reviewed and are negative.      Past Medical History:   Diagnosis Date    Arthritis     Cataract     Chronic fatigue 10/3/2017     Chronic renal disease, stage IV     Coronary artery disease     CVA (cerebral vascular accident) 2018    Hyperlipidemia     Hypertension     Palpitations 10/3/2017    Premature atrial contractions 10/17/2017    PVCs (premature ventricular contractions) 10/17/2017    Sleep apnea     Stage 4 chronic kidney disease 2018    Stroke 2007    Weakness     right leg weaker       Allergies   Allergen Reactions    Hydralazine Nausea Only    Losartan Potassium Nausea Only    Penicillins Hives    Spironolactone Swelling     Made  Breast sore and swell       Past Surgical History:   Procedure Laterality Date    CARDIAC CATHETERIZATION      CARDIAC CATHETERIZATION N/A 2020    Procedure: Left Heart Cath;  Surgeon: Rufino Brice MD;  Location: [facility] CATH INVASIVE LOCATION;  Service: Cardiology;  Laterality: N/A;    CORONARY ARTERY BYPASS GRAFT N/A 2020    Procedure: CABG X 3, LIMA, SHELBY, EVH WITH OPEN EXTENSION LOWER RIGHT LEG;  Surgeon: Nikunj Carballo MD;  Location: [facility] OR;  Service: Cardiothoracic;  Laterality: N/A;    ENDOSCOPY N/A 4/10/2024    Procedure: ESOPHAGOGASTRODUODENOSCOPY WITH ANESTHESIA;  Surgeon: Gilles Pineda MD;  Location: [facility] ENDOSCOPY;  Service: Gastroenterology;  Laterality: N/A;  pre op dysphagia  post esophageal mass  pcp Spike Polanco       Family History   Problem Relation Age of Onset    Cancer Mother     Cancer Father        Social History     Socioeconomic History    Marital status:    Tobacco Use    Smoking status: Former     Current packs/day: 0.00     Average packs/day: 1.5 packs/day for 25.0 years (37.5 ttl pk-yrs)     Types: Cigarettes     Start date:      Quit date:      Years since quittin.3    Smokeless tobacco: Never   Vaping Use    Vaping status: Never Used   Substance and Sexual Activity    Alcohol use: No    Drug use: No    Sexual activity: Defer       Prior to Admission medications    Medication Sig Start Date End Date Taking?  Authorizing Provider   oxyCODONE-acetaminophen (PERCOCET) 7.5-325 MG per tablet Take 1 tablet by mouth. 4/23/24 5/24/24 Yes Mitesh Contreras MD   aluminum-magnesium hydroxide-simethicone (MAALOX MAX) 400-400-40 MG/5ML suspension Take 15 mL by mouth 3 (Three) Times a Day. 4/11/24   Faisal Barlow APRN   amLODIPine (NORVASC) 10 MG tablet Take 0.5 tablets by mouth 2 (Two) Times a Day.    Mitesh Contreras MD   aspirin 81 MG EC tablet Take 1 tablet by mouth Daily. 5/31/20   Wesley Lew,    fluticasone (FLONASE) 50 MCG/ACT nasal spray 2 sprays by Each Nare route Daily. Obtain otc 11/23/23   Mariela Covarrubias APRN   guaiFENesin (MUCINEX) 600 MG 12 hr tablet Take 2 tablets by mouth Every 12 (Twelve) Hours. 11/22/23   Mariela Covarrubias APRN   hydrALAZINE (APRESOLINE) 100 MG tablet Take 1 tablet by mouth 2 (Two) Times a Day.    Mitesh Contreras MD   HYDROcodone-acetaminophen (Norco) 5-325 MG per tablet Take 1 tablet by mouth Every 6 (Six) Hours As Needed for Moderate Pain. 4/11/24   Gordon Denton MD   lisinopril (PRINIVIL,ZESTRIL) 20 MG tablet Take 1 tablet by mouth 2 (Two) Times a Day.    Mitesh Contreras MD   metoprolol tartrate (LOPRESSOR) 50 MG tablet Take 0.5 tablets by mouth 2 (Two) Times a Day. 4/5/24   Rufino Brice MD   nitroglycerin (NITROSTAT) 0.4 MG SL tablet Place 1 tablet under the tongue Every 5 (Five) Minutes As Needed for Chest Pain. Take no more than 3 doses in 15 minutes. 3/12/24   Jaz Musa APRN   pantoprazole (Protonix) 40 MG EC tablet Take 1 tablet by mouth Daily. 4/11/24   Faisal Barlow APRN   pravastatin (PRAVACHOL) 40 MG tablet TAKE 1 TABLET BY MOUTH DAILY. 4/15/24   Rufino Brice MD   sodium bicarbonate 650 MG tablet Take 0.5 tablets by mouth Daily.    Provider, MD Mitesh   terazosin (HYTRIN) 1 MG capsule Take 1 capsule by mouth Every Night.    Provider, MD Mitesh   vitamin B-12 (CYANOCOBALAMIN) 100 MCG tablet Take 0.5 tablets by mouth  "Daily.    Provider, MD Mitesh       /72   Pulse 62   Temp 98.1 °F (36.7 °C)   Resp 18   Ht 182.9 cm (72\")   Wt 84.4 kg (186 lb)   SpO2 99%   BMI 25.23 kg/m²     Objective   Physical Exam  Vitals and nursing note reviewed.   Constitutional:       Appearance: He is well-developed.      Comments: Nontoxic-appearing.  No acute distress.   HENT:      Head: Normocephalic and atraumatic.   Eyes:      Conjunctiva/sclera: Conjunctivae normal.      Pupils: Pupils are equal, round, and reactive to light.   Cardiovascular:      Rate and Rhythm: Normal rate and regular rhythm.      Heart sounds: Normal heart sounds.   Pulmonary:      Effort: Pulmonary effort is normal.      Breath sounds: Normal breath sounds.   Abdominal:      General: Bowel sounds are normal.      Palpations: Abdomen is soft.      Comments: Abdomen is soft, nondistended nontender.  Bowel sounds are present in all 4 quadrants.   Musculoskeletal:         General: Normal range of motion.      Cervical back: Normal range of motion and neck supple.   Skin:     General: Skin is warm and dry.   Neurological:      Mental Status: He is alert and oriented to person, place, and time.      Deep Tendon Reflexes: Reflexes are normal and symmetric.   Psychiatric:         Behavior: Behavior normal.         Thought Content: Thought content normal.         Judgment: Judgment normal.         Procedures         Lab Results (last 24 hours)       Procedure Component Value Units Date/Time    CBC & Differential [[id number]]  (Abnormal) Collected: 04/28/24 2155    Specimen: Blood from Arm, Right Updated: 04/28/24 2206    Narrative:      The following orders were created for panel order CBC & Differential.  Procedure                               Abnormality         Status                     ---------                               -----------         ------                     CBC Auto Differential[[id number]]        Abnormal            Final result             "     Please view results for these tests on the individual orders.    Comprehensive Metabolic Panel [[id number]]  (Abnormal) Collected: 04/28/24 2155    Specimen: Blood from Arm, Right Updated: 04/28/24 2222     Glucose 99 mg/dL      BUN 20 mg/dL      Creatinine 2.99 mg/dL      Sodium 139 mmol/L      Potassium 3.9 mmol/L      Chloride 106 mmol/L      CO2 23.0 mmol/L      Calcium 9.1 mg/dL      Total Protein 6.1 g/dL      Albumin 3.7 g/dL      ALT (SGPT) 6 U/L      AST (SGOT) 10 U/L      Alkaline Phosphatase 56 U/L      Total Bilirubin 0.4 mg/dL      Globulin 2.4 gm/dL      A/G Ratio 1.5 g/dL      BUN/Creatinine Ratio 6.7     Anion Gap 10.0 mmol/L      eGFR 20.1 mL/min/1.73     Narrative:      GFR Normal >60  Chronic Kidney Disease <60  Kidney Failure <15    The GFR formula is only valid for adults with stable renal function between ages 18 and 70.    Lipase [[id number]]  (Normal) Collected: 04/28/24 2155    Specimen: Blood from Arm, Right Updated: 04/28/24 2217     Lipase 21 U/L     Lactic Acid, Plasma [[id number]]  (Normal) Collected: 04/28/24 2155    Specimen: Blood from Arm, Right Updated: 04/28/24 2220     Lactate 0.8 mmol/L     CBC Auto Differential [[id number]]  (Abnormal) Collected: 04/28/24 2155    Specimen: Blood from Arm, Right Updated: 04/28/24 2206     WBC 9.38 10*3/mm3      RBC 3.81 10*6/mm3      Hemoglobin 11.5 g/dL      Hematocrit 34.2 %      MCV 89.8 fL      MCH 30.2 pg      MCHC 33.6 g/dL      RDW 12.8 %      RDW-SD 42.5 fl      MPV 10.5 fL      Platelets 129 10*3/mm3      Neutrophil % 79.6 %      Lymphocyte % 7.6 %      Monocyte % 8.4 %      Eosinophil % 3.5 %      Basophil % 0.5 %      Immature Grans % 0.4 %      Neutrophils, Absolute 7.46 10*3/mm3      Lymphocytes, Absolute 0.71 10*3/mm3      Monocytes, Absolute 0.79 10*3/mm3      Eosinophils, Absolute 0.33 10*3/mm3      Basophils, Absolute 0.05 10*3/mm3      Immature Grans, Absolute 0.04 10*3/mm3      nRBC 0.0 /100 WBC             CT Abdomen Pelvis  Without Contrast   Final Result   1. No bowel obstruction.   2. Indeterminate slowly growing 2 cm soft tissue density lesion   involving the midportion of the LEFT kidney.   3. Rectal fecal impaction.       This report was signed and finalized on 4/28/2024 9:50 PM by Dr. Dariel Padilla MD.              ED Course  ED Course as of 04/29/24 Xu   Sun Apr 28, 2024 2232 IMPRESSION:  1. No bowel obstruction.  2. Indeterminate slowly growing 2 cm soft tissue density lesion  involving the midportion of the LEFT kidney.  3. Rectal fecal impaction.     This report was signed and finalized on 4/28/2024 9:50 PM by Dr. Dariel Padilla MD.      [CW]   2232 Laboratory studies CMP shows a creatinine of 2.9 which is baseline for the patient.  Lactic acid is negative.  Lipase is 21.  CBC shows no leukocytosis, hemoglobin 11.5, hematocrit 30-34.2.  CT of the abdomen pelvis shows no bowel obstruction.  Slow-growing 2 cm soft tissue density noted in the left kidney.  Rectal fecal impaction.  Reviewed results of testing with the patient and his patient's family.  Will order soapsuds enema and fecal disimpaction.  Patient is in agreement with the care plan and voices understanding of instructions. [CW]   Mon Apr 29, 2024   0002 Patient did have some results with the enema and disimpaction.  He states the pain in his rectum has resolved.  Will place the patient on MiraLAX.  Advised to follow-up with their primary care provider this week.  Reviewed reasons to return emergency department.  Patient is in agreement with the care plan and voices understanding of instruction.  He will be discharged shortly in stable condition. [CW]      ED Course User Index  [CW] Mireille Ramirez APRN        Medical Decision Making  Patient is a 83-year-old male presents to the emergency department with constipation.  He states he has not had a bowel movement within the last 2 weeks.  He states he passed a little liquid about a week ago.  He was  hospitalized April 9 through April 11 with dysphagia and had a EGD and was noted to have esophageal cancer.  He has not started treatment yet.  He did get an esophageal stent.  He denies any abdominal pain.  He has had a little nausea but he states has had that for the past several weeks.  He denies any vomiting.  No fever or chills.  He used a suppository and an enema today without relief of symptoms.  No fever or chills.  Course treatment in ED: Nontoxic-appearing no acute distress.  Abdomen is soft, nondistended.  There is no tenderness.  Bowel sounds are present all 4 quadrants.  CV sinus rhythm.  Lungs clear to auscultation.  Laboratory studies and CT of abdomen pelvis have been ordered.  Differential diagnosis: Constipation; small bowel obstruction; gastritis diverticulitis; ileus  Labs Reviewed  COMPREHENSIVE METABOLIC PANEL - Abnormal; Notable for the following components:     Creatinine                    2.99 (*)               BUN/Creatinine Ratio          6.7 (*)                eGFR                          20.1 (*)            All other components within normal limits         Narrative: GFR Normal >60                  Chronic Kidney Disease <60                  Kidney Failure <15                                    The GFR formula is only valid for adults with stable renal function between ages 18 and 70.  CBC WITH AUTO DIFFERENTIAL - Abnormal; Notable for the following components:     RBC                           3.81 (*)               Hemoglobin                    11.5 (*)               Hematocrit                    34.2 (*)               Platelets                     129 (*)                Neutrophil %                  79.6 (*)               Lymphocyte %                  7.6 (*)                Neutrophils, Absolute         7.46 (*)            All other components within normal limits  LIPASE - Normal  LACTIC ACID, PLASMA - Normal  CBC AND DIFFERENTIAL  CT Abdomen Pelvis Without Contrast   Final  Result    1. No bowel obstruction.    2. Indeterminate slowly growing 2 cm soft tissue density lesion    involving the midportion of the LEFT kidney.    3. Rectal fecal impaction.         This report was signed and finalized on 4/28/2024 9:50 PM by Dr. Dariel Padilla MD.        Laboratory studies CMP shows a creatinine of 2.9 which is baseline for the patient.  Lactic acid is negative.  Lipase is 21.  CBC shows no leukocytosis, hemoglobin 11.5, hematocrit 30-34.2.  CT of the abdomen pelvis shows no bowel obstruction.  Slow-growing 2 cm soft tissue density noted in the left kidney.  Rectal fecal impaction.  Reviewed results of testing with the patient and his patient's family.  Will order soapsuds enema and fecal disimpaction.  Patient is in agreement with the care plan and voices understanding of instructions.  Patient did have some results with the enema and disimpaction.  He states the pain in his rectum has resolved.  Will place the patient on MiraLAX.  Advised to follow-up with their primary care provider this week.  Reviewed reasons to return emergency department.  Patient is in agreement with the care plan and voices understanding of instruction.  He will be discharged shortly in stable condition.        Amount and/or Complexity of Data Reviewed  Labs: ordered. Decision-making details documented in ED Course.  Radiology: ordered. Decision-making details documented in ED Course.    Risk  Prescription drug management.         Final diagnoses:   Constipation, unspecified constipation type          Mireille Ramirez, APRN  04/29/24 0005

## 2024-04-29 NOTE — ED NOTES
Enema completed at 2348, patient from [facility] back to bed So far is unable to pass any significant amount of stool, however is holding enema in as long as possible per this nurse's directions. Patient states the pain in his abdomen has gone away and he does not feel the urge to have a BM at this moment. Patient given call light and advised to notify when he's ready to try the C again.

## 2024-04-30 ENCOUNTER — TELEPHONE (OUTPATIENT)
Dept: HEMATOLOGY | Age: 84
End: 2024-04-30

## 2024-05-01 ENCOUNTER — HOSPITAL ENCOUNTER (OUTPATIENT)
Dept: CT IMAGING | Facility: HOSPITAL | Age: 84
Discharge: HOME | End: 2024-05-01
Payer: MEDICARE

## 2024-05-01 ENCOUNTER — CLINICAL DOCUMENTATION (OUTPATIENT)
Dept: HEMATOLOGY | Age: 84
End: 2024-05-01

## 2024-05-01 DIAGNOSIS — R91.1 LUNG NODULE: ICD-10-CM

## 2024-05-01 DIAGNOSIS — C16.0 ADENOCARCINOMA OF GASTROESOPHAGEAL JUNCTION: ICD-10-CM

## 2024-05-01 PROCEDURE — 0 FLUDEOXYGLUCOSE F18 SOLUTION: Performed by: INTERNAL MEDICINE

## 2024-05-01 PROCEDURE — A9552 F18 FDG: HCPCS | Performed by: INTERNAL MEDICINE

## 2024-05-01 PROCEDURE — 78815 PET IMAGE W/CT SKULL-THIGH: CPT

## 2024-05-01 RX ADMIN — FLUDEOXYGLUCOSE F 18 1 DOSE: 200 INJECTION, SOLUTION INTRAVENOUS at 09:39

## 2024-05-01 NOTE — PROGRESS NOTES
neoplasm.  No hypermetabolic pulmonary nodule or mass is seen.  1.8 cm solid exophytic mass medially at the midpole of the left kidney, maximum SUV of 3.3 may represent neoplasm.  No hypermetabolic mesenteric or retroperitoneal lymphadenopathy is seen.  fairly intense tracer uptake in the rectum with questionable wall thickening maximum SUV is 7.6. Neoplasm not excluded.  No hypermetabolic pelvic lymphadenopathy is seen.  focus of tracer uptake at the left anterior second ribwith no obvious osseous abnormality or fracture. Maximum SUV is 3.0.

## 2024-05-02 ENCOUNTER — HOSPITAL ENCOUNTER (OUTPATIENT)
Dept: GENERAL RADIOLOGY | Age: 84
Discharge: HOME | End: 2024-05-02
Attending: INTERNAL MEDICINE
Payer: MEDICARE

## 2024-05-02 ENCOUNTER — HOSPITAL ENCOUNTER (OUTPATIENT)
Dept: INFUSION THERAPY | Age: 84
Discharge: HOME | End: 2024-05-02
Payer: MEDICARE

## 2024-05-02 ENCOUNTER — OFFICE VISIT (OUTPATIENT)
Dept: HEMATOLOGY | Age: 84
End: 2024-05-02
Payer: MEDICARE

## 2024-05-02 VITALS
OXYGEN SATURATION: 97 % | HEIGHT: 72 IN | BODY MASS INDEX: 25.03 KG/M2 | DIASTOLIC BLOOD PRESSURE: 80 MMHG | HEART RATE: 63 BPM | SYSTOLIC BLOOD PRESSURE: 140 MMHG | TEMPERATURE: 97.9 F | WEIGHT: 184.8 LBS

## 2024-05-02 DIAGNOSIS — G89.3 CANCER RELATED PAIN: Primary | ICD-10-CM

## 2024-05-02 DIAGNOSIS — C16.0 ADENOCARCINOMA OF GASTROESOPHAGEAL JUNCTION (HCC): Primary | ICD-10-CM

## 2024-05-02 DIAGNOSIS — R97.8 OTHER ABNORMAL TUMOR MARKERS: ICD-10-CM

## 2024-05-02 DIAGNOSIS — C16.0 ADENOCARCINOMA OF GASTROESOPHAGEAL JUNCTION (HCC): ICD-10-CM

## 2024-05-02 DIAGNOSIS — T85.528A MIGRATION OF ESOPHAGEAL STENT, INITIAL ENCOUNTER: ICD-10-CM

## 2024-05-02 LAB
ALBUMIN SERPL-MCNC: 3.8 G/DL (ref 3.5–5.2)
ALP SERPL-CCNC: 61 U/L (ref 40–130)
ALT SERPL-CCNC: 10 U/L (ref 21–72)
ANION GAP SERPL CALCULATED.3IONS-SCNC: 12 MMOL/L (ref 7–19)
AST SERPL-CCNC: 16 U/L (ref 17–59)
BASOPHILS # BLD: 0.06 K/UL (ref 0.01–0.08)
BASOPHILS NFR BLD: 0.7 % (ref 0.1–1.2)
BILIRUB SERPL-MCNC: 0.6 MG/DL (ref 0.2–1.3)
BUN SERPL-MCNC: 19 MG/DL (ref 9–20)
CALCIUM SERPL-MCNC: 9.1 MG/DL (ref 8.4–10.2)
CANCER AG19-9 SERPL-ACNC: 34 U/ML (ref 0–35)
CEA SERPL-MCNC: 1.1 NG/ML (ref 0–4.7)
CHLORIDE SERPL-SCNC: 104 MMOL/L (ref 98–111)
CO2 SERPL-SCNC: 22 MMOL/L (ref 22–29)
CREAT SERPL-MCNC: 2.7 MG/DL (ref 0.6–1.2)
EOSINOPHIL # BLD: 0.35 K/UL (ref 0.04–0.54)
EOSINOPHIL NFR BLD: 4.2 % (ref 0.7–7)
ERYTHROCYTE [DISTWIDTH] IN BLOOD BY AUTOMATED COUNT: 12.5 % (ref 11.6–14.4)
GLOBULIN: 2.3 G/DL
GLUCOSE SERPL-MCNC: 91 MG/DL (ref 74–106)
HCT VFR BLD AUTO: 33.5 % (ref 40.1–51)
HGB BLD-MCNC: 11.5 G/DL (ref 13.7–17.5)
LYMPHOCYTES # BLD: 0.77 K/UL (ref 1.18–3.74)
LYMPHOCYTES NFR BLD: 9.2 % (ref 19.3–53.1)
MCH RBC QN AUTO: 30.5 PG (ref 25.7–32.2)
MCHC RBC AUTO-ENTMCNC: 34.3 G/DL (ref 32.3–36.5)
MCV RBC AUTO: 88.9 FL (ref 79–92.2)
MONOCYTES # BLD: 0.56 K/UL (ref 0.24–0.82)
MONOCYTES NFR BLD: 6.7 % (ref 4.7–12.5)
NEUTROPHILS # BLD: 6.59 K/UL (ref 1.56–6.13)
NEUTS SEG NFR BLD: 78.8 % (ref 34–71.1)
PLATELET # BLD AUTO: 152 K/UL (ref 163–337)
PMV BLD AUTO: 10 FL (ref 7.4–10.4)
POTASSIUM SERPL-SCNC: 3.9 MMOL/L (ref 3.5–5.1)
PROT SERPL-MCNC: 6.1 G/DL (ref 6.3–8.2)
RBC # BLD AUTO: 3.77 M/UL (ref 4.63–6.08)
SODIUM SERPL-SCNC: 138 MMOL/L (ref 137–145)
WBC # BLD AUTO: 8.36 K/UL (ref 4.23–9.07)

## 2024-05-02 PROCEDURE — G2211 COMPLEX E/M VISIT ADD ON: HCPCS | Performed by: INTERNAL MEDICINE

## 2024-05-02 PROCEDURE — 85025 COMPLETE CBC W/AUTO DIFF WBC: CPT

## 2024-05-02 PROCEDURE — 1123F ACP DISCUSS/DSCN MKR DOCD: CPT | Performed by: INTERNAL MEDICINE

## 2024-05-02 PROCEDURE — 36415 COLL VENOUS BLD VENIPUNCTURE: CPT

## 2024-05-02 PROCEDURE — 99215 OFFICE O/P EST HI 40 MIN: CPT | Performed by: INTERNAL MEDICINE

## 2024-05-02 PROCEDURE — 1036F TOBACCO NON-USER: CPT | Performed by: INTERNAL MEDICINE

## 2024-05-02 PROCEDURE — G8419 CALC BMI OUT NRM PARAM NOF/U: HCPCS | Performed by: INTERNAL MEDICINE

## 2024-05-02 PROCEDURE — G8427 DOCREV CUR MEDS BY ELIG CLIN: HCPCS | Performed by: INTERNAL MEDICINE

## 2024-05-02 PROCEDURE — 74220 X-RAY XM ESOPHAGUS 1CNTRST: CPT

## 2024-05-02 PROCEDURE — 80053 COMPREHEN METABOLIC PANEL: CPT

## 2024-05-02 PROCEDURE — 99213 OFFICE O/P EST LOW 20 MIN: CPT

## 2024-05-02 RX ORDER — ONDANSETRON 4 MG/1
4 TABLET, ORALLY DISINTEGRATING ORAL 3 TIMES DAILY PRN
Qty: 90 TABLET | Refills: 3 | Status: SHIPPED | OUTPATIENT
Start: 2024-05-02 | End: 2024-08-30

## 2024-05-02 RX ORDER — FENTANYL 50 UG/1
1 PATCH TRANSDERMAL
Qty: 10 PATCH | Refills: 0 | Status: SHIPPED | OUTPATIENT
Start: 2024-05-02 | End: 2024-06-01

## 2024-05-02 RX ORDER — ONDANSETRON 4 MG/1
4 TABLET, ORALLY DISINTEGRATING ORAL 3 TIMES DAILY PRN
Qty: 90 TABLET | Refills: 3 | Status: SHIPPED | OUTPATIENT
Start: 2024-05-02 | End: 2024-05-02 | Stop reason: CLARIF

## 2024-05-06 ENCOUNTER — OFFICE VISIT (OUTPATIENT)
Dept: SURGERY | Age: 84
End: 2024-05-06
Payer: MEDICARE

## 2024-05-06 ENCOUNTER — HOSPITAL ENCOUNTER (INPATIENT)
Age: 84
LOS: 18 days | Discharge: HOSPICE | End: 2024-05-24
Attending: STUDENT IN AN ORGANIZED HEALTH CARE EDUCATION/TRAINING PROGRAM | Admitting: INTERNAL MEDICINE
Payer: MEDICARE

## 2024-05-06 ENCOUNTER — APPOINTMENT (OUTPATIENT)
Dept: GENERAL RADIOLOGY | Age: 84
End: 2024-05-06
Attending: STUDENT IN AN ORGANIZED HEALTH CARE EDUCATION/TRAINING PROGRAM
Payer: MEDICARE

## 2024-05-06 ENCOUNTER — TELEPHONE (OUTPATIENT)
Dept: HEMATOLOGY | Age: 84
End: 2024-05-06

## 2024-05-06 VITALS
BODY MASS INDEX: 25 KG/M2 | OXYGEN SATURATION: 97 % | WEIGHT: 184.6 LBS | HEIGHT: 72 IN | TEMPERATURE: 98.3 F | HEART RATE: 50 BPM

## 2024-05-06 DIAGNOSIS — I20.9 ANGINA PECTORIS (HCC): ICD-10-CM

## 2024-05-06 DIAGNOSIS — G89.3 CANCER RELATED PAIN: ICD-10-CM

## 2024-05-06 DIAGNOSIS — K94.23 GASTROSTOMY TUBE DYSFUNCTION (HCC): ICD-10-CM

## 2024-05-06 DIAGNOSIS — R07.9 CHEST PAIN, UNSPECIFIED TYPE: Primary | ICD-10-CM

## 2024-05-06 DIAGNOSIS — C15.5 MALIGNANT NEOPLASM OF LOWER THIRD OF ESOPHAGUS (HCC): Primary | ICD-10-CM

## 2024-05-06 PROBLEM — C15.9 ESOPHAGEAL CANCER (HCC): Status: ACTIVE | Noted: 2024-05-06

## 2024-05-06 PROBLEM — R62.7 FAILURE TO THRIVE IN ADULT: Status: ACTIVE | Noted: 2024-05-06

## 2024-05-06 LAB
ALBUMIN SERPL-MCNC: 3.8 G/DL (ref 3.5–5.2)
ALP SERPL-CCNC: 65 U/L (ref 40–130)
ALT SERPL-CCNC: <5 U/L (ref 5–41)
ANION GAP SERPL CALCULATED.3IONS-SCNC: 11 MMOL/L (ref 7–19)
AST SERPL-CCNC: 10 U/L (ref 5–40)
BASOPHILS # BLD: 0.1 K/UL (ref 0–0.2)
BASOPHILS NFR BLD: 0.5 % (ref 0–1)
BILIRUB SERPL-MCNC: 0.4 MG/DL (ref 0.2–1.2)
BUN SERPL-MCNC: 20 MG/DL (ref 8–23)
CALCIUM SERPL-MCNC: 9.4 MG/DL (ref 8.8–10.2)
CHLORIDE SERPL-SCNC: 104 MMOL/L (ref 98–111)
CO2 SERPL-SCNC: 24 MMOL/L (ref 22–29)
CREAT SERPL-MCNC: 2.9 MG/DL (ref 0.5–1.2)
EOSINOPHIL # BLD: 0.1 K/UL (ref 0–0.6)
EOSINOPHIL NFR BLD: 1.2 % (ref 0–5)
ERYTHROCYTE [DISTWIDTH] IN BLOOD BY AUTOMATED COUNT: 12.6 % (ref 11.5–14.5)
GLUCOSE SERPL-MCNC: 106 MG/DL (ref 74–109)
HBA1C MFR BLD: 5.3 % (ref 4–6)
HCT VFR BLD AUTO: 40.5 % (ref 42–52)
HGB BLD-MCNC: 13.7 G/DL (ref 14–18)
IMM GRANULOCYTES # BLD: 0 K/UL
LACTATE BLDV-SCNC: 1.4 MMOL/L (ref 0.5–1.9)
LYMPHOCYTES # BLD: 0.9 K/UL (ref 1.1–4.5)
LYMPHOCYTES NFR BLD: 8.4 % (ref 20–40)
MCH RBC QN AUTO: 30.1 PG (ref 27–31)
MCHC RBC AUTO-ENTMCNC: 33.8 G/DL (ref 33–37)
MCV RBC AUTO: 89 FL (ref 80–94)
MONOCYTES # BLD: 0.5 K/UL (ref 0–0.9)
MONOCYTES NFR BLD: 4.6 % (ref 0–10)
NEUTROPHILS # BLD: 8.6 K/UL (ref 1.5–7.5)
NEUTS SEG NFR BLD: 84.9 % (ref 50–65)
PLATELET # BLD AUTO: 181 K/UL (ref 130–400)
PMV BLD AUTO: 10.1 FL (ref 9.4–12.4)
POTASSIUM SERPL-SCNC: 4.2 MMOL/L (ref 3.5–5)
PROT SERPL-MCNC: 6.4 G/DL (ref 6.6–8.7)
RBC # BLD AUTO: 4.55 M/UL (ref 4.7–6.1)
SODIUM SERPL-SCNC: 139 MMOL/L (ref 136–145)
TSH SERPL DL<=0.005 MIU/L-ACNC: 3.07 UIU/ML (ref 0.27–4.2)
WBC # BLD AUTO: 10.1 K/UL (ref 4.8–10.8)

## 2024-05-06 PROCEDURE — 94640 AIRWAY INHALATION TREATMENT: CPT

## 2024-05-06 PROCEDURE — 83036 HEMOGLOBIN GLYCOSYLATED A1C: CPT

## 2024-05-06 PROCEDURE — 6370000000 HC RX 637 (ALT 250 FOR IP): Performed by: NURSE PRACTITIONER

## 2024-05-06 PROCEDURE — 94760 N-INVAS EAR/PLS OXIMETRY 1: CPT

## 2024-05-06 PROCEDURE — 1036F TOBACCO NON-USER: CPT | Performed by: SURGERY

## 2024-05-06 PROCEDURE — G8427 DOCREV CUR MEDS BY ELIG CLIN: HCPCS | Performed by: SURGERY

## 2024-05-06 PROCEDURE — 71045 X-RAY EXAM CHEST 1 VIEW: CPT

## 2024-05-06 PROCEDURE — 99204 OFFICE O/P NEW MOD 45 MIN: CPT | Performed by: SURGERY

## 2024-05-06 PROCEDURE — 80053 COMPREHEN METABOLIC PANEL: CPT

## 2024-05-06 PROCEDURE — 83605 ASSAY OF LACTIC ACID: CPT

## 2024-05-06 PROCEDURE — 6360000002 HC RX W HCPCS: Performed by: NURSE PRACTITIONER

## 2024-05-06 PROCEDURE — 99222 1ST HOSP IP/OBS MODERATE 55: CPT | Performed by: INTERNAL MEDICINE

## 2024-05-06 PROCEDURE — 2580000003 HC RX 258: Performed by: NURSE PRACTITIONER

## 2024-05-06 PROCEDURE — 1123F ACP DISCUSS/DSCN MKR DOCD: CPT | Performed by: SURGERY

## 2024-05-06 PROCEDURE — 36415 COLL VENOUS BLD VENIPUNCTURE: CPT

## 2024-05-06 PROCEDURE — 1200000000 HC SEMI PRIVATE

## 2024-05-06 PROCEDURE — 84443 ASSAY THYROID STIM HORMONE: CPT

## 2024-05-06 PROCEDURE — G8419 CALC BMI OUT NRM PARAM NOF/U: HCPCS | Performed by: SURGERY

## 2024-05-06 PROCEDURE — 85025 COMPLETE CBC W/AUTO DIFF WBC: CPT

## 2024-05-06 RX ORDER — SODIUM BICARBONATE 325 MG/1
325 TABLET ORAL DAILY
Status: DISCONTINUED | OUTPATIENT
Start: 2024-05-06 | End: 2024-05-20

## 2024-05-06 RX ORDER — ASPIRIN 81 MG/1
81 TABLET ORAL DAILY
Status: DISCONTINUED | OUTPATIENT
Start: 2024-05-06 | End: 2024-05-18

## 2024-05-06 RX ORDER — ENOXAPARIN SODIUM 100 MG/ML
30 INJECTION SUBCUTANEOUS DAILY
Status: DISCONTINUED | OUTPATIENT
Start: 2024-05-06 | End: 2024-05-24 | Stop reason: HOSPADM

## 2024-05-06 RX ORDER — ACETAMINOPHEN 325 MG/1
650 TABLET ORAL EVERY 6 HOURS PRN
Status: DISCONTINUED | OUTPATIENT
Start: 2024-05-06 | End: 2024-05-24 | Stop reason: HOSPADM

## 2024-05-06 RX ORDER — SODIUM CHLORIDE 9 MG/ML
INJECTION, SOLUTION INTRAVENOUS PRN
Status: CANCELLED | OUTPATIENT
Start: 2024-05-06

## 2024-05-06 RX ORDER — SODIUM CHLORIDE 9 MG/ML
INJECTION, SOLUTION INTRAVENOUS CONTINUOUS
Status: DISCONTINUED | OUTPATIENT
Start: 2024-05-06 | End: 2024-05-09

## 2024-05-06 RX ORDER — MORPHINE SULFATE 2 MG/ML
2 INJECTION, SOLUTION INTRAMUSCULAR; INTRAVENOUS
Status: DISCONTINUED | OUTPATIENT
Start: 2024-05-06 | End: 2024-05-15

## 2024-05-06 RX ORDER — SODIUM CHLORIDE 0.9 % (FLUSH) 0.9 %
5-40 SYRINGE (ML) INJECTION PRN
Status: DISCONTINUED | OUTPATIENT
Start: 2024-05-06 | End: 2024-05-13 | Stop reason: SDUPTHER

## 2024-05-06 RX ORDER — PRAVASTATIN SODIUM 20 MG
40 TABLET ORAL NIGHTLY
Status: DISCONTINUED | OUTPATIENT
Start: 2024-05-06 | End: 2024-05-20

## 2024-05-06 RX ORDER — TERAZOSIN 1 MG/1
1 CAPSULE ORAL NIGHTLY
COMMUNITY

## 2024-05-06 RX ORDER — ACETAMINOPHEN 650 MG/1
650 SUPPOSITORY RECTAL EVERY 6 HOURS PRN
Status: DISCONTINUED | OUTPATIENT
Start: 2024-05-06 | End: 2024-05-24 | Stop reason: HOSPADM

## 2024-05-06 RX ORDER — SODIUM CHLORIDE 9 MG/ML
INJECTION, SOLUTION INTRAVENOUS PRN
Status: DISCONTINUED | OUTPATIENT
Start: 2024-05-06 | End: 2024-05-13 | Stop reason: SDUPTHER

## 2024-05-06 RX ORDER — POLYETHYLENE GLYCOL 3350 17 G/17G
17 POWDER, FOR SOLUTION ORAL DAILY PRN
Status: DISCONTINUED | OUTPATIENT
Start: 2024-05-06 | End: 2024-05-07

## 2024-05-06 RX ORDER — HYDRALAZINE HYDROCHLORIDE 50 MG/1
100 TABLET, FILM COATED ORAL 2 TIMES DAILY
Status: DISCONTINUED | OUTPATIENT
Start: 2024-05-06 | End: 2024-05-18

## 2024-05-06 RX ORDER — SODIUM CHLORIDE 0.9 % (FLUSH) 0.9 %
5-40 SYRINGE (ML) INJECTION PRN
Status: CANCELLED | OUTPATIENT
Start: 2024-05-06

## 2024-05-06 RX ORDER — MORPHINE SULFATE 4 MG/ML
4 INJECTION, SOLUTION INTRAMUSCULAR; INTRAVENOUS
Status: DISCONTINUED | OUTPATIENT
Start: 2024-05-06 | End: 2024-05-15

## 2024-05-06 RX ORDER — ONDANSETRON 2 MG/ML
4 INJECTION INTRAMUSCULAR; INTRAVENOUS EVERY 6 HOURS PRN
Status: DISCONTINUED | OUTPATIENT
Start: 2024-05-06 | End: 2024-05-24 | Stop reason: HOSPADM

## 2024-05-06 RX ORDER — UBIDECARENONE 75 MG
50 CAPSULE ORAL DAILY
Status: DISCONTINUED | OUTPATIENT
Start: 2024-05-06 | End: 2024-05-20

## 2024-05-06 RX ORDER — BUDESONIDE AND FORMOTEROL FUMARATE DIHYDRATE 160; 4.5 UG/1; UG/1
2 AEROSOL RESPIRATORY (INHALATION) 2 TIMES DAILY
Status: DISCONTINUED | OUTPATIENT
Start: 2024-05-06 | End: 2024-05-24 | Stop reason: HOSPADM

## 2024-05-06 RX ORDER — NALOXONE HYDROCHLORIDE 0.4 MG/ML
0.4 INJECTION, SOLUTION INTRAMUSCULAR; INTRAVENOUS; SUBCUTANEOUS PRN
Status: DISCONTINUED | OUTPATIENT
Start: 2024-05-06 | End: 2024-05-24 | Stop reason: HOSPADM

## 2024-05-06 RX ORDER — SODIUM CHLORIDE 0.9 % (FLUSH) 0.9 %
5-40 SYRINGE (ML) INJECTION EVERY 12 HOURS SCHEDULED
Status: CANCELLED | OUTPATIENT
Start: 2024-05-06

## 2024-05-06 RX ORDER — GUAIFENESIN 600 MG/1
1200 TABLET, EXTENDED RELEASE ORAL EVERY 12 HOURS SCHEDULED
Status: DISCONTINUED | OUTPATIENT
Start: 2024-05-06 | End: 2024-05-20

## 2024-05-06 RX ORDER — SODIUM CHLORIDE 0.9 % (FLUSH) 0.9 %
5-40 SYRINGE (ML) INJECTION EVERY 12 HOURS SCHEDULED
Status: DISCONTINUED | OUTPATIENT
Start: 2024-05-06 | End: 2024-05-13 | Stop reason: SDUPTHER

## 2024-05-06 RX ORDER — FENTANYL 50 UG/1
1 PATCH TRANSDERMAL
Status: DISCONTINUED | OUTPATIENT
Start: 2024-05-06 | End: 2024-05-10

## 2024-05-06 RX ORDER — NITROGLYCERIN 0.4 MG/1
0.4 TABLET SUBLINGUAL EVERY 5 MIN PRN
Status: DISCONTINUED | OUTPATIENT
Start: 2024-05-06 | End: 2024-05-24 | Stop reason: HOSPADM

## 2024-05-06 RX ORDER — ONDANSETRON 4 MG/1
4 TABLET, ORALLY DISINTEGRATING ORAL EVERY 8 HOURS PRN
Status: DISCONTINUED | OUTPATIENT
Start: 2024-05-06 | End: 2024-05-24 | Stop reason: HOSPADM

## 2024-05-06 RX ORDER — FLUTICASONE PROPIONATE 50 MCG
2 SPRAY, SUSPENSION (ML) NASAL DAILY
Status: DISCONTINUED | OUTPATIENT
Start: 2024-05-06 | End: 2024-05-24 | Stop reason: HOSPADM

## 2024-05-06 RX ORDER — PANTOPRAZOLE SODIUM 40 MG/1
40 TABLET, DELAYED RELEASE ORAL
Status: DISCONTINUED | OUTPATIENT
Start: 2024-05-06 | End: 2024-05-18

## 2024-05-06 RX ORDER — OXYCODONE AND ACETAMINOPHEN 7.5; 325 MG/1; MG/1
1 TABLET ORAL EVERY 6 HOURS PRN
Status: DISCONTINUED | OUTPATIENT
Start: 2024-05-06 | End: 2024-05-08

## 2024-05-06 RX ORDER — AMLODIPINE BESYLATE 5 MG/1
5 TABLET ORAL 2 TIMES DAILY
Status: DISCONTINUED | OUTPATIENT
Start: 2024-05-06 | End: 2024-05-18

## 2024-05-06 RX ADMIN — AMLODIPINE BESYLATE 5 MG: 5 TABLET ORAL at 21:26

## 2024-05-06 RX ADMIN — ENOXAPARIN SODIUM 30 MG: 100 INJECTION SUBCUTANEOUS at 16:09

## 2024-05-06 RX ADMIN — MORPHINE SULFATE 4 MG: 4 INJECTION, SOLUTION INTRAMUSCULAR; INTRAVENOUS at 17:31

## 2024-05-06 RX ADMIN — PRAVASTATIN SODIUM 40 MG: 20 TABLET ORAL at 21:27

## 2024-05-06 RX ADMIN — Medication 2 PUFF: at 19:06

## 2024-05-06 RX ADMIN — GUAIFENESIN 1200 MG: 600 TABLET, EXTENDED RELEASE ORAL at 21:27

## 2024-05-06 RX ADMIN — SODIUM CHLORIDE: 9 INJECTION, SOLUTION INTRAVENOUS at 16:09

## 2024-05-06 ASSESSMENT — ENCOUNTER SYMPTOMS
VOMITING: 1
BACK PAIN: 1
COUGH: 0
SHORTNESS OF BREATH: 1
RHINORRHEA: 0
NAUSEA: 1
CHOKING: 0
CONSTIPATION: 1
RECTAL PAIN: 1

## 2024-05-06 ASSESSMENT — PAIN SCALES - GENERAL
PAINLEVEL_OUTOF10: 8
PAINLEVEL_OUTOF10: 9

## 2024-05-06 ASSESSMENT — LIFESTYLE VARIABLES
HOW MANY STANDARD DRINKS CONTAINING ALCOHOL DO YOU HAVE ON A TYPICAL DAY: PATIENT DOES NOT DRINK
HOW OFTEN DO YOU HAVE A DRINK CONTAINING ALCOHOL: NEVER

## 2024-05-06 ASSESSMENT — PAIN DESCRIPTION - ORIENTATION: ORIENTATION: RIGHT

## 2024-05-06 ASSESSMENT — PAIN DESCRIPTION - DESCRIPTORS: DESCRIPTORS: ACHING

## 2024-05-06 ASSESSMENT — PAIN DESCRIPTION - LOCATION: LOCATION: CHEST;ABDOMEN

## 2024-05-06 NOTE — TELEPHONE ENCOUNTER
Called patient and reminded patient of their appointment on 5/8/2024 and patient confirmed they would be here.Daughter states patient is in hospital right now and if he is not home by then, they will call and reschedule.

## 2024-05-06 NOTE — TELEPHONE ENCOUNTER
[place] Consult:   Room 426-01    Dr Christopher's Patient    DX: History of Esophageal Malignancy    Referring: Dr Mariano Grewal RN  [phone]

## 2024-05-06 NOTE — PROGRESS NOTES
Mr. Rocha is an 83 year old male who presents with a complaint of esophageal cancer. He was diagnosed less than a month ago. He had a stent placed, but is not able to take anything PO. He is now in need of port placement and feeding tube placement. Location of feeding tube discussed by phone call with Dr. Christopher. Notes from Dr. Claudio and from [facility] reviewed.    Past Medical History:   Diagnosis Date    CAD (coronary artery disease) 2020    Cabg x 3    Cerebral artery occlusion with cerebral infarction (HCC)     x 2 (2007, 2020) right side weakness    Chronic kidney disease (CKD), stage IV (severe) (HCC)     COPD (chronic obstructive pulmonary disease) (HCC)     Esophageal cancer (HCC) 04/2024    Hyperlipidemia     Hypertension      Past Surgical History:   Procedure Laterality Date    CARDIAC CATHETERIZATION  05/28/2020    Dr Cruz    CATARACT EXTRACTION, BILATERAL Bilateral     COLONOSCOPY  12/06/2006    Dr Bella-HP    COLONOSCOPY  12/12/2003    Dr Bella-AP    CORONARY ARTERY BYPASS GRAFT  06/09/2020    Dr Dimas    UPPER GASTROINTESTINAL ENDOSCOPY  04/10/2024    Dr Jeremy AlonzoMfiiiyupo-Igapobh-Xoyiqv differentiated adenocarcinoma with signet ring features, distal esophagus    UPPER GASTROINTESTINAL ENDOSCOPY N/A 04/17/2024    Dr MINGO Claudio-w/esophageal stent placement- [employer] esophageal wall flex stent measuring 23 mm x 155 mm and EUS-Esophageal adenocarcinoma likely at least T3 by EUS    UPPER GASTROINTESTINAL ENDOSCOPY N/A 04/17/2024    Dr MINGO Claudio-w/esophageal stent placement- [employer] esophageal wall flex stent measuring 23 mm x 155 mm and EUS-Esophageal adenocarcinoma likely at least T3 by EUS     Current Outpatient Medications   Medication Sig Dispense Refill    fentaNYL (DURAGESIC) 50 MCG/HR Place 1 patch onto the skin every 72 hours for 30 days. Max Daily Amount: 1 patch 10 patch 0    ondansetron (ZOFRAN-ODT) 4 MG disintegrating tablet Take 1 tablet by mouth 3 times daily as needed for

## 2024-05-06 NOTE — H&P
EUS-Esophageal adenocarcinoma likely at least T3 by EUS    UPPER GASTROINTESTINAL ENDOSCOPY N/A 04/17/2024    Dr MINGO Claudio-w/esophageal stent placement- [employer] esophageal wall flex stent measuring 23 mm x 155 mm and EUS-Esophageal adenocarcinoma likely at least T3 by EUS       Home Medications:  Prior to Admission medications    Medication Sig Start Date End Date Taking? Authorizing Provider   fentaNYL (DURAGESIC) 50 MCG/HR Place 1 patch onto the skin every 72 hours for 30 days. Max Daily Amount: 1 patch 5/2/24 6/1/24  Seth Christopher MD   ondansetron (ZOFRAN-ODT) 4 MG disintegrating tablet Take 1 tablet by mouth 3 times daily as needed for Nausea or Vomiting 5/2/24 8/30/24  Seth Christopher MD   oxyCODONE-acetaminophen (PERCOCET) 7.5-325 MG per tablet Take 1 tablet by mouth every 6 hours as needed for Pain for up to 30 days. Intended supply: 3 days Max Daily Amount: 4 tablets 4/23/24 5/23/24  Seth Christopher MD   pantoprazole (PROTONIX) 40 MG tablet Take 1 tablet by mouth 2 times daily (before meals) 4/17/24   Robert Claudio MD   aspirin 81 MG EC tablet Take 1 tablet by mouth daily 5/31/20   Marta Rosales MD   amLODIPine (NORVASC) 10 MG tablet Take 0.5 tablets by mouth 2 times daily 12/18/21   Marta Rosales MD   lisinopril (PRINIVIL;ZESTRIL) 20 MG tablet Take 1 tablet by mouth 2 times daily 10/26/21   Marta Rosales MD   cyanocobalamin 100 MCG tablet Take 0.5 tablets by mouth daily    Marta Rosales MD   hydrALAZINE (APRESOLINE) 100 MG tablet Take 1 tablet by mouth 2 times daily    Marta Rosales MD   pravastatin (PRAVACHOL) 40 MG tablet Take 1 tablet by mouth nightly 11/23/21   Marta Rosales MD   fluticasone (FLONASE) 50 MCG/ACT nasal spray 2 sprays by Nasal route daily 11/23/23   Marta Rosales MD   guaiFENesin (MUCINEX) 600 MG extended release tablet Take 2 tablets by mouth every 12 hours 11/22/23   Marta Rosales MD   sodium  tenderness.   Musculoskeletal:      Right lower leg: No edema.      Left lower leg: No edema.   Skin:     General: Skin is warm and dry.   Neurological:      Mental Status: He is alert and oriented to person, place, and time.     Assessment/Plan:  Principal Problem:    Failure to thrive in adult  Active Problems:    Adenocarcinoma of gastroesophageal junction (HCC)  Resolved Problems:    * No resolved hospital problems. *     Principal Problem:    Failure to thrive in adult/Adenocarcinoma of gastroesophageal junction  -consult oncology  -consult Dr. Blankenship  -consult dietician  -consider consult palliative care  -SLP eval/treat  -daily weight  -pt/ot consult  -I's and O's  -cbc  -cmp  -tsh with reflex FT4  -lactic acid  -ua with reflex to culture  -HgA1c  -fall precautions   Resolved Problems:    * No resolved hospital problems. *  Signed:  URAVSHI Agee - CNP, 5/6/2024 2:48 PM

## 2024-05-07 ENCOUNTER — TELEPHONE (OUTPATIENT)
Dept: GASTROENTEROLOGY | Age: 84
End: 2024-05-07

## 2024-05-07 PROBLEM — R13.10 DYSPHAGIA: Status: ACTIVE | Noted: 2024-05-07

## 2024-05-07 PROBLEM — Z51.5 PALLIATIVE CARE PATIENT: Status: ACTIVE | Noted: 2024-05-07

## 2024-05-07 PROBLEM — G89.3 CANCER RELATED PAIN: Status: ACTIVE | Noted: 2024-05-07

## 2024-05-07 PROBLEM — E44.1 MILD MALNUTRITION (HCC): Chronic | Status: ACTIVE | Noted: 2024-05-07

## 2024-05-07 LAB
ANION GAP SERPL CALCULATED.3IONS-SCNC: 13 MMOL/L (ref 7–19)
BACTERIA URNS QL MICRO: NEGATIVE /HPF
BILIRUB UR QL STRIP: NEGATIVE
BNP BLD-MCNC: 9915 PG/ML (ref 0–449)
BUN SERPL-MCNC: 20 MG/DL (ref 8–23)
CALCIUM SERPL-MCNC: 8.7 MG/DL (ref 8.8–10.2)
CHLORIDE SERPL-SCNC: 106 MMOL/L (ref 98–111)
CLARITY UR: CLEAR
CO2 SERPL-SCNC: 20 MMOL/L (ref 22–29)
COLOR UR: YELLOW
CREAT SERPL-MCNC: 2.5 MG/DL (ref 0.5–1.2)
CRYSTALS URNS MICRO: NORMAL /HPF
EKG P AXIS: NORMAL DEGREES
EKG P-R INTERVAL: NORMAL MS
EKG Q-T INTERVAL: 518 MS
EKG QRS DURATION: 144 MS
EKG QTC CALCULATION (BAZETT): 485 MS
EKG T AXIS: 74 DEGREES
EPI CELLS #/AREA URNS AUTO: 1 /HPF (ref 0–5)
ERYTHROCYTE [DISTWIDTH] IN BLOOD BY AUTOMATED COUNT: 12.5 % (ref 11.5–14.5)
GLUCOSE SERPL-MCNC: 87 MG/DL (ref 74–109)
GLUCOSE UR STRIP.AUTO-MCNC: NEGATIVE MG/DL
HCT VFR BLD AUTO: 33.3 % (ref 42–52)
HGB BLD-MCNC: 11.4 G/DL (ref 14–18)
HGB UR STRIP.AUTO-MCNC: NEGATIVE MG/L
HYALINE CASTS #/AREA URNS AUTO: 4 /HPF (ref 0–8)
KETONES UR STRIP.AUTO-MCNC: NEGATIVE MG/DL
LEUKOCYTE ESTERASE UR QL STRIP.AUTO: NEGATIVE
MCH RBC QN AUTO: 31.1 PG (ref 27–31)
MCHC RBC AUTO-ENTMCNC: 34.2 G/DL (ref 33–37)
MCV RBC AUTO: 90.7 FL (ref 80–94)
NITRITE UR QL STRIP.AUTO: NEGATIVE
PH UR STRIP.AUTO: 6.5 [PH] (ref 5–8)
PLATELET # BLD AUTO: 129 K/UL (ref 130–400)
PMV BLD AUTO: 10.4 FL (ref 9.4–12.4)
POTASSIUM SERPL-SCNC: 3.9 MMOL/L (ref 3.5–5)
PROT UR STRIP.AUTO-MCNC: 100 MG/DL
RBC # BLD AUTO: 3.67 M/UL (ref 4.7–6.1)
RBC #/AREA URNS AUTO: 0 /HPF (ref 0–4)
SODIUM SERPL-SCNC: 139 MMOL/L (ref 136–145)
SP GR UR STRIP.AUTO: 1.02 (ref 1–1.03)
UROBILINOGEN UR STRIP.AUTO-MCNC: 0.2 E.U./DL
WBC # BLD AUTO: 7.1 K/UL (ref 4.8–10.8)
WBC #/AREA URNS AUTO: 1 /HPF (ref 0–5)

## 2024-05-07 PROCEDURE — 97162 PT EVAL MOD COMPLEX 30 MIN: CPT

## 2024-05-07 PROCEDURE — 85027 COMPLETE CBC AUTOMATED: CPT

## 2024-05-07 PROCEDURE — 94760 N-INVAS EAR/PLS OXIMETRY 1: CPT

## 2024-05-07 PROCEDURE — 36415 COLL VENOUS BLD VENIPUNCTURE: CPT

## 2024-05-07 PROCEDURE — 6360000002 HC RX W HCPCS: Performed by: NURSE PRACTITIONER

## 2024-05-07 PROCEDURE — 80048 BASIC METABOLIC PNL TOTAL CA: CPT

## 2024-05-07 PROCEDURE — 1200000000 HC SEMI PRIVATE

## 2024-05-07 PROCEDURE — 6370000000 HC RX 637 (ALT 250 FOR IP): Performed by: NURSE PRACTITIONER

## 2024-05-07 PROCEDURE — 99223 1ST HOSP IP/OBS HIGH 75: CPT

## 2024-05-07 PROCEDURE — 94640 AIRWAY INHALATION TREATMENT: CPT

## 2024-05-07 PROCEDURE — 93010 ELECTROCARDIOGRAM REPORT: CPT | Performed by: INTERNAL MEDICINE

## 2024-05-07 PROCEDURE — 83880 ASSAY OF NATRIURETIC PEPTIDE: CPT

## 2024-05-07 PROCEDURE — 97165 OT EVAL LOW COMPLEX 30 MIN: CPT

## 2024-05-07 PROCEDURE — 93005 ELECTROCARDIOGRAM TRACING: CPT | Performed by: STUDENT IN AN ORGANIZED HEALTH CARE EDUCATION/TRAINING PROGRAM

## 2024-05-07 PROCEDURE — 99232 SBSQ HOSP IP/OBS MODERATE 35: CPT | Performed by: INTERNAL MEDICINE

## 2024-05-07 PROCEDURE — 81001 URINALYSIS AUTO W/SCOPE: CPT

## 2024-05-07 PROCEDURE — 97110 THERAPEUTIC EXERCISES: CPT

## 2024-05-07 RX ORDER — POLYETHYLENE GLYCOL 3350 17 G/17G
17 POWDER, FOR SOLUTION ORAL DAILY
Status: DISCONTINUED | OUTPATIENT
Start: 2024-05-08 | End: 2024-05-20

## 2024-05-07 RX ADMIN — AMLODIPINE BESYLATE 5 MG: 5 TABLET ORAL at 09:36

## 2024-05-07 RX ADMIN — METOPROLOL TARTRATE 25 MG: 25 TABLET, FILM COATED ORAL at 09:36

## 2024-05-07 RX ADMIN — GUAIFENESIN 1200 MG: 600 TABLET, EXTENDED RELEASE ORAL at 20:46

## 2024-05-07 RX ADMIN — PRAVASTATIN SODIUM 40 MG: 20 TABLET ORAL at 20:46

## 2024-05-07 RX ADMIN — Medication 2 PUFF: at 19:30

## 2024-05-07 RX ADMIN — PANTOPRAZOLE SODIUM 40 MG: 40 TABLET, DELAYED RELEASE ORAL at 17:58

## 2024-05-07 RX ADMIN — GUAIFENESIN 1200 MG: 600 TABLET, EXTENDED RELEASE ORAL at 09:36

## 2024-05-07 RX ADMIN — PANTOPRAZOLE SODIUM 40 MG: 40 TABLET, DELAYED RELEASE ORAL at 05:51

## 2024-05-07 RX ADMIN — MORPHINE SULFATE 2 MG: 2 INJECTION, SOLUTION INTRAMUSCULAR; INTRAVENOUS at 20:50

## 2024-05-07 RX ADMIN — ONDANSETRON 4 MG: 2 INJECTION INTRAMUSCULAR; INTRAVENOUS at 18:03

## 2024-05-07 RX ADMIN — ENOXAPARIN SODIUM 30 MG: 100 INJECTION SUBCUTANEOUS at 09:36

## 2024-05-07 RX ADMIN — MORPHINE SULFATE 4 MG: 4 INJECTION, SOLUTION INTRAMUSCULAR; INTRAVENOUS at 03:44

## 2024-05-07 RX ADMIN — ASPIRIN 81 MG: 81 TABLET, COATED ORAL at 09:36

## 2024-05-07 RX ADMIN — SODIUM BICARBONATE 325 MG: 325 TABLET ORAL at 09:36

## 2024-05-07 RX ADMIN — Medication 50 MCG: at 09:36

## 2024-05-07 RX ADMIN — MORPHINE SULFATE 2 MG: 2 INJECTION, SOLUTION INTRAMUSCULAR; INTRAVENOUS at 11:59

## 2024-05-07 ASSESSMENT — PAIN DESCRIPTION - ORIENTATION
ORIENTATION: RIGHT
ORIENTATION: RIGHT

## 2024-05-07 ASSESSMENT — PAIN SCALES - GENERAL
PAINLEVEL_OUTOF10: 6
PAINLEVEL_OUTOF10: 6
PAINLEVEL_OUTOF10: 7

## 2024-05-07 ASSESSMENT — PAIN DESCRIPTION - DESCRIPTORS: DESCRIPTORS: ACHING

## 2024-05-07 ASSESSMENT — PAIN DESCRIPTION - LOCATION
LOCATION: CHEST
LOCATION: RIB CAGE

## 2024-05-07 NOTE — ACP (ADVANCE CARE PLANNING)
Advance Care Planning      Palliative Medicine Provider (MD/NP)  Advance Care Planning (ACP) Conversation      Date of Conversation: 05/07/24  The patient and/or authorized decision maker consented to a voluntary Advance Care Planning conversation.   Individuals present for the conversation:   Patient and Significant Other , Nila, at bedside and daughter/POA, Angela, over telephone    Legal Healthcare Agent(s):    Primary Decision Maker: Angela Mora - Child - [phone]    ACP documents available in EMR prior to discussion:  -Power of  for Healthcare  -Living Will  -Patient/surrogate was asked to submit existing ACP documents for our records    Primary Palliative Diagnosis(es):  Esophageal cancer  Cancer related pain  Failure to thrive    Conversation Summary: Met with pt and his significant other, Myesha, at bedside.  Patient reports that he recently completed ACP/POA documents with his  outpatient and that his daughter, Angela, is his POA. She is to bring copies of documents tomorrow and this provider will assist in getting them scanned to epic.  Reviewed code status and discussed meaning of \"full code\" versus \"DNR\".  Patient does report he wishes to continue current medical treatments/workup but is to be a DNR in the event of cardiac or respiratory arrest.  Also discussed potential need for patient to remain full code during procedures tomorrow per protocol and then reverting back to DNR following surgery.  Patient is agreeable if needed. Also called his daughter, Angela, and reviewed the above. She verbalizes understanding is supportive of pts medical decisions and goals of care.     Resuscitation Status:    Code Status: DNR      I spent 13 minutes providing ACP services with the patient and/or surrogate decision maker in a voluntary, in-person conversation discussing the patient's wishes and goals as detailed in the above note during consult visit.      Hayley Mcnamara, APRN - CNP

## 2024-05-07 NOTE — PROGRESS NOTES
requiring stent placement 4/17/2024  Weight loss (he weighed 235 pounds Thanksgiving 2023)  Stage IV CKD  2.1 x 1.7 cm exophytic soft tissue lesion on the midportion, medial side of the LEFT kidney     Ming was hospitalized at [facility] 4/10/2024 through 4/12/2024 undergoing EGD with biopsy on 4/10/2024 regarding the diagnosis of GEJ adenocarcinoma.  Ming comes in [facility] follow-up accompanied by his daughter Angela for continued management, workup and treatment planning.      Since discharge from the hospital, he has undergone:  EGD with EUS and esophageal stent placement by Dr. Robert Claudio on 4/17/2024  CT scan of the abdomen and pelvis on 4/28/2024  CT scan of the chest on 4/29/2024   PET scan on 5/1/2024.     Unfortunately, since the stent placement on 4/17/2024, Ming has not been able to take in and keep down solid foods and only minimal fluids and has lost another 10 pounds.     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  Ming presented to the ED at [facility] on 4/9/2024 with nausea and vomiting, dysphagia to solid foods, only able to eat oatmeal, 35 pound weight loss over the previous 2 months.  He also complains of  right chest wall/axillary pain.  He also has stage IV CKD with a creatinine clearance of 28.8, associated with a creatinine of 2.4 on 4/10/2024.     CT scan of the chest without contrast on 4/10/2024 reported the following:  No acute cardiopulmonary process.  Lung emphysema.   There is a 6 mm left upper lobe nodule which is stable at least dating back to May 2020. This is considered benign. No new nodules or consolidation.  Previous coronary bypass.  No acute fracture identified. No acute process identified in the chest wall soft tissues to explain  staging of the mass was incomplete.  Esophageal Stent placement: During the procedure, it was evident that the patient had a severe obstruction to his distal esophagus.  I went out and spoke with the patient's family during the procedure and discussed the idea of an esophageal stent for po intake and to prevent complete obstruction.  His daughter was present and stated this idea had been discussed by referring physicians and the patient and she were both aware this was a possibility.  The risk and benefits of the stenting were discussed and she was agreeable to continue.  Separate consent was obtained for esophageal stent placement.  Using the  endoscope, I was able to traverse the esophageal mass into the stomach.  A stiff ERCP wire was introduced through the scope and exchanged.  The wire was fixed at the mouth.  Next a [employer] esophageal wall flex stent measuring 23 mm x 155 mm was introduced over the wire and advanced down into the stomach.  In a [id number] fashion the  scope was then advanced down to the stomach.  Under direct endoscopic visualization, the stent was slowly deployed with the distal portions of the stent just distal to the mass and actually in the area of the GE junction.  The proximal portions were at approximately 28 cm (this was the desired point just a few centimeters proximal to the mass).  Using the  scope I was able to traverse the esophageal stent and confirm distal placement endoscopically.  The proximal portions of the stent were grasped with a rat-tooth forceps and adjusted approximately 1 to 2 cm.  At the end of the procedure the stent was in the desired position centered across the mass.   MPRESSION:  1. Esophageal adenocarcinoma likely at least T3 by EUS.   2. S/p esophageal stent placement            Call received 24 from Elena at [facility] Radiology dept to confirm order for MRI for staging purposes of adenocarcinoma GE junction.   Per

## 2024-05-07 NOTE — PROGRESS NOTES
Contacted Dr. Claudio's office and spoke with Nurse Morales to communicate new cardiology consult and new EKG findings that may require endoscopy to be held off until later date.

## 2024-05-07 NOTE — CONSULTS
GFR.; R13.10-Dysphagia, unspecified; R07.89-Other chest pain COMPARISON: 11/19/2023 DOSE LENGTH PRODUCT: 220 mGy.cm. Automated exposure control was also utilized to decrease patient radiation dose. TECHNIQUE: Serial helical tomographic images of the chest were acquired without contrast. Multiplanar reformatted images were provided for review. FINDINGS: Visualized neck base: The imaged portion of the base of the neck appears unremarkable. Lungs: Centrilobular emphysema. Stable 6 mm left upper lobe nodule on axial image 80. No consolidation or new pulmonary nodules. No pleural effusion. The airways are clear. Heart: The heart is normal in size. There is no pericardial effusion. Previous coronary bypass. Aortic valve calcification. Vasculature: Thoracic aorta is normal in caliber. Central pulmonary arteries are nondilated. Mediastinum and lymph nodes: No suspicious hilar or mediastinal adenopathy. Incidental granulomatous calcification. Skeletal and soft tissues: Chest wall soft tissues are unremarkable. No acute bony abnormality. Bridging spinal osteophytes. Upper abdomen: The imaged portion of the upper abdomen demonstrates no acute process.    1.  No acute cardiopulmonary process. 2.  Lung emphysema. There is a 6 mm left upper lobe nodule which is stable at least dating back to May 2020. This is considered benign. No new nodules or consolidation. 3.  Previous coronary bypass. 4.  No acute fracture identified. No acute process identified in the chest wall soft tissues to explain right axillary pain. This report was signed and finalized on 4/10/2024 6:41 AM by Dr Zoran Milton.    XR Neck Soft Tissue    Result Date: 4/9/2024  EXAM/TECHNIQUE: XR NECK SOFT TISSUE- INDICATION: vomiting; assess for food bolus COMPARISON: None available. FINDINGS: No radiographic evidence of hyperdense foreign body in the upper aerodigestive tract. Mild degenerative change in the cervical spine. Multiple dental amalgams are noted. No acute  physician’s notes. I discussed the plans of care with the patient. I answered all the questions to the patient’s satisfaction. I have also reviewed the chief complaint (CC) and part of the history (History of Present Illness (HPI), Past Family Social History (PFSH), or Review of Systems (ROS) and made changes when appropriated.       (Please note that portions of this note were completed with a voice recognition program. Efforts were made to edit the dictations but occasionally words are mis-transcribed.)        Kirstin Coughlin MD    05/06/24  7:08 PM

## 2024-05-07 NOTE — TELEPHONE ENCOUNTER
Patient's daughter called today stating that her father was scheduled to have an Endoscopy today but it was cancelled and they were not sure why. She stated he was admitted into the hospital but no one had told them it was cancelled. I informed her that since it was scheduled as an outpatient procedure originally before he was admitted that we had to cancel the outpatient proc. I asked if GI had been consulted while she was in the hospital and she stated she did not know. She was frustrated because she felt that no one was telling them what was going on. He was admitted for failure to thrive as he is not eating. Patients daughter stated that they would have not had him admitted if they knew he wasn't going to have his Endoscopy. The patient had an endoscopy with stent placement on 4/17 and it had migrated, that is why Dr Claudio scheduled another Endo. I informed her that I would have Bere, his medical assistant reach out to Dr Claudio to see if he would complete it as an inpatient. She voiced understanding- Carmen

## 2024-05-07 NOTE — TELEPHONE ENCOUNTER
4th floor called us stating the pre op EKG that the patient had came back abnormal. They think he may have a heart blockage so they are consulting a cardiologist and pt's Peg tube and Port placement has been moved to Friday. I will let Dr Claudio know. They voiced understanding.

## 2024-05-07 NOTE — CONSULTS
Comprehensive Nutrition Assessment    Type and Reason for Visit:  Initial, Positive Nutrition Screen, Consult    Nutrition Recommendations/Plan:   Add mild malnutrition to problem list.     Malnutrition Assessment:  Malnutrition Status:  Mild malnutrition (05/07/24 1602)    Context:  Chronic Illness     Findings of the 6 clinical characteristics of malnutrition:  Energy Intake:  Mild decrease in energy intake (Comment)  Weight Loss:  Greater than 10% over 6 months     Body Fat Loss:  Unable to assess     Muscle Mass Loss:  Unable to assess    Fluid Accumulation:  No significant fluid accumulation     Strength:  Not Performed    Nutrition Assessment:    Consult and +NS for wt loss, decreased appetite. PMH includes adult FTT, esophageal CA, st 4 CKD. Per H&P, pt reported losing 40-50# since Nov 2023. Pt stated he used to weigh around 230# (noted w/fluid retention) and is now 182-183#. Wt hx shows steady wt decline: 4% loss in 1 month, 8.7% loss in 5 months, 14.1% loss in 6 months, and 16.3% loss in 7 months. Aware of plan for open jejunostomy, pending cardiology eval. Pt currently tolerating Full Liquid diet. Documented 51-75% intake. Noted orders for Nepro ONS. Creat 2.5, GFR 25. Pt stated he mixes Ensure ONS with ice cream at home, but he will try Nepro at this time. Will follow for ONS acceptance and TF recommendations.    Nutrition Related Findings:    BM 5/7. Creat 2.5, GFR 25, A1c 5.3%, glu 106, 87.         Current Nutrition Intake & Therapies:    Average Meal Intake: 51-75%  Average Supplements Intake: Unable to assess  ADULT DIET; Full Liquid  ADULT ORAL NUTRITION SUPPLEMENT; Breakfast, Lunch, Dinner; Renal Oral Supplement    Anthropometric Measures:  Height: 182.9 cm (6' 0.01\") (5/6/24)  Ideal Body Weight (IBW): 178 lbs (81 kg)    Current Body Weight: 83.7 kg (184 lb 8.4 oz) (5/6/24), 103.7 % IBW.    Current BMI (kg/m2): 25  Usual Body Weight: 97.4 kg (214 lb 12.8 oz) (11/22/23)  % Weight Change

## 2024-05-07 NOTE — PROGRESS NOTES
Physical Therapy  Facility/Department: [facility] ONCOLOGY UNIT  Physical Therapy Initial Assessment    Name: Ming Rocha  : 1940  MRN: [id number]  Date of Service: 2024    Discharge Recommendations:  Continue to assess pending progress, 24 hour supervision or assist, Patient would benefit from continued therapy after discharge          Patient Diagnosis(es): There were no encounter diagnoses.  Past Medical History:  has a past medical history of CAD (coronary artery disease), Cerebral artery occlusion with cerebral infarction (HCC), Chronic kidney disease (CKD), stage IV (severe) (HCC), COPD (chronic obstructive pulmonary disease) (HCC), Esophageal cancer (HCC), Hyperlipidemia, and Hypertension.  Past Surgical History:  has a past surgical history that includes Upper gastrointestinal endoscopy (04/10/2024); Colonoscopy (2006); Colonoscopy (2003); Cardiac catheterization (2020); Coronary artery bypass graft (2020); Cataract extraction, bilateral (Bilateral); Upper gastrointestinal endoscopy (N/A, 2024); and Upper gastrointestinal endoscopy (N/A, 2024).    Assessment   Body Structures, Functions, Activity Limitations Requiring Skilled Therapeutic Intervention: Decreased functional mobility ;Decreased ADL status;Decreased ROM;Decreased body mechanics;Decreased sensation;Decreased endurance;Decreased safe awareness;Decreased balance;Decreased posture  Assessment: Pt. will benefit from cont. PT to decrease impairments. Pt. a fall risk and should not attempt mobility on her own at this time. Pt. needs 24 hr care currently. Pt. will need encouragement to work with therapy. Encouraged out of bed to chair.  Treatment Diagnosis: impaired gait and mobility  Therapy Prognosis: Fair  Decision Making: Medium Complexity  Barriers to Learning: none noted  Requires PT Follow-Up: Yes  Activity Tolerance  Activity Tolerance: Patient tolerated treatment well;Patient limited by

## 2024-05-07 NOTE — PROGRESS NOTES
Occupational Therapy Initial Assessment  Date: 2024   Patient Name: Ming Rocha  MRN: [id number]     : 1940    Date of Service: 2024    Discharge Recommendations:  Home independently, Home with assist PRN       Assessment   Assessment: Evaluation completed and tx initiated. No barriers to stated discharge plan identified.  Treatment Diagnosis: Failure to Thrive in adult  REQUIRES OT FOLLOW-UP: No  Activity Tolerance  Activity Tolerance: Patient Tolerated treatment well           Patient Diagnosis(es): There were no encounter diagnoses.   has a past medical history of CAD (coronary artery disease), Cerebral artery occlusion with cerebral infarction (HCC), Chronic kidney disease (CKD), stage IV (severe) (HCC), COPD (chronic obstructive pulmonary disease) (HCC), Esophageal cancer (HCC), Hyperlipidemia, and Hypertension.   has a past surgical history that includes Upper gastrointestinal endoscopy (04/10/2024); Colonoscopy (2006); Colonoscopy (2003); Cardiac catheterization (2020); Coronary artery bypass graft (2020); Cataract extraction, bilateral (Bilateral); Upper gastrointestinal endoscopy (N/A, 2024); and Upper gastrointestinal endoscopy (N/A, 2024).    Treatment Diagnosis: Failure to Thrive in adult      Restrictions  Restrictions/Precautions  Restrictions/Precautions: Fall Risk  Required Braces or Orthoses?: No    Subjective   General  Chart Reviewed: Yes  Patient assessed for rehabilitation services?: Yes  Family / Caregiver Present: Yes (significant other)  Pain Screening  Pain at present: 0  Scale Used: Numeric Score  Intervention List: Patient able to continue with treatment  Comments / Details: no pain during therapy  Oxygen Therapy  O2 Device: None (Room air)    Social/Functional History  Social/Functional History  Lives With: Daughter  Type of Home: House  Home Layout: Two level  Home Access: Stairs to enter with rails  Bathroom Shower/Tub: Tub/Shower

## 2024-05-07 NOTE — PROGRESS NOTES
Daily Progress Note    Date:2024  Patient: Ming Rocha  : 1940  MRN:[id number]  CODE:Full Code No additional code details  PCP:Irving Larsen DO    Admit Date: 2024  1:07 PM   LOS: 1 day     No chief complaint on file.        Subjective: Tolerated PO intake last night, but not doing as well today. Had nausea/vomiting when he tried to eat this morning. Pain currently controlled.   He was apparently scheduled for outpatient endoscopy today with Dr. Claudio due to esophageal stent migration. This is tentatively being rescheduled to tomorrow. There is also plan for possible port and surgical jejunostomy tube placement tomorrow as well.         Hospital Summary: 83 y.o. male who presented to [facility] as a direct admission from Dr. Blankenship's office to hospitalist service for evaluation of failure to thrive. Pt has history of copd, ckd stage IV, HTN, hyperlipidemia, cad and esophageal malignancy followed by Dr. Christopher. He was evaluated today as outpatient for port and feeding tube placement by Dr. Blankenship. Pt with uncontrolled pain, difficulty swallowing and weight loss. Directly admitted to hospitalist service for further management.         Review of Systems   All other systems reviewed and are negative.      Objective:      Vital signs in last 24 hours:  Patient Vitals for the past 24 hrs:   BP Temp Pulse Resp SpO2   24 1300 (!) 151/90 -- 55 -- --   24 0739 (!) 146/76 97.2 °F (36.2 °C) 60 20 94 %   24 0639 -- -- -- -- 97 %   24 2115 -- -- 54 -- --   24 1906 (!) 145/74 98.1 °F (36.7 °C) 63 18 99 %   24 1731 -- -- -- 18 --       I/O last 3 completed shifts:  In: 395.1 [I.V.:395.1]  Out: 300 [Urine:300]  I/O this shift:  In: 360 [P.O.:360]  Out: -     Physical Exam  Constitutional:       General: He is not in acute distress.     Appearance: He is not toxic-appearing.   Cardiovascular:      Rate and Rhythm: Normal rate and regular rhythm.      Pulses: Normal pulses.

## 2024-05-07 NOTE — PROGRESS NOTES
Pt chair alarm going off, pt agitated that PT left him \"hooked up and trapped\" with chair alarm. Pt wanted to sit on side of chair, but lunch tray was delivered. Nurse educated on fall prevention and policy against sitting on side of bed due to risk for falls.   Pt very dissatisfied with this information.  Chair alarm reinitiated, primary nurse aware. Meal tray in front of patient, call light in reach.

## 2024-05-08 LAB
ANION GAP SERPL CALCULATED.3IONS-SCNC: 9 MMOL/L (ref 7–19)
BUN SERPL-MCNC: 19 MG/DL (ref 8–23)
CALCIUM SERPL-MCNC: 8.2 MG/DL (ref 8.8–10.2)
CHLORIDE SERPL-SCNC: 107 MMOL/L (ref 98–111)
CO2 SERPL-SCNC: 22 MMOL/L (ref 22–29)
CREAT SERPL-MCNC: 2.3 MG/DL (ref 0.5–1.2)
ERYTHROCYTE [DISTWIDTH] IN BLOOD BY AUTOMATED COUNT: 12.5 % (ref 11.5–14.5)
FERRITIN SERPL-MCNC: 222.8 NG/ML (ref 30–400)
FOLATE SERPL-MCNC: 4.2 NG/ML (ref 4.5–32.2)
GLUCOSE BLD-MCNC: 103 MG/DL (ref 70–99)
GLUCOSE SERPL-MCNC: 91 MG/DL (ref 74–109)
HCT VFR BLD AUTO: 31 % (ref 42–52)
HGB BLD-MCNC: 10.3 G/DL (ref 14–18)
IRON SATN MFR SERPL: 23 % (ref 14–50)
IRON SERPL-MCNC: 31 UG/DL (ref 59–158)
MCH RBC QN AUTO: 30.2 PG (ref 27–31)
MCHC RBC AUTO-ENTMCNC: 33.2 G/DL (ref 33–37)
MCV RBC AUTO: 90.9 FL (ref 80–94)
PERFORMED ON: ABNORMAL
PLATELET # BLD AUTO: 113 K/UL (ref 130–400)
PMV BLD AUTO: 10.2 FL (ref 9.4–12.4)
POTASSIUM SERPL-SCNC: 4.2 MMOL/L (ref 3.5–5)
RBC # BLD AUTO: 3.41 M/UL (ref 4.7–6.1)
RETICS # AUTO: 0.03 M/UL (ref 0.03–0.12)
RETICS/RBC NFR: 0.85 % (ref 0.5–1.5)
SODIUM SERPL-SCNC: 138 MMOL/L (ref 136–145)
TIBC SERPL-MCNC: 132 UG/DL (ref 250–400)
TROPONIN, HIGH SENSITIVITY: 46 NG/L (ref 0–22)
TSH SERPL DL<=0.005 MIU/L-ACNC: 3.85 UIU/ML (ref 0.27–4.2)
VIT B12 SERPL-MCNC: 870 PG/ML (ref 211–946)
WBC # BLD AUTO: 6.3 K/UL (ref 4.8–10.8)

## 2024-05-08 PROCEDURE — 6360000002 HC RX W HCPCS: Performed by: NURSE PRACTITIONER

## 2024-05-08 PROCEDURE — 36415 COLL VENOUS BLD VENIPUNCTURE: CPT

## 2024-05-08 PROCEDURE — 6370000000 HC RX 637 (ALT 250 FOR IP)

## 2024-05-08 PROCEDURE — 94640 AIRWAY INHALATION TREATMENT: CPT

## 2024-05-08 PROCEDURE — 6370000000 HC RX 637 (ALT 250 FOR IP): Performed by: NURSE PRACTITIONER

## 2024-05-08 PROCEDURE — 1200000000 HC SEMI PRIVATE

## 2024-05-08 PROCEDURE — 83540 ASSAY OF IRON: CPT

## 2024-05-08 PROCEDURE — 85027 COMPLETE CBC AUTOMATED: CPT

## 2024-05-08 PROCEDURE — 92610 EVALUATE SWALLOWING FUNCTION: CPT

## 2024-05-08 PROCEDURE — 80048 BASIC METABOLIC PNL TOTAL CA: CPT

## 2024-05-08 PROCEDURE — 85045 AUTOMATED RETICULOCYTE COUNT: CPT

## 2024-05-08 PROCEDURE — 2580000003 HC RX 258: Performed by: NURSE PRACTITIONER

## 2024-05-08 PROCEDURE — 82607 VITAMIN B-12: CPT

## 2024-05-08 PROCEDURE — 84443 ASSAY THYROID STIM HORMONE: CPT

## 2024-05-08 PROCEDURE — 82746 ASSAY OF FOLIC ACID SERUM: CPT

## 2024-05-08 PROCEDURE — 82962 GLUCOSE BLOOD TEST: CPT

## 2024-05-08 PROCEDURE — 94760 N-INVAS EAR/PLS OXIMETRY 1: CPT

## 2024-05-08 PROCEDURE — 6370000000 HC RX 637 (ALT 250 FOR IP): Performed by: STUDENT IN AN ORGANIZED HEALTH CARE EDUCATION/TRAINING PROGRAM

## 2024-05-08 PROCEDURE — 84484 ASSAY OF TROPONIN QUANT: CPT

## 2024-05-08 PROCEDURE — 92522 EVALUATE SPEECH PRODUCTION: CPT

## 2024-05-08 PROCEDURE — 2580000003 HC RX 258: Performed by: INTERNAL MEDICINE

## 2024-05-08 PROCEDURE — 83550 IRON BINDING TEST: CPT

## 2024-05-08 PROCEDURE — 93005 ELECTROCARDIOGRAM TRACING: CPT | Performed by: INTERNAL MEDICINE

## 2024-05-08 PROCEDURE — 82728 ASSAY OF FERRITIN: CPT

## 2024-05-08 PROCEDURE — 2500000003 HC RX 250 WO HCPCS: Performed by: INTERNAL MEDICINE

## 2024-05-08 PROCEDURE — 99233 SBSQ HOSP IP/OBS HIGH 50: CPT

## 2024-05-08 PROCEDURE — 97116 GAIT TRAINING THERAPY: CPT

## 2024-05-08 RX ORDER — DEXTROSE MONOHYDRATE 100 MG/ML
INJECTION, SOLUTION INTRAVENOUS CONTINUOUS
Status: DISPENSED | OUTPATIENT
Start: 2024-05-08 | End: 2024-05-08

## 2024-05-08 RX ORDER — OXYCODONE HCL 5 MG/5 ML
10 SOLUTION, ORAL ORAL EVERY 4 HOURS PRN
Status: DISCONTINUED | OUTPATIENT
Start: 2024-05-08 | End: 2024-05-20

## 2024-05-08 RX ADMIN — ASPIRIN 81 MG: 81 TABLET, COATED ORAL at 09:39

## 2024-05-08 RX ADMIN — SODIUM BICARBONATE 325 MG: 325 TABLET ORAL at 09:38

## 2024-05-08 RX ADMIN — HYDRALAZINE HYDROCHLORIDE 100 MG: 50 TABLET ORAL at 09:40

## 2024-05-08 RX ADMIN — PRAVASTATIN SODIUM 40 MG: 20 TABLET ORAL at 21:42

## 2024-05-08 RX ADMIN — DEXTROSE MONOHYDRATE: 100 INJECTION, SOLUTION INTRAVENOUS at 12:40

## 2024-05-08 RX ADMIN — ENOXAPARIN SODIUM 30 MG: 100 INJECTION SUBCUTANEOUS at 09:38

## 2024-05-08 RX ADMIN — AMLODIPINE BESYLATE 5 MG: 5 TABLET ORAL at 09:41

## 2024-05-08 RX ADMIN — POLYETHYLENE GLYCOL (3350) 17 G: 17 POWDER, FOR SOLUTION ORAL at 09:39

## 2024-05-08 RX ADMIN — ASCORBIC ACID, VITAMIN A PALMITATE, CHOLECALCIFEROL, THIAMINE HYDROCHLORIDE, RIBOFLAVIN-5 PHOSPHATE SODIUM, PYRIDOXINE HYDROCHLORIDE, NIACINAMIDE, DEXPANTHENOL, ALPHA-TOCOPHEROL ACETATE, VITAMIN K1, FOLIC ACID, BIOTIN, CYANOCOBALAMIN: 200; 3300; 200; 6; 3.6; 6; 40; 15; 10; 150; 600; 60; 5 INJECTION, SOLUTION INTRAVENOUS at 17:34

## 2024-05-08 RX ADMIN — Medication 2 PUFF: at 06:39

## 2024-05-08 RX ADMIN — GUAIFENESIN 1200 MG: 600 TABLET, EXTENDED RELEASE ORAL at 21:42

## 2024-05-08 RX ADMIN — SODIUM CHLORIDE, PRESERVATIVE FREE 10 ML: 5 INJECTION INTRAVENOUS at 21:43

## 2024-05-08 RX ADMIN — Medication 2 PUFF: at 19:05

## 2024-05-08 RX ADMIN — SODIUM CHLORIDE, PRESERVATIVE FREE 10 ML: 5 INJECTION INTRAVENOUS at 09:32

## 2024-05-08 RX ADMIN — OXYCODONE HYDROCHLORIDE 10 MG: 5 SOLUTION ORAL at 17:39

## 2024-05-08 RX ADMIN — Medication 50 MCG: at 09:38

## 2024-05-08 RX ADMIN — MORPHINE SULFATE 2 MG: 2 INJECTION, SOLUTION INTRAMUSCULAR; INTRAVENOUS at 09:32

## 2024-05-08 RX ADMIN — PANTOPRAZOLE SODIUM 40 MG: 40 TABLET, DELAYED RELEASE ORAL at 07:18

## 2024-05-08 RX ADMIN — HYDRALAZINE HYDROCHLORIDE 100 MG: 50 TABLET ORAL at 21:42

## 2024-05-08 RX ADMIN — GUAIFENESIN 1200 MG: 600 TABLET, EXTENDED RELEASE ORAL at 09:38

## 2024-05-08 RX ADMIN — ONDANSETRON 4 MG: 2 INJECTION INTRAMUSCULAR; INTRAVENOUS at 09:32

## 2024-05-08 RX ADMIN — PANTOPRAZOLE SODIUM 40 MG: 40 TABLET, DELAYED RELEASE ORAL at 17:41

## 2024-05-08 ASSESSMENT — PAIN SCALES - GENERAL
PAINLEVEL_OUTOF10: 4
PAINLEVEL_OUTOF10: 6

## 2024-05-08 ASSESSMENT — PAIN DESCRIPTION - LOCATION: LOCATION: CHEST

## 2024-05-08 ASSESSMENT — PAIN DESCRIPTION - DESCRIPTORS: DESCRIPTORS: DISCOMFORT;PRESSURE

## 2024-05-08 ASSESSMENT — PAIN DESCRIPTION - ORIENTATION: ORIENTATION: RIGHT

## 2024-05-08 NOTE — PLAN OF CARE
Problem: Chronic Conditions and Co-morbidities  Goal: Patient's chronic conditions and co-morbidity symptoms are monitored and maintained or improved  Outcome: Progressing     Problem: Safety - Adult  Goal: Free from fall injury  Outcome: Progressing     Problem: Nutrition Deficit:  Goal: Optimize nutritional status  Outcome: Progressing

## 2024-05-08 NOTE — PROGRESS NOTES
Laboratory studies on arrival revealed evidence of hypokalemia with K+ 2.9, low total protein, WBC 10.1, hemoglobin 13.7, and platelet 181,000.  CXR without acute findings.  He was admitted under hospitalist services with oncology and general surgery evaluations.  Plans for open jejunostomy tomorrow with Dr. Blankenship.  Palliative care is consulted for goals of care discussions and overwhelming symptoms.     Review of Systems:   14 point review of systems is negative except as specifically addressed above.    Objective:   VITALS:  BP (!) 158/83   Pulse 61   Temp 97.7 °F (36.5 °C) (Temporal)   Resp 20   Ht 1.829 m (6' 0.01\") Comment: 5/6/24  SpO2 94%   BMI 25.03 kg/m²   24HR INTAKE/OUTPUT:    Intake/Output Summary (Last 24 hours) at 5/8/2024 0939  Last data filed at 5/8/2024 0932  Gross per 24 hour   Intake 360 ml   Output 650 ml   Net -290 ml     General appearance: 82 yo male, ill appearing, NAD head: Normocephalic, without obvious abnormality, atraumatic  Eyes: conjunctivae/corneas clear. PERRL, EOM's intact.   Ears/Nose: normal external ears and nose  Neck/Throat: supple, symmetrical, trachea midline  Pulmonary: Diminished to auscultation bilaterally, unlabored on room air    Cardiovascular: Irregular rhythm and rate controlled  Gastrointestinal:soft, non-tender; non-distended, bowel sounds present  Musculoskeletal:No lower extremity edema,  No erythema, no tenderness to palpation  Skin: Warm, dry, pale  Neurologic: Alert and oriented X 3, generalized weakness, speech clear, follows commands  Psychiatric: Flat, cooperative    Medications:      sodium chloride      sodium chloride 75 mL/hr at 05/06/24 1609      polyethylene glycol  17 g Oral Daily    sodium chloride flush  5-40 mL IntraVENous 2 times per day    enoxaparin  30 mg SubCUTAneous Daily    amLODIPine  5 mg Oral BID    aspirin  81 mg Oral Daily    budesonide-formoterol  2 puff Inhalation BID    cyanocobalamin  50 mcg Oral Daily    fentaNYL  1 patch  care, unable to carry out work activity, up and about > 50% or waking hours    CLINICAL PAIN ASSESSMENT:   Score 1-10 (if verbal):  3  Location:   epigastric  Character:  sharp, aching  Frequency:  continuously  What makes it worse?:  food ingestion  What makes it better?:  pain medication    Assessment/Plan   Principal Problem:    Failure to thrive in adult  Active Problems:    Adenocarcinoma of gastroesophageal junction (HCC)    Palliative care patient    Mild malnutrition (HCC)    Dysphagia    Cancer related pain  Resolved Problems:    * No resolved hospital problems. *      Visit Summary:  Chart reviewed, patient discussed with nursing staff. Reviewed health issues, work up and treatment plan as well as factors that lead to hospitalization.  Mr. Rocha is seen at bedside this morning with his daughter/POA, Angela, present.  Report obtained from RN with no acute events overnight.  Preoperative EKG did reveal showed wide complex bradycardia with HR in 40s.  Patient is not having significant symptoms from cardiac irregularity at this time.  Cardiology has been consulted and awaiting further workup for surgical clearance.  Patient/family and I reviewed current status and plan of care and that procedures are currently postponed while waiting on cardiac workup.  We reviewed pain regimen and patient does feel fentanyl Duragesic has continued to assist pain control.  He is only required a few doses of IVP morphine for breakthrough symptoms.  He does not feel Percocet has been effective and we discussed changes today.  I will increase oxy dose and also transition to solution form which may be easier for patient to swallow at this time.  Provided education on utilizing p.o. medications and reserving IVP for severe breakthrough symptoms only.  Patient/family verbalized understanding.  Opportunity for questions and emotional support provided. Palliative team will follow as needed.    Candidate for SCOP: Pts home residence

## 2024-05-08 NOTE — PROGRESS NOTES
Facility/Department: [facility] ONCOLOGY UNIT   CLINICAL BEDSIDE SWALLOW EVALUATION  SPEECH PRODUCTION EVALUATION     NAME: Ming Rocha  : 1940  MRN: [id number]    ADMISSION DATE: 2024  ADMITTING DIAGNOSIS: has Adenocarcinoma of gastroesophageal junction (HCC); Failure to thrive in adult; Esophageal cancer (HCC); Palliative care patient; Mild malnutrition (HCC); Dysphagia; and Cancer related pain on their problem list.    Date of Eval: 2024  Evaluating Therapist: JUAN MANUEL Morales    Current Diet level:  Full liquid diet with thin liquids    Pain:  Pain Assessment  Pain Level: 6  Patient's Stated Pain Goal: 1  Pain Location: Rib cage  Pain Orientation: Right  Pain Descriptors: Aching  Response to Pain Intervention: Patient satisfied  Side Effects: No reported side effects    Reason for Referral  Ming Rocha was referred for a bedside swallow evaluation to assess the efficiency of his swallow function, identify signs and symptoms of aspiration and make recommendations regarding safe dietary consistencies, effective compensatory strategies, and safe eating environment.    Impression  Assessed patient's swallowing function. Patient exhibited inconsistently slow oral transit of even blended food consistency, inconsistently fast oral transit and suspected swallow delay with thin liquids, and sluggish, inconsistently mildly decreased laryngeal elevation for swallow airway protection. Even so, no outward S/S penetration/aspiration was observed with any puree consistency trial or thin liquid trial presented during evaluation. It is noted that patient only consumed 4 bites of cream of wheat during breakfast this date prior to reporting nausea.     Aware patient stomach tube has been deferred. Would change to puree consistency. Continue thin liquids. Continue nutritional supplements. Question if patient would benefit from TPN? Will continue to follow. Thank you for this consult.     Treatment Plan  Requires SLP

## 2024-05-08 NOTE — CONSULTS
Last Year: Never true   Transportation Needs: No Transportation Needs (5/6/2024)    PRAPARE - Transportation     Lack of Transportation (Medical): No     Lack of Transportation (Non-Medical): No   Physical Activity: Not on file   Stress: Not on file   Social Connections: Not on file   Intimate Partner Violence: Not on file   Housing Stability: Low Risk  (5/6/2024)    Housing Stability Vital Sign     Unable to Pay for Housing in the Last Year: No     Number of Places Lived in the Last Year: 1     Unstable Housing in the Last Year: No       Allergies:  Allergies   Allergen Reactions    Losartan Potassium Nausea Only    Penicillins Hives    Spironolactone Swelling     Made  Breast sore and swell       Home Meds:  Prior to Admission medications    Medication Sig Start Date End Date Taking? Authorizing Provider   terazosin (HYTRIN) 1 MG capsule Take 1 capsule by mouth nightly Last filled   Yes Marta Rosales MD   fentaNYL (DURAGESIC) 50 MCG/HR Place 1 patch onto the skin every 72 hours for 30 days. Max Daily Amount: 1 patch 5/2/24 6/1/24  Seth Christopher MD   ondansetron (ZOFRAN-ODT) 4 MG disintegrating tablet Take 1 tablet by mouth 3 times daily as needed for Nausea or Vomiting 5/2/24 8/30/24  Seth Christopher MD   oxyCODONE-acetaminophen (PERCOCET) 7.5-325 MG per tablet Take 1 tablet by mouth every 6 hours as needed for Pain for up to 30 days. Intended supply: 3 days Max Daily Amount: 4 tablets 4/23/24 5/23/24  Seth Christopher MD   pantoprazole (PROTONIX) 40 MG tablet Take 1 tablet by mouth 2 times daily (before meals) 4/17/24   Robert Claudio MD   aspirin 81 MG EC tablet Take 1 tablet by mouth daily 5/31/20   Marta Rosales MD   amLODIPine (NORVASC) 10 MG tablet Take 0.5 tablets by mouth 2 times daily 12/18/21   Marta Rosales MD   lisinopril (PRINIVIL;ZESTRIL) 20 MG tablet Take 1 tablet by mouth 2 times daily 10/26/21   Marta Rosales MD   cyanocobalamin 100 MCG tablet Take 0.5  day  Lovenox 30 mg subcu daily  Pravastatin 40 mg a day  Recommend Lexiscan for tomorrow  Repeat ECG  Repeat troponin

## 2024-05-08 NOTE — PROGRESS NOTES
Daily Progress Note    Date:2024  Patient: Ming Rocha  : 1940  MRN:[id number]  CODE:DNR No additional code details  PCP:Irving Larsen DO    Admit Date: 2024  1:07 PM   LOS: 2 days     Weight loss, unable to tolerate PO. Pt with active esophageal Ca.     Hospital Summary: 83 y.o. male who presented to [facility] as a direct admission from Dr. Blankenship's office to hospitalist service for evaluation of failure to thrive. Pt has history of copd, ckd stage IV, HTN, hyperlipidemia, cad, CVA, and esophageal malignancy followed by Dr. Christopher. He was evaluated as outpatient for port and feeding tube placement by Dr. Blankenship. Pt with uncontrolled pain, difficulty swallowing and weight loss. Directly admitted to hospitalist service for further management.         Endoscopy with concerns for esophageal stent migration, as well as jejunostomy and port placement are now on hold due to abnormal KG and telemetry findings as part of pre procedure evaluation.     Cardiology involved - stress test planned.       Subjective: pt denies chest pain per say at this time.     Given delay in procedures due to need for preliminary cardiac work up, and ongoing poor PO intake to the point of present intake will not sustain nutrition needs, starting tPN today.                   Review of Systems   All other systems reviewed and are negative.      Objective:      Vital signs in last 24 hours:  Patient Vitals for the past 24 hrs:   BP Temp Temp src Pulse Resp SpO2 Height   24 0937 (!) 158/83 -- -- 61 -- -- --   24 0751 (!) 145/89 97.7 °F (36.5 °C) Temporal 51 20 94 % --   24 0639 -- -- -- -- -- 93 % --   24 193 109/70 97.2 °F (36.2 °C) -- 50 18 91 % --   24 1930 -- -- -- 56 20 92 % --   24 1545 -- -- -- -- -- -- 1.829 m (6' 0.01\")         I/O last 3 completed shifts:  In: 755.1 [P.O.:360; I.V.:395.1]  Out: 650 [Urine:650]  I/O this shift:  In: -   Out: 300 [Urine:300]    Physical  Exam  Constitutional:       General: He is not in acute distress.     Appearance: He is not toxic-appearing.   Cardiovascular:      Rate and Rhythm: Normal rate and regular rhythm.      Pulses: Normal pulses.      Heart sounds: Normal heart sounds.   Pulmonary:      Effort: Pulmonary effort is normal. No respiratory distress.      Breath sounds: Normal breath sounds.   Abdominal:      General: Bowel sounds are normal. There is no distension.      Palpations: Abdomen is soft.      Tenderness: There is no abdominal tenderness.   Musculoskeletal:         General: No deformity. Normal range of motion.   Skin:     General: Skin is warm and dry.      Findings: No rash.             Lab Review   Recent Results (from the past 24 hour(s))   EKG 12 Lead    Collection Time: 05/07/24  1:48 PM   Result Value Ref Range    P-R Interval  ms    QRS Duration 144 ms    Q-T Interval 518 ms    QTc Calculation (Bazett) 485 ms    P Axis  degrees    T Axis 74 degrees   Urinalysis with Reflex to Culture    Collection Time: 05/07/24  7:07 PM    Specimen: Urine   Result Value Ref Range    Color, UA YELLOW Straw/Yellow    Clarity, UA Clear Clear    Glucose, Ur Negative Negative mg/dL    Bilirubin, Urine Negative Negative    Ketones, Urine Negative Negative mg/dL    Specific Gravity, UA 1.017 1.005 - 1.030    Blood, Urine Negative Negative    pH, Urine 6.5 5.0 - 8.0    Protein,  (A) Negative mg/dL    Urobilinogen, Urine 0.2 <2.0 E.U./dL    Nitrite, Urine Negative Negative    Leukocyte Esterase, Urine Negative Negative   Microscopic Urinalysis    Collection Time: 05/07/24  7:07 PM   Result Value Ref Range    Bacteria, UA Negative None Seen /HPF    Crystals, UA NEG None Seen /HPF    Hyaline Casts, UA 4 0 - 8 /HPF    WBC, UA 1 0 - 5 /HPF    RBC, UA 0 0 - 4 /HPF    Epithelial Cells, UA 1 0 - 5 /HPF   Basic Metabolic Panel w/ Reflex to MG    Collection Time: 05/08/24  3:29 AM   Result Value Ref Range    Sodium 138 136 - 145 mmol/L    Potassium

## 2024-05-08 NOTE — CARE COORDINATION
Case Management Assessment  Initial Evaluation    Date/Time of Evaluation: 5/8/2024 3:37 PM  Assessment Completed by: Renee Meyers RN    If patient is discharged prior to next notation, then this note serves as note for discharge by case management.    Patient Name: Ming Rocha                   YOB: 1940  Diagnosis: Failure to thrive in adult [R62.7]                   Date / Time: 5/6/2024  1:07 PM    Patient Admission Status: Inpatient   Readmission Risk (Low < 19, Mod (19-27), High > 27): Readmission Risk Score: 15.3    Current PCP: Irving Larsen, DO  PCP verified by CM? (P) Yes    Chart Reviewed: Yes      History Provided by: (P) Patient  Patient Orientation: (P) Alert and Oriented    Patient Cognition: (P) Alert    Hospitalization in the last 30 days (Readmission):  No    If yes, Readmission Assessment in CM Navigator will be completed.    Advance Directives:      Code Status: DNR   Patient's Primary Decision Maker is: (P) Legal Next of Kin (natasha Miller)    Primary Decision Maker: Angela Mora - Child - [phone]    Discharge Planning:    Patient lives with: (P) Children Type of Home: (P) House  Primary Care Giver: (P) Family  Patient Support Systems include: (P) Children   Current Financial resources: (P) Medicare  Current community resources: (P)  (none at this time)  Current services prior to admission: (P) None            Current DME:              Type of Home Care services:  (P) Nursing Services    ADLS  Prior functional level: (P) Independent in ADLs/IADLs  Current functional level: (P) Independent in ADLs/IADLs    PT AM-PAC:   /24  OT AM-PAC:   /24    Family can provide assistance at DC: (P) Yes  Would you like Case Management to discuss the discharge plan with any other family members/significant others, and if so, who? (P) Yes  Plans to Return to Present Housing: (P) Yes  Other Identified Issues/Barriers to RETURNING to current housing: no barriers  Potential

## 2024-05-08 NOTE — PROGRESS NOTES
Physical Therapy    Name: Ming Rocha  MRN: [id number]  Date of service: 5/8/2024 05/08/24 1530   Restrictions/Precautions   Restrictions/Precautions Fall Risk   Required Braces or Orthoses? No   General   Diagnosis failure to thrive, severe dysphagia, adenocarcinoma gastroesophageal junction   General Comment   Comments LISBETH cornell   Subjective   Subjective hurting in chest   Pain did not rate   Observation/Palpation   Observation 2 IV   Bed mobility   Supine to Sit Independent   Sit to Supine Independent   Transfers   Sit to Stand Stand by assistance   Stand to Sit Stand by assistance   Ambulation   Surface Level tile   Device   (pushing IV Pole)   Assistance Contact guard assistance   Quality of Gait fatigues quickly   Gait Deviations Slow Sena;Decreased step length;Decreased step height   Distance 100ft   Comments pt pushing IV pole   Patient Goals    Patient Goals  go home   Short Term Goals   Time Frame for Short Term Goals 2 wks   Short Term Goal 1 supine to sit indep   Short Term Goal 2 sit to stand indep   Short Term Goal 3 amb. 200' indep   Short Term Goal 4 bed to chair SBA   Activity Tolerance   Activity Tolerance Patient tolerated treatment well   Assessment   Assessment Pt in pain this afternoon, agreed to ambulate but does not want to get in chair. Bed mobility Independent, SBA transfers and CGA ambualtion in hallway. Pt did not use assistive device, pushed IV pole. Left pt in bed with all needs in reach   Discharge Recommendations Continue to assess pending progress;24 hour supervision or assist;Patient would benefit from continued therapy after discharge   Physical Therapy Plan   General Plan 5-7 times per week   Current Treatment Recommendations Strengthening;Balance training;Functional mobility training;Transfer training;Gait training;Endurance training;Equipment evaluation, education, & procurement;Patient/Caregiver education & training;Therapeutic

## 2024-05-09 ENCOUNTER — APPOINTMENT (OUTPATIENT)
Dept: NUCLEAR MEDICINE | Age: 84
End: 2024-05-09
Attending: INTERNAL MEDICINE
Payer: MEDICARE

## 2024-05-09 ENCOUNTER — APPOINTMENT (OUTPATIENT)
Age: 84
End: 2024-05-09
Attending: INTERNAL MEDICINE
Payer: MEDICARE

## 2024-05-09 LAB
ALBUMIN SERPL-MCNC: 2.9 G/DL (ref 3.5–5.2)
ALP SERPL-CCNC: 47 U/L (ref 40–130)
ALT SERPL-CCNC: <5 U/L (ref 5–41)
ANION GAP SERPL CALCULATED.3IONS-SCNC: 10 MMOL/L (ref 7–19)
AST SERPL-CCNC: 9 U/L (ref 5–40)
BILIRUB DIRECT SERPL-MCNC: 0.1 MG/DL (ref 0–0.3)
BILIRUB INDIRECT SERPL-MCNC: 0.2 MG/DL (ref 0.1–1)
BILIRUB SERPL-MCNC: 0.3 MG/DL (ref 0.2–1.2)
BUN SERPL-MCNC: 15 MG/DL (ref 8–23)
CALCIUM SERPL-MCNC: 8.1 MG/DL (ref 8.8–10.2)
CHLORIDE SERPL-SCNC: 107 MMOL/L (ref 98–111)
CO2 SERPL-SCNC: 21 MMOL/L (ref 22–29)
CREAT SERPL-MCNC: 2.2 MG/DL (ref 0.5–1.2)
EKG P AXIS: NORMAL DEGREES
EKG P-R INTERVAL: NORMAL MS
EKG Q-T INTERVAL: 484 MS
EKG QRS DURATION: 122 MS
EKG QTC CALCULATION (BAZETT): 476 MS
EKG T AXIS: 140 DEGREES
ERYTHROCYTE [DISTWIDTH] IN BLOOD BY AUTOMATED COUNT: 12.6 % (ref 11.5–14.5)
GLUCOSE BLD-MCNC: 103 MG/DL (ref 70–99)
GLUCOSE BLD-MCNC: 107 MG/DL (ref 70–99)
GLUCOSE BLD-MCNC: 117 MG/DL (ref 70–99)
GLUCOSE BLD-MCNC: 93 MG/DL (ref 70–99)
GLUCOSE BLD-MCNC: 95 MG/DL (ref 70–99)
GLUCOSE SERPL-MCNC: 101 MG/DL (ref 74–109)
HCT VFR BLD AUTO: 30.8 % (ref 42–52)
HGB BLD-MCNC: 10 G/DL (ref 14–18)
MAGNESIUM SERPL-MCNC: 1.9 MG/DL (ref 1.6–2.4)
MCH RBC QN AUTO: 29.3 PG (ref 27–31)
MCHC RBC AUTO-ENTMCNC: 32.5 G/DL (ref 33–37)
MCV RBC AUTO: 90.3 FL (ref 80–94)
PERFORMED ON: ABNORMAL
PERFORMED ON: NORMAL
PERFORMED ON: NORMAL
PHOSPHATE SERPL-MCNC: 3.8 MG/DL (ref 2.5–4.5)
PLATELET # BLD AUTO: 108 K/UL (ref 130–400)
PMV BLD AUTO: 11.2 FL (ref 9.4–12.4)
POTASSIUM SERPL-SCNC: 3.9 MMOL/L (ref 3.5–5)
POTASSIUM SERPL-SCNC: 3.9 MMOL/L (ref 3.5–5)
PROT SERPL-MCNC: 4.8 G/DL (ref 6.6–8.7)
RBC # BLD AUTO: 3.41 M/UL (ref 4.7–6.1)
SODIUM SERPL-SCNC: 138 MMOL/L (ref 136–145)
TRIGL SERPL-MCNC: 98 MG/DL (ref 0–149)
WBC # BLD AUTO: 6.3 K/UL (ref 4.8–10.8)

## 2024-05-09 PROCEDURE — 6360000002 HC RX W HCPCS: Performed by: NURSE PRACTITIONER

## 2024-05-09 PROCEDURE — A9502 TC99M TETROFOSMIN: HCPCS | Performed by: INTERNAL MEDICINE

## 2024-05-09 PROCEDURE — 94640 AIRWAY INHALATION TREATMENT: CPT

## 2024-05-09 PROCEDURE — 94760 N-INVAS EAR/PLS OXIMETRY 1: CPT

## 2024-05-09 PROCEDURE — 3430000000 HC RX DIAGNOSTIC RADIOPHARMACEUTICAL: Performed by: INTERNAL MEDICINE

## 2024-05-09 PROCEDURE — 84478 ASSAY OF TRIGLYCERIDES: CPT

## 2024-05-09 PROCEDURE — 6370000000 HC RX 637 (ALT 250 FOR IP): Performed by: NURSE PRACTITIONER

## 2024-05-09 PROCEDURE — 84100 ASSAY OF PHOSPHORUS: CPT

## 2024-05-09 PROCEDURE — 78452 HT MUSCLE IMAGE SPECT MULT: CPT

## 2024-05-09 PROCEDURE — 82962 GLUCOSE BLOOD TEST: CPT

## 2024-05-09 PROCEDURE — 85027 COMPLETE CBC AUTOMATED: CPT

## 2024-05-09 PROCEDURE — 83735 ASSAY OF MAGNESIUM: CPT

## 2024-05-09 PROCEDURE — 93010 ELECTROCARDIOGRAM REPORT: CPT | Performed by: INTERNAL MEDICINE

## 2024-05-09 PROCEDURE — 6360000002 HC RX W HCPCS: Performed by: INTERNAL MEDICINE

## 2024-05-09 PROCEDURE — 6370000000 HC RX 637 (ALT 250 FOR IP)

## 2024-05-09 PROCEDURE — 6370000000 HC RX 637 (ALT 250 FOR IP): Performed by: INTERNAL MEDICINE

## 2024-05-09 PROCEDURE — 36415 COLL VENOUS BLD VENIPUNCTURE: CPT

## 2024-05-09 PROCEDURE — 2580000003 HC RX 258: Performed by: NURSE PRACTITIONER

## 2024-05-09 PROCEDURE — 99232 SBSQ HOSP IP/OBS MODERATE 35: CPT | Performed by: INTERNAL MEDICINE

## 2024-05-09 PROCEDURE — 93017 CV STRESS TEST TRACING ONLY: CPT

## 2024-05-09 PROCEDURE — 6370000000 HC RX 637 (ALT 250 FOR IP): Performed by: STUDENT IN AN ORGANIZED HEALTH CARE EDUCATION/TRAINING PROGRAM

## 2024-05-09 PROCEDURE — 99232 SBSQ HOSP IP/OBS MODERATE 35: CPT

## 2024-05-09 PROCEDURE — 80076 HEPATIC FUNCTION PANEL: CPT

## 2024-05-09 PROCEDURE — 80048 BASIC METABOLIC PNL TOTAL CA: CPT

## 2024-05-09 PROCEDURE — 97116 GAIT TRAINING THERAPY: CPT

## 2024-05-09 PROCEDURE — 1200000000 HC SEMI PRIVATE

## 2024-05-09 RX ORDER — REGADENOSON 0.08 MG/ML
0.4 INJECTION, SOLUTION INTRAVENOUS
Status: COMPLETED | OUTPATIENT
Start: 2024-05-09 | End: 2024-05-09

## 2024-05-09 RX ORDER — FOLIC ACID 1 MG/1
1 TABLET ORAL DAILY
Status: DISCONTINUED | OUTPATIENT
Start: 2024-05-09 | End: 2024-05-20

## 2024-05-09 RX ADMIN — PANTOPRAZOLE SODIUM 40 MG: 40 TABLET, DELAYED RELEASE ORAL at 18:14

## 2024-05-09 RX ADMIN — OXYCODONE HYDROCHLORIDE 10 MG: 5 SOLUTION ORAL at 06:16

## 2024-05-09 RX ADMIN — FOLIC ACID 1 MG: 1 TABLET ORAL at 11:08

## 2024-05-09 RX ADMIN — HYDRALAZINE HYDROCHLORIDE 100 MG: 50 TABLET ORAL at 11:07

## 2024-05-09 RX ADMIN — TETROFOSMIN 24 MILLICURIE: 0.23 INJECTION, POWDER, LYOPHILIZED, FOR SOLUTION INTRAVENOUS at 11:20

## 2024-05-09 RX ADMIN — TETROFOSMIN 8 MILLICURIE: 0.23 INJECTION, POWDER, LYOPHILIZED, FOR SOLUTION INTRAVENOUS at 11:20

## 2024-05-09 RX ADMIN — SODIUM CHLORIDE, PRESERVATIVE FREE 10 ML: 5 INJECTION INTRAVENOUS at 20:51

## 2024-05-09 RX ADMIN — SODIUM CHLORIDE, PRESERVATIVE FREE 10 ML: 5 INJECTION INTRAVENOUS at 11:10

## 2024-05-09 RX ADMIN — AMLODIPINE BESYLATE 5 MG: 5 TABLET ORAL at 20:49

## 2024-05-09 RX ADMIN — MORPHINE SULFATE 2 MG: 2 INJECTION, SOLUTION INTRAMUSCULAR; INTRAVENOUS at 11:26

## 2024-05-09 RX ADMIN — POLYETHYLENE GLYCOL (3350) 17 G: 17 POWDER, FOR SOLUTION ORAL at 11:08

## 2024-05-09 RX ADMIN — AMLODIPINE BESYLATE 5 MG: 5 TABLET ORAL at 11:08

## 2024-05-09 RX ADMIN — PRAVASTATIN SODIUM 40 MG: 20 TABLET ORAL at 20:48

## 2024-05-09 RX ADMIN — Medication 2 PUFF: at 19:15

## 2024-05-09 RX ADMIN — REGADENOSON 0.4 MG: 0.08 INJECTION, SOLUTION INTRAVENOUS at 09:45

## 2024-05-09 RX ADMIN — PANTOPRAZOLE SODIUM 40 MG: 40 TABLET, DELAYED RELEASE ORAL at 11:07

## 2024-05-09 RX ADMIN — GUAIFENESIN 1200 MG: 600 TABLET, EXTENDED RELEASE ORAL at 11:08

## 2024-05-09 RX ADMIN — GUAIFENESIN 1200 MG: 600 TABLET, EXTENDED RELEASE ORAL at 20:49

## 2024-05-09 RX ADMIN — ASPIRIN 81 MG: 81 TABLET, COATED ORAL at 11:07

## 2024-05-09 RX ADMIN — SODIUM BICARBONATE 325 MG: 325 TABLET ORAL at 11:08

## 2024-05-09 RX ADMIN — Medication 2 PUFF: at 07:34

## 2024-05-09 RX ADMIN — ONDANSETRON 4 MG: 2 INJECTION INTRAMUSCULAR; INTRAVENOUS at 11:27

## 2024-05-09 RX ADMIN — Medication 50 MCG: at 11:08

## 2024-05-09 RX ADMIN — HYDRALAZINE HYDROCHLORIDE 100 MG: 50 TABLET ORAL at 20:48

## 2024-05-09 RX ADMIN — ENOXAPARIN SODIUM 30 MG: 100 INJECTION SUBCUTANEOUS at 11:09

## 2024-05-09 ASSESSMENT — PAIN DESCRIPTION - LOCATION: LOCATION: ABDOMEN;BACK

## 2024-05-09 ASSESSMENT — PAIN DESCRIPTION - DESCRIPTORS: DESCRIPTORS: ACHING

## 2024-05-09 ASSESSMENT — PAIN SCALES - GENERAL
PAINLEVEL_OUTOF10: 7
PAINLEVEL_OUTOF10: 4

## 2024-05-09 ASSESSMENT — PAIN DESCRIPTION - ORIENTATION: ORIENTATION: RIGHT

## 2024-05-09 NOTE — PROGRESS NOTES
Daily Progress Note    Date:2024  Patient: Ming Rocha  : 1940  MRN:[id number]  CODE:DNR No additional code details  PCP:Irving Larsen DO    Admit Date: 2024  1:07 PM   LOS: 3 days     Weight loss, unable to tolerate PO. Pt with active esophageal Ca.     Hospital Summary: 83 y.o. male who presented to [facility] as a direct admission from Dr. Blankenship's office to hospitalist service for evaluation of failure to thrive. Pt has history of copd, ckd stage IV, HTN, hyperlipidemia, cad, CVA, and esophageal malignancy followed by Dr. Christopher. He was evaluated as outpatient for port and feeding tube placement by Dr. Blankenship. Pt with uncontrolled pain, difficulty swallowing and weight loss. Directly admitted to hospitalist service for further management.         Endoscopy with concerns for esophageal stent migration, as well as jejunostomy and port placement are now on hold due to abnormal KG and telemetry findings as part of pre procedure evaluation.     Cardiology involved - stress test planned.       Subjective:     Tolerating tPN well at this time.     Stress test this am.                   Review of Systems   All other systems reviewed and are negative.      Objective:      Vital signs in last 24 hours:  Patient Vitals for the past 24 hrs:   BP Temp Temp src Pulse Resp SpO2   24 1126 -- -- -- -- 18 --   24 0737 -- -- -- -- -- 93 %   24 0731 (!) 162/67 98.4 °F (36.9 °C) Oral 62 18 95 %   24 0249 (!) 165/80 97.5 °F (36.4 °C) -- 63 -- 93 %   24 0015 -- -- -- 56 -- --   24 2349 (!) 140/71 97.2 °F (36.2 °C) -- (!) 41 -- 93 %   24 1934 (!) 158/79 97.9 °F (36.6 °C) Temporal 59 -- 94 %         I/O last 3 completed shifts:  In: 3819.3 [I.V.:3819.3]  Out: 850 [Urine:850]  No intake/output data recorded.    Physical Exam  Constitutional:       General: He is not in acute distress.     Appearance: He is not toxic-appearing.   Cardiovascular:      Rate and Rhythm: Normal rate  thickening. Neoplasm not excluded. 5.  Focus of tracer uptake left anterior second rib without visualized fracture or abnormality. This may be posttraumatic but continued attention on follow-up is recommended. This report was signed and finalized on 5/1/2024 1:37 PM by Jose Cruz Monsivais.         Assessment/Plan  Principal Problem:    Failure to thrive in adult  Active Problems:    Adenocarcinoma of gastroesophageal junction (HCC)    Palliative care patient    Mild malnutrition (HCC)    Dysphagia    Cancer related pain  Resolved Problems:    * No resolved hospital problems. *       Bradycardia  -- EKG showed irregular, wide complex bradycardia with HR 41; difficult to appreciate p waves; possible 3rd degree heart block vs junctional rhythm vs afib with slow conduction and IVCD  -- Stop Metoprolol  -- Consulted cardiology  -- Will delay procedures until cardiology can evaluate further   - stress test today.    - cont telemetry.    - bradycardia improved with BB on hold.         Adenocarcinoma of GE junction  Failure to thrive  -- Oncology following  -- General surgery following for possible port and jejunostomy tube placement  -- GI, Dr. Claudio, may be planning endoscopy for esophageal stent exchange due to stent migration  -- Continue pain control, antiemetics, bowel regimen  -- Nutrition consult for tube feedings once J tube in place  -- Monitor labs for evidence of refeeding syndrome, especially once enteral feeds are initiated  - started tPN support 5/8/24 given potential need to postpone procedures pending cardiology evaluation.         CAD s/p CABG  -- Continue ASA, statin      HTN  -- Continue Amlodipine, Hydralazine,   - Lopressor held - see above      COPD  -- Continue ICS/LABA and PRN Albuterol      GERD  -- Continue Protonix      DVT prophylaxis: Hold for procedure    DISPOSITION:  Plans to return home at discharge.    DNR No additional code details           Marcel Loza MD 5/9/2024 1:36 PM

## 2024-05-09 NOTE — PROGRESS NOTES
Palliative Care Progress Note  5/9/2024 8:20 AM    Patient:  Ming Rocha  YOB: 1940  Primary Care Physician: Irving Larsen DO  Advance Directive: DNR  Admit Date: 5/6/2024       Hospital Day: 3  Portions of this note have been copied forward, however, changed to reflect the most current clinical status of this patient.    CHIEF COMPLAINT/REASON FOR CONSULTATION Goals of care, family support, Code status, symptom management     SUBJECTIVE:  Mr. Rocha is just back to room following cardiac stress test.  He is having some increased back pain from positioning during exam and RN administering as needed medications during visit.     HPI: The patient is a 83 y.o. male with PMH HTN, HLD, COPD, CKD IV, CVA with right sided residual weakness, CAD s/p CABG 2020, recent esophageal cancer diagnosis, and other comorbidities who presented to [facility]  5/6/2024 as a direct admit from general surgery office. 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 Christopher.  Patient has been having ongoing issues with p.o. intake without improvement following stent placement and concern for failure to thrive.  He has been referred to radiation oncology.  He was seen in general surgery office on day of admission for recommended port and G-tube placement but while there he was having uncontrolled pain and was recommended for inpatient admission.  Of note he was recently started on 50 mcg fentanyl patch by oncology team last week.  On arrival patient also endorsed ongoing issues with weakness, fatigue, and 40 to 50 pound weight loss since November of last year.  Patient also has a known left kidney mass currently being monitored with serial imaging but cannot rule out malignancy, noted interval growth over the last few years.  Laboratory studies on arrival revealed evidence of hypokalemia with K+ 2.9, low total  Bed Bound  Extensive disease  Total care  Mouth care only  Drowsy/coma  [] 0% Death    ECOG:(2) Ambulatory and capable of self care, unable to carry out work activity, up and about > 50% or waking hours    CLINICAL PAIN ASSESSMENT:   Score 1-10 (if verbal): 8  Location:   epigastric, back  Character:  sharp, aching  Frequency:  continuously  What makes it worse?:  food ingestion, positioning  What makes it better?:  pain medication    Assessment/Plan   Principal Problem:    Failure to thrive in adult  Active Problems:    Adenocarcinoma of gastroesophageal junction (HCC)    Palliative care patient    Mild malnutrition (HCC)    Dysphagia    Cancer related pain  Resolved Problems:    * No resolved hospital problems. *      Visit Summary:  Chart reviewed, patient discussed with nursing staff. Reviewed health issues, work up and treatment plan as well as factors that lead to hospitalization.  Mr. Rocha is seen at bedside this afternoon with nursing staff present.  Patient just completed stress test and reports increased back pain from positioning during exam.  RN administering IV morphine once new IV access obtained.  Otherwise patient reports his pain has been well-managed with current regimen.  He is awaiting cardiac clearance for surgical intervention at this point.  Will await further recommendations from cardiology team. Opportunity for questions and emotional support provided. Palliative team will follow as needed.    Candidate for SCOP: Pts home residence is out of service area for outpatient palliative to follow     Recommendations:      Palliative Care- GOC continue current medical treatments/workup and monitor for improvement. D/c planning based on hospital course. Code status- DNR  Advanced esophageal malignancy- oncology following  Dysphagia- 2/2 above. Potential plans for EGS to assess for esophageal stent migration. SLP eval as warranted  Cancer related pain- Initiated on 50 mcg fentanyl duragesic

## 2024-05-09 NOTE — PROGRESS NOTES
MEDICAL ONCOLOGY PROGRESS NOTE    Pt Name: Ming Rocha  MRN: [id number]  YOB: 1940  Date of evaluation: 5/9/2024    Subjective-reviewed internal medicine, palliative care and cardiology notes.  Plans for Lexiscan today.    HISTORY OF PRESENT ILLNESS:  Ming Rocha is well-known to our clinic.  He is a patient established with Dr. Seth Christopher.  He was last seen on 5/2/2024.  The patient has a new diagnosis of esophageal cancer of the distal esophagus status post esophageal stent placement.  He is having significant issues with p.o. intake after the stent placement.  He was referred back to Dr. Claudio for stent adjustment.  In addition, recommend port placement with general surgery as well as surgical jejunostomy feeding tube.  Referral to radiation oncology has been made.  Patient presents today hospital as a direct admission from Dr. Blankenship's office.  Patient had uncontrolled pain and therefore was admitted to the inpatient for pain control.  Patient has generalized weakness, fatigue and significant pain related to his cancer.  Patient was started on Duragesic patch 50 mcg every 3 days by Dr. Christopher last week.    Laboratory studies at admission today showed evidence of hypokalemia with potassium 2.9, low total protein.  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  Right chest wall axillary pain secondary to esophageal cancer  Distal esophageal obstruction requiring stent placement 4/17/2024  Weight loss (he weighed 235 pounds Thanksgiving 2023)  Stage IV CKD  2.1 x 1.7 cm exophytic soft tissue lesion on the midportion, medial side

## 2024-05-09 NOTE — PLAN OF CARE
Problem: Chronic Conditions and Co-morbidities  Goal: Patient's chronic conditions and co-morbidity symptoms are monitored and maintained or improved  5/8/2024 2308 by Nazanin Espana RN  Outcome: Progressing  5/8/2024 1536 by Kezia Ewing RN  Outcome: Progressing     Problem: Safety - Adult  Goal: Free from fall injury  5/8/2024 2308 by Nazanin Espana RN  Outcome: Progressing  5/8/2024 1536 by Kezia Ewing RN  Outcome: Progressing     Problem: Nutrition Deficit:  Goal: Optimize nutritional status  5/8/2024 2308 by Nazanin Espana RN  Outcome: Progressing  5/8/2024 1536 by Kezia Ewing RN  Outcome: Progressing

## 2024-05-09 NOTE — PROGRESS NOTES
Physical Therapy    Name: Ming Rocha  MRN: [id number]  Date of service: 5/9/2024    Pt in bed upon entry. States he will be going for a test shortly. LISBETH Grewal, confirmed pt will be going for stress testing anytime. Will continue to follow.      Electronically signed by Jasvir Perry PTA on 5/9/2024 at 8:42 AM

## 2024-05-09 NOTE — PROGRESS NOTES
Physical Therapy    Name: Ming Rocha  MRN: [id number]  Date of service: 5/9/2024 05/09/24 1430   Restrictions/Precautions   Restrictions/Precautions Fall Risk   Required Braces or Orthoses? No   General   Diagnosis failure to thrive, severe dysphagia, adenocarcinoma gastroesophageal junction   General Comment   Comments pt in bed   Subjective   Subjective agreed to therapy   Subjective   Subjective no c/o pain   Observation/Palpation   Observation IV   Bed mobility   Supine to Sit Independent   Sit to Supine Independent   Transfers   Sit to Stand Stand by assistance   Stand to Sit Stand by assistance   Ambulation   Surface Level tile   Device No Device   Assistance Contact guard assistance   Gait Deviations Slow Sena;Decreased step length;Decreased step height   Distance 75ft   Patient Goals    Patient Goals  go home   Short Term Goals   Time Frame for Short Term Goals 2 wks   Short Term Goal 1 supine to sit indep   Short Term Goal 2 sit to stand indep   Short Term Goal 3 amb. 200' indep   Short Term Goal 4 bed to chair SBA   Activity Tolerance   Activity Tolerance Patient tolerated treatment well   Assessment   Assessment Pt with no c/o pain. Independent with bed mobility, transfers SBA, and ambulated into hallway and back to bed. Pt left in bed with all needs in reach   Discharge Recommendations Continue to assess pending progress;24 hour supervision or assist;Patient would benefit from continued therapy after discharge   Physical Therapy Plan   General Plan 5-7 times per week   Therapy Duration 2 Weeks   Current Treatment Recommendations Strengthening;Balance training;Functional mobility training;Transfer training;Gait training;Endurance training;Equipment evaluation, education, & procurement;Patient/Caregiver education & training;Therapeutic activities;Positioning;Safety education & training   PT Plan of Care   Thursday X   Safety Devices   Type of Devices Left in bed;Call light within reach;Bed alarm

## 2024-05-09 NOTE — CONSULTS
Comprehensive Nutrition Assessment    Type and Reason for Visit:  Consult    Nutrition Recommendations/Plan:   Recommend modifying PN to 4.25/10 with goal rate of 85mL/hr to more closely meet pt needs.   Consider adding 250mL 20% lipids twice weekly.   Titrate slowly and monitor for s/s refeeding syndrome.  Request magnesium level.      Malnutrition Assessment:  Malnutrition Status:  Mild malnutrition (05/07/24 1602)    Context:  Chronic Illness     Findings of the 6 clinical characteristics of malnutrition:  Energy Intake:  Mild decrease in energy intake (Comment)  Weight Loss:  Greater than 10% over 6 months       Nutrition Assessment:    Consult received for PN recommendations. Pt awaiting j-tube placement for enteral nutrition. He is recommended for a Pureed diet with thin liquids, but remains NPO today for Lexiscan. PN 4.25/5 currently infusing at 42mL/hr. This regimen meets approximately 20% of pt's estimated needs. Recommend modifying PN to 4.25/10 with goal rate of 85mL/hr and 250mL 20% lipids twice weekly to meet approximately 60% of pt's estimated energy needs through PN. Continue to monitor for s/s refeeding syndrome.    Nutrition Related Findings:    BM 5/7; K+ 3.9, Triglycerides 98, Phos 3.8         Current Nutrition Intake & Therapies:    Average Meal Intake: NPO  Average Supplements Intake: NPO  PN-Adult Premix 4.25/5 - Standard Electrolytes- Peripheral Line  Diet NPO  PN-Adult Premix 4.25/5 - Standard Electrolytes- Peripheral Line  Current Parenteral Nutrition Orders:  Type and Formula: Premix Peripheral   Lipids: Two times weekly, 250ml  Duration: Continuous  Rate/Volume: PN 4.25/10 at 85mL/dk=1632uK/day  Current PN Order Provides: PN 4.25/5 at 42mL/hr= 343kcal, 43gPRO, 50gCHO. GUR=0.41.  Goal PN Orders Provides: PN 4.25/10 at 85mL/hr= 1042kcal, 87gPRO, 204gCHO. GUR=1.7. Lipids twice weekly provide avg of 143kcal/day.    Anthropometric Measures:  Height: 182.9 cm (6' 0.01\") (5/6/24)  Ideal Body

## 2024-05-10 ENCOUNTER — ANESTHESIA EVENT (OUTPATIENT)
Dept: OPERATING ROOM | Age: 84
End: 2024-05-10
Payer: MEDICARE

## 2024-05-10 ENCOUNTER — APPOINTMENT (OUTPATIENT)
Dept: GENERAL RADIOLOGY | Age: 84
End: 2024-05-10
Attending: STUDENT IN AN ORGANIZED HEALTH CARE EDUCATION/TRAINING PROGRAM
Payer: MEDICARE

## 2024-05-10 ENCOUNTER — ANESTHESIA (OUTPATIENT)
Dept: OPERATING ROOM | Age: 84
End: 2024-05-10
Payer: MEDICARE

## 2024-05-10 LAB
ALBUMIN SERPL-MCNC: 2.8 G/DL (ref 3.5–5.2)
ALP SERPL-CCNC: 48 U/L (ref 40–130)
ALT SERPL-CCNC: <5 U/L (ref 5–41)
ANION GAP SERPL CALCULATED.3IONS-SCNC: 11 MMOL/L (ref 7–19)
AST SERPL-CCNC: 10 U/L (ref 5–40)
BILIRUB DIRECT SERPL-MCNC: 0.2 MG/DL (ref 0–0.3)
BILIRUB INDIRECT SERPL-MCNC: 0.2 MG/DL (ref 0.1–1)
BILIRUB SERPL-MCNC: 0.4 MG/DL (ref 0.2–1.2)
BUN SERPL-MCNC: 16 MG/DL (ref 8–23)
CALCIUM SERPL-MCNC: 8.4 MG/DL (ref 8.8–10.2)
CHLORIDE SERPL-SCNC: 107 MMOL/L (ref 98–111)
CO2 SERPL-SCNC: 20 MMOL/L (ref 22–29)
CREAT SERPL-MCNC: 2.2 MG/DL (ref 0.5–1.2)
ECHO BSA: 2.06 M2
GLUCOSE BLD-MCNC: 100 MG/DL (ref 70–99)
GLUCOSE BLD-MCNC: 124 MG/DL (ref 70–99)
GLUCOSE BLD-MCNC: 87 MG/DL (ref 70–99)
GLUCOSE BLD-MCNC: 90 MG/DL (ref 70–99)
GLUCOSE BLD-MCNC: 99 MG/DL (ref 70–99)
GLUCOSE SERPL-MCNC: 98 MG/DL (ref 74–109)
INR PPP: 1.08 (ref 0.88–1.18)
NUC STRESS EJECTION FRACTION: 60 %
PERFORMED ON: ABNORMAL
PERFORMED ON: ABNORMAL
PERFORMED ON: NORMAL
PHOSPHATE SERPL-MCNC: 3.5 MG/DL (ref 2.5–4.5)
POTASSIUM SERPL-SCNC: 3.8 MMOL/L (ref 3.5–5)
PROT SERPL-MCNC: 4.9 G/DL (ref 6.6–8.7)
PROTHROMBIN TIME: 13.7 SEC (ref 12–14.6)
SODIUM SERPL-SCNC: 138 MMOL/L (ref 136–145)
STRESS BASELINE DIAS BP: 85 MMHG
STRESS BASELINE HR: 65 BPM
STRESS BASELINE SYS BP: 165 MMHG
STRESS EXERCISE DUR MIN: 5 MIN
STRESS EXERCISE DUR SEC: 0 SEC
STRESS PEAK DIAS BP: 75 MMHG
STRESS PEAK SYS BP: 168 MMHG
STRESS PERCENT HR ACHIEVED: 74 %
STRESS POST PEAK HR: 101 BPM
STRESS RATE PRESSURE PRODUCT: NORMAL BPM*MMHG
STRESS STAGE 1 BP: NORMAL MMHG
STRESS STAGE 1 DURATION: 1 MIN:SEC
STRESS STAGE 1 HR: 67 BPM
STRESS STAGE 2 BP: NORMAL MMHG
STRESS STAGE 2 DURATION: 1 MIN:SEC
STRESS STAGE 2 HR: 94 BPM
STRESS STAGE 3 BP: NORMAL MMHG
STRESS STAGE 3 DURATION: 1 MIN:SEC
STRESS STAGE 3 HR: 97 BPM
STRESS STAGE 4 BP: NORMAL MMHG
STRESS STAGE 4 DURATION: 1 MIN:SEC
STRESS STAGE 4 HR: 81 BPM
STRESS STAGE 5 BP: NORMAL MMHG
STRESS STAGE 5 DURATION: 1 MIN:SEC
STRESS STAGE 5 HR: 85 BPM
STRESS STAGE RECOVERY 1 BP: NORMAL MMHG
STRESS STAGE RECOVERY 1 DURATION: 1 MIN:SEC
STRESS STAGE RECOVERY 1 HR: 75 BPM
STRESS TARGET HR: 137 BPM

## 2024-05-10 PROCEDURE — 2500000003 HC RX 250 WO HCPCS: Performed by: SURGERY

## 2024-05-10 PROCEDURE — 43830 GSTRST OPEN WO CONSTJ TUBE: CPT | Performed by: SURGERY

## 2024-05-10 PROCEDURE — 76937 US GUIDE VASCULAR ACCESS: CPT | Performed by: SURGERY

## 2024-05-10 PROCEDURE — 1200000000 HC SEMI PRIVATE

## 2024-05-10 PROCEDURE — 36561 INSERT TUNNELED CV CATH: CPT | Performed by: SURGERY

## 2024-05-10 PROCEDURE — 2580000003 HC RX 258: Performed by: NURSE PRACTITIONER

## 2024-05-10 PROCEDURE — 7100000001 HC PACU RECOVERY - ADDTL 15 MIN: Performed by: SURGERY

## 2024-05-10 PROCEDURE — 80048 BASIC METABOLIC PNL TOTAL CA: CPT

## 2024-05-10 PROCEDURE — 3700000001 HC ADD 15 MINUTES (ANESTHESIA): Performed by: SURGERY

## 2024-05-10 PROCEDURE — 82962 GLUCOSE BLOOD TEST: CPT

## 2024-05-10 PROCEDURE — 6360000002 HC RX W HCPCS: Performed by: SURGERY

## 2024-05-10 PROCEDURE — 3700000000 HC ANESTHESIA ATTENDED CARE: Performed by: SURGERY

## 2024-05-10 PROCEDURE — 6370000000 HC RX 637 (ALT 250 FOR IP): Performed by: NURSE PRACTITIONER

## 2024-05-10 PROCEDURE — 3600000003 HC SURGERY LEVEL 3 BASE: Performed by: SURGERY

## 2024-05-10 PROCEDURE — 94640 AIRWAY INHALATION TREATMENT: CPT

## 2024-05-10 PROCEDURE — 36415 COLL VENOUS BLD VENIPUNCTURE: CPT

## 2024-05-10 PROCEDURE — 84100 ASSAY OF PHOSPHORUS: CPT

## 2024-05-10 PROCEDURE — 0JH60WZ INSERTION OF TOTALLY IMPLANTABLE VASCULAR ACCESS DEVICE INTO CHEST SUBCUTANEOUS TISSUE AND FASCIA, OPEN APPROACH: ICD-10-PCS | Performed by: SURGERY

## 2024-05-10 PROCEDURE — 71045 X-RAY EXAM CHEST 1 VIEW: CPT

## 2024-05-10 PROCEDURE — 3600000013 HC SURGERY LEVEL 3 ADDTL 15MIN: Performed by: SURGERY

## 2024-05-10 PROCEDURE — 3E0G76Z INTRODUCTION OF NUTRITIONAL SUBSTANCE INTO UPPER GI, VIA NATURAL OR ARTIFICIAL OPENING: ICD-10-PCS | Performed by: INTERNAL MEDICINE

## 2024-05-10 PROCEDURE — 05HM33Z INSERTION OF INFUSION DEVICE INTO RIGHT INTERNAL JUGULAR VEIN, PERCUTANEOUS APPROACH: ICD-10-PCS | Performed by: SURGERY

## 2024-05-10 PROCEDURE — 2709999900 HC NON-CHARGEABLE SUPPLY: Performed by: SURGERY

## 2024-05-10 PROCEDURE — 6370000000 HC RX 637 (ALT 250 FOR IP): Performed by: STUDENT IN AN ORGANIZED HEALTH CARE EDUCATION/TRAINING PROGRAM

## 2024-05-10 PROCEDURE — C1788 PORT, INDWELLING, IMP: HCPCS | Performed by: SURGERY

## 2024-05-10 PROCEDURE — 78452 HT MUSCLE IMAGE SPECT MULT: CPT | Performed by: INTERNAL MEDICINE

## 2024-05-10 PROCEDURE — 99232 SBSQ HOSP IP/OBS MODERATE 35: CPT | Performed by: INTERNAL MEDICINE

## 2024-05-10 PROCEDURE — 6360000002 HC RX W HCPCS: Performed by: NURSE PRACTITIONER

## 2024-05-10 PROCEDURE — 2580000003 HC RX 258: Performed by: SURGERY

## 2024-05-10 PROCEDURE — 93016 CV STRESS TEST SUPVJ ONLY: CPT | Performed by: INTERNAL MEDICINE

## 2024-05-10 PROCEDURE — 6360000002 HC RX W HCPCS: Performed by: NURSE ANESTHETIST, CERTIFIED REGISTERED

## 2024-05-10 PROCEDURE — 94760 N-INVAS EAR/PLS OXIMETRY 1: CPT

## 2024-05-10 PROCEDURE — 2580000003 HC RX 258: Performed by: ANESTHESIOLOGY

## 2024-05-10 PROCEDURE — 2580000003 HC RX 258: Performed by: NURSE ANESTHETIST, CERTIFIED REGISTERED

## 2024-05-10 PROCEDURE — 80076 HEPATIC FUNCTION PANEL: CPT

## 2024-05-10 PROCEDURE — 6370000000 HC RX 637 (ALT 250 FOR IP): Performed by: SURGERY

## 2024-05-10 PROCEDURE — 99232 SBSQ HOSP IP/OBS MODERATE 35: CPT

## 2024-05-10 PROCEDURE — 97116 GAIT TRAINING THERAPY: CPT

## 2024-05-10 PROCEDURE — 0DH60UZ INSERTION OF FEEDING DEVICE INTO STOMACH, OPEN APPROACH: ICD-10-PCS | Performed by: SURGERY

## 2024-05-10 PROCEDURE — 2720000010 HC SURG SUPPLY STERILE: Performed by: SURGERY

## 2024-05-10 PROCEDURE — 85610 PROTHROMBIN TIME: CPT

## 2024-05-10 PROCEDURE — B5131ZA FLUOROSCOPY OF RIGHT JUGULAR VEINS USING LOW OSMOLAR CONTRAST, GUIDANCE: ICD-10-PCS | Performed by: SURGERY

## 2024-05-10 PROCEDURE — 77001 FLUOROGUIDE FOR VEIN DEVICE: CPT | Performed by: SURGERY

## 2024-05-10 PROCEDURE — 7100000000 HC PACU RECOVERY - FIRST 15 MIN: Performed by: SURGERY

## 2024-05-10 PROCEDURE — 2700000000 HC OXYGEN THERAPY PER DAY

## 2024-05-10 PROCEDURE — 6370000000 HC RX 637 (ALT 250 FOR IP): Performed by: INTERNAL MEDICINE

## 2024-05-10 PROCEDURE — 93018 CV STRESS TEST I&R ONLY: CPT | Performed by: INTERNAL MEDICINE

## 2024-05-10 PROCEDURE — 2500000003 HC RX 250 WO HCPCS: Performed by: NURSE ANESTHETIST, CERTIFIED REGISTERED

## 2024-05-10 PROCEDURE — 2500000003 HC RX 250 WO HCPCS: Performed by: INTERNAL MEDICINE

## 2024-05-10 PROCEDURE — 6370000000 HC RX 637 (ALT 250 FOR IP): Performed by: HOSPITALIST

## 2024-05-10 DEVICE — PORT INFUS PLAS SGL LUMN W/ 9.6FR SIL CATH AIRGUARD VLV: Type: IMPLANTABLE DEVICE | Site: SUBCLAVIAN | Status: FUNCTIONAL

## 2024-05-10 RX ORDER — FENTANYL CITRATE 50 UG/ML
INJECTION, SOLUTION INTRAMUSCULAR; INTRAVENOUS PRN
Status: DISCONTINUED | OUTPATIENT
Start: 2024-05-10 | End: 2024-05-10 | Stop reason: SDUPTHER

## 2024-05-10 RX ORDER — HEPARIN SODIUM,PORCINE/PF 10 UNIT/ML
SYRINGE (ML) INTRAVENOUS PRN
Status: DISCONTINUED | OUTPATIENT
Start: 2024-05-10 | End: 2024-05-10 | Stop reason: ALTCHOICE

## 2024-05-10 RX ORDER — ONDANSETRON 2 MG/ML
4 INJECTION INTRAMUSCULAR; INTRAVENOUS
Status: DISCONTINUED | OUTPATIENT
Start: 2024-05-10 | End: 2024-05-10

## 2024-05-10 RX ORDER — HYDROMORPHONE HYDROCHLORIDE 1 MG/ML
0.25 INJECTION, SOLUTION INTRAMUSCULAR; INTRAVENOUS; SUBCUTANEOUS EVERY 5 MIN PRN
Status: DISCONTINUED | OUTPATIENT
Start: 2024-05-10 | End: 2024-05-10

## 2024-05-10 RX ORDER — HYDROMORPHONE HYDROCHLORIDE 1 MG/ML
0.5 INJECTION, SOLUTION INTRAMUSCULAR; INTRAVENOUS; SUBCUTANEOUS EVERY 5 MIN PRN
Status: DISCONTINUED | OUTPATIENT
Start: 2024-05-10 | End: 2024-05-10

## 2024-05-10 RX ORDER — PROPOFOL 10 MG/ML
INJECTION, EMULSION INTRAVENOUS PRN
Status: DISCONTINUED | OUTPATIENT
Start: 2024-05-10 | End: 2024-05-10 | Stop reason: SDUPTHER

## 2024-05-10 RX ORDER — SODIUM CHLORIDE 0.9 % (FLUSH) 0.9 %
5-40 SYRINGE (ML) INJECTION PRN
Status: DISCONTINUED | OUTPATIENT
Start: 2024-05-10 | End: 2024-05-20 | Stop reason: SDUPTHER

## 2024-05-10 RX ORDER — SODIUM CHLORIDE 9 MG/ML
INJECTION, SOLUTION INTRAVENOUS PRN
Status: DISCONTINUED | OUTPATIENT
Start: 2024-05-10 | End: 2024-05-10

## 2024-05-10 RX ORDER — METOPROLOL SUCCINATE 25 MG/1
25 TABLET, EXTENDED RELEASE ORAL DAILY
Status: DISCONTINUED | OUTPATIENT
Start: 2024-05-10 | End: 2024-05-18

## 2024-05-10 RX ORDER — FENTANYL 50 UG/1
1 PATCH TRANSDERMAL
Status: DISCONTINUED | OUTPATIENT
Start: 2024-05-10 | End: 2024-05-22

## 2024-05-10 RX ORDER — SODIUM CHLORIDE 0.9 % (FLUSH) 0.9 %
5-40 SYRINGE (ML) INJECTION PRN
Status: DISCONTINUED | OUTPATIENT
Start: 2024-05-10 | End: 2024-05-10 | Stop reason: HOSPADM

## 2024-05-10 RX ORDER — DEXAMETHASONE SODIUM PHOSPHATE 10 MG/ML
INJECTION, SOLUTION INTRAMUSCULAR; INTRAVENOUS PRN
Status: DISCONTINUED | OUTPATIENT
Start: 2024-05-10 | End: 2024-05-10 | Stop reason: SDUPTHER

## 2024-05-10 RX ORDER — SODIUM CHLORIDE, SODIUM LACTATE, POTASSIUM CHLORIDE, CALCIUM CHLORIDE 600; 310; 30; 20 MG/100ML; MG/100ML; MG/100ML; MG/100ML
INJECTION, SOLUTION INTRAVENOUS CONTINUOUS PRN
Status: DISCONTINUED | OUTPATIENT
Start: 2024-05-10 | End: 2024-05-10 | Stop reason: SDUPTHER

## 2024-05-10 RX ORDER — SODIUM CHLORIDE 9 MG/ML
INJECTION, SOLUTION INTRAVENOUS PRN
Status: DISCONTINUED | OUTPATIENT
Start: 2024-05-10 | End: 2024-05-10 | Stop reason: HOSPADM

## 2024-05-10 RX ORDER — SODIUM CHLORIDE, SODIUM LACTATE, POTASSIUM CHLORIDE, CALCIUM CHLORIDE 600; 310; 30; 20 MG/100ML; MG/100ML; MG/100ML; MG/100ML
INJECTION, SOLUTION INTRAVENOUS CONTINUOUS
Status: DISCONTINUED | OUTPATIENT
Start: 2024-05-10 | End: 2024-05-10 | Stop reason: HOSPADM

## 2024-05-10 RX ORDER — BUPIVACAINE HYDROCHLORIDE AND EPINEPHRINE 2.5; 5 MG/ML; UG/ML
INJECTION, SOLUTION EPIDURAL; INFILTRATION; INTRACAUDAL; PERINEURAL PRN
Status: DISCONTINUED | OUTPATIENT
Start: 2024-05-10 | End: 2024-05-10 | Stop reason: ALTCHOICE

## 2024-05-10 RX ORDER — SODIUM CHLORIDE 0.9 % (FLUSH) 0.9 %
5-40 SYRINGE (ML) INJECTION EVERY 12 HOURS SCHEDULED
Status: DISCONTINUED | OUTPATIENT
Start: 2024-05-10 | End: 2024-05-20 | Stop reason: SDUPTHER

## 2024-05-10 RX ORDER — SODIUM CHLORIDE 0.9 % (FLUSH) 0.9 %
5-40 SYRINGE (ML) INJECTION PRN
Status: DISCONTINUED | OUTPATIENT
Start: 2024-05-10 | End: 2024-05-10

## 2024-05-10 RX ORDER — LIDOCAINE HYDROCHLORIDE 10 MG/ML
INJECTION, SOLUTION INFILTRATION; PERINEURAL PRN
Status: DISCONTINUED | OUTPATIENT
Start: 2024-05-10 | End: 2024-05-10 | Stop reason: SDUPTHER

## 2024-05-10 RX ORDER — SODIUM CHLORIDE 9 MG/ML
INJECTION, SOLUTION INTRAVENOUS PRN
Status: DISCONTINUED | OUTPATIENT
Start: 2024-05-10 | End: 2024-05-20 | Stop reason: SDUPTHER

## 2024-05-10 RX ORDER — NALOXONE HYDROCHLORIDE 0.4 MG/ML
INJECTION, SOLUTION INTRAMUSCULAR; INTRAVENOUS; SUBCUTANEOUS PRN
Status: DISCONTINUED | OUTPATIENT
Start: 2024-05-10 | End: 2024-05-10

## 2024-05-10 RX ORDER — SODIUM CHLORIDE 0.9 % (FLUSH) 0.9 %
5-40 SYRINGE (ML) INJECTION EVERY 12 HOURS SCHEDULED
Status: DISCONTINUED | OUTPATIENT
Start: 2024-05-10 | End: 2024-05-10 | Stop reason: HOSPADM

## 2024-05-10 RX ORDER — SODIUM CHLORIDE 0.9 % (FLUSH) 0.9 %
5-40 SYRINGE (ML) INJECTION EVERY 12 HOURS SCHEDULED
Status: DISCONTINUED | OUTPATIENT
Start: 2024-05-10 | End: 2024-05-10

## 2024-05-10 RX ORDER — ROCURONIUM BROMIDE 10 MG/ML
INJECTION, SOLUTION INTRAVENOUS PRN
Status: DISCONTINUED | OUTPATIENT
Start: 2024-05-10 | End: 2024-05-10 | Stop reason: SDUPTHER

## 2024-05-10 RX ADMIN — HYDRALAZINE HYDROCHLORIDE 100 MG: 50 TABLET ORAL at 11:12

## 2024-05-10 RX ADMIN — PRAVASTATIN SODIUM 40 MG: 20 TABLET ORAL at 20:58

## 2024-05-10 RX ADMIN — AMLODIPINE BESYLATE 5 MG: 5 TABLET ORAL at 11:12

## 2024-05-10 RX ADMIN — MORPHINE SULFATE 4 MG: 4 INJECTION, SOLUTION INTRAMUSCULAR; INTRAVENOUS at 11:25

## 2024-05-10 RX ADMIN — FENTANYL CITRATE 50 MCG: 0.05 INJECTION, SOLUTION INTRAMUSCULAR; INTRAVENOUS at 15:49

## 2024-05-10 RX ADMIN — SODIUM BICARBONATE 325 MG: 325 TABLET ORAL at 11:12

## 2024-05-10 RX ADMIN — PROPOFOL 120 MG: 10 INJECTION, EMULSION INTRAVENOUS at 15:04

## 2024-05-10 RX ADMIN — PANTOPRAZOLE SODIUM 40 MG: 40 TABLET, DELAYED RELEASE ORAL at 11:12

## 2024-05-10 RX ADMIN — DEXAMETHASONE SODIUM PHOSPHATE 10 MG: 10 INJECTION, SOLUTION INTRAMUSCULAR; INTRAVENOUS at 15:22

## 2024-05-10 RX ADMIN — SODIUM CHLORIDE, PRESERVATIVE FREE 10 ML: 5 INJECTION INTRAVENOUS at 11:14

## 2024-05-10 RX ADMIN — GUAIFENESIN 1200 MG: 600 TABLET, EXTENDED RELEASE ORAL at 11:12

## 2024-05-10 RX ADMIN — ROCURONIUM BROMIDE 20 MG: 10 INJECTION, SOLUTION INTRAVENOUS at 16:04

## 2024-05-10 RX ADMIN — LIDOCAINE HYDROCHLORIDE 50 MG: 10 INJECTION, SOLUTION INFILTRATION; PERINEURAL at 15:04

## 2024-05-10 RX ADMIN — MORPHINE SULFATE 4 MG: 4 INJECTION, SOLUTION INTRAMUSCULAR; INTRAVENOUS at 19:25

## 2024-05-10 RX ADMIN — FOLIC ACID 1 MG: 1 TABLET ORAL at 11:13

## 2024-05-10 RX ADMIN — FLUTICASONE PROPIONATE 2 SPRAY: 50 SPRAY, METERED NASAL at 11:13

## 2024-05-10 RX ADMIN — HYDROMORPHONE HYDROCHLORIDE 0.25 MG: 1 INJECTION, SOLUTION INTRAMUSCULAR; INTRAVENOUS; SUBCUTANEOUS at 17:16

## 2024-05-10 RX ADMIN — POLYETHYLENE GLYCOL (3350) 17 G: 17 POWDER, FOR SOLUTION ORAL at 11:12

## 2024-05-10 RX ADMIN — FENTANYL CITRATE 100 MCG: 0.05 INJECTION, SOLUTION INTRAMUSCULAR; INTRAVENOUS at 15:04

## 2024-05-10 RX ADMIN — ASPIRIN 81 MG: 81 TABLET, COATED ORAL at 11:12

## 2024-05-10 RX ADMIN — GUAIFENESIN 1200 MG: 600 TABLET, EXTENDED RELEASE ORAL at 20:58

## 2024-05-10 RX ADMIN — SODIUM CHLORIDE, SODIUM LACTATE, POTASSIUM CHLORIDE, AND CALCIUM CHLORIDE: 600; 310; 30; 20 INJECTION, SOLUTION INTRAVENOUS at 14:59

## 2024-05-10 RX ADMIN — CEFAZOLIN 2000 MG: 2 INJECTION, POWDER, FOR SOLUTION INTRAMUSCULAR; INTRAVENOUS at 15:14

## 2024-05-10 RX ADMIN — AMLODIPINE BESYLATE 5 MG: 5 TABLET ORAL at 20:58

## 2024-05-10 RX ADMIN — SUGAMMADEX 500 MG: 100 INJECTION, SOLUTION INTRAVENOUS at 16:39

## 2024-05-10 RX ADMIN — FENTANYL CITRATE 50 MCG: 0.05 INJECTION, SOLUTION INTRAMUSCULAR; INTRAVENOUS at 16:50

## 2024-05-10 RX ADMIN — ROCURONIUM BROMIDE 80 MG: 10 INJECTION, SOLUTION INTRAVENOUS at 15:04

## 2024-05-10 RX ADMIN — ASCORBIC ACID, VITAMIN A PALMITATE, CHOLECALCIFEROL, THIAMINE HYDROCHLORIDE, RIBOFLAVIN-5 PHOSPHATE SODIUM, PYRIDOXINE HYDROCHLORIDE, NIACINAMIDE, DEXPANTHENOL, ALPHA-TOCOPHEROL ACETATE, VITAMIN K1, FOLIC ACID, BIOTIN, CYANOCOBALAMIN: 200; 3300; 200; 6; 3.6; 6; 40; 15; 10; 150; 600; 60; 5 INJECTION, SOLUTION INTRAVENOUS at 01:44

## 2024-05-10 RX ADMIN — PROPOFOL 40 MG: 10 INJECTION, EMULSION INTRAVENOUS at 15:08

## 2024-05-10 RX ADMIN — Medication 2 PUFF: at 06:47

## 2024-05-10 RX ADMIN — HYDRALAZINE HYDROCHLORIDE 100 MG: 50 TABLET ORAL at 20:58

## 2024-05-10 RX ADMIN — ASCORBIC ACID, VITAMIN A PALMITATE, CHOLECALCIFEROL, THIAMINE HYDROCHLORIDE, RIBOFLAVIN-5 PHOSPHATE SODIUM, PYRIDOXINE HYDROCHLORIDE, NIACINAMIDE, DEXPANTHENOL, ALPHA-TOCOPHEROL ACETATE, VITAMIN K1, FOLIC ACID, BIOTIN, CYANOCOBALAMIN: 200; 3300; 200; 6; 3.6; 6; 40; 15; 10; 150; 600; 60; 5 INJECTION, SOLUTION INTRAVENOUS at 20:58

## 2024-05-10 RX ADMIN — SODIUM CHLORIDE, POTASSIUM CHLORIDE, SODIUM LACTATE AND CALCIUM CHLORIDE: 600; 310; 30; 20 INJECTION, SOLUTION INTRAVENOUS at 14:38

## 2024-05-10 RX ADMIN — Medication 2 PUFF: at 18:38

## 2024-05-10 RX ADMIN — HYDROMORPHONE HYDROCHLORIDE 0.5 MG: 1 INJECTION, SOLUTION INTRAMUSCULAR; INTRAVENOUS; SUBCUTANEOUS at 17:06

## 2024-05-10 ASSESSMENT — PAIN SCALES - WONG BAKER
WONGBAKER_NUMERICALRESPONSE: NO HURT

## 2024-05-10 ASSESSMENT — PAIN DESCRIPTION - LOCATION
LOCATION: ABDOMEN;INCISION
LOCATION: ABDOMEN
LOCATION: BACK;ABDOMEN
LOCATION: ABDOMEN

## 2024-05-10 ASSESSMENT — PAIN SCALES - GENERAL
PAINLEVEL_OUTOF10: 3
PAINLEVEL_OUTOF10: 5
PAINLEVEL_OUTOF10: 8
PAINLEVEL_OUTOF10: 5
PAINLEVEL_OUTOF10: 6
PAINLEVEL_OUTOF10: 7
PAINLEVEL_OUTOF10: 6
PAINLEVEL_OUTOF10: 7

## 2024-05-10 ASSESSMENT — PAIN DESCRIPTION - DESCRIPTORS: DESCRIPTORS: ACHING;SPASM;SQUEEZING

## 2024-05-10 ASSESSMENT — PAIN - FUNCTIONAL ASSESSMENT: PAIN_FUNCTIONAL_ASSESSMENT: PREVENTS OR INTERFERES SOME ACTIVE ACTIVITIES AND ADLS

## 2024-05-10 NOTE — PROGRESS NOTES
Daily Progress Note    Date:5/10/2024  Patient: Ming Rocha  : 1940  MRN:[id number]  CODE:DNR No additional code details  PCP:Irving Larsen DO    Admit Date: 2024  1:07 PM   LOS: 4 days     Weight loss, unable to tolerate PO. Pt with active esophageal Ca.     Hospital Summary: 83 y.o. male who presented to [facility] as a direct admission from Dr. Blankenship's office to hospitalist service for evaluation of failure to thrive. Pt has history of copd, ckd stage IV, HTN, hyperlipidemia, cad, CVA, and esophageal malignancy followed by Dr. Christopher. He was evaluated as outpatient for port and feeding tube placement by Dr. Blankenship. Pt with uncontrolled pain, difficulty swallowing and weight loss. Directly admitted to hospitalist service for further management.         Endoscopy with concerns for esophageal stent migration, as well as jejunostomy and port placement are now on hold due to abnormal KG and telemetry findings as part of pre procedure evaluation.     Cardiology involved - stress test completed and reassuring.       Subjective:     Tolerating tPN well at this time.     Pending surgical and GI intervention.                     Review of Systems   All other systems reviewed and are negative.      Objective:      Vital signs in last 24 hours:  Patient Vitals for the past 24 hrs:   BP Temp Temp src Pulse Resp SpO2 Weight   05/10/24 1155 -- -- -- -- 18 -- --   05/10/24 1133 (!) 165/85 98.4 °F (36.9 °C) Temporal 80 18 93 % --   05/10/24 1125 -- -- -- -- 20 -- --   05/10/24 1112 (!) 165/78 -- -- -- -- -- --   05/10/24 0857 (!) 165/78 97.9 °F (36.6 °C) Temporal 67 16 92 % --   05/10/24 0648 -- -- -- -- -- 94 % --   05/10/24 0600 -- -- -- -- -- -- 83.7 kg (184 lb 8.4 oz)   24 (!) 156/74 98.1 °F (36.7 °C) Temporal 65 18 93 % --   24 1814 -- -- -- -- 16 -- --         I/O last 3 completed shifts:  In: 3819.3 [I.V.:3819.3]  Out: 975 [Urine:975]  I/O this shift:  In: -   Out: 200 [Urine:200]    Physical  hospital problems. *       Bradycardia  -- EKG showed irregular, wide complex bradycardia with HR 41; difficult to appreciate p waves; possible 3rd degree heart block vs junctional rhythm vs afib with slow conduction and IVCD  -- Stop Metoprolol  -- Consulted cardiology  -- Procedures postponed and pt had undergone cardiology eval   - stress test reassuring   - bradycardia improved with BB on hold.    - ok from cardiology stand point to proceed with procedures as planned.       Adenocarcinoma of GE junction  Failure to thrive  -- Oncology following  -- General surgery following for possible port and jejunostomy tube placement  -- GI, Dr. Claudio, may be planning endoscopy for esophageal stent exchange due to stent migration  -- Continue pain control, antiemetics, bowel regimen  -- Nutrition consult for tube feedings once J tube in place  -- Monitor labs for evidence of refeeding syndrome, especially once enteral feeds are initiated  - started tPN support 5/8/24 given potential need to postpone procedures pending cardiology evaluation.         CAD s/p CABG  -- Continue ASA, statin      HTN  -- Continue Amlodipine, Hydralazine,   - Lopressor held - see above      COPD  -- Continue ICS/LABA and PRN Albuterol      GERD  -- Continue Protonix      DVT prophylaxis: Hold for procedure    DISPOSITION:  Plans to return home at discharge.    DNR No additional code details           Marcel Loza MD 5/10/2024 1:41 PM

## 2024-05-10 NOTE — PROGRESS NOTES
60 minutes after radiotracer administration. Noncontrast CT images of the neck, chest, abdomen, and pelvis were obtained for attenuation correction and anatomic localization. Automated exposure control was also utilized to decrease patient radiation dose. Findings: Background right hepatic lobe metabolic activity measures max SUV 2.4. Background mediastinal metabolic activity measures max SUV 2.2. Skull base: Physiologic tracer uptake. Neck: No hypermetabolic cervical lymphadenopathy or mass. Chest: Right paratracheal lymph node 0.8 cm in short axis maximum SUV 2.9. Conglomerate AP window lymph nodes largest measuring 0.9 cm in short axis maximum SUV 3.6. Precarinal lymph node 1 cm in short axis maximum SUV 3.6. Right hilar lymph node difficult to measure without IV contrast maximum SUV 3.4. There is a stent in the distal esophagus with diffuse circumferential tracer uptake which may be inflammatory. At the distal aspect of the stent there is a solid filling defect which is intensely hypermetabolic with a maximum SUV of 9.8 consistent with the primary neoplasm. No hypermetabolic pulmonary nodule or mass is seen. Abdomen and pelvis: There is normal tracer uptake in the liver, gallbladder, bilateral adrenal glands, spleen, and pancreas. There are multiple bilateral renal cysts with no tracer uptake. There is a solid exophytic mass medially at the midpole of the left kidney which measures 1.8 cm and has a maximum SUV of 3.3 may represent neoplasm. No hypermetabolic mesenteric or retroperitoneal lymphadenopathy is seen. There is normal tracer uptake in the GI tract. In the pelvis, there is fairly intense tracer uptake in the rectum with questionable wall thickening maximum SUV is 7.6. Neoplasm not excluded. No hypermetabolic pelvic lymphadenopathy is seen. Bone: There is a focus of tracer uptake at the left anterior second rib with no obvious osseous abnormality or fracture. Maximum SUV is 3.0. Non-FDG avid findings:  This report was signed and finalized on 4/9/2024 5:47 PM by Dr. Brandan Lal MD.    XR Abdomen Flat & Upright    Result Date: 4/9/2024  EXAM/TECHNIQUE: XR ABDOMEN FLAT AND UPRIGHT- INDICATION: vomiting COMPARISON: None available. FINDINGS: Nonobstructive bowel gas pattern. No mass effect or suspicious calcifications. Multilevel degenerative change in the lumbar spine. No acute osseous finding.    Nonobstructive bowel gas pattern. This report was signed and finalized on 4/9/2024 5:47 PM by Dr. Brandan Lal MD. '    ASSESSMENT:  # Severe dysphagia  -Secondary to esophageal malignancy/esophageal stent  -Plans for open jejunostomy cleared by cardiology    # Protein calorie malnutrition  -Plans for open jejunostomy with Dr. Blankenship after cardiology clearance  -Patient will need enteral nutrition  -Currently on TPN      # Advanced esophageal malignancy  -Plans for outpatient RT's Dr. Constantine Leslie    # Cancer-related pain  -Continue fentanyl 50 mcg every 72 hours  -Morphine IV as needed  -Oxycodone p.o. as needed    # Physical deconditioning  -PT/OT    # Left kidney mass-to be addressed later.  Cannot rule out malignancy.  Interval growth over the last few years.    # Normocytic anemia-hemoglobin 10  Recent Labs     05/09/24  0449 05/08/24  0329 05/07/24  0332   WBC 6.3 6.3 7.1   HGB 10.0* 10.3* 11.4*   HCT 30.8* 31.0* 33.3*   MCV 90.3 90.9 90.7   * 113* 129*   -Continue to monitor  -Monitor platelets  -Ferritin 222, iron saturation 23%, iron 31, TIBC 132, folate 4.2, vitamin B12 870    # Mild thrombocytopenia  Platelet count of 129,000 (previously 108,000  Continue to monitor    # Renal impairment-creatinine 2.2 today  -Continue to monitor      Latest Ref Rng & Units 5/9/2024     4:49 AM 5/8/2024     3:29 AM 5/7/2024     3:32 AM 5/6/2024     1:43 PM 5/2/2024    11:32 AM   Labs Renal   BUN 8 - 23 mg/dL 15  19  20  20  19    Cr 0.5 - 1.2 mg/dL 2.2  2.3  2.5  2.9  2.7    K 3.5 - 5.0 mmol/L  3.5 - 5.0 mmol/L 3.9

## 2024-05-10 NOTE — PROGRESS NOTES
Comprehensive Nutrition Assessment    Type and Reason for Visit:  Reassess    Nutrition Recommendations/Plan:   Recommend modifying PN to 4.25/10 with goal rate of 85mL/hr to more closely meet pt needs.   Consider adding 250mL 20% lipids twice weekly.   Titrate slowly and monitor for s/s refeeding syndrome.  Follow for TF recommendations.     Malnutrition Assessment:  Malnutrition Status:  Mild malnutrition (05/07/24 1602)    Context:  Chronic Illness     Findings of the 6 clinical characteristics of malnutrition:  Energy Intake:  Mild decrease in energy intake (Comment)  Weight Loss:  Greater than 10% over 6 months     Body Fat Loss:  Unable to assess     Muscle Mass Loss:  Unable to assess    Fluid Accumulation:  No significant fluid accumulation     Strength:  Not Performed    Nutrition Assessment:    PN 4.25/5 continues infusing at 42 mL/hr. This regimen meets approximately 20% of pt's estimated needs. Recommend modifying PN to 4.25/10 with goal rate of 85mL/hr and 250mL 20% lipids twice weekly to meet approximately 60% of pt's estimated energy needs through PN. Per cardiology, pt is stable. Per GI note, anticipate tube placement on Monday. Pt indicated that he is awaiting surgery today. Will continue to follow for nutrition support recommendations.    Nutrition Related Findings:    BM 5/7. Mg 1.9, P 3.5, K+ 3.8, glu 101, 98, accuchek's .         Current Nutrition Intake & Therapies:    Average Meal Intake: NPO  Average Supplements Intake: NPO  PN-Adult Premix 4.25/5 - Standard Electrolytes- Peripheral Line  PN-Adult Premix 4.25/5 - Standard Electrolytes- Peripheral Line  Diet NPO Exceptions are: Sips of Water with Meds  Current Parenteral Nutrition Orders:  Type and Formula: Premix Peripheral   Lipids: Two times weekly, 250ml  Duration: Continuous  Rate/Volume: PN 4.25/10 at 85mL/it=7465uQ/day  Current PN Order Provides: PN 4.25/5 at 42mL/hr= 343kcal, 43gPRO, 50gCHO. GUR=0.41.  Goal PN Orders  Provides: PN 4.25/10 at 85mL/hr= 1042kcal, 87gPRO, 204gCHO. GUR=1.7. Lipids twice weekly provide avg of 143kcal/day.    Anthropometric Measures:  Height: 182.9 cm (6' 0.01\") (5/6/24)  Ideal Body Weight (IBW): 178 lbs (81 kg)    Current Body Weight: 83.7 kg (184 lb 9.6 oz), 103.7 % IBW.    Current BMI (kg/m2): 25  Usual Body Weight: 97.4 kg (214 lb 12.8 oz) (11/22/23)  % Weight Change (Calculated): -14.1  BMI Categories: Overweight (BMI 25.0-29.9)    Estimated Daily Nutrient Needs:  Energy Requirements Based On: Kcal/kg  Weight Used for Energy Requirements: Current  Energy (kcal/day): 1674-2092 (20-25 kcal/kg)  Weight Used for Protein Requirements: Ideal  Protein (g/day): 105-162  Method Used for Fluid Requirements: 1 ml/kcal  Fluid (ml/day): 1674-2092    Nutrition Diagnosis:   Other (Comment), In context of chronic illness (Mild malnutrition) related to catabolic illness, altered GI structure, inadequate protein-energy intake, swallowing difficulty as evidenced by weight loss greater than or equal to 10% in 6 months, GI abnormality, poor intake prior to admission, intake 51-75%    Nutrition Interventions:   Food and/or Nutrient Delivery: Continue NPO, Modify Parenteral Nutrition  Nutrition Education/Counseling: No recommendation at this time  Coordination of Nutrition Care: Continue to monitor while inpatient, Speech Therapy    Goals:  Previous Goal Met: Progressing toward Goal(s)  Goals: Initiate nutrition support, Tolerate nutrition support at goal rate    Nutrition Monitoring and Evaluation:   Behavioral-Environmental Outcomes: None Identified  Food/Nutrient Intake Outcomes: Food and Nutrient Intake, Parenteral Nutrition Intake/Tolerance  Physical Signs/Symptoms Outcomes: Biochemical Data, GI Status, Weight, Nutrition Focused Physical Findings    Discharge Planning:    Too soon to determine     KAYLAN YU MS, RD, LD  Contact: [phone]

## 2024-05-10 NOTE — ANESTHESIA PRE PROCEDURE
Department of Anesthesiology  Preprocedure Note       Name:  Ming Rocha   Age:  83 y.o.  :  1940                                          MRN:  [id number]         Date:  5/10/2024      Surgeon: Surgeon(s):  Elena Blankenship MD    Procedure: Procedure(s):  GASTROSTOMY TUBE PLACEMENT  PORT INSERTION    Medications prior to admission:   Prior to Admission medications    Medication Sig Start Date End Date Taking? Authorizing Provider   terazosin (HYTRIN) 1 MG capsule Take 1 capsule by mouth nightly Last filled   Yes Marta Rosales MD   fentaNYL (DURAGESIC) 50 MCG/HR Place 1 patch onto the skin every 72 hours for 30 days. Max Daily Amount: 1 patch 24  Seth Christopher MD   ondansetron (ZOFRAN-ODT) 4 MG disintegrating tablet Take 1 tablet by mouth 3 times daily as needed for Nausea or Vomiting 24  Seth Christopher MD   oxyCODONE-acetaminophen (PERCOCET) 7.5-325 MG per tablet Take 1 tablet by mouth every 6 hours as needed for Pain for up to 30 days. Intended supply: 3 days Max Daily Amount: 4 tablets 24  Seth Christopher MD   pantoprazole (PROTONIX) 40 MG tablet Take 1 tablet by mouth 2 times daily (before meals) 24   Robert Claudio MD   aspirin 81 MG EC tablet Take 1 tablet by mouth daily 20   Marta Rosales MD   amLODIPine (NORVASC) 10 MG tablet Take 0.5 tablets by mouth 2 times daily 21   Marta Rosales MD   lisinopril (PRINIVIL;ZESTRIL) 20 MG tablet Take 1 tablet by mouth 2 times daily 10/26/21   Marta Rosales MD   cyanocobalamin 100 MCG tablet Take 0.5 tablets by mouth daily    Marta Rosales MD   hydrALAZINE (APRESOLINE) 100 MG tablet Take 1 tablet by mouth 2 times daily    Marta Rosales MD   pravastatin (PRAVACHOL) 40 MG tablet Take 1 tablet by mouth nightly 21   Marta Rosales MD   fluticasone (FLONASE) 50 MCG/ACT nasal spray 2 sprays by Nasal route daily 23   Marta Rosales MD

## 2024-05-10 NOTE — PROGRESS NOTES
Physical Therapy     05/10/24 1100   Restrictions/Precautions   Restrictions/Precautions Fall Risk   Required Braces or Orthoses? No   General   Diagnosis failure to thrive, severe dysphagia, adenocarcinoma gastroesophageal junction   Subjective   Subjective agreed to therapy   Subjective   Subjective no c/o pain   Bed mobility   Supine to Sit Independent   Sit to Supine Independent   Transfers   Sit to Stand Stand by assistance   Stand to Sit Stand by assistance   Ambulation   Surface Level tile   Assistance Contact guard assistance   Quality of Gait fatigues quickly   Gait Deviations Slow Sena;Decreased step length;Decreased step height   Distance 100'   Comments pt pushing IV pole   Short Term Goals   Time Frame for Short Term Goals 2 wks   Short Term Goal 1 supine to sit indep   Short Term Goal 2 sit to stand indep   Short Term Goal 3 amb. 200' indep   Short Term Goal 4 bed to chair SBA   Activity Tolerance   Activity Tolerance Patient tolerated treatment well   Assessment   Assessment pt able to tolerate slight increase in AMB distance while pushing IV pole. pt attempted a few steps without IV pole with notable increase in unsteady gait but no true LOB. left in bed with all needs in reach.   PT Plan of Care   Friday X   Safety Devices   Type of Devices Call light within reach;Gait belt;Left in bed     Electronically signed by Jorge L Coy PTA on 5/10/2024 at 11:15 AM

## 2024-05-10 NOTE — CONSULTS
Mr. Rocha is an 83 year old male who presents with a complaint of esophageal cancer. He was diagnosed less than a month ago. He had a stent placed, but is not able to take anything PO. He is now in need of port placement and feeding tube placement. Location of feeding tube discussed by phone call with Dr. Christopher. Notes from Dr. Claudio and from [facility] reviewed.     Past Medical History        Past Medical History:   Diagnosis Date    CAD (coronary artery disease) 2020     Cabg x 3    Cerebral artery occlusion with cerebral infarction (HCC)       x 2 (2007, 2020) right side weakness    Chronic kidney disease (CKD), stage IV (severe) (HCC)      COPD (chronic obstructive pulmonary disease) (HCC)      Esophageal cancer (HCC) 04/2024    Hyperlipidemia      Hypertension           Past Surgical History         Past Surgical History:   Procedure Laterality Date    CARDIAC CATHETERIZATION   05/28/2020     Dr Cruz    CATARACT EXTRACTION, BILATERAL Bilateral      COLONOSCOPY   12/06/2006     Dr Bella-[facility]    COLONOSCOPY   12/12/2003     Dr Bella-[facility]    CORONARY ARTERY BYPASS GRAFT   06/09/2020     Dr Dimas    UPPER GASTROINTESTINAL ENDOSCOPY   04/10/2024     Dr Jeremy AlonzoBptkjttsj-Pslyjus-Rxsvzd differentiated adenocarcinoma with signet ring features, distal esophagus    UPPER GASTROINTESTINAL ENDOSCOPY N/A 04/17/2024     Dr MINGO Claudio-w/esophageal stent placement- [employer] esophageal wall flex stent measuring 23 mm x 155 mm and EUS-Esophageal adenocarcinoma likely at least T3 by EUS    UPPER GASTROINTESTINAL ENDOSCOPY N/A 04/17/2024     Dr MINGO Claudio-w/esophageal stent placement- [employer] esophageal wall flex stent measuring 23 mm x 155 mm and EUS-Esophageal adenocarcinoma likely at least T3 by EUS         Current Facility-Administered Medications          Current Outpatient Medications   Medication Sig Dispense Refill    fentaNYL (DURAGESIC) 50 MCG/HR Place 1 patch onto the skin every 72 hours for 30 days. Max

## 2024-05-10 NOTE — PROGRESS NOTES
Extensive disease  Total care  Minimal intake  Drowsy/coma  [] 10% Bed Bound  Extensive disease  Total care  Mouth care only  Drowsy/coma  [] 0% Death    ECOG:(2) Ambulatory and capable of self care, unable to carry out work activity, up and about > 50% or waking hours    CLINICAL PAIN ASSESSMENT:   Score 1-10 (if verbal): 4  Location:   epigastric, back  Character:  sharp, aching  Frequency:  continuously  What makes it worse?:  food ingestion, positioning  What makes it better?:  pain medication    Assessment/Plan   Principal Problem:    Failure to thrive in adult  Active Problems:    Adenocarcinoma of gastroesophageal junction (HCC)    Palliative care patient    Mild malnutrition (HCC)    Dysphagia    Cancer related pain  Resolved Problems:    * No resolved hospital problems. *      Visit Summary:  Chart reviewed, patient discussed with nursing staff. Reviewed health issues, work up and treatment plan as well as factors that lead to hospitalization.  Mr. Rocha is seen at bedside this afternoon with his daughter, Angela, present.  Report obtained from RN with no acute events overnight.  Patient has been cleared by cardiology to pursue surgical intervention today with plans for G-tube and port placement with Dr. Blankenship. Pt/family and I reviewed current status and plan of care.  Goals are unchanged at this time.  They wish to pursue alternative means of nutrition in hopes to provide additional support to pt who has been unable to tolerate much of any PO intake.  They are interested in anticancer therapy in order to reduce symptom burden and improve quality of life but pt does have realistic goals of care and has reported he would not continue life-prolonging measures if burden of treatment became too great. Opportunity for questions and emotional support provided. Palliative team will follow as needed.    I will be off service beginning 5/13/2024 and not returning until 5/20/2024. For palliative needs please  contact Nahomi Freed PA-C or call ext [phone].     Candidate for SCOP: Pts home residence is out of service area for outpatient palliative to follow     Recommendations:      Palliative Care- GOC continue current medical treatments/workup and monitor for improvement. D/c planning based on hospital course. Code status- DNR  Advanced esophageal malignancy- oncology following  Dysphagia- 2/2 above. Potential plans for EGS to assess for esophageal stent migration. SLP eval as warranted  Cancer related pain- improved. Initiated on 50 mcg fentanyl duragesic 5/6/24. Increase oxycodone to 10 mg Q4h prn and transition to solution for easier swallowing. Continue IVP morphine for severe breakthrough symptoms and wean IV as able.  Encouraged use of peppermint deltoids to assist with esophageal spasm discomfort If needed  Failure to thrive- plans for Gtube placement today. Dietician following. PT/OT as able. Encouraged OOB.   Bradycardia- abnormal preop EKG. Cardiology consulted and stress test completed 5/9/24 with 60% EF and normal perfusion.  He has been cleared for surgical procedures  Left kidney mass- malignancy not ruled out. OP follow up.   COPD- mgmt per primary team. Does not appear to have acute exacerbation  CKD- monitor labs. Avoid offending agents as able    Thank you for consulting Palliative Care and allowing us to participate in the care of this patient.     Total Time Spent with patient assessment, interview of independent historian/HCS, workup/treatment review, discussion with medical team, review of current and home medications, opioid titration and monitoring for symptom management, and placement of orders/preparation of this note: 36 minutes                                 Electronically signed by URVASHI Ott CNP on 5/10/2024 at 8:24 AM    (Please note that portions of this note were completed with a voice recognition program.  Effortswere made to edit the dictations but occasionally words are

## 2024-05-10 NOTE — BRIEF OP NOTE
Brief Postoperative Note      Patient: Ming Rocha  YOB: 1940  MRN: [id number]    Date of Procedure: 5/10/2024    Pre-Op Diagnosis Codes:     * Esophageal cancer (HCC) [C15.9]    Post-Op Diagnosis: Same       Procedure(s):  GASTROSTOMY TUBE PLACEMENT  PORT INSERTION    Surgeon(s):  Elena Blankenship MD    Assistant:  * No surgical staff found *    Anesthesia: General    Estimated Blood Loss (mL): Minimal    Complications: None    Specimens:   * No specimens in log *    Implants:  Implant Name Type Inv. Item Serial No.  Lot No. LRB No. Used Action   PORT INFUS PLAS SGL LUMN W/ 9.6FR PRADEEP CATH AIRGUARD VLV - [id number]  PORT INFUS PLAS SGL LUMN W/ 9.6FR PRADEEP CATH AIRGUARD VLV  [employer] [id number] Right 1 Implanted         Drains:   Gastrostomy/Enterostomy/Jejunostomy Tube Gastrostomy 1 20 fr (Active)       Findings:  Infection Present At Time Of Surgery (PATOS) (choose all levels that have infection present):  No infection present  Other Findings: no acute findings. Gastrostomy tube seated at 5 cm at the skin    Electronically signed by Elena Blankenship MD on 5/10/2024 at 4:48 PM

## 2024-05-10 NOTE — PROGRESS NOTES
Cardiology Daily Note Karan Martins MD      Patient:  Ming Rocha  [id number]    Patient Active Problem List    Diagnosis Date Noted    Palliative care patient 05/07/2024    Mild malnutrition (HCC) 05/07/2024    Dysphagia 05/07/2024    Cancer related pain 05/07/2024    Failure to thrive in adult 05/06/2024    Esophageal cancer (HCC) 05/06/2024    Adenocarcinoma of gastroesophageal junction (HCC) 04/11/2024       Admit Date:  5/6/2024    Admission Problem List: Present on Admission:   Failure to thrive in adult   Adenocarcinoma of gastroesophageal junction (HCC)   Palliative care patient   Mild malnutrition (HCC)   Dysphagia   Cancer related pain      Cardiac Specific Data:  Specialty Problems    None      Subjective:  Mr. Rocha seen today resting comfortably.  Reviewed results of Lexiscan with patient with normal ejection fraction 60% normal perfusion study.  No complaints focalized today blood pressure 165/78 heart 67.    Objective:   BP (!) 165/78   Pulse 67   Temp 97.9 °F (36.6 °C) (Temporal)   Resp 16   Ht 1.829 m (6' 0.01\") Comment: 5/6/24  Wt 83.7 kg (184 lb 8.4 oz)   SpO2 92%   BMI 25.02 kg/m²       Intake/Output Summary (Last 24 hours) at 5/10/2024 1119  Last data filed at 5/10/2024 0630  Gross per 24 hour   Intake --   Output 975 ml   Net -975 ml       Prior to Admission medications    Medication Sig Start Date End Date Taking? Authorizing Provider   terazosin (HYTRIN) 1 MG capsule Take 1 capsule by mouth nightly Last filled   Yes Provider, MD Marta   fentaNYL (DURAGESIC) 50 MCG/HR Place 1 patch onto the skin every 72 hours for 30 days. Max Daily Amount: 1 patch 5/2/24 6/1/24  Seth Christopher MD   ondansetron (ZOFRAN-ODT) 4 MG disintegrating tablet Take 1 tablet by mouth 3 times daily as needed for Nausea or Vomiting 5/2/24 8/30/24  Seth Christopher MD   oxyCODONE-acetaminophen (PERCOCET) 7.5-325 MG per tablet Take 1 tablet by mouth every 6 hours as needed for Pain for up to 30

## 2024-05-10 NOTE — ANESTHESIA POSTPROCEDURE EVALUATION
Department of Anesthesiology  Postprocedure Note    Patient: Ming Rocha  MRN: [id number]  YOB: 1940  Date of evaluation: 5/10/2024    Procedure Summary       Date: 05/10/24 Room / Location: [address]    Anesthesia Start: 1459 Anesthesia Stop: 1655    Procedures:       GASTROSTOMY TUBE PLACEMENT      PORT INSERTION (Chest) Diagnosis:       Esophageal cancer (HCC)      (Esophageal cancer (HCC) [C15.9])    Surgeons: Elena Blankenship MD Responsible Provider: Dominguez Marvin APRN - CRNA    Anesthesia Type: general ASA Status: 3            Anesthesia Type: No value filed.    Naz Phase I: Naz Score: 8    Naz Phase II:      Anesthesia Post Evaluation    Patient location during evaluation: PACU  Patient participation: complete - patient participated  Level of consciousness: sleepy but conscious  Pain score: 0  Airway patency: patent  Nausea & Vomiting: no nausea and no vomiting  Cardiovascular status: blood pressure returned to baseline  Respiratory status: acceptable  Pain management: adequate        No notable events documented.

## 2024-05-10 NOTE — PROGRESS NOTES
Awaiting input from cardiology concerning clearance for open gastrostomy tube and port placement- if no verdict before noon today, then will plan for Monday (assuming he is cleared for the surgery).

## 2024-05-11 LAB
ANION GAP SERPL CALCULATED.3IONS-SCNC: 11 MMOL/L (ref 7–19)
BASOPHILS # BLD: 0 K/UL (ref 0–0.2)
BASOPHILS NFR BLD: 0 % (ref 0–1)
BUN SERPL-MCNC: 21 MG/DL (ref 8–23)
CALCIUM SERPL-MCNC: 8.6 MG/DL (ref 8.8–10.2)
CHLORIDE SERPL-SCNC: 106 MMOL/L (ref 98–111)
CO2 SERPL-SCNC: 20 MMOL/L (ref 22–29)
CREAT SERPL-MCNC: 2.4 MG/DL (ref 0.5–1.2)
EOSINOPHIL # BLD: 0 K/UL (ref 0–0.6)
EOSINOPHIL NFR BLD: 0 % (ref 0–5)
ERYTHROCYTE [DISTWIDTH] IN BLOOD BY AUTOMATED COUNT: 12.9 % (ref 11.5–14.5)
GLUCOSE BLD-MCNC: 116 MG/DL (ref 70–99)
GLUCOSE BLD-MCNC: 117 MG/DL (ref 70–99)
GLUCOSE BLD-MCNC: 129 MG/DL (ref 70–99)
GLUCOSE BLD-MCNC: 136 MG/DL (ref 70–99)
GLUCOSE SERPL-MCNC: 140 MG/DL (ref 74–109)
HCT VFR BLD AUTO: 31.2 % (ref 42–52)
HGB BLD-MCNC: 10.4 G/DL (ref 14–18)
IMM GRANULOCYTES # BLD: 0 K/UL
LYMPHOCYTES # BLD: 0.3 K/UL (ref 1.1–4.5)
LYMPHOCYTES NFR BLD: 4.6 % (ref 20–40)
MCH RBC QN AUTO: 30.9 PG (ref 27–31)
MCHC RBC AUTO-ENTMCNC: 33.3 G/DL (ref 33–37)
MCV RBC AUTO: 92.6 FL (ref 80–94)
MONOCYTES # BLD: 0.1 K/UL (ref 0–0.9)
MONOCYTES NFR BLD: 2.3 % (ref 0–10)
NEUTROPHILS # BLD: 5.3 K/UL (ref 1.5–7.5)
NEUTS SEG NFR BLD: 92.7 % (ref 50–65)
PERFORMED ON: ABNORMAL
PHOSPHATE SERPL-MCNC: 3.8 MG/DL (ref 2.5–4.5)
PLATELET # BLD AUTO: 108 K/UL (ref 130–400)
PMV BLD AUTO: 11.5 FL (ref 9.4–12.4)
POTASSIUM SERPL-SCNC: 4.7 MMOL/L (ref 3.5–5)
RBC # BLD AUTO: 3.37 M/UL (ref 4.7–6.1)
SODIUM SERPL-SCNC: 137 MMOL/L (ref 136–145)
WBC # BLD AUTO: 5.7 K/UL (ref 4.8–10.8)

## 2024-05-11 PROCEDURE — 82962 GLUCOSE BLOOD TEST: CPT

## 2024-05-11 PROCEDURE — 80048 BASIC METABOLIC PNL TOTAL CA: CPT

## 2024-05-11 PROCEDURE — 1200000000 HC SEMI PRIVATE

## 2024-05-11 PROCEDURE — 6370000000 HC RX 637 (ALT 250 FOR IP): Performed by: SURGERY

## 2024-05-11 PROCEDURE — 2700000000 HC OXYGEN THERAPY PER DAY

## 2024-05-11 PROCEDURE — 6370000000 HC RX 637 (ALT 250 FOR IP): Performed by: INTERNAL MEDICINE

## 2024-05-11 PROCEDURE — 94640 AIRWAY INHALATION TREATMENT: CPT

## 2024-05-11 PROCEDURE — 6360000002 HC RX W HCPCS: Performed by: SURGERY

## 2024-05-11 PROCEDURE — 36415 COLL VENOUS BLD VENIPUNCTURE: CPT

## 2024-05-11 PROCEDURE — 85025 COMPLETE CBC W/AUTO DIFF WBC: CPT

## 2024-05-11 PROCEDURE — 94760 N-INVAS EAR/PLS OXIMETRY 1: CPT

## 2024-05-11 PROCEDURE — 84100 ASSAY OF PHOSPHORUS: CPT

## 2024-05-11 PROCEDURE — 99232 SBSQ HOSP IP/OBS MODERATE 35: CPT | Performed by: INTERNAL MEDICINE

## 2024-05-11 RX ORDER — ISOSORBIDE MONONITRATE 30 MG/1
30 TABLET, EXTENDED RELEASE ORAL DAILY
Status: DISCONTINUED | OUTPATIENT
Start: 2024-05-11 | End: 2024-05-18

## 2024-05-11 RX ADMIN — ISOSORBIDE MONONITRATE 30 MG: 30 TABLET, EXTENDED RELEASE ORAL at 09:17

## 2024-05-11 RX ADMIN — Medication 2 PUFF: at 06:44

## 2024-05-11 RX ADMIN — PRAVASTATIN SODIUM 40 MG: 20 TABLET ORAL at 21:06

## 2024-05-11 RX ADMIN — ASPIRIN 81 MG: 81 TABLET, COATED ORAL at 09:17

## 2024-05-11 RX ADMIN — GUAIFENESIN 1200 MG: 600 TABLET, EXTENDED RELEASE ORAL at 09:16

## 2024-05-11 RX ADMIN — AMLODIPINE BESYLATE 5 MG: 5 TABLET ORAL at 21:06

## 2024-05-11 RX ADMIN — HYDRALAZINE HYDROCHLORIDE 100 MG: 50 TABLET ORAL at 09:16

## 2024-05-11 RX ADMIN — ENOXAPARIN SODIUM 30 MG: 100 INJECTION SUBCUTANEOUS at 09:17

## 2024-05-11 RX ADMIN — SODIUM BICARBONATE 325 MG: 325 TABLET ORAL at 09:16

## 2024-05-11 RX ADMIN — Medication 2 PUFF: at 18:25

## 2024-05-11 RX ADMIN — FLUTICASONE PROPIONATE 2 SPRAY: 50 SPRAY, METERED NASAL at 09:17

## 2024-05-11 RX ADMIN — FOLIC ACID 1 MG: 1 TABLET ORAL at 09:17

## 2024-05-11 RX ADMIN — MORPHINE SULFATE 4 MG: 4 INJECTION, SOLUTION INTRAMUSCULAR; INTRAVENOUS at 21:19

## 2024-05-11 RX ADMIN — POLYETHYLENE GLYCOL (3350) 17 G: 17 POWDER, FOR SOLUTION ORAL at 09:17

## 2024-05-11 RX ADMIN — PANTOPRAZOLE SODIUM 40 MG: 40 TABLET, DELAYED RELEASE ORAL at 15:40

## 2024-05-11 RX ADMIN — PANTOPRAZOLE SODIUM 40 MG: 40 TABLET, DELAYED RELEASE ORAL at 06:32

## 2024-05-11 RX ADMIN — MORPHINE SULFATE 2 MG: 2 INJECTION, SOLUTION INTRAMUSCULAR; INTRAVENOUS at 09:21

## 2024-05-11 RX ADMIN — Medication 50 MCG: at 09:17

## 2024-05-11 RX ADMIN — METOPROLOL SUCCINATE 25 MG: 25 TABLET, EXTENDED RELEASE ORAL at 09:16

## 2024-05-11 RX ADMIN — HYDRALAZINE HYDROCHLORIDE 100 MG: 50 TABLET ORAL at 21:06

## 2024-05-11 RX ADMIN — AMLODIPINE BESYLATE 5 MG: 5 TABLET ORAL at 09:17

## 2024-05-11 ASSESSMENT — PAIN SCALES - GENERAL
PAINLEVEL_OUTOF10: 6
PAINLEVEL_OUTOF10: 7

## 2024-05-11 ASSESSMENT — PAIN - FUNCTIONAL ASSESSMENT: PAIN_FUNCTIONAL_ASSESSMENT: PREVENTS OR INTERFERES SOME ACTIVE ACTIVITIES AND ADLS

## 2024-05-11 ASSESSMENT — PAIN DESCRIPTION - LOCATION
LOCATION: BACK;ABDOMEN
LOCATION: ABDOMEN

## 2024-05-11 NOTE — PROGRESS NOTES
Base to Mid Thigh    Result Date: 5/1/2024  1.  Distal esophageal stent and intensely hypermetabolic mass at the distal aspect of the stent at the GE junction consistent with the known primary neoplasm. 2.  Hypermetabolic mediastinal lymph nodes worrisome for lymph node metastasis. 3.  Exophytic solid mass medially at the midpole of the left kidney also worrisome for neoplasm may represent a second primary, renal cell carcinoma. 4.  Tracer uptake at the rectum with questionable wall thickening. Neoplasm not excluded. 5.  Focus of tracer uptake left anterior second rib without visualized fracture or abnormality. This may be posttraumatic but continued attention on follow-up is recommended. This report was signed and finalized on 5/1/2024 1:37 PM by Jose Cruz Monsivais.         Assessment/Plan  Principal Problem:    Failure to thrive in adult  Active Problems:    Adenocarcinoma of gastroesophageal junction (HCC)    Esophageal cancer (HCC)    Palliative care patient    Mild malnutrition (HCC)    Dysphagia    Cancer related pain  Resolved Problems:    * No resolved hospital problems. *       Bradycardia  -- EKG showed irregular, wide complex bradycardia with HR 41; difficult to appreciate p waves; possible 3rd degree heart block vs junctional rhythm vs afib with slow conduction and IVCD  -- Stop Metoprolol  -- Consulted cardiology  -- Procedures postponed and pt had undergone cardiology eval   - stress test reassuring   - bradycardia improved with BB on hold - low dose Toprol XL resumed per cardiology.     - ok from cardiology stand point to proceed with procedures as planned.       Adenocarcinoma of GE junction  Failure to thrive  -- Oncology following  -- General surgery following for possible port and jejunostomy tube placement  -- GI, Dr. Claudio, may be planning endoscopy for esophageal stent exchange due to stent migration  -- Continue pain control, antiemetics, bowel regimen  -- Nutrition consult for tube feedings

## 2024-05-11 NOTE — PROGRESS NOTES
MEDICAL ONCOLOGY PROGRESS NOTE    Pt Name: Ming Rocha  MRN: [id number]  YOB: 1940  Date of evaluation: 5/11/2024    Subjective-reviewed palliative care notes, general surgery, cardiology, internal medicine evaluation recommendations.  Status post gastrostomy tube placement and port insertion by Dr. Elena Blankenship.  Denies any new complaint this morning      HISTORY OF PRESENT ILLNESS:  Ming Rocha is well-known to our clinic.  He is a patient established with Dr. Seth Christopher.  He was last seen on 5/2/2024.  The patient has a new diagnosis of esophageal cancer of the distal esophagus status post esophageal stent placement.  He is having significant issues with p.o. intake after the stent placement.  He was referred back to Dr. Claudio for stent adjustment.  In addition, recommend port placement with general surgery as well as surgical jejunostomy feeding tube.  Referral to radiation oncology has been made.  Patient presents today hospital as a direct admission from Dr. Blankenship's office.  Patient had uncontrolled pain and therefore was admitted to the inpatient for pain control.  Patient has generalized weakness, fatigue and significant pain related to his cancer.  Patient was started on Duragesic patch 50 mcg every 3 days by Dr. Christopher last week.    Laboratory studies at admission today showed evidence of hypokalemia with potassium 2.9, low total protein.  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

## 2024-05-11 NOTE — PROGRESS NOTES
General Surgery Progress Note        SUBJECTIVE: Patient reports incisional pain on his abdomen.    OBJECTIVE      Physical    VITALS:  BP (!) 156/89   Pulse 64   Temp 97.3 °F (36.3 °C) (Temporal)   Resp 18   Ht 1.829 m (6' 0.01\") Comment: 5/6/24  Wt 82.6 kg (182 lb 2 oz)   SpO2 97%   BMI 24.70 kg/m²   CONSTITUTIONAL:  awake, alert, cooperative, no apparent distress, and appears stated age  Chest: Port is present on the right upper chest wall.  No crepitus.  LUNGS: Good air movement  CARDIOVASCULAR: Regular rate and rhythm  ABDOMEN: Soft, appropriately tender to palpation.  G-tube is present and clamped.  Dressing is intact with no significant seeping through.  No rebound tenderness.  SKIN: No rashes    Data  labs: CBC:   Lab Results   Component Value Date/Time    WBC 5.7 05/11/2024 03:03 AM    HGB 10.4 05/11/2024 03:03 AM    HCT 31.2 05/11/2024 03:03 AM     05/11/2024 03:03 AM    MCV 92.6 05/11/2024 03:03 AM     BMP:   Lab Results   Component Value Date/Time     05/11/2024 03:03 AM    K 4.7 05/11/2024 03:03 AM     05/11/2024 03:03 AM    CO2 20 05/11/2024 03:03 AM    BUN 21 05/11/2024 03:03 AM      ASSESSMENT AND PLAN  Esophageal adenocarcinoma  Dysphagia, severe  CAD  HTN  Protein calorie malnutrition    **S/p gastrostomy tube placement + right Port-A-Cath placement, POD #1.    -- Okay to start trickle tube feed via the G-tube today as ordered.  Increase rate as ordered every 8 hours until set goal rate is achieved.  Goal rate to be set by nutritionist.  --Patient and RN inquired about advancing his p.o. intake.  At this point, I will defer diet management to primary and oncology.  Patient's daughter who is at the bedside reports that his esophageal stent is actually proximal to the esophageal mass.  With this report, I will recommend the patient should be maintained on a clear liquid diet as tolerated for now until evaluated by GI and esophageal stent repositioned.  -- management of his  comorbidities per primary and respective consultants.  -- General surgery will follow peripherally.  Please do not hesitate to call if with any issues with the G-tube.

## 2024-05-11 NOTE — CONSULTS
Comprehensive Nutrition Assessment    Type and Reason for Visit:  Reassess, Consult    Nutrition Recommendations/Plan:   Recommend to d/c TPN when initiating TF.  Recommend to initiate Jevity 1.5 at 20 mL/hr, increase 10 mL q 8 hours to goal rate of 60 mL/hr. Flush 30 mL/hr H2O.     Malnutrition Assessment:  Malnutrition Status:  Mild malnutrition (05/07/24 1602)    Context:  Chronic Illness     Findings of the 6 clinical characteristics of malnutrition:  Energy Intake:  Mild decrease in energy intake (Comment)  Weight Loss:  Greater than 10% over 6 months     Body Fat Loss:  Unable to assess     Muscle Mass Loss:  Unable to assess    Fluid Accumulation:  No significant fluid accumulation     Strength:  Not Performed    Nutrition Assessment:    Consult received for TF orders and management. Pt is s/p gastrostomy tube placement. Has been receiving TPN to support nutrition needs d/t inadequate PO intake. K+ 4.7, P 3.8, WNL. Clear Liquid diet ordered this morning. BG slightly elevated at 124-140, but no hx of DM. Recommend to initiate Jevity 1.5 at 20 mL/hr, increase 10 mL q 8 hours to goal rate of 60 mL/hr. Flush 30 mL/hr H2O. This regimen provides 2160 kcal, 92 g pro, 310 g CHO, and 1094 mL fluid from formula and 720 mL fluid from flush.    Nutrition Related Findings:    BM 5/7. K+ 4.7, P 3.8, BNP 9,915, glu 124-140.         Current Nutrition Intake & Therapies:    Average Meal Intake: NPO, Unable to assess  Average Supplements Intake: NPO, Unable to assess  PN-Adult Premix 4.25/5 - Standard Electrolytes- Peripheral Line  ADULT DIET; Clear Liquid  ADULT TUBE FEEDING; Gastrostomy; Standard with Fiber; Continuous; 20; Yes; 10; Q 8 hours; 60; 30; Q 1 hour  Current Tube Feeding (TF) Orders:  Feeding Route: Gastrostomy  Formula: Standard with Fiber  Schedule: Continuous  Feeding Regimen: Jevity 1.5 with a goal rate of 60 mL/hr  Additives/Modulars: None  Water Flushes: 30 mL/hr  Goal TF & Flush Orders Provides: Jevity

## 2024-05-12 LAB
ANION GAP SERPL CALCULATED.3IONS-SCNC: 10 MMOL/L (ref 7–19)
BASOPHILS # BLD: 0 K/UL (ref 0–0.2)
BASOPHILS NFR BLD: 0.1 % (ref 0–1)
BUN SERPL-MCNC: 26 MG/DL (ref 8–23)
CALCIUM SERPL-MCNC: 8.4 MG/DL (ref 8.8–10.2)
CHLORIDE SERPL-SCNC: 106 MMOL/L (ref 98–111)
CO2 SERPL-SCNC: 22 MMOL/L (ref 22–29)
CREAT SERPL-MCNC: 2.3 MG/DL (ref 0.5–1.2)
EOSINOPHIL # BLD: 0 K/UL (ref 0–0.6)
EOSINOPHIL NFR BLD: 0.5 % (ref 0–5)
ERYTHROCYTE [DISTWIDTH] IN BLOOD BY AUTOMATED COUNT: 13.2 % (ref 11.5–14.5)
GLUCOSE BLD-MCNC: 104 MG/DL (ref 70–99)
GLUCOSE BLD-MCNC: 110 MG/DL (ref 70–99)
GLUCOSE BLD-MCNC: 112 MG/DL (ref 70–99)
GLUCOSE BLD-MCNC: 97 MG/DL (ref 70–99)
GLUCOSE SERPL-MCNC: 103 MG/DL (ref 74–109)
HCT VFR BLD AUTO: 30.6 % (ref 42–52)
HGB BLD-MCNC: 10.1 G/DL (ref 14–18)
IMM GRANULOCYTES # BLD: 0 K/UL
LYMPHOCYTES # BLD: 0.6 K/UL (ref 1.1–4.5)
LYMPHOCYTES NFR BLD: 6.7 % (ref 20–40)
MCH RBC QN AUTO: 30.8 PG (ref 27–31)
MCHC RBC AUTO-ENTMCNC: 33 G/DL (ref 33–37)
MCV RBC AUTO: 93.3 FL (ref 80–94)
MONOCYTES # BLD: 0.7 K/UL (ref 0–0.9)
MONOCYTES NFR BLD: 7.8 % (ref 0–10)
NEUTROPHILS # BLD: 7.3 K/UL (ref 1.5–7.5)
NEUTS SEG NFR BLD: 84.4 % (ref 50–65)
PERFORMED ON: ABNORMAL
PERFORMED ON: NORMAL
PHOSPHATE SERPL-MCNC: 4.1 MG/DL (ref 2.5–4.5)
PLATELET # BLD AUTO: 144 K/UL (ref 130–400)
PMV BLD AUTO: 11.1 FL (ref 9.4–12.4)
POTASSIUM SERPL-SCNC: 4.6 MMOL/L (ref 3.5–5)
RBC # BLD AUTO: 3.28 M/UL (ref 4.7–6.1)
SODIUM SERPL-SCNC: 138 MMOL/L (ref 136–145)
WBC # BLD AUTO: 8.7 K/UL (ref 4.8–10.8)

## 2024-05-12 PROCEDURE — 6370000000 HC RX 637 (ALT 250 FOR IP): Performed by: SURGERY

## 2024-05-12 PROCEDURE — 2580000003 HC RX 258: Performed by: SURGERY

## 2024-05-12 PROCEDURE — 82962 GLUCOSE BLOOD TEST: CPT

## 2024-05-12 PROCEDURE — 2700000000 HC OXYGEN THERAPY PER DAY

## 2024-05-12 PROCEDURE — 84100 ASSAY OF PHOSPHORUS: CPT

## 2024-05-12 PROCEDURE — 1200000000 HC SEMI PRIVATE

## 2024-05-12 PROCEDURE — 85025 COMPLETE CBC W/AUTO DIFF WBC: CPT

## 2024-05-12 PROCEDURE — 80048 BASIC METABOLIC PNL TOTAL CA: CPT

## 2024-05-12 PROCEDURE — 36415 COLL VENOUS BLD VENIPUNCTURE: CPT

## 2024-05-12 PROCEDURE — 6370000000 HC RX 637 (ALT 250 FOR IP): Performed by: INTERNAL MEDICINE

## 2024-05-12 PROCEDURE — 94760 N-INVAS EAR/PLS OXIMETRY 1: CPT

## 2024-05-12 PROCEDURE — 94640 AIRWAY INHALATION TREATMENT: CPT

## 2024-05-12 PROCEDURE — 6360000002 HC RX W HCPCS: Performed by: SURGERY

## 2024-05-12 RX ADMIN — Medication 2 PUFF: at 09:56

## 2024-05-12 RX ADMIN — ENOXAPARIN SODIUM 30 MG: 100 INJECTION SUBCUTANEOUS at 10:24

## 2024-05-12 RX ADMIN — AMLODIPINE BESYLATE 5 MG: 5 TABLET ORAL at 19:53

## 2024-05-12 RX ADMIN — GUAIFENESIN 1200 MG: 600 TABLET, EXTENDED RELEASE ORAL at 19:53

## 2024-05-12 RX ADMIN — AMLODIPINE BESYLATE 5 MG: 5 TABLET ORAL at 10:24

## 2024-05-12 RX ADMIN — PRAVASTATIN SODIUM 40 MG: 20 TABLET ORAL at 19:53

## 2024-05-12 RX ADMIN — METOPROLOL SUCCINATE 25 MG: 25 TABLET, EXTENDED RELEASE ORAL at 10:24

## 2024-05-12 RX ADMIN — SODIUM CHLORIDE, PRESERVATIVE FREE 10 ML: 5 INJECTION INTRAVENOUS at 10:24

## 2024-05-12 RX ADMIN — GUAIFENESIN 1200 MG: 600 TABLET, EXTENDED RELEASE ORAL at 10:23

## 2024-05-12 RX ADMIN — HYDRALAZINE HYDROCHLORIDE 100 MG: 50 TABLET ORAL at 10:24

## 2024-05-12 RX ADMIN — MORPHINE SULFATE 4 MG: 4 INJECTION, SOLUTION INTRAMUSCULAR; INTRAVENOUS at 02:44

## 2024-05-12 RX ADMIN — SODIUM CHLORIDE, PRESERVATIVE FREE 10 ML: 5 INJECTION INTRAVENOUS at 19:54

## 2024-05-12 RX ADMIN — POLYETHYLENE GLYCOL (3350) 17 G: 17 POWDER, FOR SOLUTION ORAL at 10:24

## 2024-05-12 RX ADMIN — ISOSORBIDE MONONITRATE 30 MG: 30 TABLET, EXTENDED RELEASE ORAL at 10:24

## 2024-05-12 RX ADMIN — FOLIC ACID 1 MG: 1 TABLET ORAL at 10:24

## 2024-05-12 RX ADMIN — SODIUM BICARBONATE 325 MG: 325 TABLET ORAL at 10:24

## 2024-05-12 RX ADMIN — ASPIRIN 81 MG: 81 TABLET, COATED ORAL at 10:24

## 2024-05-12 RX ADMIN — Medication 50 MCG: at 10:24

## 2024-05-12 RX ADMIN — PANTOPRAZOLE SODIUM 40 MG: 40 TABLET, DELAYED RELEASE ORAL at 15:19

## 2024-05-12 RX ADMIN — HYDRALAZINE HYDROCHLORIDE 100 MG: 50 TABLET ORAL at 19:53

## 2024-05-12 RX ADMIN — MORPHINE SULFATE 2 MG: 2 INJECTION, SOLUTION INTRAMUSCULAR; INTRAVENOUS at 15:19

## 2024-05-12 RX ADMIN — MORPHINE SULFATE 2 MG: 2 INJECTION, SOLUTION INTRAMUSCULAR; INTRAVENOUS at 18:25

## 2024-05-12 RX ADMIN — FLUTICASONE PROPIONATE 2 SPRAY: 50 SPRAY, METERED NASAL at 10:24

## 2024-05-12 RX ADMIN — Medication 2 PUFF: at 19:28

## 2024-05-12 ASSESSMENT — PAIN SCALES - GENERAL
PAINLEVEL_OUTOF10: 6
PAINLEVEL_OUTOF10: 7
PAINLEVEL_OUTOF10: 8

## 2024-05-12 ASSESSMENT — PAIN DESCRIPTION - LOCATION
LOCATION: BACK
LOCATION: ABDOMEN

## 2024-05-12 ASSESSMENT — PAIN - FUNCTIONAL ASSESSMENT: PAIN_FUNCTIONAL_ASSESSMENT: PREVENTS OR INTERFERES SOME ACTIVE ACTIVITIES AND ADLS

## 2024-05-12 NOTE — PLAN OF CARE
Problem: Chronic Conditions and Co-morbidities  Goal: Patient's chronic conditions and co-morbidity symptoms are monitored and maintained or improved  5/12/2024 1238 by Kamila Saeed RN  Outcome: Progressing  5/12/2024 0335 by Ca Clement RN  Outcome: Progressing     Problem: Safety - Adult  Goal: Free from fall injury  5/12/2024 1238 by Kamila Saeed RN  Outcome: Progressing  Flowsheets (Taken 5/12/2024 1236)  Free From Fall Injury: Instruct family/caregiver on patient safety  5/12/2024 0335 by Ca Clement RN  Outcome: Progressing     Problem: Nutrition Deficit:  Goal: Optimize nutritional status  5/12/2024 1238 by Kamila Saeed RN  Outcome: Progressing  5/12/2024 0335 by Ca Clement RN  Outcome: Progressing     Problem: Discharge Planning  Goal: Discharge to home or other facility with appropriate resources  5/12/2024 1238 by Kamila Saeed RN  Outcome: Progressing  5/12/2024 0335 by Ca Clement RN  Outcome: Progressing     Problem: Pain  Goal: Verbalizes/displays adequate comfort level or baseline comfort level  5/12/2024 1238 by Kamila Saeed RN  Outcome: Progressing  5/12/2024 0335 by Ca Clement RN  Outcome: Progressing     Problem: Skin/Tissue Integrity  Goal: Absence of new skin breakdown  Description: 1.  Monitor for areas of redness and/or skin breakdown  2.  Assess vascular access sites hourly  3.  Every 4-6 hours minimum:  Change oxygen saturation probe site  4.  Every 4-6 hours:  If on nasal continuous positive airway pressure, respiratory therapy assess nares and determine need for appliance change or resting period.  5/12/2024 1238 by Kamila Saeed RN  Outcome: Progressing  5/12/2024 0335 by Ca Clement RN  Outcome: Progressing

## 2024-05-12 NOTE — PROGRESS NOTES
05/12/24 1500   Subjective   Subjective I am not feeling too good now.  Where they put that feeding tube in is hurting.  No I don't feel like it now.  ( Walking ).  (Patient delined 2 pain.)   PT Plan of Care   Sunday R       Electronically signed by Parrish De Jesus PTA on 5/12/2024 at 3:11 PM

## 2024-05-12 NOTE — PROGRESS NOTES
Daily Progress Note    Date:2024  Patient: Ming Rocha  : 1940  MRN:[id number]  CODE:DNR No additional code details  PCP:Irving Larsen DO    Admit Date: 2024  1:07 PM   LOS: 6 days     Weight loss, unable to tolerate PO. Pt with active esophageal Ca.     Hospital Summary: 83 y.o. male who presented to [facility] as a direct admission from Dr. Blankenship's office to hospitalist service for evaluation of failure to thrive. Pt has history of copd, ckd stage IV, HTN, hyperlipidemia, cad, CVA, and esophageal malignancy followed by Dr. Christopher. He was evaluated as outpatient for port and feeding tube placement by Dr. Blankenship. Pt with uncontrolled pain, difficulty swallowing and weight loss. Directly admitted to hospitalist service for further management.         Endoscopy with concerns for esophageal stent migration, as well as jejunostomy and port placement are now on hold due to abnormal KG and telemetry findings as part of pre procedure evaluation.     Cardiology involved - stress test completed and reassuring.     Went to OR with Dr Blankenship 5/10 for R chest chemo port placement as well as surgical gastrostomy placement.       Subjective:     Off tPN  - started PEG tube feeding   Tolerating well.       Pain as expected post op.               Review of Systems   All other systems reviewed and are negative.      Objective:      Vital signs in last 24 hours:  Patient Vitals for the past 24 hrs:   BP Temp Temp src Pulse Resp SpO2   24 1136 138/75 97 °F (36.1 °C) -- 56 16 95 %   24 0957 -- -- -- -- -- 99 %   24 0803 (!) 147/79 98.1 °F (36.7 °C) Temporal 53 18 98 %   24 1910 123/65 98.2 °F (36.8 °C) -- 66 18 97 %         I/O last 3 completed shifts:  In: 30 [NG/GT:30]  Out: 1525 [Urine:1525]  No intake/output data recorded.    Physical Exam  Constitutional:       General: He is not in acute distress.     Appearance: He is not toxic-appearing.   Cardiovascular:      Rate and Rhythm:

## 2024-05-12 NOTE — PLAN OF CARE
Problem: Chronic Conditions and Co-morbidities  Goal: Patient's chronic conditions and co-morbidity symptoms are monitored and maintained or improved  Outcome: Progressing     Problem: Safety - Adult  Goal: Free from fall injury  Outcome: Progressing     Problem: Nutrition Deficit:  Goal: Optimize nutritional status  Outcome: Progressing     Problem: Discharge Planning  Goal: Discharge to home or other facility with appropriate resources  Outcome: Progressing     Problem: Pain  Goal: Verbalizes/displays adequate comfort level or baseline comfort level  Outcome: Progressing     Problem: Skin/Tissue Integrity  Goal: Absence of new skin breakdown  Description: 1.  Monitor for areas of redness and/or skin breakdown  2.  Assess vascular access sites hourly  3.  Every 4-6 hours minimum:  Change oxygen saturation probe site  4.  Every 4-6 hours:  If on nasal continuous positive airway pressure, respiratory therapy assess nares and determine need for appliance change or resting period.  Outcome: Progressing

## 2024-05-13 ENCOUNTER — TELEPHONE (OUTPATIENT)
Age: 84
End: 2024-05-13
Payer: MEDICARE

## 2024-05-13 DIAGNOSIS — C16.0 ADENOCARCINOMA OF GASTROESOPHAGEAL JUNCTION (HCC): Primary | ICD-10-CM

## 2024-05-13 PROBLEM — E43 SEVERE MALNUTRITION (HCC): Chronic | Status: ACTIVE | Noted: 2024-05-13

## 2024-05-13 LAB
ALBUMIN SERPL-MCNC: 2.9 G/DL (ref 3.5–5.2)
ALP SERPL-CCNC: 50 U/L (ref 40–130)
ALT SERPL-CCNC: <5 U/L (ref 5–41)
ANION GAP SERPL CALCULATED.3IONS-SCNC: 8 MMOL/L (ref 7–19)
AST SERPL-CCNC: 10 U/L (ref 5–40)
BASOPHILS # BLD: 0 K/UL (ref 0–0.2)
BASOPHILS NFR BLD: 0.2 % (ref 0–1)
BILIRUB DIRECT SERPL-MCNC: 0.2 MG/DL (ref 0–0.3)
BILIRUB INDIRECT SERPL-MCNC: 0.1 MG/DL (ref 0.1–1)
BILIRUB SERPL-MCNC: 0.3 MG/DL (ref 0.2–1.2)
BUN SERPL-MCNC: 27 MG/DL (ref 8–23)
CALCIUM SERPL-MCNC: 8.5 MG/DL (ref 8.8–10.2)
CHLORIDE SERPL-SCNC: 107 MMOL/L (ref 98–111)
CO2 SERPL-SCNC: 23 MMOL/L (ref 22–29)
CREAT SERPL-MCNC: 2.2 MG/DL (ref 0.5–1.2)
EOSINOPHIL # BLD: 0.2 K/UL (ref 0–0.6)
EOSINOPHIL NFR BLD: 2.2 % (ref 0–5)
ERYTHROCYTE [DISTWIDTH] IN BLOOD BY AUTOMATED COUNT: 13.2 % (ref 11.5–14.5)
GLUCOSE BLD-MCNC: 108 MG/DL (ref 70–99)
GLUCOSE BLD-MCNC: 110 MG/DL (ref 70–99)
GLUCOSE BLD-MCNC: 113 MG/DL (ref 70–99)
GLUCOSE BLD-MCNC: 97 MG/DL (ref 70–99)
GLUCOSE SERPL-MCNC: 108 MG/DL (ref 74–109)
HCT VFR BLD AUTO: 29.1 % (ref 42–52)
HGB BLD-MCNC: 9.7 G/DL (ref 14–18)
IMM GRANULOCYTES # BLD: 0 K/UL
LYMPHOCYTES # BLD: 0.8 K/UL (ref 1.1–4.5)
LYMPHOCYTES NFR BLD: 9.7 % (ref 20–40)
MCH RBC QN AUTO: 30.9 PG (ref 27–31)
MCHC RBC AUTO-ENTMCNC: 33.3 G/DL (ref 33–37)
MCV RBC AUTO: 92.7 FL (ref 80–94)
MONOCYTES # BLD: 0.7 K/UL (ref 0–0.9)
MONOCYTES NFR BLD: 9.1 % (ref 0–10)
NEUTROPHILS # BLD: 6.4 K/UL (ref 1.5–7.5)
NEUTS SEG NFR BLD: 78.6 % (ref 50–65)
PERFORMED ON: ABNORMAL
PERFORMED ON: NORMAL
PHOSPHATE SERPL-MCNC: 3.4 MG/DL (ref 2.5–4.5)
PLATELET # BLD AUTO: 141 K/UL (ref 130–400)
PMV BLD AUTO: 10.3 FL (ref 9.4–12.4)
POTASSIUM SERPL-SCNC: 4.4 MMOL/L (ref 3.5–5)
PROT SERPL-MCNC: 4.9 G/DL (ref 6.6–8.7)
RBC # BLD AUTO: 3.14 M/UL (ref 4.7–6.1)
SODIUM SERPL-SCNC: 138 MMOL/L (ref 136–145)
WBC # BLD AUTO: 8.1 K/UL (ref 4.8–10.8)

## 2024-05-13 PROCEDURE — 6370000000 HC RX 637 (ALT 250 FOR IP): Performed by: SURGERY

## 2024-05-13 PROCEDURE — 6370000000 HC RX 637 (ALT 250 FOR IP): Performed by: INTERNAL MEDICINE

## 2024-05-13 PROCEDURE — 80048 BASIC METABOLIC PNL TOTAL CA: CPT

## 2024-05-13 PROCEDURE — 84100 ASSAY OF PHOSPHORUS: CPT

## 2024-05-13 PROCEDURE — 94760 N-INVAS EAR/PLS OXIMETRY 1: CPT

## 2024-05-13 PROCEDURE — 82962 GLUCOSE BLOOD TEST: CPT

## 2024-05-13 PROCEDURE — 36415 COLL VENOUS BLD VENIPUNCTURE: CPT

## 2024-05-13 PROCEDURE — 6360000002 HC RX W HCPCS: Performed by: SURGERY

## 2024-05-13 PROCEDURE — 80076 HEPATIC FUNCTION PANEL: CPT

## 2024-05-13 PROCEDURE — 2580000003 HC RX 258: Performed by: SURGERY

## 2024-05-13 PROCEDURE — 99232 SBSQ HOSP IP/OBS MODERATE 35: CPT | Performed by: INTERNAL MEDICINE

## 2024-05-13 PROCEDURE — 1200000000 HC SEMI PRIVATE

## 2024-05-13 PROCEDURE — 85025 COMPLETE CBC W/AUTO DIFF WBC: CPT

## 2024-05-13 PROCEDURE — 94640 AIRWAY INHALATION TREATMENT: CPT

## 2024-05-13 RX ORDER — ACETAMINOPHEN 325 MG/1
650 TABLET ORAL
OUTPATIENT
Start: 2024-05-13

## 2024-05-13 RX ORDER — HEPARIN SODIUM (PORCINE) LOCK FLUSH IV SOLN 100 UNIT/ML 100 UNIT/ML
500 SOLUTION INTRAVENOUS PRN
OUTPATIENT
Start: 2024-05-13

## 2024-05-13 RX ORDER — MEPERIDINE HYDROCHLORIDE 50 MG/ML
12.5 INJECTION INTRAMUSCULAR; INTRAVENOUS; SUBCUTANEOUS PRN
OUTPATIENT
Start: 2024-05-13

## 2024-05-13 RX ORDER — SODIUM CHLORIDE 0.9 % (FLUSH) 0.9 %
5-40 SYRINGE (ML) INJECTION PRN
OUTPATIENT
Start: 2024-05-13

## 2024-05-13 RX ORDER — SODIUM CHLORIDE 9 MG/ML
INJECTION, SOLUTION INTRAVENOUS CONTINUOUS
OUTPATIENT
Start: 2024-05-13

## 2024-05-13 RX ORDER — ONDANSETRON 2 MG/ML
8 INJECTION INTRAMUSCULAR; INTRAVENOUS
OUTPATIENT
Start: 2024-05-13

## 2024-05-13 RX ORDER — LACTULOSE 10 G/15ML
20 SOLUTION ORAL 3 TIMES DAILY
Status: DISCONTINUED | OUTPATIENT
Start: 2024-05-13 | End: 2024-05-15

## 2024-05-13 RX ORDER — PALONOSETRON 0.05 MG/ML
0.25 INJECTION, SOLUTION INTRAVENOUS ONCE
OUTPATIENT
Start: 2024-05-13 | End: 2024-05-13

## 2024-05-13 RX ORDER — DIPHENHYDRAMINE HYDROCHLORIDE 50 MG/ML
50 INJECTION INTRAMUSCULAR; INTRAVENOUS ONCE
OUTPATIENT
Start: 2024-05-13 | End: 2024-05-13

## 2024-05-13 RX ORDER — FAMOTIDINE 10 MG/ML
20 INJECTION, SOLUTION INTRAVENOUS ONCE
OUTPATIENT
Start: 2024-05-13 | End: 2024-05-13

## 2024-05-13 RX ORDER — DIPHENHYDRAMINE HYDROCHLORIDE 50 MG/ML
50 INJECTION INTRAMUSCULAR; INTRAVENOUS
OUTPATIENT
Start: 2024-05-13

## 2024-05-13 RX ORDER — FAMOTIDINE 10 MG/ML
20 INJECTION, SOLUTION INTRAVENOUS
OUTPATIENT
Start: 2024-05-13

## 2024-05-13 RX ORDER — ALBUTEROL SULFATE 90 UG/1
4 AEROSOL, METERED RESPIRATORY (INHALATION) PRN
OUTPATIENT
Start: 2024-05-13

## 2024-05-13 RX ORDER — SODIUM CHLORIDE 9 MG/ML
5-250 INJECTION, SOLUTION INTRAVENOUS PRN
OUTPATIENT
Start: 2024-05-13

## 2024-05-13 RX ORDER — EPINEPHRINE 1 MG/ML
0.3 INJECTION, SOLUTION, CONCENTRATE INTRAVENOUS PRN
OUTPATIENT
Start: 2024-05-13

## 2024-05-13 RX ADMIN — LACTULOSE 20 G: 20 SOLUTION ORAL at 20:34

## 2024-05-13 RX ADMIN — Medication 2 PUFF: at 08:12

## 2024-05-13 RX ADMIN — METOPROLOL SUCCINATE 25 MG: 25 TABLET, EXTENDED RELEASE ORAL at 09:30

## 2024-05-13 RX ADMIN — AMLODIPINE BESYLATE 5 MG: 5 TABLET ORAL at 09:30

## 2024-05-13 RX ADMIN — HYDRALAZINE HYDROCHLORIDE 100 MG: 50 TABLET ORAL at 09:29

## 2024-05-13 RX ADMIN — ISOSORBIDE MONONITRATE 30 MG: 30 TABLET, EXTENDED RELEASE ORAL at 09:30

## 2024-05-13 RX ADMIN — ASPIRIN 81 MG: 81 TABLET, COATED ORAL at 09:30

## 2024-05-13 RX ADMIN — LACTULOSE 20 G: 20 SOLUTION ORAL at 15:16

## 2024-05-13 RX ADMIN — POLYETHYLENE GLYCOL (3350) 17 G: 17 POWDER, FOR SOLUTION ORAL at 09:31

## 2024-05-13 RX ADMIN — HYDRALAZINE HYDROCHLORIDE 100 MG: 50 TABLET ORAL at 20:32

## 2024-05-13 RX ADMIN — SODIUM CHLORIDE, PRESERVATIVE FREE 10 ML: 5 INJECTION INTRAVENOUS at 20:37

## 2024-05-13 RX ADMIN — FLUTICASONE PROPIONATE 2 SPRAY: 50 SPRAY, METERED NASAL at 09:31

## 2024-05-13 RX ADMIN — OXYCODONE HYDROCHLORIDE 10 MG: 5 SOLUTION ORAL at 17:12

## 2024-05-13 RX ADMIN — FOLIC ACID 1 MG: 1 TABLET ORAL at 09:30

## 2024-05-13 RX ADMIN — PANTOPRAZOLE SODIUM 40 MG: 40 TABLET, DELAYED RELEASE ORAL at 16:55

## 2024-05-13 RX ADMIN — AMLODIPINE BESYLATE 5 MG: 5 TABLET ORAL at 20:32

## 2024-05-13 RX ADMIN — GUAIFENESIN 1200 MG: 600 TABLET, EXTENDED RELEASE ORAL at 20:32

## 2024-05-13 RX ADMIN — SODIUM BICARBONATE 325 MG: 325 TABLET ORAL at 09:30

## 2024-05-13 RX ADMIN — GUAIFENESIN 1200 MG: 600 TABLET, EXTENDED RELEASE ORAL at 09:29

## 2024-05-13 RX ADMIN — GLYCERIN 2 G: 2 SUPPOSITORY RECTAL at 12:54

## 2024-05-13 RX ADMIN — PRAVASTATIN SODIUM 40 MG: 20 TABLET ORAL at 20:32

## 2024-05-13 RX ADMIN — SODIUM CHLORIDE, PRESERVATIVE FREE 10 ML: 5 INJECTION INTRAVENOUS at 09:31

## 2024-05-13 RX ADMIN — OXYCODONE HYDROCHLORIDE 10 MG: 5 SOLUTION ORAL at 05:19

## 2024-05-13 RX ADMIN — Medication 2 PUFF: at 19:39

## 2024-05-13 RX ADMIN — Medication 50 MCG: at 09:30

## 2024-05-13 RX ADMIN — ENOXAPARIN SODIUM 30 MG: 100 INJECTION SUBCUTANEOUS at 09:30

## 2024-05-13 RX ADMIN — PANTOPRAZOLE SODIUM 40 MG: 40 TABLET, DELAYED RELEASE ORAL at 05:19

## 2024-05-13 ASSESSMENT — PAIN DESCRIPTION - ORIENTATION: ORIENTATION: LEFT

## 2024-05-13 ASSESSMENT — PAIN DESCRIPTION - LOCATION
LOCATION: ABDOMEN
LOCATION: ABDOMEN

## 2024-05-13 ASSESSMENT — PAIN - FUNCTIONAL ASSESSMENT: PAIN_FUNCTIONAL_ASSESSMENT: ACTIVITIES ARE NOT PREVENTED

## 2024-05-13 ASSESSMENT — PAIN SCALES - GENERAL
PAINLEVEL_OUTOF10: 6
PAINLEVEL_OUTOF10: 6

## 2024-05-13 ASSESSMENT — PAIN DESCRIPTION - DESCRIPTORS: DESCRIPTORS: SHARP

## 2024-05-13 NOTE — PROGRESS NOTES
Daily Progress Note    Date:2024  Patient: Ming Rocha  : 1940  MRN:[id number]  CODE:DNR No additional code details  PCP:Irving Larsen DO    Admit Date: 2024  1:07 PM   LOS: 7 days     Weight loss, unable to tolerate PO. Pt with active esophageal Ca.     Hospital Summary: 83 y.o. male who presented to [facility] as a direct admission from Dr. Blankenship's office to hospitalist service for evaluation of failure to thrive. Pt has history of copd, ckd stage IV, HTN, hyperlipidemia, cad, CVA, and esophageal malignancy followed by Dr. Christopher. He was evaluated as outpatient for port and feeding tube placement by Dr. Blankenship. Pt with uncontrolled pain, difficulty swallowing and weight loss. Directly admitted to hospitalist service for further management.         Endoscopy with concerns for esophageal stent migration, as well as jejunostomy and port placement are now on hold due to abnormal KG and telemetry findings as part of pre procedure evaluation.     Cardiology involved - stress test completed and reassuring.     Went to OR with Dr Blankenship 5/10 for R chest chemo port placement as well as surgical gastrostomy placement.       Subjective:     Off tPN  - started PEG tube feeding   Had some abd bloating and epigastric discomfort, albeit residuals well <100cc. PEG infusion rate cut and tolerating better now.     Still without BM.   Has Hx of fecal impaction.     Abdominal exam benign.                 Review of Systems   All other systems reviewed and are negative.      Objective:      Vital signs in last 24 hours:  Patient Vitals for the past 24 hrs:   BP Temp Temp src Pulse Resp SpO2   24 0812 -- -- -- -- -- 95 %   24 0811 (!) 143/72 97.5 °F (36.4 °C) Temporal 64 -- 97 %   24 1940 (!) 141/68 98.2 °F (36.8 °C) -- 66 20 93 %   248 -- -- -- 68 16 94 %   24 1825 -- -- -- -- 18 --   24 1815 (!) 151/77 -- -- 67 -- 95 %   24 1722 (!) 148/65 97.3 °F (36.3 °C) -- 61 18  5-40 mL, IntraVENous, PRN, Elena Blankenship MD    0.9 % sodium chloride infusion, , IntraVENous, PRN, Elena Blankenship MD    fentaNYL (DURAGESIC) 50 MCG/HR 1 patch, 1 patch, TransDERmal, Q72H, Ming Licona MD, 1 patch at 05/10/24 2344    folic acid (FOLVITE) tablet 1 mg, 1 mg, Oral, Daily, Elena Blankenship MD, 1 mg at 05/13/24 0930    oxyCODONE (ROXICODONE) 5 MG/5ML solution 10 mg, 10 mg, Oral, Q4H PRN, Elena Blankenship MD, 10 mg at 05/13/24 0519    polyethylene glycol (GLYCOLAX) packet 17 g, 17 g, Oral, Daily, Elena Blankenship MD, 17 g at 05/13/24 0931    sodium chloride flush 0.9 % injection 5-40 mL, 5-40 mL, IntraVENous, 2 times per day, Elena Blankenship MD, 10 mL at 05/10/24 1114    sodium chloride flush 0.9 % injection 5-40 mL, 5-40 mL, IntraVENous, PRN, Elena Blankenship MD    0.9 % sodium chloride infusion, , IntraVENous, PRN, Eelna Blankenship MD    enoxaparin Sodium (LOVENOX) injection 30 mg, 30 mg, SubCUTAneous, Daily, Elena Blankenship MD, 30 mg at 05/13/24 0930    ondansetron (ZOFRAN-ODT) disintegrating tablet 4 mg, 4 mg, Oral, Q8H PRN **OR** ondansetron (ZOFRAN) injection 4 mg, 4 mg, IntraVENous, Q6H PRN, Elena Blankenship MD, 4 mg at 05/09/24 1127    acetaminophen (TYLENOL) tablet 650 mg, 650 mg, Oral, Q6H PRN **OR** acetaminophen (TYLENOL) suppository 650 mg, 650 mg, Rectal, Q6H PRN, Elena Blankenship MD    amLODIPine (NORVASC) tablet 5 mg, 5 mg, Oral, BID, Elena Blankenship MD, 5 mg at 05/13/24 0930    aspirin EC tablet 81 mg, 81 mg, Oral, Daily, Elena Blankenship MD, 81 mg at 05/13/24 0930    budesonide-formoterol (SYMBICORT) 160-4.5 MCG/ACT inhaler 2 puff, 2 puff, Inhalation, BID, Elena Blankenship MD, 2 puff at 05/13/24 0812    vitamin B-12 (CYANOCOBALAMIN) tablet 50 mcg, 50 mcg, Oral, Daily, Elena Blankenship MD, 50 mcg at 05/13/24 0930    fluticasone (FLONASE) 50 MCG/ACT nasal spray 2 spray, 2 spray, Nasal, Daily, Elena Blankenship MD, 2 spray at 05/13/24 0931    guaiFENesin (MUCINEX) extended release

## 2024-05-13 NOTE — PROGRESS NOTES
05/13/24 1600   Subjective   Subjective I don't know ( about gait ) they just gave me a bunch of bowel stuff I may start going.  ( while walking )   PT Plan of Care   Monday R       Electronically signed by Parrish De Jesus PTA on 5/13/2024 at 4:09 PM

## 2024-05-13 NOTE — PROGRESS NOTES
MEDICAL ONCOLOGY PROGRESS NOTE    Pt Name: Ming Rocha  MRN: [id number]  YOB: 1940  Date of evaluation: 5/13/2024    Subjective-status post gastrostomy tube placement, postop day 3.  Enteral nutrition started.  Patient felt nauseated with enteral nutrition rate at 60.  He is now on 20 cc an hour.  Tolerating well.  Reviewed hospitalist notes.  Contacted Dr. Claudio regarding stent exchange      HISTORY OF PRESENT ILLNESS:  Ming Rocha is well-known to our clinic.  He is a patient established with Dr. Seth Christopher.  He was last seen on 5/2/2024.  The patient has a new diagnosis of esophageal cancer of the distal esophagus status post esophageal stent placement.  He is having significant issues with p.o. intake after the stent placement.  He was referred back to Dr. Claudio for stent adjustment.  In addition, recommend port placement with general surgery as well as surgical jejunostomy feeding tube.  Referral to radiation oncology has been made.  Patient presents today hospital as a direct admission from Dr. Blankenship's office.  Patient had uncontrolled pain and therefore was admitted to the inpatient for pain control.  Patient has generalized weakness, fatigue and significant pain related to his cancer.  Patient was started on Duragesic patch 50 mcg every 3 days by Dr. Christopher last week.    Laboratory studies at admission today showed evidence of hypokalemia with potassium 2.9, low total protein.  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) on EGD 4/10/2024 at [facility]  Right chest wall axillary pain secondary to esophageal cancer  Distal esophageal obstruction  2.2  -Continue to monitor      Latest Ref Rng & Units 5/13/2024     3:07 AM 5/12/2024     3:34 AM 5/11/2024     3:03 AM 5/10/2024     2:56 AM 5/9/2024     4:49 AM   Labs Renal   BUN 8 - 23 mg/dL 27  26  21  16  15    Cr 0.5 - 1.2 mg/dL 2.2  2.3  2.4  2.2  2.2    K 3.5 - 5.0 mmol/L 4.4  4.6  4.7  3.8  3.9     3.9    Na 136 - 145 mmol/L 138  138  137  138  138    -Mildly improved    # Coronary artery disease  Current amlodipine, aspirin, pravastatin  Lexiscan 5/9/2024 as above.  Trending troponin  Lexiscan 5/9/2024 ejection fraction 60% normal perfusion study  Echocardiogram 11/21/2023 ejection fraction 56 to 60% mild concentric LVH grade 2 diastolic dysfunction suggested left atrial enlargement right atrial enlargement moderate pulmonary hypertension.  Abnormal global longitudinal strain -11.5%.    PLAN:  Continue current supportive care  Consult to palliative care for pain control  Continue fentanyl 50 mcg to 72 hours  Morphine IV as needed  Naloxone, antidote  Oxycodone as needed  Antiemetic as needed  Continue enteral nutrition  Continue IV fluids  Status post gastric tube placed and port insertion by Dr. Elena Blankenship on 5/10/2024  I have messaged Dr. Claudio regarding stent exchange        (Please note that portions of this note were completed with a voice recognition program. Efforts were made to edit the dictations but occasionally words are mis-transcribed.)      Encounter Information    Encounter Information   Provider Department Encounter # Center   5/16/2024 10:15 AM Seth Christopher MD [facility] HEM ONC [id number] [facility]       Kirstin Coughlin MD    05/13/24  5:56 AM

## 2024-05-13 NOTE — PROGRESS NOTES
As ordered by Dr Christopher, per NCCN guidelines, the following treatment plan submitted for insurance authorization for adenocarcinoma distal esophagus:      (ISIDRO NGS requested on 5/3/24 - pending)

## 2024-05-13 NOTE — PLAN OF CARE
Problem: Chronic Conditions and Co-morbidities  Goal: Patient's chronic conditions and co-morbidity symptoms are monitored and maintained or improved  5/13/2024 0114 by Elizabeth Kenny RN  Outcome: Progressing  5/12/2024 1238 by Kamila Saeed RN  Outcome: Progressing     Problem: Safety - Adult  Goal: Free from fall injury  5/13/2024 0114 by Elizabeth Kenny RN  Outcome: Progressing  5/12/2024 1238 by Kamila Saeed RN  Outcome: Progressing  Flowsheets (Taken 5/12/2024 1236)  Free From Fall Injury: Instruct family/caregiver on patient safety     Problem: Nutrition Deficit:  Goal: Optimize nutritional status  5/13/2024 0114 by Elizabeth Kenny RN  Outcome: Progressing  5/12/2024 1238 by Kamila Saeed RN  Outcome: Progressing     Problem: Discharge Planning  Goal: Discharge to home or other facility with appropriate resources  5/13/2024 0114 by Elizabeth Kenny RN  Outcome: Progressing  5/12/2024 1238 by Kamila Saeed RN  Outcome: Progressing     Problem: Pain  Goal: Verbalizes/displays adequate comfort level or baseline comfort level  5/13/2024 0114 by Elizabeth Kenny RN  Outcome: Progressing  5/12/2024 1238 by Kamila Saeed RN  Outcome: Progressing     Problem: Skin/Tissue Integrity  Goal: Absence of new skin breakdown  Description: 1.  Monitor for areas of redness and/or skin breakdown  2.  Assess vascular access sites hourly  3.  Every 4-6 hours minimum:  Change oxygen saturation probe site  4.  Every 4-6 hours:  If on nasal continuous positive airway pressure, respiratory therapy assess nares and determine need for appliance change or resting period.  5/13/2024 0114 by Elizabeth Kenny RN  Outcome: Progressing  5/12/2024 1238 by Kamila Saeed RN  Outcome: Progressing

## 2024-05-13 NOTE — PROGRESS NOTES
Comprehensive Nutrition Assessment    Type and Reason for Visit:  Reassess    Nutrition Recommendations/Plan:   Recommend to slow TF rate advancement to 5 mL q 12 hours to promote tolerance, will adjust pending BM.  Remove mild malnutrition from problem list.  Add severe malnutrition to problem list.     Malnutrition Assessment:  Malnutrition Status:  Severe malnutrition (05/13/24 1320)    Context:  Chronic Illness     Findings of the 6 clinical characteristics of malnutrition:  Energy Intake:  Mild decrease in energy intake (Comment)  Weight Loss:  Greater than 10% over 6 months     Body Fat Loss:  Severe body fat loss Triceps   Muscle Mass Loss:  Severe muscle mass loss Temples (temporalis)  Fluid Accumulation:  No significant fluid accumulation     Strength:  Not Performed    Nutrition Assessment:    Aware TF rate decreased from 30 mL/hr d/t pt not tolerating and residual of 75 mL/hr. Last documented rate of 20 mL/hr. Observed Jevity 1.5 at 25 mL/hr with 30 mL/hr flush. Residual= 20 mL. Pt reported having bloating pain when rate was higher but stated he is better now at lower rate. Pt's daughter stated that pt's PO intake primarily consists of applesauce, jello, and drinks and said that calories will come primarily from TF. Pt reported not having a BM since 5/4. Per daughter, pt has a hx of impaction and was unable to resolve at home even w/suppositories. Pt also reported having some diarrhea at the beginning of admission. Pt and daughter asked about how to provide TF at home. Briefly discussed likely changing to cyclic or bolus feedings for ease of administration with pt's lifestyle but stated that pt should ideally be tolerating goal continuous rate before we increase the TF volume for lower feeding frequencies. Explained that pt will likely tolerate current TF regimen once he has a BM, and we can advance his rate and adjust prn. Pt and daughter expressed understanding. Spoke w/RN about my concerns with  increasing TF rate at current frequency when pt has not had a BM. Recommend to slow TF rate advancement to 5 mL q 12 hours to promote tolerance, will adjust pending BM.    Nutrition Related Findings:    BM 5/7. K+ 4.7, P 3.8, BNP 9,915, glu 124-140.         Current Nutrition Intake & Therapies:    Average Meal Intake: Unable to assess  Average Supplements Intake: None Ordered  ADULT DIET; Clear Liquid  ADULT TUBE FEEDING; Gastrostomy; Standard with Fiber; Continuous; 20; Yes; 5; Q 12 hours; 60; 30; Q 1 hour  Current Tube Feeding (TF) Orders:  Feeding Route: Gastrostomy  Formula: Standard with Fiber  Schedule: Continuous  Feeding Regimen: Jevity 1.5 with a goal rate of 60 mL/hr  Additives/Modulars: None  Water Flushes: 30 mL/hr  Current TF & Flush Orders Provides: Jevity 1.5 at 25 mL/hr with 30 mL/hr flush= 900 kcal, 38 g pro, 129 g CHO, and 456 mL fluid from formula and 720 mL fluid from flush.  Goal TF & Flush Orders Provides: Jevity 1.5 at 60 mL/hr with 30 mL/hr flush= 2160 kcal, 92 g pro, 310 g CHO, and 1094 mL fluid from formula and 720 mL fluid from flush.    Anthropometric Measures:  Height: 182.9 cm (6' 0.01\") (5/6/24)  Ideal Body Weight (IBW): 178 lbs (81 kg)    Current Body Weight: 82.6 kg (182 lb 1.6 oz), 102.3 % IBW.    Current BMI (kg/m2): 24.7  Usual Body Weight: 97.4 kg (214 lb 12.8 oz) (11/22/23)  % Weight Change (Calculated): -15.2  BMI Categories: Normal Weight (BMI 22.0 to 24.9) age over 65    Estimated Daily Nutrient Needs:  Energy Requirements Based On: Kcal/kg  Weight Used for Energy Requirements: Current  Energy (kcal/day): 2974-1056 (25-30 kcal/kg)  Weight Used for Protein Requirements: Current  Protein (g/day):  (1-2 g/kg)  Method Used for Fluid Requirements: Other (Comment) (20-25 mL/kg for CKD)  Fluid (ml/day): 5503-6125    Nutrition Diagnosis:   Severe malnutrition, In context of chronic illness related to catabolic illness, altered GI structure, inadequate protein-energy intake,

## 2024-05-13 NOTE — PROGRESS NOTES
Patient was on ENDO schedule today for elective adjustment of esophageal stent. Noted recent PEG placement and contrast was noted passing through stent on esophagram.     Tube feeds were not stopped. Will require 8 hrs     Plan  - EGD tomorrow with stent adjustment with/without replacement   - Hold TFs after midnight  - Please hold any Lovenox after midnight as well.

## 2024-05-14 ENCOUNTER — APPOINTMENT (OUTPATIENT)
Dept: GENERAL RADIOLOGY | Age: 84
End: 2024-05-14
Attending: STUDENT IN AN ORGANIZED HEALTH CARE EDUCATION/TRAINING PROGRAM
Payer: MEDICARE

## 2024-05-14 ENCOUNTER — ANESTHESIA (OUTPATIENT)
Dept: ENDOSCOPY | Age: 84
End: 2024-05-14
Payer: MEDICARE

## 2024-05-14 ENCOUNTER — ANESTHESIA EVENT (OUTPATIENT)
Dept: ENDOSCOPY | Age: 84
End: 2024-05-14
Payer: MEDICARE

## 2024-05-14 PROBLEM — R07.9 CHEST PAIN: Status: ACTIVE | Noted: 2024-05-14

## 2024-05-14 LAB
ANION GAP SERPL CALCULATED.3IONS-SCNC: 10 MMOL/L (ref 7–19)
BUN SERPL-MCNC: 25 MG/DL (ref 8–23)
CALCIUM SERPL-MCNC: 8.3 MG/DL (ref 8.8–10.2)
CHLORIDE SERPL-SCNC: 105 MMOL/L (ref 98–111)
CO2 SERPL-SCNC: 22 MMOL/L (ref 22–29)
CREAT SERPL-MCNC: 2.3 MG/DL (ref 0.5–1.2)
ERYTHROCYTE [DISTWIDTH] IN BLOOD BY AUTOMATED COUNT: 13.1 % (ref 11.5–14.5)
GLUCOSE BLD-MCNC: 100 MG/DL (ref 70–99)
GLUCOSE BLD-MCNC: 84 MG/DL (ref 70–99)
GLUCOSE BLD-MCNC: 92 MG/DL (ref 70–99)
GLUCOSE BLD-MCNC: 95 MG/DL (ref 70–99)
GLUCOSE SERPL-MCNC: 92 MG/DL (ref 74–109)
HCT VFR BLD AUTO: 30 % (ref 42–52)
HGB BLD-MCNC: 9.8 G/DL (ref 14–18)
MCH RBC QN AUTO: 29.8 PG (ref 27–31)
MCHC RBC AUTO-ENTMCNC: 32.7 G/DL (ref 33–37)
MCV RBC AUTO: 91.2 FL (ref 80–94)
PERFORMED ON: ABNORMAL
PERFORMED ON: NORMAL
PHOSPHATE SERPL-MCNC: 3.1 MG/DL (ref 2.5–4.5)
PLATELET # BLD AUTO: 145 K/UL (ref 130–400)
PMV BLD AUTO: 10.2 FL (ref 9.4–12.4)
POTASSIUM SERPL-SCNC: 4.2 MMOL/L (ref 3.5–5)
RBC # BLD AUTO: 3.29 M/UL (ref 4.7–6.1)
SODIUM SERPL-SCNC: 137 MMOL/L (ref 136–145)
WBC # BLD AUTO: 7.4 K/UL (ref 4.8–10.8)

## 2024-05-14 PROCEDURE — 94640 AIRWAY INHALATION TREATMENT: CPT

## 2024-05-14 PROCEDURE — 6360000002 HC RX W HCPCS: Performed by: INTERNAL MEDICINE

## 2024-05-14 PROCEDURE — 71045 X-RAY EXAM CHEST 1 VIEW: CPT

## 2024-05-14 PROCEDURE — 1200000000 HC SEMI PRIVATE

## 2024-05-14 PROCEDURE — 82962 GLUCOSE BLOOD TEST: CPT

## 2024-05-14 PROCEDURE — 85027 COMPLETE CBC AUTOMATED: CPT

## 2024-05-14 PROCEDURE — 80048 BASIC METABOLIC PNL TOTAL CA: CPT

## 2024-05-14 PROCEDURE — 36415 COLL VENOUS BLD VENIPUNCTURE: CPT

## 2024-05-14 PROCEDURE — 84100 ASSAY OF PHOSPHORUS: CPT

## 2024-05-14 PROCEDURE — 43266 EGD ENDOSCOPIC STENT PLACE: CPT | Performed by: INTERNAL MEDICINE

## 2024-05-14 PROCEDURE — 0DW58DZ REVISION OF INTRALUMINAL DEVICE IN ESOPHAGUS, VIA NATURAL OR ARTIFICIAL OPENING ENDOSCOPIC: ICD-10-PCS | Performed by: INTERNAL MEDICINE

## 2024-05-14 PROCEDURE — 97116 GAIT TRAINING THERAPY: CPT

## 2024-05-14 PROCEDURE — 3609017100 HC EGD: Performed by: INTERNAL MEDICINE

## 2024-05-14 PROCEDURE — 6370000000 HC RX 637 (ALT 250 FOR IP): Performed by: INTERNAL MEDICINE

## 2024-05-14 PROCEDURE — 2700000000 HC OXYGEN THERAPY PER DAY

## 2024-05-14 PROCEDURE — 99232 SBSQ HOSP IP/OBS MODERATE 35: CPT | Performed by: INTERNAL MEDICINE

## 2024-05-14 PROCEDURE — 2500000003 HC RX 250 WO HCPCS: Performed by: NURSE ANESTHETIST, CERTIFIED REGISTERED

## 2024-05-14 PROCEDURE — 6360000002 HC RX W HCPCS: Performed by: HOSPITALIST

## 2024-05-14 PROCEDURE — 7100000000 HC PACU RECOVERY - FIRST 15 MIN: Performed by: INTERNAL MEDICINE

## 2024-05-14 PROCEDURE — 94760 N-INVAS EAR/PLS OXIMETRY 1: CPT

## 2024-05-14 PROCEDURE — 97530 THERAPEUTIC ACTIVITIES: CPT

## 2024-05-14 PROCEDURE — 3700000001 HC ADD 15 MINUTES (ANESTHESIA): Performed by: INTERNAL MEDICINE

## 2024-05-14 PROCEDURE — 6360000002 HC RX W HCPCS: Performed by: NURSE ANESTHETIST, CERTIFIED REGISTERED

## 2024-05-14 PROCEDURE — 6370000000 HC RX 637 (ALT 250 FOR IP): Performed by: HOSPITALIST

## 2024-05-14 PROCEDURE — 2580000003 HC RX 258: Performed by: NURSE ANESTHETIST, CERTIFIED REGISTERED

## 2024-05-14 PROCEDURE — 6370000000 HC RX 637 (ALT 250 FOR IP): Performed by: SURGERY

## 2024-05-14 PROCEDURE — 3700000000 HC ANESTHESIA ATTENDED CARE: Performed by: INTERNAL MEDICINE

## 2024-05-14 PROCEDURE — 2709999900 HC NON-CHARGEABLE SUPPLY: Performed by: INTERNAL MEDICINE

## 2024-05-14 PROCEDURE — 7100000001 HC PACU RECOVERY - ADDTL 15 MIN: Performed by: INTERNAL MEDICINE

## 2024-05-14 PROCEDURE — 2580000003 HC RX 258: Performed by: INTERNAL MEDICINE

## 2024-05-14 RX ORDER — SODIUM CHLORIDE 9 MG/ML
INJECTION, SOLUTION INTRAVENOUS CONTINUOUS
Status: DISCONTINUED | OUTPATIENT
Start: 2024-05-14 | End: 2024-05-15

## 2024-05-14 RX ORDER — HYDROMORPHONE HYDROCHLORIDE 1 MG/ML
0.5 INJECTION, SOLUTION INTRAMUSCULAR; INTRAVENOUS; SUBCUTANEOUS EVERY 5 MIN PRN
Status: DISCONTINUED | OUTPATIENT
Start: 2024-05-14 | End: 2024-05-14

## 2024-05-14 RX ORDER — MEPERIDINE HYDROCHLORIDE 25 MG/ML
12.5 INJECTION INTRAMUSCULAR; INTRAVENOUS; SUBCUTANEOUS EVERY 5 MIN PRN
Status: DISCONTINUED | OUTPATIENT
Start: 2024-05-14 | End: 2024-05-14

## 2024-05-14 RX ORDER — DEXMEDETOMIDINE HYDROCHLORIDE 100 UG/ML
INJECTION, SOLUTION INTRAVENOUS PRN
Status: DISCONTINUED | OUTPATIENT
Start: 2024-05-14 | End: 2024-05-14 | Stop reason: SDUPTHER

## 2024-05-14 RX ORDER — GLYCOPYRROLATE 0.2 MG/ML
INJECTION INTRAMUSCULAR; INTRAVENOUS PRN
Status: DISCONTINUED | OUTPATIENT
Start: 2024-05-14 | End: 2024-05-14 | Stop reason: SDUPTHER

## 2024-05-14 RX ORDER — SODIUM CHLORIDE 0.9 % (FLUSH) 0.9 %
5-40 SYRINGE (ML) INJECTION PRN
Status: DISCONTINUED | OUTPATIENT
Start: 2024-05-14 | End: 2024-05-14

## 2024-05-14 RX ORDER — SODIUM CHLORIDE 9 MG/ML
INJECTION, SOLUTION INTRAVENOUS PRN
Status: DISCONTINUED | OUTPATIENT
Start: 2024-05-14 | End: 2024-05-14

## 2024-05-14 RX ORDER — DIPHENHYDRAMINE HYDROCHLORIDE 50 MG/ML
12.5 INJECTION INTRAMUSCULAR; INTRAVENOUS
Status: DISCONTINUED | OUTPATIENT
Start: 2024-05-14 | End: 2024-05-14

## 2024-05-14 RX ORDER — NALOXONE HYDROCHLORIDE 0.4 MG/ML
INJECTION, SOLUTION INTRAMUSCULAR; INTRAVENOUS; SUBCUTANEOUS PRN
Status: DISCONTINUED | OUTPATIENT
Start: 2024-05-14 | End: 2024-05-14

## 2024-05-14 RX ORDER — LIDOCAINE HYDROCHLORIDE 20 MG/ML
INJECTION, SOLUTION EPIDURAL; INFILTRATION; INTRACAUDAL; PERINEURAL PRN
Status: DISCONTINUED | OUTPATIENT
Start: 2024-05-14 | End: 2024-05-14 | Stop reason: SDUPTHER

## 2024-05-14 RX ORDER — HYDRALAZINE HYDROCHLORIDE 20 MG/ML
10 INJECTION INTRAMUSCULAR; INTRAVENOUS EVERY 4 HOURS PRN
Status: DISCONTINUED | OUTPATIENT
Start: 2024-05-14 | End: 2024-05-24 | Stop reason: HOSPADM

## 2024-05-14 RX ORDER — METOCLOPRAMIDE HYDROCHLORIDE 5 MG/ML
10 INJECTION INTRAMUSCULAR; INTRAVENOUS
Status: DISCONTINUED | OUTPATIENT
Start: 2024-05-14 | End: 2024-05-14

## 2024-05-14 RX ORDER — HYDROMORPHONE HYDROCHLORIDE 1 MG/ML
0.25 INJECTION, SOLUTION INTRAMUSCULAR; INTRAVENOUS; SUBCUTANEOUS EVERY 5 MIN PRN
Status: DISCONTINUED | OUTPATIENT
Start: 2024-05-14 | End: 2024-05-14

## 2024-05-14 RX ORDER — SODIUM CHLORIDE 0.9 % (FLUSH) 0.9 %
5-40 SYRINGE (ML) INJECTION EVERY 12 HOURS SCHEDULED
Status: DISCONTINUED | OUTPATIENT
Start: 2024-05-14 | End: 2024-05-14

## 2024-05-14 RX ORDER — PROPOFOL 10 MG/ML
INJECTION, EMULSION INTRAVENOUS PRN
Status: DISCONTINUED | OUTPATIENT
Start: 2024-05-14 | End: 2024-05-14 | Stop reason: SDUPTHER

## 2024-05-14 RX ORDER — LABETALOL HYDROCHLORIDE 5 MG/ML
10 INJECTION, SOLUTION INTRAVENOUS EVERY 4 HOURS PRN
Status: DISCONTINUED | OUTPATIENT
Start: 2024-05-14 | End: 2024-05-24 | Stop reason: HOSPADM

## 2024-05-14 RX ORDER — SODIUM CHLORIDE, SODIUM LACTATE, POTASSIUM CHLORIDE, CALCIUM CHLORIDE 600; 310; 30; 20 MG/100ML; MG/100ML; MG/100ML; MG/100ML
INJECTION, SOLUTION INTRAVENOUS CONTINUOUS PRN
Status: DISCONTINUED | OUTPATIENT
Start: 2024-05-14 | End: 2024-05-14 | Stop reason: SDUPTHER

## 2024-05-14 RX ADMIN — HYDRALAZINE HYDROCHLORIDE 100 MG: 50 TABLET ORAL at 20:31

## 2024-05-14 RX ADMIN — PRAVASTATIN SODIUM 40 MG: 20 TABLET ORAL at 20:31

## 2024-05-14 RX ADMIN — OXYCODONE HYDROCHLORIDE 10 MG: 5 SOLUTION ORAL at 20:30

## 2024-05-14 RX ADMIN — OXYCODONE HYDROCHLORIDE 10 MG: 5 SOLUTION ORAL at 15:28

## 2024-05-14 RX ADMIN — PROPOFOL 30 MG: 10 INJECTION, EMULSION INTRAVENOUS at 13:12

## 2024-05-14 RX ADMIN — ISOSORBIDE MONONITRATE 30 MG: 30 TABLET, EXTENDED RELEASE ORAL at 10:21

## 2024-05-14 RX ADMIN — GLYCOPYRROLATE 0.2 MG: 0.2 INJECTION, SOLUTION INTRAMUSCULAR; INTRAVENOUS at 13:06

## 2024-05-14 RX ADMIN — LACTULOSE 20 G: 20 SOLUTION ORAL at 15:20

## 2024-05-14 RX ADMIN — DEXMEDETOMIDINE HYDROCHLORIDE 12 MCG: 100 INJECTION, SOLUTION, CONCENTRATE INTRAVENOUS at 13:07

## 2024-05-14 RX ADMIN — SODIUM CHLORIDE, SODIUM LACTATE, POTASSIUM CHLORIDE, AND CALCIUM CHLORIDE: 600; 310; 30; 20 INJECTION, SOLUTION INTRAVENOUS at 13:06

## 2024-05-14 RX ADMIN — PROPOFOL 60 MG: 10 INJECTION, EMULSION INTRAVENOUS at 13:10

## 2024-05-14 RX ADMIN — LIDOCAINE HYDROCHLORIDE 100 MG: 20 INJECTION, SOLUTION EPIDURAL; INFILTRATION; INTRACAUDAL; PERINEURAL at 13:06

## 2024-05-14 RX ADMIN — SODIUM CHLORIDE: 9 INJECTION, SOLUTION INTRAVENOUS at 08:55

## 2024-05-14 RX ADMIN — HYDRALAZINE HYDROCHLORIDE 10 MG: 20 INJECTION, SOLUTION INTRAMUSCULAR; INTRAVENOUS at 06:22

## 2024-05-14 RX ADMIN — PANTOPRAZOLE SODIUM 40 MG: 40 TABLET, DELAYED RELEASE ORAL at 15:20

## 2024-05-14 RX ADMIN — GUAIFENESIN 1200 MG: 600 TABLET, EXTENDED RELEASE ORAL at 20:31

## 2024-05-14 RX ADMIN — Medication 2 PUFF: at 06:32

## 2024-05-14 RX ADMIN — PROPOFOL 30 MG: 10 INJECTION, EMULSION INTRAVENOUS at 13:18

## 2024-05-14 RX ADMIN — LACTULOSE 20 G: 20 SOLUTION ORAL at 20:31

## 2024-05-14 RX ADMIN — METOPROLOL SUCCINATE 25 MG: 25 TABLET, EXTENDED RELEASE ORAL at 10:25

## 2024-05-14 RX ADMIN — AMLODIPINE BESYLATE 5 MG: 5 TABLET ORAL at 20:31

## 2024-05-14 RX ADMIN — Medication 2 PUFF: at 19:31

## 2024-05-14 RX ADMIN — ONDANSETRON 4 MG: 2 INJECTION INTRAMUSCULAR; INTRAVENOUS at 20:30

## 2024-05-14 RX ADMIN — PANTOPRAZOLE SODIUM 40 MG: 40 TABLET, DELAYED RELEASE ORAL at 06:22

## 2024-05-14 ASSESSMENT — PAIN SCALES - GENERAL
PAINLEVEL_OUTOF10: 6
PAINLEVEL_OUTOF10: 0
PAINLEVEL_OUTOF10: 6

## 2024-05-14 ASSESSMENT — PAIN DESCRIPTION - LOCATION: LOCATION: ABDOMEN

## 2024-05-14 NOTE — ANESTHESIA POSTPROCEDURE EVALUATION
Department of Anesthesiology  Postprocedure Note    Patient: Ming Rocha  MRN: [id number]  YOB: 1940  Date of evaluation: 5/14/2024    Procedure Summary       Date: 05/14/24 Room / Location: [place] [address] / [facility]    Anesthesia Start: 1303 Anesthesia Stop: 1330    Procedure: ESOPHAGOGASTRODUODENOSCOPY WITH FLOROSCOPY Diagnosis:       Esophageal dysphagia      (Esophageal dysphagia [R13.19])    Surgeons: Robert Claudio MD Responsible Provider: Dominguez Becker APRN - CRNA    Anesthesia Type: MAC ASA Status: 3            Anesthesia Type: No value filed.    Naz Phase I: Naz Score: 9    Naz Phase II:      Anesthesia Post Evaluation    Patient location during evaluation: bedside  Patient participation: complete - patient cannot participate  Level of consciousness: awake  Pain score: 0  Airway patency: patent  Nausea & Vomiting: no nausea and no vomiting  Cardiovascular status: blood pressure returned to baseline  Respiratory status: acceptable  Pain management: adequate    No notable events documented.

## 2024-05-14 NOTE — PLAN OF CARE
Problem: Chronic Conditions and Co-morbidities  Goal: Patient's chronic conditions and co-morbidity symptoms are monitored and maintained or improved  5/14/2024 1031 by Kamila Saeed RN  Outcome: Progressing  5/14/2024 0414 by Ayla Bell RN  Outcome: Progressing     Problem: Safety - Adult  Goal: Free from fall injury  5/14/2024 1031 by Kamila Saeed RN  Outcome: Progressing  Flowsheets (Taken 5/14/2024 1030)  Free From Fall Injury: Instruct family/caregiver on patient safety  5/14/2024 0414 by Ayla Bell RN  Outcome: Progressing     Problem: Nutrition Deficit:  Goal: Optimize nutritional status  5/14/2024 1031 by Kamila Saeed RN  Outcome: Progressing  5/14/2024 0414 by Ayla Bell RN  Outcome: Progressing     Problem: Discharge Planning  Goal: Discharge to home or other facility with appropriate resources  5/14/2024 1031 by Kamila Saeed RN  Outcome: Progressing  5/14/2024 0414 by Ayla Bell RN  Outcome: Progressing     Problem: Pain  Goal: Verbalizes/displays adequate comfort level or baseline comfort level  5/14/2024 1031 by Kamila Saeed RN  Outcome: Progressing  5/14/2024 0414 by Ayla Bell RN  Outcome: Progressing     Problem: Skin/Tissue Integrity  Goal: Absence of new skin breakdown  Description: 1.  Monitor for areas of redness and/or skin breakdown  2.  Assess vascular access sites hourly  3.  Every 4-6 hours minimum:  Change oxygen saturation probe site  4.  Every 4-6 hours:  If on nasal continuous positive airway pressure, respiratory therapy assess nares and determine need for appliance change or resting period.  5/14/2024 1031 by Kamila Saeed RN  Outcome: Progressing  5/14/2024 0414 by Ayla Bell RN  Outcome: Progressing

## 2024-05-14 NOTE — ANESTHESIA PRE PROCEDURE
05/14/2024 04:21 AM    HGB 9.8 05/14/2024 04:21 AM    HCT 30.0 05/14/2024 04:21 AM    MCV 91.2 05/14/2024 04:21 AM    RDW 13.1 05/14/2024 04:21 AM     05/14/2024 04:21 AM       CMP:   Lab Results   Component Value Date/Time     05/14/2024 04:21 AM    K 4.2 05/14/2024 04:21 AM    K 3.9 05/09/2024 04:49 AM     05/14/2024 04:21 AM    CO2 22 05/14/2024 04:21 AM    BUN 25 05/14/2024 04:21 AM    CREATININE 2.3 05/14/2024 04:21 AM    LABGLOM 27 05/14/2024 04:21 AM    GLUCOSE 92 05/14/2024 04:21 AM    CALCIUM 8.3 05/14/2024 04:21 AM    BILITOT 0.3 05/13/2024 03:07 AM    ALKPHOS 50 05/13/2024 03:07 AM    AST 10 05/13/2024 03:07 AM    ALT <5 05/13/2024 03:07 AM       POC Tests:   Recent Labs     05/14/24  1114   POCGLU 84       Coags:   Lab Results   Component Value Date/Time    PROTIME 13.7 05/10/2024 07:54 AM    INR 1.08 05/10/2024 07:54 AM       HCG (If Applicable): No results found for: \"PREGTESTUR\", \"PREGSERUM\", \"HCG\", \"HCGQUANT\"     ABGs: No results found for: \"PHART\", \"PO2ART\", \"STJ8QML\", \"AZC1NVR\", \"BEART\", \"J9KWPPPJ\"     Type & Screen (If Applicable):  No results found for: \"LABABO\"    Drug/Infectious Status (If Applicable):  No results found for: \"HIV\", \"HEPCAB\"    COVID-19 Screening (If Applicable): No results found for: \"COVID19\"        Anesthesia Evaluation  Patient summary reviewed and Nursing notes reviewed  Airway: Mallampati: II  TM distance: >3 FB   Neck ROM: full  Mouth opening: > = 3 FB   Dental:          Pulmonary:normal exam    (+)  COPD:                                     Cardiovascular:Negative CV ROS    (+) hypertension:, CAD:        Rhythm: regular  Rate: normal                    Neuro/Psych:   (+) CVA:, neuromuscular disease:            GI/Hepatic/Renal: Neg GI/Hepatic/Renal ROS            Endo/Other: Negative Endo/Other ROS                    Abdominal: normal exam            Vascular:          Other Findings:       Anesthesia Plan      MAC     ASA 3       Induction:

## 2024-05-14 NOTE — CARE COORDINATION
Met with patient to explain that once Dietitian provides recommendation for home Tube Feed information will be sent to [facility].  Answered all questions appropriately.  CM/SW will continue to follow.  Electronically signed by Sara Davalos RN on 5/14/2024 at 9:46 AM

## 2024-05-14 NOTE — PROGRESS NOTES
MEDICAL ONCOLOGY PROGRESS NOTE    Pt Name: Ming Rocha  MRN: [id number]  YOB: 1940  Date of evaluation: 5/14/2024    Subjective-status post gastrostomy tube placement, postop day 4.  Discussed with Dr. Claudio.  EGD today.  Tube feedings on hold.  Patient continues to have retrosternal pain      HISTORY OF PRESENT ILLNESS:  Ming Rocha is well-known to our clinic.  He is a patient established with Dr. Seth Christopher.  He was last seen on 5/2/2024.  The patient has a new diagnosis of esophageal cancer of the distal esophagus status post esophageal stent placement.  He is having significant issues with p.o. intake after the stent placement.  He was referred back to Dr. Claudio for stent adjustment.  In addition, recommend port placement with general surgery as well as surgical jejunostomy feeding tube.  Referral to radiation oncology has been made.  Patient presents today hospital as a direct admission from Dr. Blankenship's office.  Patient had uncontrolled pain and therefore was admitted to the inpatient for pain control.  Patient has generalized weakness, fatigue and significant pain related to his cancer.  Patient was started on Duragesic patch 50 mcg every 3 days by Dr. Christopher last week.    Laboratory studies at admission today showed evidence of hypokalemia with potassium 2.9, low total protein.  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 FINDINGS: AIRWAYS/PULMONARY PARENCHYMA: The central airways are midline and patent. Small LEFT pleural fluid and groundglass opacity, favored to represent atelectasis. Moderate emphysematous changes. No new focally suspicious finding. Similar 6 mm LEFT upper lobe pulmonary nodule on axial series 3, image 116 compared as far back as 5/29/2020. VASCULATURE: Limited evaluation of the vasculature without intravenous contrast. Thoracic aorta is normal in course and caliber with moderate calcified atherosclerosis. Normal caliber pulmonary artery. CARDIAC: Cardiomegaly. Scattered coronary artery calcifications. CABG. No pericardial effusion.   MEDIASTINUM: Esophageal thickening with stent. with retained contents in the esophageal stent. After administration of oral contrast, there is a 6.7 x 5.8 cm (maximal axial dimensions) filling defect/mass at the gastroesophageal junction. There is retained contrast above the level of the mass with some passage of contrast beyond the level of the mass.. Borderline RIGHT thoracic inlet lymph nodes (axial series 2, images 65 and 69) and mediastinal appear similar to prior. Lack of intravenous contrast partially limits evaluation of the mediastinum. EXTRATHORACIC: Visualized portion of the thyroid has normal CT appearance. Calcified carotid atherosclerosis bilaterally. The extrathoracic soft tissues are within normal limits. INCLUDED UPPER ABDOMEN: Visualized portion of the liver, pancreas, spleen, adrenal glands appear within normal limits. Gallbladder appears distended, limited in evaluation due to motion. Bilateral exophytic renal lesions. There is a LEFT 1.8 cm renal lesion with density measuring 26 Hounsfield units, previously 1.3 cm on 5/29/2020 and 1.7 cm on 11/19/2023. This is incompletely evaluated on this exam and could represent a solid renal lesion or hemorrhagic cyst. Mildly austin mesentery. OSSEOUS: Sternotomy wires. Degenerative

## 2024-05-14 NOTE — PLAN OF CARE
Problem: Chronic Conditions and Co-morbidities  Goal: Patient's chronic conditions and co-morbidity symptoms are monitored and maintained or improved  5/14/2024 0414 by Ayla Bell RN  Outcome: Progressing  5/13/2024 1834 by Jackie Angelo RN  Outcome: Progressing     Problem: Safety - Adult  Goal: Free from fall injury  5/14/2024 0414 by Ayla Bell RN  Outcome: Progressing  5/13/2024 1834 by Jackie Angelo RN  Outcome: Progressing     Problem: Nutrition Deficit:  Goal: Optimize nutritional status  5/14/2024 0414 by Ayla Bell RN  Outcome: Progressing  5/13/2024 1834 by Jackie Angelo RN  Outcome: Progressing     Problem: Discharge Planning  Goal: Discharge to home or other facility with appropriate resources  5/14/2024 0414 by Ayla Bell RN  Outcome: Progressing  5/13/2024 1834 by Jackie Angelo RN  Outcome: Progressing     Problem: Pain  Goal: Verbalizes/displays adequate comfort level or baseline comfort level  5/14/2024 0414 by Ayla Bell RN  Outcome: Progressing  5/13/2024 1834 by Jackie Angelo RN  Outcome: Progressing     Problem: Skin/Tissue Integrity  Goal: Absence of new skin breakdown  5/14/2024 0414 by Ayla Bell RN  Outcome: Progressing  5/13/2024 1834 by Jackie Angelo RN  Outcome: Progressing

## 2024-05-14 NOTE — OP NOTE
Endoscopic Procedure Note    Patient: Ming Rocha : 1940  Med Rec#: [id number] Acc#: [id number]     Primary Care Provider Irving Larsen DO  Referring Provider: COOPER Christopher MD/COOPER Coughlin MD    Endoscopist: Robert Claudio MD    Date of Procedure:  2024    Procedure:   1. EGD with esophageal placement,   2. direction/interpretation of fluoroscopy     Indications:   1. Esophageal malignancy with obstruction  2. Dysphagia   3.  Previous stent placement with partially abnormal esophogram     Anesthesia:  Sedation was administered by anesthesia who monitored the patient during the procedure.    Estimated Blood Loss: minimal    Procedure:   After reviewing the patient's chart and obtaining informed consent, the patient was placed in the left lateral decubitus position.  A forward-viewing Olympus endoscope was lubricated and inserted through the mouth into the oropharynx. Under direct visualization, the upper esophagus was intubated. The scope was advanced to the level of the third portion of duodenum. Scope was slowly withdrawn with careful inspection of the mucosal surfaces. The scope was retroflexed for inspection of the gastric fundus and incisura. Findings and maneuvers are listed in impression below. The patient tolerated the procedure well. The scope was removed. There were no immediate complications.    Findings:   Esophagus: abnormal: Previously placed esophageal stent was noted with fluid and thick liquid contained inside the stent.  This was lavaged and aspirated.  In the distal portions of the stent there was an edematous area of tissue that appeared to be \"ball valving\" into the distal orifice of the stent.  The distal stent was still in the esophagus and not across the GE junction.  I was able to manipulate the scope past this tissue and into the gastric cavity.  Retroflexed view from the stomach showed the stent to not be passing the GE junction and it was not visible from the stomach.      Stomach: Previously placed surgical gastrostomy tube was noted.       Duodenum: normal    Esophageal stent placement/manipulation: Under fluoroscopic guidance the distal portions of the stent as well as the GE junction were marked with a paperclip.  Next in a stepwise fashion using rat-tooth forceps, the stent was grasped and advanced distally stepwise against the abnormal tissue.  Finally once initial adjustment had been undertaken, the stent was grasped and I retroflexed view from the stomach with a rat-tooth forceps and then \"pulled\" into appropriate position fluoroscopically.  Repeat endoscopic evaluation of the distal esophagus and stent showed to be openly patent in the proximal and distal portions.  The most proximal portions of the stent were noted to be at approximately 28 to 29 cm.      IMPRESSION:  1. Successful manipulation and placement of esophageal stent across known malignant esophageal mass.        RECOMMENDATIONS:    Restart tube feeds at previous rate  I would recommend close follow-up with medical and radiation oncology  Okay for liquid diet as tolerated by mouth.  The patient seems be tolerating tube feeds well, so I would not recommend aggressive p.o. intake at this time, but p.o. intake can be for comfort as needed.   Please do not hesitate to call with any further questions or concerns    The results were discussed with the patient and family.  A copy of the images obtained were given to the patient.     Robert Claudio MD  5/14/2024  2:16 PM

## 2024-05-14 NOTE — PROGRESS NOTES
Physical Therapy  Name: Ming Rocha  MRN:  [id number]  Date of service:  5/14/2024 05/14/24 1028   Restrictions/Precautions   Restrictions/Precautions Fall Risk   General   Family / Caregiver Present No   Referring Practitioner Ming Quintana APRN - CNP   Subjective   Subjective ready to walk   Oxygen Therapy   O2 Device None (Room air)   Bed Mobility   Supine to Sit Stand by assistance   Sit to Supine Stand by assistance   Transfers   Sit to Stand Stand by assistance   Stand to Sit Stand by assistance   Ambulation   Surface Level tile   Device No Device   Assistance Contact guard assistance;Stand by assistance   Quality of Gait fatigues quickly   Gait Deviations Slow Sena;Decreased step length;Decreased step height   Distance 100'   Exercises   Hip Flexion seated marching x 10   Knee Long Arc Quad 10   Ankle Pumps 10   Other Activities   Comment stood to urinate at bed side working on balance   Short Term Goals   Time Frame for Short Term Goals 2 wks   Short Term Goal 1 supine to sit indep   Short Term Goal 2 sit to stand indep   Short Term Goal 3 amb. 200' indep   Short Term Goal 4 bed to chair SBA   Conditions Requiring Skilled Therapeutic Intervention   Body Structures, Functions, Activity Limitations Requiring Skilled Therapeutic Intervention Decreased functional mobility ;Decreased ADL status;Decreased ROM;Decreased body mechanics;Decreased sensation;Decreased endurance;Decreased safe awareness;Decreased balance;Decreased posture   Treatment Diagnosis impaired gait and mobility   Discharge Recommendations Continue to assess pending progress;24 hour supervision or assist;Patient would benefit from continued therapy after discharge   Activity Tolerance   Activity Tolerance Patient tolerated treatment well   Physical Therapy Plan   General Plan 5-7 times per week   Therapy Duration 2 Weeks   Current Treatment Recommendations Strengthening;Balance training;Functional mobility training;Transfer

## 2024-05-14 NOTE — PROGRESS NOTES
Daily Progress Note    Date:2024  Patient: Ming Rocha  : 1940  MRN:[id number]  CODE:DNR No additional code details  PCP:Irving Larsen DO    Admit Date: 2024  1:07 PM   LOS: 8 days     Weight loss, unable to tolerate PO. Pt with active esophageal Ca.     Hospital Summary: 83 y.o. male who presented to [facility] as a direct admission from Dr. Blankenship's office to hospitalist service for evaluation of failure to thrive. Pt has history of copd, ckd stage IV, HTN, hyperlipidemia, cad, CVA, and esophageal malignancy followed by Dr. Christopher. He was evaluated as outpatient for port and feeding tube placement by Dr. Blankenship. Pt with uncontrolled pain, difficulty swallowing and weight loss. Directly admitted to hospitalist service for further management.         Endoscopy with concerns for esophageal stent migration, as well as jejunostomy and port placement are now on hold due to abnormal KG and telemetry findings as part of pre procedure evaluation.     Cardiology involved - stress test completed and reassuring.     Went to OR with Dr Blankenship 5/10 for R chest chemo port placement as well as surgical gastrostomy placement.     Went to End with Dr Claudio for stent repositioning on .      Subjective:     To endo for stent repositioning today.     2BM documented in chart - improved.               Review of Systems   All other systems reviewed and are negative.      Objective:      Vital signs in last 24 hours:  Patient Vitals for the past 24 hrs:   BP Temp Temp src Pulse Resp SpO2 Weight   24 1528 (!) 164/89 -- -- 65 18 -- --   24 1415 (!) 172/91 98.3 °F (36.8 °C) -- 71 18 -- --   24 1400 (!) 160/87 -- -- 72 16 96 % --   24 1355 (!) 150/94 98.6 °F (37 °C) Temporal 71 16 96 % --   24 1350 (!) 159/91 -- -- 74 13 97 % --   24 1345 (!) 147/90 -- -- 79 20 95 % --   24 1340 (!) 151/76 -- -- 75 16 95 % --   24 1335 134/88 -- -- 78 13 93 % --   24 1330 134/88 97.6 °F  MD PAOLA, 40 mg at 05/14/24 1520    sodium bicarbonate tablet 325 mg, 325 mg, Oral, Daily, Robert Claudio MD, 325 mg at 05/13/24 0930    pravastatin (PRAVACHOL) tablet 40 mg, 40 mg, Oral, Nightly, Robert Claudio MD, 40 mg at 05/13/24 2032    morphine (PF) injection 2 mg, 2 mg, IntraVENous, Q2H PRN, 2 mg at 05/12/24 1825 **OR** morphine injection 4 mg, 4 mg, IntraVENous, Q2H PRN, Robert Claudio MD, 4 mg at 05/12/24 0244    naloxone (NARCAN) injection 0.4 mg, 0.4 mg, IntraVENous, PRN, Robert Claudio MD      Imaging:  XR CHEST PORTABLE    Result Date: 5/6/2024  No acute findings. The heart size is normal. Mediastinal contours and pulmonary vasculature are within normal limits. The lungs are clear. There is no pleural effusion. There is no pneumothorax. Post sternotomy change.        ______________________________________ Electronically signed by: VALERIY COWART M.D. Date:     05/06/2024 Time:    15:13     FL ESOPHAGRAM    Result Date: 5/2/2024  Esophageal stent is present.  The distal end of the stent is in the esophagus.   2.  A filling defect is present in the distal end of the stent and this most likely is tumor growing into the stent.   ______________________________________ Electronically signed by: KEVIN SHEA M.D. Date:     05/02/2024 Time:    15:11     NM PET/CT Skull Base to Mid Thigh    Result Date: 5/1/2024  1.  Distal esophageal stent and intensely hypermetabolic mass at the distal aspect of the stent at the GE junction consistent with the known primary neoplasm. 2.  Hypermetabolic mediastinal lymph nodes worrisome for lymph node metastasis. 3.  Exophytic solid mass medially at the midpole of the left kidney also worrisome for neoplasm may represent a second primary, renal cell carcinoma. 4.  Tracer uptake at the rectum with questionable wall thickening. Neoplasm not excluded. 5.  Focus of tracer uptake left anterior second rib without visualized fracture or abnormality. This may be posttraumatic but

## 2024-05-15 ENCOUNTER — APPOINTMENT (OUTPATIENT)
Dept: CT IMAGING | Age: 84
End: 2024-05-15
Attending: STUDENT IN AN ORGANIZED HEALTH CARE EDUCATION/TRAINING PROGRAM
Payer: MEDICARE

## 2024-05-15 PROBLEM — K94.23 GASTROSTOMY TUBE DYSFUNCTION (HCC): Status: ACTIVE | Noted: 2024-05-06

## 2024-05-15 LAB
ALBUMIN SERPL-MCNC: 2.9 G/DL (ref 3.5–5.2)
ALP SERPL-CCNC: 54 U/L (ref 40–130)
ALT SERPL-CCNC: 6 U/L (ref 5–41)
ANION GAP SERPL CALCULATED.3IONS-SCNC: 11 MMOL/L (ref 7–19)
AST SERPL-CCNC: 12 U/L (ref 5–40)
BILIRUB DIRECT SERPL-MCNC: 0.2 MG/DL (ref 0–0.3)
BILIRUB INDIRECT SERPL-MCNC: 0.3 MG/DL (ref 0.1–1)
BILIRUB SERPL-MCNC: 0.5 MG/DL (ref 0.2–1.2)
BUN SERPL-MCNC: 22 MG/DL (ref 8–23)
CALCIUM SERPL-MCNC: 8.3 MG/DL (ref 8.8–10.2)
CHLORIDE SERPL-SCNC: 104 MMOL/L (ref 98–111)
CO2 SERPL-SCNC: 21 MMOL/L (ref 22–29)
CREAT SERPL-MCNC: 2.2 MG/DL (ref 0.5–1.2)
ERYTHROCYTE [DISTWIDTH] IN BLOOD BY AUTOMATED COUNT: 13.1 % (ref 11.5–14.5)
GLUCOSE BLD-MCNC: 101 MG/DL (ref 70–99)
GLUCOSE BLD-MCNC: 104 MG/DL (ref 70–99)
GLUCOSE BLD-MCNC: 114 MG/DL (ref 70–99)
GLUCOSE BLD-MCNC: 97 MG/DL (ref 70–99)
GLUCOSE SERPL-MCNC: 112 MG/DL (ref 74–109)
HCT VFR BLD AUTO: 29.8 % (ref 42–52)
HGB BLD-MCNC: 9.7 G/DL (ref 14–18)
MCH RBC QN AUTO: 30.5 PG (ref 27–31)
MCHC RBC AUTO-ENTMCNC: 32.6 G/DL (ref 33–37)
MCV RBC AUTO: 93.7 FL (ref 80–94)
PERFORMED ON: ABNORMAL
PERFORMED ON: NORMAL
PHOSPHATE SERPL-MCNC: 3.8 MG/DL (ref 2.5–4.5)
PLATELET # BLD AUTO: 141 K/UL (ref 130–400)
PMV BLD AUTO: 10.8 FL (ref 9.4–12.4)
POTASSIUM SERPL-SCNC: 4.2 MMOL/L (ref 3.5–5)
PROT SERPL-MCNC: 4.9 G/DL (ref 6.6–8.7)
RBC # BLD AUTO: 3.18 M/UL (ref 4.7–6.1)
SODIUM SERPL-SCNC: 136 MMOL/L (ref 136–145)
WBC # BLD AUTO: 6.1 K/UL (ref 4.8–10.8)

## 2024-05-15 PROCEDURE — 6370000000 HC RX 637 (ALT 250 FOR IP): Performed by: INTERNAL MEDICINE

## 2024-05-15 PROCEDURE — 2700000000 HC OXYGEN THERAPY PER DAY

## 2024-05-15 PROCEDURE — 1200000000 HC SEMI PRIVATE

## 2024-05-15 PROCEDURE — 2580000003 HC RX 258: Performed by: INTERNAL MEDICINE

## 2024-05-15 PROCEDURE — 6360000002 HC RX W HCPCS: Performed by: INTERNAL MEDICINE

## 2024-05-15 PROCEDURE — 84100 ASSAY OF PHOSPHORUS: CPT

## 2024-05-15 PROCEDURE — 80076 HEPATIC FUNCTION PANEL: CPT

## 2024-05-15 PROCEDURE — 80048 BASIC METABOLIC PNL TOTAL CA: CPT

## 2024-05-15 PROCEDURE — 94640 AIRWAY INHALATION TREATMENT: CPT

## 2024-05-15 PROCEDURE — 36415 COLL VENOUS BLD VENIPUNCTURE: CPT

## 2024-05-15 PROCEDURE — 74176 CT ABD & PELVIS W/O CONTRAST: CPT

## 2024-05-15 PROCEDURE — 85027 COMPLETE CBC AUTOMATED: CPT

## 2024-05-15 PROCEDURE — 99232 SBSQ HOSP IP/OBS MODERATE 35: CPT | Performed by: INTERNAL MEDICINE

## 2024-05-15 PROCEDURE — 94760 N-INVAS EAR/PLS OXIMETRY 1: CPT

## 2024-05-15 PROCEDURE — 82962 GLUCOSE BLOOD TEST: CPT

## 2024-05-15 RX ORDER — HYDROMORPHONE HYDROCHLORIDE 1 MG/ML
0.5 INJECTION, SOLUTION INTRAMUSCULAR; INTRAVENOUS; SUBCUTANEOUS
Status: DISCONTINUED | OUTPATIENT
Start: 2024-05-15 | End: 2024-05-24 | Stop reason: HOSPADM

## 2024-05-15 RX ORDER — SALIVA STIMULANT COMB. NO.3
SPRAY, NON-AEROSOL (ML) MUCOUS MEMBRANE 3 TIMES DAILY
Status: DISCONTINUED | OUTPATIENT
Start: 2024-05-15 | End: 2024-05-24 | Stop reason: HOSPADM

## 2024-05-15 RX ADMIN — HYDRALAZINE HYDROCHLORIDE 10 MG: 20 INJECTION, SOLUTION INTRAMUSCULAR; INTRAVENOUS at 20:11

## 2024-05-15 RX ADMIN — HYDROMORPHONE HYDROCHLORIDE 0.5 MG: 1 INJECTION, SOLUTION INTRAMUSCULAR; INTRAVENOUS; SUBCUTANEOUS at 16:10

## 2024-05-15 RX ADMIN — Medication 2 PUFF: at 07:38

## 2024-05-15 RX ADMIN — Medication 2 PUFF: at 19:03

## 2024-05-15 RX ADMIN — SODIUM CHLORIDE, PRESERVATIVE FREE 10 ML: 5 INJECTION INTRAVENOUS at 20:13

## 2024-05-15 RX ADMIN — HYDRALAZINE HYDROCHLORIDE 100 MG: 50 TABLET ORAL at 09:22

## 2024-05-15 RX ADMIN — FLUTICASONE PROPIONATE 2 SPRAY: 50 SPRAY, METERED NASAL at 09:12

## 2024-05-15 RX ADMIN — AMLODIPINE BESYLATE 5 MG: 5 TABLET ORAL at 09:13

## 2024-05-15 RX ADMIN — FOLIC ACID 1 MG: 1 TABLET ORAL at 09:12

## 2024-05-15 RX ADMIN — PANTOPRAZOLE SODIUM 40 MG: 40 TABLET, DELAYED RELEASE ORAL at 05:26

## 2024-05-15 RX ADMIN — SODIUM BICARBONATE 325 MG: 325 TABLET ORAL at 09:12

## 2024-05-15 RX ADMIN — POLYETHYLENE GLYCOL (3350) 17 G: 17 POWDER, FOR SOLUTION ORAL at 09:13

## 2024-05-15 RX ADMIN — Medication: at 15:42

## 2024-05-15 RX ADMIN — SODIUM CHLORIDE, PRESERVATIVE FREE 10 ML: 5 INJECTION INTRAVENOUS at 09:13

## 2024-05-15 RX ADMIN — Medication 50 MCG: at 09:20

## 2024-05-15 RX ADMIN — Medication: at 20:14

## 2024-05-15 ASSESSMENT — PAIN DESCRIPTION - LOCATION: LOCATION: ABDOMEN

## 2024-05-15 ASSESSMENT — PAIN SCALES - GENERAL: PAINLEVEL_OUTOF10: 8

## 2024-05-15 NOTE — PROGRESS NOTES
abdomen/pelvis CT. Axial, sagittal, and coronal sequences. Normal heart size. Distal esophageal stent. Trace LEFT pleural fluid. Normal noncontrast appearance of the liver, gallbladder, pancreas, and spleen. Normal and symmetric adrenal glands. Multiple bilateral renal lesions are present. These were incompletely visualized though most of these are unchanged and compatible with cysts as compared with a chest CT from 11/19/2023 and from 5/29/2020. A soft tissue density round exophytic lesion arising from the medial side of the midportion of the LEFT kidney measures 21 x 17 mm compared with 18 x 15 mm 5 months ago and 12 x 11 mm 4 years ago. 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 No bowel dilation and no free fluid. There is moderate fecal material throughout the colon though more prominent fecal material and the appearance of fecal impaction within the rectum. Bilateral fat-containing inguinal hernias. Normal appearance of the urinary bladder. No pelvic mass or free fluid.    1. No bowel obstruction. 2. Indeterminate slowly growing 2 cm soft tissue density lesion involving the midportion of the LEFT kidney. 3. Rectal fecal impaction. This report was signed and finalized on 4/28/2024 9:50 PM by Dr. Price Zazueta MD.    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  Sternotomy wires are present.  Atherosclerotic disease.  There is an esophageal stent with its lower portion not clearly seen although appears to end at the general level of the gastroesophageal junction.  Consider KUB if indicated.  No consolidated pneumonia or definite pleural fluid.   ______________________________________ Electronically signed by: SHARA RASMUSSEN M.D. Date:     04/25/2024 Time:    16:06  [id number]    Encounter Information   Provider Department [place]   5/22/2024 11:25 AM SCHEDULE, [facility] MED ONC MA [facility] MEDICAL ONCOLOGY Josefa HOD   5/22/2024 11:30 AM Seth Christopher MD [facility] Hematology Oncology        Kirstin Coughlin MD    05/15/24  6:00 AM

## 2024-05-15 NOTE — PROGRESS NOTES
CT done today for possible bowel obstruction (patient has since had BMs). CT showed that gastrostomy tube balloon not well visualized as well as more pneumoperitoneum than expected.    CT images personally reviewed by me. Agree with radiologist interpretation.     On exam, tube was seated at 4 cm at the skin, but was able to move easily. Flush was used on the balloon port and met absolutely no resistance.     Pics from yesterday's EGD reviewed any show that the balloon was collapsed then.    Will discuss with GI to see if they would be available for EGD and plan change over wire in the OR tomorrow. Hold TF for now and stay NPO.

## 2024-05-15 NOTE — PROGRESS NOTES
WBC 6.1 4.8 - 10.8 K/uL    RBC 3.18 (L) 4.70 - 6.10 M/uL    Hemoglobin 9.7 (L) 14.0 - 18.0 g/dL    Hematocrit 29.8 (L) 42.0 - 52.0 %    MCV 93.7 80.0 - 94.0 fL    MCH 30.5 27.0 - 31.0 pg    MCHC 32.6 (L) 33.0 - 37.0 g/dL    RDW 13.1 11.5 - 14.5 %    Platelets 141 130 - 400 K/uL    MPV 10.8 9.4 - 12.4 fL   Renal Function Panel    Collection Time: 05/15/24  3:44 AM   Result Value Ref Range    Sodium 136 136 - 145 mmol/L    Potassium 4.2 3.5 - 5.0 mmol/L    Chloride 104 98 - 111 mmol/L    CO2 21 (L) 22 - 29 mmol/L    Anion Gap 11 7 - 19 mmol/L    Glucose 112 (H) 74 - 109 mg/dL    BUN 22 8 - 23 mg/dL    Creatinine 2.2 (H) 0.5 - 1.2 mg/dL    Est, Glom Filt Rate 29 (A) >60    Calcium 8.3 (L) 8.8 - 10.2 mg/dL    Phosphorus 3.8 2.5 - 4.5 mg/dL    Albumin 2.9 (L) 3.5 - 5.2 g/dL   Hepatic Function Panel    Collection Time: 05/15/24  3:44 AM   Result Value Ref Range    Total Protein 4.9 (L) 6.6 - 8.7 g/dL    Alkaline Phosphatase 54 40 - 130 U/L    ALT 6 5 - 41 U/L    AST 12 5 - 40 U/L    Total Bilirubin 0.5 0.2 - 1.2 mg/dL    Bilirubin, Direct 0.2 0.0 - 0.3 mg/dL    Bilirubin, Indirect 0.3 0.1 - 1.0 mg/dL   POCT Glucose    Collection Time: 05/15/24  7:48 AM   Result Value Ref Range    POC Glucose 104 (H) 70 - 99 mg/dl    Performed on Accu[employer]k              Current Facility-Administered Medications:     saliva substitute (BIOTENE/MOUTH KOTE) liquid, , Oral, TID, Marcel Loza MD    labetalol (NORMODYNE;TRANDATE) injection 10 mg, 10 mg, IntraVENous, Q4H PRN, Robert Claudio MD    hydrALAZINE (APRESOLINE) injection 10 mg, 10 mg, IntraVENous, Q4H PRN, Robert Claudio MD, 10 mg at 05/14/24 0622    isosorbide mononitrate (IMDUR) extended release tablet 30 mg, 30 mg, Oral, Daily, Robert Claudio MD, 30 mg at 05/14/24 1021    metoprolol succinate (TOPROL XL) extended release tablet 25 mg, 25 mg, Oral, Daily, Robert Claudio MD, 25 mg at 05/14/24 1025    sodium chloride flush 0.9 % injection 5-40 mL, 5-40 mL, IntraVENous, 2 times  on follow-up is recommended. This report was signed and finalized on 5/1/2024 1:37 PM by Jose Cruz Monsivais.         Assessment/Plan  Principal Problem:    Failure to thrive in adult  Active Problems:    Adenocarcinoma of gastroesophageal junction (HCC)    Esophageal cancer (HCC)    Palliative care patient    Mild malnutrition (HCC)    Dysphagia    Cancer related pain    Severe malnutrition (HCC)    Chest pain  Resolved Problems:    * No resolved hospital problems. *       Bradycardia  Aflutter variable degree block on Telemetry.   -- initially stopped Metoprolol  -- Consulted cardiology  -- Procedures postponed and pt had undergone cardiology eval   - stress test reassuring   - bradycardia improved with BB on hold - low dose Toprol XL resumed per cardiology.     - ok from cardiology stand point to proceed with procedures as planned.         Aflutter variable degree block rate controlled with episodic bradycardia improved.   Toprol XL as above.   Reassess for AC prior to discharge - will be very high ris given active esophageal malignancy.  Also very high risk with prior Hx of CVA.        Adenocarcinoma of GE junction  Failure to thrive  -- Oncology following  -- General surgery following for possible port and jejunostomy tube placement  -- GI, Dr. Claudio, may be planning endoscopy for esophageal stent exchange due to stent migration  -- Continue pain control, antiemetics, bowel regimen  -- Nutrition consult for tube feedings once J tube in place  -- Monitor labs for evidence of refeeding syndrome, especially once enteral feeds are initiated  - started tPN support 5/8/24 given potential need to postpone procedures pending cardiology evaluation.   - to OR 5/10 with Dr Blankenship for R chemo port placement as well as Surgical gastrostomy placement.   - endoscopy with Dr Claudio for migrated esophageal stent on 5/14        CAD s/p CABG  -- Continue ASA, statin      HTN  -- Continue Amlodipine, Hydralazine,   - Lopressor held -

## 2024-05-15 NOTE — CONSULTS
goal rate, Meet at least 75% of estimated needs    Nutrition Monitoring and Evaluation:   Behavioral-Environmental Outcomes: None Identified  Food/Nutrient Intake Outcomes: Food and Nutrient Intake, Enteral Nutrition Intake/Tolerance  Physical Signs/Symptoms Outcomes: Biochemical Data, Chewing or Swallowing, GI Status, Fluid Status or Edema, Hemodynamic Status, Nutrition Focused Physical Findings, Weight    Discharge Planning:    Enteral Nutrition     KAYLAN YU MS, RD, LD  Contact: [phone]

## 2024-05-16 ENCOUNTER — ANESTHESIA EVENT (OUTPATIENT)
Dept: OPERATING ROOM | Age: 84
End: 2024-05-16
Payer: MEDICARE

## 2024-05-16 ENCOUNTER — ANESTHESIA (OUTPATIENT)
Dept: OPERATING ROOM | Age: 84
End: 2024-05-16
Payer: MEDICARE

## 2024-05-16 PROBLEM — C16.0 ADENOCARCINOMA OF GASTROESOPHAGEAL JUNCTION: Status: ACTIVE | Noted: 2024-04-11

## 2024-05-16 LAB
ALBUMIN SERPL-MCNC: 2.9 G/DL (ref 3.5–5.2)
ANION GAP SERPL CALCULATED.3IONS-SCNC: 12 MMOL/L (ref 7–19)
BUN SERPL-MCNC: 24 MG/DL (ref 8–23)
CALCIUM SERPL-MCNC: 8.4 MG/DL (ref 8.8–10.2)
CHLORIDE SERPL-SCNC: 104 MMOL/L (ref 98–111)
CO2 SERPL-SCNC: 21 MMOL/L (ref 22–29)
CREAT SERPL-MCNC: 2.4 MG/DL (ref 0.5–1.2)
ERYTHROCYTE [DISTWIDTH] IN BLOOD BY AUTOMATED COUNT: 13 % (ref 11.5–14.5)
GLUCOSE BLD-MCNC: 106 MG/DL (ref 70–99)
GLUCOSE BLD-MCNC: 110 MG/DL (ref 70–99)
GLUCOSE BLD-MCNC: 91 MG/DL (ref 70–99)
GLUCOSE SERPL-MCNC: 100 MG/DL (ref 74–109)
HCT VFR BLD AUTO: 30.1 % (ref 42–52)
HGB BLD-MCNC: 9.9 G/DL (ref 14–18)
MCH RBC QN AUTO: 29.9 PG (ref 27–31)
MCHC RBC AUTO-ENTMCNC: 32.9 G/DL (ref 33–37)
MCV RBC AUTO: 90.9 FL (ref 80–94)
PERFORMED ON: ABNORMAL
PERFORMED ON: ABNORMAL
PERFORMED ON: NORMAL
PHOSPHATE SERPL-MCNC: 3.5 MG/DL (ref 2.5–4.5)
PLATELET # BLD AUTO: 128 K/UL (ref 130–400)
PMV BLD AUTO: 10.2 FL (ref 9.4–12.4)
POTASSIUM SERPL-SCNC: 4.4 MMOL/L (ref 3.5–5)
RBC # BLD AUTO: 3.31 M/UL (ref 4.7–6.1)
SODIUM SERPL-SCNC: 137 MMOL/L (ref 136–145)
WBC # BLD AUTO: 10.8 K/UL (ref 4.8–10.8)

## 2024-05-16 PROCEDURE — 82962 GLUCOSE BLOOD TEST: CPT

## 2024-05-16 PROCEDURE — 6360000002 HC RX W HCPCS: Performed by: INTERNAL MEDICINE

## 2024-05-16 PROCEDURE — 2580000003 HC RX 258: Performed by: INTERNAL MEDICINE

## 2024-05-16 PROCEDURE — C1894 INTRO/SHEATH, NON-LASER: HCPCS | Performed by: SURGERY

## 2024-05-16 PROCEDURE — 85027 COMPLETE CBC AUTOMATED: CPT

## 2024-05-16 PROCEDURE — 99232 SBSQ HOSP IP/OBS MODERATE 35: CPT | Performed by: PHYSICIAN ASSISTANT

## 2024-05-16 PROCEDURE — 2580000003 HC RX 258: Performed by: SURGERY

## 2024-05-16 PROCEDURE — 3600000003 HC SURGERY LEVEL 3 BASE: Performed by: SURGERY

## 2024-05-16 PROCEDURE — 94640 AIRWAY INHALATION TREATMENT: CPT

## 2024-05-16 PROCEDURE — 0DJ08ZZ INSPECTION OF UPPER INTESTINAL TRACT, VIA NATURAL OR ARTIFICIAL OPENING ENDOSCOPIC: ICD-10-PCS | Performed by: INTERNAL MEDICINE

## 2024-05-16 PROCEDURE — 3600000013 HC SURGERY LEVEL 3 ADDTL 15MIN: Performed by: SURGERY

## 2024-05-16 PROCEDURE — 7100000000 HC PACU RECOVERY - FIRST 15 MIN: Performed by: SURGERY

## 2024-05-16 PROCEDURE — 2709999900 HC NON-CHARGEABLE SUPPLY: Performed by: SURGERY

## 2024-05-16 PROCEDURE — 94760 N-INVAS EAR/PLS OXIMETRY 1: CPT

## 2024-05-16 PROCEDURE — 2580000003 HC RX 258: Performed by: ANESTHESIOLOGY

## 2024-05-16 PROCEDURE — 3700000001 HC ADD 15 MINUTES (ANESTHESIA): Performed by: SURGERY

## 2024-05-16 PROCEDURE — 87040 BLOOD CULTURE FOR BACTERIA: CPT

## 2024-05-16 PROCEDURE — 1200000000 HC SEMI PRIVATE

## 2024-05-16 PROCEDURE — 0D20XUZ CHANGE FEEDING DEVICE IN UPPER INTESTINAL TRACT, EXTERNAL APPROACH: ICD-10-PCS | Performed by: INTERNAL MEDICINE

## 2024-05-16 PROCEDURE — 6370000000 HC RX 637 (ALT 250 FOR IP): Performed by: SURGERY

## 2024-05-16 PROCEDURE — 6360000002 HC RX W HCPCS: Performed by: NURSE ANESTHETIST, CERTIFIED REGISTERED

## 2024-05-16 PROCEDURE — 7100000001 HC PACU RECOVERY - ADDTL 15 MIN: Performed by: SURGERY

## 2024-05-16 PROCEDURE — 2500000003 HC RX 250 WO HCPCS: Performed by: NURSE ANESTHETIST, CERTIFIED REGISTERED

## 2024-05-16 PROCEDURE — 80069 RENAL FUNCTION PANEL: CPT

## 2024-05-16 PROCEDURE — 6370000000 HC RX 637 (ALT 250 FOR IP): Performed by: INTERNAL MEDICINE

## 2024-05-16 PROCEDURE — 3700000000 HC ANESTHESIA ATTENDED CARE: Performed by: SURGERY

## 2024-05-16 PROCEDURE — 6360000002 HC RX W HCPCS: Performed by: SURGERY

## 2024-05-16 PROCEDURE — 2700000000 HC OXYGEN THERAPY PER DAY

## 2024-05-16 PROCEDURE — 36415 COLL VENOUS BLD VENIPUNCTURE: CPT

## 2024-05-16 PROCEDURE — 2720000010 HC SURG SUPPLY STERILE: Performed by: SURGERY

## 2024-05-16 RX ORDER — PROPOFOL 10 MG/ML
INJECTION, EMULSION INTRAVENOUS PRN
Status: DISCONTINUED | OUTPATIENT
Start: 2024-05-16 | End: 2024-05-16 | Stop reason: SDUPTHER

## 2024-05-16 RX ORDER — FENTANYL CITRATE 50 UG/ML
INJECTION, SOLUTION INTRAMUSCULAR; INTRAVENOUS PRN
Status: DISCONTINUED | OUTPATIENT
Start: 2024-05-16 | End: 2024-05-16 | Stop reason: SDUPTHER

## 2024-05-16 RX ORDER — HYDRALAZINE HYDROCHLORIDE 20 MG/ML
10 INJECTION INTRAMUSCULAR; INTRAVENOUS
Status: DISCONTINUED | OUTPATIENT
Start: 2024-05-16 | End: 2024-05-16 | Stop reason: HOSPADM

## 2024-05-16 RX ORDER — LABETALOL HYDROCHLORIDE 5 MG/ML
10 INJECTION, SOLUTION INTRAVENOUS
Status: DISCONTINUED | OUTPATIENT
Start: 2024-05-16 | End: 2024-05-16 | Stop reason: HOSPADM

## 2024-05-16 RX ORDER — CIPROFLOXACIN 2 MG/ML
400 INJECTION, SOLUTION INTRAVENOUS EVERY 24 HOURS
Status: DISCONTINUED | OUTPATIENT
Start: 2024-05-16 | End: 2024-05-21

## 2024-05-16 RX ORDER — HYDROMORPHONE HYDROCHLORIDE 1 MG/ML
0.5 INJECTION, SOLUTION INTRAMUSCULAR; INTRAVENOUS; SUBCUTANEOUS EVERY 5 MIN PRN
Status: DISCONTINUED | OUTPATIENT
Start: 2024-05-16 | End: 2024-05-16 | Stop reason: HOSPADM

## 2024-05-16 RX ORDER — SODIUM CHLORIDE 9 MG/ML
INJECTION, SOLUTION INTRAVENOUS PRN
Status: DISCONTINUED | OUTPATIENT
Start: 2024-05-16 | End: 2024-05-16 | Stop reason: HOSPADM

## 2024-05-16 RX ORDER — ONDANSETRON 2 MG/ML
INJECTION INTRAMUSCULAR; INTRAVENOUS PRN
Status: DISCONTINUED | OUTPATIENT
Start: 2024-05-16 | End: 2024-05-16 | Stop reason: SDUPTHER

## 2024-05-16 RX ORDER — DEXTROSE MONOHYDRATE AND SODIUM CHLORIDE 5; .9 G/100ML; G/100ML
INJECTION, SOLUTION INTRAVENOUS CONTINUOUS
Status: DISCONTINUED | OUTPATIENT
Start: 2024-05-16 | End: 2024-05-18

## 2024-05-16 RX ORDER — CIPROFLOXACIN 2 MG/ML
400 INJECTION, SOLUTION INTRAVENOUS EVERY 12 HOURS
Status: DISCONTINUED | OUTPATIENT
Start: 2024-05-16 | End: 2024-05-16 | Stop reason: DRUGHIGH

## 2024-05-16 RX ORDER — SODIUM CHLORIDE 0.9 % (FLUSH) 0.9 %
5-40 SYRINGE (ML) INJECTION EVERY 12 HOURS SCHEDULED
Status: DISCONTINUED | OUTPATIENT
Start: 2024-05-16 | End: 2024-05-16 | Stop reason: HOSPADM

## 2024-05-16 RX ORDER — SODIUM CHLORIDE 0.9 % (FLUSH) 0.9 %
5-40 SYRINGE (ML) INJECTION PRN
Status: DISCONTINUED | OUTPATIENT
Start: 2024-05-16 | End: 2024-05-24 | Stop reason: HOSPADM

## 2024-05-16 RX ORDER — SODIUM CHLORIDE 0.9 % (FLUSH) 0.9 %
5-40 SYRINGE (ML) INJECTION EVERY 12 HOURS SCHEDULED
Status: DISCONTINUED | OUTPATIENT
Start: 2024-05-16 | End: 2024-05-24 | Stop reason: HOSPADM

## 2024-05-16 RX ORDER — SODIUM CHLORIDE 0.9 % (FLUSH) 0.9 %
5-40 SYRINGE (ML) INJECTION PRN
Status: DISCONTINUED | OUTPATIENT
Start: 2024-05-16 | End: 2024-05-16 | Stop reason: HOSPADM

## 2024-05-16 RX ORDER — LIDOCAINE HYDROCHLORIDE 10 MG/ML
INJECTION, SOLUTION INFILTRATION; PERINEURAL PRN
Status: DISCONTINUED | OUTPATIENT
Start: 2024-05-16 | End: 2024-05-16 | Stop reason: SDUPTHER

## 2024-05-16 RX ORDER — ROCURONIUM BROMIDE 10 MG/ML
INJECTION, SOLUTION INTRAVENOUS PRN
Status: DISCONTINUED | OUTPATIENT
Start: 2024-05-16 | End: 2024-05-16 | Stop reason: SDUPTHER

## 2024-05-16 RX ORDER — ONDANSETRON 2 MG/ML
4 INJECTION INTRAMUSCULAR; INTRAVENOUS
Status: DISCONTINUED | OUTPATIENT
Start: 2024-05-16 | End: 2024-05-16 | Stop reason: HOSPADM

## 2024-05-16 RX ORDER — HYDROMORPHONE HYDROCHLORIDE 1 MG/ML
0.25 INJECTION, SOLUTION INTRAMUSCULAR; INTRAVENOUS; SUBCUTANEOUS EVERY 5 MIN PRN
Status: DISCONTINUED | OUTPATIENT
Start: 2024-05-16 | End: 2024-05-16 | Stop reason: HOSPADM

## 2024-05-16 RX ORDER — METRONIDAZOLE 500 MG/100ML
500 INJECTION, SOLUTION INTRAVENOUS EVERY 8 HOURS
Status: DISCONTINUED | OUTPATIENT
Start: 2024-05-16 | End: 2024-05-21

## 2024-05-16 RX ORDER — CEFAZOLIN SODIUM 1 G/3ML
INJECTION, POWDER, FOR SOLUTION INTRAMUSCULAR; INTRAVENOUS PRN
Status: DISCONTINUED | OUTPATIENT
Start: 2024-05-16 | End: 2024-05-16 | Stop reason: SDUPTHER

## 2024-05-16 RX ORDER — NALOXONE HYDROCHLORIDE 0.4 MG/ML
INJECTION, SOLUTION INTRAMUSCULAR; INTRAVENOUS; SUBCUTANEOUS PRN
Status: DISCONTINUED | OUTPATIENT
Start: 2024-05-16 | End: 2024-05-16 | Stop reason: HOSPADM

## 2024-05-16 RX ORDER — SODIUM CHLORIDE, SODIUM LACTATE, POTASSIUM CHLORIDE, CALCIUM CHLORIDE 600; 310; 30; 20 MG/100ML; MG/100ML; MG/100ML; MG/100ML
INJECTION, SOLUTION INTRAVENOUS CONTINUOUS
Status: DISCONTINUED | OUTPATIENT
Start: 2024-05-16 | End: 2024-05-16 | Stop reason: HOSPADM

## 2024-05-16 RX ADMIN — PROPOFOL 50 MG: 10 INJECTION, EMULSION INTRAVENOUS at 12:39

## 2024-05-16 RX ADMIN — HYDRALAZINE HYDROCHLORIDE 100 MG: 50 TABLET ORAL at 10:01

## 2024-05-16 RX ADMIN — Medication 2 PUFF: at 18:39

## 2024-05-16 RX ADMIN — Medication 2 PUFF: at 07:27

## 2024-05-16 RX ADMIN — FENTANYL CITRATE 25 MCG: 0.05 INJECTION, SOLUTION INTRAMUSCULAR; INTRAVENOUS at 12:57

## 2024-05-16 RX ADMIN — Medication: at 09:33

## 2024-05-16 RX ADMIN — ONDANSETRON 4 MG: 2 INJECTION INTRAMUSCULAR; INTRAVENOUS at 15:43

## 2024-05-16 RX ADMIN — ROCURONIUM BROMIDE 50 MG: 10 INJECTION, SOLUTION INTRAVENOUS at 12:39

## 2024-05-16 RX ADMIN — SODIUM CHLORIDE, SODIUM LACTATE, POTASSIUM CHLORIDE, AND CALCIUM CHLORIDE: 600; 310; 30; 20 INJECTION, SOLUTION INTRAVENOUS at 11:21

## 2024-05-16 RX ADMIN — FENTANYL CITRATE 50 MCG: 0.05 INJECTION, SOLUTION INTRAMUSCULAR; INTRAVENOUS at 13:41

## 2024-05-16 RX ADMIN — ROCURONIUM BROMIDE 20 MG: 10 INJECTION, SOLUTION INTRAVENOUS at 13:41

## 2024-05-16 RX ADMIN — CEFAZOLIN 2 G: 1 INJECTION, POWDER, FOR SOLUTION INTRAMUSCULAR; INTRAVENOUS at 13:32

## 2024-05-16 RX ADMIN — DEXTROSE AND SODIUM CHLORIDE: 5; 900 INJECTION, SOLUTION INTRAVENOUS at 10:54

## 2024-05-16 RX ADMIN — LIDOCAINE HYDROCHLORIDE 50 MG: 10 INJECTION, SOLUTION INFILTRATION; PERINEURAL at 12:29

## 2024-05-16 RX ADMIN — METRONIDAZOLE 500 MG: 500 INJECTION, SOLUTION INTRAVENOUS at 16:24

## 2024-05-16 RX ADMIN — HYDROMORPHONE HYDROCHLORIDE 0.5 MG: 1 INJECTION, SOLUTION INTRAMUSCULAR; INTRAVENOUS; SUBCUTANEOUS at 14:56

## 2024-05-16 RX ADMIN — ONDANSETRON 4 MG: 2 INJECTION INTRAMUSCULAR; INTRAVENOUS at 14:02

## 2024-05-16 RX ADMIN — Medication 10 ML: at 09:50

## 2024-05-16 RX ADMIN — HYDROMORPHONE HYDROCHLORIDE 0.5 MG: 1 INJECTION, SOLUTION INTRAMUSCULAR; INTRAVENOUS; SUBCUTANEOUS at 11:03

## 2024-05-16 RX ADMIN — CIPROFLOXACIN 400 MG: 400 INJECTION, SOLUTION INTRAVENOUS at 09:28

## 2024-05-16 RX ADMIN — FENTANYL CITRATE 25 MCG: 0.05 INJECTION, SOLUTION INTRAMUSCULAR; INTRAVENOUS at 12:39

## 2024-05-16 RX ADMIN — ACETAMINOPHEN 650 MG: 325 TABLET ORAL at 17:12

## 2024-05-16 RX ADMIN — METRONIDAZOLE 500 MG: 500 INJECTION, SOLUTION INTRAVENOUS at 09:27

## 2024-05-16 RX ADMIN — HYDROMORPHONE HYDROCHLORIDE 0.5 MG: 1 INJECTION, SOLUTION INTRAMUSCULAR; INTRAVENOUS; SUBCUTANEOUS at 19:44

## 2024-05-16 RX ADMIN — SUGAMMADEX 300 MG: 100 INJECTION, SOLUTION INTRAVENOUS at 14:02

## 2024-05-16 ASSESSMENT — PAIN SCALES - GENERAL
PAINLEVEL_OUTOF10: 7
PAINLEVEL_OUTOF10: 7
PAINLEVEL_OUTOF10: 6
PAINLEVEL_OUTOF10: 3

## 2024-05-16 ASSESSMENT — PAIN DESCRIPTION - LOCATION: LOCATION: ABDOMEN

## 2024-05-16 ASSESSMENT — PAIN DESCRIPTION - DESCRIPTORS: DESCRIPTORS: ACHING

## 2024-05-16 ASSESSMENT — PAIN DESCRIPTION - ORIENTATION: ORIENTATION: MID

## 2024-05-16 NOTE — ANESTHESIA POSTPROCEDURE EVALUATION
Department of Anesthesiology  Postprocedure Note    Patient: Ming Rocha  MRN: [id number]  YOB: 1940  Date of evaluation: 5/16/2024    Procedure Summary       Date: 05/16/24 Room / Location: [address]    Anesthesia Start: 1229 Anesthesia Stop: 1419    Procedure: EGD WITH OPEN REPLACEMENT OF GTUBE Diagnosis:       Gastrostomy tube dysfunction (HCC)      (Gastrostomy tube dysfunction (HCC) [K94.23])    Surgeons: Elena Blankenship MD Responsible Provider: Gibson Friend APRN - CRNA    Anesthesia Type: general ASA Status: 3            Anesthesia Type: No value filed.    Naz Phase I: Naz Score: 9    Naz Phase II:      Anesthesia Post Evaluation    Patient location during evaluation: PACU  Patient participation: waiting for patient participation  Level of consciousness: responsive to physical stimuli  Pain score: 0  Airway patency: patent  Nausea & Vomiting: no nausea and no vomiting  Cardiovascular status: hemodynamically stable  Respiratory status: spontaneous ventilation and nonlabored ventilation  Hydration status: stable  Multimodal analgesia pain management approach    No notable events documented.

## 2024-05-16 NOTE — PROGRESS NOTES
Pharmacy Renal Adjustment    Ming Rocha is a 83 y.o. male. Pharmacy has renally adjusted medications per protocol.    Recent Labs     05/15/24  0344 05/16/24  0239   BUN 22 24*       Recent Labs     05/15/24  0344 05/16/24  0239   CREATININE 2.2* 2.4*       Estimated Creatinine Clearance: 26 mL/min (A) (based on SCr of 2.4 mg/dL (H)).    Height:   Ht Readings from Last 1 Encounters:   05/15/24 1.829 m (6' 0.01\")     Weight:  Wt Readings from Last 1 Encounters:   05/15/24 84.1 kg (185 lb 6 oz)       Plan: Adjust the following medications based on renal function:           Cipro to 400mg IV every 24 hours.    Electronically signed by Sara Ibrahim RPH on 5/16/2024 at 8:03 AM

## 2024-05-16 NOTE — ANESTHESIA PRE PROCEDURE
1984     Years since quittin.4    Smokeless tobacco: Never   Substance Use Topics    Alcohol use: Not Currently                                Counseling given: Not Answered      Vital Signs (Current):   Vitals:    24 0727 24 0749 24 1115 24 1120   BP:  (!) 159/66 (!) 150/78    Pulse:  65 67 74   Resp:      Temp:  98.1 °F (36.7 °C)     TempSrc:  Temporal     SpO2: 95% 97% 97% 97%   Weight:       Height:                                                  BP Readings from Last 3 Encounters:   24 (!) 150/78   24 (!) 140/80   24 (!) 168/74       NPO Status: Time of last liquid consumption:                         Time of last solid consumption:                         Date of last liquid consumption: 05/15/24                        Date of last solid food consumption: 05/15/24    BMI:   Wt Readings from Last 3 Encounters:   05/15/24 84.1 kg (185 lb 6 oz)   24 83.7 kg (184 lb 9.6 oz)   24 83.8 kg (184 lb 12.8 oz)     Body mass index is 25.14 kg/m².    CBC:   Lab Results   Component Value Date/Time    WBC 10.8 2024 02:39 AM    RBC 3.31 2024 02:39 AM    HGB 9.9 2024 02:39 AM    HCT 30.1 2024 02:39 AM    MCV 90.9 2024 02:39 AM    RDW 13.0 2024 02:39 AM     2024 02:39 AM       CMP:   Lab Results   Component Value Date/Time     2024 02:39 AM    K 4.4 2024 02:39 AM    K 3.9 2024 04:49 AM     2024 02:39 AM    CO2 21 2024 02:39 AM    BUN 24 2024 02:39 AM    CREATININE 2.4 2024 02:39 AM    LABGLOM 26 2024 02:39 AM    GLUCOSE 100 2024 02:39 AM    CALCIUM 8.4 2024 02:39 AM    BILITOT 0.5 05/15/2024 03:44 AM    ALKPHOS 54 05/15/2024 03:44 AM    AST 12 05/15/2024 03:44 AM    ALT 6 05/15/2024 03:44 AM       POC Tests:   Recent Labs     24  0750   POCGLU 91         Coags:   Lab Results   Component Value Date/Time    PROTIME 13.7 05/10/2024

## 2024-05-16 NOTE — PROGRESS NOTES
Jevity 1.5 (total 6 8-oz cans/day= 1,422 mL) with 64 mL H2O flush before and after each bolus. This regimen will provide 2124 kcal, 90 g pro, 305 g CHO, and 1593 mL fluid/day. When new tube is able to be used for TF, recommend to initiate smaller volume bolus feedings to mimic goal home TF regimen. Recommend 80 mL bolus of Jevity 1.5 6x/day with 20 mL flush before and after each bolus. This regimen will provide about 1/3 of pt's estimated needs: 720 kcal, 30 g pro, 103 g CHO, and 605 mL fluid/day. Will increase bolus volumes pending tolerance.    Nutrition Related Findings:    BM 5/15. BUN 24, creat 2.4, alb 2.9, accuchek's .         Current Nutrition Intake & Therapies:    Average Meal Intake: NPO  Average Supplements Intake: NPO  Diet NPO Exceptions are: Sips of Water with Meds, Ice Chips    Anthropometric Measures:  Height: 182.9 cm (6' 0.01\")  Ideal Body Weight (IBW): 178 lbs (81 kg)    Current Body Weight: 84.1 kg (185 lb 6.5 oz), 104.2 % IBW.    Current BMI (kg/m2): 25.1  Usual Body Weight: 97.4 kg (214 lb 12.8 oz) (11/22/23)  % Weight Change (Calculated): -13.7  BMI Categories: Overweight (BMI 25.0-29.9)    Estimated Daily Nutrient Needs:  Energy Requirements Based On: Kcal/kg  Weight Used for Energy Requirements: Current  Energy (kcal/day): 1730-6443 (20-25 kcal/kg)  Weight Used for Protein Requirements: Ideal  Protein (g/day): 105-162 (1.3-2 g/kg)  Method Used for Fluid Requirements: 1 ml/kcal  Fluid (ml/day): 7959-9584    Nutrition Diagnosis:   Severe malnutrition, In context of chronic illness related to catabolic illness, altered GI structure, inadequate protein-energy intake, swallowing difficulty as evidenced by GI abnormality, poor intake prior to admission, nutrition support - enteral nutrition, weight loss greater than or equal to 10% in 6 months, Criteria as identified in malnutrition assessment    Nutrition Interventions:   Food and/or Nutrient Delivery: Continue NPO  Nutrition  Education/Counseling: No recommendation at this time  Coordination of Nutrition Care: Continue to monitor while inpatient    Goals:  Previous Goal Met: Progress towards Goal(s) Declining  Goals: Initiate nutrition support    Nutrition Monitoring and Evaluation:   Behavioral-Environmental Outcomes: None Identified  Food/Nutrient Intake Outcomes: Diet Advancement/Tolerance, Enteral Nutrition Intake/Tolerance  Physical Signs/Symptoms Outcomes: Biochemical Data, Chewing or Swallowing, GI Status, Fluid Status or Edema, Hemodynamic Status, Nutrition Focused Physical Findings, Weight    Discharge Planning:    Enteral Nutrition     KAYLAN YU MS, RD, LD  Contact: [phone]

## 2024-05-16 NOTE — PROGRESS NOTES
13.0 11.5 - 14.5 %    Platelets 128 (L) 130 - 400 K/uL    MPV 10.2 9.4 - 12.4 fL   Renal Function Panel    Collection Time: 05/16/24  2:39 AM   Result Value Ref Range    Sodium 137 136 - 145 mmol/L    Potassium 4.4 3.5 - 5.0 mmol/L    Chloride 104 98 - 111 mmol/L    CO2 21 (L) 22 - 29 mmol/L    Anion Gap 12 7 - 19 mmol/L    Glucose 100 74 - 109 mg/dL    BUN 24 (H) 8 - 23 mg/dL    Creatinine 2.4 (H) 0.5 - 1.2 mg/dL    Est, Glom Filt Rate 26 (A) >60    Calcium 8.4 (L) 8.8 - 10.2 mg/dL    Phosphorus 3.5 2.5 - 4.5 mg/dL    Albumin 2.9 (L) 3.5 - 5.2 g/dL   POCT Glucose    Collection Time: 05/16/24  7:50 AM   Result Value Ref Range    POC Glucose 91 70 - 99 mg/dl    Performed on [employer]k              Current Facility-Administered Medications:     sodium chloride flush 0.9 % injection 5-40 mL, 5-40 mL, IntraVENous, 2 times per day, Elena Blankenship MD, 10 mL at 05/16/24 0950    sodium chloride flush 0.9 % injection 5-40 mL, 5-40 mL, IntraVENous, PRN, Elena Blankenship MD    0.9 % sodium chloride infusion, , IntraVENous, PRPattie BURTON Lindsey J, MD    dextrose 5 % and 0.9 % sodium chloride infusion, , IntraVENous, Continuous, Marcel Loza MD    metroNIDAZOLE (FLAGYL) 500 mg in 0.9% NaCl 100 mL IVPB premix, 500 mg, IntraVENous, Q8H, Marcel Loza MD, Last Rate: 100 mL/hr at 05/16/24 0927, 500 mg at 05/16/24 0927    ciprofloxacin (CIPRO) IVPB 400 mg, 400 mg, IntraVENous, Q24H, Marcel Loza MD, Last Rate: 200 mL/hr at 05/16/24 0928, 400 mg at 05/16/24 0928    saliva substitute (BIOTENE/MOUTH KOTE) liquid, , Oral, TID, Marcel Loza MD, Given at 05/16/24 0933    HYDROmorphone HCl PF (DILAUDID) injection 0.5 mg, 0.5 mg, IntraVENous, Q3H PRN, Marcel Loza MD, 0.5 mg at 05/15/24 1610    labetalol (NORMODYNE;TRANDATE) injection 10 mg, 10 mg, IntraVENous, Q4H PRN, Robert Claudio MD    hydrALAZINE (APRESOLINE) injection 10 mg, 10 mg, IntraVENous, Q4H PRN, Robert Claudio MD, 10 mg at  Dr Claudio for migrated esophageal stent on 5/14  - PEG complication will require replacement - pending 5/16  - low grade fever likely related to that - started Cipro Flagyl IV on 5/16 and sent blood cultures.         CAD s/p CABG  -- Continue ASA, statin      HTN  -- Continue Amlodipine, Hydralazine,   - Lopressor held - see above      COPD  -- Continue ICS/LABA and PRN Albuterol      GERD  -- Continue Protonix        Severe Prot Calorie Malnutrition.   S/p tPN.   Started PEG 5/11  Dietitian consult for PEG orders and management.       Constipation.   Glycerin VT - Hx of rectal fecal impaction.   Lactulose via PEG.   Had several large BMs.   CT shows still pretty heavy stool load R colon, but no obstruction.    - plan to resume bowel regimen once done with OR today.         DVT prophylaxis: Hold for procedure    DISPOSITION:  Plans to return home at discharge.    DNR No additional code details               Marcel Loza MD 5/16/2024 10:22 AM

## 2024-05-16 NOTE — BRIEF OP NOTE
Brief Postoperative Note      Patient: Ming Rocha  YOB: 1940  MRN: [id number]    Date of Procedure: 5/16/2024    Pre-Op Diagnosis Codes:     * Gastrostomy tube dysfunction (HCC) [K94.23]    Post-Op Diagnosis: Same       Procedure(s):  EGD WITH OPEN REPLACEMENT OF GTUBE    Surgeon(s):  Alisa Call MD Lund, Elena MCCAIN MD    Assistant:  * No surgical staff found *    Anesthesia: General    Estimated Blood Loss (mL): Minimal    Complications: None    Specimens:   * No specimens in log *    Implants:  * No implants in log *      Drains:   Gastrostomy/Enterostomy/Jejunostomy Tube Gastrostomy LUQ 1 20 fr (Active)   Drainage Appearance None 05/15/24 2234   Site Description Unable to view 05/15/24 2234   G Port Status Clamped 05/15/24 2234   Surrounding Skin Unable to view 05/15/24 2234   Dressing Status Clean, dry & intact 05/15/24 2234   Dressing Type Dry dressing 05/15/24 2234   G-Tube Care Completed Yes 05/15/24 2234   Tube Feeding Standard with Fiber 05/15/24 0915   Tube feeding/verify rate (mL/hr) 35 mL/hr 05/15/24 1030   Tube Feeding Intake (mL) 150 ml 05/13/24 2300   Free Water/Flush (mL) 30 mL 05/15/24 1030   Action Taken Repositioned 05/13/24 1700   Residual Volume (ml) 65 ml 05/13/24 2300       Findings:  Infection Present At Time Of Surgery (PATOS) (choose all levels that have infection present):  - Superficial Infection (skin/subcutaneous) present as evidenced by fluid consistent with infection  Other Findings: no acute findings    Electronically signed by Elena Blankenship MD on 5/16/2024 at 2:17 PM

## 2024-05-16 NOTE — PROGRESS NOTES
Palliative Care Progress Note  5/16/2024 12:48 PM    Patient:  Ming Rocha  YOB: 1940  Primary Care Physician: Irving Larsen DO  Advance Directive: DNR  Admit Date: 5/6/2024       Hospital Day: 10  Portions of this note have been copied forward, however, changed to reflect the most current clinical status of this patient.    CHIEF COMPLAINT/REASON FOR CONSULTATION Goals of care, family support, Code status, symptom management     SUBJECTIVE:  Mr. Rocha reports mild/moderate pain overnight. Did not receive pain medication. Plans for procedure today.    HPI:   The patient is a 83 y.o. male with PMH HTN, HLD, COPD, CKD IV, CVA with right sided residual weakness, CAD s/p CABG 2020, recent esophageal cancer diagnosis, and other comorbidities who presented to [facility]  5/6/2024 as a direct admit from general surgery office. 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 Christopher.  Patient has been having ongoing issues with p.o. intake without improvement following stent placement and concern for failure to thrive.  He has been referred to radiation oncology.  He was seen in general surgery office on day of admission for recommended port and G-tube placement but while there he was having uncontrolled pain and was recommended for inpatient admission.  Of note he was recently started on 50 mcg fentanyl patch by oncology team last week.  On arrival patient also endorsed ongoing issues with weakness, fatigue, and 40 to 50 pound weight loss since November of last year.  Patient also has a known left kidney mass currently being monitored with serial imaging but cannot rule out malignancy, noted interval growth over the last few years.  Laboratory studies on arrival revealed evidence of hypokalemia with K+ 2.9, low total protein, WBC 10.1, hemoglobin 13.7, and platelet 181,000.  CXR without acute  metoprolol succinate  25 mg Oral Daily    [MAR Hold] sodium chloride flush  5-40 mL IntraVENous 2 times per day    [MAR Hold] fentaNYL  1 patch TransDERmal Q72H    [MAR Hold] folic acid  1 mg Oral Daily    [MAR Hold] polyethylene glycol  17 g Oral Daily    [Held by provider] enoxaparin  30 mg SubCUTAneous Daily    [MAR Hold] amLODIPine  5 mg Oral BID    [MAR Hold] aspirin  81 mg Oral Daily    [MAR Hold] budesonide-formoterol  2 puff Inhalation BID    [MAR Hold] cyanocobalamin  50 mcg Oral Daily    [MAR Hold] fluticasone  2 spray Nasal Daily    [MAR Hold] guaiFENesin  1,200 mg Oral 2 times per day    [MAR Hold] hydrALAZINE  100 mg Oral BID    [MAR Hold] pantoprazole  40 mg Oral BID AC    [MAR Hold] sodium bicarbonate  325 mg Oral Daily    [MAR Hold] pravastatin  40 mg Oral Nightly     [MAR Hold] sodium chloride flush, [MAR Hold] sodium chloride, [MAR Hold] HYDROmorphone, [MAR Hold] labetalol, [MAR Hold] hydrALAZINE, [MAR Hold] sodium chloride flush, [MAR Hold] sodium chloride, [MAR Hold] oxyCODONE, [MAR Hold] ondansetron **OR** [MAR Hold] ondansetron, [MAR Hold] acetaminophen **OR** [MAR Hold] acetaminophen, [MAR Hold] nitroGLYCERIN, [MAR Hold] naloxone  Diet NPO Exceptions are: Sips of Water with Meds     Lab and other Data:     Recent Labs     05/14/24  0421 05/15/24  0344 05/16/24  0239   WBC 7.4 6.1 10.8   HGB 9.8* 9.7* 9.9*    141 128*     Recent Labs     05/14/24  0421 05/15/24  0344 05/16/24  0239    136 137   K 4.2 4.2 4.4    104 104   CO2 22 21* 21*   BUN 25* 22 24*   CREATININE 2.3* 2.2* 2.4*   GLUCOSE 92 112* 100     Recent Labs     05/15/24  0344   AST 12   ALT 6   BILITOT 0.5   ALKPHOS 54     Palliative Performance Scale:  40-50%    ECOG:3    CLINICAL PAIN ASSESSMENT:     Score 1-10 (if verbal):  5  Location:  across mid abdomen   Character:  aching   Frequency:  continuously but w/ periods of exacerbation   What makes it worse?:  positioning  What makes it better?:  pain medication

## 2024-05-16 NOTE — PLAN OF CARE
Problem: Chronic Conditions and Co-morbidities  Goal: Patient's chronic conditions and co-morbidity symptoms are monitored and maintained or improved  Outcome: Progressing  Flowsheets (Taken 5/16/2024 2543)  Care Plan - Patient's Chronic Conditions and Co-Morbidity Symptoms are Monitored and Maintained or Improved: Monitor and assess patient's chronic conditions and comorbid symptoms for stability, deterioration, or improvement     Problem: Safety - Adult  Goal: Free from fall injury  Outcome: Progressing     Problem: Nutrition Deficit:  Goal: Optimize nutritional status  Outcome: Progressing

## 2024-05-16 NOTE — OP NOTE
Endoscopic Procedure Note    Patient: Ming Rocha : 1940  Med Rec#: [id number] Acc#: [id number]     Primary Care Provider Irving Larsen DO    Endoscopist: Alisa Call MD, MD    Date of Procedure:  2024    Procedure:   EGD in the operating room (as per the request of the operating surgeon, Dr. Blankenship)    Indications:   1.  Malfunctioning surgical G-tube which was recently placed  2.  History of stage T3 by EUS esophageal adenocarcinoma with malignant esophageal stricture-status post esophageal Wallstent placement few weeks ago after which the stent had migrated requiring repeat EGD earlier this week with repositioning of the stent under fluoroscopy    Anesthesia:  Sedation was administered by anesthesia who monitored the patient during the procedure.    Estimated Blood Loss: minimal    Procedure:   After reviewing the patient's chart and obtaining informed consent, the patient was placed in the left lateral decubitus position.  A forward-viewing Olympus endoscope was lubricated and inserted through the mouth into the oropharynx. Under direct visualization, the upper esophagus was intubated. The scope was advanced to the level of the third portion of duodenum. Scope was slowly withdrawn with careful inspection of the mucosal surfaces. The scope was retroflexed for inspection of the gastric fundus and incisura. Findings and maneuvers are listed in impression below. The patient tolerated the procedure well. The scope was removed. There were no immediate complications.    Findings/IMPRESSION:  Esophagus: abnormal: Normal upper half; in the distal esophagus, between 33 cm to GE junction at 42 cm, and malignant appearing, irregular, friable mass lesion with narrowing of the esophageal lumen was noted.  The patient recently repositioned esophageal Wallstent appeared to have already migrated distally at the time of this endoscopy.  Small amount of coffee-ground material within the

## 2024-05-17 PROBLEM — Z87.891 FORMER SMOKER: Status: ACTIVE | Noted: 2024-05-17

## 2024-05-17 LAB
ALBUMIN SERPL-MCNC: 2.6 G/DL (ref 3.5–5.2)
ANION GAP SERPL CALCULATED.3IONS-SCNC: 10 MMOL/L (ref 7–19)
BASOPHILS # BLD: 0 K/UL (ref 0–0.2)
BASOPHILS NFR BLD: 0.2 % (ref 0–1)
BUN SERPL-MCNC: 25 MG/DL (ref 8–23)
CALCIUM SERPL-MCNC: 8.1 MG/DL (ref 8.8–10.2)
CHLORIDE SERPL-SCNC: 109 MMOL/L (ref 98–111)
CO2 SERPL-SCNC: 21 MMOL/L (ref 22–29)
CREAT SERPL-MCNC: 2.5 MG/DL (ref 0.5–1.2)
EOSINOPHIL # BLD: 0 K/UL (ref 0–0.6)
EOSINOPHIL NFR BLD: 0.3 % (ref 0–5)
ERYTHROCYTE [DISTWIDTH] IN BLOOD BY AUTOMATED COUNT: 13.2 % (ref 11.5–14.5)
GLUCOSE BLD-MCNC: 107 MG/DL (ref 70–99)
GLUCOSE BLD-MCNC: 118 MG/DL (ref 70–99)
GLUCOSE BLD-MCNC: 119 MG/DL (ref 70–99)
GLUCOSE BLD-MCNC: 94 MG/DL (ref 70–99)
GLUCOSE SERPL-MCNC: 118 MG/DL (ref 74–109)
HCT VFR BLD AUTO: 28.4 % (ref 42–52)
HGB BLD-MCNC: 9.1 G/DL (ref 14–18)
IMM GRANULOCYTES # BLD: 0 K/UL
LYMPHOCYTES # BLD: 0.5 K/UL (ref 1.1–4.5)
LYMPHOCYTES NFR BLD: 5 % (ref 20–40)
MCH RBC QN AUTO: 29.9 PG (ref 27–31)
MCHC RBC AUTO-ENTMCNC: 32 G/DL (ref 33–37)
MCV RBC AUTO: 93.4 FL (ref 80–94)
MONOCYTES # BLD: 0.8 K/UL (ref 0–0.9)
MONOCYTES NFR BLD: 7.7 % (ref 0–10)
NEUTROPHILS # BLD: 8.8 K/UL (ref 1.5–7.5)
NEUTS SEG NFR BLD: 86.4 % (ref 50–65)
PERFORMED ON: ABNORMAL
PERFORMED ON: NORMAL
PHOSPHATE SERPL-MCNC: 3.9 MG/DL (ref 2.5–4.5)
PLATELET # BLD AUTO: 122 K/UL (ref 130–400)
PMV BLD AUTO: 10.7 FL (ref 9.4–12.4)
POTASSIUM SERPL-SCNC: 4.6 MMOL/L (ref 3.5–5)
RBC # BLD AUTO: 3.04 M/UL (ref 4.7–6.1)
SODIUM SERPL-SCNC: 140 MMOL/L (ref 136–145)
WBC # BLD AUTO: 10.2 K/UL (ref 4.8–10.8)

## 2024-05-17 PROCEDURE — 2580000003 HC RX 258: Performed by: INTERNAL MEDICINE

## 2024-05-17 PROCEDURE — 85025 COMPLETE CBC W/AUTO DIFF WBC: CPT

## 2024-05-17 PROCEDURE — 82962 GLUCOSE BLOOD TEST: CPT

## 2024-05-17 PROCEDURE — 1200000000 HC SEMI PRIVATE

## 2024-05-17 PROCEDURE — 97530 THERAPEUTIC ACTIVITIES: CPT

## 2024-05-17 PROCEDURE — 2700000000 HC OXYGEN THERAPY PER DAY

## 2024-05-17 PROCEDURE — 6360000002 HC RX W HCPCS: Performed by: INTERNAL MEDICINE

## 2024-05-17 PROCEDURE — 36415 COLL VENOUS BLD VENIPUNCTURE: CPT

## 2024-05-17 PROCEDURE — 94760 N-INVAS EAR/PLS OXIMETRY 1: CPT

## 2024-05-17 PROCEDURE — 6370000000 HC RX 637 (ALT 250 FOR IP): Performed by: SURGERY

## 2024-05-17 PROCEDURE — 80069 RENAL FUNCTION PANEL: CPT

## 2024-05-17 PROCEDURE — 94640 AIRWAY INHALATION TREATMENT: CPT

## 2024-05-17 PROCEDURE — 6360000002 HC RX W HCPCS: Performed by: SURGERY

## 2024-05-17 PROCEDURE — 99232 SBSQ HOSP IP/OBS MODERATE 35: CPT | Performed by: INTERNAL MEDICINE

## 2024-05-17 PROCEDURE — 2580000003 HC RX 258: Performed by: SURGERY

## 2024-05-17 RX ADMIN — Medication 2 PUFF: at 19:32

## 2024-05-17 RX ADMIN — DEXTROSE AND SODIUM CHLORIDE: 5; 900 INJECTION, SOLUTION INTRAVENOUS at 00:25

## 2024-05-17 RX ADMIN — Medication 2 PUFF: at 06:25

## 2024-05-17 RX ADMIN — VANCOMYCIN HYDROCHLORIDE 2000 MG: 10 INJECTION, POWDER, LYOPHILIZED, FOR SOLUTION INTRAVENOUS at 10:01

## 2024-05-17 RX ADMIN — CIPROFLOXACIN 400 MG: 400 INJECTION, SOLUTION INTRAVENOUS at 10:00

## 2024-05-17 RX ADMIN — METRONIDAZOLE 500 MG: 500 INJECTION, SOLUTION INTRAVENOUS at 15:32

## 2024-05-17 RX ADMIN — HYDROMORPHONE HYDROCHLORIDE 0.5 MG: 1 INJECTION, SOLUTION INTRAMUSCULAR; INTRAVENOUS; SUBCUTANEOUS at 09:55

## 2024-05-17 RX ADMIN — METRONIDAZOLE 500 MG: 500 INJECTION, SOLUTION INTRAVENOUS at 02:12

## 2024-05-17 RX ADMIN — HYDROMORPHONE HYDROCHLORIDE 0.5 MG: 1 INJECTION, SOLUTION INTRAMUSCULAR; INTRAVENOUS; SUBCUTANEOUS at 21:12

## 2024-05-17 RX ADMIN — METRONIDAZOLE 500 MG: 500 INJECTION, SOLUTION INTRAVENOUS at 09:58

## 2024-05-17 RX ADMIN — DEXTROSE AND SODIUM CHLORIDE: 5; 900 INJECTION, SOLUTION INTRAVENOUS at 23:51

## 2024-05-17 ASSESSMENT — PAIN DESCRIPTION - DESCRIPTORS: DESCRIPTORS: ACHING

## 2024-05-17 ASSESSMENT — PAIN SCALES - GENERAL
PAINLEVEL_OUTOF10: 4
PAINLEVEL_OUTOF10: 3
PAINLEVEL_OUTOF10: 7

## 2024-05-17 ASSESSMENT — PAIN DESCRIPTION - LOCATION
LOCATION: ABDOMEN
LOCATION: ABDOMEN

## 2024-05-17 ASSESSMENT — PAIN DESCRIPTION - ORIENTATION: ORIENTATION: UPPER;MID

## 2024-05-17 NOTE — PROGRESS NOTES
Physical Therapy     05/17/24 1000   Restrictions/Precautions   Restrictions/Precautions Fall Risk   Required Braces or Orthoses? No   General   Diagnosis failure to thrive, severe dysphagia, adenocarcinoma gastroesophageal junction   Subjective   Subjective pt agreeable to tx with encouragement   Subjective   Pain 7/10; pt just received pain meds   Bed mobility   Supine to Sit Minimal assistance   Bed Mobility Comments pt required encouragement to attempt supine to sit without initial assistance. HOB elevated and use of R rail   Transfers   Sit to Stand Contact guard assistance   Stand to Sit Contact guard assistance   Ambulation   Surface Level tile   Device Hand-Held Assist   Assistance Minimal assistance;Moderate assistance;2 Person assistance   Quality of Gait very posterior   Gait Deviations Slow Sena;Decreased step length;Decreased step height   Distance 3'   Comments from EOB to chair. RW aquired for pt post transfer to increase safety with pt reluctant to use. educated on importance of temporary use to address safety concerns with standing/AMB and fall prevention.   Short Term Goals   Time Frame for Short Term Goals 2 wks   Short Term Goal 1 supine to sit indep   Short Term Goal 2 sit to stand indep   Short Term Goal 3 amb. 200' indep   Short Term Goal 4 bed to chair SBA   Activity Tolerance   Activity Tolerance Patient limited by pain   Assessment   Assessment Pt would benefit from further AMB/transfers with use of RW due to current posterior lean and weakened state of NPO for two days. positioned in chair for comfort with all needs in reach and alarm on. staff encouraged to be assist of 2 with RW or squat pivot if pt refuses use of AD.   PT Plan of Care   Friday X   Safety Devices   Type of Devices Call light within reach;Gait belt;Chair alarm in place;Left in chair     Electronically signed by Jorge L Coy PTA on 5/17/2024 at 10:51 AM

## 2024-05-17 NOTE — PLAN OF CARE
Problem: Chronic Conditions and Co-morbidities  Goal: Patient's chronic conditions and co-morbidity symptoms are monitored and maintained or improved  5/17/2024 0620 by Lili Joe RN  Outcome: Progressing  5/16/2024 1746 by Dominique Mccain LPN  Outcome: Progressing  Flowsheets (Taken 5/16/2024 0934)  Care Plan - Patient's Chronic Conditions and Co-Morbidity Symptoms are Monitored and Maintained or Improved: Monitor and assess patient's chronic conditions and comorbid symptoms for stability, deterioration, or improvement     Problem: Safety - Adult  Goal: Free from fall injury  5/17/2024 0620 by Lili Joe RN  Outcome: Progressing  5/16/2024 1746 by Dominique Mccain LPN  Outcome: Progressing     Problem: Nutrition Deficit:  Goal: Optimize nutritional status  5/17/2024 0620 by Lili Joe RN  Outcome: Progressing  5/16/2024 1746 by Dominique Mccain LPN  Outcome: Progressing     Problem: Discharge Planning  Goal: Discharge to home or other facility with appropriate resources  Outcome: Progressing     Problem: Pain  Goal: Verbalizes/displays adequate comfort level or baseline comfort level  Outcome: Progressing     Problem: Skin/Tissue Integrity  Goal: Absence of new skin breakdown  Outcome: Progressing

## 2024-05-17 NOTE — PROGRESS NOTES
Comprehensive Nutrition Assessment    Type and Reason for Visit:  Reassess    Nutrition Recommendations/Plan:   Recommend to initiate Jevity 1.5 at 10 mL/hr, increase 10 mL q 8 hours to goal rate of 60 mL/hr. Flush 30 mL/hr H2O.  Monitor for TF tolerance, adjust to bolus feeds prn.     Malnutrition Assessment:  Malnutrition Status:  Severe malnutrition (05/13/24 1320)    Context:  Chronic Illness     Findings of the 6 clinical characteristics of malnutrition:  Energy Intake:  Mild decrease in energy intake (Comment)  Weight Loss:  Greater than 10% over 6 months     Body Fat Loss:  Severe body fat loss Triceps   Muscle Mass Loss:  Severe muscle mass loss Temples (temporalis)  Fluid Accumulation:  No significant fluid accumulation     Strength:  Not Performed    Nutrition Assessment:    General Surgery cleared new tube for use, recommended initiate TF at 10 mL/hr, increase 10 mL q 8 hours to goal rate. Oral diet has not been reordered at this time. Pt is also receiving D5 and 0.5 NS at 125 mL/hr= 510 kcal, 3000 mL fluid. Pt's daughter present and understanding of plan to provide continuous TF at this time. Pt denied any nausea today. Aware abx ordered for possible wound infection. Recommend to initiate Jevity 1.5 at 10 mL/hr, increase 10 mL q 8 hours to goal rate of 60 mL/hr. Flush 30 mL/hr H2O. This regimen provides 2160 kcal, 92 g pro, 310 g CHO, and 1094 mL fluid from formula and 720 mL fluid from flush. Will monitor for tolerance and adjust to bolus feeds prn per goal home TF regimen.    Nutrition Related Findings:    BM 5/15. Alb 2.6, glu .         Current Nutrition Intake & Therapies:    Average Meal Intake: NPO  Average Supplements Intake: NPO  Diet NPO Exceptions are: Sips of Water with Meds, Ice Chips  ADULT TUBE FEEDING; Gastrostomy; Standard with Fiber; Continuous; 10; Yes; 10; Q 8 hours; 60; 30; Q 1 hour  Current Tube Feeding (TF) Orders:  Feeding Route: Gastrostomy  Formula: Standard with  Edema, Hemodynamic Status, Nutrition Focused Physical Findings, Weight    Discharge Planning:    Enteral Nutrition     KAYLAN MS AIMEE, RD, LD  Contact: [phone]

## 2024-05-17 NOTE — PROGRESS NOTES
AC, Elena Blankenship MD, 40 mg at 05/15/24 0526    sodium bicarbonate tablet 325 mg, 325 mg, Oral, Daily, Elena Blankenship MD, 325 mg at 05/15/24 0912    pravastatin (PRAVACHOL) tablet 40 mg, 40 mg, Oral, Nightly, Elena Blankenship MD, 40 mg at 05/14/24 2031    naloxone (NARCAN) injection 0.4 mg, 0.4 mg, IntraVENous, PRN, Elena Blankenship MD      Imaging:  XR CHEST PORTABLE    Result Date: 5/6/2024  No acute findings. The heart size is normal. Mediastinal contours and pulmonary vasculature are within normal limits. The lungs are clear. There is no pleural effusion. There is no pneumothorax. Post sternotomy change.        ______________________________________ Electronically signed by: VALERIY COWART M.D. Date:     05/06/2024 Time:    15:13     FL ESOPHAGRAM    Result Date: 5/2/2024  Esophageal stent is present.  The distal end of the stent is in the esophagus.   2.  A filling defect is present in the distal end of the stent and this most likely is tumor growing into the stent.   ______________________________________ Electronically signed by: KEVIN SHEA M.D. Date:     05/02/2024 Time:    15:11     NM PET/CT Skull Base to Mid Thigh    Result Date: 5/1/2024  1.  Distal esophageal stent and intensely hypermetabolic mass at the distal aspect of the stent at the GE junction consistent with the known primary neoplasm. 2.  Hypermetabolic mediastinal lymph nodes worrisome for lymph node metastasis. 3.  Exophytic solid mass medially at the midpole of the left kidney also worrisome for neoplasm may represent a second primary, renal cell carcinoma. 4.  Tracer uptake at the rectum with questionable wall thickening. Neoplasm not excluded. 5.  Focus of tracer uptake left anterior second rib without visualized fracture or abnormality. This may be posttraumatic but continued attention on follow-up is recommended. This report was signed and finalized on 5/1/2024 1:37 PM by Jose Cruz Monsivais.         Assessment/Plan  Principal  Problem:    Failure to thrive in adult  Active Problems:    Adenocarcinoma of gastroesophageal junction (HCC)    Esophageal cancer (HCC)    Palliative care patient    Mild malnutrition (HCC)    Dysphagia    Cancer related pain    Severe malnutrition (HCC)    Chest pain    Gastrostomy tube dysfunction (HCC)  Resolved Problems:    * No resolved hospital problems. *       Bradycardia  Aflutter variable degree block on Telemetry.   -- initially stopped Metoprolol  -- Consulted cardiology  -- Procedures postponed and pt had undergone cardiology eval   - stress test reassuring   - bradycardia improved with BB on hold - low dose Toprol XL resumed per cardiology.     - ok from cardiology stand point to proceed with procedures as planned.         A flutter variable degree block rate controlled with episodic bradycardia improved.   Toprol XL as above.   Reassess for AC prior to discharge - will be very high ris given active esophageal malignancy with partially ulcerated esophageal mass very high risk for bleeding.   Also very high risk with prior Hx of CVA.          Adenocarcinoma of GE junction  Failure to thrive  -- Oncology following  -- Continue pain control, antiemetics, bowel regimen  -- Nutrition consult for tube feedings  - started tPN support 5/8/24 given potential need to postpone procedures pending cardiology evaluation.   - to OR 5/10 with Dr Blankenship for R chemo port placement as well as Surgical gastrostomy placement.   - endoscopy with Dr Claudio for migrated esophageal stent on 5/14  - PEG complication will require replacement - pending 5/16  - low grade fever likely related to that - started Cipro Flagyl IV on 5/16 and sent blood cultures.     - given OR report of possibly wound infection I added Vanc IV on 5/17  - PEG not to be used till cleared by surgery Dr Blankenship - discussed with the nurse and patient.         CAD s/p CABG  -- Continue ASA, statin      HTN  -- PO meds on hold.       COPD  -- Continue ICS/LABA and

## 2024-05-17 NOTE — PROGRESS NOTES
[facility]   Pharmacy Pharmacokinetic Monitoring Service - Vancomycin     Ming Rocha is a 83 y.o. male starting on vancomycin therapy for Surgical Site Infection. Pharmacy consulted by Dr Loza for monitoring and adjustment.    Target Concentration: Goal AUC/LALY 400-600 mg*hr/L    Additional Antimicrobials: Cipro & Flagyl    Pertinent Laboratory Values:   Wt Readings from Last 1 Encounters:   05/15/24 84.1 kg (185 lb 6 oz)     Temp Readings from Last 1 Encounters:   05/17/24 97.7 °F (36.5 °C) (Temporal)     Estimated Creatinine Clearance: 25 mL/min (A) (based on SCr of 2.5 mg/dL (H)).  Recent Labs     05/16/24  0239 05/17/24  0309 05/17/24  0310   CREATININE 2.4* 2.5*  --    BUN 24* 25*  --    WBC 10.8  --  10.2     Procalcitonin:     Pertinent Cultures:  Culture Date Source Results   05/17/24 Blood sent   MRSA Nasal Swab: N/A. Non-respiratory infection.    Plan:  Dosing recommendations based on Bayesian software  Start vancomycin 2000 mg x 1 loading dose, then in 24 hrs begin 1000mg adrian 36 hours  Anticipated AUC of 558 and trough concentration of 17.6 at steady state  Renal labs as indicated   Vancomycin concentration ordered for 05/19/24 @ 2130   Pharmacy will continue to monitor patient and adjust therapy as indicated    Thank you for the consult,  Geoffrey Dorsey [facility]  5/17/2024 8:16 AM

## 2024-05-17 NOTE — PROGRESS NOTES
adenocarcinoma GE junction.   Per radiologist, will need to order CT Chest with oral contrast- esophageal protocol to be performed at [facility].   New order placed and patient will be notified of new appt.            Mr Rocha phoned clinic on 4/23/24 to report cancer-related pain and request for pain medication.  He reports that the Rx Percocet prescribed short term by Dr Claudio was effective, but he ran out.  Moving forward, Dr Christopher will manage Rx Percocet.  Mr Rocha verbalized understanding that he will only get pain meds from Dr Christopher (per [place] law) and will sign narcotic agreement in office per protocol.   Rx Percocet 7.5 mg Q 6 hours PRN will be sent to [facility] per patient request.      CT ABDOMEN PELVIS WO CONTRAST on 4/28/24 at [facility]  21 x 17 mm soft tissue density round exophytic lesion arising from the medial side of the midportion of the LEFT kidney, previously 18 x 15 mm 5 months ago and 12 x 11 mm 4 years ago.   No bowel obstruction.  Rectal fecal impaction.         CT CHEST with oral contrast-esophageal protocol on 4/29/24 at [facility]  Esophageal thickening with stent. with retained contents in the esophageal stent.   After administration of oral contrast, there is a 6.7 x 5.8 cm (maximal axial dimensions) filling defect/mass at the gastroesophageal junction.   There is retained contrast above the level of the mass with some passage of contrast beyond the level of the mass.  Borderline RIGHT thoracic inlet lymph nodes (axial series 2, images 65 and 69) and mediastinal appear similar to prior.   Lack of intravenous contrast partially limits evaluation of the mediastinum.   1.8 cm LEFT renal lesion with density measuring 26 Hounsfield units, previously 1.3 cm on 5/29/2020 and 1.7 cm on 11/19/2023. This is incompletely evaluated on this exam and could represent a solid renal lesion or hemorrhagic cyst.   Gallbladder appears distended, limited in evaluation due to motion. Correlate with  distal aspect of the stent at the GE junction consistent with the known primary neoplasm. 2.  Hypermetabolic mediastinal lymph nodes worrisome for lymph node metastasis. 3.  Exophytic solid mass medially at the midpole of the left kidney also worrisome for neoplasm may represent a second primary, renal cell carcinoma. 4.  Tracer uptake at the rectum with questionable wall thickening. Neoplasm not excluded. 5.  Focus of tracer uptake left anterior second rib without visualized fracture or abnormality. This may be posttraumatic but continued attention on follow-up is recommended. This report was signed and finalized on 5/1/2024 1:37 PM by Jose Cruz Monsivais.    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 DOSE LENGTH PRODUCT: 499.54 mGy.cm  Automatic exposure control was utilized to make radiation dose as low as reasonably achievable. TECHNIQUE: Unenhanced CT images obtained with multiplanar reformats. FINDINGS: AIRWAYS/PULMONARY PARENCHYMA: The central airways are midline and patent. Small LEFT pleural fluid and groundglass opacity, favored to represent atelectasis. Moderate emphysematous changes. No new focally suspicious finding. Similar 6 mm LEFT upper lobe pulmonary nodule on axial series 3, image 116 compared as far back as 5/29/2020. VASCULATURE: Limited evaluation of the vasculature without intravenous contrast. Thoracic aorta is normal in course and caliber with moderate calcified atherosclerosis. Normal caliber pulmonary artery. CARDIAC: Cardiomegaly. Scattered coronary artery calcifications. CABG. No pericardial effusion.   MEDIASTINUM: Esophageal thickening with stent. with retained contents in the esophageal stent. After administration of oral contrast, there is a 6.7 x 5.8 cm (maximal axial dimensions) filling defect/mass at the

## 2024-05-17 NOTE — PROGRESS NOTES
Subjective:  No acute events or changes. Having some nausea.    Objective:  /61   Pulse 62   Temp 97.7 °F (36.5 °C) (Temporal)   Resp 16   Ht 1.829 m (6' 0.01\")   Wt 84.1 kg (185 lb 6 oz)   SpO2 94%   BMI 25.14 kg/m²   General: NAD, AAO  Chest: regular, non-labored  Abdomen: Soft, ND, ATTP, incision C/DI    CBC:   Lab Results   Component Value Date/Time    WBC 10.2 05/17/2024 03:10 AM    RBC 3.04 05/17/2024 03:10 AM    HGB 9.1 05/17/2024 03:10 AM    HCT 28.4 05/17/2024 03:10 AM    MCV 93.4 05/17/2024 03:10 AM    MCH 29.9 05/17/2024 03:10 AM    MCHC 32.0 05/17/2024 03:10 AM    RDW 13.2 05/17/2024 03:10 AM     05/17/2024 03:10 AM    MPV 10.7 05/17/2024 03:10 AM     BMP:    Lab Results   Component Value Date/Time     05/17/2024 03:09 AM    K 4.6 05/17/2024 03:09 AM    K 3.9 05/09/2024 04:49 AM     05/17/2024 03:09 AM    CO2 21 05/17/2024 03:09 AM    BUN 25 05/17/2024 03:09 AM    CREATININE 2.5 05/17/2024 03:09 AM    CALCIUM 8.1 05/17/2024 03:09 AM    LABGLOM 25 05/17/2024 03:09 AM    GLUCOSE 118 05/17/2024 03:09 AM     Assessment and plan:  83 year old male s/p open replacement of gastrostomy tube  1) Okay to restart TF  2) Agree with antibiotics- likely only needs another 24 hours  3) Other cares per the medicine team  4) Dr. Saldaña covering over the weekend

## 2024-05-18 LAB
ALBUMIN SERPL-MCNC: 2.7 G/DL (ref 3.5–5.2)
ANION GAP SERPL CALCULATED.3IONS-SCNC: 7 MMOL/L (ref 7–19)
BASOPHILS # BLD: 0 K/UL (ref 0–0.2)
BASOPHILS NFR BLD: 0.6 % (ref 0–1)
BUN SERPL-MCNC: 23 MG/DL (ref 8–23)
CALCIUM SERPL-MCNC: 8.1 MG/DL (ref 8.8–10.2)
CHLORIDE SERPL-SCNC: 112 MMOL/L (ref 98–111)
CO2 SERPL-SCNC: 23 MMOL/L (ref 22–29)
CREAT SERPL-MCNC: 2.3 MG/DL (ref 0.5–1.2)
EOSINOPHIL # BLD: 0.4 K/UL (ref 0–0.6)
EOSINOPHIL NFR BLD: 6.5 % (ref 0–5)
ERYTHROCYTE [DISTWIDTH] IN BLOOD BY AUTOMATED COUNT: 13.4 % (ref 11.5–14.5)
GLUCOSE BLD-MCNC: 108 MG/DL (ref 70–99)
GLUCOSE BLD-MCNC: 115 MG/DL (ref 70–99)
GLUCOSE BLD-MCNC: 119 MG/DL (ref 70–99)
GLUCOSE BLD-MCNC: 133 MG/DL (ref 70–99)
GLUCOSE SERPL-MCNC: 109 MG/DL (ref 74–109)
HCT VFR BLD AUTO: 27.8 % (ref 42–52)
HGB BLD-MCNC: 8.6 G/DL (ref 14–18)
IMM GRANULOCYTES # BLD: 0 K/UL
LYMPHOCYTES # BLD: 0.5 K/UL (ref 1.1–4.5)
LYMPHOCYTES NFR BLD: 7.9 % (ref 20–40)
MCH RBC QN AUTO: 29.3 PG (ref 27–31)
MCHC RBC AUTO-ENTMCNC: 30.9 G/DL (ref 33–37)
MCV RBC AUTO: 94.6 FL (ref 80–94)
MONOCYTES # BLD: 0.6 K/UL (ref 0–0.9)
MONOCYTES NFR BLD: 8.6 % (ref 0–10)
NEUTROPHILS # BLD: 5 K/UL (ref 1.5–7.5)
NEUTS SEG NFR BLD: 75.9 % (ref 50–65)
PERFORMED ON: ABNORMAL
PHOSPHATE SERPL-MCNC: 4 MG/DL (ref 2.5–4.5)
PLATELET # BLD AUTO: 116 K/UL (ref 130–400)
PMV BLD AUTO: 10.6 FL (ref 9.4–12.4)
POTASSIUM SERPL-SCNC: 4 MMOL/L (ref 3.5–5)
RBC # BLD AUTO: 2.94 M/UL (ref 4.7–6.1)
SODIUM SERPL-SCNC: 142 MMOL/L (ref 136–145)
WBC # BLD AUTO: 6.6 K/UL (ref 4.8–10.8)

## 2024-05-18 PROCEDURE — 6360000002 HC RX W HCPCS: Performed by: SURGERY

## 2024-05-18 PROCEDURE — 94640 AIRWAY INHALATION TREATMENT: CPT

## 2024-05-18 PROCEDURE — 85025 COMPLETE CBC W/AUTO DIFF WBC: CPT

## 2024-05-18 PROCEDURE — 80069 RENAL FUNCTION PANEL: CPT

## 2024-05-18 PROCEDURE — 97530 THERAPEUTIC ACTIVITIES: CPT

## 2024-05-18 PROCEDURE — 6370000000 HC RX 637 (ALT 250 FOR IP): Performed by: SURGERY

## 2024-05-18 PROCEDURE — 99024 POSTOP FOLLOW-UP VISIT: CPT | Performed by: SURGERY

## 2024-05-18 PROCEDURE — 2580000003 HC RX 258: Performed by: INTERNAL MEDICINE

## 2024-05-18 PROCEDURE — 36415 COLL VENOUS BLD VENIPUNCTURE: CPT

## 2024-05-18 PROCEDURE — 6360000002 HC RX W HCPCS: Performed by: INTERNAL MEDICINE

## 2024-05-18 PROCEDURE — 99233 SBSQ HOSP IP/OBS HIGH 50: CPT | Performed by: INTERNAL MEDICINE

## 2024-05-18 PROCEDURE — 2580000003 HC RX 258: Performed by: SURGERY

## 2024-05-18 PROCEDURE — 97116 GAIT TRAINING THERAPY: CPT

## 2024-05-18 PROCEDURE — 94760 N-INVAS EAR/PLS OXIMETRY 1: CPT

## 2024-05-18 PROCEDURE — 6370000000 HC RX 637 (ALT 250 FOR IP): Performed by: INTERNAL MEDICINE

## 2024-05-18 PROCEDURE — 82962 GLUCOSE BLOOD TEST: CPT

## 2024-05-18 PROCEDURE — 1200000000 HC SEMI PRIVATE

## 2024-05-18 RX ORDER — ASPIRIN 81 MG/1
81 TABLET, CHEWABLE ORAL DAILY
Status: DISCONTINUED | OUTPATIENT
Start: 2024-05-18 | End: 2024-05-20

## 2024-05-18 RX ORDER — LANSOPRAZOLE 30 MG/1
30 TABLET, ORALLY DISINTEGRATING, DELAYED RELEASE ORAL 2 TIMES DAILY
Status: DISCONTINUED | OUTPATIENT
Start: 2024-05-18 | End: 2024-05-24 | Stop reason: HOSPADM

## 2024-05-18 RX ORDER — LACTULOSE 10 G/15ML
20 SOLUTION ORAL 2 TIMES DAILY
Status: DISCONTINUED | OUTPATIENT
Start: 2024-05-18 | End: 2024-05-19

## 2024-05-18 RX ORDER — ISOSORBIDE DINITRATE 10 MG/1
20 TABLET ORAL 2 TIMES DAILY
Status: DISCONTINUED | OUTPATIENT
Start: 2024-05-18 | End: 2024-05-24 | Stop reason: HOSPADM

## 2024-05-18 RX ORDER — AMLODIPINE BESYLATE 5 MG/1
5 TABLET ORAL DAILY
Status: DISCONTINUED | OUTPATIENT
Start: 2024-05-18 | End: 2024-05-24 | Stop reason: HOSPADM

## 2024-05-18 RX ADMIN — Medication: at 10:53

## 2024-05-18 RX ADMIN — LACTULOSE 20 G: 10 SOLUTION ORAL at 20:27

## 2024-05-18 RX ADMIN — LANSOPRAZOLE 30 MG: 30 TABLET, ORALLY DISINTEGRATING, DELAYED RELEASE ORAL at 11:20

## 2024-05-18 RX ADMIN — PRAVASTATIN SODIUM 40 MG: 20 TABLET ORAL at 20:27

## 2024-05-18 RX ADMIN — SODIUM CHLORIDE, PRESERVATIVE FREE 10 ML: 5 INJECTION INTRAVENOUS at 10:53

## 2024-05-18 RX ADMIN — METOPROLOL TARTRATE 25 MG: 25 TABLET, FILM COATED ORAL at 20:27

## 2024-05-18 RX ADMIN — Medication 2 PUFF: at 19:08

## 2024-05-18 RX ADMIN — AMLODIPINE BESYLATE 5 MG: 5 TABLET ORAL at 10:04

## 2024-05-18 RX ADMIN — METRONIDAZOLE 500 MG: 500 INJECTION, SOLUTION INTRAVENOUS at 09:28

## 2024-05-18 RX ADMIN — LANSOPRAZOLE 30 MG: 30 TABLET, ORALLY DISINTEGRATING, DELAYED RELEASE ORAL at 20:27

## 2024-05-18 RX ADMIN — ISOSORBIDE DINITRATE 20 MG: 10 TABLET ORAL at 20:27

## 2024-05-18 RX ADMIN — GUAIFENESIN 1200 MG: 600 TABLET, EXTENDED RELEASE ORAL at 20:27

## 2024-05-18 RX ADMIN — METRONIDAZOLE 500 MG: 500 INJECTION, SOLUTION INTRAVENOUS at 18:04

## 2024-05-18 RX ADMIN — Medication: at 20:30

## 2024-05-18 RX ADMIN — SODIUM CHLORIDE, PRESERVATIVE FREE 10 ML: 5 INJECTION INTRAVENOUS at 20:28

## 2024-05-18 RX ADMIN — METRONIDAZOLE 500 MG: 500 INJECTION, SOLUTION INTRAVENOUS at 01:51

## 2024-05-18 RX ADMIN — FOLIC ACID 1 MG: 1 TABLET ORAL at 10:04

## 2024-05-18 RX ADMIN — Medication 2 PUFF: at 06:33

## 2024-05-18 RX ADMIN — Medication 1000 MG: at 11:22

## 2024-05-18 RX ADMIN — SODIUM BICARBONATE 325 MG: 325 TABLET ORAL at 10:03

## 2024-05-18 RX ADMIN — METOPROLOL TARTRATE 25 MG: 25 TABLET, FILM COATED ORAL at 10:10

## 2024-05-18 RX ADMIN — Medication 50 MCG: at 10:10

## 2024-05-18 RX ADMIN — POLYETHYLENE GLYCOL (3350) 17 G: 17 POWDER, FOR SOLUTION ORAL at 10:52

## 2024-05-18 RX ADMIN — ISOSORBIDE DINITRATE 20 MG: 10 TABLET ORAL at 11:20

## 2024-05-18 RX ADMIN — HYDROMORPHONE HYDROCHLORIDE 0.5 MG: 1 INJECTION, SOLUTION INTRAMUSCULAR; INTRAVENOUS; SUBCUTANEOUS at 20:27

## 2024-05-18 RX ADMIN — CIPROFLOXACIN 400 MG: 400 INJECTION, SOLUTION INTRAVENOUS at 09:29

## 2024-05-18 ASSESSMENT — PAIN DESCRIPTION - DESCRIPTORS: DESCRIPTORS: ACHING

## 2024-05-18 ASSESSMENT — PAIN SCALES - GENERAL
PAINLEVEL_OUTOF10: 4
PAINLEVEL_OUTOF10: 0
PAINLEVEL_OUTOF10: 6

## 2024-05-18 ASSESSMENT — PAIN SCALES - WONG BAKER: WONGBAKER_NUMERICALRESPONSE: NO HURT

## 2024-05-18 ASSESSMENT — PAIN DESCRIPTION - LOCATION
LOCATION: ABDOMEN;INCISION
LOCATION: ABDOMEN

## 2024-05-18 ASSESSMENT — PAIN DESCRIPTION - ORIENTATION: ORIENTATION: MID

## 2024-05-18 NOTE — PROGRESS NOTES
Daily Progress Note    Date:2024  Patient: Ming Rocha  : 1940  MRN:[id number]  CODE:DNR No additional code details  PCP:Irving Larsen DO    Admit Date: 2024  1:07 PM   LOS: 12 days     Weight loss, unable to tolerate PO. Pt with active esophageal Ca.     Hospital Summary: 83 y.o. male who presented to [facility] as a direct admission from Dr. Blankenship's office to hospitalist service for evaluation of failure to thrive. Pt has history of copd, ckd stage IV, HTN, hyperlipidemia, cad, CVA, and esophageal malignancy followed by Dr. Christopher. He was evaluated as outpatient for port and feeding tube placement by Dr. Blankenship. Pt with uncontrolled pain, difficulty swallowing and weight loss. Directly admitted to hospitalist service for further management.         Endoscopy with concerns for esophageal stent migration, as well as jejunostomy and port placement are now on hold due to abnormal KG and telemetry findings as part of pre procedure evaluation.     Cardiology involved - stress test completed and reassuring.     Went to OR with Dr Blankenship 5/10 for R chest chemo port placement as well as surgical gastrostomy placement.     Went to Endo with Dr Claudio for stent repositioning on .  He is allowed for liquid PO for comfort. Nutrition and meds via PEG.         Had severe constipation with impaction requiring lactulose - improved.     CT abd was done to eval constipation, but this revealed problem with internal balloon on PEG tube - surgery notified - plan for tube exchange in OR with GI assistance       Went to OR  - despite surgery and GI collaboration, he required abd incision to be reopened to facilitate PEG replacement. Also with OR report of possibly wound infection - I started him on Vanc, Flagyl and Cipro IV.              Subjective:     Fever resolved.     Abd pain controlled today.     No BM, no flatus - resuming lactulose.     TF going at low rate - slowly increasing to goal.  stand point to proceed with procedures as planned.         A.flutter variable degree block rate controlled with episodic bradycardia improved.   BB as above.   Reassess for AC prior to discharge - will be very high ris given active esophageal malignancy with partially ulcerated esophageal mass very high risk for bleeding.   Also very high risk with prior Hx of CVA.          Adenocarcinoma of GE junction  Failure to thrive  -- Oncology following  -- Continue pain control, antiemetics, bowel regimen  -- Nutrition consult for tube feedings  - started tPN support 5/8/24 given potential need to postpone procedures pending cardiology evaluation.   - to OR 5/10 with Dr Blankenship for R chemo port placement as well as Surgical gastrostomy placement.   - endoscopy with Dr Claudio for migrated esophageal stent on 5/14  - PEG complication will require replacement - pending 5/16  - low grade fever likely related to that - started Cipro Flagyl IV on 5/16 and sent blood cultures.     - given OR report of possibly wound infection I added Vanc IV on 5/17   - tube feed resumed 5/17 - slowly titrating to goal        CAD s/p CABG  -- Continue ASA, statin      HTN  -- PO meds changed to Via PEG      COPD  -- Continue ICS/LABA and PRN Albuterol      GERD  -- Continue PPI via PEG        Severe Prot Calorie Malnutrition.   S/p tPN.   Started PEG 5/11  Then held due to PEG complication. Has since resumed.    Dietitian consult for PEG orders and management.         Constipation.   Lactulose via PEG.   Miralax via PEG.         DVT prophylaxis: Hold for procedure - resume when ok with surgery.     DISPOSITION:  Plans to return home at discharge.    DNR No additional code details               Marcel Loza MD 5/18/2024 10:46 AM

## 2024-05-18 NOTE — PROGRESS NOTES
Inquired with physician about administering oral medications with possibly migrated esophageal stent. Awaiting response. Limited selection of ordered daily medications are compatible with administration per peg tube.

## 2024-05-18 NOTE — PROGRESS NOTES
Physical Therapy  Ming Rocha  [id number]       05/18/24 1507   Restrictions/Precautions   Restrictions/Precautions Fall Risk   Required Braces or Orthoses? Yes  (Abdominal Binder- PEG tube placement)   Position Activity Restriction   Other position/activity restrictions Abdominal Binder - PEG tube placement   General   Chart Reviewed Yes   Response To Previous Treatment Not applicable   Family / Caregiver Present Yes  (Family friend present)   Referring Practitioner Ming Quintana APRN - CNP   Subjective   Subjective Pt agreeable to treatment.   Pain Assessment   Pain Assessment None - Denies Pain   Oxygen Therapy   O2 Device None (Room air)   Bed Mobility   Supine to Sit Stand by assistance   Sit to Supine Contact guard assistance   Scooting Stand by assistance   Transfers   Sit to Stand Stand by assistance;Contact guard assistance   Stand to Sit Stand by assistance  (Cues for hand placement for safety)   Bed to Chair Contact guard assistance   Comment Pt notes he hasn't used a RW before.   Ambulation   Surface Level tile   Device Rolling Walker   Assistance Contact guard assistance  (Additional assist for IV/Peg Tube)   Quality of Gait flexed, pushes/holds AD infront of him slightly vs wt bearing on it for assistance.   Gait Deviations Slow Sena;Increased JONATHAN;Decreased step height;Decreased step length;Deviated path   Distance 60'   Comments Fatigued at last 20' but did not require increased assist. Wide turns and occasionally picks up AD.   Balance   Standing - Static Fair;+   Standing - Dynamic Fair;-   Patient Goals    Patient Goals  go home   Short Term Goals   Time Frame for Short Term Goals 2 wks   Short Term Goal 1 supine to sit indep   Short Term Goal 2 sit to stand indep   Short Term Goal 3 amb. 200' indep   Short Term Goal 4 bed to chair SBA   Conditions Requiring Skilled Therapeutic Intervention   Body Structures, Functions, Activity Limitations Requiring Skilled Therapeutic Intervention Decreased

## 2024-05-18 NOTE — PLAN OF CARE
Problem: Chronic Conditions and Co-morbidities  Goal: Patient's chronic conditions and co-morbidity symptoms are monitored and maintained or improved  Outcome: Progressing     Problem: Safety - Adult  Goal: Free from fall injury  Outcome: Progressing     Problem: Nutrition Deficit:  Goal: Optimize nutritional status  Outcome: Progressing     Problem: Discharge Planning  Goal: Discharge to home or other facility with appropriate resources  Outcome: Progressing     Problem: Pain  Goal: Verbalizes/displays adequate comfort level or baseline comfort level  Outcome: Progressing     Problem: Skin/Tissue Integrity  Goal: Absence of new skin breakdown  Outcome: Progressing

## 2024-05-18 NOTE — PROGRESS NOTES
MEDICAL ONCOLOGY PROGRESS NOTE    Pt Name: Ming Rocha  MRN: [id number]  YOB: 1940  Date of evaluation: 5/18/2024    Subjective-reviewed internal meds notes, palliative care, and GI procedure notes.      POD #8 surgical g-tube placement by Dr. Blankenship on 5/10/2024  POD #4 Upper Endoscopy with manipulation and repositioning of Esophageal stent across known malignant esophageal mass by Dr. Claudio 5/14/2024    Tube feedings ongoing through surgical G-tube, rate increased from 15 cc an hour to 30 cc an hour earlier this morning and tolerating it well    HISTORY OF PRESENT ILLNESS:  Ming Rocha is well-known to our clinic.  He is a patient established with Dr. Seth Christopher.  He was last seen on 5/2/2024.  The patient has a new diagnosis of esophageal cancer of the distal esophagus status post esophageal stent placement.  He is having significant issues with p.o. intake after the stent placement.  He was referred back to Dr. Claudio for stent adjustment.  In addition, recommend port placement with general surgery as well as surgical jejunostomy feeding tube.  Referral to radiation oncology has been made.  Patient presents today hospital as a direct admission from Dr. Blankenship's office.  Patient had uncontrolled pain and therefore was admitted to the inpatient for pain control.  Patient has generalized weakness, fatigue and significant pain related to his cancer.  Patient was started on Duragesic patch 50 mcg every 3 days by Dr. Christopher last week.    Laboratory studies at admission today showed evidence of hypokalemia with potassium 2.9, low total protein.  WBC 10.1, hemoglobin 13.7, platelet 181,000    Esophagram performed 5/2/2024 showed a filling defect in the end of the stent like represent tumor growth    Chest x-ray-no acute findings      PRIOR ONCOLOGICAL HISTORY     Ming Rocha is an 83-year-old  gentleman with primary and secondary diagnoses as outlined  Poorly differentiated  malignancy  -Gastrostomy placement & Port insertion by Dr Blankenship 05/10/2024  - Dr. Claudio -Status Post Successful Upper Endoscopy with Repositioning of Esophageal Stent by Dr. Claudio on 5/14/2024.  -05/16/24-EGD with open replacement of G-tube Dr. Elena Blankenship and Dr. Steele  -Currently on enteral nutrition through gastrostomy tube    POD #4 Upper Endoscopy with manipulation and repositioning of Esophageal stent across known malignant esophageal mass by Dr. Claudio 5/14/2024    He has an appointment with Dr. Constantine Leslie at the radiation therapy center at [facility] on 5/20/2024.  We will see if he makes his appointment.  If not we can adjust without significant delay.    # Protein calorie malnutrition    POD #8 surgical g-tube placement by Dr. Blankenship on 5/10/2024  Tube feedings ongoing through surgical G-tube, rate increased from 15 cc an hour to 30 cc an hour earlier this morning and tolerating it well      # Advanced esophageal malignancy  -Plans for outpatient RT's Dr. Constantine Leslie    # Cancer-related pain  -Continue fentanyl 50 mcg every 72 hours  -Morphine IV as needed  -Oxycodone 10 mg every 4 as needed    # Physical deconditioning  -PT/OT    # Left kidney mass-to be addressed later.  Cannot rule out malignancy.  Interval growth over the last few years.  There is a solid exophytic mass medially at the midpole of the left kidney which measures 1.8 cm and has a maximum SUV of 3.3 may represent neoplasm.     # Normocytic anemia-hemoglobin 9.7  Recent Labs     05/18/24  0317 05/17/24  0310 05/16/24  0239   WBC 6.6 10.2 10.8   HGB 8.6* 9.1* 9.9*   HCT 27.8* 28.4* 30.1*   MCV 94.6* 93.4 90.9   * 122* 128*   -Continue to monitor  -Ferritin 222, iron saturation 23%, iron 31, TIBC 132, folate 4.2, vitamin B12 870    # Mild thrombocytopenia  Platelet count of 122,000 today  Continue to monitor    # Renal impairment-creatinine 2.5  -Continue to monitor      Latest Ref Rng & Units 5/18/2024     3:17 AM 5/17/2024     3:09 AM

## 2024-05-18 NOTE — PROGRESS NOTES
Comprehensive Nutrition Assessment    Type and Reason for Visit:  Reassess    Nutrition Recommendations/Plan:   Continue to work towards EN goal rate  D/C Mild Malnutrition from the Problem List     Malnutrition Assessment:  Malnutrition Status:  Severe malnutrition (05/18/24 0939)    Context:  Chronic Illness     Findings of the 6 clinical characteristics of malnutrition:  Energy Intake:  Mild decrease in energy intake (Comment)  Weight Loss:  No significant weight loss     Body Fat Loss:  Severe body fat loss Triceps   Muscle Mass Loss:  Severe muscle mass loss Temples (temporalis)  Fluid Accumulation:  No significant fluid accumulation     Strength:  Not Performed    Nutrition Assessment:    Pt meets the criteria for Severe Malnutrition AEB Severe Fat Loss and Severe Muscle Wasting. Pt receives EN d/t his inability to safely swallow. EN is currently running at 25 mL/hr. No s/s intolerance noted at this time. 35 mL/residuals noted and not concerning. Continue to work towards goal rate of 60 mL/hr per orders. Last BM noted 5/15.    Nutrition Related Findings:    BM 5/15. Alb 2.6, glu . Wound Type: Surgical Incision       Current Nutrition Intake & Therapies:    Average Meal Intake: NPO  Average Supplements Intake: NPO  Current Tube Feeding (TF) Orders:  Feeding Route: Gastrostomy  Formula: Standard with Fiber (Jevity 1.5)  Schedule: Continuous  Feeding Regimen: Jevity 1.5 at 60 mL/hr = 1440 mL/day  Additives/Modulars: None  Water Flushes: 30 mL/hr = 720 mL/day  Current TF & Flush Orders Provides: 900 kcals, 38 g/protein, 129 g/CHO, and 1176 mL/fluid daily  Goal TF & Flush Orders Provides: 2160 kcals, 91 g/protein, 310 g/CHO, and 1814 mL/fluid daily    Anthropometric Measures:  Height: 182.9 cm (6' 0.01\")  Ideal Body Weight (IBW): 178 lbs (81 kg)    Current Body Weight: 85.4 kg (188 lb 4.4 oz), 104.2 % IBW.    Current BMI (kg/m2): 25.5  Usual Body Weight: 97.4 kg (214 lb 12.8 oz) (11/22/23)  % Weight  See report in viewpoint.

## 2024-05-18 NOTE — PROGRESS NOTES
[facility] General Surgery    Progress Note    POD # 2    Subjective:    Resting comfortably in bed.  No complaints voiced.    Objective:    Vitals:    05/18/24 0328 05/18/24 0436 05/18/24 0633 05/18/24 0737   BP:  (!) 108/53  (!) 140/63   Pulse:  62  82   Resp:  16  18   Temp:  98.3 °F (36.8 °C)  98.4 °F (36.9 °C)   TempSrc:  Temporal  Temporal   SpO2:  (!) 88% 93% 92%   Weight: 85.4 kg (188 lb 6 oz)      Height:         I/O last 3 completed shifts:  In: 20 [NG/GT:20]  Out: 825 [Urine:825]     Abdomen is soft.  Mild tenderness as expected.  Incision is clean and dry.  G-tube exit site is unremarkable.  Nursing was flushing medications and reports that the tube is working well.    LABS:   CBC:   Recent Labs     05/16/24  0239 05/17/24  0310 05/18/24  0317   WBC 10.8 10.2 6.6   RBC 3.31* 3.04* 2.94*   HGB 9.9* 9.1* 8.6*   HCT 30.1* 28.4* 27.8*   * 122* 116*   LYMPHOPCT  --  5.0* 7.9*   MONOPCT  --  7.7 8.6   EOSPCT  --  0.3 6.5*   BASOPCT  --  0.2 0.6   MONOSABS  --  0.80 0.60   EOSABS  --  0.00 0.40   BASOSABS  --  0.00 0.00      BMP:   Recent Labs     05/16/24  0239 05/17/24  0309 05/18/24  0317    140 142   K 4.4 4.6 4.0    109 112*   CO2 21* 21* 23   ANIONGAP 12 10 7   GLUCOSE 100 118* 109   BUN 24* 25* 23   CREATININE 2.4* 2.5* 2.3*   LABGLOM 26* 25* 27*   CALCIUM 8.4* 8.1* 8.1*       Assessment:    1.  Continued satisfactory recovery post laparotomy and replacement of his gastrostomy tube.  The tube appears to be functioning well and no other complicating features appreciated.    Plan:    1.  Continue to use the G-tube for nutrition and medications as needed.  2.  Care for his ongoing medical problems per the hospitalist service.  3.  General surgery will sign off at this time.  Please call if we can be of further help during this hospitalization.

## 2024-05-19 LAB
ALBUMIN SERPL-MCNC: 2.5 G/DL (ref 3.5–5.2)
ANION GAP SERPL CALCULATED.3IONS-SCNC: 7 MMOL/L (ref 7–19)
BASOPHILS # BLD: 0 K/UL (ref 0–0.2)
BASOPHILS NFR BLD: 0.6 % (ref 0–1)
BUN SERPL-MCNC: 22 MG/DL (ref 8–23)
CALCIUM SERPL-MCNC: 8.3 MG/DL (ref 8.8–10.2)
CHLORIDE SERPL-SCNC: 111 MMOL/L (ref 98–111)
CO2 SERPL-SCNC: 22 MMOL/L (ref 22–29)
CREAT SERPL-MCNC: 2.4 MG/DL (ref 0.5–1.2)
EOSINOPHIL # BLD: 0.4 K/UL (ref 0–0.6)
EOSINOPHIL NFR BLD: 6.7 % (ref 0–5)
ERYTHROCYTE [DISTWIDTH] IN BLOOD BY AUTOMATED COUNT: 13.5 % (ref 11.5–14.5)
GLUCOSE BLD-MCNC: 115 MG/DL (ref 70–99)
GLUCOSE BLD-MCNC: 117 MG/DL (ref 70–99)
GLUCOSE BLD-MCNC: 126 MG/DL (ref 70–99)
GLUCOSE BLD-MCNC: 135 MG/DL (ref 70–99)
GLUCOSE SERPL-MCNC: 109 MG/DL (ref 74–109)
HCT VFR BLD AUTO: 25.1 % (ref 42–52)
HGB BLD-MCNC: 8.2 G/DL (ref 14–18)
IMM GRANULOCYTES # BLD: 0 K/UL
LYMPHOCYTES # BLD: 0.7 K/UL (ref 1.1–4.5)
LYMPHOCYTES NFR BLD: 11.7 % (ref 20–40)
MAGNESIUM SERPL-MCNC: 2.3 MG/DL (ref 1.6–2.4)
MCH RBC QN AUTO: 30 PG (ref 27–31)
MCHC RBC AUTO-ENTMCNC: 32.7 G/DL (ref 33–37)
MCV RBC AUTO: 91.9 FL (ref 80–94)
MONOCYTES # BLD: 0.6 K/UL (ref 0–0.9)
MONOCYTES NFR BLD: 9 % (ref 0–10)
NEUTROPHILS # BLD: 4.5 K/UL (ref 1.5–7.5)
NEUTS SEG NFR BLD: 71.7 % (ref 50–65)
PERFORMED ON: ABNORMAL
PHOSPHATE SERPL-MCNC: 4 MG/DL (ref 2.5–4.5)
PLATELET # BLD AUTO: 125 K/UL (ref 130–400)
PMV BLD AUTO: 10.7 FL (ref 9.4–12.4)
POTASSIUM SERPL-SCNC: 4.4 MMOL/L (ref 3.5–5)
RBC # BLD AUTO: 2.73 M/UL (ref 4.7–6.1)
SODIUM SERPL-SCNC: 140 MMOL/L (ref 136–145)
VANCOMYCIN TROUGH SERPL-MCNC: 12.9 UG/ML (ref 10–20)
WBC # BLD AUTO: 6.2 K/UL (ref 4.8–10.8)

## 2024-05-19 PROCEDURE — 85025 COMPLETE CBC W/AUTO DIFF WBC: CPT

## 2024-05-19 PROCEDURE — 6360000002 HC RX W HCPCS: Performed by: SURGERY

## 2024-05-19 PROCEDURE — 36415 COLL VENOUS BLD VENIPUNCTURE: CPT

## 2024-05-19 PROCEDURE — 94640 AIRWAY INHALATION TREATMENT: CPT

## 2024-05-19 PROCEDURE — 1200000000 HC SEMI PRIVATE

## 2024-05-19 PROCEDURE — 6370000000 HC RX 637 (ALT 250 FOR IP): Performed by: SURGERY

## 2024-05-19 PROCEDURE — 83735 ASSAY OF MAGNESIUM: CPT

## 2024-05-19 PROCEDURE — 6360000002 HC RX W HCPCS: Performed by: INTERNAL MEDICINE

## 2024-05-19 PROCEDURE — 80069 RENAL FUNCTION PANEL: CPT

## 2024-05-19 PROCEDURE — 2700000000 HC OXYGEN THERAPY PER DAY

## 2024-05-19 PROCEDURE — 94760 N-INVAS EAR/PLS OXIMETRY 1: CPT

## 2024-05-19 PROCEDURE — 99232 SBSQ HOSP IP/OBS MODERATE 35: CPT | Performed by: INTERNAL MEDICINE

## 2024-05-19 PROCEDURE — 82962 GLUCOSE BLOOD TEST: CPT

## 2024-05-19 PROCEDURE — 2580000003 HC RX 258: Performed by: SURGERY

## 2024-05-19 PROCEDURE — 80202 ASSAY OF VANCOMYCIN: CPT

## 2024-05-19 PROCEDURE — 6370000000 HC RX 637 (ALT 250 FOR IP): Performed by: INTERNAL MEDICINE

## 2024-05-19 RX ORDER — LACTULOSE 10 G/15ML
20 SOLUTION ORAL DAILY
Status: DISCONTINUED | OUTPATIENT
Start: 2024-05-20 | End: 2024-05-20

## 2024-05-19 RX ADMIN — FOLIC ACID 1 MG: 1 TABLET ORAL at 08:57

## 2024-05-19 RX ADMIN — PRAVASTATIN SODIUM 40 MG: 20 TABLET ORAL at 22:47

## 2024-05-19 RX ADMIN — LACTULOSE 20 G: 10 SOLUTION ORAL at 08:58

## 2024-05-19 RX ADMIN — METRONIDAZOLE 500 MG: 500 INJECTION, SOLUTION INTRAVENOUS at 10:56

## 2024-05-19 RX ADMIN — POLYETHYLENE GLYCOL (3350) 17 G: 17 POWDER, FOR SOLUTION ORAL at 09:00

## 2024-05-19 RX ADMIN — METRONIDAZOLE 500 MG: 500 INJECTION, SOLUTION INTRAVENOUS at 17:52

## 2024-05-19 RX ADMIN — Medication 2 PUFF: at 18:55

## 2024-05-19 RX ADMIN — AMLODIPINE BESYLATE 5 MG: 5 TABLET ORAL at 08:57

## 2024-05-19 RX ADMIN — METOPROLOL TARTRATE 12.5 MG: 25 TABLET, FILM COATED ORAL at 08:59

## 2024-05-19 RX ADMIN — LANSOPRAZOLE 30 MG: 30 TABLET, ORALLY DISINTEGRATING, DELAYED RELEASE ORAL at 22:47

## 2024-05-19 RX ADMIN — LANSOPRAZOLE 30 MG: 30 TABLET, ORALLY DISINTEGRATING, DELAYED RELEASE ORAL at 08:58

## 2024-05-19 RX ADMIN — Medication: at 17:54

## 2024-05-19 RX ADMIN — Medication 50 MCG: at 08:59

## 2024-05-19 RX ADMIN — ISOSORBIDE DINITRATE 20 MG: 10 TABLET ORAL at 08:58

## 2024-05-19 RX ADMIN — Medication 2 PUFF: at 06:32

## 2024-05-19 RX ADMIN — SODIUM CHLORIDE, PRESERVATIVE FREE 10 ML: 5 INJECTION INTRAVENOUS at 22:43

## 2024-05-19 RX ADMIN — Medication: at 22:57

## 2024-05-19 RX ADMIN — SODIUM CHLORIDE, PRESERVATIVE FREE 10 ML: 5 INJECTION INTRAVENOUS at 16:29

## 2024-05-19 RX ADMIN — ISOSORBIDE DINITRATE 20 MG: 10 TABLET ORAL at 22:47

## 2024-05-19 RX ADMIN — Medication: at 09:00

## 2024-05-19 RX ADMIN — SODIUM BICARBONATE 325 MG: 325 TABLET ORAL at 08:59

## 2024-05-19 RX ADMIN — CIPROFLOXACIN 400 MG: 400 INJECTION, SOLUTION INTRAVENOUS at 10:56

## 2024-05-19 RX ADMIN — ONDANSETRON 4 MG: 2 INJECTION INTRAMUSCULAR; INTRAVENOUS at 12:39

## 2024-05-19 RX ADMIN — METRONIDAZOLE 500 MG: 500 INJECTION, SOLUTION INTRAVENOUS at 00:23

## 2024-05-19 RX ADMIN — Medication 1000 MG: at 22:46

## 2024-05-19 NOTE — PROGRESS NOTES
Pt had large, liquid stool. Bed bath, fresh linens, and reji care provided. Pt denies pain at this time. Mobilized to bedside commode twice with contact guard assist. Strength much improved from previous days.  Pt slurring speech with dry mouth, completely resolves with generous application of biotene spray and many sips of water.   Pt has inquired about advancing diet to clears, asking for jello. Awaiting general surgery approval.

## 2024-05-19 NOTE — PLAN OF CARE
Problem: Chronic Conditions and Co-morbidities  Goal: Patient's chronic conditions and co-morbidity symptoms are monitored and maintained or improved  5/19/2024 0005 by Phyllis Loaiza RN  Outcome: Progressing  5/18/2024 2351 by Phyllis Loaiza RN  Outcome: Progressing     Problem: Safety - Adult  Goal: Free from fall injury  5/19/2024 0005 by Phyllis Loaiza RN  Outcome: Progressing  5/18/2024 2351 by Phyllis Loaiza RN  Outcome: Progressing     Problem: Nutrition Deficit:  Goal: Optimize nutritional status  5/19/2024 0005 by Phyllis Loaiza RN  Outcome: Progressing  5/18/2024 2351 by Phyllis Loaiza RN  Outcome: Progressing     Problem: Discharge Planning  Goal: Discharge to home or other facility with appropriate resources  5/19/2024 0005 by Phyllis Loaiza RN  Outcome: Progressing  5/18/2024 2351 by Phyllis Loaiza RN  Outcome: Progressing     Problem: Pain  Goal: Verbalizes/displays adequate comfort level or baseline comfort level  5/19/2024 0005 by Phyllis Loaiza RN  Outcome: Progressing  5/18/2024 2351 by Phyllis Loaiza RN  Outcome: Progressing     Problem: Skin/Tissue Integrity  Goal: Absence of new skin breakdown  Description: 1.  Monitor for areas of redness and/or skin breakdown  2.  Assess vascular access sites hourly  3.  Every 4-6 hours minimum:  Change oxygen saturation probe site  4.  Every 4-6 hours:  If on nasal continuous positive airway pressure, respiratory therapy assess nares and determine need for appliance change or resting period.  5/19/2024 0005 by Phyllis Loaiza RN  Outcome: Progressing  5/18/2024 2351 by Phyllis Loaiza RN  Outcome: Progressing

## 2024-05-19 NOTE — PROGRESS NOTES
Comprehensive Nutrition Assessment    Type and Reason for Visit:  Reassess    Nutrition Recommendations/Plan:   Modify EN orders d/t Jevity 1.5 being out of stock: Jevity 1.2 with a goal rate of 75 mL/hr. Flush tube with 30 mL/H2O every hr. This regimen will provide 2160 kcals, 99 g/protein, 304 g/CHO, and 2172 mL/fluid daily.     Malnutrition Assessment:  Malnutrition Status:  Severe malnutrition (05/18/24 0939)    Context:  Chronic Illness     Findings of the 6 clinical characteristics of malnutrition:  Energy Intake:  Mild decrease in energy intake (Comment)  Weight Loss:  No significant weight loss     Body Fat Loss:  Severe body fat loss Triceps   Muscle Mass Loss:  Severe muscle mass loss Temples (temporalis)  Fluid Accumulation:  No significant fluid accumulation     Strength:  Not Performed    Nutrition Assessment:    Jevity 1.5 is out of stock. Will modify orders to Jevity 1.2, which will continue to meet pt's nutritional needs. Will monitor tolerance closely,    Nutrition Related Findings:    BM 5/15. Alb 2.6, glu . Wound Type: Surgical Incision       Current Nutrition Intake & Therapies:    Average Meal Intake: NPO  Average Supplements Intake: NPO  Diet NPO Exceptions are: Sips of Water with Meds, Ice Chips  ADULT TUBE FEEDING; Gastrostomy; Standard with Fiber; Continuous; 10; Yes; 10; Q 8 hours; 60; 30; Q 1 hour    Anthropometric Measures:  Height: 182.9 cm (6' 0.01\")  Ideal Body Weight (IBW): 178 lbs (81 kg)    Current Body Weight: 85.4 kg (188 lb 4.4 oz), 104.2 % IBW.    Current BMI (kg/m2): 25.5  Usual Body Weight: 97.4 kg (214 lb 12.8 oz) (11/22/23)  % Weight Change (Calculated): -13.7  BMI Categories: Overweight (BMI 25.0-29.9)    Estimated Daily Nutrient Needs:  Energy Requirements Based On: Kcal/kg  Weight Used for Energy Requirements: Current  Energy (kcal/day): 3835-0789 kcals/day  Weight Used for Protein Requirements: Ideal  Protein (g/day): 105-162 g/protein/day  Method Used for

## 2024-05-19 NOTE — PROGRESS NOTES
MEDICAL ONCOLOGY PROGRESS NOTE    Pt Name: Ming Rcoha  MRN: [id number]  YOB: 1940  Date of evaluation: 5/19/2024    Subjective-reviewed internal meds notes, palliative care, and GI procedure notes.      POD #9 surgical g-tube placement by Dr. Blankenship on 5/10/2024  POD #5 Upper Endoscopy with manipulation and repositioning of Esophageal stent across known malignant esophageal mass by Dr. Claudio 5/14/2024    Tube feedings ongoing through surgical G-tube, rate increased from 30 cc an hour to 50 cc an hour and tolerating it well.    He had a large bowel movement earlier, unfortunately soiled himself but the BM was generous and welcomed.    HISTORY OF PRESENT ILLNESS:  Ming Rocha is well-known to our clinic.  He is a patient established with Dr. Seth Christopher.  He was last seen on 5/2/2024.  The patient has a new diagnosis of esophageal cancer of the distal esophagus status post esophageal stent placement.  He is having significant issues with p.o. intake after the stent placement.  He was referred back to Dr. Claudio for stent adjustment.  In addition, recommend port placement with general surgery as well as surgical jejunostomy feeding tube.  Referral to radiation oncology has been made.  Patient presents today hospital as a direct admission from Dr. Blankenship's office.  Patient had uncontrolled pain and therefore was admitted to the inpatient for pain control.  Patient has generalized weakness, fatigue and significant pain related to his cancer.  Patient was started on Duragesic patch 50 mcg every 3 days by Dr. Christopher last week.    Laboratory studies at admission today showed evidence of hypokalemia with potassium 2.9, low total protein.  WBC 10.1, hemoglobin 13.7, platelet 181,000    Esophagram performed 5/2/2024 showed a filling defect in the end of the stent like represent tumor growth    Chest x-ray-no acute findings      PRIOR ONCOLOGICAL HISTORY     Ming Rocha is an 83-year-old   [facility] Hematology Oncology        Seth Christopher MD    05/19/24  11:06 AM

## 2024-05-19 NOTE — PROGRESS NOTES
Daily Progress Note    Date:2024  Patient: Ming Rocha  : 1940  MRN:[id number]  CODE:DNR No additional code details  PCP:Irving Larsen DO    Admit Date: 2024  1:07 PM   LOS: 13 days     Weight loss, unable to tolerate PO. Pt with active esophageal Ca.     Hospital Summary: 83 y.o. male who presented to [facility] as a direct admission from Dr. Blankenship's office to hospitalist service for evaluation of failure to thrive. Pt has history of copd, ckd stage IV, HTN, hyperlipidemia, cad, CVA, and esophageal malignancy followed by Dr. Christopher. He was evaluated as outpatient for port and feeding tube placement by Dr. Blankenship. Pt with uncontrolled pain, difficulty swallowing and weight loss. Directly admitted to hospitalist service for further management.         Endoscopy with concerns for esophageal stent migration, as well as jejunostomy and port placement are now on hold due to abnormal KG and telemetry findings as part of pre procedure evaluation.     Cardiology involved - stress test completed and reassuring.     Went to OR with Dr Blankenship 5/10 for R chest chemo port placement as well as surgical gastrostomy placement.     Went to Endo with Dr Claudio for stent repositioning on .  He is allowed for liquid PO for comfort. Nutrition and meds via PEG.         Had severe constipation with impaction requiring lactulose - improved.     CT abd was done to eval constipation, but this revealed problem with internal balloon on PEG tube - surgery notified - plan for tube exchange in OR with GI assistance       Went to OR  - despite surgery and GI collaboration, he required abd incision to be reopened to facilitate PEG replacement. Also with OR report of possibly wound infection - I started him on Vanc, Flagyl and Cipro IV.              Subjective:     Fever resolved.     Abd pain controlled today.     +BM, +flatus    TF going to target.             Review of Systems   All other systems reviewed and are

## 2024-05-20 LAB
ALBUMIN SERPL-MCNC: 2.6 G/DL (ref 3.5–5.2)
ANION GAP SERPL CALCULATED.3IONS-SCNC: 9 MMOL/L (ref 7–19)
BASOPHILS # BLD: 0 K/UL (ref 0–0.2)
BASOPHILS NFR BLD: 0.8 % (ref 0–1)
BUN SERPL-MCNC: 24 MG/DL (ref 8–23)
CALCIUM SERPL-MCNC: 8.2 MG/DL (ref 8.8–10.2)
CHLORIDE SERPL-SCNC: 112 MMOL/L (ref 98–111)
CO2 SERPL-SCNC: 21 MMOL/L (ref 22–29)
CREAT SERPL-MCNC: 2.2 MG/DL (ref 0.5–1.2)
EOSINOPHIL # BLD: 0.3 K/UL (ref 0–0.6)
EOSINOPHIL NFR BLD: 6.7 % (ref 0–5)
ERYTHROCYTE [DISTWIDTH] IN BLOOD BY AUTOMATED COUNT: 13.3 % (ref 11.5–14.5)
GLUCOSE BLD-MCNC: 100 MG/DL (ref 70–99)
GLUCOSE BLD-MCNC: 113 MG/DL (ref 70–99)
GLUCOSE BLD-MCNC: 113 MG/DL (ref 70–99)
GLUCOSE BLD-MCNC: 116 MG/DL (ref 70–99)
GLUCOSE SERPL-MCNC: 118 MG/DL (ref 74–109)
HCT VFR BLD AUTO: 28.1 % (ref 42–52)
HGB BLD-MCNC: 8.6 G/DL (ref 14–18)
IMM GRANULOCYTES # BLD: 0 K/UL
LYMPHOCYTES # BLD: 0.8 K/UL (ref 1.1–4.5)
LYMPHOCYTES NFR BLD: 15.2 % (ref 20–40)
MCH RBC QN AUTO: 29.5 PG (ref 27–31)
MCHC RBC AUTO-ENTMCNC: 30.6 G/DL (ref 33–37)
MCV RBC AUTO: 96.2 FL (ref 80–94)
MONOCYTES # BLD: 0.5 K/UL (ref 0–0.9)
MONOCYTES NFR BLD: 9.3 % (ref 0–10)
NEUTROPHILS # BLD: 3.4 K/UL (ref 1.5–7.5)
NEUTS SEG NFR BLD: 67.6 % (ref 50–65)
PERFORMED ON: ABNORMAL
PHOSPHATE SERPL-MCNC: 4 MG/DL (ref 2.5–4.5)
PLATELET # BLD AUTO: 126 K/UL (ref 130–400)
PMV BLD AUTO: 10.3 FL (ref 9.4–12.4)
POTASSIUM SERPL-SCNC: 4.3 MMOL/L (ref 3.5–5)
RBC # BLD AUTO: 2.92 M/UL (ref 4.7–6.1)
SODIUM SERPL-SCNC: 142 MMOL/L (ref 136–145)
WBC # BLD AUTO: 5.1 K/UL (ref 4.8–10.8)

## 2024-05-20 PROCEDURE — 99232 SBSQ HOSP IP/OBS MODERATE 35: CPT | Performed by: INTERNAL MEDICINE

## 2024-05-20 PROCEDURE — 85025 COMPLETE CBC W/AUTO DIFF WBC: CPT

## 2024-05-20 PROCEDURE — 80069 RENAL FUNCTION PANEL: CPT

## 2024-05-20 PROCEDURE — 2580000003 HC RX 258: Performed by: SURGERY

## 2024-05-20 PROCEDURE — 6370000000 HC RX 637 (ALT 250 FOR IP): Performed by: INTERNAL MEDICINE

## 2024-05-20 PROCEDURE — 1200000000 HC SEMI PRIVATE

## 2024-05-20 PROCEDURE — 2700000000 HC OXYGEN THERAPY PER DAY

## 2024-05-20 PROCEDURE — 6360000002 HC RX W HCPCS: Performed by: SURGERY

## 2024-05-20 PROCEDURE — 94640 AIRWAY INHALATION TREATMENT: CPT

## 2024-05-20 PROCEDURE — 97116 GAIT TRAINING THERAPY: CPT

## 2024-05-20 PROCEDURE — 6370000000 HC RX 637 (ALT 250 FOR IP): Performed by: SURGERY

## 2024-05-20 PROCEDURE — 99232 SBSQ HOSP IP/OBS MODERATE 35: CPT

## 2024-05-20 PROCEDURE — 82962 GLUCOSE BLOOD TEST: CPT

## 2024-05-20 PROCEDURE — 36415 COLL VENOUS BLD VENIPUNCTURE: CPT

## 2024-05-20 PROCEDURE — 94760 N-INVAS EAR/PLS OXIMETRY 1: CPT

## 2024-05-20 RX ORDER — FOLIC ACID 1 MG/1
1 TABLET ORAL DAILY
Status: DISCONTINUED | OUTPATIENT
Start: 2024-05-20 | End: 2024-05-24 | Stop reason: HOSPADM

## 2024-05-20 RX ORDER — OXYCODONE HCL 5 MG/5 ML
10 SOLUTION, ORAL ORAL EVERY 4 HOURS PRN
Status: DISCONTINUED | OUTPATIENT
Start: 2024-05-20 | End: 2024-05-23

## 2024-05-20 RX ORDER — UREA 10 %
500 LOTION (ML) TOPICAL DAILY
Status: DISCONTINUED | OUTPATIENT
Start: 2024-05-20 | End: 2024-05-24 | Stop reason: HOSPADM

## 2024-05-20 RX ORDER — PRAVASTATIN SODIUM 20 MG
40 TABLET ORAL NIGHTLY
Status: DISCONTINUED | OUTPATIENT
Start: 2024-05-20 | End: 2024-05-24 | Stop reason: HOSPADM

## 2024-05-20 RX ORDER — SODIUM BICARBONATE 325 MG/1
325 TABLET ORAL DAILY
Status: DISCONTINUED | OUTPATIENT
Start: 2024-05-20 | End: 2024-05-24 | Stop reason: HOSPADM

## 2024-05-20 RX ORDER — LACTULOSE 10 G/15ML
20 SOLUTION ORAL 2 TIMES DAILY
Status: DISCONTINUED | OUTPATIENT
Start: 2024-05-20 | End: 2024-05-24 | Stop reason: HOSPADM

## 2024-05-20 RX ORDER — ASPIRIN 81 MG/1
81 TABLET, CHEWABLE ORAL DAILY
Status: DISCONTINUED | OUTPATIENT
Start: 2024-05-20 | End: 2024-05-24 | Stop reason: HOSPADM

## 2024-05-20 RX ORDER — POLYETHYLENE GLYCOL 3350 17 G/17G
17 POWDER, FOR SOLUTION ORAL DAILY
Status: DISCONTINUED | OUTPATIENT
Start: 2024-05-20 | End: 2024-05-24 | Stop reason: HOSPADM

## 2024-05-20 RX ORDER — METOCLOPRAMIDE 10 MG/1
5 TABLET ORAL 3 TIMES DAILY
Status: DISCONTINUED | OUTPATIENT
Start: 2024-05-20 | End: 2024-05-24 | Stop reason: HOSPADM

## 2024-05-20 RX ADMIN — CYANOCOBALAMIN TAB 500 MCG 500 MCG: 500 TAB at 08:28

## 2024-05-20 RX ADMIN — POLYETHYLENE GLYCOL 3350 17 G: 17 POWDER, FOR SOLUTION ORAL at 08:31

## 2024-05-20 RX ADMIN — SODIUM CHLORIDE, PRESERVATIVE FREE 10 ML: 5 INJECTION INTRAVENOUS at 08:32

## 2024-05-20 RX ADMIN — Medication 2 PUFF: at 18:18

## 2024-05-20 RX ADMIN — ISOSORBIDE DINITRATE 20 MG: 10 TABLET ORAL at 08:28

## 2024-05-20 RX ADMIN — METOCLOPRAMIDE 5 MG: 10 TABLET ORAL at 15:07

## 2024-05-20 RX ADMIN — Medication 2 PUFF: at 06:31

## 2024-05-20 RX ADMIN — ISOSORBIDE DINITRATE 20 MG: 10 TABLET ORAL at 22:25

## 2024-05-20 RX ADMIN — METRONIDAZOLE 500 MG: 500 INJECTION, SOLUTION INTRAVENOUS at 08:34

## 2024-05-20 RX ADMIN — METOCLOPRAMIDE 5 MG: 10 TABLET ORAL at 22:25

## 2024-05-20 RX ADMIN — PRAVASTATIN SODIUM 40 MG: 20 TABLET ORAL at 22:25

## 2024-05-20 RX ADMIN — METOPROLOL TARTRATE 12.5 MG: 25 TABLET, FILM COATED ORAL at 08:28

## 2024-05-20 RX ADMIN — METRONIDAZOLE 500 MG: 500 INJECTION, SOLUTION INTRAVENOUS at 00:18

## 2024-05-20 RX ADMIN — LANSOPRAZOLE 30 MG: 30 TABLET, ORALLY DISINTEGRATING, DELAYED RELEASE ORAL at 08:28

## 2024-05-20 RX ADMIN — LANSOPRAZOLE 30 MG: 30 TABLET, ORALLY DISINTEGRATING, DELAYED RELEASE ORAL at 22:25

## 2024-05-20 RX ADMIN — AMLODIPINE BESYLATE 5 MG: 5 TABLET ORAL at 08:28

## 2024-05-20 RX ADMIN — ONDANSETRON 4 MG: 2 INJECTION INTRAMUSCULAR; INTRAVENOUS at 17:48

## 2024-05-20 RX ADMIN — ASPIRIN 81 MG: 81 TABLET, CHEWABLE ORAL at 08:28

## 2024-05-20 RX ADMIN — CIPROFLOXACIN 400 MG: 400 INJECTION, SOLUTION INTRAVENOUS at 09:42

## 2024-05-20 RX ADMIN — SODIUM BICARBONATE 325 MG: 325 TABLET ORAL at 08:28

## 2024-05-20 RX ADMIN — SODIUM CHLORIDE, PRESERVATIVE FREE 10 ML: 5 INJECTION INTRAVENOUS at 22:27

## 2024-05-20 RX ADMIN — FOLIC ACID 1 MG: 1 TABLET ORAL at 08:28

## 2024-05-20 RX ADMIN — Medication: at 08:29

## 2024-05-20 RX ADMIN — METOCLOPRAMIDE 5 MG: 10 TABLET ORAL at 08:28

## 2024-05-20 RX ADMIN — METRONIDAZOLE 500 MG: 500 INJECTION, SOLUTION INTRAVENOUS at 15:10

## 2024-05-20 NOTE — PROGRESS NOTES
Physical Therapy  Name: Ming Rocha  MRN:  [id number]  Date of service:  5/20/2024 05/20/24 1013   Restrictions/Precautions   Restrictions/Precautions Fall Risk   General   Chart Reviewed Yes   Subjective   Subjective Pt agreeable.   Pain Assessment   Pain Assessment None - Denies Pain   Oxygen Therapy   O2 Device None (Room air)   Bed Mobility   Supine to Sit Minimal assistance   Transfers   Sit to Stand Minimal Assistance   Stand to Sit Contact guard assistance   Ambulation   Surface Level tile   Device Rolling Walker   Assistance Contact guard assistance;Minimal assistance   Quality of Gait fwd flexed, somewhat unsteady especially during turns   Gait Deviations Slow Sena;Increased JONATHAN;Decreased step height;Decreased step length;Deviated path   Distance 45'   Short Term Goals   Time Frame for Short Term Goals 2 wks   Short Term Goal 1 supine to sit indep   Short Term Goal 2 sit to stand indep   Short Term Goal 3 amb. 200' indep   Short Term Goal 4 bed to chair SBA   Conditions Requiring Skilled Therapeutic Intervention   Body Structures, Functions, Activity Limitations Requiring Skilled Therapeutic Intervention Decreased functional mobility ;Decreased ADL status;Decreased ROM;Decreased body mechanics;Decreased sensation;Decreased endurance;Decreased safe awareness;Decreased balance;Decreased posture   Assessment Pt tolerates short gait distance well with RW, generally unsteady overall but no true LOB noted. Pt fatigues quickly, up to the chair with all needs in reach.   Activity Tolerance   Activity Tolerance Patient tolerated treatment well;Patient limited by fatigue;Patient limited by endurance   PT Plan of Care   Monday X   Safety Devices   Type of Devices Call light within reach;Chair alarm in place;Gait belt;Left in chair         Electronically signed by Kwesi Mcneil PTA on 5/20/2024 at 11:53 AM

## 2024-05-20 NOTE — PROGRESS NOTES
[facility]   Pharmacy Pharmacokinetic Monitoring Service - Vancomycin    Consulting Provider: Dr Loza   Indication: Surgical site infection  Target Concentration: Goal AUC/LALY 400-600 mg*hr/L  Day of Therapy: 3  Additional Antimicrobials: cipro, flagyl    Pertinent Laboratory Values:   Wt Readings from Last 1 Encounters:   05/18/24 85.4 kg (188 lb 6 oz)     Temp Readings from Last 1 Encounters:   05/19/24 98.4 °F (36.9 °C)     Estimated Creatinine Clearance: 26 mL/min (A) (based on SCr of 2.4 mg/dL (H)).  Recent Labs     05/18/24  0317 05/19/24  0347   CREATININE 2.3* 2.4*   BUN 23 22   WBC 6.6 6.2       Pertinent Cultures:  Culture Date Source Results   5/16/24 Blood x2 No growth to date   MRSA Nasal Swab: N/A. Non-respiratory infection.    Recent vancomycin administrations                     vancomycin (VANCOCIN) 1000 mg in sodium chloride 0.9% 250 mL IVPB (mg) 1,000 mg New Bag 05/18/24 1122    vancomycin (VANCOCIN) 2,000 mg in sodium chloride 0.9 % 500 mL IVPB (mg) 2,000 mg New Bag 05/17/24 1001                    Assessment:  Date/Time Current Dose Concentration Timing of Concentration (h) AUC   5/19/24 2134 1000mg IV q36h 12.9 33 h 40 min 487   Note: Serum concentrations collected for AUC dosing may appear elevated if collected in close proximity to the dose administered, this is not necessarily an indication of toxicity    Plan:  Current dosing regimen is therapeutic  Continue current dose  Repeat vancomycin concentration ordered for 5/22 @ 2130   Pharmacy will continue to monitor patient and adjust therapy as indicated    Thank you for the consult,  Cora Barboza RPH  5/19/2024 9:33 PM

## 2024-05-20 NOTE — PROGRESS NOTES
Palliative Care Progress Note  5/20/2024 9:43 AM    Patient:  Ming Rocha  YOB: 1940  Primary Care Physician: Irving Larsen DO  Advance Directive: DNR  Admit Date: 5/6/2024       Hospital Day: 14  Portions of this note have been copied forward, however, changed to reflect the most current clinical status of this patient.    CHIEF COMPLAINT/REASON FOR CONSULTATION Goals of care, family support, Code status, symptom management     SUBJECTIVE:  Mr. Rocha is up in recliner chair and resting comfortably.  Reports pain is well-controlled at rest.  Tolerating tube feed being resumed today.  No distress during my exam.    HPI:   The patient is a 83 y.o. male with PMH HTN, HLD, COPD, CKD IV, CVA with right sided residual weakness, CAD s/p CABG 2020, recent esophageal cancer diagnosis, and other comorbidities who presented to [facility]  5/6/2024 as a direct admit from general surgery office. 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 Christopher.  Patient has been having ongoing issues with p.o. intake without improvement following stent placement and concern for failure to thrive.  He has been referred to radiation oncology.  He was seen in general surgery office on day of admission for recommended port and G-tube placement but while there he was having uncontrolled pain and was recommended for inpatient admission.  Of note he was recently started on 50 mcg fentanyl patch by oncology team last week.  On arrival patient also endorsed ongoing issues with weakness, fatigue, and 40 to 50 pound weight loss since November of last year.  Patient also has a known left kidney mass currently being monitored with serial imaging but cannot rule out malignancy, noted interval growth over the last few years.  Laboratory studies on arrival revealed evidence of hypokalemia with K+ 2.9, low total protein, WBC      Assessment/Plan   Principal Problem:    Failure to thrive in adult  Active Problems:    Adenocarcinoma of gastroesophageal junction (HCC)    Esophageal cancer (HCC)    Palliative care patient    Mild malnutrition (HCC)    Dysphagia    Cancer related pain    Severe malnutrition (HCC)    Chest pain    Gastrostomy tube dysfunction (HCC)  Resolved Problems:    * No resolved hospital problems. *      Visit Summary:  Chart reviewed.  Report obtained from RN with no acute events over the weekend.  Mr. Rocha is seen at bedside with his daughter/POA, Angela, present.  Patient/family and I discussed current status and plan of care.  He feels pain is controlled with current regimen.  Tube feeding has been resumed and he is not having any nausea or vomiting today.  PT/OT are following.  Patient does plan to return home with home health once medically stable for discharge. Nursing staff has noted increased depressed moods and feelings of hopelessness from patient. We discussed this further today and acknowledged this is not an uncommon feeling in patient's with his conditions and prolonged hospitalizations.  Allowed time for patient and family to voice feelings and concerns.  Discussed options of initiating medications for mood and potentially trialing remeron nightly to assist with this as well as the possibility of it also improving appetite and sleep.  Patient will think on this I encouraged them to continue discussing options of feelings in order to help patient continue to cope.  He remains very focused on quality of life and is adamant he would not continue life-prolonging measures if he was unable to improve or declined further. Family is supportive of his decisions. Will ask PC  to follow up with patient as well for additional support.  Daughter is interested in outpatient palliative care when he does discharge however his home residence is out of service area.  I did explain this to her and that potentially

## 2024-05-20 NOTE — PROGRESS NOTES
Hayley Mcnamara, Palliative care NP, asked this  to visit with pt for spiritual care and support. Pt's daughter was also present. Pt was struggling with all involved with his diagnoses. He shared some of his journey along with some of his donny journey. This  provided spiritual care and a listening ear to support patient and encourage his donny. Also provided a prayer. Pt expressed gratitude for the visit and spiritual care.       Electronically signed by Mary Behrens on 5/20/2024 at 2:28 PM

## 2024-05-20 NOTE — PROGRESS NOTES
Comprehensive Nutrition Assessment    Type and Reason for Visit:  Reassess    Nutrition Recommendations/Plan:   Continue advancing Jevity 1.2 to goal rate of 75 mL/hr.  Follow for home TF recommendations.     Malnutrition Assessment:  Malnutrition Status:  Severe malnutrition (05/18/24 0939)    Context:  Chronic Illness     Findings of the 6 clinical characteristics of malnutrition:  Energy Intake:  Mild decrease in energy intake (Comment)  Weight Loss:  No significant weight loss     Body Fat Loss:  Severe body fat loss Triceps   Muscle Mass Loss:  Severe muscle mass loss Temples (temporalis)  Fluid Accumulation:  No significant fluid accumulation     Strength:  Not Performed    Nutrition Assessment:    Aware TF was advanced up 50 mL/hr, but was decreased to 25 mL/hr yesterday for emesis. Aware MD directed nursing to increase rate by 5 mL q 6 hours rather than current rx. Pt stated he still feels a little nauseous today, but no abdominal tenderness. Pt also c/o dizziness when standing w/PT or to use the bathroom. Noted \"large liquid stool\" on 5/19. Pt currently receiving Jevity 1.2 at 30 mL/hr with 10 mL/hr flush. Daughter now unsure of providing bolus feeds at home given pt's current tolerance of continous feeds. Aware CM spoke w/daughter re options for home TF and daughter is interested in using a pump. Will revisit to determine daughter's preference for home TF and offer appropriate recommendations. Will continue to monitor for TF tolerance.    Nutrition Related Findings:    BM 5/19. Residual= 13 mL. Alb 2.6, accuchek's 100-135. Wound Type: Surgical Incision       Current Nutrition Intake & Therapies:    Average Meal Intake: NPO  Average Supplements Intake: NPO  Diet NPO Exceptions are: Sips of Water with Meds, Ice Chips  ADULT TUBE FEEDING; Gastrostomy; Other Tube Feeding (specify); Jevity 1.2; Continuous; 50; Yes; 10; Q 8 hours; 75; 30; Q 1 hour  Current Tube Feeding (TF) Orders:  Feeding Route:  [phone]

## 2024-05-20 NOTE — PROGRESS NOTES
reviewed and are negative.      Objective:      Vital signs in last 24 hours:  Patient Vitals for the past 24 hrs:   BP Temp Temp src Pulse Resp SpO2   05/20/24 0841 -- -- -- -- -- 94 %   05/20/24 0759 (!) 164/80 97.9 °F (36.6 °C) Temporal 62 20 94 %   05/19/24 2245 -- -- -- 54 -- --   05/19/24 2006 (!) 153/79 98.4 °F (36.9 °C) -- 58 18 98 %   05/19/24 1855 -- -- -- -- -- 98 %   05/19/24 1705 -- -- -- -- -- 93 %   05/19/24 1700 (!) 158/73 98.2 °F (36.8 °C) Temporal 72 18 (!) 88 %         I/O last 3 completed shifts:  In: 1219 [NG/GT:1219]  Out: 300 [Urine:300]  No intake/output data recorded.    Physical Exam  Constitutional:       General: He is not in acute distress.     Appearance: He is not toxic-appearing.   Cardiovascular:      Rate and Rhythm: Normal rate and regular rhythm.      Pulses: Normal pulses.      Heart sounds: Normal heart sounds.   Pulmonary:      Effort: Pulmonary effort is normal. No respiratory distress.      Breath sounds: Normal breath sounds.   Abdominal:      General: Bowel sounds are normal. There is no distension.      Palpations: Abdomen is soft.      Tenderness: There is no abdominal tenderness.   Musculoskeletal:         General: No deformity. Normal range of motion.   Skin:     General: Skin is warm and dry.      Findings: No rash.             Lab Review   Recent Results (from the past 24 hour(s))   POCT Glucose    Collection Time: 05/19/24 11:33 AM   Result Value Ref Range    POC Glucose 135 (H) 70 - 99 mg/dl    Performed on AccuChek    POCT Glucose    Collection Time: 05/19/24  4:22 PM   Result Value Ref Range    POC Glucose 117 (H) 70 - 99 mg/dl    Performed on AccuChek    POCT Glucose    Collection Time: 05/19/24  8:55 PM   Result Value Ref Range    POC Glucose 126 (H) 70 - 99 mg/dl    Performed on AccuChek    Vancomycin Level, Trough    Collection Time: 05/19/24  9:02 PM   Result Value Ref Range    Vancomycin Tr 12.9 10.0 - 20.0 ug/mL   Renal Function Panel    Collection Time:        Marcel Loza MD 5/20/2024 9:40 AM

## 2024-05-20 NOTE — PLAN OF CARE
Problem: Chronic Conditions and Co-morbidities  Goal: Patient's chronic conditions and co-morbidity symptoms are monitored and maintained or improved  5/20/2024 1013 by Kamila Saeed RN  Outcome: Progressing  5/20/2024 0053 by Nazanin Espana RN  Outcome: Progressing     Problem: Safety - Adult  Goal: Free from fall injury  5/20/2024 1013 by Kamila Saeed RN  Outcome: Progressing  Flowsheets (Taken 5/20/2024 1012)  Free From Fall Injury: Instruct family/caregiver on patient safety  5/20/2024 0053 by Nazanin Espana RN  Outcome: Progressing     Problem: Nutrition Deficit:  Goal: Optimize nutritional status  5/20/2024 1013 by Kamila Saeed RN  Outcome: Progressing  5/20/2024 0053 by Nazanin Espana RN  Outcome: Progressing     Problem: Discharge Planning  Goal: Discharge to home or other facility with appropriate resources  5/20/2024 1013 by Kamila Saeed RN  Outcome: Progressing  5/20/2024 0053 by Nazanin Espana RN  Outcome: Progressing     Problem: Pain  Goal: Verbalizes/displays adequate comfort level or baseline comfort level  5/20/2024 1013 by Kamila Saeed RN  Outcome: Progressing  5/20/2024 0053 by Nazanin Espana RN  Outcome: Progressing     Problem: Skin/Tissue Integrity  Goal: Absence of new skin breakdown  Description: 1.  Monitor for areas of redness and/or skin breakdown  2.  Assess vascular access sites hourly  3.  Every 4-6 hours minimum:  Change oxygen saturation probe site  4.  Every 4-6 hours:  If on nasal continuous positive airway pressure, respiratory therapy assess nares and determine need for appliance change or resting period.  5/20/2024 1013 by Kamila Saeed RN  Outcome: Progressing  5/20/2024 0053 by Nazanin Espana RN  Outcome: Progressing

## 2024-05-20 NOTE — PROGRESS NOTES
MEDICAL ONCOLOGY PROGRESS NOTE    Pt Name: Ming Rocha  MRN: [id number]  YOB: 1940  Date of evaluation: 5/20/2024    Subjective-reviewed internal meds notes, palliative care, and GI procedure notes.      POD #10 surgical g-tube placement by Dr. Blankenship on 5/10/2024  POD #6 Upper Endoscopy with manipulation and repositioning of Esophageal stent across known malignant esophageal mass by Dr. Claudio 5/14/2024    Tube feedings ongoing through surgical G-tube, rate increased from 30 cc an hour to 50 cc an hour and tolerating it well.    Reviewed weekend notes.    HISTORY OF PRESENT ILLNESS:  Ming Rocha is well-known to our clinic.  He is a patient established with Dr. Seth Christopher.  He was last seen on 5/2/2024.  The patient has a new diagnosis of esophageal cancer of the distal esophagus status post esophageal stent placement.  He is having significant issues with p.o. intake after the stent placement.  He was referred back to Dr. Claudio for stent adjustment.  In addition, recommend port placement with general surgery as well as surgical jejunostomy feeding tube.  Referral to radiation oncology has been made.  Patient presents today hospital as a direct admission from Dr. Blankenship's office.  Patient had uncontrolled pain and therefore was admitted to the inpatient for pain control.  Patient has generalized weakness, fatigue and significant pain related to his cancer.  Patient was started on Duragesic patch 50 mcg every 3 days by Dr. Christopher last week.    Laboratory studies at admission today showed evidence of hypokalemia with potassium 2.9, low total protein.  WBC 10.1, hemoglobin 13.7, platelet 181,000    Esophagram performed 5/2/2024 showed a filling defect in the end of the stent like represent tumor growth    Chest x-ray-no acute findings      PRIOR ONCOLOGICAL HISTORY     Ming Rocha is an 83-year-old  gentleman with primary and secondary diagnoses as outlined  Poorly differentiated  fat-containing inguinal hernias. Normal appearance of the urinary bladder. No pelvic mass or free fluid.    1. No bowel obstruction. 2. Indeterminate slowly growing 2 cm soft tissue density lesion involving the midportion of the LEFT kidney. 3. Rectal fecal impaction. This report was signed and finalized on 4/28/2024 9:50 PM by Dr. Price Zazueta MD.    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  Sternotomy wires are present.  Atherosclerotic disease.  There is an esophageal stent with its lower portion not clearly seen although appears to end at the general level of the gastroesophageal junction.  Consider KUB if indicated.  No consolidated pneumonia or definite pleural fluid.   ______________________________________ Electronically signed by: SHARA RASMUSSEN M.D. Date:     04/25/2024 Time:    16:06     EUS    Result Date: 4/17/2024  No dictation     CT CHEST WO CONTRAST    Result Date: 4/10/2024  CT CHEST WO CONTRAST DIAGNOSTIC- 4/10/2024 3:46 AM HISTORY: Right axillary pain and dysphagia. Unable to use contrast due to GFR.; R13.10-Dysphagia, unspecified; R07.89-Other chest pain COMPARISON: 11/19/2023 DOSE LENGTH PRODUCT: 220 mGy.cm. Automated exposure control was also utilized to decrease patient radiation dose. TECHNIQUE: Serial helical tomographic images of the chest were acquired without contrast. Multiplanar reformatted images were provided for review. FINDINGS: Visualized neck base: The imaged portion of the base of the neck appears unremarkable. Lungs: Centrilobular emphysema. Stable 6 mm left upper lobe nodule on axial image 80. No consolidation or new pulmonary nodules. No pleural effusion. The airways are clear. Heart: The heart is normal in size. There is no pericardial effusion. Previous coronary bypass. Aortic valve calcification. Vasculature:

## 2024-05-20 NOTE — CARE COORDINATION
Called Dietitian to learn if home Tube Feeding recommendation is available.  Left VM.  Awaiting return call.  Electronically signed by Sara Davalos RN on 5/20/2024 at 8:37 AM    DietitianSocorro, called stating patient continues to not tolerate full dose of Tube Feeding.  She is unsure if patient's daughter, Angela, understands all of patient's options for tube feeding at home.    Met with patient and patient's daughter, Angela, to discuss process of obtaining tube feeding for at home.  Explained different methods of administering tube feed.  Patient's daughter wants to proceeds with using a Kangaroo pump and not attempt bolus feeds.    Called Socorro and shared above information.    Once Tube feeding recommendation is available, recommendation will be sent to [facility] to learn cost and arrange delivery of supplies.  Electronically signed by Sara Davalos RN on 5/20/2024 at 12:14 PM

## 2024-05-21 ENCOUNTER — CLINICAL DOCUMENTATION (OUTPATIENT)
Facility: HOSPITAL | Age: 84
End: 2024-05-21

## 2024-05-21 LAB
BACTERIA BLD CULT ORG #2: NORMAL
BACTERIA BLD CULT: NORMAL
BASOPHILS # BLD: 0.1 K/UL (ref 0–0.2)
BASOPHILS NFR BLD: 1.1 % (ref 0–1)
EOSINOPHIL # BLD: 0.3 K/UL (ref 0–0.6)
EOSINOPHIL NFR BLD: 5.5 % (ref 0–5)
ERYTHROCYTE [DISTWIDTH] IN BLOOD BY AUTOMATED COUNT: 13.2 % (ref 11.5–14.5)
GLUCOSE BLD-MCNC: 120 MG/DL (ref 70–99)
GLUCOSE BLD-MCNC: 121 MG/DL (ref 70–99)
GLUCOSE BLD-MCNC: 122 MG/DL (ref 70–99)
GLUCOSE BLD-MCNC: 123 MG/DL (ref 70–99)
HCT VFR BLD AUTO: 27.7 % (ref 42–52)
HGB BLD-MCNC: 8.9 G/DL (ref 14–18)
IMM GRANULOCYTES # BLD: 0 K/UL
LYMPHOCYTES # BLD: 0.8 K/UL (ref 1.1–4.5)
LYMPHOCYTES NFR BLD: 14.1 % (ref 20–40)
MCH RBC QN AUTO: 30 PG (ref 27–31)
MCHC RBC AUTO-ENTMCNC: 32.1 G/DL (ref 33–37)
MCV RBC AUTO: 93.3 FL (ref 80–94)
MONOCYTES # BLD: 0.5 K/UL (ref 0–0.9)
MONOCYTES NFR BLD: 8.5 % (ref 0–10)
NEUTROPHILS # BLD: 3.7 K/UL (ref 1.5–7.5)
NEUTS SEG NFR BLD: 70.2 % (ref 50–65)
PERFORMED ON: ABNORMAL
PLATELET # BLD AUTO: 131 K/UL (ref 130–400)
PMV BLD AUTO: 10.3 FL (ref 9.4–12.4)
RBC # BLD AUTO: 2.97 M/UL (ref 4.7–6.1)
WBC # BLD AUTO: 5.3 K/UL (ref 4.8–10.8)

## 2024-05-21 PROCEDURE — 94640 AIRWAY INHALATION TREATMENT: CPT

## 2024-05-21 PROCEDURE — 6360000002 HC RX W HCPCS: Performed by: SURGERY

## 2024-05-21 PROCEDURE — 6370000000 HC RX 637 (ALT 250 FOR IP): Performed by: SURGERY

## 2024-05-21 PROCEDURE — 2580000003 HC RX 258: Performed by: INTERNAL MEDICINE

## 2024-05-21 PROCEDURE — 85025 COMPLETE CBC W/AUTO DIFF WBC: CPT

## 2024-05-21 PROCEDURE — 36415 COLL VENOUS BLD VENIPUNCTURE: CPT

## 2024-05-21 PROCEDURE — 2580000003 HC RX 258: Performed by: SURGERY

## 2024-05-21 PROCEDURE — 6360000002 HC RX W HCPCS: Performed by: INTERNAL MEDICINE

## 2024-05-21 PROCEDURE — 1200000000 HC SEMI PRIVATE

## 2024-05-21 PROCEDURE — 6370000000 HC RX 637 (ALT 250 FOR IP): Performed by: INTERNAL MEDICINE

## 2024-05-21 PROCEDURE — 94760 N-INVAS EAR/PLS OXIMETRY 1: CPT

## 2024-05-21 PROCEDURE — 82962 GLUCOSE BLOOD TEST: CPT

## 2024-05-21 RX ORDER — CIPROFLOXACIN 2 MG/ML
400 INJECTION, SOLUTION INTRAVENOUS EVERY 24 HOURS
Status: COMPLETED | OUTPATIENT
Start: 2024-05-21 | End: 2024-05-21

## 2024-05-21 RX ORDER — METRONIDAZOLE 500 MG/100ML
500 INJECTION, SOLUTION INTRAVENOUS EVERY 8 HOURS
Status: COMPLETED | OUTPATIENT
Start: 2024-05-21 | End: 2024-05-21

## 2024-05-21 RX ADMIN — POLYETHYLENE GLYCOL 3350 17 G: 17 POWDER, FOR SOLUTION ORAL at 08:07

## 2024-05-21 RX ADMIN — METOCLOPRAMIDE 5 MG: 10 TABLET ORAL at 15:09

## 2024-05-21 RX ADMIN — METRONIDAZOLE 500 MG: 500 INJECTION, SOLUTION INTRAVENOUS at 01:01

## 2024-05-21 RX ADMIN — SODIUM BICARBONATE 325 MG: 325 TABLET ORAL at 08:07

## 2024-05-21 RX ADMIN — METRONIDAZOLE 500 MG: 500 INJECTION, SOLUTION INTRAVENOUS at 15:09

## 2024-05-21 RX ADMIN — HYDROMORPHONE HYDROCHLORIDE 0.5 MG: 1 INJECTION, SOLUTION INTRAMUSCULAR; INTRAVENOUS; SUBCUTANEOUS at 18:22

## 2024-05-21 RX ADMIN — ISOSORBIDE DINITRATE 20 MG: 10 TABLET ORAL at 22:38

## 2024-05-21 RX ADMIN — AMLODIPINE BESYLATE 5 MG: 5 TABLET ORAL at 08:07

## 2024-05-21 RX ADMIN — FOLIC ACID 1 MG: 1 TABLET ORAL at 08:07

## 2024-05-21 RX ADMIN — CYANOCOBALAMIN TAB 500 MCG 500 MCG: 500 TAB at 08:07

## 2024-05-21 RX ADMIN — METRONIDAZOLE 500 MG: 500 INJECTION, SOLUTION INTRAVENOUS at 08:34

## 2024-05-21 RX ADMIN — LACTULOSE 20 G: 20 SOLUTION ORAL at 22:39

## 2024-05-21 RX ADMIN — FLUTICASONE PROPIONATE 2 SPRAY: 50 SPRAY, METERED NASAL at 08:08

## 2024-05-21 RX ADMIN — LANSOPRAZOLE 30 MG: 30 TABLET, ORALLY DISINTEGRATING, DELAYED RELEASE ORAL at 22:38

## 2024-05-21 RX ADMIN — Medication: at 08:08

## 2024-05-21 RX ADMIN — LANSOPRAZOLE 30 MG: 30 TABLET, ORALLY DISINTEGRATING, DELAYED RELEASE ORAL at 08:08

## 2024-05-21 RX ADMIN — ISOSORBIDE DINITRATE 20 MG: 10 TABLET ORAL at 08:07

## 2024-05-21 RX ADMIN — CIPROFLOXACIN 400 MG: 400 INJECTION, SOLUTION INTRAVENOUS at 09:44

## 2024-05-21 RX ADMIN — Medication: at 15:11

## 2024-05-21 RX ADMIN — PRAVASTATIN SODIUM 40 MG: 20 TABLET ORAL at 22:38

## 2024-05-21 RX ADMIN — SODIUM CHLORIDE, PRESERVATIVE FREE 10 ML: 5 INJECTION INTRAVENOUS at 22:39

## 2024-05-21 RX ADMIN — VANCOMYCIN HYDROCHLORIDE 1000 MG: 10 INJECTION, POWDER, LYOPHILIZED, FOR SOLUTION INTRAVENOUS at 11:17

## 2024-05-21 RX ADMIN — ENOXAPARIN SODIUM 30 MG: 100 INJECTION SUBCUTANEOUS at 08:26

## 2024-05-21 RX ADMIN — Medication 2 PUFF: at 18:48

## 2024-05-21 RX ADMIN — METOPROLOL TARTRATE 12.5 MG: 25 TABLET, FILM COATED ORAL at 08:07

## 2024-05-21 RX ADMIN — ASPIRIN 81 MG: 81 TABLET, CHEWABLE ORAL at 08:07

## 2024-05-21 RX ADMIN — HYDROMORPHONE HYDROCHLORIDE 0.5 MG: 1 INJECTION, SOLUTION INTRAMUSCULAR; INTRAVENOUS; SUBCUTANEOUS at 22:39

## 2024-05-21 RX ADMIN — METOCLOPRAMIDE 5 MG: 10 TABLET ORAL at 22:38

## 2024-05-21 RX ADMIN — LACTULOSE 20 G: 20 SOLUTION ORAL at 08:08

## 2024-05-21 RX ADMIN — METOCLOPRAMIDE 5 MG: 10 TABLET ORAL at 08:08

## 2024-05-21 RX ADMIN — Medication: at 22:48

## 2024-05-21 RX ADMIN — SODIUM CHLORIDE, PRESERVATIVE FREE 10 ML: 5 INJECTION INTRAVENOUS at 08:08

## 2024-05-21 RX ADMIN — Medication 2 PUFF: at 07:47

## 2024-05-21 ASSESSMENT — PAIN DESCRIPTION - LOCATION
LOCATION: ABDOMEN;BACK
LOCATION: ARM;BACK

## 2024-05-21 ASSESSMENT — PAIN SCALES - GENERAL
PAINLEVEL_OUTOF10: 7
PAINLEVEL_OUTOF10: 8

## 2024-05-21 ASSESSMENT — PAIN DESCRIPTION - ORIENTATION
ORIENTATION: RIGHT;LEFT
ORIENTATION: RIGHT

## 2024-05-21 ASSESSMENT — PAIN DESCRIPTION - DESCRIPTORS: DESCRIPTORS: ACHING

## 2024-05-21 NOTE — PROGRESS NOTES
Daily Progress Note    Date:2024  Patient: Ming Rocha  : 1940  MRN:[id number]  CODE:DNR No additional code details  PCP:Irving Larsen DO    Admit Date: 2024  1:07 PM   LOS: 15 days     Weight loss, unable to tolerate PO. Pt with active esophageal Ca.     Hospital Summary: 83 y.o. male who presented to [facility] as a direct admission from Dr. Blankenship's office to hospitalist service for evaluation of failure to thrive. Pt has history of copd, ckd stage IV, HTN, hyperlipidemia, cad, CVA, and esophageal malignancy followed by Dr. Christopher. He was evaluated as outpatient for port and feeding tube placement by Dr. Blankenship. Pt with uncontrolled pain, difficulty swallowing and weight loss. Directly admitted to hospitalist service for further management.         Endoscopy with concerns for esophageal stent migration, as well as jejunostomy and port placement are now on hold due to abnormal KG and telemetry findings as part of pre procedure evaluation.     Cardiology involved - stress test completed and reassuring.     Went to OR with Dr Blankenship 5/10 for R chest chemo port placement as well as surgical gastrostomy placement.     Went to Endo with Dr Claudio for stent repositioning on .  He is allowed for liquid PO for comfort. Nutrition and meds via PEG.         Had severe constipation with impaction requiring lactulose - improved.     CT abd was done to eval constipation, but this revealed problem with internal balloon on PEG tube - surgery notified - plan for tube exchange in OR with GI assistance       Went to OR  - despite surgery and GI collaboration, he required abd incision to be reopened to facilitate PEG replacement. Also with OR report of possibly wound infection - I started him on Vanc, Flagyl and Cipro IV.              Subjective:     Episode of emesis yesterday. Tube feed held and then resumed at 1/2 rate. Tolerating that ok for now.               Review of Systems   All other systems   budesonide-formoterol (SYMBICORT) 160-4.5 MCG/ACT inhaler 2 puff, 2 puff, Inhalation, BID, Elena Blankenship MD, 2 puff at 05/21/24 0747    fluticasone (FLONASE) 50 MCG/ACT nasal spray 2 spray, 2 spray, Nasal, Daily, Elena Blankenship MD, 2 spray at 05/21/24 0808    nitroGLYCERIN (NITROSTAT) SL tablet 0.4 mg, 0.4 mg, SubLINGual, Q5 Min PRN, Elena Blankenship MD    naloxone (NARCAN) injection 0.4 mg, 0.4 mg, IntraVENous, PRN, Elena Blankenship MD      Imaging:  XR CHEST PORTABLE    Result Date: 5/6/2024  No acute findings. The heart size is normal. Mediastinal contours and pulmonary vasculature are within normal limits. The lungs are clear. There is no pleural effusion. There is no pneumothorax. Post sternotomy change.        ______________________________________ Electronically signed by: VALERIY COWART M.D. Date:     05/06/2024 Time:    15:13     FL ESOPHAGRAM    Result Date: 5/2/2024  Esophageal stent is present.  The distal end of the stent is in the esophagus.   2.  A filling defect is present in the distal end of the stent and this most likely is tumor growing into the stent.   ______________________________________ Electronically signed by: KEVIN SHEA M.D. Date:     05/02/2024 Time:    15:11     NM PET/CT Skull Base to Mid Thigh    Result Date: 5/1/2024  1.  Distal esophageal stent and intensely hypermetabolic mass at the distal aspect of the stent at the GE junction consistent with the known primary neoplasm. 2.  Hypermetabolic mediastinal lymph nodes worrisome for lymph node metastasis. 3.  Exophytic solid mass medially at the midpole of the left kidney also worrisome for neoplasm may represent a second primary, renal cell carcinoma. 4.  Tracer uptake at the rectum with questionable wall thickening. Neoplasm not excluded. 5.  Focus of tracer uptake left anterior second rib without visualized fracture or abnormality. This may be posttraumatic but continued attention on follow-up is recommended. This report

## 2024-05-21 NOTE — PLAN OF CARE
Problem: Chronic Conditions and Co-morbidities  Goal: Patient's chronic conditions and co-morbidity symptoms are monitored and maintained or improved  Outcome: Progressing  Flowsheets (Taken 5/21/2024 0807)  Care Plan - Patient's Chronic Conditions and Co-Morbidity Symptoms are Monitored and Maintained or Improved: Monitor and assess patient's chronic conditions and comorbid symptoms for stability, deterioration, or improvement     Problem: Safety - Adult  Goal: Free from fall injury  Outcome: Progressing     Problem: Nutrition Deficit:  Goal: Optimize nutritional status  Outcome: Progressing

## 2024-05-21 NOTE — OP NOTE
Operative Report    PATIENT NAME: Ming Rocha  MEDICAL RECORD NO. [id number]  SURGEON: Elena Blankenship MD    Primary Care Physician: Irving Larsen, DO  Date: 5/10/2024   PROCEDURE PERFORMED: Right IJ single lumen PAC with US and fluoro, open gastrostomy tube placement  PREOPERATIVE DIAGNOSIS: esophageal cancer  POSTOPERATIVE DIAGNOSIS: Same  SURGEON:  Elena Ly MD   ANESTHESIA:  Monitored Local Anesthesia with Sedation and local  ESTIMATED BLOOD LOSS:  minimal  SPECIMEN:  none  COMPLICATIONS:  None; patient tolerated the procedure well.  FINDINGS: Infection Present At Time Of Surgery (PATOS) (choose all levels that have infection present):  No infection present  Other Findings: no acute findings. Gastrostomy tube seated at 5 cm at the skin  DISPOSITION: PACU - hemodynamically stable.  CONDITION: stable      Indications: The patient presented with a recent diagnosis of esophageal cancer, now in need of port placement and gastrostomy tube placement.      Procedure Details     After the risks and benefits of the procedure were explained, informed consent was obtained, and the patient was taken to the operating room. The patient was placed in supine position and anesthesia was begun. The abdomen, neck and chest were prepped and draped in normal sterile fashion. Preoperative antibiotics had been given. A time out was performed.    An short upper midline incision was made with a scalpel. The subcutaneous tissue was divided with cautery down to the fascia. The fascia was opened and the abdominal cavity was entered. The stomach was identified and grasped with babcocks. Two 3-0 silk stitches were placed in concentric circles for pursestring stitches. A location was chosen on the LUQ of the abdominal wall to correspond to this location on the stomach wall. An incision was made with cautery, and the tunnel was made through the skin with a natasha. The 20 Mexican feeding tube was pulled through the abdominal  wall. The balloon was tested. An opening was made in the center of the concentric stitches using cautery. The tube was inserted through the opening and the balloon expanded. The pursestring stitches were tightened. The stomach was then secured to the abdominal wall with the remaining 3-0 silk stitch. A third stitch was then placed. The tube was secured at 5 cm at the skin. The midline fascia was then closed with 2 running looped #1 PDS stitches. The skin was closed with skin staples and sterile dressing was applied.    The patient was placed in head down position. An ultrasound was used to visualize the right IJ. Local anesthetic was injected and an 18 gauge cook needle was used to cannulate the vein. Dark red, non-pulsatile blood was returned. The guide wire was inserted through the needle and advanced without difficulty. Fluoro was used to insure correct placement of the guidewire.     A location was then chosen on the chest for creating a pocket. Local anesthetic was injected at this location, as well as along the path for tunneling the catheter. A 3 cm incision was made, and cautery was used to dissect down to the chest wall. A pocket was created inferior to this. A small incision was then made in the skin of the neck at the level of the guidewire. The catheter and port were assembled and flushed. The catheter was then tunneled from the chest incision out the small neck incision. The port was secured to the chest wall using PDS. Fluoro was used to guide trimming the catheter to the appropriate length. The sheath dilator was then placed over the guidewire and advanced. The dilator and guidewire were removed. The catheter was inserted in the sheath, the sheath was broken in half, and the catheter was advanced. Fluoro was used to insure appropriate positioning of the catheter tip. There were no signs of pneumothorax.    The port was then aspirated and flushed with heparin solution. The small neck incision was

## 2024-05-22 LAB
ALBUMIN SERPL-MCNC: 2.9 G/DL (ref 3.5–5.2)
ALP SERPL-CCNC: 59 U/L (ref 40–130)
ALT SERPL-CCNC: 6 U/L (ref 5–41)
ANION GAP SERPL CALCULATED.3IONS-SCNC: 10 MMOL/L (ref 7–19)
AST SERPL-CCNC: 17 U/L (ref 5–40)
BASOPHILS # BLD: 0 K/UL (ref 0–0.2)
BASOPHILS NFR BLD: 0.6 % (ref 0–1)
BILIRUB SERPL-MCNC: 0.3 MG/DL (ref 0.2–1.2)
BUN SERPL-MCNC: 20 MG/DL (ref 8–23)
CALCIUM SERPL-MCNC: 8.3 MG/DL (ref 8.8–10.2)
CHLORIDE SERPL-SCNC: 111 MMOL/L (ref 98–111)
CO2 SERPL-SCNC: 20 MMOL/L (ref 22–29)
CREAT SERPL-MCNC: 1.9 MG/DL (ref 0.5–1.2)
EOSINOPHIL # BLD: 0.2 K/UL (ref 0–0.6)
EOSINOPHIL NFR BLD: 2.6 % (ref 0–5)
ERYTHROCYTE [DISTWIDTH] IN BLOOD BY AUTOMATED COUNT: 13.3 % (ref 11.5–14.5)
GLUCOSE BLD-MCNC: 113 MG/DL (ref 70–99)
GLUCOSE BLD-MCNC: 121 MG/DL (ref 70–99)
GLUCOSE BLD-MCNC: 131 MG/DL (ref 70–99)
GLUCOSE BLD-MCNC: 151 MG/DL (ref 70–99)
GLUCOSE SERPL-MCNC: 142 MG/DL (ref 74–109)
HCT VFR BLD AUTO: 28.7 % (ref 42–52)
HGB BLD-MCNC: 9.2 G/DL (ref 14–18)
IMM GRANULOCYTES # BLD: 0.1 K/UL
LYMPHOCYTES # BLD: 0.6 K/UL (ref 1.1–4.5)
LYMPHOCYTES NFR BLD: 8.3 % (ref 20–40)
MCH RBC QN AUTO: 29.7 PG (ref 27–31)
MCHC RBC AUTO-ENTMCNC: 32.1 G/DL (ref 33–37)
MCV RBC AUTO: 92.6 FL (ref 80–94)
MONOCYTES # BLD: 0.5 K/UL (ref 0–0.9)
MONOCYTES NFR BLD: 6.8 % (ref 0–10)
NEUTROPHILS # BLD: 5.4 K/UL (ref 1.5–7.5)
NEUTS SEG NFR BLD: 80.9 % (ref 50–65)
PERFORMED ON: ABNORMAL
PLATELET # BLD AUTO: 132 K/UL (ref 130–400)
PMV BLD AUTO: 10.1 FL (ref 9.4–12.4)
POTASSIUM SERPL-SCNC: 4.2 MMOL/L (ref 3.5–5)
PROT SERPL-MCNC: 5.1 G/DL (ref 6.6–8.7)
RBC # BLD AUTO: 3.1 M/UL (ref 4.7–6.1)
SODIUM SERPL-SCNC: 141 MMOL/L (ref 136–145)
WBC # BLD AUTO: 6.6 K/UL (ref 4.8–10.8)

## 2024-05-22 PROCEDURE — 80053 COMPREHEN METABOLIC PANEL: CPT

## 2024-05-22 PROCEDURE — 6370000000 HC RX 637 (ALT 250 FOR IP): Performed by: SURGERY

## 2024-05-22 PROCEDURE — 94640 AIRWAY INHALATION TREATMENT: CPT

## 2024-05-22 PROCEDURE — 82962 GLUCOSE BLOOD TEST: CPT

## 2024-05-22 PROCEDURE — 85025 COMPLETE CBC W/AUTO DIFF WBC: CPT

## 2024-05-22 PROCEDURE — 1200000000 HC SEMI PRIVATE

## 2024-05-22 PROCEDURE — 36415 COLL VENOUS BLD VENIPUNCTURE: CPT

## 2024-05-22 PROCEDURE — 6360000002 HC RX W HCPCS: Performed by: SURGERY

## 2024-05-22 PROCEDURE — 2580000003 HC RX 258: Performed by: SURGERY

## 2024-05-22 PROCEDURE — 6370000000 HC RX 637 (ALT 250 FOR IP)

## 2024-05-22 PROCEDURE — 99233 SBSQ HOSP IP/OBS HIGH 50: CPT

## 2024-05-22 PROCEDURE — 99232 SBSQ HOSP IP/OBS MODERATE 35: CPT | Performed by: INTERNAL MEDICINE

## 2024-05-22 PROCEDURE — 6370000000 HC RX 637 (ALT 250 FOR IP): Performed by: INTERNAL MEDICINE

## 2024-05-22 PROCEDURE — 94760 N-INVAS EAR/PLS OXIMETRY 1: CPT

## 2024-05-22 RX ORDER — FENTANYL 75 UG/1
1 PATCH TRANSDERMAL
Status: DISCONTINUED | OUTPATIENT
Start: 2024-05-22 | End: 2024-05-24 | Stop reason: HOSPADM

## 2024-05-22 RX ADMIN — Medication 2 PUFF: at 18:30

## 2024-05-22 RX ADMIN — ASPIRIN 81 MG: 81 TABLET, CHEWABLE ORAL at 11:02

## 2024-05-22 RX ADMIN — ENOXAPARIN SODIUM 30 MG: 100 INJECTION SUBCUTANEOUS at 11:03

## 2024-05-22 RX ADMIN — Medication: at 12:07

## 2024-05-22 RX ADMIN — LANSOPRAZOLE 30 MG: 30 TABLET, ORALLY DISINTEGRATING, DELAYED RELEASE ORAL at 11:02

## 2024-05-22 RX ADMIN — SODIUM BICARBONATE 325 MG: 325 TABLET ORAL at 11:04

## 2024-05-22 RX ADMIN — Medication: at 15:11

## 2024-05-22 RX ADMIN — METOPROLOL TARTRATE 12.5 MG: 25 TABLET, FILM COATED ORAL at 11:02

## 2024-05-22 RX ADMIN — LANSOPRAZOLE 30 MG: 30 TABLET, ORALLY DISINTEGRATING, DELAYED RELEASE ORAL at 20:58

## 2024-05-22 RX ADMIN — METOCLOPRAMIDE 5 MG: 10 TABLET ORAL at 11:02

## 2024-05-22 RX ADMIN — Medication: at 21:10

## 2024-05-22 RX ADMIN — SODIUM CHLORIDE, PRESERVATIVE FREE 10 ML: 5 INJECTION INTRAVENOUS at 21:10

## 2024-05-22 RX ADMIN — OXYCODONE HYDROCHLORIDE 10 MG: 5 SOLUTION ORAL at 05:30

## 2024-05-22 RX ADMIN — METOPROLOL TARTRATE 12.5 MG: 25 TABLET, FILM COATED ORAL at 20:57

## 2024-05-22 RX ADMIN — HYDROMORPHONE HYDROCHLORIDE 0.5 MG: 1 INJECTION, SOLUTION INTRAMUSCULAR; INTRAVENOUS; SUBCUTANEOUS at 08:44

## 2024-05-22 RX ADMIN — SODIUM CHLORIDE, PRESERVATIVE FREE 10 ML: 5 INJECTION INTRAVENOUS at 11:24

## 2024-05-22 RX ADMIN — ISOSORBIDE DINITRATE 20 MG: 10 TABLET ORAL at 11:02

## 2024-05-22 RX ADMIN — ISOSORBIDE DINITRATE 20 MG: 10 TABLET ORAL at 20:59

## 2024-05-22 RX ADMIN — CYANOCOBALAMIN TAB 500 MCG 500 MCG: 500 TAB at 11:02

## 2024-05-22 RX ADMIN — HYDROMORPHONE HYDROCHLORIDE 0.5 MG: 1 INJECTION, SOLUTION INTRAMUSCULAR; INTRAVENOUS; SUBCUTANEOUS at 22:22

## 2024-05-22 RX ADMIN — FLUTICASONE PROPIONATE 2 SPRAY: 50 SPRAY, METERED NASAL at 11:03

## 2024-05-22 RX ADMIN — OXYCODONE HYDROCHLORIDE 10 MG: 5 SOLUTION ORAL at 12:10

## 2024-05-22 RX ADMIN — LACTULOSE 20 G: 20 SOLUTION ORAL at 21:06

## 2024-05-22 RX ADMIN — AMLODIPINE BESYLATE 5 MG: 5 TABLET ORAL at 11:02

## 2024-05-22 RX ADMIN — FOLIC ACID 1 MG: 1 TABLET ORAL at 11:02

## 2024-05-22 RX ADMIN — ONDANSETRON 4 MG: 2 INJECTION INTRAMUSCULAR; INTRAVENOUS at 04:59

## 2024-05-22 RX ADMIN — POLYETHYLENE GLYCOL 3350 17 G: 17 POWDER, FOR SOLUTION ORAL at 11:03

## 2024-05-22 RX ADMIN — Medication 2 PUFF: at 07:43

## 2024-05-22 RX ADMIN — ONDANSETRON 4 MG: 2 INJECTION INTRAMUSCULAR; INTRAVENOUS at 12:10

## 2024-05-22 RX ADMIN — PRAVASTATIN SODIUM 40 MG: 20 TABLET ORAL at 20:56

## 2024-05-22 RX ADMIN — METOCLOPRAMIDE 5 MG: 10 TABLET ORAL at 20:56

## 2024-05-22 RX ADMIN — HYDROMORPHONE HYDROCHLORIDE 0.5 MG: 1 INJECTION, SOLUTION INTRAMUSCULAR; INTRAVENOUS; SUBCUTANEOUS at 16:55

## 2024-05-22 RX ADMIN — LACTULOSE 20 G: 20 SOLUTION ORAL at 11:03

## 2024-05-22 RX ADMIN — METOCLOPRAMIDE 5 MG: 10 TABLET ORAL at 15:10

## 2024-05-22 ASSESSMENT — PAIN DESCRIPTION - LOCATION
LOCATION: BACK;ABDOMEN
LOCATION: CHEST;BACK
LOCATION: BACK;CHEST
LOCATION: ABDOMEN;BACK

## 2024-05-22 ASSESSMENT — PAIN DESCRIPTION - ORIENTATION
ORIENTATION: LEFT;RIGHT
ORIENTATION: LEFT
ORIENTATION: RIGHT
ORIENTATION: RIGHT;LEFT

## 2024-05-22 ASSESSMENT — PAIN SCALES - GENERAL
PAINLEVEL_OUTOF10: 9
PAINLEVEL_OUTOF10: 9
PAINLEVEL_OUTOF10: 6
PAINLEVEL_OUTOF10: 8
PAINLEVEL_OUTOF10: 9

## 2024-05-22 NOTE — PLAN OF CARE
Problem: Chronic Conditions and Co-morbidities  Goal: Patient's chronic conditions and co-morbidity symptoms are monitored and maintained or improved  5/22/2024 1130 by Mahsa Erickson RN  Outcome: Progressing  5/21/2024 2349 by Nazanin Espana RN  Outcome: Progressing     Problem: Safety - Adult  Goal: Free from fall injury  5/22/2024 1130 by Mahsa Erickson RN  Outcome: Progressing  5/21/2024 2349 by Nazanin Espana RN  Outcome: Progressing  Flowsheets (Taken 5/21/2024 2348)  Free From Fall Injury: Instruct family/caregiver on patient safety     Problem: Nutrition Deficit:  Goal: Optimize nutritional status  5/22/2024 1130 by Mahsa Erickson RN  Outcome: Progressing  5/21/2024 2349 by Nazanin Espana RN  Outcome: Progressing     Problem: Discharge Planning  Goal: Discharge to home or other facility with appropriate resources  5/22/2024 1130 by Mahsa Erickson RN  Outcome: Progressing  5/21/2024 2349 by Nazanin Espana RN  Outcome: Progressing     Problem: Pain  Goal: Verbalizes/displays adequate comfort level or baseline comfort level  5/22/2024 1130 by Mahsa Erickson RN  Outcome: Progressing  5/21/2024 2349 by Nazanin Espana RN  Outcome: Progressing     Problem: Skin/Tissue Integrity  Goal: Absence of new skin breakdown  Description: 1.  Monitor for areas of redness and/or skin breakdown  2.  Assess vascular access sites hourly  3.  Every 4-6 hours minimum:  Change oxygen saturation probe site  4.  Every 4-6 hours:  If on nasal continuous positive airway pressure, respiratory therapy assess nares and determine need for appliance change or resting period.  5/22/2024 1130 by Mahsa Erickson RN  Outcome: Progressing  5/21/2024 2349 by Nazanin Espana RN  Outcome: Progressing     Problem: ABCDS Injury Assessment  Goal: Absence of physical injury  Outcome: Progressing

## 2024-05-22 NOTE — CARE COORDINATION
Tube Feed order faxed to [facility].  Awaiting approval and delivery.  [facility]  P [phone]  F [phone]  Electronically signed by Sara Davalos RN on 5/22/2024 at 5:11 PM

## 2024-05-22 NOTE — PROGRESS NOTES
MEDICAL ONCOLOGY PROGRESS NOTE    Pt Name: Ming Rocha  MRN: [id number]  YOB: 1940  Date of evaluation: 5/22/2024    Subjective-reviewed nutrition and internal medicine notes.  Arrangements in process for tube feedings at home with [facility].  Advancing diet.    POD #12 surgical g-tube placement by Dr. Blankenship on 5/10/2024  POD #8 Upper Endoscopy with manipulation and repositioning of Esophageal stent across known malignant esophageal mass by Dr. Claudio 5/14/2024    Tube feedings ongoing through surgical G-tube, rate increased from 30 cc an hour to 50 cc an hour and tolerating it well.      HISTORY OF PRESENT ILLNESS:  Ming Rocha is well-known to our clinic.  He is a patient established with Dr. Seth Christopher.  He was last seen on 5/2/2024.  The patient has a new diagnosis of esophageal cancer of the distal esophagus status post esophageal stent placement.  He is having significant issues with p.o. intake after the stent placement.  He was referred back to Dr. Claudio for stent adjustment.  In addition, recommend port placement with general surgery as well as surgical jejunostomy feeding tube.  Referral to radiation oncology has been made.  Patient presents today hospital as a direct admission from Dr. Blankenship's office.  Patient had uncontrolled pain and therefore was admitted to the inpatient for pain control.  Patient has generalized weakness, fatigue and significant pain related to his cancer.  Patient was started on Duragesic patch 50 mcg every 3 days by Dr. Christopher last week.    Laboratory studies at admission today showed evidence of hypokalemia with potassium 2.9, low total protein.  WBC 10.1, hemoglobin 13.7, platelet 181,000    Esophagram performed 5/2/2024 showed a filling defect in the end of the stent like represent tumor growth    Chest x-ray-no acute findings      PRIOR ONCOLOGICAL HISTORY     Ming Rocha is an 83-year-old  gentleman with primary and secondary diagnoses as  wall soft tissues to explain right axillary pain. This report was signed and finalized on 4/10/2024 6:41 AM by Dr Zoran Milton.    XR Neck Soft Tissue    Result Date: 4/9/2024  EXAM/TECHNIQUE: XR NECK SOFT TISSUE- INDICATION: vomiting; assess for food bolus COMPARISON: None available. FINDINGS: No radiographic evidence of hyperdense foreign body in the upper aerodigestive tract. Mild degenerative change in the cervical spine. Multiple dental amalgams are noted. No acute osseous finding. Clear lung apices are clear. Prior mean sternotomy noted.    No radiopaque foreign body or dense retained food bolus evident by x-ray. This report was signed and finalized on 4/9/2024 5:47 PM by Dr. Brandan Lal MD.    XR Abdomen Flat & Upright    Result Date: 4/9/2024  EXAM/TECHNIQUE: XR ABDOMEN FLAT AND UPRIGHT- INDICATION: vomiting COMPARISON: None available. FINDINGS: Nonobstructive bowel gas pattern. No mass effect or suspicious calcifications. Multilevel degenerative change in the lumbar spine. No acute osseous finding.    Nonobstructive bowel gas pattern. This report was signed and finalized on 4/9/2024 5:47 PM by Dr. Brandan Lal MD. 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 Claudio -Status Post Successful Upper Endoscopy with Repositioning of Esophageal Stent by Dr. Claudio on 5/14/2024.  -05/16/24-EGD with open replacement of G-tube Dr. Elena Blankenship and Dr. Steele  -Currently on enteral nutrition through gastrostomy tube    -Status post upper Endoscopy with manipulation and repositioning of Esophageal stent across known malignant esophageal mass by Dr. Claudio 5/14/2024    He has an appointment with Dr. Constantine Leslie at the radiation therapy center at [facility] on 5/20/2024.  We will reschedule appointment for radiation and medical oncology    # Protein calorie malnutrition    POD #12 surgical g-tube placement by Dr. Blankenship on

## 2024-05-22 NOTE — PROGRESS NOTES
Comprehensive Nutrition Assessment    Type and Reason for Visit:  Reassess    Nutrition Recommendations/Plan:   Home TF recommendations in RD note.     Malnutrition Assessment:  Malnutrition Status:  Severe malnutrition (05/18/24 0939)    Context:  Chronic Illness     Findings of the 6 clinical characteristics of malnutrition:  Energy Intake:  Mild decrease in energy intake (Comment)  Weight Loss:  No significant weight loss     Body Fat Loss:  Severe body fat loss Triceps   Muscle Mass Loss:  Severe muscle mass loss Temples (temporalis)  Fluid Accumulation:  No significant fluid accumulation     Strength:  Not Performed    Nutrition Assessment:    TF was gradually advanced to 65 mL/hr but held this morning for nausea. TF has been held for 7 hours today. Per RN, pt was very sweaty this morning and his residual increased from 20 to 120 mL. New residuals: 5, 50, 15 mL. Aware of question for possible formula change. Given pt's medical dx, pain meds, and abx, nausea is likely unrelated to TF. Current formula also provides fiber, which is needed for pt's hx of constipation. Currently receiving lactulose, Glycolax, Reglan, and prn Zofran. Discussed w/RN restarting TF at lesser rate when MD permits, and continue advancing to goal rate of 75 mL/hr. Daughter present this morning and agreeable with plan for continuous feeding at home. She also asked about changing TF order in the future if pt tolerates continous feeds; encouraged her to reach out to her home care contact to coordinate any order changes. Home TF is indicated for >90 days d/t >10% significant weight loss in 6 months and pt's impaired tolerance of oral intake r/t active esophageal cancer. Home TF is anticipated to meet 100% of pt's estimated needs through formula and water flushes. Per daughter's plan for continous feeding, recommend Jevity 1.5 at 60 mL/hr (a total of 6 8-oz cans/day= 1422 mL/day), with 40 mL/hr water flush (960 mL fluid/day). This will  Unknown

## 2024-05-22 NOTE — PROGRESS NOTES
Palliative Care Progress Note  5/22/2024 8:42 AM    Patient:  Ming Rocha  YOB: 1940  Primary Care Physician: Irving Larsen DO  Advance Directive: DNR  Admit Date: 5/6/2024       Hospital Day: 16  Portions of this note have been copied forward, however, changed to reflect the most current clinical status of this patient.    CHIEF COMPLAINT/REASON FOR CONSULTATION Goals of care, family support, Code status, symptom management     SUBJECTIVE:  Mr. Rocha is having increased right sided pain and poor tolerance of tube feeding today. JASON has been paused.     HPI:  The patient is a 83 y.o. male with PMH HTN, HLD, COPD, CKD IV, CVA with right sided residual weakness, CAD s/p CABG 2020, recent esophageal cancer diagnosis, and other comorbidities who presented to [facility]  5/6/2024 as a direct admit from general surgery office. 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 Christopher.  Patient has been having ongoing issues with p.o. intake without improvement following stent placement and concern for failure to thrive.  He has been referred to radiation oncology.  He was seen in general surgery office on day of admission for recommended port and G-tube placement but while there he was having uncontrolled pain and was recommended for inpatient admission.  Of note he was recently started on 50 mcg fentanyl patch by oncology team last week.  On arrival patient also endorsed ongoing issues with weakness, fatigue, and 40 to 50 pound weight loss since November of last year.  Patient also has a known left kidney mass currently being monitored with serial imaging but cannot rule out malignancy, noted interval growth over the last few years.  Laboratory studies on arrival revealed evidence of hypokalemia with K+ 2.9, low total protein, WBC 10.1, hemoglobin 13.7, and platelet 181,000.  CXR without  BID    lansoprazole  30 mg PEG Tube BID    sodium chloride flush  5-40 mL IntraVENous 2 times per day    saliva substitute   Oral TID    fentaNYL  1 patch TransDERmal Q72H    enoxaparin  30 mg SubCUTAneous Daily    budesonide-formoterol  2 puff Inhalation BID    fluticasone  2 spray Nasal Daily     oxyCODONE, sodium chloride flush, HYDROmorphone, labetalol, hydrALAZINE, ondansetron **OR** ondansetron, acetaminophen **OR** acetaminophen, nitroGLYCERIN, naloxone  Diet NPO Exceptions are: Sips of Water with Meds, Ice Chips  ADULT TUBE FEEDING; Gastrostomy; Other Tube Feeding (specify); Jevity 1.2; Continuous; 50; Yes; 10; Q 8 hours; 75; 30; Q 1 hour     Lab and other Data:     Recent Labs     05/20/24  0402 05/21/24  0319   WBC 5.1 5.3   HGB 8.6* 8.9*   * 131       Recent Labs     05/20/24  0402      K 4.3   *   CO2 21*   BUN 24*   CREATININE 2.2*   GLUCOSE 118*       No results for input(s): \"AST\", \"ALT\", \"BILITOT\", \"ALKPHOS\" in the last 72 hours.    Invalid input(s): \"ALB\"    Palliative Performance Scale:  40-50%    ECOG:3    CLINICAL PAIN ASSESSMENT:   Score 1-10 (if verbal):  7  Location:  across mid abdomen   Character:  aching   Frequency:  continuously but w/ periods of exacerbation   What makes it worse?:  positioning  What makes it better?:  pain medication     Assessment/Plan   Principal Problem:    Failure to thrive in adult  Active Problems:    Adenocarcinoma of gastroesophageal junction (HCC)    Esophageal cancer (HCC)    Palliative care patient    Mild malnutrition (HCC)    Dysphagia    Cancer related pain    Severe malnutrition (HCC)    Chest pain    Gastrostomy tube dysfunction (HCC)  Resolved Problems:    * No resolved hospital problems. *      Visit Summary:  Chart reviewed.  Report obtained from RN with no acute events over the weekend.  Mr. Rocha is seen at bedside with his daughter/POA, Angela.  RN present and report obtained. Tube feed stopped this afternoon due to increased

## 2024-05-22 NOTE — PLAN OF CARE
Problem: Chronic Conditions and Co-morbidities  Goal: Patient's chronic conditions and co-morbidity symptoms are monitored and maintained or improved  5/21/2024 2349 by Nazanin Espana RN  Outcome: Progressing  5/21/2024 1556 by Dominique Mccain LPN  Outcome: Progressing  Flowsheets (Taken 5/21/2024 0807)  Care Plan - Patient's Chronic Conditions and Co-Morbidity Symptoms are Monitored and Maintained or Improved: Monitor and assess patient's chronic conditions and comorbid symptoms for stability, deterioration, or improvement     Problem: Safety - Adult  Goal: Free from fall injury  5/21/2024 2349 by Nazanin Espana RN  Outcome: Progressing  Flowsheets (Taken 5/21/2024 2348)  Free From Fall Injury: Instruct family/caregiver on patient safety  5/21/2024 1556 by Dominique Mccain LPN  Outcome: Progressing     Problem: Nutrition Deficit:  Goal: Optimize nutritional status  5/21/2024 2349 by Nazanin Espana RN  Outcome: Progressing  5/21/2024 1556 by Dominique Mccain LPN  Outcome: Progressing     Problem: Discharge Planning  Goal: Discharge to home or other facility with appropriate resources  Outcome: Progressing     Problem: Pain  Goal: Verbalizes/displays adequate comfort level or baseline comfort level  Outcome: Progressing     Problem: Skin/Tissue Integrity  Goal: Absence of new skin breakdown  Description: 1.  Monitor for areas of redness and/or skin breakdown  2.  Assess vascular access sites hourly  3.  Every 4-6 hours minimum:  Change oxygen saturation probe site  4.  Every 4-6 hours:  If on nasal continuous positive airway pressure, respiratory therapy assess nares and determine need for appliance change or resting period.  Outcome: Progressing

## 2024-05-22 NOTE — PROGRESS NOTES
Automated Exposure Control was utilized for adjustment of the mA and/or KV according to patient size. CT Dose DLP = 300.09 mGy.cm. (If there are multiple studies performed at the same time this represents the total dose). Noncontrast abdomen/pelvis CT. Axial, sagittal, and coronal sequences. Normal heart size. Distal esophageal stent. Trace LEFT pleural fluid. Normal noncontrast appearance of the liver, gallbladder, pancreas, and spleen. Normal and symmetric adrenal glands. Multiple bilateral renal lesions are present. These were incompletely visualized though most of these are unchanged and compatible with cysts as compared with a chest CT from 11/19/2023 and from 5/29/2020. A soft tissue density round exophytic lesion arising from the medial side of the midportion of the LEFT kidney measures 21 x 17 mm compared with 18 x 15 mm 5 months ago and 12 x 11 mm 4 years ago. 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 No bowel dilation and no free fluid. There is moderate fecal material throughout the colon though more prominent fecal material and the appearance of fecal impaction within the rectum. Bilateral fat-containing inguinal hernias. Normal appearance of the urinary bladder. No pelvic mass or free fluid.    1. No bowel obstruction. 2. Indeterminate slowly growing 2 cm soft tissue density lesion involving the midportion of the LEFT kidney. 3. Rectal fecal impaction. This report was signed and finalized on 4/28/2024 9:50 PM by Dr. Price Zazueta MD.    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  Sternotomy wires are present.  Atherosclerotic disease.  There is an esophageal stent with its lower portion not clearly seen although appears to end at the general level of the  collaboration, he required abd incision to be reopened to facilitate PEG replacement. Also with OR report of possibly wound infection -     Initiated on Vanc, Flagyl and Cipro IV.        Bradycardia  Aflutter variable degree block on Telemetry.   -- initially stopped Metoprolol  -- Consulted cardiology  -- Procedures postponed and pt had undergone cardiology eval  - stress test reassuring  - bradycardia improved with BB on hold - low dose Toprol XL resumed per cardiology - changed to metoprolol BID via PEG.    A.flutter variable degree block rate controlled with episodic bradycardia improved.   BB as above.   Reassess for AC prior to discharge - will be very high ris given active esophageal malignancy with partially ulcerated esophageal mass very high risk for bleeding.   Also very high risk with prior Hx of CVA.      Adenocarcinoma of GE junction  Failure to thrive  -- Oncology following  -- Continue pain control, antiemetics, bowel regimen  -- Nutrition consult for tube feedings  - started tPN support 5/8/24 given potential need to postpone procedures pending cardiology evaluation.   - to OR 5/10 with Dr Blankenship for R chemo port placement as well as Surgical gastrostomy placement.   - endoscopy with Dr Claudio for migrated esophageal stent on 5/14  - PEG complication will require replacement - pending 5/16  - low grade fever likely related to that - started Cipro Flagyl IV on 5/16 and sent blood cultures.   given OR report of possibly wound infection I added Vanc IV on 5/17    tube feed resumed 5/17 - slowly titrating to goal     CAD s/p CABG   Continue ASA, statin     HTN  PO meds changed to Via PEG     COPD  Continue ICS/LABA and PRN Albuterol     GERD  Continue PPI via PEG     Severe Prot Calorie Malnutrition.   Started PEG 5/11  Then held due to PEG complication. Has since resumed.    Dietitian consult for PEG orders and management.         Constipation.   Lactulose via PEG.   Miralax via PEG.

## 2024-05-23 LAB
ALBUMIN SERPL-MCNC: 2.9 G/DL (ref 3.5–5.2)
ALP SERPL-CCNC: 62 U/L (ref 40–130)
ALT SERPL-CCNC: 9 U/L (ref 5–41)
ANION GAP SERPL CALCULATED.3IONS-SCNC: 9 MMOL/L (ref 7–19)
AST SERPL-CCNC: 20 U/L (ref 5–40)
BASOPHILS # BLD: 0.1 K/UL (ref 0–0.2)
BASOPHILS NFR BLD: 1.1 % (ref 0–1)
BILIRUB SERPL-MCNC: 0.3 MG/DL (ref 0.2–1.2)
BUN SERPL-MCNC: 18 MG/DL (ref 8–23)
CALCIUM SERPL-MCNC: 8.6 MG/DL (ref 8.8–10.2)
CHLORIDE SERPL-SCNC: 112 MMOL/L (ref 98–111)
CO2 SERPL-SCNC: 23 MMOL/L (ref 22–29)
CREAT SERPL-MCNC: 2.2 MG/DL (ref 0.5–1.2)
EOSINOPHIL # BLD: 0.3 K/UL (ref 0–0.6)
EOSINOPHIL NFR BLD: 4.3 % (ref 0–5)
ERYTHROCYTE [DISTWIDTH] IN BLOOD BY AUTOMATED COUNT: 13.3 % (ref 11.5–14.5)
GLUCOSE BLD-MCNC: 114 MG/DL (ref 70–99)
GLUCOSE BLD-MCNC: 122 MG/DL (ref 70–99)
GLUCOSE BLD-MCNC: 131 MG/DL (ref 70–99)
GLUCOSE BLD-MCNC: 135 MG/DL (ref 70–99)
GLUCOSE SERPL-MCNC: 115 MG/DL (ref 74–109)
HCT VFR BLD AUTO: 30.6 % (ref 42–52)
HGB BLD-MCNC: 9.8 G/DL (ref 14–18)
IMM GRANULOCYTES # BLD: 0.1 K/UL
LYMPHOCYTES # BLD: 0.8 K/UL (ref 1.1–4.5)
LYMPHOCYTES NFR BLD: 13.2 % (ref 20–40)
MCH RBC QN AUTO: 30.1 PG (ref 27–31)
MCHC RBC AUTO-ENTMCNC: 32 G/DL (ref 33–37)
MCV RBC AUTO: 93.9 FL (ref 80–94)
MONOCYTES # BLD: 0.5 K/UL (ref 0–0.9)
MONOCYTES NFR BLD: 8.6 % (ref 0–10)
NEUTROPHILS # BLD: 4.5 K/UL (ref 1.5–7.5)
NEUTS SEG NFR BLD: 71.8 % (ref 50–65)
PERFORMED ON: ABNORMAL
PLATELET # BLD AUTO: 139 K/UL (ref 130–400)
PMV BLD AUTO: 10.8 FL (ref 9.4–12.4)
POTASSIUM SERPL-SCNC: 4.1 MMOL/L (ref 3.5–5)
PROT SERPL-MCNC: 5.3 G/DL (ref 6.6–8.7)
RBC # BLD AUTO: 3.26 M/UL (ref 4.7–6.1)
SODIUM SERPL-SCNC: 144 MMOL/L (ref 136–145)
WBC # BLD AUTO: 6.3 K/UL (ref 4.8–10.8)

## 2024-05-23 PROCEDURE — 2580000003 HC RX 258: Performed by: SURGERY

## 2024-05-23 PROCEDURE — 1200000000 HC SEMI PRIVATE

## 2024-05-23 PROCEDURE — 82962 GLUCOSE BLOOD TEST: CPT

## 2024-05-23 PROCEDURE — 6360000002 HC RX W HCPCS: Performed by: SURGERY

## 2024-05-23 PROCEDURE — 6370000000 HC RX 637 (ALT 250 FOR IP)

## 2024-05-23 PROCEDURE — 99233 SBSQ HOSP IP/OBS HIGH 50: CPT

## 2024-05-23 PROCEDURE — 6370000000 HC RX 637 (ALT 250 FOR IP): Performed by: INTERNAL MEDICINE

## 2024-05-23 PROCEDURE — 36415 COLL VENOUS BLD VENIPUNCTURE: CPT

## 2024-05-23 PROCEDURE — 80053 COMPREHEN METABOLIC PANEL: CPT

## 2024-05-23 PROCEDURE — 85025 COMPLETE CBC W/AUTO DIFF WBC: CPT

## 2024-05-23 PROCEDURE — 94640 AIRWAY INHALATION TREATMENT: CPT

## 2024-05-23 PROCEDURE — 94760 N-INVAS EAR/PLS OXIMETRY 1: CPT

## 2024-05-23 PROCEDURE — 6370000000 HC RX 637 (ALT 250 FOR IP): Performed by: SURGERY

## 2024-05-23 RX ORDER — OXYCODONE HCL 5 MG/5 ML
15 SOLUTION, ORAL ORAL EVERY 4 HOURS PRN
Status: DISCONTINUED | OUTPATIENT
Start: 2024-05-23 | End: 2024-05-24 | Stop reason: HOSPADM

## 2024-05-23 RX ORDER — OXYCODONE HCL 5 MG/5 ML
10 SOLUTION, ORAL ORAL EVERY 4 HOURS PRN
Status: DISCONTINUED | OUTPATIENT
Start: 2024-05-23 | End: 2024-05-24 | Stop reason: HOSPADM

## 2024-05-23 RX ORDER — MORPHINE SULFATE 20 MG/ML
5 SOLUTION ORAL
Status: DISCONTINUED | OUTPATIENT
Start: 2024-05-23 | End: 2024-05-24 | Stop reason: HOSPADM

## 2024-05-23 RX ADMIN — METOPROLOL TARTRATE 12.5 MG: 25 TABLET, FILM COATED ORAL at 21:17

## 2024-05-23 RX ADMIN — POLYETHYLENE GLYCOL 3350 17 G: 17 POWDER, FOR SOLUTION ORAL at 09:19

## 2024-05-23 RX ADMIN — Medication: at 09:20

## 2024-05-23 RX ADMIN — ENOXAPARIN SODIUM 30 MG: 100 INJECTION SUBCUTANEOUS at 09:19

## 2024-05-23 RX ADMIN — OXYCODONE HYDROCHLORIDE 10 MG: 5 SOLUTION ORAL at 09:20

## 2024-05-23 RX ADMIN — LACTULOSE 20 G: 20 SOLUTION ORAL at 09:18

## 2024-05-23 RX ADMIN — ONDANSETRON 4 MG: 2 INJECTION INTRAMUSCULAR; INTRAVENOUS at 15:50

## 2024-05-23 RX ADMIN — AMLODIPINE BESYLATE 5 MG: 5 TABLET ORAL at 09:19

## 2024-05-23 RX ADMIN — OXYCODONE HYDROCHLORIDE 15 MG: 5 SOLUTION ORAL at 21:19

## 2024-05-23 RX ADMIN — SODIUM CHLORIDE, PRESERVATIVE FREE 10 ML: 5 INJECTION INTRAVENOUS at 09:20

## 2024-05-23 RX ADMIN — ASPIRIN 81 MG: 81 TABLET, CHEWABLE ORAL at 09:19

## 2024-05-23 RX ADMIN — FOLIC ACID 1 MG: 1 TABLET ORAL at 09:19

## 2024-05-23 RX ADMIN — METOCLOPRAMIDE 5 MG: 10 TABLET ORAL at 21:18

## 2024-05-23 RX ADMIN — OXYCODONE HYDROCHLORIDE 10 MG: 5 SOLUTION ORAL at 04:53

## 2024-05-23 RX ADMIN — LANSOPRAZOLE 30 MG: 30 TABLET, ORALLY DISINTEGRATING, DELAYED RELEASE ORAL at 09:19

## 2024-05-23 RX ADMIN — ISOSORBIDE DINITRATE 20 MG: 10 TABLET ORAL at 09:19

## 2024-05-23 RX ADMIN — OXYCODONE HYDROCHLORIDE 15 MG: 5 SOLUTION ORAL at 16:36

## 2024-05-23 RX ADMIN — SODIUM BICARBONATE 325 MG: 325 TABLET ORAL at 09:19

## 2024-05-23 RX ADMIN — METOCLOPRAMIDE 5 MG: 10 TABLET ORAL at 15:50

## 2024-05-23 RX ADMIN — METOCLOPRAMIDE 5 MG: 10 TABLET ORAL at 09:19

## 2024-05-23 RX ADMIN — FLUTICASONE PROPIONATE 2 SPRAY: 50 SPRAY, METERED NASAL at 09:18

## 2024-05-23 RX ADMIN — Medication 2 PUFF: at 06:35

## 2024-05-23 RX ADMIN — Medication: at 21:22

## 2024-05-23 RX ADMIN — ISOSORBIDE DINITRATE 20 MG: 10 TABLET ORAL at 21:18

## 2024-05-23 RX ADMIN — Medication 2 PUFF: at 21:01

## 2024-05-23 RX ADMIN — ONDANSETRON 4 MG: 2 INJECTION INTRAMUSCULAR; INTRAVENOUS at 21:21

## 2024-05-23 RX ADMIN — CYANOCOBALAMIN TAB 500 MCG 500 MCG: 500 TAB at 09:19

## 2024-05-23 RX ADMIN — ONDANSETRON 4 MG: 2 INJECTION INTRAMUSCULAR; INTRAVENOUS at 06:08

## 2024-05-23 RX ADMIN — HYDRALAZINE HYDROCHLORIDE 10 MG: 20 INJECTION, SOLUTION INTRAMUSCULAR; INTRAVENOUS at 00:01

## 2024-05-23 RX ADMIN — Medication: at 15:57

## 2024-05-23 RX ADMIN — ONDANSETRON 4 MG: 2 INJECTION INTRAMUSCULAR; INTRAVENOUS at 01:36

## 2024-05-23 RX ADMIN — LANSOPRAZOLE 30 MG: 30 TABLET, ORALLY DISINTEGRATING, DELAYED RELEASE ORAL at 21:16

## 2024-05-23 RX ADMIN — OXYCODONE HYDROCHLORIDE 10 MG: 5 SOLUTION ORAL at 00:07

## 2024-05-23 RX ADMIN — PRAVASTATIN SODIUM 40 MG: 20 TABLET ORAL at 21:19

## 2024-05-23 RX ADMIN — SODIUM CHLORIDE, PRESERVATIVE FREE 10 ML: 5 INJECTION INTRAVENOUS at 21:21

## 2024-05-23 RX ADMIN — METOPROLOL TARTRATE 12.5 MG: 25 TABLET, FILM COATED ORAL at 09:19

## 2024-05-23 ASSESSMENT — PAIN SCALES - GENERAL
PAINLEVEL_OUTOF10: 8
PAINLEVEL_OUTOF10: 0
PAINLEVEL_OUTOF10: 7
PAINLEVEL_OUTOF10: 7
PAINLEVEL_OUTOF10: 8

## 2024-05-23 ASSESSMENT — PAIN DESCRIPTION - LOCATION
LOCATION: BACK;ABDOMEN
LOCATION: BACK
LOCATION: ABDOMEN;BACK

## 2024-05-23 ASSESSMENT — PAIN DESCRIPTION - DESCRIPTORS: DESCRIPTORS: ACHING

## 2024-05-23 ASSESSMENT — PAIN DESCRIPTION - ORIENTATION
ORIENTATION: RIGHT
ORIENTATION: RIGHT

## 2024-05-23 NOTE — CARE COORDINATION
Lyric called and spoke to Aletha at [facility], she is still looking into the benefits but thinks the cost will be covered, stated if everything came back on her end today would be able to ship out tonight  Electronically signed by NIDHI Joshua on 5/23/2024 at 8:08 AM

## 2024-05-23 NOTE — PROGRESS NOTES
Palliative Care Progress Note  5/23/2024 8:37 AM    Patient:  Ming Rocha  YOB: 1940  Primary Care Physician: Irving Larsen DO  Advance Directive: DNR  Admit Date: 5/6/2024       Hospital Day: 17  Portions of this note have been copied forward, however, changed to reflect the most current clinical status of this patient.    CHIEF COMPLAINT/REASON FOR CONSULTATION Goals of care, family support, Code status, symptom management     SUBJECTIVE:  Mr. Rocha has continued to have poor tolerance of tube feeding and increased pain overnight. Worsening fatigue and weakness. Pt and his daughter have asked for me to visit today to discuss home hospice with them.     HPI:  The patient is a 83 y.o. male with PMH HTN, HLD, COPD, CKD IV, CVA with right sided residual weakness, CAD s/p CABG 2020, recent esophageal cancer diagnosis, and other comorbidities who presented to [facility]  5/6/2024 as a direct admit from general surgery office. 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 Christopher.  Patient has been having ongoing issues with p.o. intake without improvement following stent placement and concern for failure to thrive.  He has been referred to radiation oncology.  He was seen in general surgery office on day of admission for recommended port and G-tube placement but while there he was having uncontrolled pain and was recommended for inpatient admission.  Of note he was recently started on 50 mcg fentanyl patch by oncology team last week.  On arrival patient also endorsed ongoing issues with weakness, fatigue, and 40 to 50 pound weight loss since November of last year.  Patient also has a known left kidney mass currently being monitored with serial imaging but cannot rule out malignancy, noted interval growth over the last few years.  Laboratory studies on arrival revealed evidence of  hypokalemia with K+ 2.9, low total protein, WBC 10.1, hemoglobin 13.7, and platelet 181,000.  CXR without acute findings.  He was admitted under hospitalist services with oncology and general surgery evaluations.  Plans for open jejunostomy tomorrow with Dr. Blankenship.  Palliative care is consulted for goals of care discussions and overwhelming symptoms.     Port and G-tube placement with general surgery 5/10/2024  Endoscopic with Dr. Claudio for migrated esophageal stent on 5/14/2024  A complication requiring replacement 24    Review of Systems:   14 point review of systems is negative except as specifically addressed above.    Objective:   VITALS:  BP (!) 182/74   Pulse 67   Temp 98.6 °F (37 °C) (Temporal)   Resp 20   Ht 1.829 m (6' 0.01\")   Wt 85.4 kg (188 lb 6 oz)   SpO2 96%   BMI 25.54 kg/m²   24HR INTAKE/OUTPUT:    Intake/Output Summary (Last 24 hours) at 5/23/2024 0837  Last data filed at 5/23/2024 0422  Gross per 24 hour   Intake 1242 ml   Output 250 ml   Net 992 ml         General appearance: 84 yo male, ill appearing, no acute distress  Head: Bilateral temporal atrophy, normocephalic, without obvious abnormality, atraumatic  Eyes: conjunctivae/corneas clear. PERRL, EOM's intact.   Ears/Nose: normal external ears and nose  Neck/Throat: supple, symmetrical, trachea midline   Pulmonary: Diminished to auscultation bilaterally, unlabored on room air  Cardiovascular: Irregular rhythm and rate controlled  Gastrointestinal:soft, non-tender; non-distended, bowel sounds hyperactive    Musculoskeletal:No lower extremity edema,  No erythema, no tenderness to palpation  Skin: warm, dry, pale  Neurologic: Alert and oriented X 4, generalized weakness, speech clear, follows commands  Psychiatric: Flat, cooperative with PE    Medications:          fentaNYL  1 patch TransDERmal Q72H    sodium bicarbonate  325 mg PEG Tube Daily    pravastatin  40 mg PEG Tube Nightly    cyanocobalamin  500 mcg Per G Tube Daily    polyethylene

## 2024-05-23 NOTE — PLAN OF CARE
Problem: Chronic Conditions and Co-morbidities  Goal: Patient's chronic conditions and co-morbidity symptoms are monitored and maintained or improved  5/23/2024 1623 by Mahsa Erickson RN  Outcome: Progressing  5/23/2024 0311 by Ángel Hdez RN  Outcome: Progressing     Problem: Safety - Adult  Goal: Free from fall injury  5/23/2024 1623 by Mahsa Erickson RN  Outcome: Progressing  5/23/2024 0311 by Ángel Hdez RN  Outcome: Progressing     Problem: Nutrition Deficit:  Goal: Optimize nutritional status  5/23/2024 1623 by Mahsa Erickson RN  Outcome: Progressing  5/23/2024 0311 by Ángel Hdez RN  Outcome: Progressing     Problem: Discharge Planning  Goal: Discharge to home or other facility with appropriate resources  5/23/2024 1623 by Mahsa Erickson RN  Outcome: Progressing  5/23/2024 0311 by Ángel Hdez RN  Outcome: Progressing     Problem: Pain  Goal: Verbalizes/displays adequate comfort level or baseline comfort level  5/23/2024 1623 by Mahsa Erickson RN  Outcome: Progressing  5/23/2024 0311 by Ángel Hdez RN  Outcome: Progressing     Problem: Skin/Tissue Integrity  Goal: Absence of new skin breakdown  Description: 1.  Monitor for areas of redness and/or skin breakdown  2.  Assess vascular access sites hourly  3.  Every 4-6 hours minimum:  Change oxygen saturation probe site  4.  Every 4-6 hours:  If on nasal continuous positive airway pressure, respiratory therapy assess nares and determine need for appliance change or resting period.  5/23/2024 1623 by Mahsa Erickson RN  Outcome: Progressing  5/23/2024 0311 by Ángel Hdez RN  Outcome: Progressing     Problem: ABCDS Injury Assessment  Goal: Absence of physical injury  5/23/2024 1623 by Mahsa Erickson RN  Outcome: Progressing  5/23/2024 0311 by Ángel Hdez RN  Outcome: Progressing

## 2024-05-23 NOTE — PLAN OF CARE

## 2024-05-23 NOTE — PROGRESS NOTES
exam and includes at least one of the following:  Automated exposure control, adjustment of the mA and/or kV according to size, and the use of iterative reconstruction technique.  All CT scans are performed using dose optimization techniques as appropriate to the performed exam and include at least one of the following: Automated exposure control, adjustment of the mA and/or kV according to size, and the use of iterative reconstruction technique.  ______________________________________ Electronically signed by: RUDOLPH PALENCIA M.D. Date:     05/15/2024 Time:    12:00     XR CHEST 1 VIEW    Result Date: 5/14/2024  Radiology exam is complete. No Radiologist dictation. Please follow up with ordering provider.     EGD    Result Date: 5/14/2024  No dictation     EGD    Result Date: 5/13/2024  No dictation     EGD    Result Date: 5/13/2024  No dictation     XR CHEST 1 VIEW    Result Date: 5/10/2024  Radiology exam is complete. No Radiologist dictation. Please follow up with ordering provider.     Nuclear stress test with myocardial perfusion    Result Date: 5/10/2024    Stress Function: Post-stress ejection fraction is 60%.   Perfusion Comments: LV perfusion is normal.   ECG: Resting ECG demonstrates atrial fibrillation.   Stress Test: A pharmacological stress test was performed using regadenoson (Lexiscan). The patient reported dyspnea and nausea during the stress test. The patient reached the end of the protocol.   Stress ECG: Arrhythmias during stress: frequent PVCs. There were no noted arrhythmias during recovery.   Resting ECG: The ECG shows atrial fibrillation.     XR CHEST PORTABLE    Result Date: 5/6/2024  EXAM: CHEST RADIOGRAPH  TECHNIQUE: Single frontal chest radiograph.  HISTORY: Cough  COMPARISON: 04/23/2024      No acute findings. The heart size is normal. Mediastinal contours and pulmonary vasculature are within normal limits. The lungs are clear. There is no pleural effusion. There is no pneumothorax. Post  and addressed all questions/concerns  Discussed with Patient and Family at the bedside         Discharge planning: tbd  Palliative care/hospice on board, for ongoing goals of care discussion.  Patient and family considering going home with hospice.    Multiple complex medical problems  Morbidity mortality high risk  Medical decision making High complexity         Electronically signed by   John Bowens MD,   Internal Medicine Hospitalist   5/23/2024 12:05 PM

## 2024-05-24 VITALS
TEMPERATURE: 97.5 F | WEIGHT: 188.38 LBS | SYSTOLIC BLOOD PRESSURE: 171 MMHG | RESPIRATION RATE: 20 BRPM | HEIGHT: 72 IN | BODY MASS INDEX: 25.52 KG/M2 | HEART RATE: 68 BPM | DIASTOLIC BLOOD PRESSURE: 89 MMHG | OXYGEN SATURATION: 93 %

## 2024-05-24 LAB
ALBUMIN SERPL-MCNC: 2.7 G/DL (ref 3.5–5.2)
ALP SERPL-CCNC: 54 U/L (ref 40–130)
ALT SERPL-CCNC: 10 U/L (ref 5–41)
ANION GAP SERPL CALCULATED.3IONS-SCNC: 6 MMOL/L (ref 7–19)
AST SERPL-CCNC: 20 U/L (ref 5–40)
BASOPHILS # BLD: 0.1 K/UL (ref 0–0.2)
BASOPHILS NFR BLD: 0.8 % (ref 0–1)
BILIRUB SERPL-MCNC: 0.3 MG/DL (ref 0.2–1.2)
BUN SERPL-MCNC: 18 MG/DL (ref 8–23)
CALCIUM SERPL-MCNC: 8.3 MG/DL (ref 8.8–10.2)
CHLORIDE SERPL-SCNC: 112 MMOL/L (ref 98–111)
CO2 SERPL-SCNC: 25 MMOL/L (ref 22–29)
CREAT SERPL-MCNC: 2.3 MG/DL (ref 0.5–1.2)
EOSINOPHIL # BLD: 0.3 K/UL (ref 0–0.6)
EOSINOPHIL NFR BLD: 4.6 % (ref 0–5)
ERYTHROCYTE [DISTWIDTH] IN BLOOD BY AUTOMATED COUNT: 13.5 % (ref 11.5–14.5)
GLUCOSE BLD-MCNC: 110 MG/DL (ref 70–99)
GLUCOSE BLD-MCNC: 133 MG/DL (ref 70–99)
GLUCOSE SERPL-MCNC: 118 MG/DL (ref 74–109)
HCT VFR BLD AUTO: 28.8 % (ref 42–52)
HGB BLD-MCNC: 8.7 G/DL (ref 14–18)
IMM GRANULOCYTES # BLD: 0.1 K/UL
LYMPHOCYTES # BLD: 0.9 K/UL (ref 1.1–4.5)
LYMPHOCYTES NFR BLD: 14.2 % (ref 20–40)
MCH RBC QN AUTO: 29.4 PG (ref 27–31)
MCHC RBC AUTO-ENTMCNC: 30.2 G/DL (ref 33–37)
MCV RBC AUTO: 97.3 FL (ref 80–94)
MONOCYTES # BLD: 0.5 K/UL (ref 0–0.9)
MONOCYTES NFR BLD: 8.3 % (ref 0–10)
NEUTROPHILS # BLD: 4.5 K/UL (ref 1.5–7.5)
NEUTS SEG NFR BLD: 71.3 % (ref 50–65)
PERFORMED ON: ABNORMAL
PERFORMED ON: ABNORMAL
PLATELET # BLD AUTO: 132 K/UL (ref 130–400)
PMV BLD AUTO: 10.5 FL (ref 9.4–12.4)
POTASSIUM SERPL-SCNC: 4.2 MMOL/L (ref 3.5–5)
PROT SERPL-MCNC: 4.9 G/DL (ref 6.6–8.7)
RBC # BLD AUTO: 2.96 M/UL (ref 4.7–6.1)
SODIUM SERPL-SCNC: 143 MMOL/L (ref 136–145)
WBC # BLD AUTO: 6.3 K/UL (ref 4.8–10.8)

## 2024-05-24 PROCEDURE — 80053 COMPREHEN METABOLIC PANEL: CPT

## 2024-05-24 PROCEDURE — 6370000000 HC RX 637 (ALT 250 FOR IP)

## 2024-05-24 PROCEDURE — 2580000003 HC RX 258: Performed by: SURGERY

## 2024-05-24 PROCEDURE — 94760 N-INVAS EAR/PLS OXIMETRY 1: CPT

## 2024-05-24 PROCEDURE — 36415 COLL VENOUS BLD VENIPUNCTURE: CPT

## 2024-05-24 PROCEDURE — 85025 COMPLETE CBC W/AUTO DIFF WBC: CPT

## 2024-05-24 PROCEDURE — 99232 SBSQ HOSP IP/OBS MODERATE 35: CPT

## 2024-05-24 PROCEDURE — 94640 AIRWAY INHALATION TREATMENT: CPT

## 2024-05-24 PROCEDURE — 6370000000 HC RX 637 (ALT 250 FOR IP): Performed by: SURGERY

## 2024-05-24 PROCEDURE — 99233 SBSQ HOSP IP/OBS HIGH 50: CPT | Performed by: INTERNAL MEDICINE

## 2024-05-24 PROCEDURE — 6370000000 HC RX 637 (ALT 250 FOR IP): Performed by: INTERNAL MEDICINE

## 2024-05-24 PROCEDURE — 6360000002 HC RX W HCPCS: Performed by: SURGERY

## 2024-05-24 PROCEDURE — 82962 GLUCOSE BLOOD TEST: CPT

## 2024-05-24 RX ORDER — METOCLOPRAMIDE 5 MG/1
5 TABLET ORAL 3 TIMES DAILY
Qty: 120 TABLET | Refills: 3 | Status: SHIPPED | OUTPATIENT
Start: 2024-05-24

## 2024-05-24 RX ORDER — LANSOPRAZOLE 30 MG/1
30 TABLET, ORALLY DISINTEGRATING, DELAYED RELEASE ORAL 2 TIMES DAILY
Qty: 60 TABLET | Refills: 0 | Status: SHIPPED | OUTPATIENT
Start: 2024-05-24 | End: 2024-06-23

## 2024-05-24 RX ORDER — FOLIC ACID 1 MG/1
1 TABLET ORAL DAILY
Qty: 30 TABLET | Refills: 3 | Status: SHIPPED | OUTPATIENT
Start: 2024-05-25

## 2024-05-24 RX ORDER — OXYCODONE HCL 5 MG/5 ML
10 SOLUTION, ORAL ORAL EVERY 4 HOURS PRN
Qty: 30 ML | Refills: 0 | Status: SHIPPED | OUTPATIENT
Start: 2024-05-24 | End: 2024-05-27

## 2024-05-24 RX ORDER — AMLODIPINE BESYLATE 5 MG/1
5 TABLET ORAL DAILY
Qty: 30 TABLET | Refills: 1 | Status: SHIPPED | OUTPATIENT
Start: 2024-05-25

## 2024-05-24 RX ORDER — POLYETHYLENE GLYCOL 3350 17 G/17G
17 POWDER, FOR SOLUTION ORAL DAILY
Qty: 10 PACKET | Refills: 0 | Status: SHIPPED | OUTPATIENT
Start: 2024-05-25 | End: 2024-06-04

## 2024-05-24 RX ORDER — ISOSORBIDE DINITRATE 20 MG/1
20 TABLET ORAL 2 TIMES DAILY
Qty: 90 TABLET | Refills: 1 | Status: SHIPPED | OUTPATIENT
Start: 2024-05-24

## 2024-05-24 RX ORDER — MORPHINE SULFATE 20 MG/ML
5 SOLUTION ORAL EVERY 6 HOURS PRN
Qty: 2 ML | Refills: 0 | Status: SHIPPED | OUTPATIENT
Start: 2024-05-24 | End: 2024-05-24

## 2024-05-24 RX ORDER — MORPHINE SULFATE 20 MG/ML
5 SOLUTION ORAL EVERY 6 HOURS PRN
Qty: 30 ML | Refills: 0 | Status: SHIPPED | OUTPATIENT
Start: 2024-05-24 | End: 2024-06-23

## 2024-05-24 RX ORDER — LACTULOSE 10 G/15ML
20 SOLUTION ORAL 2 TIMES DAILY
Qty: 1800 ML | Refills: 0 | Status: SHIPPED | OUTPATIENT
Start: 2024-05-24 | End: 2024-06-23

## 2024-05-24 RX ADMIN — POLYETHYLENE GLYCOL 3350 17 G: 17 POWDER, FOR SOLUTION ORAL at 09:13

## 2024-05-24 RX ADMIN — ONDANSETRON 4 MG: 2 INJECTION INTRAMUSCULAR; INTRAVENOUS at 06:12

## 2024-05-24 RX ADMIN — HYDROMORPHONE HYDROCHLORIDE 0.5 MG: 1 INJECTION, SOLUTION INTRAMUSCULAR; INTRAVENOUS; SUBCUTANEOUS at 16:31

## 2024-05-24 RX ADMIN — FLUTICASONE PROPIONATE 2 SPRAY: 50 SPRAY, METERED NASAL at 09:13

## 2024-05-24 RX ADMIN — OXYCODONE HYDROCHLORIDE 15 MG: 5 SOLUTION ORAL at 14:10

## 2024-05-24 RX ADMIN — SODIUM BICARBONATE 325 MG: 325 TABLET ORAL at 09:13

## 2024-05-24 RX ADMIN — SODIUM CHLORIDE, PRESERVATIVE FREE 10 ML: 5 INJECTION INTRAVENOUS at 09:24

## 2024-05-24 RX ADMIN — ASPIRIN 81 MG: 81 TABLET, CHEWABLE ORAL at 09:13

## 2024-05-24 RX ADMIN — Medication 2 PUFF: at 06:39

## 2024-05-24 RX ADMIN — METOPROLOL TARTRATE 12.5 MG: 25 TABLET, FILM COATED ORAL at 09:13

## 2024-05-24 RX ADMIN — OXYCODONE HYDROCHLORIDE 10 MG: 5 SOLUTION ORAL at 09:12

## 2024-05-24 RX ADMIN — Medication: at 09:15

## 2024-05-24 RX ADMIN — FOLIC ACID 1 MG: 1 TABLET ORAL at 09:13

## 2024-05-24 RX ADMIN — AMLODIPINE BESYLATE 5 MG: 5 TABLET ORAL at 09:13

## 2024-05-24 RX ADMIN — ENOXAPARIN SODIUM 30 MG: 100 INJECTION SUBCUTANEOUS at 09:14

## 2024-05-24 RX ADMIN — CYANOCOBALAMIN TAB 500 MCG 500 MCG: 500 TAB at 09:13

## 2024-05-24 RX ADMIN — LANSOPRAZOLE 30 MG: 30 TABLET, ORALLY DISINTEGRATING, DELAYED RELEASE ORAL at 09:13

## 2024-05-24 RX ADMIN — METOCLOPRAMIDE 5 MG: 10 TABLET ORAL at 09:13

## 2024-05-24 RX ADMIN — ISOSORBIDE DINITRATE 20 MG: 10 TABLET ORAL at 09:13

## 2024-05-24 RX ADMIN — ONDANSETRON 4 MG: 4 TABLET, ORALLY DISINTEGRATING ORAL at 09:13

## 2024-05-24 RX ADMIN — METOCLOPRAMIDE 5 MG: 10 TABLET ORAL at 14:10

## 2024-05-24 ASSESSMENT — PAIN DESCRIPTION - ORIENTATION
ORIENTATION: RIGHT

## 2024-05-24 ASSESSMENT — PAIN DESCRIPTION - LOCATION
LOCATION: ABDOMEN

## 2024-05-24 ASSESSMENT — PAIN DESCRIPTION - DESCRIPTORS
DESCRIPTORS: ACHING;DISCOMFORT

## 2024-05-24 ASSESSMENT — PAIN SCALES - GENERAL
PAINLEVEL_OUTOF10: 6
PAINLEVEL_OUTOF10: 7
PAINLEVEL_OUTOF10: 5
PAINLEVEL_OUTOF10: 7

## 2024-05-24 ASSESSMENT — PAIN DESCRIPTION - FREQUENCY
FREQUENCY: CONTINUOUS

## 2024-05-24 ASSESSMENT — PAIN DESCRIPTION - PAIN TYPE
TYPE: CHRONIC PAIN

## 2024-05-24 NOTE — CARE COORDINATION
DEVON spoke to pt daughter, equipment is being set up at 2pm today (5/24/24)  Electronically signed by NIDHI Joshua on 5/24/2024 at 1:15 PM

## 2024-05-24 NOTE — DISCHARGE SUMMARY
medication strength  how much to take  how to take this            CONTINUE taking these medications      aspirin 81 MG EC tablet     budesonide-formoterol 160-4.5 MCG/ACT Aero  Commonly known as: SYMBICORT     fentaNYL 50 MCG/HR  Commonly known as: DURAGESIC  Place 1 patch onto the skin every 72 hours for 30 days. Max Daily Amount: 1 patch     fluticasone 50 MCG/ACT nasal spray  Commonly known as: FLONASE     furosemide 40 MG tablet  Commonly known as: LASIX     hydrALAZINE 100 MG tablet  Commonly known as: APRESOLINE     lisinopril 20 MG tablet  Commonly known as: PRINIVIL;ZESTRIL     nitroGLYCERIN 0.4 MG SL tablet  Commonly known as: NITROSTAT     ondansetron 4 MG disintegrating tablet  Commonly known as: ZOFRAN-ODT  Take 1 tablet by mouth 3 times daily as needed for Nausea or Vomiting     pravastatin 40 MG tablet  Commonly known as: PRAVACHOL     sodium bicarbonate 650 MG tablet     terazosin 1 MG capsule  Commonly known as: HYTRIN            STOP taking these medications      guaiFENesin 600 MG extended release tablet  Commonly known as: MUCINEX     oxyCODONE-acetaminophen 7.5-325 MG per tablet  Commonly known as: Percocet     pantoprazole 40 MG tablet  Commonly known as: PROTONIX               Where to Get Your Medications        These medications were sent to [id number] - [place], [place] - [address] [place] [address] [address] [facility] -  [phone] - F [phone]  [address] [place] [address] [address] [place] [id number]      Phone: [phone]   amLODIPine 5 MG tablet  cyanocobalamin 500 MCG tablet  folic acid 1 MG tablet  isosorbide dinitrate 20 MG tablet  lactulose 10 GM/15ML solution  lansoprazole 30 MG disintegrating tablet  metoclopramide 5 MG tablet  metoprolol tartrate 25 MG tablet  morphine 20MG/ML Soln concentrated solution  oxyCODONE 5 MG/5ML solution  polyethylene glycol 17 g packet           Discharge Instructions:   Follow up with Irving Larsen DO in 7 days.  Take medications as  counseling and review of medications and discharge plan.     Electronically signed by   John Bowens MD,   Internal Medicine Hospitalist   5/24/2024 2:30 PM      Thank you Irving Larsen DO for the opportunity to be involved in this patient's care. If you have any questions or concerns please feel free to contact me at [phone]        EMR Dragon/Transcription disclaimer:   Much of this encounter note is an electronic transcription/translation of spoken language to printed text. The electronic translation of spoken language may permit erroneous, or at times, nonsensical words or phrases to be inadvertently transcribed; although attempts have made to review the note for such errors, some may still exist.

## 2024-05-24 NOTE — PROGRESS NOTES
Palliative Care Progress Note  5/24/2024 7:31 AM    Patient:  Ming Rocha  YOB: 1940  Primary Care Physician: Irving Larsen DO  Advance Directive: DNR  Admit Date: 5/6/2024       Hospital Day: 18  Portions of this note have been copied forward, however, changed to reflect the most current clinical status of this patient.    CHIEF COMPLAINT/REASON FOR CONSULTATION Goals of care, family support, Code status, symptom management     SUBJECTIVE:  Mr. Rocha is alert and resting comfortably in bed.  Pain has been better with breakthrough regimen adjustments.  Anticipating discharge home with outpatient hospice today.    HPI:  The patient is a 83 y.o. male with PMH HTN, HLD, COPD, CKD IV, CVA with right sided residual weakness, CAD s/p CABG 2020, recent esophageal cancer diagnosis, and other comorbidities who presented to [facility]  5/6/2024 as a direct admit from general surgery office. 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 Christopher.  Patient has been having ongoing issues with p.o. intake without improvement following stent placement and concern for failure to thrive.  He has been referred to radiation oncology.  He was seen in general surgery office on day of admission for recommended port and G-tube placement but while there he was having uncontrolled pain and was recommended for inpatient admission.  Of note he was recently started on 50 mcg fentanyl patch by oncology team last week.  On arrival patient also endorsed ongoing issues with weakness, fatigue, and 40 to 50 pound weight loss since November of last year.  Patient also has a known left kidney mass currently being monitored with serial imaging but cannot rule out malignancy, noted interval growth over the last few years.  Laboratory studies on arrival revealed evidence of hypokalemia with K+ 2.9, low total protein, WBC

## 2024-05-24 NOTE — PROGRESS NOTES
MEDICAL ONCOLOGY PROGRESS NOTE    Pt Name: Ming Rocha  MRN: [id number]  YOB: 1940  Date of evaluation: 5/24/2024    Subjective-reviewed nutrition and internal medicine notes.  Arrangements in process for tube feedings at home with [facility].  Advancing diet.    POD #14 surgical g-tube placement by Dr. Blankenship on 5/10/2024  POD #10 Upper Endoscopy with manipulation and repositioning of Esophageal stent across known malignant esophageal mass by Dr. Claudio 5/14/2024    Tube feedings ongoing through surgical G-tube and tolerating it well.      HISTORY OF PRESENT ILLNESS:  Ming Rocha is well-known to our clinic.  He is a patient established with Dr. Seth Christopher.  He was last seen on 5/2/2024.  The patient has a new diagnosis of esophageal cancer of the distal esophagus status post esophageal stent placement.  He is having significant issues with p.o. intake after the stent placement.  He was referred back to Dr. Claudio for stent adjustment.  In addition, recommend port placement with general surgery as well as surgical jejunostomy feeding tube.  Referral to radiation oncology has been made.  Patient presents today hospital as a direct admission from Dr. Blankenship's office.  Patient had uncontrolled pain and therefore was admitted to the inpatient for pain control.  Patient has generalized weakness, fatigue and significant pain related to his cancer.  Patient was started on Duragesic patch 50 mcg every 3 days by Dr. Christopher last week.    Laboratory studies at admission today showed evidence of hypokalemia with potassium 2.9, low total protein.  WBC 10.1, hemoglobin 13.7, platelet 181,000    Esophagram performed 5/2/2024 showed a filling defect in the end of the stent like represent tumor growth    Chest x-ray-no acute findings      PRIOR ONCOLOGICAL HISTORY     Ming Rocha is an 83-year-old  gentleman with primary and secondary diagnoses as outlined  Poorly differentiated adenocarcinoma of

## 2024-05-24 NOTE — CARE COORDINATION
05/24/24 0657   IMM Letter   IMM Letter given to Patient/Family/Significant other/Guardian/POA/by: Pt is going home with hospice, IMM is not appropriate.  NIDHI Joshua   IMM Letter date given: 05/24/24   IMM Letter time given: 0620     Electronically signed by NIDHI Joshua on 5/24/2024 at 6:58 AM

## 2024-05-24 NOTE — PROGRESS NOTES
CLINICAL PHARMACY NOTE: MEDS TO BEDS    Total # of Prescriptions Filled: 9   The following medications were delivered to the patient:  Current Discharge Medication List        START taking these medications    Details   oxyCODONE (ROXICODONE) 5 MG/5ML solution 10 mLs by Per G Tube route every 4 hours as needed for Pain for up to 3 days. Max Daily Amount: 60 mg  Qty: 30 mL, Refills: 0    Comments: Reduce doses taken as pain becomes manageable  Associated Diagnoses: Cancer related pain      isosorbide dinitrate (ISORDIL) 5 MG tablet 4 tablets by PEG Tube route 2 times daily  Qty: 80 tablet, Refills: 1      folic acid (FOLVITE) 1 MG tablet 1 tablet by PEG Tube route daily  Qty: 30 tablet, Refills: 3      lactulose (CHRONULAC) 10 GM/15ML solution 30 mLs by PEG Tube route 2 times daily  Qty: 1150 mL, Refills: 0      polyethylene glycol (GLYCOLAX) 17 grams by Per G Tube route daily for 10 days  Qty: 10 packet, Refills: 0      metoclopramide (REGLAN) 5 MG tablet 1 tablet by PEG Tube route in the morning, at noon, and at bedtime  Qty: 120 tablet, Refills: 3      lansoprazole (PREVACID) 30 MG capsule 1 capsule by PEG Tube route in the morning and at bedtime  Qty: 60 tablet, Refills: 0      Morphine Sulfate (MORPHINE 20MG/ML) SOLN concentrated solution Take 0.25 mLs by mouth every 6 hours as needed for Pain for up to 30 days. Max Daily Amount: 20 mg  Qty: 30 mL, Refills: 0    Comments: Reduce doses taken as pain becomes manageable  Associated Diagnoses: Cancer related pain      Amlodipine 5mg- take 1 tablet per peg tube #30    Metoprolol tartrate 25mg- take 0.5 tablet by peg tube 2 times daily #60    Vitamin b-12 500mcg- take 1 tablet by peg tube daily #30         Did not fill miralax- otc or roxicodone solution- did not have    Additional Documentation:  Delivered to patients room and handed to patients family. Daughter paid with card over the phone

## 2024-05-24 NOTE — PLAN OF CARE
Problem: Chronic Conditions and Co-morbidities  Goal: Patient's chronic conditions and co-morbidity symptoms are monitored and maintained or improved  5/23/2024 2203 by Mai Mcleod RN  Outcome: Progressing  5/23/2024 1623 by Mahsa Erickson RN  Outcome: Progressing     Problem: Safety - Adult  Goal: Free from fall injury  5/23/2024 2203 by Mai Mcleod RN  Outcome: Progressing  5/23/2024 1623 by Mahsa Erickson RN  Outcome: Progressing     Problem: Nutrition Deficit:  Goal: Optimize nutritional status  5/23/2024 2203 by Mai Mcleod RN  Outcome: Progressing  5/23/2024 1623 by Mahsa Erickson RN  Outcome: Progressing     Problem: Discharge Planning  Goal: Discharge to home or other facility with appropriate resources  5/23/2024 2203 by Mai Mcleod RN  Outcome: Progressing  5/23/2024 1623 by Mahsa Erickson RN  Outcome: Progressing     Problem: Pain  Goal: Verbalizes/displays adequate comfort level or baseline comfort level  5/23/2024 2203 by Mai Mcleod RN  Outcome: Progressing  5/23/2024 1623 by Mahsa Erickson RN  Outcome: Progressing     Problem: Skin/Tissue Integrity  Goal: Absence of new skin breakdown  Description: 1.  Monitor for areas of redness and/or skin breakdown  2.  Assess vascular access sites hourly  3.  Every 4-6 hours minimum:  Change oxygen saturation probe site  4.  Every 4-6 hours:  If on nasal continuous positive airway pressure, respiratory therapy assess nares and determine need for appliance change or resting period.  5/23/2024 2203 by Mai Mcleod RN  Outcome: Progressing  5/23/2024 1623 by Mahsa Erickson RN  Outcome: Progressing     Problem: ABCDS Injury Assessment  Goal: Absence of physical injury  5/23/2024 2203 by Mai Mcleod RN  Outcome: Progressing  5/23/2024 1623 by Mahsa Erickson RN  Outcome: Progressing

## 2024-05-24 NOTE — PLAN OF CARE
Problem: Chronic Conditions and Co-morbidities  Goal: Patient's chronic conditions and co-morbidity symptoms are monitored and maintained or improved  Outcome: Adequate for Discharge     Problem: Safety - Adult  Goal: Free from fall injury  Outcome: Adequate for Discharge     Problem: Nutrition Deficit:  Goal: Optimize nutritional status  Outcome: Adequate for Discharge     Problem: Discharge Planning  Goal: Discharge to home or other facility with appropriate resources  Outcome: Adequate for Discharge     Problem: Pain  Goal: Verbalizes/displays adequate comfort level or baseline comfort level  Outcome: Adequate for Discharge     Problem: Skin/Tissue Integrity  Goal: Absence of new skin breakdown  Description: 1.  Monitor for areas of redness and/or skin breakdown  2.  Assess vascular access sites hourly  3.  Every 4-6 hours minimum:  Change oxygen saturation probe site  4.  Every 4-6 hours:  If on nasal continuous positive airway pressure, respiratory therapy assess nares and determine need for appliance change or resting period.  Outcome: Adequate for Discharge     Problem: ABCDS Injury Assessment  Goal: Absence of physical injury  Outcome: Adequate for Discharge

## 2024-05-24 NOTE — PROGRESS NOTES
Comprehensive Nutrition Assessment    Type and Reason for Visit:  Reassess    Nutrition Recommendations/Plan:   Continue POC.     Malnutrition Assessment:  Malnutrition Status:  Severe malnutrition (05/18/24 0939)    Context:  Chronic Illness     Findings of the 6 clinical characteristics of malnutrition:  Energy Intake:  Mild decrease in energy intake (Comment)  Weight Loss:  No significant weight loss     Body Fat Loss:  Severe body fat loss Triceps   Muscle Mass Loss:  Severe muscle mass loss Temples (temporalis)  Fluid Accumulation:  No significant fluid accumulation     Strength:  Not Performed    Nutrition Assessment:    Per documentation, TF continues at 50 mL/hr with 30 mL/hr water flush. Residuals= 15, 65, 0 mL. Aware of plan to d/c home today with hospice.    Nutrition Related Findings:    BM 5/23. +1 BLE edema. Glu 115-118, accuchek's 110-133. Wound Type: Surgical Incision       Current Nutrition Intake & Therapies:    Average Meal Intake: NPO  Average Supplements Intake: NPO  Diet NPO Exceptions are: Sips of Water with Meds, Ice Chips  ADULT TUBE FEEDING; Gastrostomy; Other Tube Feeding (specify); Jevity 1.2; Continuous; 50; Yes; 10; Q 8 hours; 75; 30; Q 1 hour  Current Tube Feeding (TF) Orders:  Feeding Route: Gastrostomy  Formula: Other Tube Feeding (Comment) (Jevity 1.2)  Schedule: Continuous  Feeding Regimen: Jevity 1.2 with a goal rate of 75 mL/hr  Additives/Modulars: None  Water Flushes: 30 mL/hr= 720 mL/day  Current TF & Flush Orders Provides: Jevity 1.2 at 50 mL/hr= 1440 kcal, 66 g pro, 203 g CHO, and 1688 mL fluid daily.  Goal TF & Flush Orders Provides: Jevity 1.2 at 75 mL/hr= 2160 kcal, 100 g pro, 305 g CHO, and 1692 mL fluid daily.    Anthropometric Measures:  Height: 182.9 cm (6' 0.01\")  Ideal Body Weight (IBW): 178 lbs (81 kg)    Current Body Weight: 85.4 kg (188 lb 4.4 oz), 104.2 % IBW.    Current BMI (kg/m2): 25.5  Usual Body Weight: 97.4 kg (214 lb 12.8 oz) (11/22/23)  % Weight

## 2024-05-24 NOTE — PALLIATIVE CARE
Confirmed with hospice that DME has been ordered for delivery to pts daughters home, will need to discuss with daughter when she set up time with [facility] for this to arrive to home.     Hospice admission appointment has been scheduled for 5/25/2024 at 4:00 pm. Due to this, attending will need to ensure three day supply of medications, including those for symptom management are prescribed for discharge.     Hayley Mcnamara, URVASHI-CNP

## 2024-05-28 ENCOUNTER — TELEPHONE (OUTPATIENT)
Age: 84
End: 2024-05-28
Payer: MEDICARE

## 2024-05-28 ENCOUNTER — HOSPITAL ENCOUNTER (OUTPATIENT)
Dept: INFUSION THERAPY | Age: 84
Discharge: HOME | End: 2024-05-28

## 2024-05-28 LAB
CYTO UR: NORMAL
LAB AP CASE REPORT: NORMAL
Lab: NORMAL
PATH REPORT.ADDENDUM SPEC: NORMAL
PATH REPORT.FINAL DX SPEC: NORMAL
PATH REPORT.GROSS SPEC: NORMAL

## 2024-05-28 NOTE — TELEPHONE ENCOUNTER
Kaci العراقي with [facility] called to notify that patient has elected Hospice and appt for Radiation Oncology is to be cancelled.

## (undated) DEVICE — PK OPN HEART 30

## (undated) DEVICE — TR BAND RADIAL ARTERY COMPRESSION DEVICE: Brand: TR BAND

## (undated) DEVICE — GLV SURG BIOGEL LTX PF 6 1/2

## (undated) DEVICE — PK SET UP ANES OPN HEART 30

## (undated) DEVICE — SUTURE PERMAHAND SZ 3-0 L18IN NONABSORBABLE BLK L26MM SH C013D

## (undated) DEVICE — SUTURE VICRYL + SZ 3-0 L27IN ABSRB UD L26MM SH 1/2 CIR VCP416H

## (undated) DEVICE — THE CHANNEL CLEANING BRUSH IS A NYLON FLEXI BRUSH ATTACHED TO A FLEXIBLE PLASTIC SHEATH DESIGNED TO SAFELY REMOVE DEBRIS FROM FLEXIBLE ENDOSCOPES.

## (undated) DEVICE — GUIDEWIRE ENDOSCP STR 0.035 INX260 CM STIFF RND DREAMWIRE ST

## (undated) DEVICE — RESERVOIR,SUCTION,100CC,SILICONE: Brand: MEDLINE

## (undated) DEVICE — NEPTUNE E-SEP SMOKE EVACUATION PENCIL, COATED, 70MM BLADE, ROCKER SWITCH: Brand: NEPTUNE E-SEP

## (undated) DEVICE — SUT STL 2/0 CV SH 24IN TPW32

## (undated) DEVICE — SUTURE PDS + SZ 1 L96IN ABSRB VLT L65MM TP-1 1/2 CIR PDP880G

## (undated) DEVICE — VERESS PNEUMOPERITONEUM NEEDLE: Brand: N.A.

## (undated) DEVICE — 1/2 FORCE SURGICAL SPRING CLIP: Brand: STEALTH® SPRING CLIP

## (undated) DEVICE — SENSR O2 OXIMAX FNGR A/ 18IN NONSTR

## (undated) DEVICE — FG-44NR-1 ROTATABLE RAT TOOTH GRASP FORC: Brand: ROTATABLE GRASPING FORCEPS

## (undated) DEVICE — DRAIN,WOUND,ROUND,24FR,5/16",FULL-FLUTED: Brand: MEDLINE

## (undated) DEVICE — Device

## (undated) DEVICE — DEV INFL ALLIANCE2 SYS

## (undated) DEVICE — FRCP BIOP ENDO CAPTURA/PRO SERR SPK 2.8MM 230CM

## (undated) DEVICE — GASTROSTOMY TUBE WITH ENFIT CONNECTOR, 20FR: Brand: GASTROSTOMY TUBE WITH ENFIT CONNECTOR

## (undated) DEVICE — PK TURNOVER RM ADV

## (undated) DEVICE — BLADE 1884004 TRICUT 5PK 4MM: Brand: TRICUT®

## (undated) DEVICE — Device: Brand: RETRACT-O-TAPE 18G X 30.5CM BLUNT NEEDLE

## (undated) DEVICE — SYRINGE MED 10ML SLIP TIP BLNT FILL AND LUERLOCK DISP

## (undated) DEVICE — YANKAUER,BULB TIP WITH VENT: Brand: ARGYLE

## (undated) DEVICE — CANN CO2/O2 NASL A/

## (undated) DEVICE — Device: Brand: DEFENDO AIR/WATER/SUCTION AND BIOPSY VALVE

## (undated) DEVICE — ESOPHAGEAL BALLOON DILATATION CATHETER: Brand: CRE FIXED WIRE

## (undated) DEVICE — BLAKE SILICONE DRAIN, 19 FR ROUND, HUBLESS WITH 1/4" BENDABLE TROCAR: Brand: BLAKE

## (undated) DEVICE — SUTURE PERMAHAND SZ 2-0 L18IN NONABSORBABLE BLK L26MM SH C012D

## (undated) DEVICE — CONMED SCOPE SAVER BITE BLOCK, 20X27 MM: Brand: SCOPE SAVER

## (undated) DEVICE — SUTURE ETHILON SZ 2-0 L30IN NONABSORBABLE BLK L36MM FSLX 3/8 1674H

## (undated) DEVICE — SUP ARMBRD ART/LINE BLU

## (undated) DEVICE — ELECTRD PAD DEFIB A/

## (undated) DEVICE — C-ARM: Brand: UNBRANDED

## (undated) DEVICE — SYS DISTNTION VEIN BONCHEK 300MMHG

## (undated) DEVICE — GLV SURG TRIUMPH MICRO PF LTX 8.5 STRL

## (undated) DEVICE — STERNUM BLADE, OFFSET (32.0 X 0.8 X 6.3MM)

## (undated) DEVICE — SUTURE PDS II SZ 1 L27IN ABSRB VLT CT L40MM 1/2 CIR TAPR Z353H

## (undated) DEVICE — STANDARD HYPODERMIC NEEDLE,POLYPROPYLENE HUB: Brand: MONOJECT

## (undated) DEVICE — INTRODUCER SHTH 0.018 IN 5 FRX15 CM 21 GAX7 CM MINI STK MAX

## (undated) DEVICE — SYS VASOVIEW HEMOPRO ENDOSCOPIC HARVST VESL

## (undated) DEVICE — SOLUTION IRRIG 1000ML 09% SOD CHL USP PIC PLAS CONTAINER

## (undated) DEVICE — MINOR CDS: Brand: MEDLINE INDUSTRIES, INC.

## (undated) DEVICE — VASOVIEW HEMOPRO: Brand: VASOVIEW HEMOPRO

## (undated) DEVICE — SUTURE PDS II SZ 3-0 L27IN ABSRB CLR SH L26MM 1/2 CIR TAPR Z416H

## (undated) DEVICE — SYRINGE, LUER LOCK, 10ML: Brand: MEDLINE

## (undated) DEVICE — PROVE COVER: Brand: UNBRANDED

## (undated) DEVICE — CATHETER FOL 2 W 12 FRX16 IN 5 CC URETH ROUNDED TIP DOVER

## (undated) DEVICE — GOWN, ORBIS, XLARGE, STERILE: Brand: MEDLINE

## (undated) DEVICE — BLAKE SILICONE DRAINS CARDIO CONNECTOR 2:1: Brand: BLAKE

## (undated) DEVICE — AORTIC PUNCHES ARE USED TO CREATE A UNIFORM OPENING IN BLOOD VESSELS DURING CARDIOVASCULAR SURGERY. THE VESSEL GRAFT IS INSERTED INTO THE CREATED OPENING AND SUTURED TO THE VESSEL WALL. AORTIC LANCETS ARE USED TO MAKE A SMALL UNIFORM CUT IN A BLOOD VESSEL TO FACILITATE INSERTION OF AN AORTIC PUNCH.  PUNCHES COME IN VARIOUS LENGTHS, DIAMETERS AND TIP CONFIGURATIONS.: Brand: CLEANCUT ROTATING AORTIC PUNCH

## (undated) DEVICE — ENDO KIT,LOURDES HOSPITAL: Brand: MEDLINE INDUSTRIES, INC.

## (undated) DEVICE — GAUZE,SPONGE,4"X4",16PLY,XRAY,STRL,LF: Brand: MEDLINE

## (undated) DEVICE — RADIFOCUS OPTITORQUE ANGIOGRAPHIC CATHETER: Brand: OPTITORQUE

## (undated) DEVICE — PACK,UNIVERSAL,NO GOWNS: Brand: MEDLINE

## (undated) DEVICE — PK CATH CARD 30 CA/4

## (undated) DEVICE — SOL IRR NACL 0.9PCT BT 1000ML

## (undated) DEVICE — CATH F6INF TL 3DRC 100CM: Brand: INFINITI

## (undated) DEVICE — GOWN, ORBIS, XLNG/XXLARGE, STRL: Brand: MEDLINE

## (undated) DEVICE — DRAPE,UTILITY,XL,4/PK,STERILE: Brand: MEDLINE

## (undated) DEVICE — TOWEL,OR,DSP,ST,BLUE,STD,6/PK,12PK/CS: Brand: MEDLINE

## (undated) DEVICE — SYRINGE 20ML LL S/C 50

## (undated) DEVICE — SKIN AFFIX SURG ADHESIVE 72/CS 0.55ML: Brand: MEDLINE

## (undated) DEVICE — KT ANTI FOG W/FLD AND SPNG

## (undated) DEVICE — YANKAUER,TAPERED BULBOUS TIP,W/O VENT: Brand: MEDLINE

## (undated) DEVICE — Device: Brand: MEDEX

## (undated) DEVICE — GLIDESHEATH SLENDER STAINLESS STEEL KIT: Brand: GLIDESHEATH SLENDER

## (undated) DEVICE — CLTH CLENS READYCLEANSE PERI CARE PK/5

## (undated) DEVICE — GLV SURG BIOGEL LTX PF 7 1/2

## (undated) DEVICE — DECANTER VI VENT W/ VLV FOR ASEP TRNSF OF FLD

## (undated) DEVICE — SOLIDIFIER LIQUI LOC PLUS 2000CC

## (undated) DEVICE — OPTIFOAM GENTLE SA, POSTOP, 4X8: Brand: MEDLINE

## (undated) DEVICE — LIQUIBAND RAPID ADHESIVE 36/CS 0.8ML: Brand: MEDLINE

## (undated) DEVICE — GLOVE SURG SZ 7 CRM LTX FREE POLYISOPRENE POLYMER BEAD ANTI

## (undated) DEVICE — E-PACK PROCEDURE KIT: Brand: E-PACK

## (undated) DEVICE — SUT SILK 2/0 FS BLK 18IN 685G

## (undated) DEVICE — CUFF,BP,DISP,1 TUBE,ADULT,HP: Brand: MEDLINE

## (undated) DEVICE — SUTURE MONOCRYL SZ 4-0 L18IN ABSRB UD L19MM PS-2 3/8 CIR PRIM Y496G